# Patient Record
Sex: FEMALE | Race: BLACK OR AFRICAN AMERICAN
[De-identification: names, ages, dates, MRNs, and addresses within clinical notes are randomized per-mention and may not be internally consistent; named-entity substitution may affect disease eponyms.]

---

## 2017-01-21 ENCOUNTER — HOSPITAL ENCOUNTER (INPATIENT)
Dept: HOSPITAL 72 - EMR | Age: 62
LOS: 9 days | Discharge: HOME HEALTH | DRG: 388 | End: 2017-01-30
Payer: MEDICARE

## 2017-01-21 VITALS — SYSTOLIC BLOOD PRESSURE: 110 MMHG | DIASTOLIC BLOOD PRESSURE: 63 MMHG

## 2017-01-21 VITALS — HEIGHT: 66 IN | BODY MASS INDEX: 26.36 KG/M2 | WEIGHT: 164 LBS

## 2017-01-21 VITALS — DIASTOLIC BLOOD PRESSURE: 61 MMHG | SYSTOLIC BLOOD PRESSURE: 110 MMHG

## 2017-01-21 VITALS — DIASTOLIC BLOOD PRESSURE: 64 MMHG | SYSTOLIC BLOOD PRESSURE: 119 MMHG

## 2017-01-21 DIAGNOSIS — G40.909: ICD-10-CM

## 2017-01-21 DIAGNOSIS — K59.00: ICD-10-CM

## 2017-01-21 DIAGNOSIS — J44.1: ICD-10-CM

## 2017-01-21 DIAGNOSIS — M54.16: ICD-10-CM

## 2017-01-21 DIAGNOSIS — K50.90: ICD-10-CM

## 2017-01-21 DIAGNOSIS — G93.40: ICD-10-CM

## 2017-01-21 DIAGNOSIS — K86.1: ICD-10-CM

## 2017-01-21 DIAGNOSIS — I10: ICD-10-CM

## 2017-01-21 DIAGNOSIS — K52.9: ICD-10-CM

## 2017-01-21 DIAGNOSIS — I50.9: ICD-10-CM

## 2017-01-21 DIAGNOSIS — Z86.73: ICD-10-CM

## 2017-01-21 DIAGNOSIS — K56.7: Primary | ICD-10-CM

## 2017-01-21 DIAGNOSIS — I95.9: ICD-10-CM

## 2017-01-21 DIAGNOSIS — F41.9: ICD-10-CM

## 2017-01-21 DIAGNOSIS — F31.81: ICD-10-CM

## 2017-01-21 DIAGNOSIS — F11.20: ICD-10-CM

## 2017-01-21 DIAGNOSIS — J84.9: ICD-10-CM

## 2017-01-21 DIAGNOSIS — E86.0: ICD-10-CM

## 2017-01-21 LAB
ALBUMIN/GLOB SERPL: 0.6 {RATIO} (ref 1–2.7)
ALT SERPL-CCNC: 9 U/L (ref 3–33)
ANION GAP SERPL CALC-SCNC: 21 MMOL/L (ref 5–15)
APPEARANCE UR: (no result)
APTT BLD: 29 SEC (ref 23–33)
AST SERPL-CCNC: 19 U/L (ref 5–40)
BACTERIA #/AREA URNS HPF: (no result) /HPF
BASOPHILS NFR BLD AUTO: 0.9 % (ref 0–2)
CALCIUM SERPL-MCNC: 10.6 MG/DL (ref 8.6–10.2)
CHLORIDE SERPL-SCNC: 99 MEQ/L (ref 98–107)
CK MB SERPL-MCNC: 2.9 NG/ML (ref ?–3.8)
CO2 SERPL-SCNC: 15 MEQ/L (ref 20–30)
CREAT SERPL-MCNC: 1.5 MG/DL (ref 0.5–0.9)
EOSINOPHIL NFR BLD AUTO: 1 % (ref 0–3)
ERYTHROCYTE [DISTWIDTH] IN BLOOD BY AUTOMATED COUNT: 17.6 % (ref 11.6–14.8)
GFR SERPLBLD BASED ON 1.73 SQ M-ARVRAT: 42.5 ML/MIN (ref 60–?)
GLOBULIN SER-MCNC: 6.5 G/DL
HEMOLYSIS: 49
ICTOTEST: NEGATIVE
INR PPP: 1.1 (ref 0.9–1.1)
KETONES UR QL STRIP: NEGATIVE
LEUKOCYTE ESTERASE UR QL STRIP: (no result)
LIPASE SERPL-CCNC: 18 U/L (ref ?–60)
LYMPHOCYTES NFR BLD AUTO: 14 % (ref 20–45)
MCH RBC QN AUTO: 26.6 PG (ref 27–31)
MCHC RBC AUTO-ENTMCNC: 30 G/DL (ref 32–36)
MCV RBC AUTO: 88 FL (ref 80–99)
MONOCYTES NFR BLD AUTO: 6.1 % (ref 1–10)
NEUTROPHILS NFR BLD AUTO: 78 % (ref 45–75)
NITRITE UR QL STRIP: NEGATIVE
PH UR STRIP: 6 [PH] (ref 4.5–8)
PLATELET # BLD: 372 K/UL (ref 150–450)
PMV BLD AUTO: 6.5 FL (ref 6.5–10.1)
POTASSIUM SERPL-SCNC: 4.5 MEQ/L (ref 3.4–4.9)
PROT SERPL-MCNC: 10.4 G/DL (ref 6.6–8.7)
PROT UR QL STRIP: (no result)
PROTHROMBIN TIME: 11.2 SEC (ref 9.3–11.5)
RBC # BLD AUTO: 5.39 M/UL (ref 4.2–5.4)
RBC #/AREA URNS HPF: (no result) /HPF (ref 0–2)
SODIUM SERPL-SCNC: 135 MEQ/L (ref 135–145)
SP GR UR STRIP: 1.02 (ref 1–1.03)
SQUAMOUS #/AREA URNS LPF: (no result) /LPF
TROPONIN I SERPL-MCNC: < 0.3 NG/ML (ref ?–0.3)
UROBILINOGEN UR-MCNC: NORMAL MG/DL (ref 0–1)
WBC # BLD AUTO: 17.1 K/UL (ref 4.8–10.8)

## 2017-01-21 PROCEDURE — 80299 QUANTITATIVE ASSAY DRUG: CPT

## 2017-01-21 PROCEDURE — 87040 BLOOD CULTURE FOR BACTERIA: CPT

## 2017-01-21 PROCEDURE — 85610 PROTHROMBIN TIME: CPT

## 2017-01-21 PROCEDURE — 94640 AIRWAY INHALATION TREATMENT: CPT

## 2017-01-21 PROCEDURE — 82150 ASSAY OF AMYLASE: CPT

## 2017-01-21 PROCEDURE — 94664 DEMO&/EVAL PT USE INHALER: CPT

## 2017-01-21 PROCEDURE — 87086 URINE CULTURE/COLONY COUNT: CPT

## 2017-01-21 PROCEDURE — 87493 C DIFF AMPLIFIED PROBE: CPT

## 2017-01-21 PROCEDURE — 85025 COMPLETE CBC W/AUTO DIFF WBC: CPT

## 2017-01-21 PROCEDURE — 85730 THROMBOPLASTIN TIME PARTIAL: CPT

## 2017-01-21 PROCEDURE — 81003 URINALYSIS AUTO W/O SCOPE: CPT

## 2017-01-21 PROCEDURE — 71010: CPT

## 2017-01-21 PROCEDURE — 80048 BASIC METABOLIC PNL TOTAL CA: CPT

## 2017-01-21 PROCEDURE — 82550 ASSAY OF CK (CPK): CPT

## 2017-01-21 PROCEDURE — 74176 CT ABD & PELVIS W/O CONTRAST: CPT

## 2017-01-21 PROCEDURE — 84484 ASSAY OF TROPONIN QUANT: CPT

## 2017-01-21 PROCEDURE — 83690 ASSAY OF LIPASE: CPT

## 2017-01-21 PROCEDURE — 93005 ELECTROCARDIOGRAM TRACING: CPT

## 2017-01-21 PROCEDURE — 80053 COMPREHEN METABOLIC PANEL: CPT

## 2017-01-21 PROCEDURE — 36415 COLL VENOUS BLD VENIPUNCTURE: CPT

## 2017-01-21 PROCEDURE — 82553 CREATINE MB FRACTION: CPT

## 2017-01-21 RX ADMIN — DEXTROSE MONOHYDRATE SCH MLS/HR: 50 INJECTION, SOLUTION INTRAVENOUS at 22:08

## 2017-01-21 RX ADMIN — HEPARIN SODIUM SCH UNITS: 5000 INJECTION INTRAVENOUS; SUBCUTANEOUS at 21:00

## 2017-01-21 RX ADMIN — TOPIRAMATE SCH MG: 25 TABLET, COATED ORAL at 19:23

## 2017-01-21 RX ADMIN — DEXTROSE AND SODIUM CHLORIDE SCH MLS/HR: 5; .45 INJECTION, SOLUTION INTRAVENOUS at 16:57

## 2017-01-21 RX ADMIN — MORPHINE SULFATE PRN MG: 2 INJECTION, SOLUTION INTRAMUSCULAR; INTRAVENOUS at 16:54

## 2017-01-21 NOTE — EMERGENCY ROOM REPORT
History of Present Illness


General


Chief Complaint:  Abdominal Pain


Source:  Patient





Present Illness


HPI


Patient presents with complaints of diffuse abdominal pain


Left lower along with left upper quadrant patient has complaints of diarrhea


Along with vomiting as well





Patient has had fairly extensive past medical history including small bowel 

Scioscia in with 2 different surgeries patient has also had Crohn's





Given the increased diarrhea and the pain she was concerning came to the ER





Patient reports being on Bactrim recently


And has 2 days left denies any fevers or chills denies any chest pain or 

shortness of breath abdominal pain is 6/10





Patient's pain management doctor is Dr. skaggs


Allergies:  


Coded Allergies:  


     No Known Allergies (Verified , 10/27/06)





Patient History


Past Medical History:  see triage record


Pertinent Family History:  none


Reviewed Nursing Documentation:  PMH: Agreed, PSxH: Agreed





Nursing Documentation-PMH


Past Medical History:  No History, Except For


Hx Cardiac Problems:  Yes - MI 2016


Hx Hypertension:  No


Hx Pacemaker:  No


Hx Asthma:  Yes


Hx COPD:  Yes


Hx Diabetes:  No


Hx Cancer:  No


Hx Gastrointestinal Problems:  Yes - Chrohns


Hx Dialysis:  No


Hx Neurological Problems:  Yes


Hx Cerebrovascular Accident:  Yes - 2005


Hx Seizures:  Yes


Hx Epilepsy:  Yes


Hx Vertigo:  Yes


Hx Dizziness:  Yes


Hx Syncope:  Yes


Hx Headaches:  Yes


Hx Weakness:  Yes


Hx Fatigue:  Yes





Review of Systems


All Other Systems:  negative except mentioned in HPI





Physical Exam





Vital Signs








  Date Time  Temp Pulse Resp B/P Pulse Ox O2 Delivery O2 Flow Rate FiO2


 


1/21/17 12:26 99.3 77 14 86/50 98 Room Air  








Sp02 EP Interpretation:  reviewed, normal


General Appearance:  well appearing, no apparent distress


Head:  normocephalic, atraumatic


Eyes:  bilateral eye EOMI, bilateral eye PERRL


ENT:  hearing grossly normal, normal pharynx, TMs + canals normal, uvula midline


Neck:  full range of motion, supple, no meningismus, no bony tend


Respiratory:  lungs clear, normal breath sounds, no rhonchi, no respiratory 

distress, no retraction, no accessory muscle use


Cardiovascular #1:  normal peripheral pulses, regular rate, rhythm, no edema, 

no gallop, no JVD, no murmur


Gastrointestinal:  normal bowel sounds, soft, no mass, no organomegaly, non-

distended, no guarding, no hernia, no pulsatile mass, no rebound, tenderness - 

Diffusely, implant in the left lower abdomen, other - Evidence of multiple 

previous abdominal surgeries


Genitourinary:  no CVA tenderness


Musculoskeletal:  normal inspection


Neurologic:  oriented x3, responsive, CNs III-XII nml as tested, motor strength/

tone normal, sensory intact


Psychiatric:  mood/affect normal


Skin:  normal color, no rash, warm/dry, palpation normal


Lymphatic:  normal inspection, no adenopathy





Medical Decision Making


Diagnostic Impression:  


 Primary Impression:  


 Colitis


 Additional Impressions:  


 Leukocytosis


 UTI (urinary tract infection)


 Abdominal pain


 Crohn's disease


ER Course


Given the patient's presentation multiple differentials are considered


Patient's complex  requiring blood work and imaging





Patient's CAT scan shows some possible evidence of ileus no obvious instruction 

blood work does show bladder infection with elevated white blood cell count 

patient also has a history of Crohn's and could have signs of exacerbation


At this time pain is improved and patient admitted for further inpatient care





Labs








Test


  1/21/17


13:18 1/21/17


13:30


 


White Blood Count


  17.1 K/UL


(4.8-10.8) 


 


 


Red Blood Count


  5.39 M/UL


(4.20-5.40) 


 


 


Hemoglobin


  14.3 G/DL


(12.0-16.0) 


 


 


Hematocrit


  47.6 %


(37.0-47.0) 


 


 


Mean Corpuscular Volume 88 FL (80-99)  


 


Mean Corpuscular Hemoglobin


  26.6 PG


(27.0-31.0) 


 


 


Mean Corpuscular Hemoglobin


Concent 30.0 G/DL


(32.0-36.0) 


 


 


Red Cell Distribution Width


  17.6 %


(11.6-14.8) 


 


 


Platelet Count


  372 K/UL


(150-450) 


 


 


Mean Platelet Volume


  6.5 FL


(6.5-10.1) 


 


 


Neutrophils (%) (Auto)


  78.0 %


(45.0-75.0) 


 


 


Lymphocytes (%) (Auto)


  14.0 %


(20.0-45.0) 


 


 


Monocytes (%) (Auto)


  6.1 %


(1.0-10.0) 


 


 


Eosinophils (%) (Auto)


  1.0 %


(0.0-3.0) 


 


 


Basophils (%) (Auto)


  0.9 %


(0.0-2.0) 


 


 


Sodium Level


  135 mEQ/L


(135-145) 


 


 


Potassium Level


  4.5 mEQ/L


(3.4-4.9) 


 


 


Chloride Level


  99 mEQ/L


() 


 


 


Carbon Dioxide Level


  15 mEQ/L


(20-30) 


 


 


Anion Gap 21 (5-15)  


 


Blood Urea Nitrogen


  17 mg/dL


(7-23) 


 


 


Creatinine


  1.5 mg/dL


(0.5-0.9) 


 


 


Estimat Glomerular Filtration


Rate 42.5 mL/min


(>60) 


 


 


Glucose Level


  111 mg/dL


() 


 


 


Calcium Level


  10.6 mg/dL


(8.6-10.2) 


 


 


Total Bilirubin


  < 0.2 mg/dL


(0.0-1.2) 


 


 


Aspartate Amino Transf


(AST/SGOT) 19 U/L (5-40) 


  


 


 


Alanine Aminotransferase


(ALT/SGPT) 9 U/L (3-33) 


  


 


 


Alkaline Phosphatase


  105 U/L


() 


 


 


Total Creatine Kinase


  44 U/L


() 


 


 


Creatine Kinase MB


  2.9 ng/mL (<


3.8) 


 


 


Creatine Kinase MB Relative


Index 6.5 


  


 


 


Troponin I


  < 0.30 ng/mL


(<=0.30) 


 


 


Total Protein


  10.4 g/dL


(6.6-8.7) 


 


 


Albumin


  3.9 g/dL


(3.5-5.2) 


 


 


Globulin 6.5 g/dL  


 


Albumin/Globulin Ratio 0.6 (1.0-2.7)  


 


Lipase 18 U/L (< 60)  


 


Prothrombin Time


  


  11.2 SEC


(9.30-11.50)


 


Prothromb Time International


Ratio 


  1.1 (0.9-1.1) 


 


 


Activated Partial


Thromboplast Time 


  29 SEC (23-33) 


 


 


Urine Color  Chel 


 


Urine Appearance


  


  Slightly


cloudy


 


Urine pH  6 (4.5-8.0) 


 


Urine Specific Gravity


  


  1.020


(1.005-1.035)


 


Urine Protein  2+ (NEGATIVE) 


 


Urine Glucose (UA)


  


  Negative


(NEGATIVE)


 


Urine Ketones


  


  Negative


(NEGATIVE)


 


Urine Occult Blood  3+ (NEGATIVE) 


 


Urine Nitrite


  


  Negative


(NEGATIVE)


 


Urine Bilirubin


  


  Negative


(NEGATIVE)


 


Urine Ictotest  Negative 


 


Urine Urobilinogen


  


  Normal MG/DL


(0.0-1.0)


 


Urine Leukocyte Esterase  2+ (NEGATIVE) 


 


Urine RBC


  


  5-10 /HPF (0 -


2)


 


Urine WBC


  


  10-15 /HPF (0


- 2)


 


Urine Squamous Epithelial


Cells 


  Many /LPF


(NONE/OCC)


 


Urine Bacteria


  


  Many /HPF


(NONE)








Rhythm Strip Diag. Results


EP Interpretation:  yes


Rate:  88


Rhythm:  NSR, no PVC's, no ectopy





Chest X-Ray Diagnostic Results


EP Interpretation:  Yes


Findings:  no consolidation, no effusion, no pneumothorax, other - Interstitial 

disease


Number of Views:  1





CT/MRI/US Diagnostic Results


CT/MRI/US Diagnostic Results :  


   Impression


CT abdomen pelvis:Impression:





Postsurgical changes with mildly dilated fluid-filled small bowel but no 


abruption


transition identified. Moderate fecal retention and gas in the remaining colon.


Ileus could have this appearance with partial obstruction not completely 


excluded.


Clinical correlation/followup recommended.





Focal thickening versus underdistention within the distal stomach series 3 

image 


32.


Further evaluation with upper GI or endoscopy recommended as indicated.





Absent uterus.





Postsurgical changes of anterior abdominal wall mesh.





Interstitial opacities of the lung bases.





Other findings as above.





Last Vital Signs








  Date Time  Temp Pulse Resp B/P Pulse Ox O2 Delivery O2 Flow Rate FiO2


 


1/21/17 12:26 99.3 77 14 86/50 98 Room Air  








Status:  improved


Disposition:  ADMITTED AS INPATIENT


Condition:  Serious


Referrals:  


MAICOL LANG (PCP)











LEA SANCHES D.O. Jan 21, 2017 13:15

## 2017-01-21 NOTE — INFECTIOUS DISEASES PROG NOTE
Assessment/Plan


Problems:  


(1) UTI (urinary tract infection)


Assessment & Plan:  will continue zosyn for now and send urine culture 





(2) Sepsis


Assessment & Plan:  will send blood culture and continue zosyn for now





(3) Abdominal pain


Assessment & Plan:  continue pain management as per primary, check CT abdomen





(4) Crohn's disease


Assessment & Plan:  consult GI, continue meds





(5) Nausea and vomiting


Assessment & Plan:  continue supportive care





(6) Diarrhea


Assessment & Plan:  will send stool for C diff and culture 








Subjective


Allergies:  


Coded Allergies:  


     No Known Allergies (Verified , 10/27/06)





Objective


Vital Signs





Last 24 Hour Vital Signs








  Date Time  Temp Pulse Resp B/P Pulse Ox O2 Delivery O2 Flow Rate FiO2


 


1/21/17 15:15 99.3 95 16 110/61 98 Room Air  


 


1/21/17 15:14 99.3       


 


1/21/17 14:00 99.3       


 


1/21/17 13:47 99.3 95 16 110/61 98 Room Air  


 


1/21/17 12:26 99.3 77 14 86/50 98 Room Air  








Height (Feet):  5


Height (Inches):  6.00


Weight (Pounds):  164





Laboratory Tests








Test


  1/21/17


13:18 1/21/17


13:30


 


White Blood Count


  17.1 K/UL


(4.8-10.8)  H 


 


 


Red Blood Count


  5.39 M/UL


(4.20-5.40) 


 


 


Hemoglobin


  14.3 G/DL


(12.0-16.0) 


 


 


Hematocrit


  47.6 %


(37.0-47.0)  H 


 


 


Mean Corpuscular Volume 88 FL (80-99)   


 


Mean Corpuscular Hemoglobin


  26.6 PG


(27.0-31.0)  L 


 


 


Mean Corpuscular Hemoglobin


Concent 30.0 G/DL


(32.0-36.0)  L 


 


 


Red Cell Distribution Width


  17.6 %


(11.6-14.8)  H 


 


 


Platelet Count


  372 K/UL


(150-450) 


 


 


Mean Platelet Volume


  6.5 FL


(6.5-10.1) 


 


 


Neutrophils (%) (Auto)


  78.0 %


(45.0-75.0)  H 


 


 


Lymphocytes (%) (Auto)


  14.0 %


(20.0-45.0)  L 


 


 


Monocytes (%) (Auto)


  6.1 %


(1.0-10.0) 


 


 


Eosinophils (%) (Auto)


  1.0 %


(0.0-3.0) 


 


 


Basophils (%) (Auto)


  0.9 %


(0.0-2.0) 


 


 


Sodium Level


  135 mEQ/L


(135-145) 


 


 


Potassium Level


  4.5 mEQ/L


(3.4-4.9) 


 


 


Chloride Level


  99 mEQ/L


() 


 


 


Carbon Dioxide Level


  15 mEQ/L


(20-30)  L 


 


 


Anion Gap 21 (5-15)  H 


 


Blood Urea Nitrogen


  17 mg/dL


(7-23) 


 


 


Creatinine


  1.5 mg/dL


(0.5-0.9)  H 


 


 


Estimat Glomerular Filtration


Rate 42.5 mL/min


(>60) 


 


 


Glucose Level


  111 mg/dL


()  H 


 


 


Calcium Level


  10.6 mg/dL


(8.6-10.2)  H 


 


 


Total Bilirubin


  < 0.2 mg/dL


(0.0-1.2) 


 


 


Aspartate Amino Transf


(AST/SGOT) 19 U/L (5-40)  


  


 


 


Alanine Aminotransferase


(ALT/SGPT) 9 U/L (3-33)  


  


 


 


Alkaline Phosphatase


  105 U/L


()  H 


 


 


Total Creatine Kinase


  44 U/L


() 


 


 


Creatine Kinase MB


  2.9 ng/mL (<


3.8) 


 


 


Creatine Kinase MB Relative


Index 6.5  


  


 


 


Troponin I


  < 0.30 ng/mL


(<=0.30) 


 


 


Total Protein


  10.4 g/dL


(6.6-8.7)  H 


 


 


Albumin


  3.9 g/dL


(3.5-5.2) 


 


 


Globulin 6.5 g/dL   


 


Albumin/Globulin Ratio


  0.6 (1.0-2.7)


L 


 


 


Lipase 18 U/L (< 60)   


 


Prothrombin Time


  


  11.2 SEC


(9.30-11.50)


 


Prothromb Time International


Ratio 


  1.1 (0.9-1.1)  


 


 


Activated Partial


Thromboplast Time 


  29 SEC (23-33)


 


 


Urine Color  Chel  


 


Urine Appearance


  


  Slightly


cloudy


 


Urine pH  6 (4.5-8.0)  


 


Urine Specific Gravity


  


  1.020


(1.005-1.035)


 


Urine Protein


  


  2+ (NEGATIVE)


H


 


Urine Glucose (UA)


  


  Negative


(NEGATIVE)


 


Urine Ketones


  


  Negative


(NEGATIVE)


 


Urine Occult Blood


  


  3+ (NEGATIVE)


H


 


Urine Nitrite


  


  Negative


(NEGATIVE)


 


Urine Bilirubin


  


  Negative


(NEGATIVE)


 


Urine Ictotest  Negative  


 


Urine Urobilinogen


  


  Normal MG/DL


(0.0-1.0)


 


Urine Leukocyte Esterase


  


  2+ (NEGATIVE)


H


 


Urine RBC


  


  5-10 /HPF (0 -


2)  H


 


Urine WBC


  


  10-15 /HPF (0


- 2)  H


 


Urine Squamous Epithelial


Cells 


  Many /LPF


(NONE/OCC)  H


 


Urine Bacteria


  


  Many /HPF


(NONE)  H











Current Medications








 Medications


  (Trade)  Dose


 Ordered  Sig/Jed


 Route


 PRN Reason  Start Time


 Stop Time Status Last Admin


Dose Admin


 


 Acetaminophen


  (Tylenol)  650 mg  Q4H  PRN


 ORAL


 fever  1/21/17 14:45


 2/20/17 14:44   


 


 


 Al Hydroxide/Mg


 Hydroxide


  (Mylanta II)  30 ml  Q6H  PRN


 ORAL


 dyspepsia  1/21/17 14:45


 2/20/17 14:44   


 


 


 Dextrose     STAT  PRN


 IV


 Hypoglycemia  1/21/17 14:45


 2/20/17 14:44   


 


 


 Dextrose/Sodium


 Chloride


  (D5 0.45% NS)  1,000 ml @ 


 75 mls/hr  [id number]


 IV


   1/21/17 15:00


 2/20/17 14:59   


 


 


 Diphenhydramine


 HCl


  (Benadryl)  25 mg  Q6H  PRN


 ORAL


 Itching/Pruritis  1/21/17 14:45


 2/20/17 14:44   


 


 


 Duloxetine HCl


  (Cymbalta)  60 mg  DAILY


 ORAL


   1/22/17 09:00


 2/21/17 08:59   


 


 


 Heparin Sodium


  (Porcine)


  (Heparin 5000


 units/ml)  5,000 units  EVERY 12  HOURS


 SUBQ


   1/21/17 21:00


 2/20/17 20:59   


 


 


 Levetiracetam


  (Keppra)  1,500 mg  TWICE A  DAY


 ORAL


   1/21/17 18:00


 2/20/17 17:59   


 


 


 Mirtazapine


  (Remeron)  30 mg  BEDTIME


 ORAL


   1/21/17 21:00


 2/20/17 20:59   


 


 


 Morphine Sulfate


  (Morphine


 Sulfate)  2 mg  Q4H  PRN


 IVP


 severe  Pain (Pain Scale 7-10)  1/21/17 14:45


 1/28/17 14:44   


 


 


 Nitroglycerin


  (Ntg)  0.4 mg  Q5M X 3 DOSES PRN


 SL


 Prn Chest Pain  1/21/17 14:45


 2/20/17 14:44   


 


 


 Ondansetron HCl


  (Zofran)  4 mg  Q6H  PRN


 IVP


 Nausea & Vomiting  1/21/17 14:45


 2/20/17 14:44   


 


 


 Piperacillin Sod/


 Tazobactam Sod/


 Sodium Chloride


  (Zosyn/Sodium


 Chloride)  110 ml @ 


 27.5 mls/hr  EVERY 8  HOURS


 IVPB


   1/21/17 22:00


 1/28/17 21:59   


 


 


 Polyethylene


 Glycol


  (Miralax)  17 gm  HSPRN  PRN


 ORAL


 Constipation  1/21/17 14:45


 2/20/17 14:44   


 


 


 Quetiapine


 Fumarate


  (SEROquel)  200 mg  DAILY


 ORAL


   1/22/17 09:00


 2/21/17 08:59   


 


 


 Temazepam


  (Restoril)  15 mg  HSPRN  PRN


 ORAL


 Insomnia  1/21/17 14:45


 1/28/17 14:44   


 


 


 Topiramate 25 mg  25 mg  TWICE A  DAY


 ORAL


   1/21/17 18:00


 2/20/17 17:59   


 

















Violet Dior M.D. Jan 21, 2017 16:29

## 2017-01-21 NOTE — CONSULTATION
History of Present Illness


General


Date patient seen:  Jan 21, 2017


Time patient seen:  17:30


Chief Complaint:  Abdominal Pain


Referring physician:  Sandeep


Reason for Consultation:  internal medicine, hx of asthma





Present Illness


HPI


61 y/old female presented to [facility] with complaints of abdominal pain for few days 


pain was getting progressively worse, not radiating,  increased abdomen, 


diarrhea earlier today, foul smelling , no blood in Stool 


denies n/v/, 


denies fevers, chills


patient with hx of Crohn disease 


in ED found that patient has leukocytosis, 


CT A/P completed, results pending 


patient was admitted for further management


Allergies:  


Coded Allergies:  


     No Known Allergies (Verified , 10/27/06)





Medication History


Scheduled


Duloxetine Hcl* (Cymbalta*), 60 MG ORAL DAILY, (Reported)


Levetiracetam* (Levetiracetam*), 1,500 MG ORAL TWICE A DAY, (Reported)


Levofloxacin* (Levaquin*), 250 MG ORAL DAILY, (Reported)


Mesalamine (Pentasa), 1,000 MG ORAL TID, (Reported)


Metronidazole* (Flagyl*), 500 MG ORAL EVERY 8 HOURS, (Reported)


Mirtazapine* (Mirtazapine*), 30 MG ORAL BEDTIME, (Reported)


Quetiapine Fumarate* (Seroquel*), 200 MG ORAL DAILY, (Reported)


Ranitidine Hcl* (Zantac*), 150 MG ORAL TWICE A DAY, (Reported)


Topiramate* (Topamax*), 25 MG ORAL TWICE A DAY, (Reported)





Scheduled PRN


Albuterol Sulfate* (Albuterol Sulfate Hhn*), 3 ML INH Q4H PRN for Shortness of 

Breath, (Reported)


Temazepam* (Restoril*), 15 MG ORAL BEDTIME PRN for Insomnia, (Reported)





Miscellaneous Medications


Diphenhydramine HCl (Benadryl), 25 MG PO, (Reported)





Patient History


History Provided By:  Patient


Healthcare decision maker





Resuscitation status





Advanced Directive on File








Past Medical/Surgical History


Past Medical/Surgical History:  


(1) Asthma


(2) Acute encephalopathy


(3) Hx SBO


(4) Anemia


(5) Crohns disease


(6) Chronic pain syndrome


(7) Abdominal pain


(8) Seizure


(9) Radiculopathy of lumbar region


(10) Chronic pancreatitis





Review of Systems


Constitutional:  Reports: weakness


Eye:  Reports: no symptoms


ENT:  Reports: no symptoms


Respiratory:  Reports: other - asthma, shortness of breath


Cardiovascular:  Reports: see HPI


Gastrointestinal:  Reports: see HPI


Genitourinary:  Reports: no symptoms


Musculoskeletal:  Reports: back pain, muscle stiffness


Skin:  Reports: no symptoms


Psychiatric:  Reports: depressed feelings


Neurological:  Reports: other - hx of CVA


Endocrine:  Reports: no symptoms


Hematologic/Lymphatic:  Reports: anemia





Physical Exam


General Appearance:  WD/WN, no apparent distress, alert


Lines, tubes and drains:  peripheral


HEENT:  normocephalic, mucous membranes moist


Neck:  normal alignment, supple


Respiratory/Chest:  chest wall non-tender, lungs clear - with moderate air 

entry , no respiratory distress, no accessory muscle use


Cardiovascular/Chest:  normal peripheral pulses, normal rate, regular rhythm, 

no JVD


Abdomen:  normal bowel sounds, soft - mild diffused tenderness, no rebound, 

noguarding, + distended


Extremities:  normal range of motion


Neurologic:  no motor/sensory deficits, alert, oriented x 3


Musculoskeletal:  normal muscle bulk





Last 24 Hour Vital Signs








  Date Time  Temp Pulse Resp B/P Pulse Ox O2 Delivery O2 Flow Rate FiO2


 


1/21/17 15:15 99.3 95 16 110/61 98 Room Air  


 


1/21/17 15:14 99.3       


 


1/21/17 14:00 99.3       


 


1/21/17 13:47 99.3 95 16 110/61 98 Room Air  


 


1/21/17 12:26 99.3 77 14 86/50 98 Room Air  











Laboratory Tests








Test


  1/21/17


13:18 1/21/17


13:30


 


White Blood Count


  17.1 K/UL


(4.8-10.8)  H 


 


 


Red Blood Count


  5.39 M/UL


(4.20-5.40) 


 


 


Hemoglobin


  14.3 G/DL


(12.0-16.0) 


 


 


Hematocrit


  47.6 %


(37.0-47.0)  H 


 


 


Mean Corpuscular Volume 88 FL (80-99)   


 


Mean Corpuscular Hemoglobin


  26.6 PG


(27.0-31.0)  L 


 


 


Mean Corpuscular Hemoglobin


Concent 30.0 G/DL


(32.0-36.0)  L 


 


 


Red Cell Distribution Width


  17.6 %


(11.6-14.8)  H 


 


 


Platelet Count


  372 K/UL


(150-450) 


 


 


Mean Platelet Volume


  6.5 FL


(6.5-10.1) 


 


 


Neutrophils (%) (Auto)


  78.0 %


(45.0-75.0)  H 


 


 


Lymphocytes (%) (Auto)


  14.0 %


(20.0-45.0)  L 


 


 


Monocytes (%) (Auto)


  6.1 %


(1.0-10.0) 


 


 


Eosinophils (%) (Auto)


  1.0 %


(0.0-3.0) 


 


 


Basophils (%) (Auto)


  0.9 %


(0.0-2.0) 


 


 


Sodium Level


  135 mEQ/L


(135-145) 


 


 


Potassium Level


  4.5 mEQ/L


(3.4-4.9) 


 


 


Chloride Level


  99 mEQ/L


() 


 


 


Carbon Dioxide Level


  15 mEQ/L


(20-30)  L 


 


 


Anion Gap 21 (5-15)  H 


 


Blood Urea Nitrogen


  17 mg/dL


(7-23) 


 


 


Creatinine


  1.5 mg/dL


(0.5-0.9)  H 


 


 


Estimat Glomerular Filtration


Rate 42.5 mL/min


(>60) 


 


 


Glucose Level


  111 mg/dL


()  H 


 


 


Calcium Level


  10.6 mg/dL


(8.6-10.2)  H 


 


 


Total Bilirubin


  < 0.2 mg/dL


(0.0-1.2) 


 


 


Aspartate Amino Transf


(AST/SGOT) 19 U/L (5-40)  


  


 


 


Alanine Aminotransferase


(ALT/SGPT) 9 U/L (3-33)  


  


 


 


Alkaline Phosphatase


  105 U/L


()  H 


 


 


Total Creatine Kinase


  44 U/L


() 


 


 


Creatine Kinase MB


  2.9 ng/mL (<


3.8) 


 


 


Creatine Kinase MB Relative


Index 6.5  


  


 


 


Troponin I


  < 0.30 ng/mL


(<=0.30) 


 


 


Total Protein


  10.4 g/dL


(6.6-8.7)  H 


 


 


Albumin


  3.9 g/dL


(3.5-5.2) 


 


 


Globulin 6.5 g/dL   


 


Albumin/Globulin Ratio


  0.6 (1.0-2.7)


L 


 


 


Lipase 18 U/L (< 60)   


 


Prothrombin Time


  


  11.2 SEC


(9.30-11.50)


 


Prothromb Time International


Ratio 


  1.1 (0.9-1.1)  


 


 


Activated Partial


Thromboplast Time 


  29 SEC (23-33)


 


 


Urine Color  Chel  


 


Urine Appearance


  


  Slightly


cloudy


 


Urine pH  6 (4.5-8.0)  


 


Urine Specific Gravity


  


  1.020


(1.005-1.035)


 


Urine Protein


  


  2+ (NEGATIVE)


H


 


Urine Glucose (UA)


  


  Negative


(NEGATIVE)


 


Urine Ketones


  


  Negative


(NEGATIVE)


 


Urine Occult Blood


  


  3+ (NEGATIVE)


H


 


Urine Nitrite


  


  Negative


(NEGATIVE)


 


Urine Bilirubin


  


  Negative


(NEGATIVE)


 


Urine Ictotest  Negative  


 


Urine Urobilinogen


  


  Normal MG/DL


(0.0-1.0)


 


Urine Leukocyte Esterase


  


  2+ (NEGATIVE)


H


 


Urine RBC


  


  5-10 /HPF (0 -


2)  H


 


Urine WBC


  


  10-15 /HPF (0


- 2)  H


 


Urine Squamous Epithelial


Cells 


  Many /LPF


(NONE/OCC)  H


 


Urine Bacteria


  


  Many /HPF


(NONE)  H








Height (Feet):  5


Height (Inches):  6.00


Weight (Pounds):  164


Medications





Current Medications








 Medications


  (Trade)  Dose


 Ordered  Sig/Jed


 Route


 PRN Reason  Start Time


 Stop Time Status Last Admin


Dose Admin


 


 Acetaminophen


  (Tylenol)  650 mg  Q4H  PRN


 ORAL


 fever  1/21/17 14:45


 2/20/17 14:44   


 


 


 Al Hydroxide/Mg


 Hydroxide


  (Mylanta II)  30 ml  Q6H  PRN


 ORAL


 dyspepsia  1/21/17 14:45


 2/20/17 14:44   


 


 


 Dextrose     STAT  PRN


 IV


 Hypoglycemia  1/21/17 14:45


 2/20/17 14:44   


 


 


 Dextrose/Sodium


 Chloride


  (D5 0.45% NS)  1,000 ml @ 


 75 mls/hr  [id number]


 IV


   1/21/17 15:00


 2/20/17 14:59  1/21/17 16:57


 


 


 Diphenhydramine


 HCl


  (Benadryl)  25 mg  Q6H  PRN


 ORAL


 Itching/Pruritis  1/21/17 14:45


 2/20/17 14:44   


 


 


 Duloxetine HCl


  (Cymbalta)  60 mg  DAILY


 ORAL


   1/22/17 09:00


 2/21/17 08:59   


 


 


 Heparin Sodium


  (Porcine)


  (Heparin 5000


 units/ml)  5,000 units  EVERY 12  HOURS


 SUBQ


   1/21/17 21:00


 2/20/17 20:59   


 


 


 Levetiracetam


  (Keppra)  1,500 mg  TWICE A  DAY


 ORAL


   1/21/17 18:00


 2/20/17 17:59   


 


 


 Mirtazapine


  (Remeron)  30 mg  BEDTIME


 ORAL


   1/21/17 21:00


 2/20/17 20:59   


 


 


 Morphine Sulfate


  (Morphine


 Sulfate)  2 mg  Q4H  PRN


 IVP


 severe  Pain (Pain Scale 7-10)  1/21/17 14:45


 1/28/17 14:44  1/21/17 16:54


 


 


 Nitroglycerin


  (Ntg)  0.4 mg  Q5M X 3 DOSES PRN


 SL


 Prn Chest Pain  1/21/17 14:45


 2/20/17 14:44   


 


 


 Ondansetron HCl


  (Zofran)  4 mg  Q6H  PRN


 IVP


 Nausea & Vomiting  1/21/17 14:45


 2/20/17 14:44  1/21/17 16:54


 


 


 Piperacillin Sod/


 Tazobactam Sod/


 Sodium Chloride


  (Zosyn/Sodium


 Chloride)  110 ml @ 


 27.5 mls/hr  EVERY 8  HOURS


 IVPB


   1/21/17 22:00


 1/28/17 21:59   


 


 


 Polyethylene


 Glycol


  (Miralax)  17 gm  HSPRN  PRN


 ORAL


 Constipation  1/21/17 14:45


 2/20/17 14:44   


 


 


 Quetiapine


 Fumarate


  (SEROquel)  200 mg  DAILY


 ORAL


   1/22/17 09:00


 2/21/17 08:59   


 


 


 Temazepam


  (Restoril)  15 mg  HSPRN  PRN


 ORAL


 Insomnia  1/21/17 14:45


 1/28/17 14:44   


 


 


 Topiramate 25 mg  25 mg  TWICE A  DAY


 ORAL


   1/21/17 18:00


 2/20/17 17:59   


 











Assessment/Plan


Assessment/Plan


ASSESSMENT


sepsis 


leukocytosis 


colitis  


r/o C dif colitis 


hx of Crohn disease ? flare  


chronic pancreatics 


UTI 


asthma 


chronic pain syndrome 


seizure disorder 


hx of CVA 





PLAN OF CARE 


tele


NPO 


IVF 


empiric abx


fup with CT AP results


stool C dif , fup with other cx 


 


GI consult as per PMD discretion 


check amylase, lipase  


pan management  


antiemetic prn 


seizure precautions, 


continue Keppra 


O2, HHN prn  


no evidence of asthma exacerbation   


DVT prophylaxis   





case discussed and evaluated by supervising physician











Rozina Henry NP ([place]) Jan 21, 2017 17:25

## 2017-01-22 VITALS — SYSTOLIC BLOOD PRESSURE: 146 MMHG | DIASTOLIC BLOOD PRESSURE: 86 MMHG

## 2017-01-22 VITALS — SYSTOLIC BLOOD PRESSURE: 136 MMHG | DIASTOLIC BLOOD PRESSURE: 71 MMHG

## 2017-01-22 VITALS — SYSTOLIC BLOOD PRESSURE: 145 MMHG | DIASTOLIC BLOOD PRESSURE: 86 MMHG

## 2017-01-22 VITALS — DIASTOLIC BLOOD PRESSURE: 92 MMHG | SYSTOLIC BLOOD PRESSURE: 140 MMHG

## 2017-01-22 VITALS — SYSTOLIC BLOOD PRESSURE: 104 MMHG | DIASTOLIC BLOOD PRESSURE: 66 MMHG

## 2017-01-22 VITALS — SYSTOLIC BLOOD PRESSURE: 134 MMHG | DIASTOLIC BLOOD PRESSURE: 77 MMHG

## 2017-01-22 VITALS — DIASTOLIC BLOOD PRESSURE: 77 MMHG | SYSTOLIC BLOOD PRESSURE: 150 MMHG

## 2017-01-22 LAB
ALBUMIN/GLOB SERPL: 0.5 {RATIO} (ref 1–2.7)
ALT SERPL-CCNC: 6 U/L (ref 3–33)
AMYLASE SERPL-CCNC: 88 U/L (ref 10–110)
ANION GAP SERPL CALC-SCNC: 18 MMOL/L (ref 5–15)
AST SERPL-CCNC: 10 U/L (ref 5–40)
BASOPHILS NFR BLD AUTO: 0.5 % (ref 0–2)
CALCIUM SERPL-MCNC: 9.3 MG/DL (ref 8.6–10.2)
CHLORIDE SERPL-SCNC: 98 MEQ/L (ref 98–107)
CO2 SERPL-SCNC: 18 MEQ/L (ref 20–30)
CREAT SERPL-MCNC: 1.2 MG/DL (ref 0.5–0.9)
EOSINOPHIL NFR BLD AUTO: 0.1 % (ref 0–3)
ERYTHROCYTE [DISTWIDTH] IN BLOOD BY AUTOMATED COUNT: 16.7 % (ref 11.6–14.8)
GFR SERPLBLD BASED ON 1.73 SQ M-ARVRAT: 54.9 ML/MIN (ref 60–?)
GLOBULIN SER-MCNC: 5.8 G/DL
HEMOLYSIS: 1
LIPASE SERPL-CCNC: 13 U/L (ref ?–60)
LYMPHOCYTES NFR BLD AUTO: 12.1 % (ref 20–45)
MCH RBC QN AUTO: 25.7 PG (ref 27–31)
MCHC RBC AUTO-ENTMCNC: 29.5 G/DL (ref 32–36)
MCV RBC AUTO: 87 FL (ref 80–99)
MONOCYTES NFR BLD AUTO: 7.3 % (ref 1–10)
NEUTROPHILS NFR BLD AUTO: 80.1 % (ref 45–75)
PLATELET # BLD: 390 K/UL (ref 150–450)
PMV BLD AUTO: 6.7 FL (ref 6.5–10.1)
POTASSIUM SERPL-SCNC: 4 MEQ/L (ref 3.4–4.9)
PROT SERPL-MCNC: 8.9 G/DL (ref 6.6–8.7)
RBC # BLD AUTO: 5.14 M/UL (ref 4.2–5.4)
SODIUM SERPL-SCNC: 134 MEQ/L (ref 135–145)
WBC # BLD AUTO: 12 K/UL (ref 4.8–10.8)

## 2017-01-22 RX ADMIN — TOPIRAMATE SCH MG: 25 TABLET, COATED ORAL at 18:06

## 2017-01-22 RX ADMIN — HEPARIN SODIUM SCH UNITS: 5000 INJECTION INTRAVENOUS; SUBCUTANEOUS at 21:18

## 2017-01-22 RX ADMIN — DEXTROSE MONOHYDRATE SCH MLS/HR: 50 INJECTION, SOLUTION INTRAVENOUS at 06:05

## 2017-01-22 RX ADMIN — DULOXETINE HYDROCHLORIDE SCH MG: 30 CAPSULE, DELAYED RELEASE ORAL at 09:05

## 2017-01-22 RX ADMIN — TOPIRAMATE SCH MG: 25 TABLET, COATED ORAL at 09:06

## 2017-01-22 RX ADMIN — DEXTROSE MONOHYDRATE SCH MLS/HR: 50 INJECTION, SOLUTION INTRAVENOUS at 13:55

## 2017-01-22 RX ADMIN — DEXTROSE AND SODIUM CHLORIDE SCH MLS/HR: 5; .45 INJECTION, SOLUTION INTRAVENOUS at 21:21

## 2017-01-22 RX ADMIN — MORPHINE SULFATE PRN MG: 2 INJECTION, SOLUTION INTRAMUSCULAR; INTRAVENOUS at 00:29

## 2017-01-22 RX ADMIN — HEPARIN SODIUM SCH UNITS: 5000 INJECTION INTRAVENOUS; SUBCUTANEOUS at 09:10

## 2017-01-22 RX ADMIN — QUETIAPINE SCH MG: 200 TABLET, FILM COATED ORAL at 09:05

## 2017-01-22 RX ADMIN — MORPHINE SULFATE PRN MG: 2 INJECTION, SOLUTION INTRAMUSCULAR; INTRAVENOUS at 04:34

## 2017-01-22 RX ADMIN — DEXTROSE AND SODIUM CHLORIDE SCH MLS/HR: 5; .45 INJECTION, SOLUTION INTRAVENOUS at 17:40

## 2017-01-22 RX ADMIN — DEXTROSE AND SODIUM CHLORIDE SCH MLS/HR: 5; .45 INJECTION, SOLUTION INTRAVENOUS at 04:20

## 2017-01-22 RX ADMIN — DEXTROSE MONOHYDRATE SCH MLS/HR: 50 INJECTION, SOLUTION INTRAVENOUS at 21:16

## 2017-01-22 NOTE — PULMONOLOGY PROGRESS NOTE
Assessment/Plan


Assessment/Plan


  ASSESSMENT


sepsis 


leukocytosis 


colitis  


r/o C dif colitis  


ileus  


possible partial SBO 


fecal impaction 


hx of Crohn disease ? flare  


chronic pancreatitis


UTI 


asthma 


chronic pain syndrome 


seizure disorder 


hx of CVA 





PLAN OF CARE 


 


NPO 


IVF 


empiric abx


CT AP  with moderate fecal retention and gas in the remaining colon.


Ileus could have this appearance with partial obstruction not completely  

excluded.


stool C dif , fup with other cx 


GI consult as per PMD discretion 


check amylase, lipase - pending 


pan management  


antiemetic prn 


seizure precautions, 


continue Keppra 


O2, HHN prn  


CXR no acute cardiopulmonary disease 


clinically no evidence of asthma exacerbation   


DVT prophylaxis   





case discussed and evaluated by supervising physician














CT AP  -





Subjective


Allergies:  


Coded Allergies:  


     No Known Allergies (Verified , 10/27/06)


Subjective


afebrile   


on RA, no sat of respiratory distress


labs pending for this am


still with abdominal pain,





Objective





Last 24 Hour Vital Signs








  Date Time  Temp Pulse Resp B/P Pulse Ox O2 Delivery O2 Flow Rate FiO2


 


1/22/17 12:04 97.7 81 16 104/66 100 Room Air  


 


1/22/17 09:36 98.2       


 


1/22/17 08:32 98.2 92 16 140/92 93 Room Air  


 


1/22/17 07:50  97 20   Room Air  21


 


1/22/17 04:00 98.2 102 22 134/77 98 Room Air  


 


1/22/17 01:52 97.0 99 20 146/86  Room Air  


 


1/22/17 00:00  92      


 


1/22/17 00:00 98.6 92 18 150/77 96 Room Air  


 


1/21/17 21:43        21


 


1/21/17 21:42  88 20  96 Room Air  21


 


1/21/17 21:42  88 20   Room Air  21


 


1/21/17 20:00 97.6 86 18 119/64 98   


 


1/21/17 20:00  86      


 


1/21/17 16:00 98.8 89 20 110/63 100 Room Air  


 


1/21/17 16:00  92      


 


1/21/17 15:15 99.3 95 16 110/61 98 Room Air  


 


1/21/17 15:14 99.3       


 


1/21/17 14:00 99.3       


 


1/21/17 13:47 99.3 95 16 110/61 98 Room Air  


 


1/21/17 12:26 99.3 77 14 86/50 98 Room Air  

















Intake and Output  


 


 1/21/17 1/22/17





 19:00 07:00


 


Intake Total 510 ml 772.5 ml


 


Balance 510 ml 772.5 ml


 


  


 


Intake Oral 360 ml 240 ml


 


IV Total 150 ml 532.5 ml


 


# Voids 1 2








Objective


General Appearance:  WD/WN, no apparent distress, alert


Lines, tubes and drains:  peripheral


HEENT:  normocephalic, mucous membranes moist


Neck:  normal alignment, supple


Respiratory/Chest:  chest wall non-tender, lungs clear - with moderate air 

entry , no respiratory distress, no accessory muscle use


Cardiovascular/Chest:  normal peripheral pulses, normal rate, regular rhythm, 

no JVD


Abdomen:  normal bowel sounds, soft - mild diffused tenderness, no rebound, 

noguarding, + distended


Extremities:  normal range of motion


Neurologic:  no motor/sensory deficits, alert, oriented x 3


Musculoskeletal:  normal muscle bulk





Microbiology








 Date/Time


Source Procedure


Growth Status


 


 


 1/21/17 20:42


Stool  Ordered


 


 1/21/17 13:30


Urine,Clean Catch Urine Culture - Preliminary Resulted








Laboratory Tests


1/21/17 13:18: 


White Blood Count 17.1H, Red Blood Count 5.39, Hemoglobin 14.3, Hematocrit 47.6H

, Mean Corpuscular Volume 88, Mean Corpuscular Hemoglobin 26.6L, Mean 

Corpuscular Hemoglobin Concent 30.0L, Red Cell Distribution Width 17.6H, 

Platelet Count 372, Mean Platelet Volume 6.5, Neutrophils (%) (Auto) 78.0H, 

Lymphocytes (%) (Auto) 14.0L, Monocytes (%) (Auto) 6.1, Eosinophils (%) (Auto) 

1.0, Basophils (%) (Auto) 0.9, Sodium Level 135, Potassium Level 4.5, Chloride 

Level 99, Carbon Dioxide Level 15L, Anion Gap 21H, Blood Urea Nitrogen 17, 

Creatinine 1.5H, Estimat Glomerular Filtration Rate 42.5, Glucose Level 111H, 

Calcium Level 10.6H, Total Bilirubin < 0.2, Aspartate Amino Transf (AST/SGOT) 19

, Alanine Aminotransferase (ALT/SGPT) 9, Alkaline Phosphatase 105H, Total 

Creatine Kinase 44, Creatine Kinase MB 2.9, Creatine Kinase MB Relative Index 

6.5, Troponin I < 0.30, Total Protein 10.4H, Albumin 3.9, Globulin 6.5, Albumin/

Globulin Ratio 0.6L, Lipase 18


1/21/17 13:30: 


Prothrombin Time 11.2, Prothromb Time International Ratio 1.1, Activated 

Partial Thromboplast Time 29, Urine Color Chel, Urine Appearance Slightly 

cloudy, Urine pH 6, Urine Specific Gravity 1.020, Urine Protein 2+H, Urine 

Glucose (UA) Negative, Urine Ketones Negative, Urine Occult Blood 3+H, Urine 

Nitrite Negative, Urine Bilirubin Negative, Urine Ictotest Negative, Urine 

Urobilinogen Normal, Urine Leukocyte Esterase 2+H, Urine RBC 5-10H, Urine WBC 10

-15H, Urine Squamous Epithelial Cells ManyH, Urine Bacteria ManyH


1/22/17 11:20: 


White Blood Count [Pending], Red Blood Count [Pending], Hemoglobin [Pending], 

Hematocrit [Pending], Mean Corpuscular Volume [Pending], Mean Corpuscular 

Hemoglobin [Pending], Mean Corpuscular Hemoglobin Concent [Pending], Red Cell 

Distribution Width [Pending], Platelet Count [Pending], Mean Platelet Volume [

Pending], Neutrophils (%) (Auto) [Pending], Lymphocytes (%) (Auto) [Pending], 

Monocytes (%) (Auto) [Pending], Eosinophils (%) (Auto) [Pending], Basophils (%) 

(Auto) [Pending], Sodium Level [Pending], Potassium Level [Pending], Chloride 

Level [Pending], Carbon Dioxide Level [Pending], Blood Urea Nitrogen [Pending], 

Creatinine [Pending], Estimat Glomerular Filtration Rate [Pending], Glucose 

Level [Pending], Calcium Level [Pending], Total Bilirubin [Pending], Aspartate 

Amino Transf (AST/SGOT) [Pending], Alanine Aminotransferase (ALT/SGPT) [Pending]

, Alkaline Phosphatase [Pending], Total Protein [Pending], Albumin [Pending], 

Globulin [Pending], Lipase [Pending], Activated Partial Thromboplast Time [

Pending], Amylase Level [Pending]





Current Medications








 Medications


  (Trade)  Dose


 Ordered  Sig/Jed


 Route


 PRN Reason  Start Time


 Stop Time Status Last Admin


Dose Admin


 


 Acetaminophen


  (Tylenol)  650 mg  Q4H  PRN


 ORAL


 fever  1/21/17 14:45


 2/20/17 14:44   


 


 


 Al Hydroxide/Mg


 Hydroxide


  (Mylanta II)  30 ml  Q6H  PRN


 ORAL


 dyspepsia  1/21/17 14:45


 2/20/17 14:44   


 


 


 Albuterol/


 Ipratropium


  (DuoNeb


 0.5-3(2.5)mg/3ml)  3 ml  Q4HRT  PRN


 HHN


 sob  1/21/17 18:30


 1/26/17 18:29  1/21/17 21:40


 


 


 Dextrose     STAT  PRN


 IV


 Hypoglycemia  1/21/17 14:45


 2/20/17 14:44   


 


 


 Dextrose/Sodium


 Chloride


  (D5 0.45% NS)  1,000 ml @ 


 75 mls/hr  [id number]


 IV


   1/21/17 15:00


 2/20/17 14:59  1/21/17 16:57


 


 


 Diphenhydramine


 HCl


  (Benadryl)  25 mg  Q6H  PRN


 ORAL


 Itching/Pruritis  1/21/17 14:45


 2/20/17 14:44   


 


 


 Duloxetine HCl


  (Cymbalta)  60 mg  DAILY


 ORAL


   1/22/17 09:00


 2/21/17 08:59  1/22/17 09:05


 


 


 Heparin Sodium


  (Porcine)


  (Heparin 5000


 units/ml)  5,000 units  EVERY 12  HOURS


 SUBQ


   1/21/17 21:00


 2/20/17 20:59  1/22/17 09:10


 


 


 Hydromorphone HCl


  (Dilaudid)  2 mg  Q4H  PRN


 IVP


 For Pain  1/22/17 08:15


 1/29/17 08:14  1/22/17 09:06


 


 


 Levetiracetam


  (Keppra)  1,500 mg  TWICE A  DAY


 ORAL


   1/21/17 18:00


 2/20/17 17:59  1/22/17 09:05


 


 


 Mesalamine


  (Asacol)  800 mg  THREE TIMES A  DAY


 ORAL


   1/22/17 09:00


 2/21/17 08:59  1/22/17 10:02


 


 


 Mirtazapine


  (Remeron)  30 mg  BEDTIME


 ORAL


   1/21/17 21:00


 2/20/17 20:59  1/21/17 21:00


 


 


 Nitroglycerin


  (Ntg)  0.4 mg  Q5M X 3 DOSES PRN


 SL


 Prn Chest Pain  1/21/17 14:45


 2/20/17 14:44   


 


 


 Ondansetron HCl


  (Zofran)  4 mg  Q6H  PRN


 IVP


 Nausea & Vomiting  1/21/17 14:45


 2/20/17 14:44  1/22/17 09:21


 


 


 Piperacillin Sod/


 Tazobactam Sod/


 Sodium Chloride


  (Zosyn/Sodium


 Chloride)  110 ml @ 


 27.5 mls/hr  EVERY 8  HOURS


 IVPB


   1/21/17 22:00


 1/28/17 21:59  1/22/17 06:05


 


 


 Polyethylene


 Glycol


  (Miralax)  17 gm  HSPRN  PRN


 ORAL


 Constipation  1/21/17 14:45


 2/20/17 14:44   


 


 


 Quetiapine


 Fumarate


  (SEROquel)  200 mg  DAILY


 ORAL


   1/22/17 09:00


 2/21/17 08:59  1/22/17 09:05


 


 


 Temazepam


  (Restoril)  15 mg  HSPRN  PRN


 ORAL


 Insomnia  1/21/17 14:45


 1/28/17 14:44  1/21/17 22:10


 


 


 Topiramate 25 mg  25 mg  TWICE A  DAY


 ORAL


   1/21/17 18:00


 2/20/17 17:59  1/22/17 09:06


 

















Carl ([facility])Rozina NP Jan 22, 2017 12:14

## 2017-01-22 NOTE — DIAGNOSTIC IMAGING REPORT
Indication: Chest pain



Technique: XRAY CHEST 1 V



Comparison: 12/23/16



Findings: Cardiomediastinal silhouette is stable. There is grossly stable

interstitial disease. No consolidation is identified. Osseous structures are 

stable.

Atherosclerotic changes are seen.



Impression:



Grossly stable interstitial disease. Clinical correlation recommended.

## 2017-01-22 NOTE — DIAGNOSTIC IMAGING REPORT
Indication: Abdominal pain



Technique: CT scan of the abdomen and pelvis utilizing automated exposure control

with oral contrast. Axial, sagittal and coronal images were obtained.



CT dose: Total  mGycm; CTDI vol 17.7 mGy



Comparison: 12/23/16



Findings:



Evaluation of the solid organs is limited without intravenous contrast 

material.

Interstitial opacities are noted in the lung bases. The adrenal glands, kidneys,

spleen and pancreas are grossly unremarkable. No focal hepatic abnormalities are

seen. No CT evident gallstones are identified. There is focal thickening 

versus

underdistention of the stomach series 3 image 32. Postsurgical changes are

demonstrated of the bowel. Fluid-filled mildly dilated small bowel loops are 

seen.

Moderate stool and air are present in the remaining colon. Anterior abdominal 

hernia

repair is present. Intrathecal pump and leads are present. Degenerative changes of

the spine are noted. Atherosclerotic changes are seen. 2 tiny stable nodules are

seen in the left upper quadrant each measuring 6 mm.



Impression:



Postsurgical changes with mildly dilated fluid-filled small bowel but no 

abruption

transition identified. Moderate fecal retention and gas in the remaining colon.

Ileus could have this appearance with partial obstruction not completely 

excluded.

Clinical correlation/followup recommended.



Focal thickening versus underdistention within the distal stomach series 3 image 

32.

Further evaluation with upper GI or endoscopy recommended as indicated.



Absent uterus.



Postsurgical changes of anterior abdominal wall mesh.



Interstitial opacities of the lung bases.



Other findings as above.



The CT scanner at [facility] is accredited by the [employer] College 

of

Radiology and the scans are performed using protocols designed to limit 

radiation

exposure to as low as reasonably achievable to attain images of sufficient

resolution adequate for diagnostic evaluation.

## 2017-01-22 NOTE — INFECTIOUS DISEASES PROG NOTE
Assessment/Plan


Problems:  


(1) UTI (urinary tract infection)


Assessment & Plan:   continue zosyn for now pending  urine culture results 





(2) Sepsis


Assessment & Plan:  suspect GI VS  source , await  blood culture and urine 

culture results, continue zosyn for now





(3) Abdominal pain


Assessment & Plan:  continue pain management as per primary, check CT abdomen





(4) Crohn's disease


Assessment & Plan:  consult GI, continue meds





(5) Nausea and vomiting


Assessment & Plan:  due to ileus , continue supportive care





(6) Diarrhea


Assessment & Plan:  will send stool for C diff and culture 








Subjective


Gastrointestinal/Abdominal:  Reports: bloating, constipation, nausea, vomiting


Allergies:  


Coded Allergies:  


     No Known Allergies (Verified , 10/27/06)


All Systems:  reviewed and negative except above





Objective


Vital Signs





Last 24 Hour Vital Signs








  Date Time  Temp Pulse Resp B/P Pulse Ox O2 Delivery O2 Flow Rate FiO2


 


1/22/17 14:25 97.7       


 


1/22/17 12:04 97.7 81 16 104/66 100 Room Air  


 


1/22/17 08:32 98.2 92 16 140/92 93 Room Air  


 


1/22/17 07:50  97 20   Room Air  21


 


1/22/17 04:00 98.2 102 22 134/77 98 Room Air  


 


1/22/17 01:52 97.0 99 20 146/86  Room Air  


 


1/22/17 00:00  92      


 


1/22/17 00:00 98.6 92 18 150/77 96 Room Air  


 


1/21/17 21:43        21


 


1/21/17 21:42  88 20  96 Room Air  21


 


1/21/17 21:42  88 20   Room Air  21


 


1/21/17 20:00 97.6 86 18 119/64 98   


 


1/21/17 20:00  86      








Height (Feet):  5


Height (Inches):  6.00


Weight (Pounds):  164


General Appearance:  WD/WN, no acute distress


HEENT:  normocephalic, atraumatic, anicteric, mucous membranes moist


Respiratory/Chest:  chest wall non-tender, lungs clear, normal breath sounds, 

no respiratory distress, no accessory muscle use


Cardiovascular:  normal peripheral pulses, normal rate, regular rhythm, no 

gallop/murmur, no JVD


Abdomen:  no organomegaly, no mass, absent bowel sounds, distended, tender


Extremities:  no cyanosis, no clubbing


Skin:  no rash, no lesions, no ulcers





Microbiology








 Date/Time


Source Procedure


Growth Status


 


 


 1/21/17 20:42


Stool  Ordered


 


 1/21/17 13:30


Urine,Clean Catch Urine Culture - Preliminary Resulted











Laboratory Tests








Test


  1/22/17


11:20


 


White Blood Count


  12.0 K/UL


(4.8-10.8)  H


 


Red Blood Count


  5.14 M/UL


(4.20-5.40)


 


Hemoglobin


  13.2 G/DL


(12.0-16.0)


 


Hematocrit


  44.8 %


(37.0-47.0)


 


Mean Corpuscular Volume 87 FL (80-99)  


 


Mean Corpuscular Hemoglobin


  25.7 PG


(27.0-31.0)  L


 


Mean Corpuscular Hemoglobin


Concent 29.5 G/DL


(32.0-36.0)  L


 


Red Cell Distribution Width


  16.7 %


(11.6-14.8)  H


 


Platelet Count


  390 K/UL


(150-450)


 


Mean Platelet Volume


  6.7 FL


(6.5-10.1)


 


Neutrophils (%) (Auto)


  80.1 %


(45.0-75.0)  H


 


Lymphocytes (%) (Auto)


  12.1 %


(20.0-45.0)  L


 


Monocytes (%) (Auto)


  7.3 %


(1.0-10.0)


 


Eosinophils (%) (Auto)


  0.1 %


(0.0-3.0)


 


Basophils (%) (Auto)


  0.5 %


(0.0-2.0)


 


Activated Partial


Thromboplast Time 31 SEC (23-33)


 


 


Sodium Level


  134 mEQ/L


(135-145)  L


 


Potassium Level


  4.0 mEQ/L


(3.4-4.9)


 


Chloride Level


  98 mEQ/L


()


 


Carbon Dioxide Level


  18 mEQ/L


(20-30)  L


 


Anion Gap 18 (5-15)  H


 


Blood Urea Nitrogen


  23 mg/dL


(7-23)


 


Creatinine


  1.2 mg/dL


(0.5-0.9)  H


 


Estimat Glomerular Filtration


Rate 54.9 mL/min


(>60)


 


Glucose Level


  130 mg/dL


()  H


 


Calcium Level


  9.3 mg/dL


(8.6-10.2)


 


Total Bilirubin


  0.3 mg/dL


(0.0-1.2)


 


Aspartate Amino Transf


(AST/SGOT) 10 U/L (5-40)  


 


 


Alanine Aminotransferase


(ALT/SGPT) 6 U/L (3-33)  


 


 


Alkaline Phosphatase


  87 U/L


()


 


Total Protein


  8.9 g/dL


(6.6-8.7)  H


 


Albumin


  3.1 g/dL


(3.5-5.2)  L


 


Globulin 5.8 g/dL  


 


Albumin/Globulin Ratio


  0.5 (1.0-2.7)


L


 


Amylase Level


  88 U/L


()


 


Lipase 13 U/L (< 60)  











Current Medications








 Medications


  (Trade)  Dose


 Ordered  Sig/Jed


 Route


 PRN Reason  Start Time


 Stop Time Status Last Admin


Dose Admin


 


 Acetaminophen


  (Tylenol)  650 mg  Q4H  PRN


 ORAL


 fever  1/21/17 14:45


 2/20/17 14:44   


 


 


 Al Hydroxide/Mg


 Hydroxide


  (Mylanta II)  30 ml  Q6H  PRN


 ORAL


 dyspepsia  1/21/17 14:45


 2/20/17 14:44   


 


 


 Albuterol/


 Ipratropium


  (DuoNeb


 0.5-3(2.5)mg/3ml)  3 ml  Q4HRT  PRN


 HHN


 sob  1/21/17 18:30


 1/26/17 18:29  1/21/17 21:40


 


 


 Dextrose     STAT  PRN


 IV


 Hypoglycemia  1/21/17 14:45


 2/20/17 14:44   


 


 


 Dextrose/Sodium


 Chloride


  (D5 0.45% NS)  1,000 ml @ 


 75 mls/hr  [id number]


 IV


   1/21/17 15:00


 2/20/17 14:59  1/21/17 16:57


 


 


 Diphenhydramine


 HCl


  (Benadryl)  25 mg  Q6H  PRN


 ORAL


 Itching/Pruritis  1/21/17 14:45


 2/20/17 14:44   


 


 


 Duloxetine HCl


  (Cymbalta)  60 mg  DAILY


 ORAL


   1/22/17 09:00


 2/21/17 08:59  1/22/17 09:05


 


 


 Heparin Sodium


  (Porcine)


  (Heparin 5000


 units/ml)  5,000 units  EVERY 12  HOURS


 SUBQ


   1/21/17 21:00


 2/20/17 20:59  1/22/17 09:10


 


 


 Hydromorphone HCl


  (Dilaudid)  2 mg  Q4H  PRN


 IVP


 For Pain  1/22/17 08:15


 1/29/17 08:14  1/22/17 13:55


 


 


 Levetiracetam


  (Keppra)  1,500 mg  TWICE A  DAY


 ORAL


   1/21/17 18:00


 2/20/17 17:59  1/22/17 09:05


 


 


 Mesalamine


  (Asacol)  800 mg  THREE TIMES A  DAY


 ORAL


   1/22/17 09:00


 2/21/17 08:59  1/22/17 14:03


 


 


 Mirtazapine


  (Remeron)  30 mg  BEDTIME


 ORAL


   1/21/17 21:00


 2/20/17 20:59  1/21/17 21:00


 


 


 Nitroglycerin


  (Ntg)  0.4 mg  Q5M X 3 DOSES PRN


 SL


 Prn Chest Pain  1/21/17 14:45


 2/20/17 14:44   


 


 


 Ondansetron HCl


  (Zofran)  4 mg  Q6H  PRN


 IVP


 Nausea & Vomiting  1/21/17 14:45


 2/20/17 14:44  1/22/17 09:21


 


 


 Piperacillin Sod/


 Tazobactam Sod/


 Sodium Chloride


  (Zosyn/Sodium


 Chloride)  110 ml @ 


 27.5 mls/hr  EVERY 8  HOURS


 IVPB


   1/21/17 22:00


 1/28/17 21:59  1/22/17 13:55


 


 


 Polyethylene


 Glycol


  (Miralax)  17 gm  HSPRN  PRN


 ORAL


 Constipation  1/21/17 14:45


 2/20/17 14:44   


 


 


 Quetiapine


 Fumarate


  (SEROquel)  200 mg  DAILY


 ORAL


   1/22/17 09:00


 2/21/17 08:59  1/22/17 09:05


 


 


 Temazepam


  (Restoril)  15 mg  HSPRN  PRN


 ORAL


 Insomnia  1/21/17 14:45


 1/28/17 14:44  1/21/17 22:10


 


 


 Topiramate 25 mg  25 mg  TWICE A  DAY


 ORAL


   1/21/17 18:00


 2/20/17 17:59  1/22/17 09:06


 

















Violet Dior M.D. Jan 22, 2017 16:14

## 2017-01-22 NOTE — CONSULTATION
DATE OF CONSULTATION:



INFECTIOUS DISEASE CONSULTATION



REQUESTING PHYSICIAN:  Maxim Hopkins D.O.



REASON FOR CONSULTATION:  Sepsis, UTI, and colitis.  Recommendation

for antibiotic therapy.



HISTORY OF PRESENT ILLNESS:  The patient is a 61-year-old female with

complicated surgical history, who had Crohn's disease presented to [facility] with progressive diffuse abdominal pain.  It was mainly

localized in the left side of her abdomen 10/10, sharp, dull ache radiate

to the back at sometimes.  Abdominal pain was associated with diarrhea and

nonbloody stool.  She also had nausea and vomited several times.  The

patient has multiple surgeries on abdomen in the past.  She also had a

Crohn disease, unclear whether she is taking medications for it or not.

The patient denied any fever or chills.  No chest pain.  No cough or

shortness of breath.  No recent travel or sick contacts.  In the emergency

room, she had fever with 99.3 degrees.  Her white count was elevated at

17,000, so was consulted by the primary provider for antibiotics

recommendation and further management.



REVIEW OF SYSTEMS:  A 14-point of system reviewed were all negative

apart from the one I mentioned above in my History and Physical.



PAST MEDICAL HISTORY:  Significant for coronary artery disease status

post MI, asthma, COPD, Crohn's disease, CVA, epilepsy, seizure disorder,

syncope, headache, and fatigue.



PAST SURGICAL HISTORY:  She had extensive abdominal surgery in the

past, unknown date.



ALLERGIES:  She has no known drug allergy.



MEDICATIONS:  She is on Zosyn.  For the rest of the medications,

please refer to the MAR.



FAMILY HISTORY:  Negative.  Noncontributory.



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  Denied using any drugs,

tobacco, or alcohol.



PHYSICAL EXAMINATION:

VITAL SIGNS:  Temperature 99.3 degrees, pulse 95, respirations 16,

blood pressure 110/61, and saturation 98% on room air.

GENERAL:  This is a middle-aged female, obese lying in bed, awake,

alert, not in distress, complains of abdominal discomfort.

HEENT:  Normocephalic and atraumatic.  Pupils both reactive to light

equally.  Moist oral mucosa.  No exudate or thrush.

NECK:  Supple.  No lymphadenopathy.

CARDIOVASCULAR:  Regular rate and rhythm.  No murmur or gallop.

LUNGS:  Diminished breathing at the bases.

ABDOMEN:  Soft and distended.  Tender diffusely.  No rebound.  No

organomegaly.  No ascites.

EXTREMITIES:  No edema.  No cyanosis.



LABORATORY AND DIAGNOSTIC DATA:  Showed white count of 17.1,

hemoglobin of 14.3, hematocrit of 47.6, and platelet count of 372,000.

BUN 17 and creatinine of 1.5.  AST of 19 and ALT of 9.  Urinalysis showed

+2 leukocyte esterase.  WBC 10 to 15 and many bacteria.



Abdominal CT scan results pending at this point.



ASSESSMENT AND PLAN:

1. Sepsis suspect due to gastrointestinal source.  The patient will be

started on Zosyn.  We will send the blood culture.

2. Abdominal pain, nausea, and vomiting suspect colitis.  Continue

antibiotics treatment.  IV fluid for hydration.  Consult GI.

3. Diarrhea.  We will send stool for Clostridium difficile and culture.

 

4. Urinary tract infection.  The patient is already on Zosyn.  We will

send urine for culture.









  ______________________________________________

  Violet Dior M.D.





DR:  Juan J

D:  01/21/2017 19:04

T:  01/22/2017 04:43

JOB#:  [id number]

CC:

## 2017-01-23 VITALS — DIASTOLIC BLOOD PRESSURE: 88 MMHG | SYSTOLIC BLOOD PRESSURE: 136 MMHG

## 2017-01-23 VITALS — SYSTOLIC BLOOD PRESSURE: 132 MMHG | DIASTOLIC BLOOD PRESSURE: 88 MMHG

## 2017-01-23 VITALS — DIASTOLIC BLOOD PRESSURE: 92 MMHG | SYSTOLIC BLOOD PRESSURE: 147 MMHG

## 2017-01-23 VITALS — DIASTOLIC BLOOD PRESSURE: 93 MMHG | SYSTOLIC BLOOD PRESSURE: 143 MMHG

## 2017-01-23 VITALS — SYSTOLIC BLOOD PRESSURE: 133 MMHG | DIASTOLIC BLOOD PRESSURE: 86 MMHG

## 2017-01-23 VITALS — DIASTOLIC BLOOD PRESSURE: 92 MMHG | SYSTOLIC BLOOD PRESSURE: 116 MMHG

## 2017-01-23 LAB
ALBUMIN/GLOB SERPL: 0.7 {RATIO} (ref 1–2.7)
ALT SERPL-CCNC: 6 U/L (ref 3–33)
ANION GAP SERPL CALC-SCNC: 15 MMOL/L (ref 5–15)
AST SERPL-CCNC: 12 U/L (ref 5–40)
BASOPHILS NFR BLD AUTO: 0.9 % (ref 0–2)
CALCIUM SERPL-MCNC: 9.4 MG/DL (ref 8.6–10.2)
CHLORIDE SERPL-SCNC: 101 MEQ/L (ref 98–107)
CO2 SERPL-SCNC: 21 MEQ/L (ref 20–30)
CREAT SERPL-MCNC: 1 MG/DL (ref 0.5–0.9)
EOSINOPHIL NFR BLD AUTO: 0.4 % (ref 0–3)
ERYTHROCYTE [DISTWIDTH] IN BLOOD BY AUTOMATED COUNT: 16.7 % (ref 11.6–14.8)
GFR SERPLBLD BASED ON 1.73 SQ M-ARVRAT: > 60 ML/MIN (ref 60–?)
GLOBULIN SER-MCNC: 4.9 G/DL
HEMOLYSIS: 17
LYMPHOCYTES NFR BLD AUTO: 15.3 % (ref 20–45)
MCH RBC QN AUTO: 26.2 PG (ref 27–31)
MCHC RBC AUTO-ENTMCNC: 29.6 G/DL (ref 32–36)
MCV RBC AUTO: 89 FL (ref 80–99)
MONOCYTES NFR BLD AUTO: 9.5 % (ref 1–10)
NEUTROPHILS NFR BLD AUTO: 74 % (ref 45–75)
PLATELET # BLD: 375 K/UL (ref 150–450)
PMV BLD AUTO: 6.5 FL (ref 6.5–10.1)
POTASSIUM SERPL-SCNC: 4.6 MEQ/L (ref 3.4–4.9)
PROT SERPL-MCNC: 8.5 G/DL (ref 6.6–8.7)
RBC # BLD AUTO: 4.93 M/UL (ref 4.2–5.4)
SODIUM SERPL-SCNC: 137 MEQ/L (ref 135–145)
WBC # BLD AUTO: 11.7 K/UL (ref 4.8–10.8)

## 2017-01-23 RX ADMIN — DEXTROSE AND SODIUM CHLORIDE SCH MLS/HR: 5; .45 INJECTION, SOLUTION INTRAVENOUS at 21:44

## 2017-01-23 RX ADMIN — POLYETHYLENE GLYCOL 3350 SCH GM: 17 POWDER, FOR SOLUTION ORAL at 21:44

## 2017-01-23 RX ADMIN — DEXTROSE MONOHYDRATE SCH MLS/HR: 50 INJECTION, SOLUTION INTRAVENOUS at 21:43

## 2017-01-23 RX ADMIN — DOCUSATE SODIUM SCH MG: 100 CAPSULE, LIQUID FILLED ORAL at 18:10

## 2017-01-23 RX ADMIN — DEXTROSE MONOHYDRATE SCH MLS/HR: 50 INJECTION, SOLUTION INTRAVENOUS at 05:43

## 2017-01-23 RX ADMIN — TOPIRAMATE SCH MG: 25 TABLET, COATED ORAL at 18:10

## 2017-01-23 RX ADMIN — HEPARIN SODIUM SCH UNITS: 5000 INJECTION INTRAVENOUS; SUBCUTANEOUS at 21:00

## 2017-01-23 RX ADMIN — DOCUSATE SODIUM SCH MG: 100 CAPSULE, LIQUID FILLED ORAL at 14:28

## 2017-01-23 RX ADMIN — HEPARIN SODIUM SCH UNITS: 5000 INJECTION INTRAVENOUS; SUBCUTANEOUS at 10:18

## 2017-01-23 RX ADMIN — QUETIAPINE SCH MG: 200 TABLET, FILM COATED ORAL at 10:05

## 2017-01-23 RX ADMIN — DULOXETINE HYDROCHLORIDE SCH MG: 30 CAPSULE, DELAYED RELEASE ORAL at 10:03

## 2017-01-23 RX ADMIN — DEXTROSE MONOHYDRATE SCH MLS/HR: 50 INJECTION, SOLUTION INTRAVENOUS at 14:29

## 2017-01-23 RX ADMIN — TOPIRAMATE SCH MG: 25 TABLET, COATED ORAL at 10:03

## 2017-01-23 NOTE — HISTORY AND PHYSICAL REPORT
DATE OF ADMISSION:  01/21/2017



TIME SEEN:  9 a.m.



ATTENDING PHYSICIAN:  Maxim Hopkins D.O.



CONSULTANTS:

1. Blaze Fraga M.D.

2. Behnoush Zarrini, M.D.

3. Dimitry Mcleod M.D.

4. Violet Dior M.D.



CHIEF COMPLAINT:  Abdominal pain, possible obstruction, UTI, and

sepsis.



BRIEF HISTORY:  The patient is a 61-year-old female from home, who

presented to [facility] last night with increased abdominal pain and

slight nausea, who was found to have abdominal pain, possible obstruction,

ileus, and also UTI and sepsis, and admitted to medical floor for further

treatment.  Currently, calm, sleeping in bed, and not talking much.



PAST MEDICAL HISTORY:  Includes Crohn's, CHF, chronic pain, and

COPD.



PAST SURGICAL HISTORY:  Abdominal surgery.



MEDICATIONS:  Include Cymbalta, Seroquel, Asacol, Dilaudid, Zosyn,

Remeron, Keppra, Topamax, albuterol, Tylenol, MiraLax, Zofran, Restoril,

Benadryl, Mylanta, and nitroglycerine.



ALLERGIES:  Denies.



SOCIAL HISTORY:  Positive for smoking.  No alcohol.  No intravenous

drug abuse.



FAMILY HISTORY:  Noncontributory.



REVIEW OF SYSTEMS:  Unavailable.



PHYSICAL EXAMINATION:

GENERAL:  The patient is calm in bed, oriented x3, and in no acute

distress.

VITAL SIGNS:  Temperature is 98 degrees, pulse 92, respirations 16,

and blood pressure 140/92.

CARDIOVASCULAR:  Distant without murmur.

LUNGS:  Poor exchange.

ABDOMEN:  Bowel sounds positive.  Slightly distended and soft.  No

guarding.  No rigidity.

EXTREMITIES:  No cyanosis, clubbing, or edema.

NEUROLOGIC:  Cranial nerves II through XII are grossly intact.  Deep

tendon reflexes 2+.  Muscle strength 4/5.



LABORATORY DATA:  Lab studies show white count 17, otherwise CBC is

normal.  BMP shows CO2 of 15.  BUN and creatinine 17 and 1.5.  Glucose

111.  Troponin less than 0.3.  INR is 1.1.  Urinalysis shows 3+ occult

blood, 2+ protein, and 2+ leukocyte esterase.



ASSESSMENT:

1. Abdominal pain, possible obstruction, ileus.

2. Urinary tract infection.

3. Sepsis.

4. Crohn's disease.

5. Congestive heart failure.

6. Chronic pain.

7. Chronic obstructive pulmonary disease.



PLAN:  Continue premedications, NPO, and IV fluids.  Antibiotics per

Infectious Disease.  Pain control.  O2 and pulmonary treatment.  Dietary

followup.  OT, PT, and dietary evaluation.  CBC and BMP in the morning.

Dr. Fraga, Dr. Shetty, Dr. Mcleod, and Dr. Dior consulted.









  ______________________________________________

  Maxim Hopkins D.O.





DR:  RITA

D:  01/22/2017 08:58

T:  01/23/2017 00:19

JOB#:  [id number]

CC:

## 2017-01-23 NOTE — CARDIOLOGY REPORT
--------------- APPROVED REPORT --------------





EKG Measurement

Heart [id number]

OH [id number]

[id number]

[id number]

[id number]





Normal sinus rhythm

Possible Left atrial enlargement

Borderline ECG

## 2017-01-23 NOTE — INFECTIOUS DISEASES PROG NOTE
Assessment/Plan


Problems:  


(1) UTI (urinary tract infection)


Assessment & Plan:   continue zosyn for now , urine culture showed contaminant 

carla.





(2) Sepsis


Assessment & Plan:  suspect GI source , await  blood culture and urine culture 

results, continue zosyn for now





(3) Abdominal pain


Assessment & Plan:  continue pain management as per primary, check CT abdomen





(4) Crohn's disease


Assessment & Plan:  consult GI, continue meds





(5) Nausea and vomiting


Assessment & Plan:  due to ileus , continue supportive care





(6) Diarrhea


Assessment & Plan:  will send stool for C diff and culture 








Subjective


Gastrointestinal/Abdominal:  Reports: bloating, constipation


Neurologic:  Reports: confusion, weakness


Allergies:  


Coded Allergies:  


     No Known Allergies (Verified , 10/27/06)


All Systems:  reviewed and negative except above


Subjective


she was lethargic, but arousal, respond to verbal commands, had no BM today or 

passed kolton , no nausea or vomiting





Objective


Vital Signs





Last 24 Hour Vital Signs








  Date Time  Temp Pulse Resp B/P Pulse Ox O2 Delivery O2 Flow Rate FiO2


 


1/23/17 15:13 97.0       


 


1/23/17 12:00 97.0 88 20 136/88 98 Room Air  


 


1/23/17 08:00 97.6 93 20 147/92 98 Room Air  


 


1/23/17 07:30  86 20   Room Air  21


 


1/23/17 04:00 97.9 86 18 143/93 100 Room Air  


 


1/23/17 00:00 97.9 96 18 132/88 100 Room Air  


 


1/22/17 21:42  91 20   Room Air  21


 


1/22/17 20:00 97.9 101 18 136/71 94 Room Air  








Height (Feet):  5


Height (Inches):  6.00


Weight (Pounds):  164


General Appearance:  WD/WN, no acute distress


HEENT:  normocephalic, atraumatic, anicteric, mucous membranes moist


Respiratory/Chest:  chest wall non-tender, lungs clear, normal breath sounds, 

no respiratory distress, no accessory muscle use


Cardiovascular:  normal peripheral pulses, normal rate, regular rhythm, no 

gallop/murmur, no JVD


Abdomen:  no organomegaly, non distended, no mass, absent bowel sounds, 

distended, tender


Extremities:  no cyanosis, no clubbing


Skin:  no rash, no lesions, no ulcers





Microbiology








 Date/Time


Source Procedure


Growth Status


 


 


 1/21/17 20:20


Blood Blood Culture - Preliminary


NO GROWTH AFTER 24 HOURS Resulted


 


 1/21/17 20:10


Blood Blood Culture - Preliminary


NO GROWTH AFTER 24 HOURS Resulted


 


 1/21/17 20:42


Stool  Ordered


 


 1/21/17 13:30


Urine,Clean Catch Urine Culture - Final


Mixed Urogenital Contaminants Complete











Laboratory Tests








Test


  1/23/17


07:45


 


White Blood Count


  11.7 K/UL


(4.8-10.8)  H


 


Red Blood Count


  4.93 M/UL


(4.20-5.40)


 


Hemoglobin


  12.9 G/DL


(12.0-16.0)


 


Hematocrit


  43.8 %


(37.0-47.0)


 


Mean Corpuscular Volume 89 FL (80-99)  


 


Mean Corpuscular Hemoglobin


  26.2 PG


(27.0-31.0)  L


 


Mean Corpuscular Hemoglobin


Concent 29.6 G/DL


(32.0-36.0)  L


 


Red Cell Distribution Width


  16.7 %


(11.6-14.8)  H


 


Platelet Count


  375 K/UL


(150-450)


 


Mean Platelet Volume


  6.5 FL


(6.5-10.1)


 


Neutrophils (%) (Auto)


  74.0 %


(45.0-75.0)


 


Lymphocytes (%) (Auto)


  15.3 %


(20.0-45.0)  L


 


Monocytes (%) (Auto)


  9.5 %


(1.0-10.0)


 


Eosinophils (%) (Auto)


  0.4 %


(0.0-3.0)


 


Basophils (%) (Auto)


  0.9 %


(0.0-2.0)


 


Sodium Level


  137 mEQ/L


(135-145)


 


Potassium Level


  4.6 mEQ/L


(3.4-4.9)


 


Chloride Level


  101 mEQ/L


()


 


Carbon Dioxide Level


  21 mEQ/L


(20-30)


 


Anion Gap 15 (5-15)  


 


Blood Urea Nitrogen


  26 mg/dL


(7-23)  H


 


Creatinine


  1.0 mg/dL


(0.5-0.9)  H


 


Estimat Glomerular Filtration


Rate > 60 mL/min


(>60)


 


Glucose Level


  114 mg/dL


()  H


 


Calcium Level


  9.4 mg/dL


(8.6-10.2)


 


Total Bilirubin


  0.2 mg/dL


(0.0-1.2)


 


Aspartate Amino Transf


(AST/SGOT) 12 U/L (5-40)  


 


 


Alanine Aminotransferase


(ALT/SGPT) 6 U/L (3-33)  


 


 


Alkaline Phosphatase


  93 U/L


()


 


Total Protein


  8.5 g/dL


(6.6-8.7)


 


Albumin


  3.6 g/dL


(3.5-5.2)


 


Globulin 4.9 g/dL  


 


Albumin/Globulin Ratio


  0.7 (1.0-2.7)


L











Current Medications








 Medications


  (Trade)  Dose


 Ordered  Sig/Jed


 Route


 PRN Reason  Start Time


 Stop Time Status Last Admin


Dose Admin


 


 Acetaminophen


  (Tylenol)  650 mg  Q4H  PRN


 ORAL


 fever  1/21/17 14:45


 2/20/17 14:44   


 


 


 Al Hydroxide/Mg


 Hydroxide


  (Mylanta II)  30 ml  Q6H  PRN


 ORAL


 dyspepsia  1/21/17 14:45


 2/20/17 14:44   


 


 


 Albuterol/


 Ipratropium


  (DuoNeb


 0.5-3(2.5)mg/3ml)  3 ml  Q4HRT  PRN


 HHN


 sob  1/21/17 18:30


 1/26/17 18:29  1/21/17 21:40


 


 


 Dextrose     STAT  PRN


 IV


 Hypoglycemia  1/21/17 14:45


 2/20/17 14:44   


 


 


 Dextrose/Sodium


 Chloride


  (D5 0.45% NS)  1,000 ml @ 


 75 mls/hr  [id number]


 IV


   1/21/17 15:00


 2/20/17 14:59  1/22/17 21:21


 


 


 Diphenhydramine


 HCl


  (Benadryl)  25 mg  Q6H  PRN


 ORAL


 Itching/Pruritis  1/21/17 14:45


 2/20/17 14:44   


 


 


 Docusate Sodium


  (Colace)  100 mg  THREE TIMES A  DAY


 ORAL


   1/23/17 13:00


 2/22/17 12:59  1/23/17 14:28


 


 


 Duloxetine HCl


  (Cymbalta)  60 mg  DAILY


 ORAL


   1/22/17 09:00


 2/21/17 08:59  1/23/17 10:03


 


 


 Heparin Sodium


  (Porcine)


  (Heparin 5000


 units/ml)  5,000 units  EVERY 12  HOURS


 SUBQ


   1/21/17 21:00


 2/20/17 20:59  1/23/17 10:18


 


 


 Hydromorphone HCl


  (Dilaudid)  2 mg  Q4H  PRN


 IVP


 For Pain  1/22/17 08:15


 1/29/17 08:14  1/23/17 14:43


 


 


 Levetiracetam


  (Keppra)  1,500 mg  TWICE A  DAY


 ORAL


   1/21/17 18:00


 2/20/17 17:59  1/23/17 10:03


 


 


 Mesalamine


  (Asacol)  800 mg  THREE TIMES A  DAY


 ORAL


   1/22/17 09:00


 2/21/17 08:59  1/23/17 14:28


 


 


 Mirtazapine


  (Remeron)  30 mg  BEDTIME


 ORAL


   1/21/17 21:00


 2/20/17 20:59  1/22/17 21:15


 


 


 Nitroglycerin


  (Ntg)  0.4 mg  Q5M X 3 DOSES PRN


 SL


 Prn Chest Pain  1/21/17 14:45


 2/20/17 14:44   


 


 


 Ondansetron HCl


  (Zofran)  4 mg  Q6H  PRN


 IVP


 Nausea & Vomiting  1/21/17 14:45


 2/20/17 14:44  1/23/17 10:03


 


 


 Piperacillin Sod/


 Tazobactam Sod/


 Sodium Chloride


  (Zosyn/Sodium


 Chloride)  110 ml @ 


 27.5 mls/hr  EVERY 8  HOURS


 IVPB


   1/21/17 22:00


 1/28/17 21:59  1/23/17 14:29


 


 


 Polyethylene


 Glycol


  (Miralax)  17 gm  BEDTIME


 ORAL


   1/23/17 21:00


 2/22/17 20:59   


 


 


 Polyethylene


 Glycol


  (Miralax)  17 gm  HSPRN  PRN


 ORAL


 Constipation  1/21/17 14:45


 2/20/17 14:44   


 


 


 Quetiapine


 Fumarate


  (SEROquel)  200 mg  DAILY


 ORAL


   1/22/17 09:00


 2/21/17 08:59  1/23/17 10:05


 


 


 Temazepam


  (Restoril)  15 mg  HSPRN  PRN


 ORAL


 Insomnia  1/21/17 14:45


 1/28/17 14:44  1/21/17 22:10


 


 


 Topiramate 25 mg  25 mg  TWICE A  DAY


 ORAL


   1/21/17 18:00


 2/20/17 17:59  1/23/17 10:03


 

















Violet Dior M.D. Jan 23, 2017 17:33

## 2017-01-23 NOTE — CONSULTATION
DATE OF CONSULTATION:



HISTORY OF PRESENT ILLNESS:  This is an initial psychiatric

evaluation for Radha Gaviria, 61-year-old female patient.  _____ she is in

the hospital because of hypertension and abdominal pain.  She has a

history of ulcerative colitis and she also has a diagnosis of bipolar 2

disorder, currently on psychotropic medication regimen consisting of

Topamax 25 mg twice a day and she is also on psychotropic med regimen

consisting of Seroquel 200 mg daily _____ medication without any side

effects, but she would like to continue on Seroquel and Topamax _____ she

is on Remeron at a dose of 30 mg bedtime and she denies any current _____

for depression continue on Cymbalta 60 mg daily and _____ she denies any

drug or alcohol use.



SOCIAL HISTORY:  She lives in a private residence.  Financially

supported by [employer] and Medicare.



ALLERGIES:  No known drug allergies.



MEDICAL HISTORY:  Include asthma, COPD, past procedure disorder per

chart.



PSYCHIATRIC HISTORY:  Bipolar 2.



MENTAL STATUS EXAMINATION:  This is a 61-year-old female _____ she

does have psychomotor retardation.  Mood is depressed.  Affect guarded and

restricted.  Thought process is disorganized and illogical.  No signs of

any suicidal or homicidal thoughts.  Insight and judgment is poor.



PLAN:  Continue on Cymbalta at 60 mg daily _____ 30 mg at bedtime,

Topamax 25 mg twice a day, Seroquel 200 mg daily _____ January 22, 2017.



Chart reviewed and discussed with the staff.









  ______________________________________________

  Samantha Rick M.D.





DR:  [place]

D:  01/22/2017 15:10

T:  01/23/2017 22:30

JOB#:  [id number]

CC:

## 2017-01-23 NOTE — GENERAL PROGRESS NOTE
Assessment/Plan


Problem List:  


(1) Abdominal pain


(2) Constipation


(3) Opioid dependence


ICD Codes:  F11.20 - Opioid dependence


SNOMED:  20197805


(4) Shortness of breath


(5) Emphysema


ICD Codes:  J43.9 - Emphysema


SNOMED:  90069707


(6) COPD exacerbation


ICD Codes:  J44.1 - COPD exacerbation


SNOMED:  648860377


(7) CHF (congestive heart failure)


ICD Codes:  I50.9 - Heart failure, unspecified


SNOMED:  54428467


(8) Nausea and vomiting


(9) Crohns disease


ICD Codes:  K50.90 - Crohns disease


SNOMED:  04510820


(10) UTI (urinary tract infection)


ICD Codes:  N39.0 - Urinary tract infection, site not specified


SNOMED:  86765138


Status:  stable, progressing


Assessment/Plan


ot pt diet gi f/u abx o2 pulm tx pain control cbc bmp am





Subjective


Constitutional:  Reports: weakness


Allergies:  


Coded Allergies:  


     No Known Allergies (Verified , 10/27/06)


All Systems:  reviewed and negative except above


Subjective


sleepy calm





Objective





Last 24 Hour Vital Signs








  Date Time  Temp Pulse Resp B/P Pulse Ox O2 Delivery O2 Flow Rate FiO2


 


1/23/17 10:34 97.9       


 


1/23/17 08:00 97.6 93 20 147/92 98 Room Air  


 


1/23/17 07:30  86 20   Room Air  21 1/23/17 04:00 97.9 86 18 143/93 100 Room Air  


 


1/23/17 00:00 97.9 96 18 132/88 100 Room Air  


 


1/22/17 21:42  91 20   Room Air  21 1/22/17 20:00 97.9 101 18 136/71 94 Room Air  


 


1/22/17 16:29 97.3 97 15 145/86 100 Room Air  

















Intake and Output  


 


 1/22/17 1/23/17





 19:00 07:00


 


Intake Total 445.0 ml 137.5 ml


 


Output Total 400 ml 300 ml


 


Balance 45.0 ml -162.5 ml


 


  


 


Intake Oral  0 ml


 


IV Total 445.0 ml 137.5 ml


 


Output Urine Total 400 ml 300 ml








Laboratory Tests


1/23/17 07:45: 


White Blood Count 11.7H, Red Blood Count 4.93, Hemoglobin 12.9, Hematocrit 43.8

, Mean Corpuscular Volume 89, Mean Corpuscular Hemoglobin 26.2L, Mean 

Corpuscular Hemoglobin Concent 29.6L, Red Cell Distribution Width 16.7H, 

Platelet Count 375, Mean Platelet Volume 6.5, Neutrophils (%) (Auto) 74.0, 

Lymphocytes (%) (Auto) 15.3L, Monocytes (%) (Auto) 9.5, Eosinophils (%) (Auto) 

0.4, Basophils (%) (Auto) 0.9, Sodium Level 137, Potassium Level 4.6, Chloride 

Level 101, Carbon Dioxide Level 21, Anion Gap 15, Blood Urea Nitrogen 26H, 

Creatinine 1.0H, Estimat Glomerular Filtration Rate > 60, Glucose Level 114H, 

Calcium Level 9.4, Total Bilirubin 0.2, Aspartate Amino Transf (AST/SGOT) 12, 

Alanine Aminotransferase (ALT/SGPT) 6, Alkaline Phosphatase 93, Total Protein 

8.5, Albumin 3.6, Globulin 4.9, Albumin/Globulin Ratio 0.7L


Height (Feet):  5


Height (Inches):  6.00


Weight (Pounds):  164


General Appearance:  lethargic


EENT:  normal ENT inspection


Neck:  normal alignment


Cardiovascular:  normal peripheral pulses, normal rate, regular rhythm


Respiratory/Chest:  chest wall non-tender, lungs clear, normal breath sounds


Abdomen:  non tender, soft, hypoactive bowel sounds, distended


Extremities:  normal inspection


Edema:  no edema noted Arm (L), no edema noted Arm (R), no edema noted Leg (L), 

no edema noted Leg (R), no edema noted Pedal (L), no edema noted Pedal (R), no 

edema noted Generalized


Neurologic:  motor weakness


Skin:  normal pigmentation, warm/dry











MAICOL LANG Jan 23, 2017 13:38

## 2017-01-23 NOTE — PULMONOLOGY PROGRESS NOTE
Assessment/Plan


Problems:  


(1) Emphysema


(2) Leukocytosis


(3) Colitis


(4) Acute encephalopathy


(5) Hx SBO


Assessment/Plan


check cultures


continue antibiotics


symptomatic treatment


IV hydration


GI evaluation





Subjective


ROS Limited/Unobtainable:  No


Allergies:  


Coded Allergies:  


     No Known Allergies (Verified , 10/27/06)





Objective





Last 24 Hour Vital Signs








  Date Time  Temp Pulse Resp B/P Pulse Ox O2 Delivery O2 Flow Rate FiO2


 


1/23/17 16:00 98.6 88 20 116/92 100 Room Air  


 


1/23/17 15:13 97.0       


 


1/23/17 12:00 97.0 88 20 136/88 98 Room Air  


 


1/23/17 08:00 97.6 93 20 147/92 98 Room Air  


 


1/23/17 07:30  86 20   Room Air  21


 


1/23/17 04:00 97.9 86 18 143/93 100 Room Air  


 


1/23/17 00:00 97.9 96 18 132/88 100 Room Air  


 


1/22/17 21:42  91 20   Room Air  21


 


1/22/17 20:00 97.9 101 18 136/71 94 Room Air  

















Intake and Output  


 


 1/22/17 1/23/17





 19:00 07:00


 


Intake Total 520.0 ml 265.0 ml


 


Output Total 400 ml 300 ml


 


Balance 120.0 ml -35.0 ml


 


  


 


Intake Oral  0 ml


 


IV Total 520.0 ml 265.0 ml


 


Output Urine Total 400 ml 300 ml








General Appearance:  WD/WN


HEENT:  normocephalic


Respiratory/Chest:  chest wall non-tender


Cardiovascular:  normal peripheral pulses, regular rhythm


Abdomen:  soft, non tender


Genitourinary:  normal external genitalia


Neurologic/Psychiatric:  CNs II-XII grossly normal, abnormal gait





Microbiology








 Date/Time


Source Procedure


Growth Status


 


 


 1/21/17 20:20


Blood Blood Culture - Preliminary


NO GROWTH AFTER 24 HOURS Resulted


 


 1/21/17 20:10


Blood Blood Culture - Preliminary


NO GROWTH AFTER 24 HOURS Resulted


 


 1/21/17 20:42


Stool  Ordered


 


 1/21/17 13:30


Urine,Clean Catch Urine Culture - Final


Mixed Urogenital Contaminants Complete








Laboratory Tests


1/23/17 07:45: 


White Blood Count 11.7H, Red Blood Count 4.93, Hemoglobin 12.9, Hematocrit 43.8

, Mean Corpuscular Volume 89, Mean Corpuscular Hemoglobin 26.2L, Mean 

Corpuscular Hemoglobin Concent 29.6L, Red Cell Distribution Width 16.7H, 

Platelet Count 375, Mean Platelet Volume 6.5, Neutrophils (%) (Auto) 74.0, 

Lymphocytes (%) (Auto) 15.3L, Monocytes (%) (Auto) 9.5, Eosinophils (%) (Auto) 

0.4, Basophils (%) (Auto) 0.9, Sodium Level 137, Potassium Level 4.6, Chloride 

Level 101, Carbon Dioxide Level 21, Anion Gap 15, Blood Urea Nitrogen 26H, 

Creatinine 1.0H, Estimat Glomerular Filtration Rate > 60, Glucose Level 114H, 

Calcium Level 9.4, Total Bilirubin 0.2, Aspartate Amino Transf (AST/SGOT) 12, 

Alanine Aminotransferase (ALT/SGPT) 6, Alkaline Phosphatase 93, Total Protein 

8.5, Albumin 3.6, Globulin 4.9, Albumin/Globulin Ratio 0.7L





Current Medications








 Medications


  (Trade)  Dose


 Ordered  Sig/Jed


 Route


 PRN Reason  Start Time


 Stop Time Status Last Admin


Dose Admin


 


 Acetaminophen


  (Tylenol)  650 mg  Q4H  PRN


 ORAL


 fever  1/21/17 14:45


 2/20/17 14:44   


 


 


 Al Hydroxide/Mg


 Hydroxide


  (Mylanta II)  30 ml  Q6H  PRN


 ORAL


 dyspepsia  1/21/17 14:45


 2/20/17 14:44   


 


 


 Albuterol/


 Ipratropium


  (DuoNeb


 0.5-3(2.5)mg/3ml)  3 ml  Q4HRT  PRN


 HHN


 sob  1/21/17 18:30


 1/26/17 18:29  1/21/17 21:40


 


 


 Dextrose     STAT  PRN


 IV


 Hypoglycemia  1/21/17 14:45


 2/20/17 14:44   


 


 


 Dextrose/Sodium


 Chloride


  (D5 0.45% NS)  1,000 ml @ 


 75 mls/hr  [id number]


 IV


   1/21/17 15:00


 2/20/17 14:59  1/22/17 21:21


 


 


 Diphenhydramine


 HCl


  (Benadryl)  25 mg  Q6H  PRN


 ORAL


 Itching/Pruritis  1/21/17 14:45


 2/20/17 14:44   


 


 


 Docusate Sodium


  (Colace)  100 mg  THREE TIMES A  DAY


 ORAL


   1/23/17 13:00


 2/22/17 12:59  1/23/17 18:10


 


 


 Duloxetine HCl


  (Cymbalta)  60 mg  DAILY


 ORAL


   1/22/17 09:00


 2/21/17 08:59  1/23/17 10:03


 


 


 Heparin Sodium


  (Porcine)


  (Heparin 5000


 units/ml)  5,000 units  EVERY 12  HOURS


 SUBQ


   1/21/17 21:00


 2/20/17 20:59  1/23/17 10:18


 


 


 Hydromorphone HCl


  (Dilaudid)  2 mg  Q4H  PRN


 IVP


 For Pain  1/22/17 08:15


 1/29/17 08:14  1/23/17 14:43


 


 


 Levetiracetam


  (Keppra)  1,500 mg  TWICE A  DAY


 ORAL


   1/21/17 18:00


 2/20/17 17:59  1/23/17 18:10


 


 


 Mesalamine


  (Asacol)  800 mg  THREE TIMES A  DAY


 ORAL


   1/22/17 09:00


 2/21/17 08:59  1/23/17 18:10


 


 


 Mirtazapine


  (Remeron)  30 mg  BEDTIME


 ORAL


   1/21/17 21:00


 2/20/17 20:59  1/22/17 21:15


 


 


 Nitroglycerin


  (Ntg)  0.4 mg  Q5M X 3 DOSES PRN


 SL


 Prn Chest Pain  1/21/17 14:45


 2/20/17 14:44   


 


 


 Ondansetron HCl


  (Zofran)  4 mg  Q6H  PRN


 IVP


 Nausea & Vomiting  1/21/17 14:45


 2/20/17 14:44  1/23/17 10:03


 


 


 Piperacillin Sod/


 Tazobactam Sod/


 Sodium Chloride


  (Zosyn/Sodium


 Chloride)  110 ml @ 


 27.5 mls/hr  EVERY 8  HOURS


 IVPB


   1/21/17 22:00


 1/28/17 21:59  1/23/17 14:29


 


 


 Polyethylene


 Glycol


  (Miralax)  17 gm  BEDTIME


 ORAL


   1/23/17 21:00


 2/22/17 20:59   


 


 


 Polyethylene


 Glycol


  (Miralax)  17 gm  HSPRN  PRN


 ORAL


 Constipation  1/21/17 14:45


 2/20/17 14:44   


 


 


 Quetiapine


 Fumarate


  (SEROquel)  200 mg  DAILY


 ORAL


   1/22/17 09:00


 2/21/17 08:59  1/23/17 10:05


 


 


 Temazepam


  (Restoril)  15 mg  HSPRN  PRN


 ORAL


 Insomnia  1/21/17 14:45


 1/28/17 14:44  1/21/17 22:10


 


 


 Topiramate 25 mg  25 mg  TWICE A  DAY


 ORAL


   1/21/17 18:00


 2/20/17 17:59  1/23/17 18:10


 

















JULIETTE KLEIN Jan 23, 2017 18:38

## 2017-01-23 NOTE — CONSULTATION
DATE OF CONSULTATION:  01/23/2017



CONSULTING PHYSICIAN:  Orestes Ross PsyD.



TREATING ATTENDING PHYSICIAN:  Maxim Hopkins D.O.



HISTORY OF PRESENT ILLNESS:  The patient is a 61-year-old female.

This patient was admitted to the hospital for hypertension and abdominal

pain.  The patient also has a history of mental illness including

depression, anxiety, and possible bipolar disorder.  This clinician

assessed the patient.  The patient states that she has been treated by the

psychiatrist in the past for bipolar disorder.  She does endorse feelings

of anxiety and depression at this time stating that she has had these

symptoms since about a year.  Apparently, it has been exacerbated due to

her current medical condition and medical stressors.  The patient also

reports difficulty sleeping and difficulty focusing and concentrating.

She denies suicidal or homicidal thoughts of ideations.  This clinician

assessed the patient.  _____ the patient requires continuous

hospitalization for stabilization of her symptoms.  Encouraged the patient

to go to psychotherapy and _____ issues.



PAST MEDICAL HISTORY:  Leukocytosis and Crohn disease.



ALLERGIES:  The patient has no known drug allergies.



SUBSTANCE USE HISTORY:  The patient denies any history of alcohol

use, illicit substance use, or smoking cigarettes.



PSYCHIATRIC HISTORY:  The patient states that she does have a history

of bipolar disorder and has been treated with psychotropic medications in

the past.



SOCIAL HISTORY:  The patient is a 61-year-old female, lives

independently.  Financially sustained through Medicare.



MENTAL STATUS EXAMINATION:  The patient is alert and oriented x3 to

person, place, and time.  Her mood is depressed.  Affect is blunted.

Thought process is disorganized.  The patient has poor attention and

concentration.  Poor insight, judgment, and impulse control.   This

clinician assessed the patient and provided the patient with supportive

psychotherapy, reality orientation, and coping skills, encouraging the

patient to participate in treatment.  Continue with medication

management.



DIAGNOSES:

AXIS I:  Bipolar 2 disorder, severe, recurrent.

AXIS II:  Deferred.

AXIS III:  Per History and Physical.

AXIS IV:  Problems with social environment.



PLAN:  This clinician assessed the patient, provided to the patient

with reality orientation and supportive psychotherapy and coping skills.

Encouraging the patient to participate in treatment, including medication

management and behavioral management.  This clinician has reviewed the

patient's chart and discussed the treatment with nursing staff.









  ______________________________________________

  Orestes Ross PsyD.





DR:  [facility]

D:  01/22/2017 22:36

T:  01/23/2017 21:05

JOB#:  [id number]

CC:

## 2017-01-23 NOTE — GI PROGRESS NOTE
Assessment/Plan


Problems:  


(1) Abdominal pain


ICD Codes:  R10.9 - Unspecified abdominal pain


SNOMED:  77652305


(2) Crohn's disease


ICD Codes:  K50.90 - Crohn's disease


SNOMED:  82296241


(3) Colitis


ICD Codes:  K52.9 - Noninfective gastroenteritis and colitis, unspecified


SNOMED:  452896954


(4) Abdominal pain


(5) Anemia


(6) Nausea and vomiting


Status:  stable, unchanged


Status Narrative


Discussed with Dr. Mcleod.


Assessment/Plan


adv CLD


APCT >> fecal retention >> laxatives


mesalamine for Crohns


OB stool uncollected


s/p EGD/colon 2016


fu cdiff


pain mgmt


fu labs





Subjective


Gastrointestinal/Abdominal:  Reports: abdominal pain, constipated





Objective





Last 24 Hour Vital Signs








  Date Time  Temp Pulse Resp B/P Pulse Ox O2 Delivery O2 Flow Rate FiO2


 


1/23/17 08:00 97.6 93 20 147/92 98 Room Air  


 


1/23/17 07:30  86 20   Room Air  21


 


1/23/17 06:13 97.9       


 


1/23/17 04:00 97.9 86 18 143/93 100 Room Air  


 


1/23/17 00:00 97.9 96 18 132/88 100 Room Air  


 


1/22/17 21:42  91 20   Room Air  21


 


1/22/17 20:00 97.9 101 18 136/71 94 Room Air  


 


1/22/17 16:29 97.3 97 15 145/86 100 Room Air  

















Intake and Output  


 


 1/22/17 1/23/17





 19:00 07:00


 


Intake Total 445.0 ml 137.5 ml


 


Output Total 400 ml 300 ml


 


Balance 45.0 ml -162.5 ml


 


  


 


Intake Oral  0 ml


 


IV Total 445.0 ml 137.5 ml


 


Output Urine Total 400 ml 300 ml











Laboratory Tests








Test


  1/23/17


07:45


 


White Blood Count


  11.7 K/UL


(4.8-10.8)  H


 


Red Blood Count


  4.93 M/UL


(4.20-5.40)


 


Hemoglobin


  12.9 G/DL


(12.0-16.0)


 


Hematocrit


  43.8 %


(37.0-47.0)


 


Mean Corpuscular Volume 89 FL (80-99)  


 


Mean Corpuscular Hemoglobin


  26.2 PG


(27.0-31.0)  L


 


Mean Corpuscular Hemoglobin


Concent 29.6 G/DL


(32.0-36.0)  L


 


Red Cell Distribution Width


  16.7 %


(11.6-14.8)  H


 


Platelet Count


  375 K/UL


(150-450)


 


Mean Platelet Volume


  6.5 FL


(6.5-10.1)


 


Neutrophils (%) (Auto)


  74.0 %


(45.0-75.0)


 


Lymphocytes (%) (Auto)


  15.3 %


(20.0-45.0)  L


 


Monocytes (%) (Auto)


  9.5 %


(1.0-10.0)


 


Eosinophils (%) (Auto)


  0.4 %


(0.0-3.0)


 


Basophils (%) (Auto)


  0.9 %


(0.0-2.0)


 


Sodium Level


  137 mEQ/L


(135-145)


 


Potassium Level


  4.6 mEQ/L


(3.4-4.9)


 


Chloride Level


  101 mEQ/L


()


 


Carbon Dioxide Level


  21 mEQ/L


(20-30)


 


Anion Gap 15 (5-15)  


 


Blood Urea Nitrogen


  26 mg/dL


(7-23)  H


 


Creatinine


  1.0 mg/dL


(0.5-0.9)  H


 


Estimat Glomerular Filtration


Rate > 60 mL/min


(>60)


 


Glucose Level


  114 mg/dL


()  H


 


Calcium Level


  9.4 mg/dL


(8.6-10.2)


 


Total Bilirubin


  0.2 mg/dL


(0.0-1.2)


 


Aspartate Amino Transf


(AST/SGOT) 12 U/L (5-40)  


 


 


Alanine Aminotransferase


(ALT/SGPT) 6 U/L (3-33)  


 


 


Alkaline Phosphatase


  93 U/L


()


 


Total Protein


  8.5 g/dL


(6.6-8.7)


 


Albumin


  3.6 g/dL


(3.5-5.2)


 


Globulin 4.9 g/dL  


 


Albumin/Globulin Ratio


  0.7 (1.0-2.7)


L








Height (Feet):  5


Height (Inches):  6.00


Weight (Pounds):  164


General Appearance:  no apparent distress, alert


Cardiovascular:  normal rate


Respiratory/Chest:  normal breath sounds, no respiratory distress


Abdominal Exam:  normal bowel sounds, soft, tender











Elena Seay [email or web address] Jan 23, 2017 12:43

## 2017-01-23 NOTE — CONSULTATION
DATE OF CONSULTATION:  01/22/2017



CHIEF COMPLAINT:  Abdominal pain.



HISTORY OF PRESENT ILLNESS:  This is a very pleasant unfortunate

61-year-old  female, known to me from prior admission.

The patient has history of Crohn disease.  Last endoscopy and colonoscopy

was done in October 2016.  Endoscopy showed H. pylori negative gastritis

and gastric ulceration.  Colonoscopy showed evidence of recurrent Crohn's

of the anastomosis.  The patient was taking Pentasa and Entocort, but the

patient now readmitted with complaint of abdominal pain and on admission,

had a white count of 17,000.  Of note, the patient also history of C.

difficile colitis.



PAST MEDICAL HISTORY:

1. History of Crohn's disease with multiple surgeries and last

colonoscopy showing reactivation at the anastomosis in October 2016.

2. H. pylori negative gastritis and gastric ulceration.

3. History of C. difficile colitis.

4. History of chronic opiate dependency.

5. Appendectomy.

6. Multiple abdominal surgeries for Crohn's and only a 40 cm of the

colon left.

7. Asthma/chronic obstructive pulmonary disease.

8. Coronary artery disease.

9. Seizure.



PAST SURGICAL HISTORY:

1. Appendectomy.

2. Hysterectomy.

3. Multiple surgeries, four colonic surgeries for Crohn's.



MEDICATION:  Please see medication reconciliation list.



ALLERGIES:  No known drug allergy.



SOCIAL HISTORY:  The patient denies any tobacco, alcohol, or drug

abuse.



FAMILY HISTORY:  Noncontributory.



REVIEW OF SYSTEMS:  A 10-point review of systems was performed and

pertinent positives in history of present illness.



PHYSICAL EXAMINATION:

VITAL SIGNS:  Temperature is 98.2 degrees, pulse is 102, respirations

22, and blood pressure is 134/77.

HEENT:  Normocephalic and atraumatic.  Sclerae anicteric.

NECK:  Supple.  No lymphadenopathy.

CARDIOVASCULAR:  Tachy.  Regular rate.  Plus S1 and S2.

LUNGS:  Decreased breath sounds bilaterally on supine exam.

ABDOMEN:  Distended.  Tympanic to percussion.  Bowel sounds are

hypoactive.  No rebound.  No guarding.  No peritoneal sign.

EXTREMITIES:  No cyanosis.  No clubbing.  No edema.



LABORATORY DATA:  White count is 17, hemoglobin 14, hematocrit 47,

and platelet count is 372,000.  Chem-7, sodium is 135, potassium 4.5, BUN

is 17, and creatinine is 1.5.



ASSESSMENT:  This is a 61-year-old female with abdominal distention

and abdominal pain.  Differential diagnosis would be recurrent Clostridium

difficile colitis with white count of 17,000 versus reactivation of the

Crohn's.  According to the patient, she was taking her medication Pentasa

and Enticort.



PLAN:  Followup with a CT, which has already been ordered.  The

results are still pending.  Send stool for C. difficile.  If the stool for

C. difficile come back negative, we will consider starting the patient on

intravenous steroids and may be add 6-MP since the mesalamine is not

working for her Crohn disease.  We will follow with the C. diff and if it

is negative, we will go ahead with our plan of steroids and 6-MP.

Meanwhile, the patient to be hydrated with fluids, pain managed with

Dilaudid, and labs to be followed closely.



I want to thank, Dr. Maxim Hopkins, for this kind referral.









  ______________________________________________

  Dimitry Mcleod M.D.





DR:  JOHN

D:  01/22/2017 08:23

T:  01/23/2017 00:35

JOB#:  [id number]

CC:  Maxim Hopkins D.O.

## 2017-01-24 VITALS — SYSTOLIC BLOOD PRESSURE: 136 MMHG | DIASTOLIC BLOOD PRESSURE: 74 MMHG

## 2017-01-24 VITALS — DIASTOLIC BLOOD PRESSURE: 76 MMHG | SYSTOLIC BLOOD PRESSURE: 133 MMHG

## 2017-01-24 VITALS — SYSTOLIC BLOOD PRESSURE: 109 MMHG | DIASTOLIC BLOOD PRESSURE: 70 MMHG

## 2017-01-24 VITALS — SYSTOLIC BLOOD PRESSURE: 149 MMHG | DIASTOLIC BLOOD PRESSURE: 79 MMHG

## 2017-01-24 VITALS — DIASTOLIC BLOOD PRESSURE: 80 MMHG | SYSTOLIC BLOOD PRESSURE: 140 MMHG

## 2017-01-24 VITALS — SYSTOLIC BLOOD PRESSURE: 104 MMHG | DIASTOLIC BLOOD PRESSURE: 79 MMHG

## 2017-01-24 LAB
ALBUMIN/GLOB SERPL: 0.5 {RATIO} (ref 1–2.7)
ALT SERPL-CCNC: 5 U/L (ref 3–33)
ANION GAP SERPL CALC-SCNC: 12 MMOL/L (ref 5–15)
AST SERPL-CCNC: 9 U/L (ref 5–40)
BASOPHILS NFR BLD AUTO: 0.8 % (ref 0–2)
CALCIUM SERPL-MCNC: 8.9 MG/DL (ref 8.6–10.2)
CHLORIDE SERPL-SCNC: 103 MEQ/L (ref 98–107)
CO2 SERPL-SCNC: 23 MEQ/L (ref 20–30)
CREAT SERPL-MCNC: 0.9 MG/DL (ref 0.5–0.9)
EOSINOPHIL NFR BLD AUTO: 1.6 % (ref 0–3)
ERYTHROCYTE [DISTWIDTH] IN BLOOD BY AUTOMATED COUNT: 16.2 % (ref 11.6–14.8)
GFR SERPLBLD BASED ON 1.73 SQ M-ARVRAT: > 60 ML/MIN (ref 60–?)
GLOBULIN SER-MCNC: 5.1 G/DL
HEMOLYSIS: 0
LYMPHOCYTES NFR BLD AUTO: 23.8 % (ref 20–45)
MCH RBC QN AUTO: 26.3 PG (ref 27–31)
MCHC RBC AUTO-ENTMCNC: 30.1 G/DL (ref 32–36)
MCV RBC AUTO: 87 FL (ref 80–99)
MONOCYTES NFR BLD AUTO: 12.8 % (ref 1–10)
NEUTROPHILS NFR BLD AUTO: 61.1 % (ref 45–75)
PLATELET # BLD: 384 K/UL (ref 150–450)
PMV BLD AUTO: 6.4 FL (ref 6.5–10.1)
POTASSIUM SERPL-SCNC: 3.6 MEQ/L (ref 3.4–4.9)
PROT SERPL-MCNC: 8.1 G/DL (ref 6.6–8.7)
RBC # BLD AUTO: 4.58 M/UL (ref 4.2–5.4)
SODIUM SERPL-SCNC: 138 MEQ/L (ref 135–145)
WBC # BLD AUTO: 11.3 K/UL (ref 4.8–10.8)

## 2017-01-24 RX ADMIN — DOCUSATE SODIUM SCH MG: 100 CAPSULE, LIQUID FILLED ORAL at 08:15

## 2017-01-24 RX ADMIN — TOPIRAMATE SCH MG: 25 TABLET, COATED ORAL at 08:15

## 2017-01-24 RX ADMIN — HEPARIN SODIUM SCH UNITS: 5000 INJECTION INTRAVENOUS; SUBCUTANEOUS at 22:36

## 2017-01-24 RX ADMIN — DEXTROSE AND SODIUM CHLORIDE SCH MLS/HR: 5; .45 INJECTION, SOLUTION INTRAVENOUS at 22:44

## 2017-01-24 RX ADMIN — HEPARIN SODIUM SCH UNITS: 5000 INJECTION INTRAVENOUS; SUBCUTANEOUS at 08:18

## 2017-01-24 RX ADMIN — DOCUSATE SODIUM SCH MG: 100 CAPSULE, LIQUID FILLED ORAL at 15:17

## 2017-01-24 RX ADMIN — QUETIAPINE SCH MG: 200 TABLET, FILM COATED ORAL at 08:28

## 2017-01-24 RX ADMIN — DEXTROSE MONOHYDRATE SCH MLS/HR: 50 INJECTION, SOLUTION INTRAVENOUS at 22:38

## 2017-01-24 RX ADMIN — VALSARTAN SCH EA: 160 TABLET ORAL at 16:42

## 2017-01-24 RX ADMIN — DULOXETINE HYDROCHLORIDE SCH MG: 30 CAPSULE, DELAYED RELEASE ORAL at 08:15

## 2017-01-24 RX ADMIN — DEXTROSE MONOHYDRATE SCH MLS/HR: 50 INJECTION, SOLUTION INTRAVENOUS at 15:17

## 2017-01-24 RX ADMIN — DEXTROSE AND SODIUM CHLORIDE SCH MLS/HR: 5; .45 INJECTION, SOLUTION INTRAVENOUS at 08:31

## 2017-01-24 RX ADMIN — TOPIRAMATE SCH MG: 25 TABLET, COATED ORAL at 17:04

## 2017-01-24 RX ADMIN — POLYETHYLENE GLYCOL 3350 SCH GM: 17 POWDER, FOR SOLUTION ORAL at 22:33

## 2017-01-24 RX ADMIN — DOCUSATE SODIUM SCH MG: 100 CAPSULE, LIQUID FILLED ORAL at 17:04

## 2017-01-24 RX ADMIN — DEXTROSE MONOHYDRATE SCH MLS/HR: 50 INJECTION, SOLUTION INTRAVENOUS at 05:48

## 2017-01-24 NOTE — INFECTIOUS DISEASES PROG NOTE
Assessment/Plan


Problems:  


(1) UTI (urinary tract infection)


Assessment & Plan:   continue zosyn for now , urine culture showed contaminant 

carla.





(2) Sepsis


Assessment & Plan:  suspect GI source , await  blood culture and urine culture 

results, continue zosyn for now





(3) Abdominal pain


Assessment & Plan:  continue pain management as per primary, check CT abdomen





(4) Crohn's disease


Assessment & Plan:  consult GI, continue meds





(5) Nausea and vomiting


Assessment & Plan:  due to ileus , continue supportive care








Subjective


Constitutional:  Reports: anorexia, fatigue


Gastrointestinal/Abdominal:  Reports: bloating, constipation


Allergies:  


Coded Allergies:  


     No Known Allergies (Verified , 10/27/06)


All Systems:  reviewed and negative except above


Subjective


she was more awake and alert today , had no BM or passed kolton , no nausea or 

vomiting





Objective


Vital Signs





Last 24 Hour Vital Signs








  Date Time  Temp Pulse Resp B/P Pulse Ox O2 Delivery O2 Flow Rate FiO2


 


1/24/17 16:00 98.1 94 20 140/80 96 Room Air  


 


1/24/17 15:48 98.1       


 


1/24/17 12:00 97.8 91 18 109/70 97 Room Air  


 


1/24/17 08:00 97.7 88 18 149/79 96 Room Air  


 


1/24/17 04:00 97.5 91 20 104/79 95 Room Air  


 


1/24/17 00:00 97.3 97 20 136/74 97 Room Air  


 


1/23/17 20:00 98.6 99 20 133/86 91 Room Air  


 


1/23/17 19:30  90 20   Room Air  21








Height (Feet):  5


Height (Inches):  6.00


Weight (Pounds):  164


General Appearance:  WD/WN, no acute distress


HEENT:  normocephalic, atraumatic, anicteric, mucous membranes moist, PERRL


Respiratory/Chest:  chest wall non-tender, lungs clear, normal breath sounds, 

no respiratory distress, no accessory muscle use


Cardiovascular:  normal peripheral pulses, normal rate, regular rhythm, no 

gallop/murmur


Abdomen:  no organomegaly, no mass, absent bowel sounds, distended, tender


Extremities:  no cyanosis, no clubbing


Skin:  no rash, no lesions, no ulcers





Microbiology








 Date/Time


Source Procedure


Growth Status


 


 


 1/21/17 20:20


Blood Blood Culture - Preliminary


NO GROWTH AFTER 48 HOURS Resulted


 


 1/21/17 20:10


Blood Blood Culture - Preliminary


NO GROWTH AFTER 48 HOURS Resulted











Laboratory Tests








Test


  1/24/17


06:15


 


White Blood Count


  11.3 K/UL


(4.8-10.8)  H


 


Red Blood Count


  4.58 M/UL


(4.20-5.40)


 


Hemoglobin


  12.0 G/DL


(12.0-16.0)


 


Hematocrit


  40.0 %


(37.0-47.0)


 


Mean Corpuscular Volume 87 FL (80-99)  


 


Mean Corpuscular Hemoglobin


  26.3 PG


(27.0-31.0)  L


 


Mean Corpuscular Hemoglobin


Concent 30.1 G/DL


(32.0-36.0)  L


 


Red Cell Distribution Width


  16.2 %


(11.6-14.8)  H


 


Platelet Count


  384 K/UL


(150-450)


 


Mean Platelet Volume


  6.4 FL


(6.5-10.1)  L


 


Neutrophils (%) (Auto)


  61.1 %


(45.0-75.0)


 


Lymphocytes (%) (Auto)


  23.8 %


(20.0-45.0)


 


Monocytes (%) (Auto)


  12.8 %


(1.0-10.0)  H


 


Eosinophils (%) (Auto)


  1.6 %


(0.0-3.0)


 


Basophils (%) (Auto)


  0.8 %


(0.0-2.0)


 


Sodium Level


  138 mEQ/L


(135-145)


 


Potassium Level


  3.6 mEQ/L


(3.4-4.9)


 


Chloride Level


  103 mEQ/L


()


 


Carbon Dioxide Level


  23 mEQ/L


(20-30)


 


Anion Gap 12 (5-15)  


 


Blood Urea Nitrogen


  21 mg/dL


(7-23)


 


Creatinine


  0.9 mg/dL


(0.5-0.9)


 


Estimat Glomerular Filtration


Rate > 60 mL/min


(>60)


 


Glucose Level


  97 mg/dL


()


 


Calcium Level


  8.9 mg/dL


(8.6-10.2)


 


Total Bilirubin


  0.2 mg/dL


(0.0-1.2)


 


Aspartate Amino Transf


(AST/SGOT) 9 U/L (5-40)  


 


 


Alanine Aminotransferase


(ALT/SGPT) 5 U/L (3-33)  


 


 


Alkaline Phosphatase


  74 U/L


()


 


Total Protein


  8.1 g/dL


(6.6-8.7)


 


Albumin


  3.0 g/dL


(3.5-5.2)  L


 


Globulin 5.1 g/dL  


 


Albumin/Globulin Ratio


  0.5 (1.0-2.7)


L











Current Medications








 Medications


  (Trade)  Dose


 Ordered  Sig/Jed


 Route


 PRN Reason  Start Time


 Stop Time Status Last Admin


Dose Admin


 


 Acetaminophen


  (Tylenol)  650 mg  Q4H  PRN


 ORAL


 fever  1/21/17 14:45


 2/20/17 14:44   


 


 


 Al Hydroxide/Mg


 Hydroxide


  (Mylanta II)  30 ml  Q6H  PRN


 ORAL


 dyspepsia  1/21/17 14:45


 2/20/17 14:44   


 


 


 Albuterol/


 Ipratropium


  (DuoNeb


 0.5-3(2.5)mg/3ml)  3 ml  Q4HRT  PRN


 HHN


 sob  1/21/17 18:30


 1/26/17 18:29  1/21/17 21:40


 


 


 Dextrose     STAT  PRN


 IV


 Hypoglycemia  1/21/17 14:45


 2/20/17 14:44   


 


 


 Dextrose/Sodium


 Chloride


  (D5 0.45% NS)  1,000 ml @ 


 75 mls/hr  [id number]


 IV


   1/21/17 15:00


 2/20/17 14:59  1/23/17 21:44


 


 


 Diphenhydramine


 HCl


  (Benadryl)  25 mg  Q6H  PRN


 ORAL


 Itching/Pruritis  1/21/17 14:45


 2/20/17 14:44   


 


 


 Docusate Sodium


  (Colace)  100 mg  THREE TIMES A  DAY


 ORAL


   1/23/17 13:00


 2/22/17 12:59  1/24/17 17:04


 


 


 Duloxetine HCl


  (Cymbalta)  60 mg  DAILY


 ORAL


   1/22/17 09:00


 2/21/17 08:59  1/24/17 08:15


 


 


 Heparin Sodium


  (Porcine)


  (Heparin 5000


 units/ml)  5,000 units  EVERY 12  HOURS


 SUBQ


   1/21/17 21:00


 2/20/17 20:59  1/24/17 08:18


 


 


 Hydromorphone HCl


  (Dilaudid)  2 mg  Q4H  PRN


 IVP


 For Pain  1/22/17 08:15


 1/29/17 08:14  1/24/17 15:18


 


 


 Levetiracetam


  (Keppra)  1,500 mg  TWICE A  DAY


 ORAL


   1/21/17 18:00


 2/20/17 17:59  1/24/17 17:04


 


 


 Mesalamine


  (Asacol)  800 mg  THREE TIMES A  DAY


 ORAL


   1/22/17 09:00


 2/21/17 08:59  1/24/17 17:04


 


 


 Mirtazapine


  (Remeron)  30 mg  BEDTIME


 ORAL


   1/21/17 21:00


 2/20/17 20:59  1/23/17 21:43


 


 


 Nitroglycerin


  (Ntg)  0.4 mg  Q5M X 3 DOSES PRN


 SL


 Prn Chest Pain  1/21/17 14:45


 2/20/17 14:44   


 


 


 Ondansetron HCl


  (Zofran)  4 mg  Q6H  PRN


 IVP


 Nausea & Vomiting  1/21/17 14:45


 2/20/17 14:44  1/24/17 10:59


 


 


 Patient Own


 Medication


  (Patient's Own


 Med)  1 ea  DAILY@1630


 ORAL


   1/24/17 16:30


 1/26/17 16:31  1/24/17 16:42


 


 


 Piperacillin Sod/


 Tazobactam Sod/


 Sodium Chloride


  (Zosyn/Sodium


 Chloride)  110 ml @ 


 27.5 mls/hr  EVERY 8  HOURS


 IVPB


   1/21/17 22:00


 1/28/17 21:59  1/24/17 15:17


 


 


 Polyethylene


 Glycol


  (Miralax)  17 gm  BEDTIME


 ORAL


   1/23/17 21:00


 2/22/17 20:59  1/23/17 21:44


 


 


 Polyethylene


 Glycol


  (Miralax)  17 gm  HSPRN  PRN


 ORAL


 Constipation  1/21/17 14:45


 2/20/17 14:44   


 


 


 Quetiapine


 Fumarate


  (SEROquel)  200 mg  DAILY


 ORAL


   1/22/17 09:00


 2/21/17 08:59  1/24/17 08:28


 


 


 Temazepam


  (Restoril)  15 mg  HSPRN  PRN


 ORAL


 Insomnia  1/21/17 14:45


 1/28/17 14:44  1/21/17 22:10


 


 


 Topiramate 25 mg  25 mg  TWICE A  DAY


 ORAL


   1/21/17 18:00


 2/20/17 17:59  1/24/17 17:04


 

















Violet Dior M.D. Jan 24, 2017 17:19

## 2017-01-24 NOTE — PULMONOLOGY PROGRESS NOTE
Assessment/Plan


Problems:  


(1) Sepsis


(2) Emphysema


(3) Leukocytosis


(4) Colitis


(5) Acute encephalopathy


(6) Hx SBO


Assessment/Plan


on zosyn, 


improivng


all cultures negative sofar


all notes and meds reviewed.





Subjective


Interval Events:  improving


Allergies:  


Coded Allergies:  


     No Known Allergies (Verified , 10/27/06)





Objective





Last 24 Hour Vital Signs








  Date Time  Temp Pulse Resp B/P Pulse Ox O2 Delivery O2 Flow Rate FiO2


 


1/24/17 23:17 98.1       


 


1/24/17 19:38  92 20   Room Air  21


 


1/24/17 19:00 98.1 93 20 133/76 95 Room Air  


 


1/24/17 16:00 98.1 94 20 140/80 96 Room Air  


 


1/24/17 12:00 97.8 91 18 109/70 97 Room Air  


 


1/24/17 08:00 97.7 88 18 149/79 96 Room Air  


 


1/24/17 04:00 97.5 91 20 104/79 95 Room Air  


 


1/24/17 00:00 97.3 97 20 136/74 97 Room Air  

















Intake and Output  


 


 1/23/17 1/24/17





 19:00 07:00


 


Intake Total 550.0 ml 505.0 ml


 


Balance 550.0 ml 505.0 ml


 


  


 


Intake Oral 120 ml 


 


IV Total 430.0 ml 505.0 ml


 


# Voids  3








General Appearance:  WD/WN


HEENT:  normocephalic


Respiratory/Chest:  chest wall non-tender


Breasts:  no masses


Cardiovascular:  regular rhythm, no JVD


Abdomen:  soft, non tender


Skin:  no rash


Laboratory Tests


1/24/17 06:15: 


White Blood Count 11.3H, Red Blood Count 4.58, Hemoglobin 12.0, Hematocrit 40.0

, Mean Corpuscular Volume 87, Mean Corpuscular Hemoglobin 26.3L, Mean 

Corpuscular Hemoglobin Concent 30.1L, Red Cell Distribution Width 16.2H, 

Platelet Count 384, Mean Platelet Volume 6.4L, Neutrophils (%) (Auto) 61.1, 

Lymphocytes (%) (Auto) 23.8, Monocytes (%) (Auto) 12.8H, Eosinophils (%) (Auto) 

1.6, Basophils (%) (Auto) 0.8, Sodium Level 138, Potassium Level 3.6, Chloride 

Level 103, Carbon Dioxide Level 23, Anion Gap 12, Blood Urea Nitrogen 21, 

Creatinine 0.9, Estimat Glomerular Filtration Rate > 60, Glucose Level 97, 

Calcium Level 8.9, Total Bilirubin 0.2, Aspartate Amino Transf (AST/SGOT) 9, 

Alanine Aminotransferase (ALT/SGPT) 5, Alkaline Phosphatase 74, Total Protein 

8.1, Albumin 3.0L, Globulin 5.1, Albumin/Globulin Ratio 0.5L





Current Medications








 Medications


  (Trade)  Dose


 Ordered  Sig/Jed


 Route


 PRN Reason  Start Time


 Stop Time Status Last Admin


Dose Admin


 


 Acetaminophen


  (Tylenol)  650 mg  Q4H  PRN


 ORAL


 fever  1/21/17 14:45


 2/20/17 14:44   


 


 


 Al Hydroxide/Mg


 Hydroxide


  (Mylanta II)  30 ml  Q6H  PRN


 ORAL


 dyspepsia  1/21/17 14:45


 2/20/17 14:44   


 


 


 Albuterol/


 Ipratropium


  (DuoNeb


 0.5-3(2.5)mg/3ml)  3 ml  Q4HRT  PRN


 HHN


 sob  1/21/17 18:30


 1/26/17 18:29  1/21/17 21:40


 


 


 Dextrose     STAT  PRN


 IV


 Hypoglycemia  1/21/17 14:45


 2/20/17 14:44   


 


 


 Dextrose/Sodium


 Chloride


  (D5 0.45% NS)  1,000 ml @ 


 75 mls/hr  [id number]


 IV


   1/21/17 15:00


 2/20/17 14:59  1/23/17 21:44


 


 


 Diphenhydramine


 HCl


  (Benadryl)  25 mg  Q6H  PRN


 ORAL


 Itching/Pruritis  1/21/17 14:45


 2/20/17 14:44   


 


 


 Docusate Sodium


  (Colace)  100 mg  THREE TIMES A  DAY


 ORAL


   1/23/17 13:00


 2/22/17 12:59  1/24/17 17:04


 


 


 Duloxetine HCl


  (Cymbalta)  60 mg  DAILY


 ORAL


   1/22/17 09:00


 2/21/17 08:59  1/24/17 08:15


 


 


 Heparin Sodium


  (Porcine)


  (Heparin 5000


 units/ml)  5,000 units  EVERY 12  HOURS


 SUBQ


   1/21/17 21:00


 2/20/17 20:59  1/24/17 22:36


 


 


 Hydromorphone HCl


  (Dilaudid)  2 mg  Q4H  PRN


 IVP


 For Pain  1/22/17 08:15


 1/29/17 08:14  1/24/17 22:47


 


 


 Levetiracetam


  (Keppra)  1,500 mg  TWICE A  DAY


 ORAL


   1/21/17 18:00


 2/20/17 17:59  1/24/17 17:04


 


 


 Mesalamine


  (Asacol)  800 mg  THREE TIMES A  DAY


 ORAL


   1/22/17 09:00


 2/21/17 08:59  1/24/17 17:04


 


 


 Mirtazapine


  (Remeron)  30 mg  BEDTIME


 ORAL


   1/21/17 21:00


 2/20/17 20:59  1/24/17 22:33


 


 


 Nitroglycerin


  (Ntg)  0.4 mg  Q5M X 3 DOSES PRN


 SL


 Prn Chest Pain  1/21/17 14:45


 2/20/17 14:44   


 


 


 Ondansetron HCl


  (Zofran)  4 mg  Q6H  PRN


 IVP


 Nausea & Vomiting  1/21/17 14:45


 2/20/17 14:44  1/24/17 10:59


 


 


 Patient Own


 Medication


  (Patient's Own


 Med)  1 ea  DAILY@1630


 ORAL


   1/24/17 16:30


 1/26/17 16:31  1/24/17 16:42


 


 


 Piperacillin Sod/


 Tazobactam Sod/


 Sodium Chloride


  (Zosyn/Sodium


 Chloride)  110 ml @ 


 27.5 mls/hr  EVERY 8  HOURS


 IVPB


   1/21/17 22:00


 1/28/17 21:59  1/24/17 22:38


 


 


 Polyethylene


 Glycol


  (Miralax)  17 gm  BEDTIME


 ORAL


   1/23/17 21:00


 2/22/17 20:59  1/24/17 22:33


 


 


 Polyethylene


 Glycol


  (Miralax)  17 gm  HSPRN  PRN


 ORAL


 Constipation  1/21/17 14:45


 2/20/17 14:44   


 


 


 Quetiapine


 Fumarate


  (SEROquel)  200 mg  DAILY


 ORAL


   1/22/17 09:00


 2/21/17 08:59  1/24/17 08:28


 


 


 Temazepam


  (Restoril)  15 mg  HSPRN  PRN


 ORAL


 Insomnia  1/21/17 14:45


 1/28/17 14:44  1/21/17 22:10


 


 


 Topiramate 25 mg  25 mg  TWICE A  DAY


 ORAL


   1/21/17 18:00


 2/20/17 17:59  1/24/17 17:04


 

















JULIETTE KLEIN Jan 24, 2017 23:47

## 2017-01-24 NOTE — CONSULTATION
DATE OF CONSULTATION:  01/23/2017



PSYCHOTHERAPY CONSULTATION PROGRESS NOTE:



CONSULTING PHYSICIAN:  Orestes Ross M.D.



TREATING ATTENDING:  Maxim Hopkins D.O.



HISTORY OF PRESENT ILLNESS:  The patient is a 61-year-old female.

This patient _____ diagnosis ________ bipolar disorder.  The patient

states that she confused and had some difficulty breathing.  She has

anxiety and depression, however, denies suicidal or homicidal thoughts of

ideation _____ auditory or visual hallucinations.  She has been sleeping,

lethargic, _____ having poor energy ______.This clinician assessed the

patient and provided with supportive psychotherapy, cognitive intervention

and coping skills including _____ interview.  Continue with medication

management and behavioral management.   This clinician has reviewed the

patient's chart. Discussed the treatment with nursing staff.









  ______________________________________________

  Orestes Ross PsyD.





DR:  Janusz

D:  01/23/2017 16:42

T:  01/24/2017 00:19

JOB#:  [id number]

CC:

## 2017-01-24 NOTE — DIAGNOSTIC IMAGING REPORT
Indication: DYSPNEA



Technique: One view of the chest



Comparison: 1/21/2017



Findings: Bilateral diffuse interstitial disease, right greater than left, 

appears

similar to the previous exam. No new consolidation. Heart size is normal. Pleural

spaces are clear. Inspiration is less optimal on the current than on the prior exam



Impression:  Unchanged, over 3 days, findings as above.

## 2017-01-24 NOTE — GI PROGRESS NOTE
Assessment/Plan


Problems:  


(1) Abdominal pain


ICD Codes:  R10.9 - Unspecified abdominal pain


SNOMED:  96903038


(2) Crohn's disease


ICD Codes:  K50.90 - Crohn's disease


SNOMED:  53293235


(3) Colitis


ICD Codes:  K52.9 - Noninfective gastroenteritis and colitis, unspecified


SNOMED:  002329330


(4) Abdominal pain


(5) Anemia


(6) Nausea and vomiting


(7) Constipation


Status:  unchanged


Status Narrative


Discussed with Dr. Mcleod.


Assessment/Plan


APCT >> fecal retention


mesalamine for Crohns


no BM reported


s/p EGD/colon 2016


non functional implanted morphine pump





adv FLD


Rx trial of Movantik for OIC


laxatives


OB stool uncollected


pain mgmt


fu labs





Subjective


Gastrointestinal/Abdominal:  Reports: abdomen distended, constipated





Objective





Last 24 Hour Vital Signs








  Date Time  Temp Pulse Resp B/P Pulse Ox O2 Delivery O2 Flow Rate FiO2


 


1/24/17 12:00 97.8 91 18 109/70 97 Room Air  


 


1/24/17 11:00 97.7       


 


1/24/17 08:00 97.7 88 18 149/79 96 Room Air  


 


1/24/17 04:00 97.5 91 20 104/79 95 Room Air  


 


1/24/17 00:00 97.3 97 20 136/74 97 Room Air  


 


1/23/17 20:00 98.6 99 20 133/86 91 Room Air  


 


1/23/17 19:30  90 20   Room Air  21


 


1/23/17 16:00 98.6 88 20 116/92 100 Room Air  

















Intake and Output  


 


 1/23/17 1/24/17





 19:00 07:00


 


Intake Total 550.0 ml 505.0 ml


 


Balance 550.0 ml 505.0 ml


 


  


 


Intake Oral 120 ml 


 


IV Total 430.0 ml 505.0 ml


 


# Voids  3











Laboratory Tests








Test


  1/24/17


06:15


 


White Blood Count


  11.3 K/UL


(4.8-10.8)  H


 


Red Blood Count


  4.58 M/UL


(4.20-5.40)


 


Hemoglobin


  12.0 G/DL


(12.0-16.0)


 


Hematocrit


  40.0 %


(37.0-47.0)


 


Mean Corpuscular Volume 87 FL (80-99)  


 


Mean Corpuscular Hemoglobin


  26.3 PG


(27.0-31.0)  L


 


Mean Corpuscular Hemoglobin


Concent 30.1 G/DL


(32.0-36.0)  L


 


Red Cell Distribution Width


  16.2 %


(11.6-14.8)  H


 


Platelet Count


  384 K/UL


(150-450)


 


Mean Platelet Volume


  6.4 FL


(6.5-10.1)  L


 


Neutrophils (%) (Auto)


  61.1 %


(45.0-75.0)


 


Lymphocytes (%) (Auto)


  23.8 %


(20.0-45.0)


 


Monocytes (%) (Auto)


  12.8 %


(1.0-10.0)  H


 


Eosinophils (%) (Auto)


  1.6 %


(0.0-3.0)


 


Basophils (%) (Auto)


  0.8 %


(0.0-2.0)


 


Sodium Level


  138 mEQ/L


(135-145)


 


Potassium Level


  3.6 mEQ/L


(3.4-4.9)


 


Chloride Level


  103 mEQ/L


()


 


Carbon Dioxide Level


  23 mEQ/L


(20-30)


 


Anion Gap 12 (5-15)  


 


Blood Urea Nitrogen


  21 mg/dL


(7-23)


 


Creatinine


  0.9 mg/dL


(0.5-0.9)


 


Estimat Glomerular Filtration


Rate > 60 mL/min


(>60)


 


Glucose Level


  97 mg/dL


()


 


Calcium Level


  8.9 mg/dL


(8.6-10.2)


 


Total Bilirubin


  0.2 mg/dL


(0.0-1.2)


 


Aspartate Amino Transf


(AST/SGOT) 9 U/L (5-40)  


 


 


Alanine Aminotransferase


(ALT/SGPT) 5 U/L (3-33)  


 


 


Alkaline Phosphatase


  74 U/L


()


 


Total Protein


  8.1 g/dL


(6.6-8.7)


 


Albumin


  3.0 g/dL


(3.5-5.2)  L


 


Globulin 5.1 g/dL  


 


Albumin/Globulin Ratio


  0.5 (1.0-2.7)


L








Height (Feet):  5


Height (Inches):  6.00


Weight (Pounds):  164


General Appearance:  no apparent distress, alert


Cardiovascular:  normal rate


Respiratory/Chest:  normal breath sounds, no respiratory distress


Abdominal Exam:  normal bowel sounds, non tender, soft, distended


Objective


No BM reported.











Elena Seay N.P. Jan 24, 2017 15:43

## 2017-01-24 NOTE — GENERAL PROGRESS NOTE
Assessment/Plan


Problem List:  


(1) Abdominal pain


(2) Constipation


(3) Opioid dependence


ICD Codes:  F11.20 - Opioid dependence


SNOMED:  18764358


(4) Shortness of breath


(5) Emphysema


ICD Codes:  J43.9 - Emphysema


SNOMED:  81928930


(6) COPD exacerbation


ICD Codes:  J44.1 - COPD exacerbation


SNOMED:  666028802


(7) CHF (congestive heart failure)


ICD Codes:  I50.9 - Heart failure, unspecified


SNOMED:  89141900


(8) Nausea and vomiting


(9) Crohns disease


ICD Codes:  K50.90 - Crohns disease


SNOMED:  75783767


(10) UTI (urinary tract infection)


ICD Codes:  N39.0 - Urinary tract infection, site not specified


SNOMED:  13922579


Status:  stable, progressing, tolerating diet


Assessment/Plan


ot pt diet gi f/u abx o2 pulm tx pain control cbc bmp am





Subjective


Constitutional:  Reports: weakness


Allergies:  


Coded Allergies:  


     No Known Allergies (Verified , 10/27/06)


All Systems:  reviewed and negative except above


Subjective


sleepy calm





Objective





Last 24 Hour Vital Signs








  Date Time  Temp Pulse Resp B/P Pulse Ox O2 Delivery O2 Flow Rate FiO2


 


1/24/17 12:00 97.8 91 18 109/70 97 Room Air  


 


1/24/17 11:00 97.7       


 


1/24/17 08:00 97.7 88 18 149/79 96 Room Air  


 


1/24/17 04:00 97.5 91 20 104/79 95 Room Air  


 


1/24/17 00:00 97.3 97 20 136/74 97 Room Air  


 


1/23/17 20:00 98.6 99 20 133/86 91 Room Air  


 


1/23/17 19:30  90 20   Room Air  21


 


1/23/17 16:00 98.6 88 20 116/92 100 Room Air  

















Intake and Output  


 


 1/23/17 1/24/17





 19:00 07:00


 


Intake Total 550.0 ml 505.0 ml


 


Balance 550.0 ml 505.0 ml


 


  


 


Intake Oral 120 ml 


 


IV Total 430.0 ml 505.0 ml


 


# Voids  3








Laboratory Tests


1/24/17 06:15: 


White Blood Count 11.3H, Red Blood Count 4.58, Hemoglobin 12.0, Hematocrit 40.0

, Mean Corpuscular Volume 87, Mean Corpuscular Hemoglobin 26.3L, Mean 

Corpuscular Hemoglobin Concent 30.1L, Red Cell Distribution Width 16.2H, 

Platelet Count 384, Mean Platelet Volume 6.4L, Neutrophils (%) (Auto) 61.1, 

Lymphocytes (%) (Auto) 23.8, Monocytes (%) (Auto) 12.8H, Eosinophils (%) (Auto) 

1.6, Basophils (%) (Auto) 0.8, Sodium Level 138, Potassium Level 3.6, Chloride 

Level 103, Carbon Dioxide Level 23, Anion Gap 12, Blood Urea Nitrogen 21, 

Creatinine 0.9, Estimat Glomerular Filtration Rate > 60, Glucose Level 97, 

Calcium Level 8.9, Total Bilirubin 0.2, Aspartate Amino Transf (AST/SGOT) 9, 

Alanine Aminotransferase (ALT/SGPT) 5, Alkaline Phosphatase 74, Total Protein 

8.1, Albumin 3.0L, Globulin 5.1, Albumin/Globulin Ratio 0.5L


Height (Feet):  5


Height (Inches):  6.00


Weight (Pounds):  164


General Appearance:  lethargic


EENT:  normal ENT inspection


Neck:  normal alignment


Cardiovascular:  normal peripheral pulses, normal rate, regular rhythm


Respiratory/Chest:  chest wall non-tender, lungs clear, normal breath sounds


Abdomen:  soft, hypoactive bowel sounds, distended


Extremities:  normal inspection


Edema:  no edema noted Arm (L), no edema noted Arm (R), no edema noted Leg (L), 

no edema noted Leg (R), no edema noted Pedal (L), no edema noted Pedal (R), no 

edema noted Generalized


Neurologic:  responsive, motor weakness


Skin:  normal pigmentation, warm/dry











MAICOL LANG Jan 24, 2017 15:09

## 2017-01-25 VITALS — DIASTOLIC BLOOD PRESSURE: 77 MMHG | SYSTOLIC BLOOD PRESSURE: 129 MMHG

## 2017-01-25 VITALS — DIASTOLIC BLOOD PRESSURE: 74 MMHG | SYSTOLIC BLOOD PRESSURE: 123 MMHG

## 2017-01-25 VITALS — DIASTOLIC BLOOD PRESSURE: 77 MMHG | SYSTOLIC BLOOD PRESSURE: 130 MMHG

## 2017-01-25 VITALS — DIASTOLIC BLOOD PRESSURE: 64 MMHG | SYSTOLIC BLOOD PRESSURE: 101 MMHG

## 2017-01-25 VITALS — SYSTOLIC BLOOD PRESSURE: 122 MMHG | DIASTOLIC BLOOD PRESSURE: 66 MMHG

## 2017-01-25 VITALS — DIASTOLIC BLOOD PRESSURE: 74 MMHG | SYSTOLIC BLOOD PRESSURE: 128 MMHG

## 2017-01-25 LAB
ANION GAP SERPL CALC-SCNC: 13 MMOL/L (ref 5–15)
BASOPHILS NFR BLD AUTO: 0.8 % (ref 0–2)
CALCIUM SERPL-MCNC: 9.1 MG/DL (ref 8.6–10.2)
CHLORIDE SERPL-SCNC: 100 MEQ/L (ref 98–107)
CO2 SERPL-SCNC: 24 MEQ/L (ref 20–30)
CREAT SERPL-MCNC: 0.8 MG/DL (ref 0.5–0.9)
EOSINOPHIL NFR BLD AUTO: 1.6 % (ref 0–3)
ERYTHROCYTE [DISTWIDTH] IN BLOOD BY AUTOMATED COUNT: 16.5 % (ref 11.6–14.8)
GFR SERPLBLD BASED ON 1.73 SQ M-ARVRAT: > 60 ML/MIN (ref 60–?)
HEMOLYSIS: 5
LYMPHOCYTES NFR BLD AUTO: 24 % (ref 20–45)
MCH RBC QN AUTO: 25.9 PG (ref 27–31)
MCHC RBC AUTO-ENTMCNC: 29.9 G/DL (ref 32–36)
MCV RBC AUTO: 87 FL (ref 80–99)
MONOCYTES NFR BLD AUTO: 11 % (ref 1–10)
NEUTROPHILS NFR BLD AUTO: 62.8 % (ref 45–75)
PLATELET # BLD: 392 K/UL (ref 150–450)
PMV BLD AUTO: 6.4 FL (ref 6.5–10.1)
POTASSIUM SERPL-SCNC: 3.4 MEQ/L (ref 3.4–4.9)
RBC # BLD AUTO: 4.75 M/UL (ref 4.2–5.4)
SODIUM SERPL-SCNC: 137 MEQ/L (ref 135–145)
WBC # BLD AUTO: 10.8 K/UL (ref 4.8–10.8)

## 2017-01-25 RX ADMIN — POLYETHYLENE GLYCOL 3350 SCH GM: 17 POWDER, FOR SOLUTION ORAL at 21:00

## 2017-01-25 RX ADMIN — DIPHENHYDRAMINE HYDROCHLORIDE PRN MG: 50 INJECTION INTRAMUSCULAR; INTRAVENOUS at 17:27

## 2017-01-25 RX ADMIN — VALSARTAN SCH EA: 160 TABLET ORAL at 18:49

## 2017-01-25 RX ADMIN — DOCUSATE SODIUM SCH MG: 100 CAPSULE, LIQUID FILLED ORAL at 13:51

## 2017-01-25 RX ADMIN — TOPIRAMATE SCH MG: 25 TABLET, COATED ORAL at 08:33

## 2017-01-25 RX ADMIN — DULOXETINE HYDROCHLORIDE SCH MG: 30 CAPSULE, DELAYED RELEASE ORAL at 08:33

## 2017-01-25 RX ADMIN — DEXTROSE MONOHYDRATE SCH MLS/HR: 50 INJECTION, SOLUTION INTRAVENOUS at 13:53

## 2017-01-25 RX ADMIN — QUETIAPINE SCH MG: 200 TABLET, FILM COATED ORAL at 08:33

## 2017-01-25 RX ADMIN — DEXTROSE AND SODIUM CHLORIDE SCH MLS/HR: 5; .45 INJECTION, SOLUTION INTRAVENOUS at 13:51

## 2017-01-25 RX ADMIN — DIPHENHYDRAMINE HYDROCHLORIDE PRN MG: 50 INJECTION INTRAMUSCULAR; INTRAVENOUS at 18:49

## 2017-01-25 RX ADMIN — DEXTROSE MONOHYDRATE SCH MLS/HR: 50 INJECTION, SOLUTION INTRAVENOUS at 05:37

## 2017-01-25 RX ADMIN — DOCUSATE SODIUM SCH MG: 100 CAPSULE, LIQUID FILLED ORAL at 08:33

## 2017-01-25 RX ADMIN — DOCUSATE SODIUM SCH MG: 100 CAPSULE, LIQUID FILLED ORAL at 17:24

## 2017-01-25 RX ADMIN — TOPIRAMATE SCH MG: 25 TABLET, COATED ORAL at 17:26

## 2017-01-25 RX ADMIN — HEPARIN SODIUM SCH UNITS: 5000 INJECTION INTRAVENOUS; SUBCUTANEOUS at 21:21

## 2017-01-25 RX ADMIN — HEPARIN SODIUM SCH UNITS: 5000 INJECTION INTRAVENOUS; SUBCUTANEOUS at 08:32

## 2017-01-25 NOTE — GI PROGRESS NOTE
Assessment/Plan


Problems:  


(1) Abdominal pain


ICD Codes:  R10.9 - Unspecified abdominal pain


SNOMED:  90323150


(2) Crohn's disease


ICD Codes:  K50.90 - Crohn's disease


SNOMED:  47205905


(3) Colitis


ICD Codes:  K52.9 - Noninfective gastroenteritis and colitis, unspecified


SNOMED:  798572223


(4) Abdominal pain


(5) Anemia


(6) Nausea and vomiting


(7) Constipation


Status:  stable


Status Narrative


Discussed with Dr. Mcleod.


Assessment/Plan


APCT >> fecal retention


mesalamine for Crohns


BM x 1


s/p EGD/colon 2016


non functional implanted morphine pump LLQ





ok for DC per GI standpoint


cardiac diet


Rx trial of Movantik for OIC >> BM x 1 this morning


laxatives


OB stool uncollected


pain mgmt 


fu labs


pt scheduled to be seen outpatient in [facility] next week





Subjective


Gastrointestinal/Abdominal:  Reports: abdominal pain - improved


Subjective


BM x 1





Objective





Last 24 Hour Vital Signs








  Date Time  Temp Pulse Resp B/P Pulse Ox O2 Delivery O2 Flow Rate FiO2


 


1/25/17 12:00 98.2 93 18 122/66 98 Room Air  


 


1/25/17 08:00 98.2 92 18 129/77 100 Room Air  


 


1/25/17 07:43  87 20   Room Air  21


 


1/25/17 04:00 98.2 91 20 130/77 93 Room Air  


 


1/25/17 00:00 97.2 89 20 101/64 100 Room Air  


 


1/24/17 23:17 98.1       


 


1/24/17 19:38  92 20   Room Air  21


 


1/24/17 19:00 98.1 93 20 133/76 95 Room Air  


 


1/24/17 16:00 98.1 94 20 140/80 96 Room Air  

















Intake and Output  


 


 1/24/17 1/25/17





 19:00 07:00


 


Intake Total 690.0 ml 355.0 ml


 


Balance 690.0 ml 355.0 ml


 


  


 


Intake Oral 240 ml 


 


IV Total 450.0 ml 355.0 ml


 


# Voids 2 4











Laboratory Tests








Test


  1/25/17


05:30


 


White Blood Count


  10.8 K/UL


(4.8-10.8)


 


Red Blood Count


  4.75 M/UL


(4.20-5.40)


 


Hemoglobin


  12.3 G/DL


(12.0-16.0)


 


Hematocrit


  41.1 %


(37.0-47.0)


 


Mean Corpuscular Volume 87 FL (80-99)  


 


Mean Corpuscular Hemoglobin


  25.9 PG


(27.0-31.0)  L


 


Mean Corpuscular Hemoglobin


Concent 29.9 G/DL


(32.0-36.0)  L


 


Red Cell Distribution Width


  16.5 %


(11.6-14.8)  H


 


Platelet Count


  392 K/UL


(150-450)


 


Mean Platelet Volume


  6.4 FL


(6.5-10.1)  L


 


Neutrophils (%) (Auto)


  62.8 %


(45.0-75.0)


 


Lymphocytes (%) (Auto)


  24.0 %


(20.0-45.0)


 


Monocytes (%) (Auto)


  11.0 %


(1.0-10.0)  H


 


Eosinophils (%) (Auto)


  1.6 %


(0.0-3.0)


 


Basophils (%) (Auto)


  0.8 %


(0.0-2.0)


 


Sodium Level


  137 mEQ/L


(135-145)


 


Potassium Level


  3.4 mEQ/L


(3.4-4.9)


 


Chloride Level


  100 mEQ/L


()


 


Carbon Dioxide Level


  24 mEQ/L


(20-30)


 


Anion Gap 13 (5-15)  


 


Blood Urea Nitrogen


  18 mg/dL


(7-23)


 


Creatinine


  0.8 mg/dL


(0.5-0.9)


 


Estimat Glomerular Filtration


Rate > 60 mL/min


(>60)


 


Glucose Level


  90 mg/dL


()


 


Calcium Level


  9.1 mg/dL


(8.6-10.2)











Microbiology








 Date/Time


Source Procedure


Growth Status


 


 


 1/25/17 06:50


Stool Clostridium difficile Toxin Assay - Final Complete








Height (Feet):  5


Height (Inches):  6.00


Weight (Pounds):  164


General Appearance:  alert, other - drowsy


Cardiovascular:  normal rate


Respiratory/Chest:  normal breath sounds, no respiratory distress


Abdominal Exam:  normal bowel sounds, non tender, soft











Elena Seay N.PMatt Jan 25, 2017 13:59

## 2017-01-25 NOTE — PULMONOLOGY PROGRESS NOTE
Assessment/Plan


Problems:  


(1) Sepsis


(2) Leukocytosis


(3) Colitis


(4) Acute encephalopathy


(5) Hx SBO


(6) Crohn's disease


(7) Asthma


(8) Emphysema


Assessment/Plan


improving


off antibiotics


all cultures negative


tolerating diet





Subjective


ROS Limited/Unobtainable:  No


Interval Events:  no new complains


Allergies:  


Coded Allergies:  


     No Known Allergies (Verified , 10/27/06)





Objective





Last 24 Hour Vital Signs








  Date Time  Temp Pulse Resp B/P Pulse Ox O2 Delivery O2 Flow Rate FiO2


 


1/25/17 16:00 98.4 95 20 123/74 100 Room Air  


 


1/25/17 12:00 98.2 93 18 122/66 98 Room Air  


 


1/25/17 08:00 98.2 92 18 129/77 100 Room Air  


 


1/25/17 07:43  87 20   Room Air  21


 


1/25/17 04:00 98.2 91 20 130/77 93 Room Air  


 


1/25/17 00:00 97.2 89 20 101/64 100 Room Air  


 


1/24/17 23:17 98.1       


 


1/24/17 19:38  92 20   Room Air  21


 


1/24/17 19:00 98.1 93 20 133/76 95 Room Air  

















Intake and Output  


 


 1/24/17 1/25/17





 19:00 07:00


 


Intake Total 690.0 ml 355.0 ml


 


Balance 690.0 ml 355.0 ml


 


  


 


Intake Oral 240 ml 


 


IV Total 450.0 ml 355.0 ml


 


# Voids 2 4








General Appearance:  WD/WN


HEENT:  normocephalic, atraumatic


Respiratory/Chest:  chest wall non-tender, lungs clear


Cardiovascular:  normal peripheral pulses, normal rate


Abdomen:  normal bowel sounds, soft, non tender


Skin:  no rash


Neurologic/Psychiatric:  CNs II-XII grossly normal, no motor/sensory deficits





Microbiology








 Date/Time


Source Procedure


Growth Status


 


 


 1/25/17 06:50


Stool Clostridium difficile Toxin Assay - Final Complete








Laboratory Tests


1/25/17 05:30: 


White Blood Count 10.8, Red Blood Count 4.75, Hemoglobin 12.3, Hematocrit 41.1, 

Mean Corpuscular Volume 87, Mean Corpuscular Hemoglobin 25.9L, Mean Corpuscular 

Hemoglobin Concent 29.9L, Red Cell Distribution Width 16.5H, Platelet Count 392

, Mean Platelet Volume 6.4L, Neutrophils (%) (Auto) 62.8, Lymphocytes (%) (Auto

) 24.0, Monocytes (%) (Auto) 11.0H, Eosinophils (%) (Auto) 1.6, Basophils (%) (

Auto) 0.8, Sodium Level 137, Potassium Level 3.4, Chloride Level 100, Carbon 

Dioxide Level 24, Anion Gap 13, Blood Urea Nitrogen 18, Creatinine 0.8, Estimat 

Glomerular Filtration Rate > 60, Glucose Level 90, Calcium Level 9.1





Current Medications








 Medications


  (Trade)  Dose


 Ordered  Sig/Jed


 Route


 PRN Reason  Start Time


 Stop Time Status Last Admin


Dose Admin


 


 Acetaminophen


  (Tylenol)  650 mg  Q4H  PRN


 ORAL


 fever  1/21/17 14:45


 2/20/17 14:44   


 


 


 Al Hydroxide/Mg


 Hydroxide


  (Mylanta II)  30 ml  Q6H  PRN


 ORAL


 dyspepsia  1/21/17 14:45


 2/20/17 14:44   


 


 


 Albuterol/


 Ipratropium


  (DuoNeb


 0.5-3(2.5)mg/3ml)  3 ml  Q4HRT  PRN


 HHN


 sob  1/21/17 18:30


 1/26/17 18:29  1/21/17 21:40


 


 


 Dextrose


  (Dextrose 50%)    STAT  PRN


 IV


 Hypoglycemia  1/21/17 14:45


 2/20/17 14:44   


 


 


 Dextrose/Sodium


 Chloride


  (D5 0.45% NS)  1,000 ml @ 


 75 mls/hr  [id number]


 IV


   1/21/17 15:00


 2/20/17 14:59  1/25/17 13:51


 


 


 Diphenhydramine


 HCl


  (Benadryl)  25 mg  Q6H  PRN


 ORAL


 Itching/Pruritis  1/21/17 14:45


 2/20/17 14:44   


 


 


 Docusate Sodium


  (Colace)  100 mg  THREE TIMES A  DAY


 ORAL


   1/23/17 13:00


 2/22/17 12:59  1/25/17 13:51


 


 


 Duloxetine HCl


  (Cymbalta)  60 mg  DAILY


 ORAL


   1/22/17 09:00


 2/21/17 08:59  1/25/17 08:33


 


 


 Heparin Sodium


  (Porcine)


  (Heparin 5000


 units/ml)  5,000 units  EVERY 12  HOURS


 SUBQ


   1/21/17 21:00


 2/20/17 20:59  1/25/17 08:32


 


 


 Hydromorphone HCl


  (Dilaudid)  1 mg  Q4H  PRN


 IVP


 PAIN 4-10  1/25/17 14:00


 2/1/17 13:59  1/25/17 17:27


 


 


 Levetiracetam


  (Keppra)  1,500 mg  TWICE A  DAY


 ORAL


   1/21/17 18:00


 2/20/17 17:59  1/25/17 17:26


 


 


 Mesalamine


  (Asacol)  800 mg  THREE TIMES A  DAY


 ORAL


   1/22/17 09:00


 2/21/17 08:59  1/25/17 17:26


 


 


 Mirtazapine


  (Remeron)  30 mg  BEDTIME


 ORAL


   1/21/17 21:00


 2/20/17 20:59  1/24/17 22:33


 


 


 Nitroglycerin


  (Ntg)  0.4 mg  Q5M X 3 DOSES PRN


 SL


 Prn Chest Pain  1/21/17 14:45


 2/20/17 14:44   


 


 


 Ondansetron HCl


  (Zofran)  4 mg  Q6H  PRN


 IVP


 Nausea & Vomiting  1/21/17 14:45


 2/20/17 14:44  1/24/17 10:59


 


 


 Patient Own


 Medication


  (Patient's Own


 Med)  1 ea  DAILY@1630


 ORAL


   1/24/17 16:30


 1/26/17 16:31  1/24/17 16:42


 


 


 Polyethylene


 Glycol


  (Miralax)  17 gm  BEDTIME


 ORAL


   1/23/17 21:00


 2/22/17 20:59  1/24/17 22:33


 


 


 Polyethylene


 Glycol


  (Miralax)  17 gm  HSPRN  PRN


 ORAL


 Constipation  1/21/17 14:45


 2/20/17 14:44   


 


 


 Quetiapine


 Fumarate


  (SEROquel)  200 mg  DAILY


 ORAL


   1/22/17 09:00


 2/21/17 08:59  1/25/17 08:33


 


 


 Temazepam


  (Restoril)  15 mg  HSPRN  PRN


 ORAL


 Insomnia  1/21/17 14:45


 1/28/17 14:44  1/21/17 22:10


 


 


 Topiramate 25 mg  25 mg  TWICE A  DAY


 ORAL


   1/21/17 18:00


 2/20/17 17:59  1/25/17 17:26


 

















JULIETTE KLEIN Jan 25, 2017 17:43

## 2017-01-25 NOTE — GENERAL PROGRESS NOTE
Assessment/Plan


Problem List:  


(1) Abdominal pain


(2) Constipation


(3) Opioid dependence


ICD Codes:  F11.20 - Opioid dependence


SNOMED:  41522837


(4) Shortness of breath


(5) Emphysema


ICD Codes:  J43.9 - Emphysema


SNOMED:  91664797


(6) COPD exacerbation


ICD Codes:  J44.1 - COPD exacerbation


SNOMED:  440837195


(7) CHF (congestive heart failure)


ICD Codes:  I50.9 - Heart failure, unspecified


SNOMED:  21251120


(8) Nausea and vomiting


(9) Crohns disease


ICD Codes:  K50.90 - Crohns disease


SNOMED:  59692072


(10) UTI (urinary tract infection)


ICD Codes:  N39.0 - Urinary tract infection, site not specified


SNOMED:  41074572


Status:  stable, progressing


Assessment/Plan


ot pt diet gi f/u abx o2 pulm tx pain control cbc bmp am promise ltach





Subjective


Constitutional:  Reports: weakness


Gastrointestinal/Abdominal:  Reports: nausea


Allergies:  


Coded Allergies:  


     No Known Allergies (Verified , 10/27/06)


All Systems:  reviewed and negative except above


Subjective


sleepy calm





Objective





Last 24 Hour Vital Signs








  Date Time  Temp Pulse Resp B/P Pulse Ox O2 Delivery O2 Flow Rate FiO2


 


1/25/17 12:00 98.2 93 18 122/66 98 Room Air  


 


1/25/17 08:00 98.2 92 18 129/77 100 Room Air  


 


1/25/17 07:43  87 20   Room Air  21


 


1/25/17 04:00 98.2 91 20 130/77 93 Room Air  


 


1/25/17 00:00 97.2 89 20 101/64 100 Room Air  


 


1/24/17 23:17 98.1       


 


1/24/17 19:38  92 20   Room Air  21


 


1/24/17 19:00 98.1 93 20 133/76 95 Room Air  


 


1/24/17 16:00 98.1 94 20 140/80 96 Room Air  

















Intake and Output  


 


 1/24/17 1/25/17





 19:00 07:00


 


Intake Total 690.0 ml 355.0 ml


 


Balance 690.0 ml 355.0 ml


 


  


 


Intake Oral 240 ml 


 


IV Total 450.0 ml 355.0 ml


 


# Voids 2 4








Laboratory Tests


1/25/17 05:30: 


White Blood Count 10.8, Red Blood Count 4.75, Hemoglobin 12.3, Hematocrit 41.1, 

Mean Corpuscular Volume 87, Mean Corpuscular Hemoglobin 25.9L, Mean Corpuscular 

Hemoglobin Concent 29.9L, Red Cell Distribution Width 16.5H, Platelet Count 392

, Mean Platelet Volume 6.4L, Neutrophils (%) (Auto) 62.8, Lymphocytes (%) (Auto

) 24.0, Monocytes (%) (Auto) 11.0H, Eosinophils (%) (Auto) 1.6, Basophils (%) (

Auto) 0.8, Sodium Level 137, Potassium Level 3.4, Chloride Level 100, Carbon 

Dioxide Level 24, Anion Gap 13, Blood Urea Nitrogen 18, Creatinine 0.8, Estimat 

Glomerular Filtration Rate > 60, Glucose Level 90, Calcium Level 9.1


Height (Feet):  5


Height (Inches):  6.00


Weight (Pounds):  164


General Appearance:  lethargic


EENT:  normal ENT inspection


Neck:  normal alignment


Cardiovascular:  normal peripheral pulses, normal rate, regular rhythm


Respiratory/Chest:  chest wall non-tender, lungs clear, normal breath sounds


Abdomen:  non tender, soft, hypoactive bowel sounds, distended


Extremities:  normal inspection


Edema:  no edema noted Arm (L), no edema noted Arm (R), no edema noted Leg (L), 

no edema noted Leg (R), no edema noted Pedal (L), no edema noted Pedal (R), no 

edema noted Generalized


Neurologic:  responsive, motor weakness


Skin:  normal pigmentation, warm/dry











MAICOL LANG Jan 25, 2017 13:18

## 2017-01-25 NOTE — INFECTIOUS DISEASES PROG NOTE
Assessment/Plan


Problems:  


(1) Ileus


Assessment & Plan:  suspect due to prior surgeries , continue supportive care , 

management as per GI





(2) UTI (urinary tract infection)


Assessment & Plan:  urine culture showed contaminant [email or web address] D/C zosyn





(3) Sepsis


Assessment & Plan:  ruled out with negative blood culture , will stop  zosyn , 

and monitor off antibiotics.





(4) Abdominal pain


Assessment & Plan:  continue pain management as per primary, check CT abdomen





(5) Crohn's disease


Assessment & Plan:  consult GI, continue meds





(6) Nausea and vomiting


Assessment & Plan:  due to ileus , continue supportive care








Subjective


Constitutional:  Denies: anorexia, chills, drenching sweats, fatigue, fever, no 

symptoms, other


HEENT:  Denies: congestion, coryza, dysphagia, hearing change, no symptoms, 

other, visual change


Respiratory:  Denies: dry cough, no symptoms, other, productive cough, 

shortness of breath


Breasts:  Denies: discharge, no symptoms, other, swelling, tenderness


Cardiovascular:  Denies: chest pain, dyspnea on exertion, no symptoms, other, 

palpitations


Gastrointestinal/Abdominal:  Reports: bloating, constipation


Genitourinary:  Denies: dysuria, frequency, hematuria, last menstrual period, 

no symptoms, nocturia, other, vaginal bleed/discharge


Neurologic:  Reports: weakness


Psychiatric:  Denies: anxiety, depression, no symptoms, other


Skin:  Denies: no symptoms, other, rash, ulcer


Endocrine:  Denies: feels cold, feels warm, no symptoms, other


Allergies:  


Coded Allergies:  


     No Known Allergies (Verified , 10/27/06)


Subjective


she was doing ok, awake and alert today , had one  BM today , no nausea or 

vomiting





Objective


Vital Signs





Last 24 Hour Vital Signs








  Date Time  Temp Pulse Resp B/P Pulse Ox O2 Delivery O2 Flow Rate FiO2


 


1/25/17 12:00 98.2 93 18 122/66 98 Room Air  


 


1/25/17 08:00 98.2 92 18 129/77 100 Room Air  


 


1/25/17 07:43  87 20   Room Air  21


 


1/25/17 04:00 98.2 91 20 130/77 93 Room Air  


 


1/25/17 00:00 97.2 89 20 101/64 100 Room Air  


 


1/24/17 23:17 98.1       


 


1/24/17 19:38  92 20   Room Air  21


 


1/24/17 19:00 98.1 93 20 133/76 95 Room Air  








Height (Feet):  5


Height (Inches):  6.00


Weight (Pounds):  164


General Appearance:  WD/WN, no acute distress


HEENT:  normocephalic, atraumatic, anicteric, mucous membranes moist


Respiratory/Chest:  chest wall non-tender, lungs clear, normal breath sounds, 

no respiratory distress, no accessory muscle use


Cardiovascular:  normal peripheral pulses, normal rate, regular rhythm, no 

gallop/murmur


Abdomen:  normal bowel sounds, soft, non tender, no organomegaly, non distended

, no mass, no scars


Extremities:  no cyanosis, no clubbing


Skin:  no rash, no lesions





Microbiology








 Date/Time


Source Procedure


Growth Status


 


 


 1/25/17 06:50


Stool Clostridium difficile Toxin Assay - Final Complete











Laboratory Tests








Test


  1/25/17


05:30


 


White Blood Count


  10.8 K/UL


(4.8-10.8)


 


Red Blood Count


  4.75 M/UL


(4.20-5.40)


 


Hemoglobin


  12.3 G/DL


(12.0-16.0)


 


Hematocrit


  41.1 %


(37.0-47.0)


 


Mean Corpuscular Volume 87 FL (80-99)  


 


Mean Corpuscular Hemoglobin


  25.9 PG


(27.0-31.0)  L


 


Mean Corpuscular Hemoglobin


Concent 29.9 G/DL


(32.0-36.0)  L


 


Red Cell Distribution Width


  16.5 %


(11.6-14.8)  H


 


Platelet Count


  392 K/UL


(150-450)


 


Mean Platelet Volume


  6.4 FL


(6.5-10.1)  L


 


Neutrophils (%) (Auto)


  62.8 %


(45.0-75.0)


 


Lymphocytes (%) (Auto)


  24.0 %


(20.0-45.0)


 


Monocytes (%) (Auto)


  11.0 %


(1.0-10.0)  H


 


Eosinophils (%) (Auto)


  1.6 %


(0.0-3.0)


 


Basophils (%) (Auto)


  0.8 %


(0.0-2.0)


 


Sodium Level


  137 mEQ/L


(135-145)


 


Potassium Level


  3.4 mEQ/L


(3.4-4.9)


 


Chloride Level


  100 mEQ/L


()


 


Carbon Dioxide Level


  24 mEQ/L


(20-30)


 


Anion Gap 13 (5-15)  


 


Blood Urea Nitrogen


  18 mg/dL


(7-23)


 


Creatinine


  0.8 mg/dL


(0.5-0.9)


 


Estimat Glomerular Filtration


Rate > 60 mL/min


(>60)


 


Glucose Level


  90 mg/dL


()


 


Calcium Level


  9.1 mg/dL


(8.6-10.2)











Current Medications








 Medications


  (Trade)  Dose


 Ordered  Sig/Jed


 Route


 PRN Reason  Start Time


 Stop Time Status Last Admin


Dose Admin


 


 Acetaminophen


  (Tylenol)  650 mg  Q4H  PRN


 ORAL


 fever  1/21/17 14:45


 2/20/17 14:44   


 


 


 Al Hydroxide/Mg


 Hydroxide


  (Mylanta II)  30 ml  Q6H  PRN


 ORAL


 dyspepsia  1/21/17 14:45


 2/20/17 14:44   


 


 


 Albuterol/


 Ipratropium


  (DuoNeb


 0.5-3(2.5)mg/3ml)  3 ml  Q4HRT  PRN


 HHN


 sob  1/21/17 18:30


 1/26/17 18:29  1/21/17 21:40


 


 


 Dextrose     STAT  PRN


 IV


 Hypoglycemia  1/21/17 14:45


 2/20/17 14:44   


 


 


 Dextrose/Sodium


 Chloride


  (D5 0.45% NS)  1,000 ml @ 


 75 mls/hr  [id number]


 IV


   1/21/17 15:00


 2/20/17 14:59  1/25/17 13:51


 


 


 Diphenhydramine


 HCl


  (Benadryl)  25 mg  Q6H  PRN


 ORAL


 Itching/Pruritis  1/21/17 14:45


 2/20/17 14:44   


 


 


 Docusate Sodium


  (Colace)  100 mg  THREE TIMES A  DAY


 ORAL


   1/23/17 13:00


 2/22/17 12:59  1/25/17 13:51


 


 


 Duloxetine HCl


  (Cymbalta)  60 mg  DAILY


 ORAL


   1/22/17 09:00


 2/21/17 08:59  1/25/17 08:33


 


 


 Heparin Sodium


  (Porcine)


  (Heparin 5000


 units/ml)  5,000 units  EVERY 12  HOURS


 SUBQ


   1/21/17 21:00


 2/20/17 20:59  1/25/17 08:32


 


 


 Hydromorphone HCl


  (Dilaudid)  1 mg  Q4H  PRN


 IVP


 PAIN 4-10  1/25/17 14:00


 2/1/17 13:59   


 


 


 Levetiracetam


  (Keppra)  1,500 mg  TWICE A  DAY


 ORAL


   1/21/17 18:00


 2/20/17 17:59  1/25/17 08:33


 


 


 Mesalamine


  (Asacol)  800 mg  THREE TIMES A  DAY


 ORAL


   1/22/17 09:00


 2/21/17 08:59  1/25/17 13:51


 


 


 Mirtazapine


  (Remeron)  30 mg  BEDTIME


 ORAL


   1/21/17 21:00


 2/20/17 20:59  1/24/17 22:33


 


 


 Nitroglycerin


  (Ntg)  0.4 mg  Q5M X 3 DOSES PRN


 SL


 Prn Chest Pain  1/21/17 14:45


 2/20/17 14:44   


 


 


 Ondansetron HCl


  (Zofran)  4 mg  Q6H  PRN


 IVP


 Nausea & Vomiting  1/21/17 14:45


 2/20/17 14:44  1/24/17 10:59


 


 


 Patient Own


 Medication


  (Patient's Own


 Med)  1 ea  DAILY@1630


 ORAL


   1/24/17 16:30


 1/26/17 16:31  1/24/17 16:42


 


 


 Piperacillin Sod/


 Tazobactam Sod/


 Sodium Chloride


  (Zosyn/Sodium


 Chloride)  110 ml @ 


 27.5 mls/hr  EVERY 8  HOURS


 IVPB


   1/21/17 22:00


 1/28/17 21:59  1/25/17 13:53


 


 


 Polyethylene


 Glycol


  (Miralax)  17 gm  BEDTIME


 ORAL


   1/23/17 21:00


 2/22/17 20:59  1/24/17 22:33


 


 


 Polyethylene


 Glycol


  (Miralax)  17 gm  HSPRN  PRN


 ORAL


 Constipation  1/21/17 14:45


 2/20/17 14:44   


 


 


 Quetiapine


 Fumarate


  (SEROquel)  200 mg  DAILY


 ORAL


   1/22/17 09:00


 2/21/17 08:59  1/25/17 08:33


 


 


 Temazepam


  (Restoril)  15 mg  HSPRN  PRN


 ORAL


 Insomnia  1/21/17 14:45


 1/28/17 14:44  1/21/17 22:10


 


 


 Topiramate 25 mg  25 mg  TWICE A  DAY


 ORAL


   1/21/17 18:00


 2/20/17 17:59  1/25/17 08:33


 

















Violet Dior M.D. Jan 25, 2017 16:47

## 2017-01-26 VITALS — SYSTOLIC BLOOD PRESSURE: 148 MMHG | DIASTOLIC BLOOD PRESSURE: 75 MMHG

## 2017-01-26 VITALS — DIASTOLIC BLOOD PRESSURE: 64 MMHG | SYSTOLIC BLOOD PRESSURE: 116 MMHG

## 2017-01-26 VITALS — SYSTOLIC BLOOD PRESSURE: 131 MMHG | DIASTOLIC BLOOD PRESSURE: 71 MMHG

## 2017-01-26 VITALS — DIASTOLIC BLOOD PRESSURE: 61 MMHG | SYSTOLIC BLOOD PRESSURE: 118 MMHG

## 2017-01-26 VITALS — SYSTOLIC BLOOD PRESSURE: 123 MMHG | DIASTOLIC BLOOD PRESSURE: 72 MMHG

## 2017-01-26 VITALS — DIASTOLIC BLOOD PRESSURE: 76 MMHG | SYSTOLIC BLOOD PRESSURE: 126 MMHG

## 2017-01-26 LAB
ANION GAP SERPL CALC-SCNC: 13 MMOL/L (ref 5–15)
BASOPHILS NFR BLD AUTO: 0.7 % (ref 0–2)
CALCIUM SERPL-MCNC: 8.9 MG/DL (ref 8.6–10.2)
CHLORIDE SERPL-SCNC: 100 MEQ/L (ref 98–107)
CO2 SERPL-SCNC: 25 MEQ/L (ref 20–30)
CREAT SERPL-MCNC: 0.8 MG/DL (ref 0.5–0.9)
EOSINOPHIL NFR BLD AUTO: 1.5 % (ref 0–3)
ERYTHROCYTE [DISTWIDTH] IN BLOOD BY AUTOMATED COUNT: 16.4 % (ref 11.6–14.8)
GFR SERPLBLD BASED ON 1.73 SQ M-ARVRAT: > 60 ML/MIN (ref 60–?)
HEMOLYSIS: 0
LYMPHOCYTES NFR BLD AUTO: 20.4 % (ref 20–45)
MCH RBC QN AUTO: 26.3 PG (ref 27–31)
MCHC RBC AUTO-ENTMCNC: 30.4 G/DL (ref 32–36)
MCV RBC AUTO: 86 FL (ref 80–99)
MONOCYTES NFR BLD AUTO: 10.9 % (ref 1–10)
NEUTROPHILS NFR BLD AUTO: 66.5 % (ref 45–75)
PLATELET # BLD: 371 K/UL (ref 150–450)
PMV BLD AUTO: 6.8 FL (ref 6.5–10.1)
POTASSIUM SERPL-SCNC: 3.2 MEQ/L (ref 3.4–4.9)
RBC # BLD AUTO: 4.48 M/UL (ref 4.2–5.4)
SODIUM SERPL-SCNC: 138 MEQ/L (ref 135–145)
WBC # BLD AUTO: 10.2 K/UL (ref 4.8–10.8)

## 2017-01-26 RX ADMIN — DEXTROSE AND SODIUM CHLORIDE SCH MLS/HR: 5; .45 INJECTION, SOLUTION INTRAVENOUS at 11:07

## 2017-01-26 RX ADMIN — DOCUSATE SODIUM SCH MG: 100 CAPSULE, LIQUID FILLED ORAL at 17:40

## 2017-01-26 RX ADMIN — TOPIRAMATE SCH MG: 25 TABLET, COATED ORAL at 17:40

## 2017-01-26 RX ADMIN — DEXTROSE AND SODIUM CHLORIDE SCH MLS/HR: 5; .45 INJECTION, SOLUTION INTRAVENOUS at 01:40

## 2017-01-26 RX ADMIN — POLYETHYLENE GLYCOL 3350 SCH GM: 17 POWDER, FOR SOLUTION ORAL at 20:35

## 2017-01-26 RX ADMIN — HEPARIN SODIUM SCH UNITS: 5000 INJECTION INTRAVENOUS; SUBCUTANEOUS at 20:38

## 2017-01-26 RX ADMIN — DIPHENHYDRAMINE HYDROCHLORIDE PRN MG: 50 INJECTION INTRAMUSCULAR; INTRAVENOUS at 22:32

## 2017-01-26 RX ADMIN — DIPHENHYDRAMINE HYDROCHLORIDE PRN MG: 50 INJECTION INTRAMUSCULAR; INTRAVENOUS at 13:42

## 2017-01-26 RX ADMIN — DOCUSATE SODIUM SCH MG: 100 CAPSULE, LIQUID FILLED ORAL at 09:33

## 2017-01-26 RX ADMIN — DOCUSATE SODIUM SCH MG: 100 CAPSULE, LIQUID FILLED ORAL at 12:54

## 2017-01-26 RX ADMIN — QUETIAPINE SCH MG: 200 TABLET, FILM COATED ORAL at 09:33

## 2017-01-26 RX ADMIN — DIPHENHYDRAMINE HYDROCHLORIDE PRN MG: 50 INJECTION INTRAMUSCULAR; INTRAVENOUS at 00:30

## 2017-01-26 RX ADMIN — DULOXETINE HYDROCHLORIDE SCH MG: 30 CAPSULE, DELAYED RELEASE ORAL at 09:33

## 2017-01-26 RX ADMIN — HEPARIN SODIUM SCH UNITS: 5000 INJECTION INTRAVENOUS; SUBCUTANEOUS at 09:31

## 2017-01-26 RX ADMIN — DIPHENHYDRAMINE HYDROCHLORIDE PRN MG: 50 INJECTION INTRAMUSCULAR; INTRAVENOUS at 04:49

## 2017-01-26 RX ADMIN — TOPIRAMATE SCH MG: 25 TABLET, COATED ORAL at 09:34

## 2017-01-26 RX ADMIN — DIPHENHYDRAMINE HYDROCHLORIDE PRN MG: 50 INJECTION INTRAMUSCULAR; INTRAVENOUS at 09:37

## 2017-01-26 RX ADMIN — VALSARTAN SCH EA: 160 TABLET ORAL at 16:45

## 2017-01-26 NOTE — PULMONOLOGY PROGRESS NOTE
Assessment/Plan


Problems:  


(1) Interstitial lung disease


(2) Sepsis


(3) Emphysema


(4) Leukocytosis


(5) Colitis


(6) Acute encephalopathy


(7) Hx SBO


Assessment/Plan


cxr unchged


on zosyn, 


improivng


titrate fo2 to sat of 92%


all cultures negative sofar


all notes and meds reviewed.





Subjective


ROS Limited/Unobtainable:  No


Interval Events:  no new complains


Allergies:  


Coded Allergies:  


     No Known Allergies (Verified , 10/27/06)





Objective





Last 24 Hour Vital Signs








  Date Time  Temp Pulse Resp B/P Pulse Ox O2 Delivery O2 Flow Rate FiO2


 


1/26/17 12:00 97.5 86 18 131/71 98 Room Air  


 


1/26/17 09:45  92 20   Room Air  21


 


1/26/17 08:00 97.6 83 18 123/72 97 Room Air  


 


1/26/17 04:00 96.4 84 20 126/76 100 Room Air  


 


1/26/17 00:00 98.1 85 20 148/75 100 Room Air  


 


1/25/17 19:00  90 20   Room Air  21


 


1/25/17 19:00 97.9 89 18 128/74 100 Room Air  


 


1/25/17 16:00 98.4 95 20 123/74 100 Room Air  

















Intake and Output  


 


 1/25/17 1/26/17





 19:00 07:00


 


Intake Total 642.5 ml 1035 ml


 


Balance 642.5 ml 1035 ml


 


  


 


Intake Oral 560 ml 360 ml


 


IV Total 82.5 ml 675 ml


 


# Voids 3 5


 


# Bowel Movements 1 3








General Appearance:  WD/WN


HEENT:  normocephalic


Respiratory/Chest:  chest wall non-tender, crackles/rales


Cardiovascular:  normal peripheral pulses, normal rate


Abdomen:  normal bowel sounds, soft, non tender


Genitourinary:  normal external genitalia


Extremities:  no cyanosis


Neurologic/Psychiatric:  CNs II-XII grossly normal, no motor/sensory deficits





Microbiology








 Date/Time


Source Procedure


Growth Status


 


 


 1/25/17 06:50


Stool Clostridium difficile Toxin Assay - Final Complete








Laboratory Tests


1/26/17 06:50: 


White Blood Count 10.2, Red Blood Count 4.48, Hemoglobin 11.8L, Hematocrit 38.6

, Mean Corpuscular Volume 86, Mean Corpuscular Hemoglobin 26.3L, Mean 

Corpuscular Hemoglobin Concent 30.4L, Red Cell Distribution Width 16.4H, 

Platelet Count 371, Mean Platelet Volume 6.8, Neutrophils (%) (Auto) 66.5, 

Lymphocytes (%) (Auto) 20.4, Monocytes (%) (Auto) 10.9H, Eosinophils (%) (Auto) 

1.5, Basophils (%) (Auto) 0.7, Sodium Level 138, Potassium Level 3.2L, Chloride 

Level 100, Carbon Dioxide Level 25, Anion Gap 13, Blood Urea Nitrogen 15, 

Creatinine 0.8, Estimat Glomerular Filtration Rate > 60, Glucose Level 103, 

Calcium Level 8.9





Current Medications








 Medications


  (Trade)  Dose


 Ordered  Sig/Jde


 Route


 PRN Reason  Start Time


 Stop Time Status Last Admin


Dose Admin


 


 Acetaminophen


  (Tylenol)  650 mg  Q4H  PRN


 ORAL


 fever  1/21/17 14:45


 2/20/17 14:44   


 


 


 Al Hydroxide/Mg


 Hydroxide


  (Mylanta II)  30 ml  Q6H  PRN


 ORAL


 dyspepsia  1/21/17 14:45


 2/20/17 14:44   


 


 


 Albuterol/


 Ipratropium


  (DuoNeb


 0.5-3(2.5)mg/3ml)  3 ml  Q4HRT  PRN


 HHN


 sob  1/21/17 18:30


 1/26/17 18:29  1/21/17 21:40


 


 


 Dextrose


  (Dextrose 50%)    STAT  PRN


 IV


 Hypoglycemia  1/21/17 14:45


 2/20/17 14:44   


 


 


 Dextrose/Sodium


 Chloride


  (D5 0.45% NS)  1,000 ml @ 


 75 mls/hr  [id number]


 IV


   1/21/17 15:00


 2/20/17 14:59  1/26/17 11:07


 


 


 Diphenhydramine


 HCl


  (Benadryl)  25 mg  Q6H  PRN


 ORAL


 Itching/Pruritis  1/21/17 14:45


 2/20/17 14:44   


 


 


 Docusate Sodium


  (Colace)  100 mg  THREE TIMES A  DAY


 ORAL


   1/23/17 13:00


 2/22/17 12:59  1/26/17 12:54


 


 


 Duloxetine HCl


  (Cymbalta)  60 mg  DAILY


 ORAL


   1/22/17 09:00


 2/21/17 08:59  1/26/17 09:33


 


 


 Heparin Sodium


  (Porcine)


  (Heparin 5000


 units/ml)  5,000 units  EVERY 12  HOURS


 SUBQ


   1/21/17 21:00


 2/20/17 20:59  1/26/17 09:31


 


 


 Hydromorphone HCl


  (Dilaudid)  1 mg  Q4H  PRN


 IVP


 PAIN 4-10  1/25/17 14:00


 2/1/17 13:59  1/26/17 13:42


 


 


 Levetiracetam


  (Keppra)  1,500 mg  TWICE A  DAY


 ORAL


   1/21/17 18:00


 2/20/17 17:59  1/26/17 09:35


 


 


 Mesalamine


  (Asacol)  800 mg  THREE TIMES A  DAY


 ORAL


   1/22/17 09:00


 2/21/17 08:59  1/26/17 12:54


 


 


 Mirtazapine


  (Remeron)  30 mg  BEDTIME


 ORAL


   1/21/17 21:00


 2/20/17 20:59  1/25/17 21:20


 


 


 Nitroglycerin


  (Ntg)  0.4 mg  Q5M X 3 DOSES PRN


 SL


 Prn Chest Pain  1/21/17 14:45


 2/20/17 14:44   


 


 


 Ondansetron HCl


  (Zofran)  4 mg  Q6H  PRN


 IVP


 Nausea & Vomiting  1/21/17 14:45


 2/20/17 14:44  1/26/17 09:37


 


 


 Patient Own


 Medication


  (Patient's Own


 Med)  1 ea  DAILY@1630


 ORAL


   1/24/17 16:30


 1/26/17 16:31  1/25/17 18:49


 


 


 Polyethylene


 Glycol


  (Miralax)  17 gm  BEDTIME


 ORAL


   1/23/17 21:00


 2/22/17 20:59  1/24/17 22:33


 


 


 Polyethylene


 Glycol


  (Miralax)  17 gm  HSPRN  PRN


 ORAL


 Constipation  1/21/17 14:45


 2/20/17 14:44   


 


 


 Quetiapine


 Fumarate


  (SEROquel)  200 mg  DAILY


 ORAL


   1/22/17 09:00


 2/21/17 08:59  1/26/17 09:33


 


 


 Temazepam


  (Restoril)  15 mg  HSPRN  PRN


 ORAL


 Insomnia  1/21/17 14:45


 1/28/17 14:44  1/21/17 22:10


 


 


 Topiramate 25 mg  25 mg  TWICE A  DAY


 ORAL


   1/21/17 18:00


 2/20/17 17:59  1/26/17 09:34


 

















JULIETTE KLEIN Jan 26, 2017 14:57

## 2017-01-26 NOTE — INFECTIOUS DISEASES PROG NOTE
Assessment/Plan


Problems:  


(1) Ileus


Assessment & Plan:  suspect due to prior surgeries , continue supportive care , 

management as per GI





(2) UTI (urinary tract infection)


Assessment & Plan:  urine culture showed contaminant carla. zosyn was 

discontinued 





(3) Sepsis


Assessment & Plan:  ruled out with negative blood culture , off  zosyn , will 

monitor off antibiotics.





(4) Abdominal pain


Assessment & Plan:  continue pain management as per primary, check CT abdomen





(5) Crohn's disease


Assessment & Plan:   GI is following , continue meds





(6) Nausea and vomiting


Assessment & Plan:  due to ileus , continue supportive care








Subjective


Gastrointestinal/Abdominal:  Reports: bloating, nausea


Allergies:  


Coded Allergies:  


     No Known Allergies (Verified , 10/27/06)


All Systems:  reviewed and negative except above


Subjective


tolerated liquid diet





Objective


Vital Signs





Last 24 Hour Vital Signs








  Date Time  Temp Pulse Resp B/P Pulse Ox O2 Delivery O2 Flow Rate FiO2


 


1/26/17 16:00 97.3 91 18 116/64  Room Air  


 


1/26/17 12:00 97.5 86 18 131/71 98 Room Air  


 


1/26/17 09:45  92 20   Room Air  21


 


1/26/17 08:00 97.6 83 18 123/72 97 Room Air  


 


1/26/17 04:00 96.4 84 20 126/76 100 Room Air  


 


1/26/17 00:00 98.1 85 20 148/75 100 Room Air  


 


1/25/17 19:00  90 20   Room Air  21


 


1/25/17 19:00 97.9 89 18 128/74 100 Room Air  








Height (Feet):  5


Height (Inches):  6.00


Weight (Pounds):  164


General Appearance:  WD/WN, no acute distress


HEENT:  normocephalic, atraumatic, anicteric, mucous membranes moist, PERRL


Respiratory/Chest:  chest wall non-tender, lungs clear, normal breath sounds, 

no respiratory distress, no accessory muscle use


Cardiovascular:  normal peripheral pulses, normal rate, regular rhythm, no 

gallop/murmur, no JVD


Abdomen:  normal bowel sounds, no organomegaly, no mass, hypoactive bowel sounds

, distended, tender


Extremities:  no cyanosis, no clubbing


Skin:  no rash, no lesions, no ulcers





Microbiology








 Date/Time


Source Procedure


Growth Status


 


 


 1/25/17 06:50


Stool Clostridium difficile Toxin Assay - Final Complete











Laboratory Tests








Test


  1/26/17


06:50


 


White Blood Count


  10.2 K/UL


(4.8-10.8)


 


Red Blood Count


  4.48 M/UL


(4.20-5.40)


 


Hemoglobin


  11.8 G/DL


(12.0-16.0)  L


 


Hematocrit


  38.6 %


(37.0-47.0)


 


Mean Corpuscular Volume 86 FL (80-99)  


 


Mean Corpuscular Hemoglobin


  26.3 PG


(27.0-31.0)  L


 


Mean Corpuscular Hemoglobin


Concent 30.4 G/DL


(32.0-36.0)  L


 


Red Cell Distribution Width


  16.4 %


(11.6-14.8)  H


 


Platelet Count


  371 K/UL


(150-450)


 


Mean Platelet Volume


  6.8 FL


(6.5-10.1)


 


Neutrophils (%) (Auto)


  66.5 %


(45.0-75.0)


 


Lymphocytes (%) (Auto)


  20.4 %


(20.0-45.0)


 


Monocytes (%) (Auto)


  10.9 %


(1.0-10.0)  H


 


Eosinophils (%) (Auto)


  1.5 %


(0.0-3.0)


 


Basophils (%) (Auto)


  0.7 %


(0.0-2.0)


 


Sodium Level


  138 mEQ/L


(135-145)


 


Potassium Level


  3.2 mEQ/L


(3.4-4.9)  L


 


Chloride Level


  100 mEQ/L


()


 


Carbon Dioxide Level


  25 mEQ/L


(20-30)


 


Anion Gap 13 (5-15)  


 


Blood Urea Nitrogen


  15 mg/dL


(7-23)


 


Creatinine


  0.8 mg/dL


(0.5-0.9)


 


Estimat Glomerular Filtration


Rate > 60 mL/min


(>60)


 


Glucose Level


  103 mg/dL


()


 


Calcium Level


  8.9 mg/dL


(8.6-10.2)











Current Medications








 Medications


  (Trade)  Dose


 Ordered  Sig/Jed


 Route


 PRN Reason  Start Time


 Stop Time Status Last Admin


Dose Admin


 


 Acetaminophen


  (Tylenol)  650 mg  Q4H  PRN


 ORAL


 fever  1/21/17 14:45


 2/20/17 14:44   


 


 


 Al Hydroxide/Mg


 Hydroxide


  (Mylanta II)  30 ml  Q6H  PRN


 ORAL


 dyspepsia  1/21/17 14:45


 2/20/17 14:44   


 


 


 Albuterol/


 Ipratropium


  (DuoNeb


 0.5-3(2.5)mg/3ml)  3 ml  Q4HRT  PRN


 HHN


 sob  1/21/17 18:30


 1/26/17 18:29  1/21/17 21:40


 


 


 Dextrose


  (Dextrose 50%)    STAT  PRN


 IV


 Hypoglycemia  1/21/17 14:45


 2/20/17 14:44   


 


 


 Dextrose/Sodium


 Chloride


  (D5 0.45% NS)  1,000 ml @ 


 75 mls/hr  [id number]


 IV


   1/21/17 15:00


 2/20/17 14:59  1/26/17 11:07


 


 


 Diphenhydramine


 HCl


  (Benadryl)  25 mg  Q6H  PRN


 ORAL


 Itching/Pruritis  1/21/17 14:45


 2/20/17 14:44   


 


 


 Docusate Sodium


  (Colace)  100 mg  THREE TIMES A  DAY


 ORAL


   1/23/17 13:00


 2/22/17 12:59  1/26/17 12:54


 


 


 Duloxetine HCl


  (Cymbalta)  60 mg  DAILY


 ORAL


   1/22/17 09:00


 2/21/17 08:59  1/26/17 09:33


 


 


 Heparin Sodium


  (Porcine)


  (Heparin 5000


 units/ml)  5,000 units  EVERY 12  HOURS


 SUBQ


   1/21/17 21:00


 2/20/17 20:59  1/26/17 09:31


 


 


 Hydromorphone HCl


  (Dilaudid)  1 mg  Q4H  PRN


 IVP


 PAIN 4-10  1/25/17 14:00


 2/1/17 13:59  1/26/17 13:42


 


 


 Levetiracetam


  (Keppra)  1,500 mg  TWICE A  DAY


 ORAL


   1/21/17 18:00


 2/20/17 17:59  1/26/17 09:35


 


 


 Mesalamine


  (Asacol)  800 mg  THREE TIMES A  DAY


 ORAL


   1/22/17 09:00


 2/21/17 08:59  1/26/17 12:54


 


 


 Mirtazapine


  (Remeron)  30 mg  BEDTIME


 ORAL


   1/21/17 21:00


 2/20/17 20:59  1/25/17 21:20


 


 


 Nitroglycerin


  (Ntg)  0.4 mg  Q5M X 3 DOSES PRN


 SL


 Prn Chest Pain  1/21/17 14:45


 2/20/17 14:44   


 


 


 Ondansetron HCl


  (Zofran)  4 mg  Q6H  PRN


 IVP


 Nausea & Vomiting  1/21/17 14:45


 2/20/17 14:44  1/26/17 09:37


 


 


 Polyethylene


 Glycol


  (Miralax)  17 gm  BEDTIME


 ORAL


   1/23/17 21:00


 2/22/17 20:59  1/24/17 22:33


 


 


 Polyethylene


 Glycol


  (Miralax)  17 gm  HSPRN  PRN


 ORAL


 Constipation  1/21/17 14:45


 2/20/17 14:44   


 


 


 Quetiapine


 Fumarate


  (SEROquel)  200 mg  DAILY


 ORAL


   1/22/17 09:00


 2/21/17 08:59  1/26/17 09:33


 


 


 Temazepam


  (Restoril)  15 mg  HSPRN  PRN


 ORAL


 Insomnia  1/21/17 14:45


 1/28/17 14:44  1/21/17 22:10


 


 


 Topiramate 25 mg  25 mg  TWICE A  DAY


 ORAL


   1/21/17 18:00


 2/20/17 17:59  1/26/17 09:34


 

















Violet Dior M.D. Jan 26, 2017 17:34

## 2017-01-26 NOTE — GENERAL PROGRESS NOTE
Assessment/Plan


Problem List:  


(1) Abdominal pain


(2) Constipation


(3) Opioid dependence


ICD Codes:  F11.20 - Opioid dependence


SNOMED:  61697140


(4) Shortness of breath


(5) Emphysema


ICD Codes:  J43.9 - Emphysema


SNOMED:  79245615


(6) COPD exacerbation


ICD Codes:  J44.1 - COPD exacerbation


SNOMED:  496030646


(7) CHF (congestive heart failure)


ICD Codes:  I50.9 - Heart failure, unspecified


SNOMED:  23453797


(8) Nausea and vomiting


(9) Crohns disease


ICD Codes:  K50.90 - Crohns disease


SNOMED:  06217036


(10) UTI (urinary tract infection)


ICD Codes:  N39.0 - Urinary tract infection, site not specified


SNOMED:  71568240


Status:  stable, progressing, tolerating diet


Assessment/Plan


ot pt diet gi f/u abx o2 pulm tx pain control cbc bmp am promise ltach transfer





Subjective


Constitutional:  Reports: weakness


Allergies:  


Coded Allergies:  


     No Known Allergies (Verified , 10/27/06)


All Systems:  reviewed and negative except above


Subjective


sleepy calm





Objective





Last 24 Hour Vital Signs








  Date Time  Temp Pulse Resp B/P Pulse Ox O2 Delivery O2 Flow Rate FiO2


 


1/26/17 12:00 97.5 86 18 131/71 98 Room Air  


 


1/26/17 09:45  92 20   Room Air  21 1/26/17 08:00 97.6 83 18 123/72 97 Room Air  


 


1/26/17 04:00 96.4 84 20 126/76 100 Room Air  


 


1/26/17 00:00 98.1 85 20 148/75 100 Room Air  


 


1/25/17 19:00  90 20   Room Air  21


 


1/25/17 19:00 97.9 89 18 128/74 100 Room Air  


 


1/25/17 16:00 98.4 95 20 123/74 100 Room Air  

















Intake and Output  


 


 1/25/17 1/26/17





 19:00 07:00


 


Intake Total 642.5 ml 1035 ml


 


Balance 642.5 ml 1035 ml


 


  


 


Intake Oral 560 ml 360 ml


 


IV Total 82.5 ml 675 ml


 


# Voids 3 5


 


# Bowel Movements 1 3








Laboratory Tests


1/26/17 06:50: 


White Blood Count 10.2, Red Blood Count 4.48, Hemoglobin 11.8L, Hematocrit 38.6

, Mean Corpuscular Volume 86, Mean Corpuscular Hemoglobin 26.3L, Mean 

Corpuscular Hemoglobin Concent 30.4L, Red Cell Distribution Width 16.4H, 

Platelet Count 371, Mean Platelet Volume 6.8, Neutrophils (%) (Auto) 66.5, 

Lymphocytes (%) (Auto) 20.4, Monocytes (%) (Auto) 10.9H, Eosinophils (%) (Auto) 

1.5, Basophils (%) (Auto) 0.7, Sodium Level 138, Potassium Level 3.2L, Chloride 

Level 100, Carbon Dioxide Level 25, Anion Gap 13, Blood Urea Nitrogen 15, 

Creatinine 0.8, Estimat Glomerular Filtration Rate > 60, Glucose Level 103, 

Calcium Level 8.9


Height (Feet):  5


Height (Inches):  6.00


Weight (Pounds):  164


General Appearance:  lethargic


EENT:  TMs normal


Neck:  normal alignment


Cardiovascular:  normal peripheral pulses, normal rate, regular rhythm


Respiratory/Chest:  chest wall non-tender, lungs clear, normal breath sounds


Abdomen:  non tender, soft


Extremities:  normal inspection


Edema:  no edema noted Arm (L), no edema noted Arm (R), no edema noted Leg (L), 

no edema noted Leg (R), no edema noted Pedal (L), no edema noted Pedal (R), no 

edema noted Generalized


Neurologic:  motor weakness


Skin:  warm/dry











MAICOL LANG Jan 26, 2017 15:20

## 2017-01-26 NOTE — PROGRESS NOTE
DATE:  01/25/2017



PSYCHOTHERAPY CONSULTATION PROGRESS NOTE



CONSULTING PHYSICIAN:  Orestes Ross M.D.



TREATING ATTENDING:  Maxim Hopkins D.O.



SUBJECTIVE:  The patient is a 61-year-old female.  This patient has

been very agitated, irritable, confused, anxious, and depressed.  The

patient states that her anxiety and depression always fluctuate as she has

"good days and bad days."  The patient states that she is having

difficulty sleeping and feeling restless.



PLAN:  This clinician assessed the patient.  This clinician provided

the patient with supportive psychotherapy, reality orientation, and coping

skills.  Encouraging the patient to participate in treatment as well as

with medication regimen.  Continue with medication management and

behavioral management.   This clinician has reviewed the patient's chart.

Discussed the treatment with nursing staff.









  ______________________________________________

  Orestes Ross PsyD.





DR:  JUSTUS

D:  01/26/2017 09:38

T:  01/26/2017 20:36

JOB#:  [id number]

CC:

## 2017-01-26 NOTE — GI PROGRESS NOTE
Assessment/Plan


Problems:  


(1) Abdominal pain


ICD Codes:  R10.9 - Unspecified abdominal pain


SNOMED:  61960505


(2) Crohn's disease


ICD Codes:  K50.90 - Crohn's disease


SNOMED:  48795494


(3) Colitis


ICD Codes:  K52.9 - Noninfective gastroenteritis and colitis, unspecified


SNOMED:  649206610


(4) Abdominal pain


(5) Anemia


(6) Nausea and vomiting


(7) Constipation


Status:  stable


Status Narrative


Discussed with Dr. Mcleod.


Assessment/Plan


APCT >> fecal retention


mesalamine for Crohns


BM x 1


s/p EGD/colon 2016


non functional implanted morphine pump LLQ





ok for DC per GI standpoint


cardiac diet


Rx trial of Movantik for OIC >> patient responded well


laxatives


OB stool uncollected


pain mgmt 


fu labs


pt scheduled to be seen outpatient in [facility] next week





Subjective


Gastrointestinal/Abdominal:  Reports: no symptoms


Subjective


feels better


multiple BMs 


ready to go home





Objective





Last 24 Hour Vital Signs








  Date Time  Temp Pulse Resp B/P Pulse Ox O2 Delivery O2 Flow Rate FiO2


 


1/26/17 09:45  92 20   Room Air  21


 


1/26/17 08:00 97.6 83 18 123/72 97 Room Air  


 


1/26/17 04:00 96.4 84 20 126/76 100 Room Air  


 


1/26/17 00:00 98.1 85 20 148/75 100 Room Air  


 


1/25/17 19:00  90 20   Room Air  21


 


1/25/17 19:00 97.9 89 18 128/74 100 Room Air  


 


1/25/17 16:00 98.4 95 20 123/74 100 Room Air  


 


1/25/17 12:00 98.2 93 18 122/66 98 Room Air  

















Intake and Output  


 


 1/25/17 1/26/17





 19:00 07:00


 


Intake Total 642.5 ml 1035 ml


 


Balance 642.5 ml 1035 ml


 


  


 


Intake Oral 560 ml 360 ml


 


IV Total 82.5 ml 675 ml


 


# Voids 3 5


 


# Bowel Movements 1 3











Laboratory Tests








Test


  1/26/17


06:50


 


White Blood Count


  10.2 K/UL


(4.8-10.8)


 


Red Blood Count


  4.48 M/UL


(4.20-5.40)


 


Hemoglobin


  11.8 G/DL


(12.0-16.0)  L


 


Hematocrit


  38.6 %


(37.0-47.0)


 


Mean Corpuscular Volume 86 FL (80-99)  


 


Mean Corpuscular Hemoglobin


  26.3 PG


(27.0-31.0)  L


 


Mean Corpuscular Hemoglobin


Concent 30.4 G/DL


(32.0-36.0)  L


 


Red Cell Distribution Width


  16.4 %


(11.6-14.8)  H


 


Platelet Count


  371 K/UL


(150-450)


 


Mean Platelet Volume


  6.8 FL


(6.5-10.1)


 


Neutrophils (%) (Auto)


  66.5 %


(45.0-75.0)


 


Lymphocytes (%) (Auto)


  20.4 %


(20.0-45.0)


 


Monocytes (%) (Auto)


  10.9 %


(1.0-10.0)  H


 


Eosinophils (%) (Auto)


  1.5 %


(0.0-3.0)


 


Basophils (%) (Auto)


  0.7 %


(0.0-2.0)


 


Sodium Level


  138 mEQ/L


(135-145)


 


Potassium Level


  3.2 mEQ/L


(3.4-4.9)  L


 


Chloride Level


  100 mEQ/L


()


 


Carbon Dioxide Level


  25 mEQ/L


(20-30)


 


Anion Gap 13 (5-15)  


 


Blood Urea Nitrogen


  15 mg/dL


(7-23)


 


Creatinine


  0.8 mg/dL


(0.5-0.9)


 


Estimat Glomerular Filtration


Rate > 60 mL/min


(>60)


 


Glucose Level


  103 mg/dL


()


 


Calcium Level


  8.9 mg/dL


(8.6-10.2)








Height (Feet):  5


Height (Inches):  6.00


Weight (Pounds):  164


General Appearance:  no apparent distress, alert


Cardiovascular:  normal rate


Respiratory/Chest:  normal breath sounds, no respiratory distress


Abdominal Exam:  normal bowel sounds, non tender, soft


Extremities:  normal range of motion











Elena Seay N.P. Jan 26, 2017 11:19

## 2017-01-27 VITALS — DIASTOLIC BLOOD PRESSURE: 72 MMHG | SYSTOLIC BLOOD PRESSURE: 128 MMHG

## 2017-01-27 VITALS — SYSTOLIC BLOOD PRESSURE: 146 MMHG | DIASTOLIC BLOOD PRESSURE: 80 MMHG

## 2017-01-27 VITALS — SYSTOLIC BLOOD PRESSURE: 146 MMHG | DIASTOLIC BLOOD PRESSURE: 87 MMHG

## 2017-01-27 VITALS — DIASTOLIC BLOOD PRESSURE: 68 MMHG | SYSTOLIC BLOOD PRESSURE: 111 MMHG

## 2017-01-27 VITALS — DIASTOLIC BLOOD PRESSURE: 65 MMHG | SYSTOLIC BLOOD PRESSURE: 133 MMHG

## 2017-01-27 VITALS — DIASTOLIC BLOOD PRESSURE: 64 MMHG | SYSTOLIC BLOOD PRESSURE: 122 MMHG

## 2017-01-27 LAB
ANION GAP SERPL CALC-SCNC: 16 MMOL/L (ref 5–15)
BASOPHILS NFR BLD AUTO: 0.9 % (ref 0–2)
CALCIUM SERPL-MCNC: 8.7 MG/DL (ref 8.6–10.2)
CHLORIDE SERPL-SCNC: 97 MEQ/L (ref 98–107)
CO2 SERPL-SCNC: 21 MEQ/L (ref 20–30)
CREAT SERPL-MCNC: 0.8 MG/DL (ref 0.5–0.9)
EOSINOPHIL NFR BLD AUTO: 0.8 % (ref 0–3)
ERYTHROCYTE [DISTWIDTH] IN BLOOD BY AUTOMATED COUNT: 17 % (ref 11.6–14.8)
GFR SERPLBLD BASED ON 1.73 SQ M-ARVRAT: > 60 ML/MIN (ref 60–?)
HEMOLYSIS: 19
LYMPHOCYTES NFR BLD AUTO: 19.5 % (ref 20–45)
MCH RBC QN AUTO: 26.2 PG (ref 27–31)
MCHC RBC AUTO-ENTMCNC: 30.3 G/DL (ref 32–36)
MCV RBC AUTO: 86 FL (ref 80–99)
MONOCYTES NFR BLD AUTO: 8.9 % (ref 1–10)
NEUTROPHILS NFR BLD AUTO: 69.9 % (ref 45–75)
PLATELET # BLD: 341 K/UL (ref 150–450)
PMV BLD AUTO: 6.9 FL (ref 6.5–10.1)
POTASSIUM SERPL-SCNC: 3.8 MEQ/L (ref 3.4–4.9)
RBC # BLD AUTO: 4.63 M/UL (ref 4.2–5.4)
SODIUM SERPL-SCNC: 134 MEQ/L (ref 135–145)
WBC # BLD AUTO: 13.6 K/UL (ref 4.8–10.8)

## 2017-01-27 RX ADMIN — DOCUSATE SODIUM SCH MG: 100 CAPSULE, LIQUID FILLED ORAL at 09:00

## 2017-01-27 RX ADMIN — DIPHENHYDRAMINE HYDROCHLORIDE PRN MG: 50 INJECTION INTRAMUSCULAR; INTRAVENOUS at 17:38

## 2017-01-27 RX ADMIN — DIPHENHYDRAMINE HYDROCHLORIDE PRN MG: 50 INJECTION INTRAMUSCULAR; INTRAVENOUS at 05:00

## 2017-01-27 RX ADMIN — HEPARIN SODIUM SCH UNITS: 5000 INJECTION INTRAVENOUS; SUBCUTANEOUS at 20:18

## 2017-01-27 RX ADMIN — DEXTROSE AND SODIUM CHLORIDE SCH MLS/HR: 5; .45 INJECTION, SOLUTION INTRAVENOUS at 00:12

## 2017-01-27 RX ADMIN — TOPIRAMATE SCH MG: 25 TABLET, COATED ORAL at 09:14

## 2017-01-27 RX ADMIN — DULOXETINE HYDROCHLORIDE SCH MG: 30 CAPSULE, DELAYED RELEASE ORAL at 09:15

## 2017-01-27 RX ADMIN — HEPARIN SODIUM SCH UNITS: 5000 INJECTION INTRAVENOUS; SUBCUTANEOUS at 09:14

## 2017-01-27 RX ADMIN — QUETIAPINE SCH MG: 200 TABLET, FILM COATED ORAL at 09:18

## 2017-01-27 RX ADMIN — DOCUSATE SODIUM SCH MG: 100 CAPSULE, LIQUID FILLED ORAL at 12:24

## 2017-01-27 RX ADMIN — DEXTROSE AND SODIUM CHLORIDE SCH MLS/HR: 5; .45 INJECTION, SOLUTION INTRAVENOUS at 16:09

## 2017-01-27 RX ADMIN — DOCUSATE SODIUM SCH MG: 100 CAPSULE, LIQUID FILLED ORAL at 17:38

## 2017-01-27 RX ADMIN — TOPIRAMATE SCH MG: 25 TABLET, COATED ORAL at 17:39

## 2017-01-27 RX ADMIN — DIPHENHYDRAMINE HYDROCHLORIDE PRN MG: 50 INJECTION INTRAMUSCULAR; INTRAVENOUS at 22:24

## 2017-01-27 RX ADMIN — POLYETHYLENE GLYCOL 3350 SCH GM: 17 POWDER, FOR SOLUTION ORAL at 20:13

## 2017-01-27 RX ADMIN — DIPHENHYDRAMINE HYDROCHLORIDE PRN MG: 50 INJECTION INTRAMUSCULAR; INTRAVENOUS at 09:18

## 2017-01-27 NOTE — GI PROGRESS NOTE
Assessment/Plan


Problems:  


(1) Abdominal pain


ICD Codes:  R10.9 - Unspecified abdominal pain


SNOMED:  80446090


(2) Crohn's disease


ICD Codes:  K50.90 - Crohn's disease


SNOMED:  11547358


(3) Colitis


ICD Codes:  K52.9 - Noninfective gastroenteritis and colitis, unspecified


SNOMED:  795908026


(4) Abdominal pain


(5) Anemia


(6) Nausea and vomiting


(7) Constipation


Status:  stable


Status Narrative


Discussed with Dr. Mcleod.


Assessment/Plan


APCT >> fecal retention


mesalamine for Crohns


BM x 1


s/p EGD/colon 2016


non functional implanted morphine pump LLQ





ok for DC per GI standpoint


cardiac diet


Rx trial of Movantik for OIC >> patient responded well


laxatives


OB stool uncollected


pain mgmt 


fu labs


pt scheduled to be seen outpatient in [facility] next week





Subjective


Gastrointestinal/Abdominal:  Reports: no symptoms


Subjective


feels better


multiple BMs 


ready to go home





Objective





Last 24 Hour Vital Signs








  Date Time  Temp Pulse Resp B/P Pulse Ox O2 Delivery O2 Flow Rate FiO2


 


1/27/17 07:58 97.6 92 21 146/80 95 Room Air  


 


1/27/17 04:00 98.2 86 20 128/72 99 Room Air  


 


1/27/17 00:00 98.0 70 20 146/87 100 Room Air  


 


1/26/17 20:14  95 20   Room Air  


 


1/26/17 19:00 97.0 90 18 118/61 98 Room Air  


 


1/26/17 16:00 97.3 91 18 116/64  Room Air  


 


1/26/17 12:00 97.5 86 18 131/71 98 Room Air  

















Intake and Output  


 


 1/26/17 1/27/17





 19:00 07:00


 


Intake Total 1330 ml 1185 ml


 


Balance 1330 ml 1185 ml


 


  


 


Intake Oral 580 ml 360 ml


 


IV Total 750 ml 825 ml


 


# Voids 2 5


 


# Bowel Movements  1











Laboratory Tests








Test


  1/27/17


09:30


 


White Blood Count


  13.6 K/UL


(4.8-10.8)  H


 


Red Blood Count


  4.63 M/UL


(4.20-5.40)


 


Hemoglobin


  12.1 G/DL


(12.0-16.0)


 


Hematocrit


  39.9 %


(37.0-47.0)


 


Mean Corpuscular Volume 86 FL (80-99)  


 


Mean Corpuscular Hemoglobin


  26.2 PG


(27.0-31.0)  L


 


Mean Corpuscular Hemoglobin


Concent 30.3 G/DL


(32.0-36.0)  L


 


Red Cell Distribution Width


  17.0 %


(11.6-14.8)  H


 


Platelet Count


  341 K/UL


(150-450)


 


Mean Platelet Volume


  6.9 FL


(6.5-10.1)


 


Neutrophils (%) (Auto)


  69.9 %


(45.0-75.0)


 


Lymphocytes (%) (Auto)


  19.5 %


(20.0-45.0)  L


 


Monocytes (%) (Auto)


  8.9 %


(1.0-10.0)


 


Eosinophils (%) (Auto)


  0.8 %


(0.0-3.0)


 


Basophils (%) (Auto)


  0.9 %


(0.0-2.0)


 


Sodium Level Pending  


 


Potassium Level Pending  


 


Chloride Level Pending  


 


Carbon Dioxide Level Pending  


 


Blood Urea Nitrogen Pending  


 


Creatinine Pending  


 


Estimat Glomerular Filtration


Rate Pending  


 


 


Glucose Level Pending  


 


Calcium Level Pending  








Height (Feet):  5


Height (Inches):  6.00


Weight (Pounds):  164


General Appearance:  no apparent distress, alert


Cardiovascular:  normal rate


Respiratory/Chest:  normal breath sounds, no respiratory distress


Abdominal Exam:  normal bowel sounds, non tender, soft


Extremities:  normal range of motion











Elena Seay N.P. Jan 27, 2017 10:25

## 2017-01-27 NOTE — GENERAL PROGRESS NOTE
Assessment/Plan


Problem List:  


(1) Abdominal pain


(2) Constipation


(3) Opioid dependence


ICD Codes:  F11.20 - Opioid dependence


SNOMED:  83161268


(4) Shortness of breath


(5) Emphysema


ICD Codes:  J43.9 - Emphysema


SNOMED:  51474915


(6) COPD exacerbation


ICD Codes:  J44.1 - COPD exacerbation


SNOMED:  297331675


(7) CHF (congestive heart failure)


ICD Codes:  I50.9 - Heart failure, unspecified


SNOMED:  77216787


(8) Nausea and vomiting


(9) Crohns disease


ICD Codes:  K50.90 - Crohns disease


SNOMED:  02938547


(10) UTI (urinary tract infection)


ICD Codes:  N39.0 - Urinary tract infection, site not specified


SNOMED:  55945480


Status:  stable, progressing, tolerating diet


Assessment/Plan


ot pt diet gi f/u abx o2 pulm tx pain control cbc bmp am





Subjective


Constitutional:  Reports: weakness


Allergies:  


Coded Allergies:  


     No Known Allergies (Verified , 10/27/06)


All Systems:  reviewed and negative except above


Subjective


sleepy calm





Objective





Last 24 Hour Vital Signs








  Date Time  Temp Pulse Resp B/P Pulse Ox O2 Delivery O2 Flow Rate FiO2


 


1/27/17 16:00 97.3 85 20 122/64 97 Room Air  


 


1/27/17 11:46 98.7 85 20 111/68 97 Room Air  


 


1/27/17 07:58 97.6 92 21 146/80 95 Room Air  


 


1/27/17 04:00 98.2 86 20 128/72 99 Room Air  


 


1/27/17 00:00 98.0 70 20 146/87 100 Room Air  


 


1/26/17 20:14  95 20   Room Air  


 


1/26/17 19:00 97.0 90 18 118/61 98 Room Air  

















Intake and Output  


 


 1/26/17 1/27/17





 19:00 07:00


 


Intake Total 1330 ml 1185 ml


 


Balance 1330 ml 1185 ml


 


  


 


Intake Oral 580 ml 360 ml


 


IV Total 750 ml 825 ml


 


# Voids 2 5


 


# Bowel Movements  1








Laboratory Tests


1/27/17 09:30: 


White Blood Count 13.6H, Red Blood Count 4.63, Hemoglobin 12.1, Hematocrit 39.9

, Mean Corpuscular Volume 86, Mean Corpuscular Hemoglobin 26.2L, Mean 

Corpuscular Hemoglobin Concent 30.3L, Red Cell Distribution Width 17.0H, 

Platelet Count 341, Mean Platelet Volume 6.9, Neutrophils (%) (Auto) 69.9, 

Lymphocytes (%) (Auto) 19.5L, Monocytes (%) (Auto) 8.9, Eosinophils (%) (Auto) 

0.8, Basophils (%) (Auto) 0.9, Sodium Level 134L, Potassium Level 3.8, Chloride 

Level 97L, Carbon Dioxide Level 21, Anion Gap 16H, Blood Urea Nitrogen 9, 

Creatinine 0.8, Estimat Glomerular Filtration Rate > 60, Glucose Level 104, 

Calcium Level 8.7


Height (Feet):  5


Height (Inches):  6.00


Weight (Pounds):  164


General Appearance:  lethargic


EENT:  normal ENT inspection


Neck:  normal alignment


Cardiovascular:  normal peripheral pulses, normal rate, regular rhythm


Respiratory/Chest:  chest wall non-tender, lungs clear, normal breath sounds


Abdomen:  normal bowel sounds, non tender, soft


Extremities:  normal inspection


Edema:  no edema noted Arm (L), no edema noted Arm (R), no edema noted Leg (L), 

no edema noted Leg (R), no edema noted Pedal (L), no edema noted Pedal (R), no 

edema noted Generalized


Neurologic:  responsive, motor weakness


Skin:  normal pigmentation, warm/dry











MAICOL LANG Jan 27, 2017 16:21

## 2017-01-27 NOTE — INFECTIOUS DISEASES PROG NOTE
Assessment/Plan


Problems:  


(1) Ileus


Assessment & Plan:  suspect due to prior surgeries , continue supportive care , 

management as per GI





(2) UTI (urinary tract infection)


Assessment & Plan:  urine culture showed contaminant carla. zosyn was 

discontinued 





(3) Sepsis


Assessment & Plan:  ruled out with negative blood culture , off  zosyn , will 

monitor off antibiotics.





(4) Abdominal pain


Assessment & Plan:  continue pain management as per primary, check CT abdomen





(5) Crohn's disease


Assessment & Plan:   GI is following , continue meds





(6) Nausea and vomiting


Assessment & Plan:  due to ileus , continue supportive care








Subjective


Constitutional:  Denies: anorexia, chills, drenching sweats, fatigue, fever, no 

symptoms, other


Respiratory:  Denies: dry cough, no symptoms, other, productive cough, 

shortness of breath


Gastrointestinal/Abdominal:  Reports: bloating


Genitourinary:  Denies: dysuria, frequency, hematuria, last menstrual period, 

no symptoms, nocturia, other, vaginal bleed/discharge


Neurologic:  Denies: confusion, headache, no symptoms, numbness, other, weakness


Psychiatric:  Denies: anxiety, depression, no symptoms, other


Skin:  Denies: no symptoms, other, rash, ulcer


Endocrine:  Denies: feels cold, feels warm, no symptoms, other


Hematologic:  Denies: bleeding, no symptoms, other, swollen lymph nodes


Musculoskeletal:  Denies: no symptoms, other, pain, stiffness, swelling


Allergies:  


Coded Allergies:  


     No Known Allergies (Verified , 10/27/06)


Subjective


tolerated liquid diet





Objective


Vital Signs





Last 24 Hour Vital Signs








  Date Time  Temp Pulse Resp B/P Pulse Ox O2 Delivery O2 Flow Rate FiO2


 


1/27/17 16:00 97.3 85 20 122/64 97 Room Air  


 


1/27/17 11:46 98.7 85 20 111/68 97 Room Air  


 


1/27/17 07:58 97.6 92 21 146/80 95 Room Air  


 


1/27/17 04:00 98.2 86 20 128/72 99 Room Air  


 


1/27/17 00:00 98.0 70 20 146/87 100 Room Air  


 


1/26/17 20:14  95 20   Room Air  








Height (Feet):  5


Height (Inches):  6.00


Weight (Pounds):  164


General Appearance:  WD/WN, no acute distress


HEENT:  normocephalic, atraumatic, anicteric, mucous membranes moist, PERRL


Respiratory/Chest:  chest wall non-tender, lungs clear, normal breath sounds, 

no respiratory distress, no accessory muscle use


Cardiovascular:  normal peripheral pulses, normal rate, regular rhythm, no 

gallop/murmur, no JVD


Abdomen:  soft, non tender, no organomegaly, no mass, hypoactive bowel sounds, 

distended, tender


Extremities:  no cyanosis, no clubbing


Skin:  no rash, no lesions





Microbiology








 Date/Time


Source Procedure


Growth Status


 


 


 1/25/17 06:50


Stool Clostridium difficile Toxin Assay - Final Complete











Laboratory Tests








Test


  1/27/17


09:30


 


White Blood Count


  13.6 K/UL


(4.8-10.8)  H


 


Red Blood Count


  4.63 M/UL


(4.20-5.40)


 


Hemoglobin


  12.1 G/DL


(12.0-16.0)


 


Hematocrit


  39.9 %


(37.0-47.0)


 


Mean Corpuscular Volume 86 FL (80-99)  


 


Mean Corpuscular Hemoglobin


  26.2 PG


(27.0-31.0)  L


 


Mean Corpuscular Hemoglobin


Concent 30.3 G/DL


(32.0-36.0)  L


 


Red Cell Distribution Width


  17.0 %


(11.6-14.8)  H


 


Platelet Count


  341 K/UL


(150-450)


 


Mean Platelet Volume


  6.9 FL


(6.5-10.1)


 


Neutrophils (%) (Auto)


  69.9 %


(45.0-75.0)


 


Lymphocytes (%) (Auto)


  19.5 %


(20.0-45.0)  L


 


Monocytes (%) (Auto)


  8.9 %


(1.0-10.0)


 


Eosinophils (%) (Auto)


  0.8 %


(0.0-3.0)


 


Basophils (%) (Auto)


  0.9 %


(0.0-2.0)


 


Sodium Level


  134 mEQ/L


(135-145)  L


 


Potassium Level


  3.8 mEQ/L


(3.4-4.9)


 


Chloride Level


  97 mEQ/L


()  L


 


Carbon Dioxide Level


  21 mEQ/L


(20-30)


 


Anion Gap 16 (5-15)  H


 


Blood Urea Nitrogen


  9 mg/dL (7-23)


 


 


Creatinine


  0.8 mg/dL


(0.5-0.9)


 


Estimat Glomerular Filtration


Rate > 60 mL/min


(>60)


 


Glucose Level


  104 mg/dL


()


 


Calcium Level


  8.7 mg/dL


(8.6-10.2)











Current Medications








 Medications


  (Trade)  Dose


 Ordered  Sig/Jed


 Route


 PRN Reason  Start Time


 Stop Time Status Last Admin


Dose Admin


 


 Acetaminophen


  (Tylenol)  650 mg  Q4H  PRN


 ORAL


 fever  1/21/17 14:45


 2/20/17 14:44   


 


 


 Al Hydroxide/Mg


 Hydroxide


  (Mylanta II)  30 ml  Q6H  PRN


 ORAL


 dyspepsia  1/21/17 14:45


 2/20/17 14:44   


 


 


 Dextrose


  (Dextrose 50%)    STAT  PRN


 IV


 Hypoglycemia  1/21/17 14:45


 2/20/17 14:44   


 


 


 Dextrose/Sodium


 Chloride


  (D5 0.45% NS)  1,000 ml @ 


 75 mls/hr  [id number]


 IV


   1/21/17 15:00


 2/20/17 14:59  1/27/17 16:09


 


 


 Diphenhydramine


 HCl


  (Benadryl)  25 mg  Q6H  PRN


 ORAL


 Itching/Pruritis  1/21/17 14:45


 2/20/17 14:44  1/27/17 00:19


 


 


 Docusate Sodium


  (Colace)  100 mg  THREE TIMES A  DAY


 ORAL


   1/23/17 13:00


 2/22/17 12:59  1/26/17 17:40


 


 


 Duloxetine HCl


  (Cymbalta)  60 mg  DAILY


 ORAL


   1/22/17 09:00


 2/21/17 08:59  1/27/17 09:15


 


 


 Heparin Sodium


  (Porcine)


  (Heparin 5000


 units/ml)  5,000 units  EVERY 12  HOURS


 SUBQ


   1/21/17 21:00


 2/20/17 20:59  1/27/17 09:14


 


 


 Hydromorphone HCl


  (Dilaudid)  1 mg  Q4H  PRN


 IVP


 PAIN 4-10  1/25/17 14:00


 2/1/17 13:59  1/27/17 17:38


 


 


 Levetiracetam


  (Keppra)  1,500 mg  TWICE A  DAY


 ORAL


   1/21/17 18:00


 2/20/17 17:59  1/27/17 17:38


 


 


 Mesalamine


  (Asacol)  800 mg  THREE TIMES A  DAY


 ORAL


   1/22/17 09:00


 2/21/17 08:59  1/27/17 17:38


 


 


 Mirtazapine


  (Remeron)  30 mg  BEDTIME


 ORAL


   1/21/17 21:00


 2/20/17 20:59  1/26/17 20:36


 


 


 Nitroglycerin


  (Ntg)  0.4 mg  Q5M X 3 DOSES PRN


 SL


 Prn Chest Pain  1/21/17 14:45


 2/20/17 14:44   


 


 


 Ondansetron HCl


  (Zofran)  4 mg  Q6H  PRN


 IVP


 Nausea & Vomiting  1/21/17 14:45


 2/20/17 14:44  1/26/17 09:37


 


 


 Polyethylene


 Glycol


  (Miralax)  17 gm  BEDTIME


 ORAL


   1/23/17 21:00


 2/22/17 20:59  1/26/17 20:35


 


 


 Polyethylene


 Glycol


  (Miralax)  17 gm  HSPRN  PRN


 ORAL


 Constipation  1/21/17 14:45


 2/20/17 14:44   


 


 


 Quetiapine


 Fumarate


  (SEROquel)  200 mg  DAILY


 ORAL


   1/22/17 09:00


 2/21/17 08:59  1/27/17 09:18


 


 


 Temazepam


  (Restoril)  15 mg  HSPRN  PRN


 ORAL


 Insomnia  1/21/17 14:45


 1/28/17 14:44  1/21/17 22:10


 


 


 Topiramate 25 mg  25 mg  TWICE A  DAY


 ORAL


   1/21/17 18:00


 2/20/17 17:59  1/27/17 17:39


 

















Violet Dior M.D. Jan 27, 2017 19:33

## 2017-01-27 NOTE — PULMONOLOGY PROGRESS NOTE
Assessment/Plan


Problems:  


(1) Interstitial lung disease


(2) Sepsis


(3) Emphysema


(4) Leukocytosis


(5) Colitis


(6) Acute encephalopathy


(7) Hx SBO


Assessment/Plan


cxr unchged


off antibiotics





improivng


titrate fo2 to sat of 92%


all cultures negative sofar


all notes and meds reviewed.





ok to go home





Subjective


ROS Limited/Unobtainable:  No


Interval Events:  no new complains


Allergies:  


Coded Allergies:  


     No Known Allergies (Verified , 10/27/06)





Objective





Last 24 Hour Vital Signs








  Date Time  Temp Pulse Resp B/P Pulse Ox O2 Delivery O2 Flow Rate FiO2


 


1/27/17 11:46 98.7 85 20 111/68 97 Room Air  


 


1/27/17 07:58 97.6 92 21 146/80 95 Room Air  


 


1/27/17 04:00 98.2 86 20 128/72 99 Room Air  


 


1/27/17 00:00 98.0 70 20 146/87 100 Room Air  


 


1/26/17 20:14  95 20   Room Air  


 


1/26/17 19:00 97.0 90 18 118/61 98 Room Air  


 


1/26/17 16:00 97.3 91 18 116/64  Room Air  

















Intake and Output  


 


 1/26/17 1/27/17





 19:00 07:00


 


Intake Total 1330 ml 1185 ml


 


Balance 1330 ml 1185 ml


 


  


 


Intake Oral 580 ml 360 ml


 


IV Total 750 ml 825 ml


 


# Voids 2 5


 


# Bowel Movements  1








General Appearance:  WD/WN


HEENT:  normocephalic, atraumatic


Respiratory/Chest:  chest wall non-tender, lungs clear


Cardiovascular:  normal peripheral pulses, regular rhythm


Genitourinary:  normal external genitalia


Extremities:  no clubbing


Neurologic/Psychiatric:  CNs II-XII grossly normal





Microbiology








 Date/Time


Source Procedure


Growth Status


 


 


 1/25/17 06:50


Stool Clostridium difficile Toxin Assay - Final Complete








Laboratory Tests


1/27/17 09:30: 


White Blood Count 13.6H, Red Blood Count 4.63, Hemoglobin 12.1, Hematocrit 39.9

, Mean Corpuscular Volume 86, Mean Corpuscular Hemoglobin 26.2L, Mean 

Corpuscular Hemoglobin Concent 30.3L, Red Cell Distribution Width 17.0H, 

Platelet Count 341, Mean Platelet Volume 6.9, Neutrophils (%) (Auto) 69.9, 

Lymphocytes (%) (Auto) 19.5L, Monocytes (%) (Auto) 8.9, Eosinophils (%) (Auto) 

0.8, Basophils (%) (Auto) 0.9, Sodium Level 134L, Potassium Level 3.8, Chloride 

Level 97L, Carbon Dioxide Level 21, Anion Gap 16H, Blood Urea Nitrogen 9, 

Creatinine 0.8, Estimat Glomerular Filtration Rate > 60, Glucose Level 104, 

Calcium Level 8.7





Current Medications








 Medications


  (Trade)  Dose


 Ordered  Sig/Jed


 Route


 PRN Reason  Start Time


 Stop Time Status Last Admin


Dose Admin


 


 Acetaminophen


  (Tylenol)  650 mg  Q4H  PRN


 ORAL


 fever  1/21/17 14:45


 2/20/17 14:44   


 


 


 Al Hydroxide/Mg


 Hydroxide


  (Mylanta II)  30 ml  Q6H  PRN


 ORAL


 dyspepsia  1/21/17 14:45


 2/20/17 14:44   


 


 


 Dextrose


  (Dextrose 50%)    STAT  PRN


 IV


 Hypoglycemia  1/21/17 14:45


 2/20/17 14:44   


 


 


 Dextrose/Sodium


 Chloride


  (D5 0.45% NS)  1,000 ml @ 


 75 mls/hr  [id number]


 IV


   1/21/17 15:00


 2/20/17 14:59  1/27/17 00:12


 


 


 Diphenhydramine


 HCl


  (Benadryl)  25 mg  Q6H  PRN


 ORAL


 Itching/Pruritis  1/21/17 14:45


 2/20/17 14:44  1/27/17 00:19


 


 


 Docusate Sodium


  (Colace)  100 mg  THREE TIMES A  DAY


 ORAL


   1/23/17 13:00


 2/22/17 12:59  1/26/17 17:40


 


 


 Duloxetine HCl


  (Cymbalta)  60 mg  DAILY


 ORAL


   1/22/17 09:00


 2/21/17 08:59  1/27/17 09:15


 


 


 Heparin Sodium


  (Porcine)


  (Heparin 5000


 units/ml)  5,000 units  EVERY 12  HOURS


 SUBQ


   1/21/17 21:00


 2/20/17 20:59  1/27/17 09:14


 


 


 Hydromorphone HCl


  (Dilaudid)  1 mg  Q4H  PRN


 IVP


 PAIN 4-10  1/25/17 14:00


 2/1/17 13:59  1/27/17 09:18


 


 


 Levetiracetam


  (Keppra)  1,500 mg  TWICE A  DAY


 ORAL


   1/21/17 18:00


 2/20/17 17:59  1/27/17 09:18


 


 


 Mesalamine


  (Asacol)  800 mg  THREE TIMES A  DAY


 ORAL


   1/22/17 09:00


 2/21/17 08:59  1/27/17 12:24


 


 


 Mirtazapine


  (Remeron)  30 mg  BEDTIME


 ORAL


   1/21/17 21:00


 2/20/17 20:59  1/26/17 20:36


 


 


 Nitroglycerin


  (Ntg)  0.4 mg  Q5M X 3 DOSES PRN


 SL


 Prn Chest Pain  1/21/17 14:45


 2/20/17 14:44   


 


 


 Ondansetron HCl


  (Zofran)  4 mg  Q6H  PRN


 IVP


 Nausea & Vomiting  1/21/17 14:45


 2/20/17 14:44  1/26/17 09:37


 


 


 Polyethylene


 Glycol


  (Miralax)  17 gm  BEDTIME


 ORAL


   1/23/17 21:00


 2/22/17 20:59  1/26/17 20:35


 


 


 Polyethylene


 Glycol


  (Miralax)  17 gm  HSPRN  PRN


 ORAL


 Constipation  1/21/17 14:45


 2/20/17 14:44   


 


 


 Quetiapine


 Fumarate


  (SEROquel)  200 mg  DAILY


 ORAL


   1/22/17 09:00


 2/21/17 08:59  1/27/17 09:18


 


 


 Temazepam


  (Restoril)  15 mg  HSPRN  PRN


 ORAL


 Insomnia  1/21/17 14:45


 1/28/17 14:44  1/21/17 22:10


 


 


 Topiramate 25 mg  25 mg  TWICE A  DAY


 ORAL


   1/21/17 18:00


 2/20/17 17:59  1/27/17 09:14


 

















JULIETTE KLEIN Jan 27, 2017 14:19

## 2017-01-27 NOTE — PROGRESS NOTE
DATE:  01/26/2017



PSYCHOTHERAPY CONSULTATION PROGRESS NOTE



TREATING ATTENDING PHYSICIAN:  Maxim Hopkins D.O.



SUBJECTIVE:  The patient is a 61-year-old female.  This patient has

been depressed and helpless.  The patient has been feeling lethargic and

tired.  She states her anxiety, bipolar, depression remains high however

she denies suicidal or homicidal thoughts of ideation.  The patient feels

that her anxiety and depression _____.  She _____ that she is feeling very

tired and fatigued.



PLAN:  This clinician assessed the patient. Provided the patient with

reality orientation and supportive psychotherapy. Encouraging the patient

to participate in treatment as well as with medication management.

Continue with medication management and behavioral management.   This

clinician has reviewed the patient's chart.  Discussed the treatment with

nursing staff.









  ______________________________________________

  Orestes Ross PsyD.





DR:  Mariana

D:  01/27/2017 09:22

T:  01/27/2017 21:55

JOB#:  [id number]

CC:

## 2017-01-28 VITALS — SYSTOLIC BLOOD PRESSURE: 127 MMHG | DIASTOLIC BLOOD PRESSURE: 67 MMHG

## 2017-01-28 VITALS — DIASTOLIC BLOOD PRESSURE: 71 MMHG | SYSTOLIC BLOOD PRESSURE: 128 MMHG

## 2017-01-28 VITALS — SYSTOLIC BLOOD PRESSURE: 138 MMHG | DIASTOLIC BLOOD PRESSURE: 70 MMHG

## 2017-01-28 VITALS — SYSTOLIC BLOOD PRESSURE: 134 MMHG | DIASTOLIC BLOOD PRESSURE: 78 MMHG

## 2017-01-28 VITALS — DIASTOLIC BLOOD PRESSURE: 68 MMHG | SYSTOLIC BLOOD PRESSURE: 123 MMHG

## 2017-01-28 VITALS — DIASTOLIC BLOOD PRESSURE: 69 MMHG | SYSTOLIC BLOOD PRESSURE: 105 MMHG

## 2017-01-28 VITALS — SYSTOLIC BLOOD PRESSURE: 137 MMHG | DIASTOLIC BLOOD PRESSURE: 74 MMHG

## 2017-01-28 LAB
ANION GAP SERPL CALC-SCNC: 9 MMOL/L (ref 5–15)
BASOPHILS NFR BLD AUTO: 0.4 % (ref 0–2)
CALCIUM SERPL-MCNC: 9.1 MG/DL (ref 8.6–10.2)
CHLORIDE SERPL-SCNC: 97 MEQ/L (ref 98–107)
CO2 SERPL-SCNC: 30 MEQ/L (ref 20–30)
CREAT SERPL-MCNC: 0.7 MG/DL (ref 0.5–0.9)
EOSINOPHIL NFR BLD AUTO: 1.3 % (ref 0–3)
ERYTHROCYTE [DISTWIDTH] IN BLOOD BY AUTOMATED COUNT: 16.9 % (ref 11.6–14.8)
GFR SERPLBLD BASED ON 1.73 SQ M-ARVRAT: > 60 ML/MIN (ref 60–?)
HEMOLYSIS: 2
LYMPHOCYTES NFR BLD AUTO: 19.7 % (ref 20–45)
MCH RBC QN AUTO: 26.2 PG (ref 27–31)
MCHC RBC AUTO-ENTMCNC: 30.4 G/DL (ref 32–36)
MCV RBC AUTO: 86 FL (ref 80–99)
MONOCYTES NFR BLD AUTO: 9.3 % (ref 1–10)
NEUTROPHILS NFR BLD AUTO: 69.3 % (ref 45–75)
PLATELET # BLD: 381 K/UL (ref 150–450)
PMV BLD AUTO: 6.7 FL (ref 6.5–10.1)
POTASSIUM SERPL-SCNC: 2.8 MEQ/L (ref 3.4–4.9)
RBC # BLD AUTO: 4.37 M/UL (ref 4.2–5.4)
SODIUM SERPL-SCNC: 136 MEQ/L (ref 135–145)
WBC # BLD AUTO: 13.4 K/UL (ref 4.8–10.8)

## 2017-01-28 RX ADMIN — DIPHENHYDRAMINE HYDROCHLORIDE PRN MG: 50 INJECTION INTRAMUSCULAR; INTRAVENOUS at 22:23

## 2017-01-28 RX ADMIN — DIPHENHYDRAMINE HYDROCHLORIDE PRN MG: 50 INJECTION INTRAMUSCULAR; INTRAVENOUS at 17:53

## 2017-01-28 RX ADMIN — DIPHENHYDRAMINE HYDROCHLORIDE PRN MG: 50 INJECTION INTRAMUSCULAR; INTRAVENOUS at 02:57

## 2017-01-28 RX ADMIN — QUETIAPINE SCH MG: 200 TABLET, FILM COATED ORAL at 08:10

## 2017-01-28 RX ADMIN — DOCUSATE SODIUM SCH MG: 100 CAPSULE, LIQUID FILLED ORAL at 17:55

## 2017-01-28 RX ADMIN — DOCUSATE SODIUM SCH MG: 100 CAPSULE, LIQUID FILLED ORAL at 08:10

## 2017-01-28 RX ADMIN — TOPIRAMATE SCH MG: 25 TABLET, COATED ORAL at 08:10

## 2017-01-28 RX ADMIN — POLYETHYLENE GLYCOL 3350 SCH GM: 17 POWDER, FOR SOLUTION ORAL at 21:00

## 2017-01-28 RX ADMIN — HEPARIN SODIUM SCH UNITS: 5000 INJECTION INTRAVENOUS; SUBCUTANEOUS at 21:00

## 2017-01-28 RX ADMIN — DIPHENHYDRAMINE HYDROCHLORIDE PRN MG: 50 INJECTION INTRAMUSCULAR; INTRAVENOUS at 13:41

## 2017-01-28 RX ADMIN — DOCUSATE SODIUM SCH MG: 100 CAPSULE, LIQUID FILLED ORAL at 13:40

## 2017-01-28 RX ADMIN — DIPHENHYDRAMINE HYDROCHLORIDE PRN MG: 50 INJECTION INTRAMUSCULAR; INTRAVENOUS at 08:10

## 2017-01-28 RX ADMIN — TOPIRAMATE SCH MG: 25 TABLET, COATED ORAL at 17:52

## 2017-01-28 RX ADMIN — DEXTROSE AND SODIUM CHLORIDE SCH MLS/HR: 5; .45 INJECTION, SOLUTION INTRAVENOUS at 07:00

## 2017-01-28 RX ADMIN — DULOXETINE HYDROCHLORIDE SCH MG: 30 CAPSULE, DELAYED RELEASE ORAL at 08:09

## 2017-01-28 RX ADMIN — HEPARIN SODIUM SCH UNITS: 5000 INJECTION INTRAVENOUS; SUBCUTANEOUS at 08:11

## 2017-01-28 NOTE — PULMONOLOGY PROGRESS NOTE
Assessment/Plan


Problems:  


(1) Interstitial lung disease


(2) Sepsis


(3) Emphysema


(4) Leukocytosis


(5) Colitis


(6) Acute encephalopathy


(7) Hx SBO


Assessment/Plan


cxr unchged


off antibiotics


improivng


titrate fo2 to sat of 92%


all cultures negative sofar


all notes and meds reviewed.


dc IV fluids


ok to go home





Subjective


ROS Limited/Unobtainable:  No


Constitutional:  Reports: no symptoms


HEENT:  Repors: no symptoms


Respiratory:  Reports: no symptoms


Allergies:  


Coded Allergies:  


     No Known Allergies (Verified , 10/27/06)





Objective





Last 24 Hour Vital Signs








  Date Time  Temp Pulse Resp B/P Pulse Ox O2 Delivery O2 Flow Rate FiO2


 


1/28/17 12:02 98.4 95 20 138/70 98 Room Air  


 


1/28/17 08:40 98.2       


 


1/28/17 08:02 98.2 91 20 137/74 98 Room Air  


 


1/28/17 04:00 96.6 86 20 127/67 100 Room Air  


 


1/28/17 00:00 98.2 87 20 134/78 100 Room Air  


 


1/27/17 19:00 96.8 85 20 133/65 97 Room Air  


 


1/27/17 16:00 97.3 85 20 122/64 97 Room Air  

















Intake and Output  


 


 1/27/17 1/28/17





 19:00 07:00


 


Intake Total 1155 ml 1020 ml


 


Balance 1155 ml 1020 ml


 


  


 


Intake Oral 480 ml 120 ml


 


IV Total 675 ml 900 ml


 


# Voids 4 4


 


# Bowel Movements 4 1








General Appearance:  WD/WN


HEENT:  normocephalic, atraumatic


Respiratory/Chest:  chest wall non-tender, lungs clear, normal breath sounds


Cardiovascular:  normal peripheral pulses, normal rate


Abdomen:  normal bowel sounds, soft, non tender


Genitourinary:  normal external genitalia


Extremities:  no cyanosis


Skin:  no rash


Laboratory Tests


1/28/17 06:05: 


White Blood Count 13.4H, Red Blood Count 4.37, Hemoglobin 11.5L, Hematocrit 37.7

, Mean Corpuscular Volume 86, Mean Corpuscular Hemoglobin 26.2L, Mean 

Corpuscular Hemoglobin Concent 30.4L, Red Cell Distribution Width 16.9H, 

Platelet Count 381, Mean Platelet Volume 6.7, Neutrophils (%) (Auto) 69.3, 

Lymphocytes (%) (Auto) 19.7L, Monocytes (%) (Auto) 9.3, Eosinophils (%) (Auto) 

1.3, Basophils (%) (Auto) 0.4, Sodium Level 136, Potassium Level 2.8L, Chloride 

Level 97L, Carbon Dioxide Level 30, Anion Gap 9, Blood Urea Nitrogen 6L, 

Creatinine 0.7, Estimat Glomerular Filtration Rate > 60, Glucose Level 109H, 

Calcium Level 9.1





Current Medications








 Medications


  (Trade)  Dose


 Ordered  Sig/Jed


 Route


 PRN Reason  Start Time


 Stop Time Status Last Admin


Dose Admin


 


 Acetaminophen


  (Tylenol)  650 mg  Q4H  PRN


 ORAL


 fever  1/21/17 14:45


 2/20/17 14:44   


 


 


 Al Hydroxide/Mg


 Hydroxide


  (Mylanta II)  30 ml  Q6H  PRN


 ORAL


 dyspepsia  1/21/17 14:45


 2/20/17 14:44   


 


 


 Dextrose


  (Dextrose 50%)    STAT  PRN


 IV


 Hypoglycemia  1/21/17 14:45


 2/20/17 14:44   


 


 


 Dextrose/Sodium


 Chloride


  (D5 0.45% NS)  1,000 ml @ 


 75 mls/hr  [id number]


 IV


   1/21/17 15:00


 2/20/17 14:59  1/27/17 16:09


 


 


 Diphenhydramine


 HCl


  (Benadryl)  25 mg  Q6H  PRN


 ORAL


 Itching/Pruritis  1/21/17 14:45


 2/20/17 14:44  1/27/17 00:19


 


 


 Docusate Sodium


  (Colace)  100 mg  THREE TIMES A  DAY


 ORAL


   1/23/17 13:00


 2/22/17 12:59  1/28/17 08:10


 


 


 Duloxetine HCl


  (Cymbalta)  60 mg  DAILY


 ORAL


   1/22/17 09:00


 2/21/17 08:59  1/28/17 08:09


 


 


 Heparin Sodium


  (Porcine)


  (Heparin 5000


 units/ml)  5,000 units  EVERY 12  HOURS


 SUBQ


   1/21/17 21:00


 2/20/17 20:59  1/28/17 08:11


 


 


 Hydromorphone HCl


  (Dilaudid)  1 mg  Q4H  PRN


 IVP


 PAIN 4-10  1/25/17 14:00


 2/1/17 13:59  1/28/17 08:10


 


 


 Levetiracetam


  (Keppra)  1,500 mg  TWICE A  DAY


 ORAL


   1/21/17 18:00


 2/20/17 17:59  1/28/17 08:10


 


 


 Mesalamine


  (Asacol)  800 mg  THREE TIMES A  DAY


 ORAL


   1/22/17 09:00


 2/21/17 08:59  1/28/17 10:28


 


 


 Mirtazapine


  (Remeron)  30 mg  BEDTIME


 ORAL


   1/21/17 21:00


 2/20/17 20:59  1/27/17 20:18


 


 


 Nitroglycerin


  (Ntg)  0.4 mg  Q5M X 3 DOSES PRN


 SL


 Prn Chest Pain  1/21/17 14:45


 2/20/17 14:44   


 


 


 Ondansetron HCl


  (Zofran)  4 mg  Q6H  PRN


 IVP


 Nausea & Vomiting  1/21/17 14:45


 2/20/17 14:44  1/26/17 09:37


 


 


 Polyethylene


 Glycol


  (Miralax)  17 gm  BEDTIME


 ORAL


   1/23/17 21:00


 2/22/17 20:59  1/26/17 20:35


 


 


 Polyethylene


 Glycol


  (Miralax)  17 gm  HSPRN  PRN


 ORAL


 Constipation  1/21/17 14:45


 2/20/17 14:44   


 


 


 Quetiapine


 Fumarate


  (SEROquel)  200 mg  DAILY


 ORAL


   1/22/17 09:00


 2/21/17 08:59  1/28/17 08:10


 


 


 Temazepam


  (Restoril)  15 mg  HSPRN  PRN


 ORAL


 Insomnia  1/21/17 14:45


 1/28/17 14:44  1/21/17 22:10


 


 


 Topiramate 25 mg  25 mg  TWICE A  DAY


 ORAL


   1/21/17 18:00


 2/20/17 17:59  1/28/17 08:10


 

















JULIETTE KLEIN Jan 28, 2017 13:14

## 2017-01-28 NOTE — GENERAL PROGRESS NOTE
Assessment/Plan


Problem List:  


(1) Abdominal pain


(2) Constipation


(3) Opioid dependence


ICD Codes:  F11.20 - Opioid dependence


SNOMED:  74291487


(4) Shortness of breath


(5) Emphysema


ICD Codes:  J43.9 - Emphysema


SNOMED:  23533407


(6) COPD exacerbation


ICD Codes:  J44.1 - COPD exacerbation


SNOMED:  002852876


(7) CHF (congestive heart failure)


ICD Codes:  I50.9 - Heart failure, unspecified


SNOMED:  69139669


(8) Nausea and vomiting


(9) Crohns disease


ICD Codes:  K50.90 - Crohns disease


SNOMED:  46792103


(10) UTI (urinary tract infection)


ICD Codes:  N39.0 - Urinary tract infection, site not specified


SNOMED:  56722220


Status:  stable, progressing, tolerating diet


Assessment/Plan


ot pt diet gi f/u abx o2 pulm tx pain control gi id f/u cbc bmp am





Subjective


Constitutional:  Reports: weakness


Allergies:  


Coded Allergies:  


     No Known Allergies (Verified , 10/27/06)


All Systems:  reviewed and negative except above


Subjective


sleepy calm





Objective





Last 24 Hour Vital Signs








  Date Time  Temp Pulse Resp B/P Pulse Ox O2 Delivery O2 Flow Rate FiO2


 


1/28/17 08:02 98.2 91 20 137/74 98 Room Air  


 


1/28/17 04:00 96.6 86 20 127/67 100 Room Air  


 


1/28/17 00:00 98.2 87 20 134/78 100 Room Air  


 


1/27/17 19:00 96.8 85 20 133/65 97 Room Air  


 


1/27/17 16:00 97.3 85 20 122/64 97 Room Air  


 


1/27/17 11:46 98.7 85 20 111/68 97 Room Air  

















Intake and Output  


 


 1/27/17 1/28/17





 19:00 07:00


 


Intake Total 1155 ml 1020 ml


 


Balance 1155 ml 1020 ml


 


  


 


Intake Oral 480 ml 120 ml


 


IV Total 675 ml 900 ml


 


# Voids 4 4


 


# Bowel Movements 4 1








Laboratory Tests


1/27/17 09:30: 


White Blood Count 13.6H, Red Blood Count 4.63, Hemoglobin 12.1, Hematocrit 39.9

, Mean Corpuscular Volume 86, Mean Corpuscular Hemoglobin 26.2L, Mean 

Corpuscular Hemoglobin Concent 30.3L, Red Cell Distribution Width 17.0H, 

Platelet Count 341, Mean Platelet Volume 6.9, Neutrophils (%) (Auto) 69.9, 

Lymphocytes (%) (Auto) 19.5L, Monocytes (%) (Auto) 8.9, Eosinophils (%) (Auto) 

0.8, Basophils (%) (Auto) 0.9, Sodium Level 134L, Potassium Level 3.8, Chloride 

Level 97L, Carbon Dioxide Level 21, Anion Gap 16H, Blood Urea Nitrogen 9, 

Creatinine 0.8, Estimat Glomerular Filtration Rate > 60, Glucose Level 104, 

Calcium Level 8.7


1/28/17 06:05: 


White Blood Count 13.4H, Red Blood Count 4.37, Hemoglobin 11.5L, Hematocrit 37.7

, Mean Corpuscular Volume 86, Mean Corpuscular Hemoglobin 26.2L, Mean 

Corpuscular Hemoglobin Concent 30.4L, Red Cell Distribution Width 16.9H, 

Platelet Count 381, Mean Platelet Volume 6.7, Neutrophils (%) (Auto) 69.3, 

Lymphocytes (%) (Auto) 19.7L, Monocytes (%) (Auto) 9.3, Eosinophils (%) (Auto) 

1.3, Basophils (%) (Auto) 0.4, Sodium Level 136, Potassium Level 2.8L, Chloride 

Level 97L, Carbon Dioxide Level 30, Anion Gap 9, Blood Urea Nitrogen 6L, 

Creatinine 0.7, Estimat Glomerular Filtration Rate > 60, Glucose Level 109H, 

Calcium Level 9.1


Height (Feet):  5


Height (Inches):  6.00


Weight (Pounds):  164


General Appearance:  lethargic


EENT:  normal ENT inspection


Neck:  normal alignment


Cardiovascular:  normal peripheral pulses, normal rate, regular rhythm


Respiratory/Chest:  chest wall non-tender, lungs clear, normal breath sounds


Abdomen:  normal bowel sounds, non tender, soft


Extremities:  normal inspection


Edema:  no edema noted Arm (L), no edema noted Arm (R), no edema noted Leg (L), 

no edema noted Leg (R), no edema noted Pedal (L), no edema noted Pedal (R), no 

edema noted Generalized


Neurologic:  responsive, motor weakness


Skin:  normal pigmentation, warm/dry











MAICOL LANG Jan 28, 2017 08:54

## 2017-01-28 NOTE — INFECTIOUS DISEASES PROG NOTE
Assessment/Plan


Problems:  


(1) Ileus


Assessment & Plan:  suspect due to prior surgeries , continue supportive care , 

management as per GI





(2) UTI (urinary tract infection)


Assessment & Plan:  urine culture showed contaminant carla. zosyn was 

discontinued 





(3) Sepsis


Assessment & Plan:  ruled out with negative blood culture , off  zosyn , will 

monitor off antibiotics.





(4) Abdominal pain


Assessment & Plan:  continue pain management as per primary, check CT abdomen





(5) Crohn's disease


Assessment & Plan:   GI is following , continue meds





(6) Nausea and vomiting


Assessment & Plan:  due to ileus , continue supportive care








Subjective


Constitutional:  Denies: anorexia, chills, drenching sweats, fatigue, fever, no 

symptoms, other


HEENT:  Denies: congestion, coryza, dysphagia, hearing change, no symptoms, 

other, visual change


Breasts:  Denies: discharge, no symptoms, other, swelling, tenderness


Cardiovascular:  Denies: chest pain, dyspnea on exertion, no symptoms, other, 

palpitations


Gastrointestinal/Abdominal:  Denies: bloating, blood in stool, constipation, 

diarrhea, nausea, no symptoms, other, vomiting


Genitourinary:  Denies: dysuria, frequency, hematuria, last menstrual period, 

no symptoms, nocturia, other, vaginal bleed/discharge


Neurologic:  Denies: confusion, headache, no symptoms, numbness, other, weakness


Psychiatric:  Denies: anxiety, depression, no symptoms, other


Skin:  Denies: no symptoms, other, rash, ulcer


Endocrine:  Denies: feels cold, feels warm, no symptoms, other


Hematologic:  Denies: bleeding, no symptoms, other, swollen lymph nodes


Allergies:  


Coded Allergies:  


     No Known Allergies (Verified , 10/27/06)


Subjective


tolerated diet well





Objective


Vital Signs





Last 24 Hour Vital Signs








  Date Time  Temp Pulse Resp B/P Pulse Ox O2 Delivery O2 Flow Rate FiO2


 


1/28/17 15:58 97.9 89 18 123/68 100 Room Air  


 


1/28/17 14:11 98.4       


 


1/28/17 12:02 98.4 95 20 138/70 98 Room Air  


 


1/28/17 08:02 98.2 91 20 137/74 98 Room Air  


 


1/28/17 04:00 96.6 86 20 127/67 100 Room Air  


 


1/28/17 00:00 98.2 87 20 134/78 100 Room Air  


 


1/27/17 19:00 96.8 85 20 133/65 97 Room Air  








Height (Feet):  5


Height (Inches):  6.00


Weight (Pounds):  164


General Appearance:  WD/WN, no acute distress


HEENT:  normocephalic, atraumatic, anicteric, mucous membranes moist


Respiratory/Chest:  chest wall non-tender, lungs clear, normal breath sounds, 

no respiratory distress, no accessory muscle use


Cardiovascular:  normal peripheral pulses, normal rate, regular rhythm, no 

gallop/murmur


Abdomen:  normal bowel sounds, soft, non tender, no organomegaly, non distended

, no mass


Extremities:  no cyanosis, no clubbing


Skin:  no rash





Laboratory Tests








Test


  1/28/17


06:05


 


White Blood Count


  13.4 K/UL


(4.8-10.8)  H


 


Red Blood Count


  4.37 M/UL


(4.20-5.40)


 


Hemoglobin


  11.5 G/DL


(12.0-16.0)  L


 


Hematocrit


  37.7 %


(37.0-47.0)


 


Mean Corpuscular Volume 86 FL (80-99)  


 


Mean Corpuscular Hemoglobin


  26.2 PG


(27.0-31.0)  L


 


Mean Corpuscular Hemoglobin


Concent 30.4 G/DL


(32.0-36.0)  L


 


Red Cell Distribution Width


  16.9 %


(11.6-14.8)  H


 


Platelet Count


  381 K/UL


(150-450)


 


Mean Platelet Volume


  6.7 FL


(6.5-10.1)


 


Neutrophils (%) (Auto)


  69.3 %


(45.0-75.0)


 


Lymphocytes (%) (Auto)


  19.7 %


(20.0-45.0)  L


 


Monocytes (%) (Auto)


  9.3 %


(1.0-10.0)


 


Eosinophils (%) (Auto)


  1.3 %


(0.0-3.0)


 


Basophils (%) (Auto)


  0.4 %


(0.0-2.0)


 


Sodium Level


  136 mEQ/L


(135-145)


 


Potassium Level


  2.8 mEQ/L


(3.4-4.9)  L


 


Chloride Level


  97 mEQ/L


()  L


 


Carbon Dioxide Level


  30 mEQ/L


(20-30)


 


Anion Gap 9 (5-15)  


 


Blood Urea Nitrogen


  6 mg/dL (7-23)


L


 


Creatinine


  0.7 mg/dL


(0.5-0.9)


 


Estimat Glomerular Filtration


Rate > 60 mL/min


(>60)


 


Glucose Level


  109 mg/dL


()  H


 


Calcium Level


  9.1 mg/dL


(8.6-10.2)











Current Medications








 Medications


  (Trade)  Dose


 Ordered  Sig/Jed


 Route


 PRN Reason  Start Time


 Stop Time Status Last Admin


Dose Admin


 


 Acetaminophen


  (Tylenol)  650 mg  Q4H  PRN


 ORAL


 fever  1/21/17 14:45


 2/20/17 14:44   


 


 


 Al Hydroxide/Mg


 Hydroxide


  (Mylanta II)  30 ml  Q6H  PRN


 ORAL


 dyspepsia  1/21/17 14:45


 2/20/17 14:44   


 


 


 Dextrose


  (Dextrose 50%)    STAT  PRN


 IV


 Hypoglycemia  1/21/17 14:45


 2/20/17 14:44   


 


 


 Diphenhydramine


 HCl


  (Benadryl)  25 mg  Q6H  PRN


 ORAL


 Itching/Pruritis  1/21/17 14:45


 2/20/17 14:44  1/27/17 00:19


 


 


 Docusate Sodium


  (Colace)  100 mg  THREE TIMES A  DAY


 ORAL


   1/23/17 13:00


 2/22/17 12:59  1/28/17 13:40


 


 


 Duloxetine HCl


  (Cymbalta)  60 mg  DAILY


 ORAL


   1/22/17 09:00


 2/21/17 08:59  1/28/17 08:09


 


 


 Heparin Sodium


  (Porcine)


  (Heparin 5000


 units/ml)  5,000 units  EVERY 12  HOURS


 SUBQ


   1/21/17 21:00


 2/20/17 20:59  1/28/17 08:11


 


 


 Hydromorphone HCl


  (Dilaudid)  1 mg  Q4H  PRN


 IVP


 PAIN 4-10  1/25/17 14:00


 2/1/17 13:59  1/28/17 13:41


 


 


 Levetiracetam


  (Keppra)  1,500 mg  TWICE A  DAY


 ORAL


   1/21/17 18:00


 2/20/17 17:59  1/28/17 08:10


 


 


 Mesalamine


  (Asacol)  800 mg  THREE TIMES A  DAY


 ORAL


   1/22/17 09:00


 2/21/17 08:59  1/28/17 13:40


 


 


 Mirtazapine


  (Remeron)  30 mg  BEDTIME


 ORAL


   1/21/17 21:00


 2/20/17 20:59  1/27/17 20:18


 


 


 Nitroglycerin


  (Ntg)  0.4 mg  Q5M X 3 DOSES PRN


 SL


 Prn Chest Pain  1/21/17 14:45


 2/20/17 14:44   


 


 


 Ondansetron HCl


  (Zofran)  4 mg  Q6H  PRN


 IVP


 Nausea & Vomiting  1/21/17 14:45


 2/20/17 14:44  1/26/17 09:37


 


 


 Polyethylene


 Glycol


  (Miralax)  17 gm  BEDTIME


 ORAL


   1/23/17 21:00


 2/22/17 20:59  1/26/17 20:35


 


 


 Polyethylene


 Glycol


  (Miralax)  17 gm  HSPRN  PRN


 ORAL


 Constipation  1/21/17 14:45


 2/20/17 14:44   


 


 


 Quetiapine


 Fumarate


  (SEROquel)  200 mg  DAILY


 ORAL


   1/22/17 09:00


 2/21/17 08:59  1/28/17 08:10


 


 


 Topiramate


  (Topamax)  25 mg  TWICE A  DAY


 ORAL


   1/21/17 18:00


 2/20/17 17:59  1/28/17 08:10


 

















Violet Dior M.D. Jan 28, 2017 16:36

## 2017-01-29 VITALS — DIASTOLIC BLOOD PRESSURE: 69 MMHG | SYSTOLIC BLOOD PRESSURE: 114 MMHG

## 2017-01-29 VITALS — DIASTOLIC BLOOD PRESSURE: 54 MMHG | SYSTOLIC BLOOD PRESSURE: 92 MMHG

## 2017-01-29 VITALS — SYSTOLIC BLOOD PRESSURE: 124 MMHG | DIASTOLIC BLOOD PRESSURE: 69 MMHG

## 2017-01-29 VITALS — SYSTOLIC BLOOD PRESSURE: 120 MMHG | DIASTOLIC BLOOD PRESSURE: 67 MMHG

## 2017-01-29 VITALS — DIASTOLIC BLOOD PRESSURE: 65 MMHG | SYSTOLIC BLOOD PRESSURE: 114 MMHG

## 2017-01-29 VITALS — DIASTOLIC BLOOD PRESSURE: 65 MMHG | SYSTOLIC BLOOD PRESSURE: 123 MMHG

## 2017-01-29 LAB
ANION GAP SERPL CALC-SCNC: 10 MMOL/L (ref 5–15)
BASOPHILS NFR BLD AUTO: 0.3 % (ref 0–2)
CALCIUM SERPL-MCNC: 9.7 MG/DL (ref 8.6–10.2)
CHLORIDE SERPL-SCNC: 97 MEQ/L (ref 98–107)
CO2 SERPL-SCNC: 31 MEQ/L (ref 20–30)
CREAT SERPL-MCNC: 0.7 MG/DL (ref 0.5–0.9)
EOSINOPHIL NFR BLD AUTO: 0.8 % (ref 0–3)
ERYTHROCYTE [DISTWIDTH] IN BLOOD BY AUTOMATED COUNT: 16.9 % (ref 11.6–14.8)
GFR SERPLBLD BASED ON 1.73 SQ M-ARVRAT: > 60 ML/MIN (ref 60–?)
HEMOLYSIS: 0
LYMPHOCYTES NFR BLD AUTO: 13.4 % (ref 20–45)
MCH RBC QN AUTO: 26.6 PG (ref 27–31)
MCHC RBC AUTO-ENTMCNC: 31.2 G/DL (ref 32–36)
MCV RBC AUTO: 85 FL (ref 80–99)
MONOCYTES NFR BLD AUTO: 8.2 % (ref 1–10)
NEUTROPHILS NFR BLD AUTO: 77.4 % (ref 45–75)
PLATELET # BLD: 410 K/UL (ref 150–450)
PMV BLD AUTO: 7.2 FL (ref 6.5–10.1)
POTASSIUM SERPL-SCNC: 3.3 MEQ/L (ref 3.4–4.9)
RBC # BLD AUTO: 4.5 M/UL (ref 4.2–5.4)
SODIUM SERPL-SCNC: 138 MEQ/L (ref 135–145)
WBC # BLD AUTO: 17 K/UL (ref 4.8–10.8)

## 2017-01-29 RX ADMIN — DOCUSATE SODIUM SCH MG: 100 CAPSULE, LIQUID FILLED ORAL at 08:06

## 2017-01-29 RX ADMIN — DIPHENHYDRAMINE HYDROCHLORIDE PRN MG: 50 INJECTION INTRAMUSCULAR; INTRAVENOUS at 21:52

## 2017-01-29 RX ADMIN — HEPARIN SODIUM SCH UNITS: 5000 INJECTION INTRAVENOUS; SUBCUTANEOUS at 08:09

## 2017-01-29 RX ADMIN — TOPIRAMATE SCH MG: 25 TABLET, COATED ORAL at 17:15

## 2017-01-29 RX ADMIN — DIPHENHYDRAMINE HYDROCHLORIDE PRN MG: 50 INJECTION INTRAMUSCULAR; INTRAVENOUS at 03:47

## 2017-01-29 RX ADMIN — POLYETHYLENE GLYCOL 3350 SCH GM: 17 POWDER, FOR SOLUTION ORAL at 20:09

## 2017-01-29 RX ADMIN — DIPHENHYDRAMINE HYDROCHLORIDE PRN MG: 50 INJECTION INTRAMUSCULAR; INTRAVENOUS at 17:16

## 2017-01-29 RX ADMIN — TOPIRAMATE SCH MG: 25 TABLET, COATED ORAL at 08:06

## 2017-01-29 RX ADMIN — DOCUSATE SODIUM SCH MG: 100 CAPSULE, LIQUID FILLED ORAL at 13:01

## 2017-01-29 RX ADMIN — QUETIAPINE SCH MG: 200 TABLET, FILM COATED ORAL at 08:07

## 2017-01-29 RX ADMIN — DULOXETINE HYDROCHLORIDE SCH MG: 30 CAPSULE, DELAYED RELEASE ORAL at 08:06

## 2017-01-29 RX ADMIN — DOCUSATE SODIUM SCH MG: 100 CAPSULE, LIQUID FILLED ORAL at 17:11

## 2017-01-29 RX ADMIN — HEPARIN SODIUM SCH UNITS: 5000 INJECTION INTRAVENOUS; SUBCUTANEOUS at 20:13

## 2017-01-29 RX ADMIN — DIPHENHYDRAMINE HYDROCHLORIDE PRN MG: 50 INJECTION INTRAMUSCULAR; INTRAVENOUS at 08:09

## 2017-01-29 RX ADMIN — DIPHENHYDRAMINE HYDROCHLORIDE PRN MG: 50 INJECTION INTRAMUSCULAR; INTRAVENOUS at 13:01

## 2017-01-29 NOTE — PROGRESS NOTE
DATE:  01/28/2017



SUBJECTIVE:  This is a 61-year-old female patient with hypertension

and abdominal pain.



PLAN:  I am going to continue treatment with psychotropic medications

to prevent any further decline in cognition.  Continue treatment with

Seroquel to prevent any further decline in cognition.  Chart reviewed.

Discussed with staff.  Seen and assessed at bedside.









  ______________________________________________

  Samantha iRck M.D.





DR:  YOLANDA

D:  01/29/2017 19:54

T:  01/29/2017 23:08

JOB#:  [id number]

CC:

## 2017-01-29 NOTE — PULMONOLOGY PROGRESS NOTE
Assessment/Plan


Problems:  


(1) Interstitial lung disease


(2) Sepsis


(3) Emphysema


(4) Leukocytosis


(5) Colitis


(6) Acute encephalopathy


(7) Hx SBO


Assessment/Plan


wants to go home


cxr unchged


off antibiotics


improivng


titrate fo2 to sat of 92%


all cultures negative sofar


all notes and meds reviewed.





ok to go home





Subjective


ROS Limited/Unobtainable:  No


Constitutional:  Reports: no symptoms


HEENT:  Repors: no symptoms


Allergies:  


Coded Allergies:  


     No Known Allergies (Verified , 10/27/06)





Objective





Last 24 Hour Vital Signs








  Date Time  Temp Pulse Resp B/P Pulse Ox O2 Delivery O2 Flow Rate FiO2


 


1/29/17 12:00 97.3 90 16 92/54 99 Room Air  


 


1/29/17 08:39 98.2       


 


1/29/17 07:50 98.2 89 14 120/67 100 Room Air  


 


1/29/17 04:00 97.9 89 20 123/65 97 Room Air  


 


1/28/17 23:39 98.8 90 20 105/69 97 Room Air  


 


1/28/17 20:12 97.8 71 18 128/71 100 Room Air  


 


1/28/17 15:58 97.9 89 18 123/68 100 Room Air  

















Intake and Output  


 


 1/28/17 1/29/17





 19:00 07:00


 


Intake Total 240 ml 400 ml


 


Output Total  650 ml


 


Balance 240 ml -250 ml


 


  


 


Intake Oral 240 ml 400 ml


 


Output Urine Total  650 ml


 


# Voids 2 6


 


# Bowel Movements 1 








General Appearance:  WD/WN


HEENT:  normocephalic, atraumatic


Respiratory/Chest:  chest wall non-tender, lungs clear


Breasts:  no masses


Cardiovascular:  normal peripheral pulses, normal rate


Abdomen:  normal bowel sounds, soft, non tender


Genitourinary:  normal external genitalia


Extremities:  no cyanosis


Laboratory Tests


1/29/17 07:15: 


White Blood Count 17.0H, Red Blood Count 4.50, Hemoglobin 12.0, Hematocrit 38.3

, Mean Corpuscular Volume 85, Mean Corpuscular Hemoglobin 26.6L, Mean 

Corpuscular Hemoglobin Concent 31.2L, Red Cell Distribution Width 16.9H, 

Platelet Count 410, Mean Platelet Volume 7.2, Neutrophils (%) (Auto) 77.4H, 

Lymphocytes (%) (Auto) 13.4L, Monocytes (%) (Auto) 8.2, Eosinophils (%) (Auto) 

0.8, Basophils (%) (Auto) 0.3, Sodium Level 138, Potassium Level 3.3L, Chloride 

Level 97L, Carbon Dioxide Level 31H, Anion Gap 10, Blood Urea Nitrogen 9, 

Creatinine 0.7, Estimat Glomerular Filtration Rate > 60, Glucose Level 109H, 

Calcium Level 9.7





Current Medications








 Medications


  (Trade)  Dose


 Ordered  Sig/Jed


 Route


 PRN Reason  Start Time


 Stop Time Status Last Admin


Dose Admin


 


 Acetaminophen


  (Tylenol)  650 mg  Q4H  PRN


 ORAL


 fever  1/21/17 14:45


 2/20/17 14:44   


 


 


 Al Hydroxide/Mg


 Hydroxide


  (Mylanta II)  30 ml  Q6H  PRN


 ORAL


 dyspepsia  1/21/17 14:45


 2/20/17 14:44   


 


 


 Dextrose


  (Dextrose 50%)    STAT  PRN


 IV


 Hypoglycemia  1/21/17 14:45


 2/20/17 14:44   


 


 


 Diphenhydramine


 HCl


  (Benadryl)  25 mg  Q6H  PRN


 ORAL


 Itching/Pruritis  1/21/17 14:45


 2/20/17 14:44  1/27/17 00:19


 


 


 Docusate Sodium


  (Colace)  100 mg  THREE TIMES A  DAY


 ORAL


   1/23/17 13:00


 2/22/17 12:59  1/29/17 13:01


 


 


 Duloxetine HCl


  (Cymbalta)  60 mg  DAILY


 ORAL


   1/22/17 09:00


 2/21/17 08:59  1/29/17 08:06


 


 


 Heparin Sodium


  (Porcine)


  (Heparin 5000


 units/ml)  5,000 units  EVERY 12  HOURS


 SUBQ


   1/21/17 21:00


 2/20/17 20:59  1/29/17 08:09


 


 


 Hydromorphone HCl


  (Dilaudid)  1 mg  Q4H  PRN


 IVP


 PAIN 4-10  1/25/17 14:00


 2/1/17 13:59  1/29/17 13:01


 


 


 Levetiracetam


  (Keppra)  1,500 mg  TWICE A  DAY


 ORAL


   1/21/17 18:00


 2/20/17 17:59  1/29/17 08:07


 


 


 Mesalamine


  (Asacol)  800 mg  THREE TIMES A  DAY


 ORAL


   1/22/17 09:00


 2/21/17 08:59  1/29/17 13:01


 


 


 Mirtazapine


  (Remeron)  30 mg  BEDTIME


 ORAL


   1/21/17 21:00


 2/20/17 20:59  1/27/17 20:18


 


 


 Nitroglycerin


  (Ntg)  0.4 mg  Q5M X 3 DOSES PRN


 SL


 Prn Chest Pain  1/21/17 14:45


 2/20/17 14:44   


 


 


 Ondansetron HCl


  (Zofran)  4 mg  Q6H  PRN


 IVP


 Nausea & Vomiting  1/21/17 14:45


 2/20/17 14:44  1/26/17 09:37


 


 


 Polyethylene


 Glycol


  (Miralax)  17 gm  BEDTIME


 ORAL


   1/23/17 21:00


 2/22/17 20:59  1/26/17 20:35


 


 


 Polyethylene


 Glycol


  (Miralax)  17 gm  HSPRN  PRN


 ORAL


 Constipation  1/21/17 14:45


 2/20/17 14:44   


 


 


 Quetiapine


 Fumarate


  (SEROquel)  200 mg  DAILY


 ORAL


   1/22/17 09:00


 2/21/17 08:59  1/29/17 08:07


 


 


 Topiramate


  (Topamax)  25 mg  TWICE A  DAY


 ORAL


   1/21/17 18:00


 2/20/17 17:59  1/29/17 08:06


 

















JULIETTE KLEIN Jan 29, 2017 13:18

## 2017-01-29 NOTE — GENERAL PROGRESS NOTE
Assessment/Plan


Problem List:  


(1) Abdominal pain


(2) Constipation


(3) Opioid dependence


ICD Codes:  F11.20 - Opioid dependence


SNOMED:  03630126


(4) Shortness of breath


(5) Emphysema


ICD Codes:  J43.9 - Emphysema


SNOMED:  12510948


(6) COPD exacerbation


ICD Codes:  J44.1 - COPD exacerbation


SNOMED:  525191834


(7) CHF (congestive heart failure)


ICD Codes:  I50.9 - Heart failure, unspecified


SNOMED:  05223649


(8) Nausea and vomiting


(9) Crohns disease


ICD Codes:  K50.90 - Crohns disease


SNOMED:  33325139


(10) UTI (urinary tract infection)


ICD Codes:  N39.0 - Urinary tract infection, site not specified


SNOMED:  77013774


Status:  stable, progressing, tolerating diet


Assessment/Plan


ot pt diet gi f/u abx o2 pulm tx pain control gi id f/u cbc bmp am





Subjective


Constitutional:  Reports: weakness


Allergies:  


Coded Allergies:  


     No Known Allergies (Verified , 10/27/06)


All Systems:  reviewed and negative except above


Subjective


sleepy calm





Objective





Last 24 Hour Vital Signs








  Date Time  Temp Pulse Resp B/P Pulse Ox O2 Delivery O2 Flow Rate FiO2


 


1/29/17 07:50 98.2 89 14 120/67 100 Room Air  


 


1/29/17 04:00 97.9 89 20 123/65 97 Room Air  


 


1/28/17 23:39 98.8 90 20 105/69 97 Room Air  


 


1/28/17 22:53 97.8       


 


1/28/17 20:12 97.8 71 18 128/71 100 Room Air  


 


1/28/17 15:58 97.9 89 18 123/68 100 Room Air  


 


1/28/17 12:02 98.4 95 20 138/70 98 Room Air  

















Intake and Output  


 


 1/28/17 1/29/17





 19:00 07:00


 


Intake Total 240 ml 400 ml


 


Output Total  650 ml


 


Balance 240 ml -250 ml


 


  


 


Intake Oral 240 ml 400 ml


 


Output Urine Total  650 ml


 


# Voids 2 6


 


# Bowel Movements 1 








Laboratory Tests


1/29/17 07:15: 


White Blood Count 17.0H, Red Blood Count 4.50, Hemoglobin 12.0, Hematocrit 38.3

, Mean Corpuscular Volume 85, Mean Corpuscular Hemoglobin 26.6L, Mean 

Corpuscular Hemoglobin Concent 31.2L, Red Cell Distribution Width 16.9H, 

Platelet Count 410, Mean Platelet Volume 7.2, Neutrophils (%) (Auto) 77.4H, 

Lymphocytes (%) (Auto) 13.4L, Monocytes (%) (Auto) 8.2, Eosinophils (%) (Auto) 

0.8, Basophils (%) (Auto) 0.3, Sodium Level 138, Potassium Level 3.3L, Chloride 

Level 97L, Carbon Dioxide Level 31H, Anion Gap 10, Blood Urea Nitrogen 9, 

Creatinine 0.7, Estimat Glomerular Filtration Rate > 60, Glucose Level 109H, 

Calcium Level 9.7


Height (Feet):  5


Height (Inches):  6.00


Weight (Pounds):  164


General Appearance:  lethargic


EENT:  normal ENT inspection


Neck:  normal alignment


Cardiovascular:  normal peripheral pulses, normal rate, regular rhythm


Respiratory/Chest:  chest wall non-tender, lungs clear, normal breath sounds


Abdomen:  non tender, soft


Extremities:  normal inspection


Edema:  no edema noted Arm (L), no edema noted Arm (R), no edema noted Leg (L), 

no edema noted Leg (R), no edema noted Pedal (L), no edema noted Pedal (R), no 

edema noted Generalized


Neurologic:  responsive, motor weakness


Skin:  normal pigmentation, warm/dry











MAICOL LANG Jan 29, 2017 08:56

## 2017-01-29 NOTE — PROGRESS NOTE
DATE:  01/26/2017



SUBJECTIVE:  The patient has hypotension and abdominal pain.



PLAN:  Plan is to continue treatment with psychotropic medications to

stabilize her mood.  Chart reviewed.  Discussed with staff.









  ______________________________________________

  Samantha Rick M.D.





DR:  BASSEM

D:  01/29/2017 19:52

T:  01/29/2017 23:07

JOB#:  [id number]

CC:

## 2017-01-29 NOTE — PROGRESS NOTE
DATE:  01/25/2017



SUBJECTIVE:  The patient is a 61-year-old female with hypertension

and abdominal pain.  She was seen and assessed at bedside.



PLAN:  Treat her with Seroquel and stabilize her mood.  Seen and

assessed at bedside.  Chart reviewed and discussed with staff.









  ______________________________________________

  Samantha Rick M.D.





DR:  YOLANDA

D:  01/29/2017 19:51

T:  01/29/2017 22:58

JOB#:  [id number]

CC:

## 2017-01-29 NOTE — PROGRESS NOTE
DATE:  01/29/2017



SUBJECTIVE:  The patient is a 61-year-old female patient with

__________ and abdominal plan.  I am going to continue treatment with

psychotropic medications to prevent any further decline in cognition.

Chart reviewed.  Discussed with staff. Seen and assessed at bedside.









  ______________________________________________

  Samantha Rick M.D.





DR:  Ramone

D:  01/29/2017 19:55

T:  01/29/2017 23:34

JOB#:  [id number]

CC:

## 2017-01-30 VITALS — DIASTOLIC BLOOD PRESSURE: 72 MMHG | SYSTOLIC BLOOD PRESSURE: 127 MMHG

## 2017-01-30 VITALS — DIASTOLIC BLOOD PRESSURE: 77 MMHG | SYSTOLIC BLOOD PRESSURE: 122 MMHG

## 2017-01-30 VITALS — SYSTOLIC BLOOD PRESSURE: 110 MMHG | DIASTOLIC BLOOD PRESSURE: 62 MMHG

## 2017-01-30 VITALS — DIASTOLIC BLOOD PRESSURE: 66 MMHG | SYSTOLIC BLOOD PRESSURE: 110 MMHG

## 2017-01-30 LAB
ANION GAP SERPL CALC-SCNC: 12 MMOL/L (ref 5–15)
BASOPHILS NFR BLD AUTO: 0.5 % (ref 0–2)
CALCIUM SERPL-MCNC: 9.5 MG/DL (ref 8.6–10.2)
CHLORIDE SERPL-SCNC: 96 MEQ/L (ref 98–107)
CO2 SERPL-SCNC: 31 MEQ/L (ref 20–30)
CREAT SERPL-MCNC: 0.8 MG/DL (ref 0.5–0.9)
EOSINOPHIL NFR BLD AUTO: 0.6 % (ref 0–3)
ERYTHROCYTE [DISTWIDTH] IN BLOOD BY AUTOMATED COUNT: 16.6 % (ref 11.6–14.8)
GFR SERPLBLD BASED ON 1.73 SQ M-ARVRAT: > 60 ML/MIN (ref 60–?)
HEMOLYSIS: 0
LYMPHOCYTES NFR BLD AUTO: 15.4 % (ref 20–45)
MCH RBC QN AUTO: 26.9 PG (ref 27–31)
MCHC RBC AUTO-ENTMCNC: 30.6 G/DL (ref 32–36)
MCV RBC AUTO: 88 FL (ref 80–99)
MONOCYTES NFR BLD AUTO: 6.6 % (ref 1–10)
NEUTROPHILS NFR BLD AUTO: 76.9 % (ref 45–75)
PLATELET # BLD: 356 K/UL (ref 150–450)
PMV BLD AUTO: 6.4 FL (ref 6.5–10.1)
POTASSIUM SERPL-SCNC: 3.4 MEQ/L (ref 3.4–4.9)
RBC # BLD AUTO: 4.36 M/UL (ref 4.2–5.4)
SODIUM SERPL-SCNC: 139 MEQ/L (ref 135–145)
WBC # BLD AUTO: 16.3 K/UL (ref 4.8–10.8)

## 2017-01-30 RX ADMIN — DOCUSATE SODIUM SCH MG: 100 CAPSULE, LIQUID FILLED ORAL at 13:00

## 2017-01-30 RX ADMIN — TOPIRAMATE SCH MG: 25 TABLET, COATED ORAL at 08:48

## 2017-01-30 RX ADMIN — DIPHENHYDRAMINE HYDROCHLORIDE PRN MG: 50 INJECTION INTRAMUSCULAR; INTRAVENOUS at 03:41

## 2017-01-30 RX ADMIN — TOPIRAMATE SCH MG: 25 TABLET, COATED ORAL at 18:06

## 2017-01-30 RX ADMIN — DIPHENHYDRAMINE HYDROCHLORIDE PRN MG: 50 INJECTION INTRAMUSCULAR; INTRAVENOUS at 08:47

## 2017-01-30 RX ADMIN — DOCUSATE SODIUM SCH MG: 100 CAPSULE, LIQUID FILLED ORAL at 18:00

## 2017-01-30 RX ADMIN — HEPARIN SODIUM SCH UNITS: 5000 INJECTION INTRAVENOUS; SUBCUTANEOUS at 08:46

## 2017-01-30 RX ADMIN — DULOXETINE HYDROCHLORIDE SCH MG: 30 CAPSULE, DELAYED RELEASE ORAL at 08:47

## 2017-01-30 RX ADMIN — DIPHENHYDRAMINE HYDROCHLORIDE PRN MG: 50 INJECTION INTRAMUSCULAR; INTRAVENOUS at 15:59

## 2017-01-30 RX ADMIN — DOCUSATE SODIUM SCH MG: 100 CAPSULE, LIQUID FILLED ORAL at 08:47

## 2017-01-30 RX ADMIN — QUETIAPINE SCH MG: 200 TABLET, FILM COATED ORAL at 08:47

## 2017-01-30 NOTE — PROGRESS NOTE
DATE:  01/23/2017



SUBJECTIVE:  This is a female patient, 61 years old, with

hypertension and abdominal pain.



PLAN:  We will continue treating her with the psychiatric regimen of

Seroquel to stabilize her mood.  Chart reviewed.  Discussed with staff.

Seen and assessed at bedside.









  ______________________________________________

  Samantha Rick M.D.





DR:  YOLANDA

D:  01/29/2017 19:50

T:  01/30/2017 03:00

JOB#:  [id number]

CC:

## 2017-01-30 NOTE — GENERAL PROGRESS NOTE
Assessment/Plan


Problem List:  


(1) Abdominal pain


(2) Constipation


(3) Opioid dependence


ICD Codes:  F11.20 - Opioid dependence


SNOMED:  22202900


(4) Shortness of breath


(5) Emphysema


ICD Codes:  J43.9 - Emphysema


SNOMED:  85879659


(6) COPD exacerbation


ICD Codes:  J44.1 - COPD exacerbation


SNOMED:  556780718


(7) CHF (congestive heart failure)


ICD Codes:  I50.9 - Heart failure, unspecified


SNOMED:  42886238


(8) Nausea and vomiting


(9) Crohns disease


ICD Codes:  K50.90 - Crohns disease


SNOMED:  35733982


(10) UTI (urinary tract infection)


ICD Codes:  N39.0 - Urinary tract infection, site not specified


SNOMED:  70092395


Status:  stable, progressing, tolerating diet


Assessment/Plan


ot pt diet gi f/u abx o2 pulm tx pain control gi id f/u cbc bmp am dc if clear 

by id and pulm





Subjective


Constitutional:  Reports: weakness


Allergies:  


Coded Allergies:  


     No Known Allergies (Verified , 10/27/06)


All Systems:  reviewed and negative except above


Subjective


sleepy calm





Objective





Last 24 Hour Vital Signs








  Date Time  Temp Pulse Resp B/P Pulse Ox O2 Delivery O2 Flow Rate FiO2


 


1/30/17 11:42 98.2 97 21 110/66 97 Room Air  


 


1/30/17 08:15 98.6 111 21 122/77 95 Room Air  


 


1/30/17 04:00 98.2 95 20 127/72 97 Room Air  


 


1/29/17 23:48 97.2 100 20 124/69 95 Room Air  


 


1/29/17 22:22 98.1       


 


1/29/17 20:00 98.1 101 19 114/65 97 Nasal Cannula  


 


1/29/17 15:58 98.6 98 16 114/69 98 Room Air  

















Intake and Output  


 


 1/29/17 1/30/17





 19:00 07:00


 


Intake Total 1000 ml 


 


Output Total  500 ml


 


Balance 1000 ml -500 ml


 


  


 


Intake Oral 1000 ml 


 


Output Urine Total  500 ml


 


# Voids  1


 


# Bowel Movements 2 2








Laboratory Tests


1/30/17 06:00: 


White Blood Count 16.3H, Red Blood Count 4.36, Hemoglobin 11.7L, Hematocrit 38.3

, Mean Corpuscular Volume 88, Mean Corpuscular Hemoglobin 26.9L, Mean 

Corpuscular Hemoglobin Concent 30.6L, Red Cell Distribution Width 16.6H, 

Platelet Count 356, Mean Platelet Volume 6.4L, Neutrophils (%) (Auto) 76.9H, 

Lymphocytes (%) (Auto) 15.4L, Monocytes (%) (Auto) 6.6, Eosinophils (%) (Auto) 

0.6, Basophils (%) (Auto) 0.5, Sodium Level 139, Potassium Level 3.4, Chloride 

Level 96L, Carbon Dioxide Level 31H, Anion Gap 12, Blood Urea Nitrogen 15, 

Creatinine 0.8, Estimat Glomerular Filtration Rate > 60, Glucose Level 137H, 

Calcium Level 9.5


Height (Feet):  5


Height (Inches):  6.00


Weight (Pounds):  164


General Appearance:  alert


EENT:  normal ENT inspection


Neck:  normal alignment


Cardiovascular:  normal peripheral pulses, normal rate, regular rhythm


Respiratory/Chest:  chest wall non-tender, lungs clear, normal breath sounds


Abdomen:  normal bowel sounds, non tender, soft


Extremities:  normal inspection


Edema:  no edema noted Arm (L), no edema noted Arm (R), no edema noted Leg (L), 

no edema noted Leg (R), no edema noted Pedal (L), no edema noted Pedal (R), no 

edema noted Generalized


Neurologic:  responsive, motor weakness


Skin:  normal pigmentation, warm/dry











MAICOL LANG Jan 30, 2017 13:04

## 2017-01-30 NOTE — PROGRESS NOTE
DATE:  01/30/2017



SUBJECTIVE:  The patient was seen and assessed at bedside.  She has

abdominal pain.  I am going to treat her with some Seroquel 200 mg daily,

Cymbalta 60 mg daily, Remeron 30 mg at bedtime, and Topamax 25 mg twice a

day to stabilize her mood.  The patient was seen and assessed at bedside.

Chart reviewed.  Discussed with staff.









  ______________________________________________

  Samantha Rick M.D.





DR:  GRAY

D:  01/30/2017 14:25

T:  01/30/2017 20:46

JOB#:  [id number]

CC:

## 2017-01-30 NOTE — GI PROGRESS NOTE
Assessment/Plan


Problems:  


(1) Abdominal pain


ICD Codes:  R10.9 - Unspecified abdominal pain


SNOMED:  66324937


(2) Crohn's disease


ICD Codes:  K50.90 - Crohn's disease


SNOMED:  10154984


(3) Colitis


ICD Codes:  K52.9 - Noninfective gastroenteritis and colitis, unspecified


SNOMED:  364972459


(4) Abdominal pain


(5) Anemia


(6) Nausea and vomiting


(7) Constipation


Status:  stable


Status Narrative


Discussed with Dr. Mcleod.


Assessment/Plan


APCT >> fecal retention


mesalamine for Crohns


BM x 1


s/p EGD/colon 2016


non functional implanted morphine pump LLQ





ok for DC per GI standpoint


cardiac diet


Rx trial of Movantik for OIC >> patient responded well


laxatives


OB stool uncollected


pain mgmt 


fu labs


pt scheduled to be seen outpatient in [facility] next week





Subjective


Gastrointestinal/Abdominal:  Reports: no symptoms


Subjective


asymptomatic


ready to go home





Objective





Last 24 Hour Vital Signs








  Date Time  Temp Pulse Resp B/P Pulse Ox O2 Delivery O2 Flow Rate FiO2


 


1/30/17 08:15 98.6 111 21 122/77 95 Room Air  


 


1/30/17 04:00 98.2 95 20 127/72 97 Room Air  


 


1/29/17 23:48 97.2 100 20 124/69 95 Room Air  


 


1/29/17 22:22 98.1       


 


1/29/17 20:00 98.1 101 19 114/65 97 Nasal Cannula  


 


1/29/17 15:58 98.6 98 16 114/69 98 Room Air  


 


1/29/17 12:00 97.3 90 16 92/54 99 Room Air  

















Intake and Output  


 


 1/29/17 1/30/17





 19:00 07:00


 


Intake Total 1000 ml 


 


Output Total  500 ml


 


Balance 1000 ml -500 ml


 


  


 


Intake Oral 1000 ml 


 


Output Urine Total  500 ml


 


# Voids  1


 


# Bowel Movements 2 2











Laboratory Tests








Test


  1/30/17


06:00


 


White Blood Count


  16.3 K/UL


(4.8-10.8)  H


 


Red Blood Count


  4.36 M/UL


(4.20-5.40)


 


Hemoglobin


  11.7 G/DL


(12.0-16.0)  L


 


Hematocrit


  38.3 %


(37.0-47.0)


 


Mean Corpuscular Volume 88 FL (80-99)  


 


Mean Corpuscular Hemoglobin


  26.9 PG


(27.0-31.0)  L


 


Mean Corpuscular Hemoglobin


Concent 30.6 G/DL


(32.0-36.0)  L


 


Red Cell Distribution Width


  16.6 %


(11.6-14.8)  H


 


Platelet Count


  356 K/UL


(150-450)


 


Mean Platelet Volume


  6.4 FL


(6.5-10.1)  L


 


Neutrophils (%) (Auto)


  76.9 %


(45.0-75.0)  H


 


Lymphocytes (%) (Auto)


  15.4 %


(20.0-45.0)  L


 


Monocytes (%) (Auto)


  6.6 %


(1.0-10.0)


 


Eosinophils (%) (Auto)


  0.6 %


(0.0-3.0)


 


Basophils (%) (Auto)


  0.5 %


(0.0-2.0)


 


Sodium Level


  139 mEQ/L


(135-145)


 


Potassium Level


  3.4 mEQ/L


(3.4-4.9)


 


Chloride Level


  96 mEQ/L


()  L


 


Carbon Dioxide Level


  31 mEQ/L


(20-30)  H


 


Anion Gap 12 (5-15)  


 


Blood Urea Nitrogen


  15 mg/dL


(7-23)


 


Creatinine


  0.8 mg/dL


(0.5-0.9)


 


Estimat Glomerular Filtration


Rate > 60 mL/min


(>60)


 


Glucose Level


  137 mg/dL


()  H


 


Calcium Level


  9.5 mg/dL


(8.6-10.2)








Height (Feet):  5


Height (Inches):  6.00


Weight (Pounds):  164


General Appearance:  alert


Cardiovascular:  normal rate


Respiratory/Chest:  normal breath sounds, no respiratory distress


Abdominal Exam:  non tender, soft











Elena Seay [email or web address] Jan 30, 2017 10:57

## 2017-01-30 NOTE — PULMONOLOGY PROGRESS NOTE
Assessment/Plan


Problems:  


(1) Interstitial lung disease


(2) Sepsis


(3) Emphysema


(4) Leukocytosis


(5) Colitis


(6) Acute encephalopathy


(7) Hx SBO


Assessment/Plan


no new complains


cxr unchged


off antibiotics


improivng


titrate fo2 to sat of 92%


all cultures negative sofar


all notes and meds reviewed.





ok to go home





Subjective


ROS Limited/Unobtainable:  No


Constitutional:  Reports: no symptoms


HEENT:  Repors: no symptoms


Respiratory:  Reports: no symptoms


Allergies:  


Coded Allergies:  


     No Known Allergies (Verified , 10/27/06)





Objective





Last 24 Hour Vital Signs








  Date Time  Temp Pulse Resp B/P Pulse Ox O2 Delivery O2 Flow Rate FiO2


 


1/30/17 16:28 98.2       


 


1/30/17 16:00 98.2 97 18 110/62 98 Room Air  


 


1/30/17 11:42 98.2 97 21 110/66 97 Room Air  


 


1/30/17 08:15 98.6 111 21 122/77 95 Room Air  


 


1/30/17 04:00 98.2 95 20 127/72 97 Room Air  


 


1/29/17 23:48 97.2 100 20 124/69 95 Room Air  

















Intake and Output  


 


 1/29/17 1/30/17





 19:00 07:00


 


Intake Total 1000 ml 


 


Output Total  500 ml


 


Balance 1000 ml -500 ml


 


  


 


Intake Oral 1000 ml 


 


Output Urine Total  500 ml


 


# Voids  1


 


# Bowel Movements 2 2








General Appearance:  WD/WN


HEENT:  normocephalic


Respiratory/Chest:  chest wall non-tender, lungs clear


Cardiovascular:  normal peripheral pulses


Abdomen:  normal bowel sounds


Genitourinary:  normal external genitalia


Skin:  no rash


Neurologic/Psychiatric:  CNs II-XII grossly normal


Laboratory Tests


1/30/17 06:00: 


White Blood Count 16.3H, Red Blood Count 4.36, Hemoglobin 11.7L, Hematocrit 38.3

, Mean Corpuscular Volume 88, Mean Corpuscular Hemoglobin 26.9L, Mean 

Corpuscular Hemoglobin Concent 30.6L, Red Cell Distribution Width 16.6H, 

Platelet Count 356, Mean Platelet Volume 6.4L, Neutrophils (%) (Auto) 76.9H, 

Lymphocytes (%) (Auto) 15.4L, Monocytes (%) (Auto) 6.6, Eosinophils (%) (Auto) 

0.6, Basophils (%) (Auto) 0.5, Sodium Level 139, Potassium Level 3.4, Chloride 

Level 96L, Carbon Dioxide Level 31H, Anion Gap 12, Blood Urea Nitrogen 15, 

Creatinine 0.8, Estimat Glomerular Filtration Rate > 60, Glucose Level 137H, 

Calcium Level 9.5











JULIETTE KLEIN Jan 30, 2017 23:08

## 2017-01-30 NOTE — PROGRESS NOTE
DATE:  01/24/2017



SUBJECTIVE:  The patient is a 61-year-old female patient with

hypertension and abdominal pain.  She has a diagnosis of bipolar 2

disorder.



PLAN:  Continue treatment with Seroquel.  Chart reviewed and

discussed with the staff.  Behavioral management provided.









  ______________________________________________

  Samantha Rick M.D.





DR:  YOLANDA

D:  01/29/2017 19:51

T:  01/30/2017 01:52

JOB#:  [id number]

CC:

## 2017-01-30 NOTE — INFECTIOUS DISEASES PROG NOTE
Assessment/Plan


Problems:  


(1) Ileus


Assessment & Plan:  resolved, suspect due to prior surgeries , follow up with  

GI





(2) UTI (urinary tract infection)


Assessment & Plan:  was treated with zosyn , urine culture showed contaminant 

carla.





(3) Sepsis


Assessment & Plan:  ruled out with negative blood culture , off  zosyn , doing 

well  off antibiotics.





(4) Abdominal pain


Assessment & Plan:  continue pain management as per primary





(5) Crohn's disease


Assessment & Plan:   GI is following , continue meds





(6) Nausea and vomiting


Assessment & Plan:  resolved, due to ileus , continue supportive care





(7) Leukocytosis


Assessment & Plan:  suspect dehydration related, VS Crohn's DZ related, no 

evidence of infection or colitis, no need for antibiotics therapy , already 

received  zosyn    








Subjective


Constitutional:  Denies: anorexia, chills, drenching sweats, fatigue, fever, no 

symptoms, other


HEENT:  Denies: congestion, coryza, dysphagia, hearing change, no symptoms, 

other, visual change


Respiratory:  Denies: dry cough, no symptoms, other, productive cough, 

shortness of breath


Breasts:  Denies: discharge, no symptoms, other, swelling, tenderness


Cardiovascular:  Denies: chest pain, dyspnea on exertion, no symptoms, other, 

palpitations


Gastrointestinal/Abdominal:  Denies: bloating, blood in stool, constipation, 

diarrhea, nausea, no symptoms, other, vomiting


Genitourinary:  Denies: dysuria, frequency, hematuria, last menstrual period, 

no symptoms, nocturia, other, vaginal bleed/discharge


Neurologic:  Denies: confusion, headache, no symptoms, numbness, other, weakness


Psychiatric:  Denies: anxiety, depression, no symptoms, other


Skin:  Denies: no symptoms, other, rash, ulcer


Endocrine:  Denies: feels cold, feels warm, no symptoms, other


Hematologic:  Denies: bleeding, no symptoms, other, swollen lymph nodes


Allergies:  


Coded Allergies:  


     No Known Allergies (Verified , 10/27/06)


Subjective


tolerated diet well





Objective


Vital Signs





Last 24 Hour Vital Signs








  Date Time  Temp Pulse Resp B/P Pulse Ox O2 Delivery O2 Flow Rate FiO2


 


1/30/17 16:28 98.2       


 


1/30/17 16:00 98.2 97 18 110/62 98 Room Air  


 


1/30/17 11:42 98.2 97 21 110/66 97 Room Air  


 


1/30/17 08:15 98.6 111 21 122/77 95 Room Air  


 


1/30/17 04:00 98.2 95 20 127/72 97 Room Air  


 


1/29/17 23:48 97.2 100 20 124/69 95 Room Air  


 


1/29/17 20:00 98.1 101 19 114/65 97 Nasal Cannula  








Height (Feet):  5


Height (Inches):  6.00


Weight (Pounds):  164


General Appearance:  WD/WN, no acute distress


HEENT:  normocephalic, atraumatic, anicteric, mucous membranes moist


Respiratory/Chest:  chest wall non-tender, lungs clear, normal breath sounds, 

no respiratory distress, no accessory muscle use


Cardiovascular:  normal peripheral pulses, normal rate, regular rhythm, no 

gallop/murmur, no JVD


Abdomen:  normal bowel sounds, soft, non tender, no organomegaly, non distended

, no mass


Extremities:  no cyanosis, no clubbing


Skin:  no rash, no lesions





Laboratory Tests








Test


  1/30/17


06:00


 


White Blood Count


  16.3 K/UL


(4.8-10.8)  H


 


Red Blood Count


  4.36 M/UL


(4.20-5.40)


 


Hemoglobin


  11.7 G/DL


(12.0-16.0)  L


 


Hematocrit


  38.3 %


(37.0-47.0)


 


Mean Corpuscular Volume 88 FL (80-99)  


 


Mean Corpuscular Hemoglobin


  26.9 PG


(27.0-31.0)  L


 


Mean Corpuscular Hemoglobin


Concent 30.6 G/DL


(32.0-36.0)  L


 


Red Cell Distribution Width


  16.6 %


(11.6-14.8)  H


 


Platelet Count


  356 K/UL


(150-450)


 


Mean Platelet Volume


  6.4 FL


(6.5-10.1)  L


 


Neutrophils (%) (Auto)


  76.9 %


(45.0-75.0)  H


 


Lymphocytes (%) (Auto)


  15.4 %


(20.0-45.0)  L


 


Monocytes (%) (Auto)


  6.6 %


(1.0-10.0)


 


Eosinophils (%) (Auto)


  0.6 %


(0.0-3.0)


 


Basophils (%) (Auto)


  0.5 %


(0.0-2.0)


 


Sodium Level


  139 mEQ/L


(135-145)


 


Potassium Level


  3.4 mEQ/L


(3.4-4.9)


 


Chloride Level


  96 mEQ/L


()  L


 


Carbon Dioxide Level


  31 mEQ/L


(20-30)  H


 


Anion Gap 12 (5-15)  


 


Blood Urea Nitrogen


  15 mg/dL


(7-23)


 


Creatinine


  0.8 mg/dL


(0.5-0.9)


 


Estimat Glomerular Filtration


Rate > 60 mL/min


(>60)


 


Glucose Level


  137 mg/dL


()  H


 


Calcium Level


  9.5 mg/dL


(8.6-10.2)











Current Medications








 Medications


  (Trade)  Dose


 Ordered  Sig/Jed


 Route


 PRN Reason  Start Time


 Stop Time Status Last Admin


Dose Admin


 


 Acetaminophen


  (Tylenol)  650 mg  Q4H  PRN


 ORAL


 fever  1/21/17 14:45


 2/20/17 14:44   


 


 


 Al Hydroxide/Mg


 Hydroxide


  (Mylanta II)  30 ml  Q6H  PRN


 ORAL


 dyspepsia  1/21/17 14:45


 2/20/17 14:44   


 


 


 Dextrose


  (Dextrose 50%)    STAT  PRN


 IV


 Hypoglycemia  1/21/17 14:45


 2/20/17 14:44   


 


 


 Diphenhydramine


 HCl


  (Benadryl)  25 mg  Q6H  PRN


 ORAL


 Itching/Pruritis  1/21/17 14:45


 2/20/17 14:44  1/27/17 00:19


 


 


 Docusate Sodium


  (Colace)  100 mg  THREE TIMES A  DAY


 ORAL


   1/23/17 13:00


 2/22/17 12:59  1/29/17 13:01


 


 


 Duloxetine HCl


  (Cymbalta)  60 mg  DAILY


 ORAL


   1/22/17 09:00


 2/21/17 08:59  1/30/17 08:47


 


 


 Heparin Sodium


  (Porcine)


  (Heparin 5000


 units/ml)  5,000 units  EVERY 12  HOURS


 SUBQ


   1/21/17 21:00


 2/20/17 20:59  1/30/17 08:46


 


 


 Hydromorphone HCl


  (Dilaudid)  1 mg  Q4H  PRN


 IVP


 PAIN 4-10  1/25/17 14:00


 2/1/17 13:59  1/30/17 15:59


 


 


 Levetiracetam


  (Keppra)  1,500 mg  TWICE A  DAY


 ORAL


   1/21/17 18:00


 2/20/17 17:59  1/30/17 08:47


 


 


 Mesalamine


  (Asacol)  800 mg  THREE TIMES A  DAY


 ORAL


   1/22/17 09:00


 2/21/17 08:59  1/30/17 13:35


 


 


 Mirtazapine


  (Remeron)  30 mg  BEDTIME


 ORAL


   1/21/17 21:00


 2/20/17 20:59  1/29/17 20:10


 


 


 Nitroglycerin


  (Ntg)  0.4 mg  Q5M X 3 DOSES PRN


 SL


 Prn Chest Pain  1/21/17 14:45


 2/20/17 14:44   


 


 


 Ondansetron HCl


  (Zofran)  4 mg  Q6H  PRN


 IVP


 Nausea & Vomiting  1/21/17 14:45


 2/20/17 14:44  1/26/17 09:37


 


 


 Polyethylene


 Glycol


  (Miralax)  17 gm  BEDTIME


 ORAL


   1/23/17 21:00


 2/22/17 20:59  1/26/17 20:35


 


 


 Polyethylene


 Glycol


  (Miralax)  17 gm  HSPRN  PRN


 ORAL


 Constipation  1/21/17 14:45


 2/20/17 14:44   


 


 


 Quetiapine


 Fumarate


  (SEROquel)  200 mg  DAILY


 ORAL


   1/22/17 09:00


 2/21/17 08:59  1/30/17 08:47


 


 


 Topiramate


  (Topamax)  25 mg  TWICE A  DAY


 ORAL


   1/21/17 18:00


 2/20/17 17:59  1/30/17 08:48


 

















Violet Dior M.D. Jan 30, 2017 17:41

## 2017-01-31 NOTE — DISCHARGE SUMMARY
Discharge Summary


Hospital Course


Date of Admission


Jan 21, 2017 at 13:16


Date of Discharge


Jan 30, 2017 at 20:57


Admitting Diagnosis


 hypotension, abdominal pain


HPI


Radha Gaviria is a 61 year old female who was admitted on Jan 21, 2017 at 13:16 

for Hypotension, Abdominal Pain


Hospital Course


dc summary dictated #[id number]





Discharge Medications


Continued Medications:  


Acetaminophen* (Tylenol Extra Strength*) 500 Mg Tablet


650 MG ORAL Q4HR PRN for Mild Pain/Temp > 100.5, TAB 0 Refills





Al Hydroxide/mg Hydroxide (Mag-Al Liquid) 30 Ml Oral.susp


30 ML ORAL Q6HR PRN for DYSPEPSIA, ML





Diphenhydramine HCl (Benadryl) 25 Mg Capsule


25 MG PO, CAP





Docusate Sodium* (Docusate Sodium*) 100 Mg Capsule


100 MG ORAL THREE TIMES A DAY, CAP





Duloxetine Hcl* (Cymbalta*) 60 Mg Capsule.dr


60 MG ORAL DAILY, CAP





Levetiracetam* (Levetiracetam*) 500 Mg Tablet


1500 MG ORAL TWICE A DAY, #60 TAB 0 Refills





Mesalamine (Asacol Hd) 800 Mg Tablet.dr


800 MG ORAL THREE TIMES A DAY, TAB


Do not break outer coating


Mirtazapine* (Mirtazapine*) 15 Mg Tablet


30 MG ORAL BEDTIME, TAB





Nitroglycerin (Nitroglycerin) 0.4 Mg Tab.subl


0.4 MG SL Q5MIN X3 PRN for CHEST PAIN, TAB





Polyethylene Glycol 3350* (Miralax*) 17 Gm Powd.pack


17 GM ORAL BEDTIME, PACKET





Quetiapine Fumarate* (Seroquel*) 200 Mg Tablet


200 MG ORAL DAILY, TAB





Topiramate* (Topamax*) 25 Mg Tablet


25 MG ORAL TWICE A DAY, #60 TAB 0 Refills





 


Discontinued Medications:  


Albuterol Sulfate* (Albuterol Sulfate Hhn*) 2.5 Mg/3 Ml Vial.neb


3 ML INH Q4H PRN for Shortness of Breath, EA





Levofloxacin* (Levaquin*) 250 Mg Tablet


250 MG ORAL DAILY, TAB





Mesalamine (Pentasa) 500 Mg Capsule.er


1000 MG ORAL TID, #30 CAP 0 Refills





Metronidazole* (Flagyl*) 500 Mg Tablet


500 MG ORAL EVERY 8 HOURS, TAB





Ranitidine Hcl* (Zantac*) 150 Mg Tablet


150 MG ORAL TWICE A DAY, TAB





Temazepam* (Restoril*) 15 Mg Capsule


15 MG ORAL BEDTIME PRN for Insomnia, CAP











Discharge


Condition Upon Discharge:  stable


Discharge Disposition


Patient was discharged to Home with Home Health(06)


Follow up with outpt GI clinic in 1 week


Discharge Diagnoses:  











Bravo ([facility]),Rozina DA SILVA Jan 31, 2017 13:03

## 2017-02-01 NOTE — DISCHARGE SUMMARY
DATE OF ADMISSION:  01/21/2017



DATE OF DISCHARGE:  01/30/2017



 



REASON FOR ADMISSION:  The patient is a 61-year-old female, presented

to the emergency room with complaint of diffuse abdominal pain and also

complained of diarrhea and vomiting.  The patient has a fairly extensive

past medical history including bowel obstruction, Crohn disease, and

history of C. difficile colitis.  The patient reported being on Bactrim

recently.  She denies fever.  Denies chills.  Denies chest pain, shortness

of breath, or cough.  She quantifies her abdominal pain 6/10 on a scale 1

to 10.  The patient has a pain specialist, Dr. Shetty, that she follows.

Subsequently in the emergency room, urinalysis revealed evidence of many

bacteria, pyuria, but negative for nitrates.  The patient presented with

leukocytosis of 17.1.  Hemoglobin and hematocrit are stable.  Creatinine

up to 1.5.  Anion gap 21.  Lipase stable of 18.  CT of the abdomen and

pelvis revealed moderate fecal retention and gas in the remaining colon.

Ileus could have the appearance with partial obstruction, not completely

excluded.  Chest x-ray revealed grossly stable interstitial disease.  The

patient was admitted for further management.



ADMITTING DIAGNOSES:   



1. Ileus.

2. Probable urinary tract infection.

3. Crohn disease.

4. Abdominal pain.

5. Dehydration secondary to nausea and vomiting.

6. History of asthma.

7. Leukocytosis.

8. Fecal impaction.



HOSPITAL COURSE:  The patient was admitted on the floor.  The patient

was started initially on the IV fluids.  Renal parameters were closely

monitored.  GI consult and Pulmonary consult were requested.   

GI specialist had seen the patient.  He put the patient on mesalamine for

Crohn disease.  The patient undergone esophagogastroduodenoscopy and

colonoscopy in 2016 without significant changes.  Bowel regimen was

instituted.  The patient started to have normal bowel movements.

Outpatient followup arranged for next week with the GI in the clinic.  

Leukocytosis, likely reactive due to dehydration versus Crohn disease.  

She had no evidence of infection or colitis.  No need for antibiotics as per ID.

Pulmonary wise, the patient has stable respiratory status.  She had stable 
pulse 

oximetry on room air.  No need for supplemental oxygen.  Pulmonary toilet as 
needed.  Chest

x-ray revealed  no acute cardiopulmonary disease.  There was no evidence of 
asthma

exacerbation.  The patient was asymptomatic in regards to respiratory symptoms. 

Initially, the patient was on antibiotic for urinary tract infection and

status post treatment, however urine culture was collected after

antibiotic started,  and revealed just mixed urogenital contaminants.  The

patient  off antibiotics.  Stool for C. difficile was negative.

Diarrhea subsided. Antiemetic provided as needed.   

Pain was controlled.  Pain specialist followed.

Pain management was effectively managed with multiple regimen of analgesic ..

Seizure precautions maintained. Keppra continued.   Due to the history of 
chronic 

pancreatitis, amylase and lipase were monitored and were

within normal limits.  GI cleared the patient for discharge.



DISCHARGE DIAGNOSES:   



1. Crohn disease.

2. Abdominal pain.

3. Leukocytosis.

4. History of asthma.

5. Fecal impaction, resolved.

6. Chronic pancreatitis, in remission.

7. Chronic pain syndrome.

8. Seizure disorder.

9.  Probable urinary tract infection, status post treatment.

10. History of asthma, but no acute exacerbation.



 





  ______________________________________________

  Maxim Hopkins D.O.



  ______________________________________________

  Rozina MeltonAlbany Medical Centeredwar N.PMatt





DR:  HIGINIO

D:  01/31/2017 13:05

T:  02/01/2017 02:02

JOB#:  [id number]

CC:



MEL

## 2017-02-09 ENCOUNTER — HOSPITAL ENCOUNTER (INPATIENT)
Dept: HOSPITAL 72 - EMR | Age: 62
LOS: 4 days | Discharge: NURSING, REHAB OR LONG-TERM CARE | DRG: 386 | End: 2017-02-13
Payer: MEDICARE

## 2017-02-09 VITALS — HEIGHT: 66 IN | BODY MASS INDEX: 26.36 KG/M2 | WEIGHT: 164 LBS

## 2017-02-09 VITALS — DIASTOLIC BLOOD PRESSURE: 69 MMHG | SYSTOLIC BLOOD PRESSURE: 112 MMHG

## 2017-02-09 VITALS — SYSTOLIC BLOOD PRESSURE: 100 MMHG | DIASTOLIC BLOOD PRESSURE: 60 MMHG

## 2017-02-09 VITALS — SYSTOLIC BLOOD PRESSURE: 117 MMHG | DIASTOLIC BLOOD PRESSURE: 71 MMHG

## 2017-02-09 DIAGNOSIS — F31.81: ICD-10-CM

## 2017-02-09 DIAGNOSIS — E86.0: ICD-10-CM

## 2017-02-09 DIAGNOSIS — R56.9: ICD-10-CM

## 2017-02-09 DIAGNOSIS — J44.9: ICD-10-CM

## 2017-02-09 DIAGNOSIS — J45.909: ICD-10-CM

## 2017-02-09 DIAGNOSIS — I50.9: ICD-10-CM

## 2017-02-09 DIAGNOSIS — G89.4: ICD-10-CM

## 2017-02-09 DIAGNOSIS — J84.9: ICD-10-CM

## 2017-02-09 DIAGNOSIS — K50.918: Primary | ICD-10-CM

## 2017-02-09 DIAGNOSIS — N39.0: ICD-10-CM

## 2017-02-09 LAB
ALBUMIN/GLOB SERPL: 0.5 {RATIO} (ref 1–2.7)
ALT SERPL-CCNC: 10 U/L (ref 3–33)
ANION GAP SERPL CALC-SCNC: 17 MMOL/L (ref 5–15)
APPEARANCE UR: (no result)
AST SERPL-CCNC: 13 U/L (ref 5–40)
BACTERIA #/AREA URNS HPF: (no result) /HPF
BASOPHILS NFR BLD AUTO: 0.6 % (ref 0–2)
CALCIUM SERPL-MCNC: 8.9 MG/DL (ref 8.6–10.2)
CHLORIDE SERPL-SCNC: 95 MEQ/L (ref 98–107)
CO2 SERPL-SCNC: 20 MEQ/L (ref 20–30)
CREAT SERPL-MCNC: 1.1 MG/DL (ref 0.5–0.9)
EOSINOPHIL NFR BLD AUTO: 1.2 % (ref 0–3)
ERYTHROCYTE [DISTWIDTH] IN BLOOD BY AUTOMATED COUNT: 16.3 % (ref 11.6–14.8)
GFR SERPLBLD BASED ON 1.73 SQ M-ARVRAT: > 60 ML/MIN (ref 60–?)
GLOBULIN SER-MCNC: 5.7 G/DL
HEMOLYSIS: 0
KETONES UR QL STRIP: NEGATIVE
LEUKOCYTE ESTERASE UR QL STRIP: (no result)
LIPASE SERPL-CCNC: 10 U/L (ref ?–60)
LYMPHOCYTES NFR BLD AUTO: 21.1 % (ref 20–45)
MCH RBC QN AUTO: 26 PG (ref 27–31)
MCHC RBC AUTO-ENTMCNC: 30.7 G/DL (ref 32–36)
MCV RBC AUTO: 84 FL (ref 80–99)
MONOCYTES NFR BLD AUTO: 10.9 % (ref 1–10)
NEUTROPHILS NFR BLD AUTO: 66.2 % (ref 45–75)
NITRITE UR QL STRIP: NEGATIVE
PH UR STRIP: 6.5 [PH] (ref 4.5–8)
PLATELET # BLD: 478 K/UL (ref 150–450)
PMV BLD AUTO: 6.9 FL (ref 6.5–10.1)
POTASSIUM SERPL-SCNC: 3.7 MEQ/L (ref 3.4–4.9)
PROT SERPL-MCNC: 8.6 G/DL (ref 6.6–8.7)
PROT UR QL STRIP: (no result)
RBC # BLD AUTO: 4.56 M/UL (ref 4.2–5.4)
RBC #/AREA URNS HPF: (no result) /HPF (ref 0–2)
SODIUM SERPL-SCNC: 132 MEQ/L (ref 135–145)
SP GR UR STRIP: 1.01 (ref 1–1.03)
SQUAMOUS #/AREA URNS LPF: (no result) /LPF
UROBILINOGEN UR-MCNC: NORMAL MG/DL (ref 0–1)
WBC # BLD AUTO: 12.3 K/UL (ref 4.8–10.8)
WBC #/AREA URNS HPF: (no result) /HPF (ref 0–2)

## 2017-02-09 PROCEDURE — 36415 COLL VENOUS BLD VENIPUNCTURE: CPT

## 2017-02-09 PROCEDURE — 81003 URINALYSIS AUTO W/O SCOPE: CPT

## 2017-02-09 PROCEDURE — 82150 ASSAY OF AMYLASE: CPT

## 2017-02-09 PROCEDURE — 83690 ASSAY OF LIPASE: CPT

## 2017-02-09 PROCEDURE — 85025 COMPLETE CBC W/AUTO DIFF WBC: CPT

## 2017-02-09 PROCEDURE — 76700 US EXAM ABDOM COMPLETE: CPT

## 2017-02-09 PROCEDURE — 84100 ASSAY OF PHOSPHORUS: CPT

## 2017-02-09 PROCEDURE — 85730 THROMBOPLASTIN TIME PARTIAL: CPT

## 2017-02-09 PROCEDURE — 87045 FECES CULTURE AEROBIC BACT: CPT

## 2017-02-09 PROCEDURE — 87493 C DIFF AMPLIFIED PROBE: CPT

## 2017-02-09 PROCEDURE — 80053 COMPREHEN METABOLIC PANEL: CPT

## 2017-02-09 PROCEDURE — 80299 QUANTITATIVE ASSAY DRUG: CPT

## 2017-02-09 PROCEDURE — 83735 ASSAY OF MAGNESIUM: CPT

## 2017-02-09 PROCEDURE — 80048 BASIC METABOLIC PNL TOTAL CA: CPT

## 2017-02-09 PROCEDURE — 74000: CPT

## 2017-02-09 RX ADMIN — DIPHENHYDRAMINE HYDROCHLORIDE PRN MG: 50 INJECTION INTRAMUSCULAR; INTRAVENOUS at 21:09

## 2017-02-09 RX ADMIN — DEXTROSE AND SODIUM CHLORIDE SCH MLS/HR: 5; .45 INJECTION, SOLUTION INTRAVENOUS at 18:40

## 2017-02-09 RX ADMIN — SUCRALFATE SCH GM: 1 TABLET ORAL at 18:00

## 2017-02-09 RX ADMIN — POLYETHYLENE GLYCOL 3350 SCH GM: 17 POWDER, FOR SOLUTION ORAL at 21:00

## 2017-02-09 RX ADMIN — TOPIRAMATE SCH MG: 25 TABLET, COATED ORAL at 21:08

## 2017-02-09 RX ADMIN — HEPARIN SODIUM SCH UNITS: 5000 INJECTION INTRAVENOUS; SUBCUTANEOUS at 21:10

## 2017-02-09 NOTE — CONSULTATION
History of Present Illness


General


Date patient seen:  Feb 9, 2017


Chief Complaint:  Abdominal Pain


Reason for Consultation:  inpatient management





Present Illness


HPI


61 year old patient with hx of Crohn's disease, Asthma/copd  recent 

hospitalization to [facility] [place], resented to  emergency department today complaining of 

severe abdominal pain typical for her usual Crohn's disease exacerbation, 

nausea vomiting abdominal discomfort associate with diarrhea.  Patient has had 

multiple surgeries in the past for bowel obstruction as well as lysis of 

adhesions.   Pt is admitted for flare of Crohn disease.


Allergies:  


Coded Allergies:  


     No Known Allergies (Verified , 10/27/06)





Medication History


Scheduled


Bupropion Hcl* (Bupropion Hcl*), 100 MG ORAL DAILY, (Reported)


Docusate Sodium* (Docusate Sodium*), 100 MG ORAL THREE TIMES A DAY, (Reported)


Duloxetine Hcl* (Cymbalta*), 60 MG ORAL DAILY, (Reported)


Ibuprofen* (Motrin*), 800 MG ORAL THREE TIMES A DAY, (Reported)


Levetiracetam* (Levetiracetam*), 1,500 MG ORAL TWICE A DAY, (Reported)


Mesalamine (Asacol Hd), 800 MG ORAL THREE TIMES A DAY, (Reported)


Mesalamine (Pentasa), 1,000 MG ORAL TID, (Reported)


Mirtazapine* (Mirtazapine*), 30 MG ORAL BEDTIME, (Reported)


Montelukast Sodium* (Montelukast Sodium*), 10 MG ORAL DAILY, (Reported)


Polyethylene Glycol 3350* (Miralax*), 17 GM ORAL BEDTIME, (Reported)


Quetiapine Fumarate* (Seroquel*), 100 MG ORAL BEDTIME, (Reported)


Quetiapine Fumarate* (Seroquel*), 200 MG ORAL DAILY, (Reported)


Risperidone* (Risperdal*), 0.5 MG ORAL BEDTIME, (Reported)


Sucralfate* (Carafate*), 1 GM ORAL TID, (Reported)


Topiramate* (Topamax*), 25 MG ORAL TWICE A DAY, (Reported)





Scheduled PRN


Diphenhydramine HCl (Benadryl), 25 MG PO Q6HR PRN for Itching, (Reported)


Nitroglycerin (Nitroglycerin), 0.4 MG SL Q5MIN X3 PRN for CHEST PAIN, (Reported)


Trazodone* (Trazodone*), 100 MG ORAL BEDTIME PRN for Insomnia, (Reported)


Zolpidem Tartrate* (Zolpidem Tartrate*), 10 MG ORAL BEDTIME PRN for Insomnia, (

Reported)





Discontinued Medications


Acetaminophen* (Tylenol Extra Strength*), 650 MG ORAL Q4HR PRN for Mild Pain/

Temp > 100.5, (Reported)


   Discontinued Reason: MD discontinued med


Al Hydroxide/mg Hydroxide (Mag-Al Liquid), 30 ML ORAL Q6HR PRN for DYSPEPSIA, (

Reported)


   Discontinued Reason: Therapy completed





Patient History


Healthcare decision maker





Resuscitation status





Advanced Directive on File








Past Medical/Surgical History


Past Medical/Surgical History:  


(1) Psychosis


(2) IBD (inflammatory bowel disease)


(3) Crohns disease


(4) Seizure


(5) Asthma





Review of Systems


Gastrointestinal:  Reports: diarrhea, vomiting





Physical Exam


General Appearance:  WD/WN


Lines, tubes and drains:  peripheral


HEENT:  normocephalic, atraumatic


Neck:  normal alignment


Respiratory/Chest:  chest wall non-tender, lungs clear


Cardiovascular/Chest:  normal peripheral pulses


Abdomen:  normal bowel sounds, non tender


Genitourinary/Rectal:  normal genital exam


Extremities:  normal range of motion





Last 24 Hour Vital Signs








  Date Time  Temp Pulse Resp B/P Pulse Ox O2 Delivery O2 Flow Rate FiO2


 


2/9/17 13:00  90 20 100/60 98 Room Air  


 


2/9/17 12:56 99.1       


 


2/9/17 12:05 99.1       


 


2/9/17 10:58 99.1 99 13 117/71 100 Room Air  


 


2/9/17 10:41 99.5 104 18 101/67 100 Room Air  











Laboratory Tests








Test


  2/9/17


12:10 2/9/17


14:47


 


White Blood Count


  12.3 K/UL


(4.8-10.8)  H 


 


 


Red Blood Count


  4.56 M/UL


(4.20-5.40) 


 


 


Hemoglobin


  11.8 G/DL


(12.0-16.0)  L 


 


 


Hematocrit


  38.5 %


(37.0-47.0) 


 


 


Mean Corpuscular Volume 84 FL (80-99)   


 


Mean Corpuscular Hemoglobin


  26.0 PG


(27.0-31.0)  L 


 


 


Mean Corpuscular Hemoglobin


Concent 30.7 G/DL


(32.0-36.0)  L 


 


 


Red Cell Distribution Width


  16.3 %


(11.6-14.8)  H 


 


 


Platelet Count


  478 K/UL


(150-450)  H 


 


 


Mean Platelet Volume


  6.9 FL


(6.5-10.1) 


 


 


Neutrophils (%) (Auto)


  66.2 %


(45.0-75.0) 


 


 


Lymphocytes (%) (Auto)


  21.1 %


(20.0-45.0) 


 


 


Monocytes (%) (Auto)


  10.9 %


(1.0-10.0)  H 


 


 


Eosinophils (%) (Auto)


  1.2 %


(0.0-3.0) 


 


 


Basophils (%) (Auto)


  0.6 %


(0.0-2.0) 


 


 


Sodium Level


  132 mEQ/L


(135-145)  L 


 


 


Potassium Level


  3.7 mEQ/L


(3.4-4.9) 


 


 


Chloride Level


  95 mEQ/L


()  L 


 


 


Carbon Dioxide Level


  20 mEQ/L


(20-30) 


 


 


Anion Gap 17 (5-15)  H 


 


Blood Urea Nitrogen


  29 mg/dL


(7-23)  H 


 


 


Creatinine


  1.1 mg/dL


(0.5-0.9)  H 


 


 


Estimat Glomerular Filtration


Rate > 60 mL/min


(>60) 


 


 


Glucose Level


  90 mg/dL


() 


 


 


Calcium Level


  8.9 mg/dL


(8.6-10.2) 


 


 


Total Bilirubin


  < 0.2 mg/dL


(0.0-1.2) 


 


 


Aspartate Amino Transf


(AST/SGOT) 13 U/L (5-40)  


  


 


 


Alanine Aminotransferase


(ALT/SGPT) 10 U/L (3-33)  


  


 


 


Alkaline Phosphatase


  102 U/L


() 


 


 


Total Protein


  8.6 g/dL


(6.6-8.7) 


 


 


Albumin


  2.9 g/dL


(3.5-5.2)  L 


 


 


Globulin 5.7 g/dL   


 


Albumin/Globulin Ratio


  0.5 (1.0-2.7)


L 


 


 


Lipase 10 U/L (< 60)   


 


Urine Color  Yellow  


 


Urine Appearance


  


  Slightly


cloudy


 


Urine pH  6.5 (4.5-8.0)  


 


Urine Specific Gravity


  


  1.015


(1.005-1.035)


 


Urine Protein


  


  2+ (NEGATIVE)


H


 


Urine Glucose (UA)


  


  Negative


(NEGATIVE)


 


Urine Ketones


  


  Negative


(NEGATIVE)


 


Urine Occult Blood


  


  3+ (NEGATIVE)


H


 


Urine Nitrite


  


  Negative


(NEGATIVE)


 


Urine Bilirubin


  


  Negative


(NEGATIVE)


 


Urine Urobilinogen


  


  Normal MG/DL


(0.0-1.0)


 


Urine Leukocyte Esterase


  


  1+ (NEGATIVE)


H


 


Urine RBC


  


  5-10 /HPF (0 -


2)  H


 


Urine WBC


  


  5-10 /HPF (0 -


2)  H


 


Urine Squamous Epithelial


Cells 


  Few /LPF


(NONE/OCC)


 


Urine Bacteria


  


  Few /HPF


(NONE)








Height (Feet):  5


Height (Inches):  6.00


Weight (Pounds):  164





Assessment/Plan


Problem List:  


(1) Nausea and vomiting


(2) IBD (inflammatory bowel disease)


ICD Codes:  K63.89 - Other specified diseases of intestine


SNOMED:  76147356


(3) Asthma


ICD Codes:  J45.909 - Unspecified asthma, uncomplicated


SNOMED:  431931712


(4) Interstitial lung disease


ICD Codes:  J84.9 - Interstitial pulmonary disease, unspecified


SNOMED:  59621066, 697319689


(5) Seizure


ICD Codes:  R56.9 - Unspecified convulsions


SNOMED:  16978836


(6) Psychosis


ICD Codes:  F29 - Unspecified psychosis not due to a substance or known 

physiological condition


SNOMED:  82289637


(7) Crohns disease


ICD Codes:  K50.90 - Crohns disease


SNOMED:  97053105


(8) History of exploratory laparotomy


ICD Codes:  Z98.89 - Other specified postprocedural states


SNOMED:  [id number], 49186034, [id number]


Assessment/Plan


NPO


antiinflammatory


Iv fluids


GI evaluation


check electrolytes


paracenthesis if enough fluid.











JULIETTE KLEIN Feb 9, 2017 17:20

## 2017-02-09 NOTE — EMERGENCY ROOM REPORT
History of Present Illness


General


Chief Complaint:  Abdominal Pain


Source:  Patient





Present Illness


HPI


Patient has a history of Crohn's disease.  Patient presents emergency 

department today complaining of severe abdominal pain typical for her usual 

Crohn's disease exacerbation.  Patient has had multiple surgeries in the past 

for bowel obstruction as well as lysis of adhesions.  Symptoms noted to be 

severe.  Patient was recently admitted for ileus and stool impaction.  She 

presents emergency department today complaining nausea vomiting abdominal 

discomfort associate with diarrhea typical for her severe exacerbation.  She is 

requesting pain medications.  States that she's not tolerating any oral fluids.

  Symptoms noted to be severe.  Patient primary care physician is Dr. Maicol Lang.No other modifying factors.  No other associated signs and symptoms.  No 

other complaints were noted.


Allergies:  


Coded Allergies:  


     No Known Allergies (Verified , 10/27/06)





Patient History


Past Medical History:  HTN, MI, other - crohn's disease


Past Surgical History:  other - small bowel obstructio, lysis of adhesio


Social History:  Denies: alcohol use, drug use, smoking


Reviewed Nursing Documentation:  PMH: Agreed, PSxH: Agreed





Nursing Documentation-PMH


Hx Cardiac Problems:  Yes - MI


Hx Hypertension:  No


Hx Pacemaker:  No


Hx Asthma:  Yes


Hx COPD:  Yes


Hx Diabetes:  No


Hx Cancer:  No


Hx Gastrointestinal Problems:  Yes - Crohn's; C-diff; MRSA


Hx Dialysis:  No


Hx Neurological Problems:  Yes


Hx Cerebrovascular Accident:  Yes - 2005


Hx Seizures:  Yes


Hx Epilepsy:  Yes


Hx Vertigo:  Yes


Hx Dizziness:  Yes


Hx Syncope:  Yes


Hx Headaches:  Yes


Hx Weakness:  Yes


Hx Fatigue:  Yes





Review of Systems


All Other Systems:  negative except mentioned in HPI





Physical Exam





Vital Signs








  Date Time  Temp Pulse Resp B/P Pulse Ox O2 Delivery O2 Flow Rate FiO2


 


2/9/17 10:41 99.5 104 18 101/67 100 Room Air  








Sp02 EP Interpretation:  reviewed, normal


General Appearance:  alert, moderate distress


Head:  atraumatic


Eyes:  bilateral eye normal inspection


ENT:  normal ENT inspection, hearing grossly normal, normal voice


Neck:  normal inspection, full range of motion, supple, no bony tend


Respiratory:  normal inspection, lungs clear, normal breath sounds, no 

respiratory distress, no retraction, no wheezing


Cardiovascular #1:  regular rate, rhythm, no edema


Gastrointestinal:  soft, no guarding, distended, other - diffuse be ten, 

decreased bowel sounds


Genitourinary:  no CVA tenderness


Musculoskeletal:  normal inspection, back normal, normal range of motion


Neurologic:  normal inspection, alert, responsive, speech normal


Psychiatric:  anxious


Skin:  normal inspection, normal color, no rash





Medical Decision Making


Diagnostic Impression:  


 Primary Impression:  


 Crohn's disease


 Additional Impressions:  


 Diarrhea


 Abdominal pain


 Chronic pain


ER Course


Patient presents to the emergency department today complaining of abdominal 

pain.  Differential considerations include acute pancreatitis, cholecystitis, 

gastritis, hepatitis, appendicitis just to name a few.  Given the severity of 

the patient's presentation I felt this is a highly complex patient.  This 

patient required extensive workup.  Patient laboratory workup showed an 

elevated white blood cell count.  Patient received multiple doses of pain 

medications and fluids and still had continued have pain.  Case was discussed 

in detail with Dr. Maicol Lang who knows the patient well and recommended 

patient be admitted.  Case was also discussed with Dr. Fraga.





Labs








Test


  2/9/17


12:10


 


White Blood Count


  12.3 K/UL


(4.8-10.8)


 


Red Blood Count


  4.56 M/UL


(4.20-5.40)


 


Hemoglobin


  11.8 G/DL


(12.0-16.0)


 


Hematocrit


  38.5 %


(37.0-47.0)


 


Mean Corpuscular Volume 84 FL (80-99) 


 


Mean Corpuscular Hemoglobin


  26.0 PG


(27.0-31.0)


 


Mean Corpuscular Hemoglobin


Concent 30.7 G/DL


(32.0-36.0)


 


Red Cell Distribution Width


  16.3 %


(11.6-14.8)


 


Platelet Count


  478 K/UL


(150-450)


 


Mean Platelet Volume


  6.9 FL


(6.5-10.1)


 


Neutrophils (%) (Auto)


  66.2 %


(45.0-75.0)


 


Lymphocytes (%) (Auto)


  21.1 %


(20.0-45.0)


 


Monocytes (%) (Auto)


  10.9 %


(1.0-10.0)


 


Eosinophils (%) (Auto)


  1.2 %


(0.0-3.0)


 


Basophils (%) (Auto)


  0.6 %


(0.0-2.0)


 


Sodium Level


  132 mEQ/L


(135-145)


 


Potassium Level


  3.7 mEQ/L


(3.4-4.9)


 


Chloride Level


  95 mEQ/L


()


 


Carbon Dioxide Level


  20 mEQ/L


(20-30)


 


Anion Gap 17 (5-15) 


 


Blood Urea Nitrogen


  29 mg/dL


(7-23)


 


Creatinine


  1.1 mg/dL


(0.5-0.9)


 


Estimat Glomerular Filtration


Rate > 60 mL/min


(>60)


 


Glucose Level


  90 mg/dL


()


 


Calcium Level


  8.9 mg/dL


(8.6-10.2)


 


Total Bilirubin


  < 0.2 mg/dL


(0.0-1.2)


 


Aspartate Amino Transf


(AST/SGOT) 13 U/L (5-40) 


 


 


Alanine Aminotransferase


(ALT/SGPT) 10 U/L (3-33) 


 


 


Alkaline Phosphatase


  102 U/L


()


 


Total Protein


  8.6 g/dL


(6.6-8.7)


 


Albumin


  2.9 g/dL


(3.5-5.2)


 


Globulin 5.7 g/dL 


 


Albumin/Globulin Ratio 0.5 (1.0-2.7) 


 


Lipase 10 U/L (< 60) 











Last Vital Signs








  Date Time  Temp Pulse Resp B/P Pulse Ox O2 Delivery O2 Flow Rate FiO2


 


2/9/17 13:00  90 20 100/60 98 Room Air  


 


2/9/17 12:56 99.1       








Status:  improved


Disposition:  ADMITTED AS INPATIENT


Condition:  Serious


Referrals:  


MAICOL LANG (PCP)











MANJINDER PAEZ M.D. Feb 9, 2017 14:23

## 2017-02-10 VITALS — SYSTOLIC BLOOD PRESSURE: 110 MMHG | DIASTOLIC BLOOD PRESSURE: 65 MMHG

## 2017-02-10 VITALS — SYSTOLIC BLOOD PRESSURE: 136 MMHG | DIASTOLIC BLOOD PRESSURE: 70 MMHG

## 2017-02-10 VITALS — SYSTOLIC BLOOD PRESSURE: 106 MMHG | DIASTOLIC BLOOD PRESSURE: 55 MMHG

## 2017-02-10 VITALS — SYSTOLIC BLOOD PRESSURE: 94 MMHG | DIASTOLIC BLOOD PRESSURE: 60 MMHG

## 2017-02-10 VITALS — SYSTOLIC BLOOD PRESSURE: 96 MMHG | DIASTOLIC BLOOD PRESSURE: 54 MMHG

## 2017-02-10 VITALS — SYSTOLIC BLOOD PRESSURE: 103 MMHG | DIASTOLIC BLOOD PRESSURE: 59 MMHG

## 2017-02-10 LAB
ALBUMIN/GLOB SERPL: 0.4 {RATIO} (ref 1–2.7)
ALT SERPL-CCNC: 8 U/L (ref 3–33)
AMYLASE SERPL-CCNC: 36 U/L (ref 10–110)
ANION GAP SERPL CALC-SCNC: 15 MMOL/L (ref 5–15)
AST SERPL-CCNC: 11 U/L (ref 5–40)
BASOPHILS NFR BLD AUTO: 0.6 % (ref 0–2)
CALCIUM SERPL-MCNC: 9.1 MG/DL (ref 8.6–10.2)
CHLORIDE SERPL-SCNC: 100 MEQ/L (ref 98–107)
CO2 SERPL-SCNC: 23 MEQ/L (ref 20–30)
CREAT SERPL-MCNC: 0.8 MG/DL (ref 0.5–0.9)
EOSINOPHIL NFR BLD AUTO: 1.2 % (ref 0–3)
ERYTHROCYTE [DISTWIDTH] IN BLOOD BY AUTOMATED COUNT: 16.3 % (ref 11.6–14.8)
GFR SERPLBLD BASED ON 1.73 SQ M-ARVRAT: > 60 ML/MIN (ref 60–?)
GLOBULIN SER-MCNC: 5.5 G/DL
HEMOLYSIS: 0
LIPASE SERPL-CCNC: 10 U/L (ref ?–60)
LYMPHOCYTES NFR BLD AUTO: 26.9 % (ref 20–45)
MCH RBC QN AUTO: 25.6 PG (ref 27–31)
MCHC RBC AUTO-ENTMCNC: 30.2 G/DL (ref 32–36)
MCV RBC AUTO: 85 FL (ref 80–99)
MONOCYTES NFR BLD AUTO: 10.8 % (ref 1–10)
NEUTROPHILS NFR BLD AUTO: 60.4 % (ref 45–75)
PLATELET # BLD: 462 K/UL (ref 150–450)
PMV BLD AUTO: 6.7 FL (ref 6.5–10.1)
POTASSIUM SERPL-SCNC: 3.5 MEQ/L (ref 3.4–4.9)
PROT SERPL-MCNC: 8.2 G/DL (ref 6.6–8.7)
RBC # BLD AUTO: 4.5 M/UL (ref 4.2–5.4)
SODIUM SERPL-SCNC: 138 MEQ/L (ref 135–145)
WBC # BLD AUTO: 7.2 K/UL (ref 4.8–10.8)

## 2017-02-10 RX ADMIN — MONTELUKAST SODIUM SCH MG: 10 TABLET, COATED ORAL at 08:50

## 2017-02-10 RX ADMIN — DIPHENHYDRAMINE HYDROCHLORIDE PRN MG: 50 INJECTION INTRAMUSCULAR; INTRAVENOUS at 16:39

## 2017-02-10 RX ADMIN — DIPHENHYDRAMINE HYDROCHLORIDE PRN MG: 50 INJECTION INTRAMUSCULAR; INTRAVENOUS at 09:55

## 2017-02-10 RX ADMIN — SUCRALFATE SCH GM: 1 TABLET ORAL at 18:54

## 2017-02-10 RX ADMIN — BUPROPION HYDROCHLORIDE SCH MG: 100 TABLET, EXTENDED RELEASE ORAL at 08:50

## 2017-02-10 RX ADMIN — QUETIAPINE SCH MG: 200 TABLET, FILM COATED ORAL at 08:50

## 2017-02-10 RX ADMIN — HEPARIN SODIUM SCH UNITS: 5000 INJECTION INTRAVENOUS; SUBCUTANEOUS at 08:54

## 2017-02-10 RX ADMIN — DEXTROSE AND SODIUM CHLORIDE SCH MLS/HR: 5; .45 INJECTION, SOLUTION INTRAVENOUS at 18:55

## 2017-02-10 RX ADMIN — TOPIRAMATE SCH MG: 25 TABLET, COATED ORAL at 18:55

## 2017-02-10 RX ADMIN — DEXTROSE AND SODIUM CHLORIDE SCH MLS/HR: 5; .45 INJECTION, SOLUTION INTRAVENOUS at 03:40

## 2017-02-10 RX ADMIN — TOPIRAMATE SCH MG: 25 TABLET, COATED ORAL at 08:50

## 2017-02-10 RX ADMIN — SUCRALFATE SCH GM: 1 TABLET ORAL at 08:49

## 2017-02-10 RX ADMIN — DIPHENHYDRAMINE HYDROCHLORIDE PRN MG: 50 INJECTION INTRAMUSCULAR; INTRAVENOUS at 03:37

## 2017-02-10 RX ADMIN — DIPHENHYDRAMINE HYDROCHLORIDE PRN MG: 50 INJECTION INTRAMUSCULAR; INTRAVENOUS at 23:00

## 2017-02-10 RX ADMIN — SUCRALFATE SCH GM: 1 TABLET ORAL at 13:00

## 2017-02-10 RX ADMIN — POLYETHYLENE GLYCOL 3350 SCH GM: 17 POWDER, FOR SOLUTION ORAL at 21:00

## 2017-02-10 RX ADMIN — DULOXETINE HYDROCHLORIDE SCH MG: 30 CAPSULE, DELAYED RELEASE ORAL at 08:50

## 2017-02-10 RX ADMIN — HEPARIN SODIUM SCH UNITS: 5000 INJECTION INTRAVENOUS; SUBCUTANEOUS at 21:22

## 2017-02-10 RX ADMIN — DIPHENHYDRAMINE HYDROCHLORIDE PRN MG: 50 INJECTION INTRAMUSCULAR; INTRAVENOUS at 06:36

## 2017-02-10 NOTE — PULMONOLOGY PROGRESS NOTE
Assessment/Plan


Problems:  


(1) Nausea and vomiting


(2) IBD (inflammatory bowel disease)


(3) Asthma


(4) Interstitial lung disease


(5) Seizure


(6) Psychosis


(7) Crohns disease


(8) History of exploratory laparotomy


Assessment/Plan


improving 


IV fluids


GI note appreciated


f/u electrolytes


pain management.





Subjective


ROS Limited/Unobtainable:  No


Interval Events:  doing better,


Allergies:  


Coded Allergies:  


     No Known Allergies (Verified , 10/27/06)





Objective





Last 24 Hour Vital Signs








  Date Time  Temp Pulse Resp B/P Pulse Ox O2 Delivery O2 Flow Rate FiO2


 


2/10/17 16:00 99.0 81 16 110/65 100 Room Air  


 


2/10/17 11:57 98.0 82 19 96/54 97 Room Air  


 


2/10/17 08:00 97.9 73 21 106/55 99 Room Air  


 


2/10/17 07:06 96.6       


 


2/10/17 04:00 97.0 73 20 94/60 93 Room Air  


 


2/10/17 00:00 96.6 75 20 103/59 100 Room Air  

















Intake and Output  


 


 2/9/17 2/10/17





 19:00 07:00


 


Intake Total 1055 ml 600 ml


 


Balance 1055 ml 600 ml


 


  


 


Intake Oral 0 ml 


 


IV Total 1055 ml 600 ml


 


# Voids  4








General Appearance:  WD/WN


HEENT:  normocephalic, atraumatic


Respiratory/Chest:  chest wall non-tender, lungs clear


Cardiovascular:  normal peripheral pulses, normal rate


Abdomen:  normal bowel sounds, soft, non tender


Extremities:  no cyanosis


Laboratory Tests


2/10/17 07:40: 


White Blood Count 7.2, Red Blood Count 4.50, Hemoglobin 11.5L, Hematocrit 38.1, 

Mean Corpuscular Volume 85, Mean Corpuscular Hemoglobin 25.6L, Mean Corpuscular 

Hemoglobin Concent 30.2L, Red Cell Distribution Width 16.3H, Platelet Count 462H

, Mean Platelet Volume 6.7, Neutrophils (%) (Auto) 60.4, Lymphocytes (%) (Auto) 

26.9, Monocytes (%) (Auto) 10.8H, Eosinophils (%) (Auto) 1.2, Basophils (%) (

Auto) 0.6, Activated Partial Thromboplast Time 28, Sodium Level 138, Potassium 

Level 3.5, Chloride Level 100, Carbon Dioxide Level 23, Anion Gap 15, Blood 

Urea Nitrogen 19, Creatinine 0.8, Estimat Glomerular Filtration Rate > 60, 

Glucose Level 96, Calcium Level 9.1, Total Bilirubin < 0.2, Aspartate Amino 

Transf (AST/SGOT) 11, Alanine Aminotransferase (ALT/SGPT) 8, Alkaline 

Phosphatase 99, Total Protein 8.2, Albumin 2.7L, Globulin 5.5, Albumin/Globulin 

Ratio 0.4L, Amylase Level 36, Lipase 10





Current Medications








 Medications


  (Trade)  Dose


 Ordered  Sig/Jed


 Route


 PRN Reason  Start Time


 Stop Time Status Last Admin


Dose Admin


 


 Acetaminophen


  (Tylenol)  650 mg  Q4H  PRN


 ORAL


 fever  2/9/17 18:00


 3/11/17 17:59   


 


 


 Al Hydroxide/Mg


 Hydroxide


  (Mylanta II)  30 ml  Q6H  PRN


 ORAL


 dyspepsia  2/9/17 18:00


 3/11/17 17:59   


 


 


 Bupropion HCl


  (Wellbutrin SR)  100 mg  DAILY


 ORAL


   2/10/17 09:00


 3/12/17 08:59  2/10/17 08:50


 


 


 Dextrose


  (Dextrose 50%)    STAT  PRN


 IV


 Hypoglycemia  2/9/17 18:00


 3/11/17 17:59   


 


 


 Dextrose/Sodium


 Chloride


  (D5 0.45% NS)  1,000 ml @ 


 75 mls/hr  [id number]


 IV


   2/9/17 18:00


 3/11/17 17:59  2/10/17 03:40


 


 


 Diphenhydramine


 HCl


  (Benadryl)  25 mg  Q6H  PRN


 IVP


 Itching  2/9/17 18:00


 3/11/17 17:59   


 


 


 Diphenhydramine


 HCl


  (Benadryl)  25 mg  Q6H  PRN


 ORAL


 Itching/Pruritis  2/9/17 18:00


 3/11/17 17:59   


 


 


 Duloxetine HCl


  (Cymbalta)  60 mg  DAILY


 ORAL


   2/10/17 09:00


 3/12/17 08:59  2/10/17 08:50


 


 


 Heparin Sodium


  (Porcine)


  (Heparin 5000


 units/ml)  5,000 units  EVERY 12  HOURS


 SUBQ


   2/9/17 21:00


 3/11/17 20:59  2/10/17 08:54


 


 


 Hydromorphone HCl


  (Dilaudid)  1 mg  Q3H  PRN


 IVP


 Severe Pain (Pain Scale 7-10)  2/9/17 18:45


 2/16/17 18:44  2/10/17 16:39


 


 


 Levetiracetam


  (Keppra)  1,500 mg  TWICE A  DAY


 ORAL


   2/9/17 18:00


 3/11/17 17:59  2/10/17 08:50


 


 


 Mirtazapine


  (Remeron)  30 mg  BEDTIME


 ORAL


   2/9/17 21:00


 3/11/17 20:59  2/9/17 21:08


 


 


 Montelukast Sodium


  (Singulair)  10 mg  DAILY


 ORAL


   2/10/17 09:00


 3/12/17 08:59  2/10/17 08:50


 


 


 Nitroglycerin


  (Ntg)  0.4 mg  Q5M X 3 DOSES PRN


 SL


 Prn Chest Pain  2/9/17 18:00


 3/11/17 17:59   


 


 


 Ondansetron HCl


  (Zofran)  4 mg  Q6H  PRN


 IVP


 Nausea & Vomiting  2/9/17 18:00


 3/11/17 17:59   


 


 


 Polyethylene


 Glycol


  (Miralax)  17 gm  BEDTIME


 ORAL


   2/9/17 21:00


 3/11/17 20:59   


 


 


 Polyethylene


 Glycol


  (Miralax)  17 gm  HSPRN  PRN


 ORAL


 Constipation  2/9/17 18:00


 3/11/17 17:59   


 


 


 Quetiapine


 Fumarate


  (SEROquel)  100 mg  BEDTIME


 ORAL


   2/9/17 21:00


 3/11/17 20:59  2/9/17 21:08


 


 


 Quetiapine


 Fumarate


  (SEROquel)  200 mg  DAILY


 ORAL


   2/10/17 09:00


 3/12/17 08:59  2/10/17 08:50


 


 


 Sucralfate


  (Carafate)  1 gm  TID


 ORAL


   2/9/17 18:00


 3/11/17 17:59  2/10/17 08:49


 


 


 Topiramate


  (Topamax)  25 mg  TWICE A  DAY


 ORAL


   2/9/17 18:00


 3/11/17 17:59  2/10/17 08:50


 


 


 Trazodone HCl 100


 mg  100 mg  HSPRN  PRN


 ORAL


 Insomnia  2/9/17 17:30


 3/11/17 17:29   


 

















JULIETTE KLEIN Feb 10, 2017 17:20

## 2017-02-10 NOTE — DIAGNOSTIC IMAGING REPORT
Indication: Abdominal pain and distention



Technique: Gray-scale and duplex images of the upper abdomen were obtained



Comparison: 6/23/2008



Findings:    . Gallbladder is unremarkable, without stones, wall thickening, 

nor

pericholecystic fluid.  Sonographic Pressley's sign is negative. Common bile duct

measures 4 mm in diameter.  No intrahepatic biliary ductal dilatation.  

Liver

demonstrates normal echogenicity, no focal abnormality.  Portal vein and hepatic

veins are patent..  Pancreas is incompletely visualized due to overlying bowel 

gas,

visualized portions are unremarkable.  Spleen is unremarkable. Left kidney measures

10.7 cm in length.  Right kidney measures 11.3 cm length. Right kidney 

demonstrates

normal echogenicity. Left kidney demonstrates equivocally slightly 

increased

echogenicity. There is no hydronephrosis.  There is a small left renal cyst.    

.

Abdominal aorta is partially obscured by bowel gas, visualized portions 

are

non-aneurysmal.



Impression: Negative for gallstones or dilated ducts



Equivocal slightly increased left renal echogenicity, if real could indicate 

medical

renal disease



Note suboptimal visualization of the abdominal aorta and pancreas

## 2017-02-10 NOTE — HISTORY AND PHYSICAL REPORT
DATE OF ADMISSION:  02/09/2017



CONSULTANTS:

1. Behnoush Zarrini, M.D.

2. Blaze Fraga M.D.

3. Dimitry Mcleod M.D.

4. Samantha Rick M.D.



CHIEF COMPLAINT:  Abdominal pain, nausea, vomiting, weakness, and

shortness of breath.



BRIEF HISTORY:  This is a 61-year-old female, who lives at home,

presents with increased nausea, vomiting, and diarrhea, and came to the

office yesterday and diagnosed with the above and sent to the [facility]

and diagnosed with above and admitted to medical floor for further

treatment.  Currently, sleeping in bed and not talking much.



PAST MEDICAL HISTORY:  Includes COPD, chronic pain, Crohn's, and

CHF.



PAST SURGICAL HISTORY:  Abdominal surgery.



MEDICATIONS:  Include Wellbutrin, Cymbalta, Singulair, Seroquel,

Remeron, MiraLax, Dilaudid, Keppra, Topamax, Carafate, Tylenol, and

Benadryl.



ALLERGIES:  Denies.



SOCIAL HISTORY:  Positive smoking.  No alcohol.  No intravenous drug

abuse.



FAMILY HISTORY:  Noncontributory.



REVIEW OF SYSTEMS:  Unavailable.



PHYSICAL EXAMINATION:

GENERAL:  Calm in bed.  Oriented x3.  Slight weakness.

VITAL SIGNS:  Show temperature is 98 degrees, pulse 82, respirations

19, and blood pressure 96/54.

CARDIOVASCULAR:  No murmur.

LUNGS:  Poor air exchange.

ABDOMEN:  Positive bowel sounds.  Nontender.  Nondistended.

EXTREMITIES:  No cyanosis, clubbing, or edema.

NEUROLOGIC:  The patient moves all extremities slightly.



LABORATORY DATA:  Labs at this time show white count 12.3 and now

7.2, hemoglobin and hematocrit are 11 and 38, and platelets is 462,000.

BMP is normal today.  Albumin 2.7.  Iron efficacy is 28.  Urinalysis show

3+ occult blood and 1+ leukocyte esterase.



ASSESSMENT:

1. Shortness of breath.

2. Urinary tract infection.

3. Nausea, vomiting, and diarrhea.

4. Abdominal pain.

5. Anemia.

6. Chronic obstructive pulmonary disease.

7. Chronic pain.

8. Crohn's.

9. Congestive heart failure.



PLAN:

1. Continue premedications.

2. O2 and pulmonary treatment

3. Antibiotics per Infectious Disease.

4. Pain control.

5. OT, PT, and dietary evaluation.

6. CBC and BMP in the morning.

7. Resume home medications.



Dr. Shetty, Dr. Fraga, Dr. Mcleod, Dr. Rick, and Dr. Dior to

consult.  We will continue to follow this patient.









  ______________________________________________

  Maxim Hopkins D.O.





DR:  RITA

D:  02/10/2017 13:05

T:  02/10/2017 21:36

JOB#:  [id number]

CC:

## 2017-02-10 NOTE — GI INITIAL CONSULT NOTE
History of Present Illness


General


Date patient seen:  Feb 10, 2017


Time patient seen:  10:00


Reason for Hospitalization:  Abdominal Pain


Referring physician:  MAICOL LANG


Reason for Consultation:  ABDOMINAL PAIN





Present Illness


HPI


Patient has a history of Crohn's disease.  Patient presents emergency 

department today complaining of severe abdominal pain typical for her usual 

Crohn's disease exacerbation.  Patient has had multiple surgeries in the past 

for bowel obstruction as well as lysis of adhesions.  Symptoms noted to be 

severe.  Patient was recently admitted for ileus and stool impaction.  She 

presents emergency department today complaining nausea vomiting abdominal 

discomfort associate with diarrhea typical for her severe exacerbation.  She is 

requesting pain medications.  States that she's not tolerating any oral fluids.

  Symptoms noted to be severe.  Patient primary care physician is Dr. Maicol Lang.No other modifying factors.  No other associated signs and symptoms.  No 

other complaints were noted.





GI CONSULT:  HPI as noted above.  GI consulted for abdominal pain.  This 

patient known to us previously admitted a few weeks prior for stool impaction 2/ 2 to [facility].  She presents c/o of abdominal pain with diarrhea x 3 days and 

hypoalbuminemia.  Lipase unremarkable.  Abdominal US taken in ER.





Endoscopy Procedure Note


Indication for Procedure:  CD, gerd


Procedures Performed:  EGD, colonoscopy


Operative Findings/Diagnosis:  , CD


THAOJAYDALINDA THOMASDAD - Oct 5, 2016 12:35


Home Meds


Reported Medications


Mesalamine (PENTASA) 500 Mg Capsule.er, 1000 MG ORAL TID, #30 CAP 0 Refills


   2/9/17


Montelukast Sodium* (MONTELUKAST SODIUM*) 10 Mg Tablet, 10 MG ORAL DAILY, TAB


   2/9/17


Quetiapine Fumarate* (SEROQUEL*) 200 Mg Tablet, 200 MG ORAL DAILY, TAB


   2/9/17


Zolpidem Tartrate* (ZOLPIDEM TARTRATE*) 10 Mg Tablet, 10 MG ORAL BEDTIME Y for 

Insomnia, TAB 0 Refills


   2/9/17


Risperidone* (RISPERDAL*) 0.5 Mg Tablet, 0.5 MG ORAL BEDTIME, #30 TAB 0 Refills


   2/9/17


Bupropion Hcl* (BUPROPION HCL*) 100 Mg Tablet, 100 MG ORAL DAILY, TAB


   2/9/17


Trazodone* (TRAZODONE*) 150 Mg Tablet, 100 MG ORAL BEDTIME Y for Insomnia, TAB


   2/9/17


Sucralfate* (CARAFATE*) 1 Gm Tablet, 1 GM ORAL TID, TAB


   2/9/17


Ibuprofen* (MOTRIN*) 600 Mg Tablet, 800 MG ORAL THREE TIMES A DAY, #30 TAB 0 

Refills


   2/9/17


Mesalamine (ASACOL HD) 800 Mg Tablet.dr, 800 MG ORAL THREE TIMES A DAY, TAB


   Do not break outer coating


   1/30/17


Polyethylene Glycol 3350* (MIRALAX*) 17 Gm Powd.pack, 17 GM ORAL BEDTIME, PACKET


   1/30/17


Nitroglycerin (NITROGLYCERIN) 0.4 Mg Tab.subl, 0.4 MG SL Q5MIN X3 Y for CHEST 

PAIN, TAB


   1/30/17


Docusate Sodium* (DOCUSATE SODIUM*) 100 Mg Capsule, 100 MG ORAL THREE TIMES A 

DAY, CAP


   1/30/17


Topiramate* (TOPAMAX*) 25 Mg Tablet, 25 MG ORAL TWICE A DAY, #60 TAB 0 Refills


   12/25/16


Quetiapine Fumarate* (SEROQUEL*) 200 Mg Tablet, 100 MG ORAL BEDTIME, TAB


   12/25/16


Mirtazapine* (MIRTAZAPINE*) 15 Mg Tablet, 30 MG ORAL BEDTIME, TAB


   12/25/16


Levetiracetam* (LEVETIRACETAM*) 500 Mg Tablet, 1500 MG ORAL TWICE A DAY, #60 

TAB 0 Refills


   12/25/16


Duloxetine Hcl* (CYMBALTA*) 60 Mg Capsule.dr, 60 MG ORAL DAILY, CAP


   12/25/16


Diphenhydramine HCl (Benadryl) 25 Mg Capsule, 25 MG PO Q6HR Y for Itching, CAP


   12/25/16


Discontinued Reported Medications


Al Hydroxide/mg Hydroxide (Mag-Al Liquid) 30 Ml Oral.susp, 30 ML ORAL Q6HR Y 

for DYSPEPSIA, ML


   1/30/17


Acetaminophen* (TYLENOL EXTRA STRENGTH*) 500 Mg Tablet, 650 MG ORAL Q4HR Y for 

Mild Pain/Temp > 100.5, TAB 0 Refills


   1/30/17


Med list reviewed/reconciled:  Yes


Allergies:  


Coded Allergies:  


     No Known Allergies (Verified , 10/27/06)





Patient History


History Provided By:  Patient, Medical Record


[facility] Narrative


Patient History


Past Medical History:  HTN, MI, other - Crohn's disease


Past Surgical History:  other - small bowel obstruction, lysis of adhesion


Social History:  Denies: alcohol use, drug use, smoking


Reviewed Nursing Documentation:  PMH: Agreed, PSxH: Agreed





Nursing Documentation-PMH


Hx Cardiac Problems:  Yes - MI


Hx Hypertension:  No


Hx Pacemaker:  No


Hx Asthma:  Yes


Hx COPD:  Yes


Hx Diabetes:  No


Hx Cancer:  No


Hx Gastrointestinal Problems:  Yes - Crohn's; C-diff; MRSA


Hx Dialysis:  No


Hx Neurological Problems:  Yes


Hx Cerebrovascular Accident:  Yes - 2005


Hx Seizures:  Yes


Hx Epilepsy:  Yes


Hx Vertigo:  Yes


Hx Dizziness:  Yes


Hx Syncope:  Yes


Hx Headaches:  Yes


Hx Weakness:  Yes


Hx Fatigue:  Yes





Review of Systems


All Other Systems:  negative except mentioned in HPI





Physical Exam





Vital Signs








  Date Time  Temp Pulse Resp B/P Pulse Ox O2 Delivery O2 Flow Rate FiO2


 


2/9/17 10:41 99.5 104 18 101/67 100 Room Air  








Sp02 EP Interpretation:  reviewed


Labs





Laboratory Tests








Test


  2/9/17


14:47 2/10/17


07:40


 


Urine Color Yellow   


 


Urine Appearance


  Slightly


cloudy 


 


 


Urine pH 6.5 (4.5-8.0)   


 


Urine Specific Gravity


  1.015


(1.005-1.035) 


 


 


Urine Protein


  2+ (NEGATIVE)


H 


 


 


Urine Glucose (UA)


  Negative


(NEGATIVE) 


 


 


Urine Ketones


  Negative


(NEGATIVE) 


 


 


Urine Occult Blood


  3+ (NEGATIVE)


H 


 


 


Urine Nitrite


  Negative


(NEGATIVE) 


 


 


Urine Bilirubin


  Negative


(NEGATIVE) 


 


 


Urine Urobilinogen


  Normal MG/DL


(0.0-1.0) 


 


 


Urine Leukocyte Esterase


  1+ (NEGATIVE)


H 


 


 


Urine RBC


  5-10 /HPF (0 -


2)  H 


 


 


Urine WBC


  5-10 /HPF (0 -


2)  H 


 


 


Urine Squamous Epithelial


Cells Few /LPF


(NONE/OCC) 


 


 


Urine Bacteria


  Few /HPF


(NONE) 


 


 


White Blood Count


  


  7.2 K/UL


(4.8-10.8)


 


Red Blood Count


  


  4.50 M/UL


(4.20-5.40)


 


Hemoglobin


  


  11.5 G/DL


(12.0-16.0)  L


 


Hematocrit


  


  38.1 %


(37.0-47.0)


 


Mean Corpuscular Volume  85 FL (80-99)  


 


Mean Corpuscular Hemoglobin


  


  25.6 PG


(27.0-31.0)  L


 


Mean Corpuscular Hemoglobin


Concent 


  30.2 G/DL


(32.0-36.0)  L


 


Red Cell Distribution Width


  


  16.3 %


(11.6-14.8)  H


 


Platelet Count


  


  462 K/UL


(150-450)  H


 


Mean Platelet Volume


  


  6.7 FL


(6.5-10.1)


 


Neutrophils (%) (Auto)


  


  60.4 %


(45.0-75.0)


 


Lymphocytes (%) (Auto)


  


  26.9 %


(20.0-45.0)


 


Monocytes (%) (Auto)


  


  10.8 %


(1.0-10.0)  H


 


Eosinophils (%) (Auto)


  


  1.2 %


(0.0-3.0)


 


Basophils (%) (Auto)


  


  0.6 %


(0.0-2.0)


 


Activated Partial


Thromboplast Time 


  28 SEC (23-33)


 


 


Sodium Level


  


  138 mEQ/L


(135-145)


 


Potassium Level


  


  3.5 mEQ/L


(3.4-4.9)


 


Chloride Level


  


  100 mEQ/L


()


 


Carbon Dioxide Level


  


  23 mEQ/L


(20-30)


 


Anion Gap  15 (5-15)  


 


Blood Urea Nitrogen


  


  19 mg/dL


(7-23)


 


Creatinine


  


  0.8 mg/dL


(0.5-0.9)


 


Estimat Glomerular Filtration


Rate 


  > 60 mL/min


(>60)


 


Glucose Level


  


  96 mg/dL


()


 


Calcium Level


  


  9.1 mg/dL


(8.6-10.2)


 


Total Bilirubin


  


  < 0.2 mg/dL


(0.0-1.2)


 


Aspartate Amino Transf


(AST/SGOT) 


  11 U/L (5-40)  


 


 


Alanine Aminotransferase


(ALT/SGPT) 


  8 U/L (3-33)  


 


 


Alkaline Phosphatase


  


  99 U/L


()


 


Total Protein


  


  8.2 g/dL


(6.6-8.7)


 


Albumin


  


  2.7 g/dL


(3.5-5.2)  L


 


Globulin  5.5 g/dL  


 


Albumin/Globulin Ratio


  


  0.4 (1.0-2.7)


L


 


Amylase Level


  


  36 U/L


()


 


Lipase  10 U/L (< 60)  








General Appearance:  well appearing, no apparent distress, alert


Head:  normocephalic


EENT:  normal ENT inspection


Neck:  supple


Respiratory:  normal breath sounds, no respiratory distress


Cardiovascular:  normal rate


Gastrointestinal:  other - abdominal bloating / LLQ pump


Musculoskeletal:  normal inspection


Neurologic:  normal inspection, alert, oriented x3, responsive


Psychiatric:  normal inspection, judgement/insight normal


Skin:  normal inspection, normal color, no rash, warm/dry


Lymphatic:  normal inspection, no adenopathy


Current Medications





Current Medications








 Medications


  (Trade)  Dose


 Ordered  Sig/Jed


 Route


 PRN Reason  Start Time


 Stop Time Status Last Admin


Dose Admin


 


 Acetaminophen


  (Tylenol)  650 mg  Q4H  PRN


 ORAL


 fever  2/9/17 18:00


 3/11/17 17:59   


 


 


 Al Hydroxide/Mg


 Hydroxide


  (Mylanta II)  30 ml  Q6H  PRN


 ORAL


 dyspepsia  2/9/17 18:00


 3/11/17 17:59   


 


 


 Bupropion HCl


  (Wellbutrin SR)  100 mg  DAILY


 ORAL


   2/10/17 09:00


 3/12/17 08:59  2/10/17 08:50


 


 


 Dextrose


  (Dextrose 50%)    STAT  PRN


 IV


 Hypoglycemia  2/9/17 18:00


 3/11/17 17:59   


 


 


 Dextrose/Sodium


 Chloride


  (D5 0.45% NS)  1,000 ml @ 


 75 mls/hr  [id number]


 IV


   2/9/17 18:00


 3/11/17 17:59  2/10/17 03:40


 


 


 Diphenhydramine


 HCl


  (Benadryl)  25 mg  Q6H  PRN


 IVP


 Itching  2/9/17 18:00


 3/11/17 17:59   


 


 


 Diphenhydramine


 HCl


  (Benadryl)  25 mg  Q6H  PRN


 ORAL


 Itching/Pruritis  2/9/17 18:00


 3/11/17 17:59   


 


 


 Duloxetine HCl


  (Cymbalta)  60 mg  DAILY


 ORAL


   2/10/17 09:00


 3/12/17 08:59  2/10/17 08:50


 


 


 Heparin Sodium


  (Porcine)


  (Heparin 5000


 units/ml)  5,000 units  EVERY 12  HOURS


 SUBQ


   2/9/17 21:00


 3/11/17 20:59  2/10/17 08:54


 


 


 Hydromorphone HCl


  (Dilaudid)  1 mg  Q3H  PRN


 IVP


 Severe Pain (Pain Scale 7-10)  2/9/17 18:45


 2/16/17 18:44  2/10/17 09:55


 


 


 Levetiracetam


  (Keppra)  1,500 mg  TWICE A  DAY


 ORAL


   2/9/17 18:00


 3/11/17 17:59  2/10/17 08:50


 


 


 Mirtazapine


  (Remeron)  30 mg  BEDTIME


 ORAL


   2/9/17 21:00


 3/11/17 20:59  2/9/17 21:08


 


 


 Montelukast Sodium


  (Singulair)  10 mg  DAILY


 ORAL


   2/10/17 09:00


 3/12/17 08:59  2/10/17 08:50


 


 


 Nitroglycerin


  (Ntg)  0.4 mg  Q5M X 3 DOSES PRN


 SL


 Prn Chest Pain  2/9/17 18:00


 3/11/17 17:59   


 


 


 Ondansetron HCl


  (Zofran)  4 mg  Q6H  PRN


 IVP


 Nausea & Vomiting  2/9/17 18:00


 3/11/17 17:59   


 


 


 Polyethylene


 Glycol


  (Miralax)  17 gm  BEDTIME


 ORAL


   2/9/17 21:00


 3/11/17 20:59   


 


 


 Polyethylene


 Glycol


  (Miralax)  17 gm  HSPRN  PRN


 ORAL


 Constipation  2/9/17 18:00


 3/11/17 17:59   


 


 


 Quetiapine


 Fumarate


  (SEROquel)  100 mg  BEDTIME


 ORAL


   2/9/17 21:00


 3/11/17 20:59  2/9/17 21:08


 


 


 Quetiapine


 Fumarate


  (SEROquel)  200 mg  DAILY


 ORAL


   2/10/17 09:00


 3/12/17 08:59  2/10/17 08:50


 


 


 Sucralfate


  (Carafate)  1 gm  TID


 ORAL


   2/9/17 18:00


 3/11/17 17:59  2/10/17 08:49


 


 


 Topiramate


  (Topamax)  25 mg  TWICE A  DAY


 ORAL


   2/9/17 18:00


 3/11/17 17:59  2/10/17 08:50


 


 


 Trazodone HCl 100


 mg  100 mg  HSPRN  PRN


 ORAL


 Insomnia  2/9/17 17:30


 3/11/17 17:29   


 











GI: Plan


Problems:  


(1) Diarrhea


(2) Crohn's disease


(3) Chronic pain


(4) History of exploratory laparotomy


(5) Nausea and vomiting


Plan


fu Abd U/S


s/p EGD/colon 2016 >> Gastric Ulcer, Crohns





diarrhea most likely from Crohn's Flare


ordered cdiff, stool studies


mesalamine for Crohns


FLD, adv as tolerated


ppi


pain mgmt


fu labs





Discussed with Dr. Mcleod.


Thank you for referring this patient, we will follow.











Elena Seay N.P. Feb 10, 2017 13:08

## 2017-02-11 VITALS — SYSTOLIC BLOOD PRESSURE: 117 MMHG | DIASTOLIC BLOOD PRESSURE: 77 MMHG

## 2017-02-11 VITALS — SYSTOLIC BLOOD PRESSURE: 108 MMHG | DIASTOLIC BLOOD PRESSURE: 67 MMHG

## 2017-02-11 VITALS — DIASTOLIC BLOOD PRESSURE: 70 MMHG | SYSTOLIC BLOOD PRESSURE: 115 MMHG

## 2017-02-11 VITALS — SYSTOLIC BLOOD PRESSURE: 109 MMHG | DIASTOLIC BLOOD PRESSURE: 66 MMHG

## 2017-02-11 VITALS — DIASTOLIC BLOOD PRESSURE: 66 MMHG | SYSTOLIC BLOOD PRESSURE: 114 MMHG

## 2017-02-11 VITALS — SYSTOLIC BLOOD PRESSURE: 101 MMHG | DIASTOLIC BLOOD PRESSURE: 73 MMHG

## 2017-02-11 LAB
ANION GAP SERPL CALC-SCNC: 18 MMOL/L (ref 5–15)
BASOPHILS NFR BLD AUTO: 0.8 % (ref 0–2)
CALCIUM SERPL-MCNC: 9.5 MG/DL (ref 8.6–10.2)
CHLORIDE SERPL-SCNC: 96 MEQ/L (ref 98–107)
CO2 SERPL-SCNC: 22 MEQ/L (ref 20–30)
CREAT SERPL-MCNC: 0.9 MG/DL (ref 0.5–0.9)
EOSINOPHIL NFR BLD AUTO: 2 % (ref 0–3)
ERYTHROCYTE [DISTWIDTH] IN BLOOD BY AUTOMATED COUNT: 16.2 % (ref 11.6–14.8)
GFR SERPLBLD BASED ON 1.73 SQ M-ARVRAT: > 60 ML/MIN (ref 60–?)
HEMOLYSIS: 3
LYMPHOCYTES NFR BLD AUTO: 31.5 % (ref 20–45)
MAGNESIUM SERPL-MCNC: 1.6 MG/DL (ref 1.7–2.5)
MCH RBC QN AUTO: 26 PG (ref 27–31)
MCHC RBC AUTO-ENTMCNC: 30.9 G/DL (ref 32–36)
MCV RBC AUTO: 84 FL (ref 80–99)
MONOCYTES NFR BLD AUTO: 12.4 % (ref 1–10)
NEUTROPHILS NFR BLD AUTO: 53.4 % (ref 45–75)
PHOSPHATE SERPL-MCNC: 3.1 MG/DL (ref 2.5–4.8)
PLATELET # BLD: 452 K/UL (ref 150–450)
PMV BLD AUTO: 6.6 FL (ref 6.5–10.1)
POTASSIUM SERPL-SCNC: 3.1 MEQ/L (ref 3.4–4.9)
RBC # BLD AUTO: 4.62 M/UL (ref 4.2–5.4)
SODIUM SERPL-SCNC: 136 MEQ/L (ref 135–145)
WBC # BLD AUTO: 6.4 K/UL (ref 4.8–10.8)

## 2017-02-11 RX ADMIN — TOPIRAMATE SCH MG: 25 TABLET, COATED ORAL at 19:05

## 2017-02-11 RX ADMIN — QUETIAPINE SCH MG: 200 TABLET, FILM COATED ORAL at 09:13

## 2017-02-11 RX ADMIN — DIPHENHYDRAMINE HYDROCHLORIDE PRN MG: 50 INJECTION INTRAMUSCULAR; INTRAVENOUS at 19:41

## 2017-02-11 RX ADMIN — BUPROPION HYDROCHLORIDE SCH MG: 100 TABLET, EXTENDED RELEASE ORAL at 09:13

## 2017-02-11 RX ADMIN — DIPHENHYDRAMINE HYDROCHLORIDE PRN MG: 50 INJECTION INTRAMUSCULAR; INTRAVENOUS at 08:27

## 2017-02-11 RX ADMIN — HEPARIN SODIUM SCH UNITS: 5000 INJECTION INTRAVENOUS; SUBCUTANEOUS at 09:14

## 2017-02-11 RX ADMIN — POLYETHYLENE GLYCOL 3350 SCH GM: 17 POWDER, FOR SOLUTION ORAL at 20:32

## 2017-02-11 RX ADMIN — SUCRALFATE SCH GM: 1 TABLET ORAL at 09:21

## 2017-02-11 RX ADMIN — SUCRALFATE SCH GM: 1 TABLET ORAL at 14:00

## 2017-02-11 RX ADMIN — SUCRALFATE SCH GM: 1 TABLET ORAL at 19:05

## 2017-02-11 RX ADMIN — DEXTROSE AND SODIUM CHLORIDE SCH MLS/HR: 5; .45 INJECTION, SOLUTION INTRAVENOUS at 09:11

## 2017-02-11 RX ADMIN — HEPARIN SODIUM SCH UNITS: 5000 INJECTION INTRAVENOUS; SUBCUTANEOUS at 20:26

## 2017-02-11 RX ADMIN — TOPIRAMATE SCH MG: 25 TABLET, COATED ORAL at 09:13

## 2017-02-11 RX ADMIN — DULOXETINE HYDROCHLORIDE SCH MG: 30 CAPSULE, DELAYED RELEASE ORAL at 09:13

## 2017-02-11 RX ADMIN — MONTELUKAST SODIUM SCH MG: 10 TABLET, COATED ORAL at 09:14

## 2017-02-11 RX ADMIN — QUETIAPINE SCH MG: 200 TABLET, FILM COATED ORAL at 20:25

## 2017-02-11 RX ADMIN — DIPHENHYDRAMINE HYDROCHLORIDE PRN MG: 50 INJECTION INTRAMUSCULAR; INTRAVENOUS at 16:14

## 2017-02-11 NOTE — PULMONOLOGY PROGRESS NOTE
Assessment/Plan


Problems:  


(1) Nausea and vomiting


(2) IBD (inflammatory bowel disease)


(3) Asthma


(4) Interstitial lung disease


(5) Seizure


(6) Psychosis


(7) Crohns disease


(8) History of exploratory laparotomy


Assessment/Plan


improving 


IV fluids


GI note appreciated


f/u electrolytes


pain management.


diet was started today, tolerating well





Subjective


ROS Limited/Unobtainable:  No


Interval Events:  improving slowly, on clear liquid diet


Allergies:  


Coded Allergies:  


     No Known Allergies (Verified , 10/27/06)





Objective





Last 24 Hour Vital Signs








  Date Time  Temp Pulse Resp B/P Pulse Ox O2 Delivery O2 Flow Rate FiO2


 


2/11/17 16:44 98.6       


 


2/11/17 16:00 98.6 87 18 101/73 100 Room Air  


 


2/11/17 11:34 98.2 84 19 108/67 95 Room Air  


 


2/11/17 07:42 98.6 85 19 114/66 95 Room Air  


 


2/11/17 04:00 96.4 78 20 109/66 93 Room Air  


 


2/11/17 00:00 98.4 78 20 115/70 94 Room Air  


 


2/10/17 20:00 98.2 62 19 136/70 100 Room Air  

















Intake and Output  


 


 2/10/17 2/11/17





 19:00 07:00


 


Intake Total 225 ml 1075 ml


 


Balance 225 ml 1075 ml


 


  


 


Intake Oral  400 ml


 


IV Total 225 ml 675 ml


 


# Voids 3 3








Objective





General Appearance:  WD/WN, no apparent distress, alert


EENT:  PERRL/EOMI, normal ENT inspection, TMs normal, hair lose.


Neck:  non-tender, normal alignment, supple, normal inspection


Cardiovascular:  normal peripheral pulses, normal rate, regular rhythm, no 

murmur.


Respiratory/Chest:  CTA, crackles/rales, rhonchi , no wheezing


Abdomen:  normal bowel sounds, non tender, soft, no mass


Extremities:  normal range of motion, non-tender


Neurologic:  CNs II-XII grossly normal, Moving all extremities.


Skin:  normal pigmentation, warm/dry


Laboratory Tests


2/11/17 05:15: 


White Blood Count 6.4, Red Blood Count 4.62, Hemoglobin 12.0, Hematocrit 38.9, 

Mean Corpuscular Volume 84, Mean Corpuscular Hemoglobin 26.0L, Mean Corpuscular 

Hemoglobin Concent 30.9L, Red Cell Distribution Width 16.2H, Platelet Count 452H

, Mean Platelet Volume 6.6, Neutrophils (%) (Auto) 53.4, Lymphocytes (%) (Auto) 

31.5, Monocytes (%) (Auto) 12.4H, Eosinophils (%) (Auto) 2.0, Basophils (%) (

Auto) 0.8, Sodium Level 136, Potassium Level 3.1L, Chloride Level 96L, Carbon 

Dioxide Level 22, Anion Gap 18H, Blood Urea Nitrogen 16, Creatinine 0.9, 

Estimat Glomerular Filtration Rate > 60, Glucose Level 93, Calcium Level 9.5, 

Phosphorus Level 3.1, Magnesium Level 1.6L





Current Medications








 Medications


  (Trade)  Dose


 Ordered  Sig/Jed


 Route


 PRN Reason  Start Time


 Stop Time Status Last Admin


Dose Admin


 


 Acetaminophen


  (Tylenol)  650 mg  Q4H  PRN


 ORAL


 fever  2/9/17 18:00


 3/11/17 17:59   


 


 


 Al Hydroxide/Mg


 Hydroxide


  (Mylanta II)  30 ml  Q6H  PRN


 ORAL


 dyspepsia  2/9/17 18:00


 3/11/17 17:59   


 


 


 Bupropion HCl


  (Wellbutrin SR)  100 mg  DAILY


 ORAL


   2/10/17 09:00


 3/12/17 08:59  2/11/17 09:13


 


 


 Dextrose


  (Dextrose 50%)    STAT  PRN


 IV


 Hypoglycemia  2/9/17 18:00


 3/11/17 17:59   


 


 


 Dextrose/Sodium


 Chloride


  (D5 0.45% NS)  1,000 ml @ 


 75 mls/hr  [id number]


 IV


   2/9/17 18:00


 3/11/17 17:59  2/11/17 09:11


 


 


 Diphenhydramine


 HCl


  (Benadryl)  25 mg  Q6H  PRN


 IVP


 Itching  2/9/17 18:00


 3/11/17 17:59   


 


 


 Diphenhydramine


 HCl


  (Benadryl)  25 mg  Q6H  PRN


 ORAL


 Itching/Pruritis  2/9/17 18:00


 3/11/17 17:59   


 


 


 Duloxetine HCl


  (Cymbalta)  60 mg  DAILY


 ORAL


   2/10/17 09:00


 3/12/17 08:59  2/11/17 09:13


 


 


 Heparin Sodium


  (Porcine)


  (Heparin 5000


 units/ml)  5,000 units  EVERY 12  HOURS


 SUBQ


   2/9/17 21:00


 3/11/17 20:59  2/11/17 09:14


 


 


 Hydromorphone HCl


  (Dilaudid)  1 mg  Q3H  PRN


 IVP


 Severe Pain (Pain Scale 7-10)  2/9/17 18:45


 2/16/17 18:44  2/11/17 16:14


 


 


 Levetiracetam


  (Keppra)  1,500 mg  TWICE A  DAY


 ORAL


   2/9/17 18:00


 3/11/17 17:59  2/11/17 09:13


 


 


 Mirtazapine


  (Remeron)  30 mg  BEDTIME


 ORAL


   2/9/17 21:00


 3/11/17 20:59  2/10/17 21:21


 


 


 Montelukast Sodium


  (Singulair)  10 mg  DAILY


 ORAL


   2/10/17 09:00


 3/12/17 08:59  2/11/17 09:14


 


 


 Nitroglycerin


  (Ntg)  0.4 mg  Q5M X 3 DOSES PRN


 SL


 Prn Chest Pain  2/9/17 18:00


 3/11/17 17:59   


 


 


 Ondansetron HCl


  (Zofran)  4 mg  Q6H  PRN


 IVP


 Nausea & Vomiting  2/9/17 18:00


 3/11/17 17:59  2/11/17 17:38


 


 


 Polyethylene


 Glycol


  (Miralax)  17 gm  BEDTIME


 ORAL


   2/9/17 21:00


 3/11/17 20:59   


 


 


 Polyethylene


 Glycol


  (Miralax)  17 gm  HSPRN  PRN


 ORAL


 Constipation  2/9/17 18:00


 3/11/17 17:59   


 


 


 Quetiapine


 Fumarate


  (SEROquel)  100 mg  BEDTIME


 ORAL


   2/9/17 21:00


 3/11/17 20:59  2/10/17 21:21


 


 


 Quetiapine


 Fumarate


  (SEROquel)  200 mg  DAILY


 ORAL


   2/10/17 09:00


 3/12/17 08:59  2/11/17 09:13


 


 


 Sucralfate


  (Carafate)  1 gm  TID


 ORAL


   2/9/17 18:00


 3/11/17 17:59  2/11/17 14:00


 


 


 Topiramate


  (Topamax)  25 mg  TWICE A  DAY


 ORAL


   2/9/17 18:00


 3/11/17 17:59  2/11/17 09:13


 


 


 Trazodone HCl 100


 mg  100 mg  HSPRN  PRN


 ORAL


 Insomnia  2/9/17 17:30


 3/11/17 17:29   


 

















JULIETTE KLEIN Feb 11, 2017 18:12

## 2017-02-11 NOTE — GENERAL PROGRESS NOTE
Assessment/Plan


Problem List:  


(1) Respiratory distress


(2) Dyspnea


(3) Chronic abdominal pain


(4) SOB (shortness of breath)


ICD Codes:  R06.02 - Shortness of breath


SNOMED:  427495369


(5) COPD (chronic obstructive pulmonary disease)


ICD Codes:  J44.9 - Chronic obstructive pulmonary disease


SNOMED:  54896916


(6) Abdominal pain


(7) UTI (urinary tract infection)


ICD Codes:  N39.0 - Urinary tract infection, site not specified


SNOMED:  86586437


(8) CHF (congestive heart failure)


ICD Codes:  I50.9 - Heart failure, unspecified


SNOMED:  43008251


(9) Crohns disease


ICD Codes:  K50.90 - Crohns disease


SNOMED:  96402088


(10) Nausea and vomiting


(11) Diarrhea


ICD Codes:  R19.7 - Diarrhea, unspecified


SNOMED:  97131232


Status:  stable, progressing


Assessment/Plan


ot pt diet abx cbc bmp am promise ltach eval





Subjective


Constitutional:  Reports: weakness


Allergies:  


Coded Allergies:  


     No Known Allergies (Verified , 10/27/06)


All Systems:  reviewed and negative except above


Subjective


lethargic sleepy





Objective





Last 24 Hour Vital Signs








  Date Time  Temp Pulse Resp B/P Pulse Ox O2 Delivery O2 Flow Rate FiO2


 


2/11/17 04:00 96.4 78 20 109/66 93 Room Air  


 


2/11/17 00:00 98.4 78 20 115/70 94 Room Air  


 


2/10/17 23:30 98.2       


 


2/10/17 20:00 98.2 62 19 136/70 100 Room Air  


 


2/10/17 16:00 99.0 81 16 110/65 100 Room Air  


 


2/10/17 11:57 98.0 82 19 96/54 97 Room Air  


 


2/10/17 08:00 97.9 73 21 106/55 99 Room Air  

















Intake and Output  


 


 2/10/17 2/11/17





 19:00 07:00


 


Intake Total 225 ml 1000 ml


 


Balance 225 ml 1000 ml


 


  


 


Intake Oral  400 ml


 


IV Total 225 ml 600 ml


 


# Voids 3 3








Laboratory Tests


2/10/17 07:40: 


White Blood Count 7.2, Red Blood Count 4.50, Hemoglobin 11.5L, Hematocrit 38.1, 

Mean Corpuscular Volume 85, Mean Corpuscular Hemoglobin 25.6L, Mean Corpuscular 

Hemoglobin Concent 30.2L, Red Cell Distribution Width 16.3H, Platelet Count 462H

, Mean Platelet Volume 6.7, Neutrophils (%) (Auto) 60.4, Lymphocytes (%) (Auto) 

26.9, Monocytes (%) (Auto) 10.8H, Eosinophils (%) (Auto) 1.2, Basophils (%) (

Auto) 0.6, Activated Partial Thromboplast Time 28, Sodium Level 138, Potassium 

Level 3.5, Chloride Level 100, Carbon Dioxide Level 23, Anion Gap 15, Blood 

Urea Nitrogen 19, Creatinine 0.8, Estimat Glomerular Filtration Rate > 60, 

Glucose Level 96, Calcium Level 9.1, Total Bilirubin < 0.2, Aspartate Amino 

Transf (AST/SGOT) 11, Alanine Aminotransferase (ALT/SGPT) 8, Alkaline 

Phosphatase 99, Total Protein 8.2, Albumin 2.7L, Globulin 5.5, Albumin/Globulin 

Ratio 0.4L, Amylase Level 36, Lipase 10


2/11/17 05:15: 


White Blood Count [Pending], Red Blood Count [Pending], Hemoglobin [Pending], 

Hematocrit [Pending], Mean Corpuscular Volume [Pending], Mean Corpuscular 

Hemoglobin [Pending], Mean Corpuscular Hemoglobin Concent [Pending], Red Cell 

Distribution Width [Pending], Platelet Count [Pending], Mean Platelet Volume [

Pending], Neutrophils (%) (Auto) [Pending], Lymphocytes (%) (Auto) [Pending], 

Monocytes (%) (Auto) [Pending], Eosinophils (%) (Auto) [Pending], Basophils (%) 

(Auto) [Pending], Sodium Level [Pending], Potassium Level [Pending], Chloride 

Level [Pending], Carbon Dioxide Level [Pending], Blood Urea Nitrogen [Pending], 

Creatinine [Pending], Estimat Glomerular Filtration Rate [Pending], Glucose 

Level [Pending], Calcium Level [Pending], Phosphorus Level [Pending], Magnesium 

Level [Pending]


Height (Feet):  5


Height (Inches):  6.00


Weight (Pounds):  164


General Appearance:  lethargic


EENT:  normal ENT inspection


Neck:  normal alignment


Cardiovascular:  normal peripheral pulses, normal rate, regular rhythm


Respiratory/Chest:  chest wall non-tender, lungs clear, normal breath sounds


Abdomen:  normal bowel sounds, non tender, soft


Extremities:  normal inspection


Edema:  no edema noted Arm (L), no edema noted Arm (R), no edema noted Leg (L), 

no edema noted Leg (R), no edema noted Pedal (L), no edema noted Pedal (R), no 

edema noted Generalized


Neurologic:  motor weakness


Skin:  normal pigmentation, warm/dry











MAICOL LANG Feb 11, 2017 07:41

## 2017-02-11 NOTE — CONSULTATION
DATE OF CONSULTATION:  02/10/2017



PSYCHOTHERAPY CONSULTATION PROGRESS NOTE:



CONSULTING PHYSICIAN:  Orestes Ross M.D.



TREATING ATTENDING PHYSICIAN:  Maxim Hopkins D.O.



HISTORY OF PRESENT ILLNESS:  The patient is a 61-year-old female

admitted to the hospital for abdominal pain.  The patient states that she

has been experiencing severe anxiety and depression.  She states that she

had a diagnosis of bipolar disorder.  The patient denies suicidal or

homicidal ideation.  The patient denied auditory or visual hallucination.

_____ anxiety and depression.  This clinician assessed this patient and

provided the patient with reality orientation, supportive psychotherapy.

 



PAST MEDICAL HISTORY:  History of COPD, chronic disease, CHF, and

chronic pain.



ALLERGIES:  The patient has no known drug allergies.



SUBSTANCE USE HISTORY:  The patient denies any history of alcohol

use, illicit substance use, or smoking cigarettes.



PSYCHIATRIC HISTORY:  The patient states that she has a history of

bipolar disorder and it has been treated with psychotropic medications in

the past.



SOCIAL HISTORY:  The patient is a 61-year-old female, lives

independently.  Financially sustained through the Medicare.



MENTAL STATUS EXAMINATION:  The patient is alert and oriented x3 to

person, place, and time.  Her mood is anxious.  Affect is congruent.

Thought process is slightly disorganized.  The patient has poor attention

and concentration.  Poor insight, judgment, and impulse control.



DIAGNOSES:

Axis I  Bipolar disorder, recurrent, moderate.

Axis II  Deferred.

Axis III  Per History and Physical.

Axis IV  Problem with social environment.



PLAN:  This clinician assessed the patient, this clinician has

reviewed the patient's chart and _____ for anxiety and depression

providing with coping skills.  Continue with medication management and

behavioral management.  Continue to encourage _____ .  Review with the

patient's chart and discuss the treatment with nursing staff.









  ______________________________________________

  Orestes Ross PsyD.





:  MONICA

D:  02/11/2017 06:38

T:  02/11/2017 11:54

JOB#:  [id number]

CC:

## 2017-02-12 VITALS — SYSTOLIC BLOOD PRESSURE: 111 MMHG | DIASTOLIC BLOOD PRESSURE: 77 MMHG

## 2017-02-12 VITALS — SYSTOLIC BLOOD PRESSURE: 117 MMHG | DIASTOLIC BLOOD PRESSURE: 81 MMHG

## 2017-02-12 VITALS — DIASTOLIC BLOOD PRESSURE: 76 MMHG | SYSTOLIC BLOOD PRESSURE: 102 MMHG

## 2017-02-12 VITALS — DIASTOLIC BLOOD PRESSURE: 74 MMHG | SYSTOLIC BLOOD PRESSURE: 119 MMHG

## 2017-02-12 VITALS — DIASTOLIC BLOOD PRESSURE: 87 MMHG | SYSTOLIC BLOOD PRESSURE: 117 MMHG

## 2017-02-12 VITALS — DIASTOLIC BLOOD PRESSURE: 74 MMHG | SYSTOLIC BLOOD PRESSURE: 124 MMHG

## 2017-02-12 LAB
ANION GAP SERPL CALC-SCNC: 17 MMOL/L (ref 5–15)
BASOPHILS NFR BLD AUTO: 0.4 % (ref 0–2)
CALCIUM SERPL-MCNC: 10.5 MG/DL (ref 8.6–10.2)
CHLORIDE SERPL-SCNC: 91 MEQ/L (ref 98–107)
CO2 SERPL-SCNC: 28 MEQ/L (ref 20–30)
CREAT SERPL-MCNC: 1.3 MG/DL (ref 0.5–0.9)
EOSINOPHIL NFR BLD AUTO: 0.4 % (ref 0–3)
ERYTHROCYTE [DISTWIDTH] IN BLOOD BY AUTOMATED COUNT: 16.4 % (ref 11.6–14.8)
GFR SERPLBLD BASED ON 1.73 SQ M-ARVRAT: 50.2 ML/MIN (ref 60–?)
HEMOLYSIS: 0
LYMPHOCYTES NFR BLD AUTO: 19.8 % (ref 20–45)
MCH RBC QN AUTO: 25 PG (ref 27–31)
MCHC RBC AUTO-ENTMCNC: 29.9 G/DL (ref 32–36)
MCV RBC AUTO: 83 FL (ref 80–99)
MONOCYTES NFR BLD AUTO: 9.2 % (ref 1–10)
NEUTROPHILS NFR BLD AUTO: 70.2 % (ref 45–75)
PLATELET # BLD: 506 K/UL (ref 150–450)
PMV BLD AUTO: 7 FL (ref 6.5–10.1)
POTASSIUM SERPL-SCNC: 3 MEQ/L (ref 3.4–4.9)
RBC # BLD AUTO: 5.82 M/UL (ref 4.2–5.4)
SODIUM SERPL-SCNC: 136 MEQ/L (ref 135–145)
WBC # BLD AUTO: 11.1 K/UL (ref 4.8–10.8)

## 2017-02-12 RX ADMIN — TOPIRAMATE SCH MG: 25 TABLET, COATED ORAL at 10:04

## 2017-02-12 RX ADMIN — DIPHENHYDRAMINE HYDROCHLORIDE PRN MG: 50 INJECTION INTRAMUSCULAR; INTRAVENOUS at 06:03

## 2017-02-12 RX ADMIN — MONTELUKAST SODIUM SCH MG: 10 TABLET, COATED ORAL at 10:05

## 2017-02-12 RX ADMIN — DIPHENHYDRAMINE HYDROCHLORIDE PRN MG: 50 INJECTION INTRAMUSCULAR; INTRAVENOUS at 22:35

## 2017-02-12 RX ADMIN — TOPIRAMATE SCH MG: 25 TABLET, COATED ORAL at 17:34

## 2017-02-12 RX ADMIN — BUPROPION HYDROCHLORIDE SCH MG: 100 TABLET, EXTENDED RELEASE ORAL at 10:04

## 2017-02-12 RX ADMIN — SUCRALFATE SCH GM: 1 TABLET ORAL at 10:05

## 2017-02-12 RX ADMIN — SUCRALFATE SCH GM: 1 TABLET ORAL at 17:33

## 2017-02-12 RX ADMIN — DIPHENHYDRAMINE HYDROCHLORIDE PRN MG: 50 INJECTION INTRAMUSCULAR; INTRAVENOUS at 16:55

## 2017-02-12 RX ADMIN — HEPARIN SODIUM SCH UNITS: 5000 INJECTION INTRAVENOUS; SUBCUTANEOUS at 20:46

## 2017-02-12 RX ADMIN — DULOXETINE HYDROCHLORIDE SCH MG: 30 CAPSULE, DELAYED RELEASE ORAL at 10:04

## 2017-02-12 RX ADMIN — DIPHENHYDRAMINE HYDROCHLORIDE PRN MG: 50 INJECTION INTRAMUSCULAR; INTRAVENOUS at 00:50

## 2017-02-12 RX ADMIN — DEXTROSE AND SODIUM CHLORIDE SCH MLS/HR: 5; .45 INJECTION, SOLUTION INTRAVENOUS at 03:19

## 2017-02-12 RX ADMIN — POLYETHYLENE GLYCOL 3350 SCH GM: 17 POWDER, FOR SOLUTION ORAL at 21:00

## 2017-02-12 RX ADMIN — SODIUM CHLORIDE SCH MG: 0.9 INJECTION INTRAVENOUS at 20:45

## 2017-02-12 RX ADMIN — HEPARIN SODIUM SCH UNITS: 5000 INJECTION INTRAVENOUS; SUBCUTANEOUS at 10:06

## 2017-02-12 RX ADMIN — QUETIAPINE SCH MG: 200 TABLET, FILM COATED ORAL at 10:26

## 2017-02-12 RX ADMIN — DIPHENHYDRAMINE HYDROCHLORIDE PRN MG: 50 INJECTION INTRAMUSCULAR; INTRAVENOUS at 09:06

## 2017-02-12 RX ADMIN — DIPHENHYDRAMINE HYDROCHLORIDE PRN MG: 50 INJECTION INTRAMUSCULAR; INTRAVENOUS at 13:58

## 2017-02-12 RX ADMIN — SUCRALFATE SCH GM: 1 TABLET ORAL at 13:24

## 2017-02-12 NOTE — PROGRESS NOTE
DATE:  02/11/2017



PSYCHOTHERAPY CONSULTATION PROGRESS NOTE



TREATING ATTENDING:  Maxim Hopkins D.O.



SUBJECTIVE:  The patient is a 61-year-old female, remained confused

slightly and disorganized, however, anxious and depressed __________ was

very tired __________.



PLAN:  This clinician assessed the patient.  Provided the patient

with reality orientation and supportive psychotherapy.  Encouraging the

patient to participate in treatment and milieu.  Provided the patient

inpatient insight into mental illness.  Continue with medication

management and behavioral management.  This clinician has reviewed the

patient's chart.  Discussed the treatment with nursing staff.









  ______________________________________________

  Orestes Ross PsyD.





DR:  Jess

D:  02/12/2017 05:19

T:  02/12/2017 21:07

JOB#:  [id number]

CC:

## 2017-02-12 NOTE — DIAGNOSTIC IMAGING REPORT
Clinical history: 2/12/17.



Technique: Single frontal abdominal radiograph was obtained.



Comparisons: Abdomen/pelvis CT dated 1/21/17.



Findings: Neural stimulation device is noted in the left hemipelvis. Postsurgical

changes are noted. There is moderate air distention of several small and large 

bowel

loops throughout the abdomen and pelvis, suspicious for ileus or at least partial

small bowel obstruction.



Impression:

Moderate air distention of several small and large bowel loops suspicious for ileus

or at least partial small bowel obstruction. Postsurgical changes noted.

## 2017-02-12 NOTE — PROGRESS NOTE
DATE:  02/12/2017



SUBJECTIVE:  This is a 61-year-old female patient with abdominal

pain.



PLAN:  For this patient is to treat her with Remeron, Cymbalta,

trazodone and Topamax.  She will continue to be followed by Psychiatry

throughout the hospital course.  Chart reviewed.  Discussed with staff.

Seen and assessed at bedside.









  ______________________________________________

  Samantha Rick M.D.





DR:  BASSEM

D:  02/12/2017 20:12

T:  02/12/2017 22:24

JOB#:  [id number]

CC:

## 2017-02-12 NOTE — GENERAL PROGRESS NOTE
Assessment/Plan


Problem List:  


(1) Respiratory distress


(2) Dyspnea


(3) Chronic abdominal pain


(4) SOB (shortness of breath)


ICD Codes:  R06.02 - Shortness of breath


SNOMED:  081170274


(5) COPD (chronic obstructive pulmonary disease)


ICD Codes:  J44.9 - Chronic obstructive pulmonary disease


SNOMED:  16868711


(6) Abdominal pain


(7) UTI (urinary tract infection)


ICD Codes:  N39.0 - Urinary tract infection, site not specified


SNOMED:  14706132


(8) CHF (congestive heart failure)


ICD Codes:  I50.9 - Heart failure, unspecified


SNOMED:  65002395


(9) Crohns disease


ICD Codes:  K50.90 - Crohns disease


SNOMED:  43598742


(10) Nausea and vomiting


(11) Diarrhea


ICD Codes:  R19.7 - Diarrhea, unspecified


SNOMED:  55418091


Status:  stable, progressing, tolerating diet


Assessment/Plan


ot pt diet abx cbc bmp am promise ltach eval





Subjective


Constitutional:  Reports: weakness


Allergies:  


Coded Allergies:  


     No Known Allergies (Verified , 10/27/06)


All Systems:  reviewed and negative except above


Subjective


lethargic sleepy





Objective





Last 24 Hour Vital Signs








  Date Time  Temp Pulse Resp B/P Pulse Ox O2 Delivery O2 Flow Rate FiO2


 


2/12/17 04:00 97.3 93 20 124/74 95 Room Air  


 


2/12/17 00:00 96.8 87 20 117/87 95 Room Air  


 


2/11/17 20:00 98.2 93 19 117/77 100 Room Air  


 


2/11/17 16:44 98.6       


 


2/11/17 16:00 98.6 87 18 101/73 100 Room Air  


 


2/11/17 11:34 98.2 84 19 108/67 95 Room Air  


 


2/11/17 07:42 98.6 85 19 114/66 95 Room Air  

















Intake and Output  


 


 2/11/17 2/12/17





 19:00 07:00


 


Intake Total 1275 ml 755 ml


 


Balance 1275 ml 755 ml


 


  


 


Intake Oral 450 ml 480 ml


 


IV Total 825 ml 275 ml


 


# Voids 2 2


 


# Bowel Movements 4 3








Height (Feet):  5


Height (Inches):  6.00


Weight (Pounds):  164


General Appearance:  lethargic


EENT:  normal ENT inspection


Neck:  normal alignment


Cardiovascular:  normal peripheral pulses, normal rate, regular rhythm


Respiratory/Chest:  chest wall non-tender, lungs clear, normal breath sounds


Abdomen:  normal bowel sounds, non tender, hypoactive bowel sounds


Extremities:  normal inspection


Edema:  no edema noted Arm (L), no edema noted Arm (R), no edema noted Leg (L), 

no edema noted Leg (R), no edema noted Pedal (L), no edema noted Pedal (R), no 

edema noted Generalized


Neurologic:  responsive, motor weakness


Skin:  normal pigmentation, warm/dry











MAICOL LANG Feb 12, 2017 07:27

## 2017-02-12 NOTE — PROGRESS NOTE
DATE:  02/11/2017



SUBJECTIVE:  Treated with Remeron, Cymbalta, trazodone, Topamax,

Seroquel, and Wellbutrin to stabilize her mood, depression, and anxiety.

She is depressed, secondary to her medical condition.



Chart reviewed and discussed with staff.  Seen and assessed at

bedside.









  ______________________________________________

  Samantha Rick M.D.





DR:  FAY

D:  02/12/2017 20:11

T:  02/12/2017 22:39

JOB#:  [id number]

CC:

## 2017-02-12 NOTE — PULMONOLOGY PROGRESS NOTE
Assessment/Plan


Problems:  


(1) Nausea and vomiting


(2) IBD (inflammatory bowel disease)


(3) Asthma


(4) Interstitial lung disease


(5) Seizure


(6) Psychosis


(7) Crohns disease


(8) History of exploratory laparotomy


Assessment/Plan


npo again


IV fluids


GI note appreciated


f/u electrolytes


pain management.


f/u GI recommendation


pts respiratory status stable





Subjective


ROS Limited/Unobtainable:  No - lots


Interval Events:  lots of vominting, abdomen distended, NPO again


Allergies:  


Coded Allergies:  


     No Known Allergies (Verified , 10/27/06)





Objective





Last 24 Hour Vital Signs








  Date Time  Temp Pulse Resp B/P Pulse Ox O2 Delivery O2 Flow Rate FiO2


 


2/12/17 14:28 97.3       


 


2/12/17 12:00 98.4 101 19 111/77 100 Room Air  


 


2/12/17 08:00 98.2 99 20 102/76 100 Room Air  


 


2/12/17 04:00 97.3 93 20 124/74 95 Room Air  


 


2/12/17 00:00 96.8 87 20 117/87 95 Room Air  


 


2/11/17 20:00 98.2 93 19 117/77 100 Room Air  


 


2/11/17 16:00 98.6 87 18 101/73 100 Room Air  

















Intake and Output  


 


 2/11/17 2/12/17





 19:00 07:00


 


Intake Total 1275 ml 755 ml


 


Balance 1275 ml 755 ml


 


  


 


Intake Oral 450 ml 480 ml


 


IV Total 825 ml 275 ml


 


# Voids 2 2


 


# Bowel Movements 4 3








Objective





General Appearance:  WD/WN, no apparent distress, alert


EENT:  PERRL/EOMI, normal ENT inspection, TMs normal, hair lose.


Neck:  non-tender, normal alignment, supple, normal inspection


Cardiovascular:  normal peripheral pulses, normal rate, regular rhythm, no 

murmur.


Respiratory/Chest:  CTA, crackles/rales, rhonchi , no wheezing


Abdomen:  normal bowel sounds, non tender, soft, no mass, distended


Extremities:  normal range of motion, non-tender


Neurologic:  CNs II-XII grossly normal, Moving all extremities.


Skin:  normal pigmentation, warm/dry





Microbiology








 Date/Time


Source Procedure


Growth Status


 


 


 2/11/17 09:37


Stool Stool Culture - Preliminary


NORMAL FECAL RANDY. Resulted


 


 2/11/17 09:37


Stool Clostridium difficile Toxin Assay - Final Resulted








Laboratory Tests


2/12/17 07:00: 


White Blood Count 11.1#H, Red Blood Count 5.82H, Hemoglobin 14.5, Hematocrit 

48.5H, Mean Corpuscular Volume 83, Mean Corpuscular Hemoglobin 25.0L, Mean 

Corpuscular Hemoglobin Concent 29.9L, Red Cell Distribution Width 16.4H, 

Platelet Count 506H, Mean Platelet Volume 7.0, Neutrophils (%) (Auto) 70.2, 

Lymphocytes (%) (Auto) 19.8L, Monocytes (%) (Auto) 9.2, Eosinophils (%) (Auto) 

0.4, Basophils (%) (Auto) 0.4, Sodium Level 136, Potassium Level 3.0L, Chloride 

Level 91L, Carbon Dioxide Level 28, Anion Gap 17H, Blood Urea Nitrogen 19, 

Creatinine 1.3H, Estimat Glomerular Filtration Rate 50.2, Glucose Level 137H, 

Calcium Level 10.5H





Current Medications








 Medications


  (Trade)  Dose


 Ordered  Sig/Jed


 Route


 PRN Reason  Start Time


 Stop Time Status Last Admin


Dose Admin


 


 Acetaminophen


  (Tylenol)  650 mg  Q4H  PRN


 ORAL


 fever  2/9/17 18:00


 3/11/17 17:59   


 


 


 Al Hydroxide/Mg


 Hydroxide


  (Mylanta II)  30 ml  Q6H  PRN


 ORAL


 dyspepsia  2/9/17 18:00


 3/11/17 17:59   


 


 


 Bupropion HCl


  (Wellbutrin SR)  100 mg  DAILY


 ORAL


   2/10/17 09:00


 3/12/17 08:59  2/12/17 10:04


 


 


 Dextrose


  (Dextrose 50%)    STAT  PRN


 IV


 Hypoglycemia  2/9/17 18:00


 3/11/17 17:59   


 


 


 Dextrose/Sodium


 Chloride


  (D5 0.45% NS)  1,000 ml @ 


 50 mls/hr  Q20H


 IV


   2/12/17 18:45


 3/14/17 18:44  2/12/17 03:19


 


 


 Diphenhydramine


 HCl


  (Benadryl)  25 mg  Q6H  PRN


 IVP


 Itching  2/9/17 18:00


 3/11/17 17:59   


 


 


 Diphenhydramine


 HCl


  (Benadryl)  25 mg  Q6H  PRN


 ORAL


 Itching/Pruritis  2/9/17 18:00


 3/11/17 17:59   


 


 


 Duloxetine HCl


  (Cymbalta)  60 mg  DAILY


 ORAL


   2/10/17 09:00


 3/12/17 08:59  2/12/17 10:04


 


 


 Heparin Sodium


  (Porcine)


  (Heparin 5000


 units/ml)  5,000 units  EVERY 12  HOURS


 SUBQ


   2/9/17 21:00


 3/11/17 20:59  2/12/17 10:06


 


 


 Hydromorphone HCl


 1 mg  1 mg  Q3H  PRN


 IVP


 Severe Pain (Pain Scale 7-10)  2/9/17 18:45


 2/16/17 18:44  2/12/17 13:58


 


 


 Levetiracetam


  (Keppra)  1,500 mg  TWICE A  DAY


 ORAL


   2/9/17 18:00


 3/11/17 17:59  2/12/17 10:05


 


 


 Mirtazapine


  (Remeron)  30 mg  BEDTIME


 ORAL


   2/9/17 21:00


 3/11/17 20:59  2/11/17 20:26


 


 


 Montelukast Sodium


  (Singulair)  10 mg  DAILY


 ORAL


   2/10/17 09:00


 3/12/17 08:59  2/12/17 10:05


 


 


 Nitroglycerin


  (Ntg)  0.4 mg  Q5M X 3 DOSES PRN


 SL


 Prn Chest Pain  2/9/17 18:00


 3/11/17 17:59   


 


 


 Ondansetron HCl 4


 mg  4 mg  Q4HR  PRN


 IVP


 Nausea & Vomiting  2/11/17 21:45


 3/13/17 21:44  2/12/17 13:58


 


 


 Polyethylene


 Glycol


  (Miralax)  17 gm  BEDTIME


 ORAL


   2/9/17 21:00


 3/11/17 20:59   


 


 


 Polyethylene


 Glycol


  (Miralax)  17 gm  HSPRN  PRN


 ORAL


 Constipation  2/9/17 18:00


 3/11/17 17:59   


 


 


 Potassium Chloride  100 ml @ 


 100 mls/hr  Q1HR


 IVPB


   2/12/17 16:00


 2/12/17 19:59   


 


 


 Potassium Chloride


  (KCl 10mEq/100ml


 Premix)  100 ml @ 


 100 mls/hr  Q1HR


 IVPB


   2/13/17 09:00


 2/13/17 12:59   


 


 


 Quetiapine


 Fumarate


  (SEROquel)  100 mg  BEDTIME


 ORAL


   2/9/17 21:00


 3/11/17 20:59  2/11/17 20:32


 


 


 Quetiapine


 Fumarate


  (SEROquel)  200 mg  DAILY


 ORAL


   2/10/17 09:00


 3/12/17 08:59  2/12/17 10:26


 


 


 Sucralfate


  (Carafate)  1 gm  TID


 ORAL


   2/9/17 18:00


 3/11/17 17:59  2/12/17 13:24


 


 


 Topiramate


  (Topamax)  25 mg  TWICE A  DAY


 ORAL


   2/9/17 18:00


 3/11/17 17:59  2/12/17 10:04


 


 


 Trazodone HCl


  (Desyrel)  100 mg  HSPRN  PRN


 ORAL


 Insomnia  2/9/17 17:30


 3/11/17 17:29   


 

















JULIETTE KLEIN Feb 12, 2017 15:16

## 2017-02-13 VITALS — SYSTOLIC BLOOD PRESSURE: 107 MMHG | DIASTOLIC BLOOD PRESSURE: 78 MMHG

## 2017-02-13 VITALS — DIASTOLIC BLOOD PRESSURE: 76 MMHG | SYSTOLIC BLOOD PRESSURE: 106 MMHG

## 2017-02-13 VITALS — SYSTOLIC BLOOD PRESSURE: 102 MMHG | DIASTOLIC BLOOD PRESSURE: 72 MMHG

## 2017-02-13 VITALS — SYSTOLIC BLOOD PRESSURE: 113 MMHG | DIASTOLIC BLOOD PRESSURE: 71 MMHG

## 2017-02-13 VITALS — DIASTOLIC BLOOD PRESSURE: 80 MMHG | SYSTOLIC BLOOD PRESSURE: 114 MMHG

## 2017-02-13 VITALS — DIASTOLIC BLOOD PRESSURE: 78 MMHG | SYSTOLIC BLOOD PRESSURE: 111 MMHG

## 2017-02-13 VITALS — DIASTOLIC BLOOD PRESSURE: 72 MMHG | SYSTOLIC BLOOD PRESSURE: 91 MMHG

## 2017-02-13 LAB
ANION GAP SERPL CALC-SCNC: 18 MMOL/L (ref 5–15)
BASOPHILS NFR BLD AUTO: 0.3 % (ref 0–2)
CALCIUM SERPL-MCNC: 10.3 MG/DL (ref 8.6–10.2)
CHLORIDE SERPL-SCNC: 92 MEQ/L (ref 98–107)
CO2 SERPL-SCNC: 25 MEQ/L (ref 20–30)
CREAT SERPL-MCNC: 1.7 MG/DL (ref 0.5–0.9)
EOSINOPHIL NFR BLD AUTO: 0.4 % (ref 0–3)
ERYTHROCYTE [DISTWIDTH] IN BLOOD BY AUTOMATED COUNT: 16.2 % (ref 11.6–14.8)
GFR SERPLBLD BASED ON 1.73 SQ M-ARVRAT: 36.7 ML/MIN (ref 60–?)
HEMOLYSIS: 0
LYMPHOCYTES NFR BLD AUTO: 14.7 % (ref 20–45)
MCH RBC QN AUTO: 25.5 PG (ref 27–31)
MCHC RBC AUTO-ENTMCNC: 30.4 G/DL (ref 32–36)
MCV RBC AUTO: 84 FL (ref 80–99)
MONOCYTES NFR BLD AUTO: 10.4 % (ref 1–10)
NEUTROPHILS NFR BLD AUTO: 74.1 % (ref 45–75)
PLATELET # BLD: 491 K/UL (ref 150–450)
PMV BLD AUTO: 7.1 FL (ref 6.5–10.1)
POTASSIUM SERPL-SCNC: 4.1 MEQ/L (ref 3.4–4.9)
RBC # BLD AUTO: 6.18 M/UL (ref 4.2–5.4)
SODIUM SERPL-SCNC: 135 MEQ/L (ref 135–145)
WBC # BLD AUTO: 12.2 K/UL (ref 4.8–10.8)

## 2017-02-13 RX ADMIN — TOPIRAMATE SCH MG: 25 TABLET, COATED ORAL at 09:30

## 2017-02-13 RX ADMIN — DULOXETINE HYDROCHLORIDE SCH MG: 30 CAPSULE, DELAYED RELEASE ORAL at 09:29

## 2017-02-13 RX ADMIN — HEPARIN SODIUM SCH UNITS: 5000 INJECTION INTRAVENOUS; SUBCUTANEOUS at 09:36

## 2017-02-13 RX ADMIN — SUCRALFATE SCH GM: 1 TABLET ORAL at 09:29

## 2017-02-13 RX ADMIN — DIPHENHYDRAMINE HYDROCHLORIDE PRN MG: 50 INJECTION INTRAMUSCULAR; INTRAVENOUS at 12:53

## 2017-02-13 RX ADMIN — DEXTROSE AND SODIUM CHLORIDE SCH MLS/HR: 5; .45 INJECTION, SOLUTION INTRAVENOUS at 04:17

## 2017-02-13 RX ADMIN — SUCRALFATE SCH GM: 1 TABLET ORAL at 17:59

## 2017-02-13 RX ADMIN — SODIUM CHLORIDE SCH MG: 0.9 INJECTION INTRAVENOUS at 12:47

## 2017-02-13 RX ADMIN — DIPHENHYDRAMINE HYDROCHLORIDE PRN MG: 50 INJECTION INTRAMUSCULAR; INTRAVENOUS at 16:25

## 2017-02-13 RX ADMIN — MONTELUKAST SODIUM SCH MG: 10 TABLET, COATED ORAL at 09:29

## 2017-02-13 RX ADMIN — DIPHENHYDRAMINE HYDROCHLORIDE PRN MG: 50 INJECTION INTRAMUSCULAR; INTRAVENOUS at 04:23

## 2017-02-13 RX ADMIN — DIPHENHYDRAMINE HYDROCHLORIDE PRN MG: 50 INJECTION INTRAMUSCULAR; INTRAVENOUS at 20:50

## 2017-02-13 RX ADMIN — DIPHENHYDRAMINE HYDROCHLORIDE PRN MG: 50 INJECTION INTRAMUSCULAR; INTRAVENOUS at 09:30

## 2017-02-13 RX ADMIN — QUETIAPINE SCH MG: 200 TABLET, FILM COATED ORAL at 09:30

## 2017-02-13 RX ADMIN — TOPIRAMATE SCH MG: 25 TABLET, COATED ORAL at 18:05

## 2017-02-13 RX ADMIN — SUCRALFATE SCH GM: 1 TABLET ORAL at 12:47

## 2017-02-13 RX ADMIN — SODIUM CHLORIDE SCH MG: 0.9 INJECTION INTRAVENOUS at 04:17

## 2017-02-13 RX ADMIN — BUPROPION HYDROCHLORIDE SCH MG: 100 TABLET, EXTENDED RELEASE ORAL at 09:29

## 2017-02-13 NOTE — PULMONOLOGY PROGRESS NOTE
Assessment/Plan


Problems:  


(1) Nausea and vomiting


(2) IBD (inflammatory bowel disease)


(3) Asthma


(4) Interstitial lung disease


(5) Seizure


(6) Psychosis


(7) Crohns disease


(8) History of exploratory laparotomy


Assessment/Plan


no new complains


npo again


IV fluids


GI note appreciated


f/u electrolytes


pain management.


f/u GI recommendation


pts respiratory status stable





Subjective


ROS Limited/Unobtainable:  Yes


Allergies:  


Coded Allergies:  


     No Known Allergies (Verified , 10/27/06)





Objective





Last 24 Hour Vital Signs








  Date Time  Temp Pulse Resp B/P Pulse Ox O2 Delivery O2 Flow Rate FiO2


 


2/13/17 16:55 98.4       


 


2/13/17 15:59 98.4 100 20 113/71 100 Room Air  


 


2/13/17 12:15 97.7 101 20 102/72 97 Room Air  


 


2/13/17 07:58 97.7 98 20 106/76 95 Room Air  


 


2/13/17 04:00 98.4 100 19 114/80 94 Room Air  


 


2/13/17 00:00 98.6 95 18 107/78 94 Room Air  


 


2/12/17 20:00 98.2 106 18 117/81 98 Room Air  

















Intake and Output  


 


 2/12/17 2/13/17





 18:59 06:59


 


Intake Total 600 ml 780 ml


 


Output Total 550 ml 


 


Balance 50 ml 780 ml


 


  


 


Intake Oral 0 ml 480 ml


 


IV Total 600 ml 300 ml


 


Output Emesis 550 ml 


 


# Voids 3 4


 


# Bowel Movements  5








Objective





General Appearance:  WD/WN, no apparent distress, alert


EENT:  PERRL/EOMI, normal ENT inspection, TMs normal, hair lose.


Neck:  non-tender, normal alignment, supple, normal inspection


Cardiovascular:  normal peripheral pulses, normal rate, regular rhythm, no 

murmur.


Respiratory/Chest:  CTA, crackles/rales, rhonchi , no wheezing


Abdomen:  normal bowel sounds, non tender, soft, no mass, distended


Extremities:  normal range of motion, non-tender


Neurologic:  CNs II-XII grossly normal, Moving all extremities.


Skin:  normal pigmentation, warm/dry





Microbiology








 Date/Time


Source Procedure


Growth Status


 


 


 2/11/17 09:37


Stool Stool Culture - Preliminary Resulted


 


 2/11/17 09:37


Stool Clostridium difficile Toxin Assay - Final Resulted








Laboratory Tests


2/13/17 07:00: 


White Blood Count 12.2H, Red Blood Count 6.18H, Hemoglobin 15.7, Hematocrit 

51.8H, Mean Corpuscular Volume 84, Mean Corpuscular Hemoglobin 25.5L, Mean 

Corpuscular Hemoglobin Concent 30.4L, Red Cell Distribution Width 16.2H, 

Platelet Count 491H, Mean Platelet Volume 7.1, Neutrophils (%) (Auto) 74.1, 

Lymphocytes (%) (Auto) 14.7L, Monocytes (%) (Auto) 10.4H, Eosinophils (%) (Auto

) 0.4, Basophils (%) (Auto) 0.3, Sodium Level 135, Potassium Level 4.1, 

Chloride Level 92L, Carbon Dioxide Level 25, Anion Gap 18H, Blood Urea Nitrogen 

29H, Creatinine 1.7H, Estimat Glomerular Filtration Rate 36.7, Glucose Level 

133H, Calcium Level 10.3H





Current Medications








 Medications


  (Trade)  Dose


 Ordered  Sig/Jed


 Route


 PRN Reason  Start Time


 Stop Time Status Last Admin


Dose Admin


 


 Acetaminophen


  (Tylenol)  650 mg  Q4H  PRN


 ORAL


 fever  2/9/17 18:00


 3/11/17 17:59   


 


 


 Al Hydroxide/Mg


 Hydroxide


  (Mylanta II)  30 ml  Q6H  PRN


 ORAL


 dyspepsia  2/9/17 18:00


 3/11/17 17:59   


 


 


 Bupropion HCl


  (Wellbutrin SR)  100 mg  DAILY


 ORAL


   2/10/17 09:00


 3/12/17 08:59  2/13/17 09:29


 


 


 Dextrose


  (Dextrose 50%)    STAT  PRN


 IV


 Hypoglycemia  2/9/17 18:00


 3/11/17 17:59   


 


 


 Dextrose/Sodium


 Chloride


  (D5 0.45% NS)  1,000 ml @ 


 50 mls/hr  Q20H


 IV


   2/12/17 18:45


 3/14/17 18:44  2/13/17 04:17


 


 


 Diphenhydramine


 HCl


  (Benadryl)  25 mg  Q6H  PRN


 IVP


 Itching  2/9/17 18:00


 3/11/17 17:59   


 


 


 Diphenhydramine


 HCl


  (Benadryl)  25 mg  Q6H  PRN


 ORAL


 Itching/Pruritis  2/9/17 18:00


 3/11/17 17:59   


 


 


 Duloxetine HCl


  (Cymbalta)  60 mg  DAILY


 ORAL


   2/10/17 09:00


 3/12/17 08:59  2/13/17 09:29


 


 


 Heparin Sodium


  (Porcine)


  (Heparin 5000


 units/ml)  5,000 units  EVERY 12  HOURS


 SUBQ


   2/9/17 21:00


 3/11/17 20:59  2/12/17 20:46


 


 


 Hydromorphone HCl


 1 mg  1 mg  Q3H  PRN


 IVP


 Severe Pain (Pain Scale 7-10)  2/9/17 18:45


 2/16/17 18:44  2/13/17 16:25


 


 


 Levetiracetam


  (Keppra)  1,500 mg  TWICE A  DAY


 ORAL


   2/9/17 18:00


 3/11/17 17:59  2/13/17 09:29


 


 


 Mirtazapine


  (Remeron)  30 mg  BEDTIME


 ORAL


   2/9/17 21:00


 3/11/17 20:59  2/12/17 20:45


 


 


 Montelukast Sodium


  (Singulair)  10 mg  DAILY


 ORAL


   2/10/17 09:00


 3/12/17 08:59  2/13/17 09:29


 


 


 Nitroglycerin


  (Ntg)  0.4 mg  Q5M X 3 DOSES PRN


 SL


 Prn Chest Pain  2/9/17 18:00


 3/11/17 17:59   


 


 


 Ondansetron HCl


  (Zofran)  4 mg  Q4HR  PRN


 IVP


 Nausea & Vomiting  2/11/17 21:45


 3/13/17 21:44  2/12/17 22:35


 


 


 Patient Own


 Medication


  (Patient's Own


 Med)  1 ea  DAILY


 ORAL


   2/13/17 18:00


 2/18/17 09:01   


 


 


 Polyethylene


 Glycol


  (Miralax)  17 gm  BEDTIME


 ORAL


   2/9/17 21:00


 3/11/17 20:59   


 


 


 Polyethylene


 Glycol


  (Miralax)  17 gm  HSPRN  PRN


 ORAL


 Constipation  2/9/17 18:00


 3/11/17 17:59   


 


 


 Quetiapine


 Fumarate


  (SEROquel)  100 mg  BEDTIME


 ORAL


   2/9/17 21:00


 3/11/17 20:59  2/12/17 20:45


 


 


 Quetiapine


 Fumarate


  (SEROquel)  200 mg  DAILY


 ORAL


   2/10/17 09:00


 3/12/17 08:59  2/13/17 09:30


 


 


 Sucralfate


  (Carafate)  1 gm  TID


 ORAL


   2/9/17 18:00


 3/11/17 17:59  2/13/17 12:47


 


 


 Topiramate


  (Topamax)  25 mg  TWICE A  DAY


 ORAL


   2/9/17 18:00


 3/11/17 17:59  2/13/17 09:30


 


 


 Trazodone HCl


  (Desyrel)  100 mg  HSPRN  PRN


 ORAL


 Insomnia  2/9/17 17:30


 3/11/17 17:29   


 

















JULIETTE KLEIN Feb 13, 2017 17:26

## 2017-02-13 NOTE — GI PROGRESS NOTE
Assessment/Plan


Problems:  


(1) History of exploratory laparotomy


ICD Codes:  Z98.89 - Other specified postprocedural states


SNOMED:  14588304, 34085711, 422425462


(2) Abdominal pain


(3) Chronic pain


ICD Codes:  G89.29 - Other chronic pain


SNOMED:  11048315


(4) Diarrhea


ICD Codes:  R19.7 - Diarrhea, unspecified


SNOMED:  48394469


(5) Crohn's disease


ICD Codes:  K50.90 - Crohn's disease


SNOMED:  32060168


(6) Ileus


ICD Codes:  K56.7 - Ileus, unspecified


SNOMED:  248242147


Status:  not improved, unchanged


Status Narrative


Discussed with Dr. Mcleod.


Assessment/Plan


fu Abd U/S


s/p EGD/colon 2016 >> Gastric Ulcer, Crohns


cdiff >> negative


KUB >> Moderate air distention of several small and large bowel loops 

suspicious for ileus or at least partial small bowel obstruction.


hx crohns





will give trial rx Movantik for OIC today.


fu stool studies


mesalamine for Crohns


NPO + IVFs


ppi


pain mgmt


fu labs





Subjective


Gastrointestinal/Abdominal:  Reports: abdomen distended, vomiting


Subjective


emesis x 1 this morning


BM x 1 this morning, watery


abdominal distention





Objective





Last 24 Hour Vital Signs








  Date Time  Temp Pulse Resp B/P Pulse Ox O2 Delivery O2 Flow Rate FiO2


 


2/13/17 12:15 97.7 101 20 102/72 97 Room Air  


 


2/13/17 07:58 97.7 98 20 106/76 95 Room Air  


 


2/13/17 04:00 98.4 100 19 114/80 94 Room Air  


 


2/13/17 00:00 98.6 95 18 107/78 94 Room Air  


 


2/12/17 20:00 98.2 106 18 117/81 98 Room Air  


 


2/12/17 16:00 98.2 101 16 119/74 100 Room Air  

















Intake and Output  


 


 2/12/17 2/13/17





 19:00 07:00


 


Intake Total 550 ml 780 ml


 


Output Total 550 ml 


 


Balance 0 ml 780 ml


 


  


 


Intake Oral 0 ml 480 ml


 


IV Total 550 ml 300 ml


 


Output Emesis 550 ml 


 


# Voids 3 4


 


# Bowel Movements  5











Laboratory Tests








Test


  2/13/17


07:00


 


White Blood Count


  12.2 K/UL


(4.8-10.8)  H


 


Red Blood Count


  6.18 M/UL


(4.20-5.40)  H


 


Hemoglobin


  15.7 G/DL


(12.0-16.0)


 


Hematocrit


  51.8 %


(37.0-47.0)  H


 


Mean Corpuscular Volume 84 FL (80-99)  


 


Mean Corpuscular Hemoglobin


  25.5 PG


(27.0-31.0)  L


 


Mean Corpuscular Hemoglobin


Concent 30.4 G/DL


(32.0-36.0)  L


 


Red Cell Distribution Width


  16.2 %


(11.6-14.8)  H


 


Platelet Count


  491 K/UL


(150-450)  H


 


Mean Platelet Volume


  7.1 FL


(6.5-10.1)


 


Neutrophils (%) (Auto)


  74.1 %


(45.0-75.0)


 


Lymphocytes (%) (Auto)


  14.7 %


(20.0-45.0)  L


 


Monocytes (%) (Auto)


  10.4 %


(1.0-10.0)  H


 


Eosinophils (%) (Auto)


  0.4 %


(0.0-3.0)


 


Basophils (%) (Auto)


  0.3 %


(0.0-2.0)


 


Sodium Level


  135 mEQ/L


(135-145)


 


Potassium Level


  4.1 mEQ/L


(3.4-4.9)


 


Chloride Level


  92 mEQ/L


()  L


 


Carbon Dioxide Level


  25 mEQ/L


(20-30)


 


Anion Gap 18 (5-15)  H


 


Blood Urea Nitrogen


  29 mg/dL


(7-23)  H


 


Creatinine


  1.7 mg/dL


(0.5-0.9)  H


 


Estimat Glomerular Filtration


Rate 36.7 mL/min


(>60)


 


Glucose Level


  133 mg/dL


()  H


 


Calcium Level


  10.3 mg/dL


(8.6-10.2)  H








Height (Feet):  5


Height (Inches):  6.00


Weight (Pounds):  164


General Appearance:  no apparent distress, alert


Cardiovascular:  normal rate


Respiratory/Chest:  normal breath sounds, no respiratory distress


Abdominal Exam:  soft, distended - tympanic


Extremities:  normal range of motion


Objective


Procedure: XRAY Abdomen 1v


Clinical history: 2/12/17.


Comparisons: Abdomen/pelvis CT dated 1/21/17.


Impression:


Moderate air distention of several small and large bowel loops suspicious for 

ileus


or at least partial small bowel obstruction. Postsurgical changes noted.











Elena Seay N.P. Feb 13, 2017 14:37

## 2017-02-13 NOTE — PROGRESS NOTE
DATE:  02/12/2017



PSYCHOTHERAPY CONSULTATION PROGRESS NOTE



TREATING ATTENDING:  Maxim Hopkins D.O.



HISTORY:  The patient is a 61-year-old female patient complaining of

anxiety, depression, has been very helpless, hopeless and depressed.  The

patient states that she has difficulty breathing as well, remains anxious

during the daytime.  She claims that Cogentin has good impulse control,

_____ depression and anxiety.



IMPRESSION:  This clinician assessed the patient, provided the

patient with reality orientation and supportive psychotherapy.

Encouraging the patient to participate in treatment as well as with

medication regimen.  Continue with medication management and behavioral

management.  This clinician has reviewed the patient's chart and discussed

the treatment with nursing staff.









  ______________________________________________

  Orestes Ross PsyD.





DR:  GRETEL

D:  02/13/2017 10:42

T:  02/13/2017 20:26

JOB#:  [id number]

CC:

## 2017-02-13 NOTE — GENERAL PROGRESS NOTE
Assessment/Plan


Problem List:  


(1) Respiratory distress


(2) Dyspnea


(3) Chronic abdominal pain


(4) SOB (shortness of breath)


ICD Codes:  R06.02 - Shortness of breath


SNOMED:  788483011


(5) COPD (chronic obstructive pulmonary disease)


ICD Codes:  J44.9 - Chronic obstructive pulmonary disease


SNOMED:  04101417


(6) Abdominal pain


(7) UTI (urinary tract infection)


ICD Codes:  N39.0 - Urinary tract infection, site not specified


SNOMED:  05548088


(8) CHF (congestive heart failure)


ICD Codes:  I50.9 - Heart failure, unspecified


SNOMED:  92962139


(9) Crohns disease


ICD Codes:  K50.90 - Crohns disease


SNOMED:  63822782


(10) Nausea and vomiting


(11) Diarrhea


ICD Codes:  R19.7 - Diarrhea, unspecified


SNOMED:  [id number]


Status:  stable, progressing, tolerating diet


Assessment/Plan


ot pt diet abx cbc bmp am promise ltach eval





Subjective


Constitutional:  Reports: weakness


Allergies:  


Coded Allergies:  


     No Known Allergies (Verified , 10/27/06)


All Systems:  reviewed and negative except above


Subjective


lethargic sleepy





Objective





Last 24 Hour Vital Signs








  Date Time  Temp Pulse Resp B/P Pulse Ox O2 Delivery O2 Flow Rate FiO2


 


2/13/17 12:15 97.7 101 20 102/72 97 Room Air  


 


2/13/17 07:58 97.7 98 20 106/76 95 Room Air  


 


2/13/17 04:00 98.4 100 19 114/80 94 Room Air  


 


2/13/17 00:00 98.6 95 18 107/78 94 Room Air  


 


2/12/17 20:00 98.2 106 18 117/81 98 Room Air  


 


2/12/17 16:00 98.2 101 16 119/74 100 Room Air  


 


2/12/17 14:28 97.3       

















Intake and Output  


 


 2/12/17 2/13/17





 18:59 06:59


 


Intake Total 600 ml 780 ml


 


Output Total 550 ml 


 


Balance 50 ml 780 ml


 


  


 


Intake Oral 0 ml 480 ml


 


IV Total 600 ml 300 ml


 


Output Emesis 550 ml 


 


# Voids 3 4


 


# Bowel Movements  5








Laboratory Tests


2/13/17 07:00: 


White Blood Count 12.2H, Red Blood Count 6.18H, Hemoglobin 15.7, Hematocrit 

51.8H, Mean Corpuscular Volume 84, Mean Corpuscular Hemoglobin 25.5L, Mean 

Corpuscular Hemoglobin Concent 30.4L, Red Cell Distribution Width 16.2H, 

Platelet Count 491H, Mean Platelet Volume 7.1, Neutrophils (%) (Auto) 74.1, 

Lymphocytes (%) (Auto) 14.7L, Monocytes (%) (Auto) 10.4H, Eosinophils (%) (Auto

) 0.4, Basophils (%) (Auto) 0.3, Sodium Level 135, Potassium Level 4.1, 

Chloride Level 92L, Carbon Dioxide Level 25, Anion Gap 18H, Blood Urea Nitrogen 

29H, Creatinine 1.7H, Estimat Glomerular Filtration Rate 36.7, Glucose Level 

133H, Calcium Level 10.3H


Height (Feet):  5


Height (Inches):  6.00


Weight (Pounds):  164


General Appearance:  lethargic


EENT:  normal ENT inspection


Neck:  normal alignment


Cardiovascular:  normal peripheral pulses, normal rate, regular rhythm


Respiratory/Chest:  chest wall non-tender, lungs clear, normal breath sounds


Abdomen:  normal bowel sounds, non tender, hypoactive bowel sounds


Extremities:  normal inspection


Edema:  no edema noted Arm (L), no edema noted Arm (R), no edema noted Leg (L), 

no edema noted Leg (R), no edema noted Pedal (L), no edema noted Pedal (R), no 

edema noted Generalized


Neurologic:  responsive, motor weakness


Skin:  normal pigmentation, warm/dry











MAICOL LANG Feb 13, 2017 13:20

## 2017-02-13 NOTE — CONSULTATION
DATE OF CONSULTATION:  02/09/2017



HISTORY OF PRESENT ILLNESS:  Ms. Radha Gaviria is a 61-year-old female

patient with abdominal pain.  This patient has inflammatory bowel disease,

but she also has a history of bipolar 2 disorder.  She is on a number of

psychotropic medications.  So there was a psychiatric consultation

requested to evaluate this patient.  The patient does not has depression

and confusion, but she says that she has tried to manage by taking

psychiatric medications regimen consisting of Remeron, Cymbalta,

trazodone, Topamax, Seroquel, and Wellbutrin.  Prior to that she denies

any suicidal or homicidal thoughts.  She is calm, but she says she does

have some anxiety because of her abdominal pain and inflammatory bowel

disease.



SOCIAL HISTORY:  As far as her social history lives in a private

residence.  Financially supported by surgical site infection and

Medicare.



SUBSTANCE ABUSE HISTORY:  Denies drug and alcohol use.



ALLERGY:  She has no known drug allergies.



PAST MEDICAL HISTORY:  She has a history of inflammatory bowel

disease, pneumonia, chronic pain syndrome with some radiculopathy.



PSYCHIATRIC HISTORY:  Bipolar 2.



MENTAL STATUS EXAMINATION:  This is a 61-year-old female with

psychomotor retardation.  Mood is depressed and anxious.  Affect is

guarded and restricted.  Thought process, linear goal directed.  Denies

any current suicidal or homicidal thoughts.  Insight and judgment is

fair.



DIAGNOSIS:  Bipolar 2.



PLAN:  Plan for this patient I am going to treat this patient with

medications regimen of Remeron 30 mg at bedtime, Cymbalta 60 mg daily,

trazodone 50 mg at bedtime as needed, Topamax 25 mg twice a day, Seroquel

twice a day.









  ______________________________________________

  Samantha Rick M.D. DR:  Carlos

D:  02/12/2017 20:09

T:  02/13/2017 03:28

JOB#:  [id number]

CC:

## 2017-02-13 NOTE — CONSULTATION
DATE OF CONSULTATION:



DATE:  02/10/2017



SUBJECTIVE:  The patient is a 60-year-old female patient with

abdominal pain.



PLAN:  For this patient is to treat her with Remeron 30 mg nightly,

Cymbalta 60 mg nightly, trazodone as needed 100 mg nightly, Topamax 25 mg

twice a day, Wellbutrin 100 mg daily, and Seroquel twice a day.   Chart

reviewed.  Discussed with staff.









  ______________________________________________

  Samantha Rick M.D.





DR:  BALTA

D:  02/12/2017 20:10

T:  02/13/2017 00:00

JOB#:  [id number]

CC:

## 2017-02-14 NOTE — DISCHARGE SUMMARY
Discharge Summary


Hospital Course


Date of Admission


Feb 9, 2017 at 14:21


Date of Discharge


Feb 13, 2017 at 21:48


Admitting Diagnosis


Abdominal pain


HPI


Radha Gaviria is a 61 year old female who was admitted on Feb 9, 2017 at 14:21 

for Abdominal Pain


Hospital Course


dc summary dictated #[id number]





Discharge Medications


Continued Medications:  


Acetaminophen (Acetaminophen) 650 Mg/20.3 Ml Solution


650 MG ORAL Q8H PRN for Fever/Headache/Mild Pain, ML 0 Refills





Al Hydroxide/mg Hydroxide (Mag-Al Liquid) 30 Ml Oral.susp


30 ML PO Q6HR PRN for Per rx protocol, ML





Bupropion Hcl* (Bupropion Hcl*) 100 Mg Tablet


100 MG ORAL DAILY, TAB





Diphenhydramine HCl (Benadryl) 25 Mg Capsule


25 MG PO Q6HR PRN for Itching, CAP





Docusate Sodium* (Docusate Sodium*) 100 Mg Capsule


100 MG ORAL THREE TIMES A DAY, CAP





Duloxetine Hcl* (Cymbalta*) 60 Mg Capsule.dr


60 MG ORAL DAILY, CAP





Heparin Sod (Porcine) (Heparin Sodium*) 5 000/1 Ml Vial


5000 UNITS SUBQ EVERY 12 HOURS, VIAL





Levetiracetam* (Levetiracetam*) 500 Mg Tablet


1500 MG ORAL TWICE A DAY, #60 TAB 0 Refills





Mirtazapine* (Mirtazapine*) 15 Mg Tablet


30 MG ORAL BEDTIME, TAB





Montelukast Sodium* (Montelukast Sodium*) 10 Mg Tablet


10 MG ORAL DAILY, TAB





Naloxegol Oxalate (Movantik) 25 Mg Tablet


25 MG PO DAILY for 6 Days, TAB





Nitroglycerin (Nitroglycerin) 0.4 Mg Tab.subl


0.4 MG SL Q5MIN X3 PRN for CHEST PAIN, TAB





Ondansetron* (Zofran*) 4 Mg/2 Ml Vial


4 MG IV Q4HR PRN for Nausea & Vomiting, VIAL





Polyethylene Glycol 3350* (Miralax*) 17 Gm Powd.pack


17 GM ORAL BEDTIME, PACKET





Quetiapine Fumarate* (Seroquel*) 200 Mg Tablet


100 MG ORAL BEDTIME, TAB





Quetiapine Fumarate* (Seroquel*) 200 Mg Tablet


200 MG ORAL DAILY, TAB





Sucralfate* (Carafate*) 1 Gm Tablet


1 GM ORAL TID, TAB





Topiramate* (Topamax*) 25 Mg Tablet


25 MG ORAL TWICE A DAY, #60 TAB 0 Refills





Trazodone* (Trazodone*) 150 Mg Tablet


100 MG ORAL BEDTIME PRN for Insomnia, TAB











Discharge


Condition Upon Discharge:  stable


Discharge Disposition


Patient was discharged to [facility] ([facility])


Discharge Diagnoses:  





Discharge Instructions


Discharge Instructions


Special Instructions


I have been assigned to complete a D/C Summary on this account. I was not 

involved in the patient management











Carl MckenzieRozina vann NP Feb 14, 2017 12:39

## 2017-02-15 NOTE — DISCHARGE SUMMARY 2 SIG
DATE OF ADMISSION:  02/09/2017



DATE OF DISCHARGE:  02/13/2017



REASON FOR HOSPITAL ADMISSION:   61-year-old female

with a history of Crohn disease presented to the emergency department

complaining of severe abdominal pain and diarrhea.  The patient with a 

history of multiple surgeries in the past for bowel obstruction and lysis

of adhesions.  The patient reported severe symptoms.  She was recently

hospitalized for ileus and stool impaction.   This time she    complained of 
nausea, 

vomiting, abdominal discomfort, and diarrhea. These symptoms typical,  which 
she 

experiences with Crohn disease flare. She requested pain medication.  

She stated that she was not able to tolerate any oral fluids. Of note, the 
patient with 

chronic pain syndrome. 

Workup in the emergency department revealed mild leukocytosis of 12.3,

mild anemia with hemoglobin of  11.8 and  hematocrit of 38.5, lipase and LFTs 
were  stable, 

mild dehydration with BUN 29 and creatinine 1.1.  Anion gap elevated  -at 17.  
The patient started

on the IV fluids and transferred to medical/surgical floor for further

management.



ADMITTING DIAGNOSES:   



1. Diarrhea.

2. Crohn disease,  possible flare.

3. Abdominal pain.

4. Chronic pain syndrome.

5. History of asthma.



HOSPITAL STAY:  The patient admitted to medical/surgical floor.  GI

followed the patient.  Stool culture were ordered.  Abdominal ultrasound

initially done in the emergency room was negative for gallstones

and dilated ducts.  Subsequently, abdominal x-ray was done, which indicated

moderate air distention of several small large bowel loops suspicious for

ileus or at least partial small bowel obstruction.  The patient's stool

culture was negative.  Stool for C. difficile was negative.  The patient

was afebrile.  GI followed the patient closely.  EGD and colonoscopy were done

in 2016  with findings of Crohn disease and gastric ulcer.   

Diarrhea likely secondary to Crohn disease flare.  Pain management provided.  

The patient started on mesalamine for Crohn disease, reinforced  adherence to

medication regimen at home.  Antiemetic provided as needed.  IV fluids

provided.  Started on  liquid diet.  Able to tolerate full  liquid diet.  PPI 
added to

existing regimen.  Patient was started on trial of Movantix.

Pulmonary status was stable.  Pulmonologist followed.  

The patient has a history of asthma and interstitial lung

disease.  Pulse oximetry was stable on room air.  Pulmonary toilet provided as 
needed.

No evidence of asthma exacerbation.  

No evidence of seizure activity while in the hospital.  Continue  antiepileptic 
medication.  

The patient with significant depression and  mild anxiety secondary to disease

process.  Psychiatrist seen the patient and optimize her psychiatric

medication.  The patient was stable for discharge.



DISCHARGE DIAGNOSES:  



1. Crohn disease flare.

2. Diarrhea -resolved.

3. Abdominal pain with chronic pain syndrome-controlled

4. History of exploratory laparotomy.

5. Asthma.

6. Interstitial lung disease.

7. Seizure disorder.

8. Depression and anxiety.

9. Mild dehydration, due to diarrhea -  resolved after intravenous hydration.



DISCHARGE MEDICATIONS:  See medication reconciliation list.



DISCHARGE INSTRUCTIONS:  The patient discharged to [facility] term acute care 
[facility] for 

further management. Pain control, advance diet as tolerated, bowel regimen,  
continue mesalamine, continue 

trial with Movantix as recommended by GI. 

 





  ______________________________________________

  Maxim Hopkins D.O.



I have been assigned to dictate discharge summary on this account and I

was not involved in the patient's management.



  ______________________________________________

  Rozina Mckenzieedwar N.PaMtt





DR:  Luis Antonio

D:  02/14/2017 12:36

T:  02/15/2017 01:06

JOB#:  [id number]

CC:



MEL

## 2017-02-27 ENCOUNTER — HOSPITAL ENCOUNTER (OUTPATIENT)
Dept: HOSPITAL 72 - PAN | Age: 62
Discharge: HOME | End: 2017-02-27
Payer: MEDICARE

## 2017-02-27 DIAGNOSIS — R19.7: ICD-10-CM

## 2017-02-27 DIAGNOSIS — K50.90: Primary | ICD-10-CM

## 2017-02-27 DIAGNOSIS — R11.0: ICD-10-CM

## 2017-02-27 DIAGNOSIS — R10.9: ICD-10-CM

## 2017-02-27 PROCEDURE — 99211 OFF/OP EST MAY X REQ PHY/QHP: CPT

## 2017-02-27 NOTE — GI PROGRESS NOTE
Assessment/Plan


Problems:  


(1) Nausea


ICD Codes:  R11.0 - Nausea


SNOMED:  485276849


(2) Abdominal pain


(3) Diarrhea


ICD Codes:  R19.7 - Diarrhea, unspecified


SNOMED:  76963066


(4) Crohn's disease


ICD Codes:  K50.90 - Crohn's disease


SNOMED:  95989025


Status:  stable, unchanged


Status Narrative


Seen with Dr. Mcleod.


Assessment/Plan


cdiff negative


recommend 6-MP >> pt will contact us if she wants the rx, will fax then.


cont pentasa 1000mg TID


RTC x 1 month





Subjective


Subjective


generalized abdominal pain


diarrhea x10/day


nausea without vomiting


20+ lbs weight loss in the past 2 months due to recently hospitalization/SNF





Objective


T 98.2


/69


P 90


Weight (Pounds):  143


General Appearance:  no apparent distress, alert


Cardiovascular:  normal rate


Respiratory/Chest:  normal breath sounds, no respiratory distress


Abdominal Exam:  normal bowel sounds, soft, tender


Extremities:  normal range of motion


Objective


Endoscopy Procedure Note


Indication for Procedure:  CD, gerd


Procedures Performed:  EGD, colonoscopy


Operative Findings/Diagnosis:  , CD


LESLY MCLEOD - Oct 5, 2016 12:35





s/p hospitalization @ [facility] [place] 2/13/17


s/p SNF placement


[facility] Nov. 2008


cdiff negative 12/28











Elena Seay N.P. Feb 27, 2017 15:11

## 2017-02-28 VITALS — SYSTOLIC BLOOD PRESSURE: 101 MMHG | DIASTOLIC BLOOD PRESSURE: 69 MMHG

## 2017-03-23 ENCOUNTER — HOSPITAL ENCOUNTER (OUTPATIENT)
Dept: HOSPITAL 72 - PAN | Age: 62
Discharge: HOME | End: 2017-03-23
Payer: MEDICARE

## 2017-03-23 VITALS — DIASTOLIC BLOOD PRESSURE: 63 MMHG | SYSTOLIC BLOOD PRESSURE: 104 MMHG

## 2017-03-23 DIAGNOSIS — K59.00: ICD-10-CM

## 2017-03-23 DIAGNOSIS — J44.9: ICD-10-CM

## 2017-03-23 DIAGNOSIS — K50.90: Primary | ICD-10-CM

## 2017-03-23 DIAGNOSIS — R10.9: ICD-10-CM

## 2017-03-23 PROCEDURE — 99211 OFF/OP EST MAY X REQ PHY/QHP: CPT

## 2017-03-23 NOTE — GENERAL PROGRESS NOTE
Assessment/Plan


Problem List:  


(1) Crohn's disease


ICD Codes:  K50.90 - Crohn's disease


SNOMED:  95625627


(2) Chronic abdominal pain


(3) COPD (chronic obstructive pulmonary disease)


ICD Codes:  J44.9 - Chronic obstructive pulmonary disease


SNOMED:  55360351


(4) Constipation


Assessment/Plan


continue pentasa


add entrocort


refill linzess


rtc 8 weeks





Subjective


ROS Limited/Unobtainable:  Yes


Allergies:  


Coded Allergies:  


     No Known Allergies (Verified , 10/27/06)


Subjective


C/O ABD PAIN





Objective





Last 24 Hour Vital Signs








  Date Time  Temp Pulse Resp B/P Pulse Ox O2 Delivery O2 Flow Rate FiO2


 


3/23/17 13:37 98.8 95 18 104/63    








General Appearance:  no apparent distress


EENT:  normal ENT inspection


Neck:  supple


Cardiovascular:  normal rate


Respiratory/Chest:  decreased breath sounds


Abdomen:  normal bowel sounds, non tender, soft


Extremities:  non-tender











LESLY PINA Mar 23, 2017 13:55

## 2017-05-25 ENCOUNTER — HOSPITAL ENCOUNTER (INPATIENT)
Dept: HOSPITAL 72 - EMR | Age: 62
LOS: 5 days | Discharge: HOME HEALTH | DRG: 871 | End: 2017-05-30
Payer: MEDICARE

## 2017-05-25 VITALS — BODY MASS INDEX: 26.36 KG/M2 | HEIGHT: 66 IN | WEIGHT: 164 LBS

## 2017-05-25 VITALS — SYSTOLIC BLOOD PRESSURE: 111 MMHG | DIASTOLIC BLOOD PRESSURE: 67 MMHG

## 2017-05-25 VITALS — SYSTOLIC BLOOD PRESSURE: 119 MMHG | DIASTOLIC BLOOD PRESSURE: 75 MMHG

## 2017-05-25 VITALS — SYSTOLIC BLOOD PRESSURE: 115 MMHG | DIASTOLIC BLOOD PRESSURE: 60 MMHG

## 2017-05-25 VITALS — DIASTOLIC BLOOD PRESSURE: 64 MMHG | SYSTOLIC BLOOD PRESSURE: 115 MMHG

## 2017-05-25 VITALS — SYSTOLIC BLOOD PRESSURE: 120 MMHG | DIASTOLIC BLOOD PRESSURE: 73 MMHG

## 2017-05-25 VITALS — DIASTOLIC BLOOD PRESSURE: 60 MMHG | SYSTOLIC BLOOD PRESSURE: 112 MMHG

## 2017-05-25 DIAGNOSIS — K86.1: ICD-10-CM

## 2017-05-25 DIAGNOSIS — J18.9: ICD-10-CM

## 2017-05-25 DIAGNOSIS — Z86.73: ICD-10-CM

## 2017-05-25 DIAGNOSIS — J44.1: ICD-10-CM

## 2017-05-25 DIAGNOSIS — F17.200: ICD-10-CM

## 2017-05-25 DIAGNOSIS — M47.896: ICD-10-CM

## 2017-05-25 DIAGNOSIS — F31.81: ICD-10-CM

## 2017-05-25 DIAGNOSIS — K50.90: ICD-10-CM

## 2017-05-25 DIAGNOSIS — M51.26: ICD-10-CM

## 2017-05-25 DIAGNOSIS — A41.9: Primary | ICD-10-CM

## 2017-05-25 DIAGNOSIS — J84.9: ICD-10-CM

## 2017-05-25 LAB
ALBUMIN/GLOB SERPL: 0.5 {RATIO} (ref 1–2.7)
ALT SERPL-CCNC: 12 U/L (ref 3–33)
ANION GAP SERPL CALC-SCNC: 13 MMOL/L (ref 5–15)
APPEARANCE UR: (no result)
AST SERPL-CCNC: 15 U/L (ref 5–40)
BACTERIA #/AREA URNS HPF: (no result) /HPF
BASOPHILS NFR BLD AUTO: 0.6 % (ref 0–2)
CALCIUM SERPL-MCNC: 9.2 MG/DL (ref 8.6–10.2)
CHLORIDE SERPL-SCNC: 103 MEQ/L (ref 98–107)
CK MB SERPL-MCNC: < 1.5 NG/ML (ref ?–3.8)
CO2 SERPL-SCNC: 23 MEQ/L (ref 20–30)
CREAT SERPL-MCNC: 0.9 MG/DL (ref 0.5–0.9)
EOSINOPHIL NFR BLD AUTO: 1.1 % (ref 0–3)
ERYTHROCYTE [DISTWIDTH] IN BLOOD BY AUTOMATED COUNT: 16.3 % (ref 11.6–14.8)
GFR SERPLBLD BASED ON 1.73 SQ M-ARVRAT: > 60 ML/MIN (ref 60–?)
GLOBULIN SER-MCNC: 5.7 G/DL
HEMOLYSIS: 26
KETONES UR QL STRIP: NEGATIVE
LEUKOCYTE ESTERASE UR QL STRIP: (no result)
LIPASE SERPL-CCNC: 15 U/L (ref ?–60)
LYMPHOCYTES NFR BLD AUTO: 22 % (ref 20–45)
MCH RBC QN AUTO: 26 PG (ref 27–31)
MCHC RBC AUTO-ENTMCNC: 30.2 G/DL (ref 32–36)
MCV RBC AUTO: 86 FL (ref 80–99)
MONOCYTES NFR BLD AUTO: 7 % (ref 1–10)
NEUTROPHILS NFR BLD AUTO: 69.3 % (ref 45–75)
NITRITE UR QL STRIP: NEGATIVE
PH UR STRIP: 6 [PH] (ref 4.5–8)
PLATELET # BLD: 454 K/UL (ref 150–450)
PMV BLD AUTO: 6.3 FL (ref 6.5–10.1)
POTASSIUM SERPL-SCNC: 3.7 MEQ/L (ref 3.4–4.9)
PROT SERPL-MCNC: 8.9 G/DL (ref 6.6–8.7)
PROT UR QL STRIP: (no result)
RBC # BLD AUTO: 4.5 M/UL (ref 4.2–5.4)
RBC #/AREA URNS HPF: (no result) /HPF (ref 0–2)
SODIUM SERPL-SCNC: 139 MEQ/L (ref 135–145)
SP GR UR STRIP: 1.02 (ref 1–1.03)
SQUAMOUS #/AREA URNS LPF: (no result) /LPF
TROPONIN I SERPL-MCNC: < 0.3 NG/ML (ref ?–0.3)
UROBILINOGEN UR-MCNC: NORMAL MG/DL (ref 0–1)
WBC # BLD AUTO: 15.5 K/UL (ref 4.8–10.8)
WBC #/AREA URNS HPF: (no result) /HPF (ref 0–2)

## 2017-05-25 PROCEDURE — 71010: CPT

## 2017-05-25 PROCEDURE — 94760 N-INVAS EAR/PLS OXIMETRY 1: CPT

## 2017-05-25 PROCEDURE — 80048 BASIC METABOLIC PNL TOTAL CA: CPT

## 2017-05-25 PROCEDURE — 86140 C-REACTIVE PROTEIN: CPT

## 2017-05-25 PROCEDURE — 97803 MED NUTRITION INDIV SUBSEQ: CPT

## 2017-05-25 PROCEDURE — 87324 CLOSTRIDIUM AG IA: CPT

## 2017-05-25 PROCEDURE — 83690 ASSAY OF LIPASE: CPT

## 2017-05-25 PROCEDURE — 84484 ASSAY OF TROPONIN QUANT: CPT

## 2017-05-25 PROCEDURE — 83605 ASSAY OF LACTIC ACID: CPT

## 2017-05-25 PROCEDURE — 81003 URINALYSIS AUTO W/O SCOPE: CPT

## 2017-05-25 PROCEDURE — 85651 RBC SED RATE NONAUTOMATED: CPT

## 2017-05-25 PROCEDURE — 80053 COMPREHEN METABOLIC PANEL: CPT

## 2017-05-25 PROCEDURE — 36415 COLL VENOUS BLD VENIPUNCTURE: CPT

## 2017-05-25 PROCEDURE — 82553 CREATINE MB FRACTION: CPT

## 2017-05-25 PROCEDURE — 84100 ASSAY OF PHOSPHORUS: CPT

## 2017-05-25 PROCEDURE — 83880 ASSAY OF NATRIURETIC PEPTIDE: CPT

## 2017-05-25 PROCEDURE — 86480 TB TEST CELL IMMUN MEASURE: CPT

## 2017-05-25 PROCEDURE — 93005 ELECTROCARDIOGRAM TRACING: CPT

## 2017-05-25 PROCEDURE — 87040 BLOOD CULTURE FOR BACTERIA: CPT

## 2017-05-25 PROCEDURE — 94640 AIRWAY INHALATION TREATMENT: CPT

## 2017-05-25 PROCEDURE — 80299 QUANTITATIVE ASSAY DRUG: CPT

## 2017-05-25 PROCEDURE — 82550 ASSAY OF CK (CPK): CPT

## 2017-05-25 PROCEDURE — 94664 DEMO&/EVAL PT USE INHALER: CPT

## 2017-05-25 PROCEDURE — 83735 ASSAY OF MAGNESIUM: CPT

## 2017-05-25 PROCEDURE — 74177 CT ABD & PELVIS W/CONTRAST: CPT

## 2017-05-25 PROCEDURE — 87045 FECES CULTURE AEROBIC BACT: CPT

## 2017-05-25 PROCEDURE — 85025 COMPLETE CBC W/AUTO DIFF WBC: CPT

## 2017-05-25 PROCEDURE — 80061 LIPID PANEL: CPT

## 2017-05-25 PROCEDURE — 85007 BL SMEAR W/DIFF WBC COUNT: CPT

## 2017-05-25 RX ADMIN — MORPHINE SULFATE PRN MG: 2 INJECTION, SOLUTION INTRAMUSCULAR; INTRAVENOUS at 13:48

## 2017-05-25 RX ADMIN — DEXTROSE MONOHYDRATE SCH MLS/HR: 50 INJECTION, SOLUTION INTRAVENOUS at 18:49

## 2017-05-25 RX ADMIN — HEPARIN SODIUM SCH UNITS: 5000 INJECTION INTRAVENOUS; SUBCUTANEOUS at 21:03

## 2017-05-25 RX ADMIN — THEOPHYLLINE ANHYDROUS SCH MG: 100 CAPSULE, EXTENDED RELEASE ORAL at 20:59

## 2017-05-25 RX ADMIN — MORPHINE SULFATE PRN MG: 2 INJECTION, SOLUTION INTRAMUSCULAR; INTRAVENOUS at 18:52

## 2017-05-25 RX ADMIN — METHYLPREDNISOLONE SODIUM SUCCINATE SCH MG: 125 INJECTION, POWDER, FOR SOLUTION INTRAMUSCULAR; INTRAVENOUS at 18:49

## 2017-05-25 NOTE — EMERGENCY ROOM REPORT
History of Present Illness


General


Chief Complaint:  General Complaint


Source:  Patient





Present Illness


HPI


61-year-old female presents to ED complaining of shortness of breath and chest 

pain.  Symptoms for the last 2 days.  Notes chest tightness.  History of COPD.  

States she's having a productive cough with grayish phlegm.  No sick contacts 

or recent travel.  Denies fevers or chills.  Denies nausea or vomiting.  No 

other aggravating or leading factors.  Denies any other associated symptoms


Allergies:  


Coded Allergies:  


     No Known Allergies (Verified , 10/27/06)





Patient History


Past Medical History:  asthma, COPD, CVA/TIA, seizures


Pertinent Family History:  none


Social History:  Denies: alcohol use, drug use, smoking


Pregnant Now:  No


Immunizations:  UTD


Reviewed Nursing Documentation:  PMH: Agreed, PSxH: Agreed





Nursing Documentation-PMH


Hx Cardiac Problems:  Yes


Hx Hypertension:  No


Hx Pacemaker:  No


Hx Asthma:  Yes


Hx COPD:  Yes


Hx Diabetes:  No


Hx Cancer:  No


Hx Gastrointestinal Problems:  Yes


Hx Dialysis:  No


Hx Neurological Problems:  Yes


Hx Cerebrovascular Accident:  Yes - 2005


Hx Seizures:  Yes


Hx Epilepsy:  Yes


Hx Vertigo:  Yes


Hx Dizziness:  Yes


Hx Syncope:  Yes


Hx Headaches:  Yes


Hx Weakness:  Yes


Hx Fatigue:  Yes





Review of Systems


All Other Systems:  negative except mentioned in HPI





Physical Exam





Vital Signs








  Date Time  Temp Pulse Resp B/P Pulse Ox O2 Delivery O2 Flow Rate FiO2


 


5/25/17 09:50 99.3 97 20 134/79 100 Room Air  


 


5/25/17 10:18        21


 


5/25/17 11:46       2.0 








Sp02 EP Interpretation:  reviewed, normal


General Appearance:  no apparent distress, alert, GCS 15, non-toxic


Head:  normocephalic


Eyes:  bilateral eye PERRL, bilateral eye normal inspection


ENT:  normal ENT inspection


Neck:  normal inspection


Respiratory:  crackles, wheezing


Cardiovascular #1:  regular rate, rhythm, no edema


Gastrointestinal:  normal inspection


Rectal:  deferred


Genitourinary:  no CVA tenderness


Musculoskeletal:  normal inspection


Neurologic:  alert, oriented x3, responsive, motor strength/tone normal, 

sensory intact, speech normal


Psychiatric:  normal inspection


Skin:  normal inspection


Lymphatic:  normal inspection





Medical Decision Making


Diagnostic Impression:  


 Primary Impression:  


 COPD exacerbation


 Additional Impression:  


 PNA (pneumonia)


 Qualified Codes:  J18.9 - Pneumonia, unspecified organism


ER Course


Hospital Course 


61-year-old F presenting to ED with SOB.  h/o COPD





Differential diagnoses include: Pneumonia, CHF exacerbation, pneumothorax, 

fluid overload





Clinical course


Patient placed on stretcher.  On cardiac monitor with stable vitals.  After 

initial history and physical, I ordered nebulizer treatments.  I ordered labs, 

IV fluids, EKG, chest x-ray, blood cultures, UA.  





Labs - noted leukocytosis noted, hemoglobin/hematocrit stable, electrolytes  

okay, lactate okay, troponins negative 


CXR - bilateral interstitial infiltrates


EKG - NSR, no acute changes





Patient states she does not feel better and wishes to be admitted.  abx given





Case discussed with Dr. Hopkins and he agreed to the patient to his service for 

further care and support





I feel this is a highly complex case requiring extensive working including EKG/

Rhythm strip, Xray/CT/US, Blood/urine lab work, repeat exams while in ED, and 

administration of strong opiates/narcotics for pain control, admission to 

hospital or close patient follow up.  





Diagnosis - COPD exacerbation, PNA 





Patient admitted to telemetry in serious condition





Labs








Test


  5/25/17


10:30 5/25/17


13:00


 


White Blood Count


  15.5 K/UL


(4.8-10.8) 


 


 


Red Blood Count


  4.50 M/UL


(4.20-5.40) 


 


 


Hemoglobin


  11.7 G/DL


(12.0-16.0) 


 


 


Hematocrit


  38.7 %


(37.0-47.0) 


 


 


Mean Corpuscular Volume 86 FL (80-99)  


 


Mean Corpuscular Hemoglobin


  26.0 PG


(27.0-31.0) 


 


 


Mean Corpuscular Hemoglobin


Concent 30.2 G/DL


(32.0-36.0) 


 


 


Red Cell Distribution Width


  16.3 %


(11.6-14.8) 


 


 


Platelet Count


  454 K/UL


(150-450) 


 


 


Mean Platelet Volume


  6.3 FL


(6.5-10.1) 


 


 


Neutrophils (%) (Auto)


  69.3 %


(45.0-75.0) 


 


 


Lymphocytes (%) (Auto)


  22.0 %


(20.0-45.0) 


 


 


Monocytes (%) (Auto)


  7.0 %


(1.0-10.0) 


 


 


Eosinophils (%) (Auto)


  1.1 %


(0.0-3.0) 


 


 


Basophils (%) (Auto)


  0.6 %


(0.0-2.0) 


 


 


Sodium Level


  139 mEQ/L


(135-145) 


 


 


Potassium Level


  3.7 mEQ/L


(3.4-4.9) 


 


 


Chloride Level


  103 mEQ/L


() 


 


 


Carbon Dioxide Level


  23 mEQ/L


(20-30) 


 


 


Anion Gap 13 (5-15)  


 


Blood Urea Nitrogen


  23 mg/dL


(7-23) 


 


 


Creatinine


  0.9 mg/dL


(0.5-0.9) 


 


 


Estimat Glomerular Filtration


Rate > 60 mL/min


(>60) 


 


 


Glucose Level


  90 mg/dL


() 


 


 


Lactic Acid Level


  1.40 mmol/L


(0.66-2.22) 


 


 


Calcium Level


  9.2 mg/dL


(8.6-10.2) 


 


 


Total Bilirubin


  0.2 mg/dL


(0.0-1.2) 


 


 


Aspartate Amino Transf


(AST/SGOT) 15 U/L (5-40) 


  


 


 


Alanine Aminotransferase


(ALT/SGPT) 12 U/L (3-33) 


  


 


 


Alkaline Phosphatase


  80 U/L


() 


 


 


Total Creatine Kinase


  32 U/L


() 


 


 


Creatine Kinase MB


  < 1.5 ng/mL (<


3.8) 


 


 


Creatine Kinase MB Relative


Index  


  


 


 


Troponin I


  < 0.30 ng/mL


(<=0.30) 


 


 


Pro-B-Type Natriuretic Peptide


  49 pg/mL


(0-125) 


 


 


Total Protein


  8.9 g/dL


(6.6-8.7) 


 


 


Albumin


  3.2 g/dL


(3.5-5.2) 


 


 


Globulin 5.7 g/dL  


 


Albumin/Globulin Ratio 0.5 (1.0-2.7)  


 


Lipase 15 U/L (< 60)  








EKG Diagnostic Results


Rate:  normal


Rhythm:  NSR


ST Segments:  no acute changes


ASA given to the pt in ED:  No





Rhythm Strip Diag. Results


EP Interpretation:  yes


Rhythm:  NSR, no PVC's, no ectopy





Chest X-Ray Diagnostic Results


EP Interpretation:  No


Findings:  no pneumothorax, no acute cardiopulmonary disease, other - diffuse 

bilateral interstitial infiltrates


Number of Views:  1





Last Vital Signs








  Date Time  Temp Pulse Resp B/P Pulse Ox O2 Delivery O2 Flow Rate FiO2


 


5/25/17 11:46  83 20 115/64 96 Nasal Cannula 2.0 


 


5/25/17 10:33        28


 


5/25/17 10:01 99.3       








Status:  improved


Disposition:  ADMITTED AS INPATIENT


Condition:  Serious


Referrals:  


NOT CHOSEN IPA/MD,REFERRING (PCP)











ROB HEDRICK M.D. May 25, 2017 14:05

## 2017-05-25 NOTE — HISTORY AND PHYSICAL REPORT
DATE OF ADMISSION:  05/25/2017



TIME SEEN:  1 p.m.



CONSULTANTS:

1. Dr. Hopkins.

2. Blaze Fraga M.D.

3. Dr. Little.

4. Dimitry Mcleod M.D.

5. Behnoush Zarrini, M.D.

6. Delon Harris M.D.



CHIEF COMPLAINT:  Chest pain, abdominal pain, diarrhea, shortness of

breath, pneumonia, and COPD exacerbation.



BRIEF HISTORY:  The patient is a 61-year-old female who complained of

cough times four days.  Today, it became worse, has some diarrhea, has

some chest pain, came to the [facility] ER, diagnosed as above, and being

admitted to telemetry for further care at clinic.  _____ no complaint

otherwise.



PAST MEDICAL HISTORY:  Includes Crohn, COPD, and chronic pain.



PAST SURGICAL HISTORY:  Abdominal surgery.



MEDICATIONS:  Wellbutrin, Cymbalta, Remeron, Seroquel, Keppra, Zosyn,

Duo-Neb, Toradol, Ativan, Solu-Medrol, and morphine.



ALLERGIES:  Denied.



SOCIAL HISTORY:  Positive smoke.  Positive alcohol.  No intravenous

drug abuse.



FAMILY HISTORY:  Noncontributory.



REVIEW OF SYSTEMS:  Chest pain, shortness of breath, nausea, no

vomiting.  Positive diarrhea.



PHYSICAL EXAMINATION:

GENERAL:  Slightly weak and pale, oriented x3, and in no acute

distress.

VITAL SIGNS:  Temperature 99 degrees, pulse is 63, respiratory rate

20, and blood pressure 115/64.

CARDIOVASCULAR:  No murmurs.

LUNGS:  Poor air exchange.

ABDOMEN:  Positive bowel sounds.  Slightly tender and soft.  No

guarding.  No rigidity.  No rebound.

EXTREMITIES:  No cyanosis, clubbing, or edema.

NEUROLOGIC:  The patient moves all extremities, but slightly weak.

 



LABORATORY AND DIAGNOSTIC DATA:  White count 15, hemoglobin and

hematocrit 11.7 and 38, and platelets 454,000.  BMP shows albumin 3.2,

lipase 15, otherwise BMP is normal.  Troponin is less than 0.3.



ASSESSMENT:

1. Pneumonia.

2. Cough.

3. Sepsis.

4. Chest pain.

5. Diarrhea.

6. Abdominal pain.

7. Weakness.

8. Anemia.

9. Chronic obstructive pulmonary disease exacerbation.

10. Crohn's disease.

11. Chronic pain.



PLAN:  Continue pre-medications.  OT/PT.  Dietary followup.  O2 and

pulmonary treatment.  Antibiotic per Infectious Disease.  Dr. Fraga, Dr. Little, Dr. Mcleod, Dr. Shetty,  Dr. Harris, and Dr. Rick to consult.  We

will continue to follow this patient medically.









  ______________________________________________

  Maxim Hopkins D.O.





DR:  Sarah

D:  05/25/2017 12:46

T:  05/25/2017 21:14

JOB#:  [id number]

CC:

## 2017-05-25 NOTE — GI INITIAL CONSULT NOTE
History of Present Illness


General


Date patient seen:  May 25, 2017


Time patient seen:  16:18


Reason for Hospitalization:  General Complaint


Referring physician:  MAICOL LANG


Reason for Consultation:  DIARRHEA





Present Illness


HPI


61-year-old female presents to ED complaining of shortness of breath and chest 

pain.  Symptoms for the last 2 days.  Notes chest tightness.  History of COPD.  

States she's having a productive cough with grayish phlegm.  No sick contacts 

or recent travel.  Denies fevers or chills.  Denies nausea or vomiting.  No 

other aggravating or leading factors.  Denies any other associated symptoms





GI CONSULT:  Patient has a history of Crohn's disease.  Patient presents 

emergency department today complaining of severe abdominal pain typical for her 

usual Crohn's disease exacerbation.  Patient has had multiple surgeries in the 

past for bowel obstruction as well as lysis of adhesions.  C/o of CP, abdominal 

pain and diarrhea.  Presents today with leukocytosis and hypoalbuminemia.





Endoscopy Procedure Note


Indication for Procedure:  CD, GERD


Procedures Performed:  EGD, colonoscopy


Operative Findings/Diagnosis:  , CD


LESLY PINA - Oct 5, 2016 12:35


Home Meds


Reported Medications


Linaclotide (LINZESS) 145 Mcg Capsule, 145 MCG PO DAILY, CAP


   3/23/17


Acetaminophen (Acetaminophen) 650 Mg/20.3 Ml Solution, 650 MG ORAL Q8H Y for 

Fever/Headache/Mild Pain, ML 0 Refills


   2/13/17


Mesalamine (PENTASA) 500 Mg Capsule.er, 1000 MG ORAL TID, #30 CAP 0 Refills


   2/9/17


Montelukast Sodium* (MONTELUKAST SODIUM*) 10 Mg Tablet, 10 MG ORAL DAILY, TAB


   2/9/17


Risperidone* (RISPERDAL*) 0.5 Mg Tablet, 0.5 MG ORAL BEDTIME, #30 TAB 0 Refills


   2/9/17


Bupropion Hcl* (BUPROPION HCL*) 100 Mg Tablet, 100 MG ORAL DAILY, TAB


   2/9/17


Trazodone* (TRAZODONE*) 150 Mg Tablet, 100 MG ORAL BEDTIME Y for Insomnia, TAB


   2/9/17


Ibuprofen* (MOTRIN*) 600 Mg Tablet, 800 MG ORAL THREE TIMES A DAY, #30 TAB 0 

Refills


   2/9/17


Polyethylene Glycol 3350* (MIRALAX*) 17 Gm Powd.pack, 17 GM ORAL BEDTIME, PACKET


   1/30/17


Topiramate* (TOPAMAX*) 25 Mg Tablet, 25 MG ORAL TWICE A DAY, #60 TAB 0 Refills


   12/25/16


Quetiapine Fumarate* (SEROQUEL*) 200 Mg Tablet, 100 MG ORAL BEDTIME, TAB


   12/25/16


Mirtazapine* (MIRTAZAPINE*) 15 Mg Tablet, 30 MG ORAL BEDTIME, TAB


   12/25/16


Levetiracetam* (LEVETIRACETAM*) 500 Mg Tablet, 1500 MG ORAL TWICE A DAY, #60 

TAB 0 Refills


   12/25/16


Duloxetine Hcl* (CYMBALTA*) 60 Mg Capsule.dr, 60 MG ORAL DAILY, CAP


   12/25/16


Diphenhydramine HCl (Benadryl) 25 Mg Capsule, 25 MG PO Q6HR Y for Itching, CAP


   12/25/16


Med list reviewed/reconciled:  Yes


Allergies:  


Coded Allergies:  


     No Known Allergies (Verified , 10/27/06)





Patient History


History Provided By:  Patient, Medical Record


PMH Narrative


Past Medical History:  asthma, COPD, CVA/TIA, seizures


Pertinent Family History:  none


Social History:  Denies: alcohol use, drug use, smoking


Pregnant Now:  No


Immunizations:  UTD


Reviewed Nursing Documentation:  PMH: Agreed, PSxH: Agreed





Nursing Documentation-PMH


Hx Cardiac Problems:  Yes


Hx Hypertension:  No


Hx Pacemaker:  No


Hx Asthma:  Yes


Hx COPD:  Yes


Hx Diabetes:  No


Hx Cancer:  No


Hx Gastrointestinal Problems:  Yes


Hx Dialysis:  No


Hx Neurological Problems:  Yes


Hx Cerebrovascular Accident:  Yes - 2005


Hx Seizures:  Yes


Hx Epilepsy:  Yes


Hx Vertigo:  Yes


Hx Dizziness:  Yes


Hx Syncope:  Yes


Hx Headaches:  Yes


Hx Weakness:  Yes


Hx Fatigue:  Yes


Social History:  Reports: drug use - pain medication





Review of Systems


All Other Systems:  negative except mentioned in HPI





Physical Exam





Vital Signs








  Date Time  Temp Pulse Resp B/P Pulse Ox O2 Delivery O2 Flow Rate FiO2


 


5/25/17 09:50 99.3 97 20 134/79 100 Room Air  


 


5/25/17 10:18        21


 


5/25/17 11:46       2.0 








Sp02 EP Interpretation:  reviewed


Labs





Laboratory Tests








Test


  5/25/17


10:30 5/25/17


13:00


 


White Blood Count


  15.5 K/UL


(4.8-10.8)  H 


 


 


Red Blood Count


  4.50 M/UL


(4.20-5.40) 


 


 


Hemoglobin


  11.7 G/DL


(12.0-16.0)  L 


 


 


Hematocrit


  38.7 %


(37.0-47.0) 


 


 


Mean Corpuscular Volume 86 FL (80-99)   


 


Mean Corpuscular Hemoglobin


  26.0 PG


(27.0-31.0)  L 


 


 


Mean Corpuscular Hemoglobin


Concent 30.2 G/DL


(32.0-36.0)  L 


 


 


Red Cell Distribution Width


  16.3 %


(11.6-14.8)  H 


 


 


Platelet Count


  454 K/UL


(150-450)  H 


 


 


Mean Platelet Volume


  6.3 FL


(6.5-10.1)  L 


 


 


Neutrophils (%) (Auto)


  69.3 %


(45.0-75.0) 


 


 


Lymphocytes (%) (Auto)


  22.0 %


(20.0-45.0) 


 


 


Monocytes (%) (Auto)


  7.0 %


(1.0-10.0) 


 


 


Eosinophils (%) (Auto)


  1.1 %


(0.0-3.0) 


 


 


Basophils (%) (Auto)


  0.6 %


(0.0-2.0) 


 


 


Sodium Level


  139 mEQ/L


(135-145) 


 


 


Potassium Level


  3.7 mEQ/L


(3.4-4.9) 


 


 


Chloride Level


  103 mEQ/L


() 


 


 


Carbon Dioxide Level


  23 mEQ/L


(20-30) 


 


 


Anion Gap 13 (5-15)   


 


Blood Urea Nitrogen


  23 mg/dL


(7-23) 


 


 


Creatinine


  0.9 mg/dL


(0.5-0.9) 


 


 


Estimat Glomerular Filtration


Rate > 60 mL/min


(>60) 


 


 


Glucose Level


  90 mg/dL


() 


 


 


Lactic Acid Level


  1.40 mmol/L


(0.66-2.22) 


 


 


Calcium Level


  9.2 mg/dL


(8.6-10.2) 


 


 


Total Bilirubin


  0.2 mg/dL


(0.0-1.2) 


 


 


Aspartate Amino Transf


(AST/SGOT) 15 U/L (5-40)  


  


 


 


Alanine Aminotransferase


(ALT/SGPT) 12 U/L (3-33)  


  


 


 


Alkaline Phosphatase


  80 U/L


() 


 


 


Total Creatine Kinase


  32 U/L


() 


 


 


Creatine Kinase MB


  < 1.5 ng/mL (<


3.8) 


 


 


Creatine Kinase MB Relative


Index   


  


 


 


Troponin I


  < 0.30 ng/mL


(<=0.30) 


 


 


Pro-B-Type Natriuretic Peptide


  49 pg/mL


(0-125) 


 


 


Total Protein


  8.9 g/dL


(6.6-8.7)  H 


 


 


Albumin


  3.2 g/dL


(3.5-5.2)  L 


 


 


Globulin 5.7 g/dL   


 


Albumin/Globulin Ratio


  0.5 (1.0-2.7)


L 


 


 


Lipase 15 U/L (< 60)   


 


Urine Color  Yellow  


 


Urine Appearance


  


  Slightly


cloudy


 


Urine pH  6 (4.5-8.0)  


 


Urine Specific Gravity


  


  1.020


(1.005-1.035)


 


Urine Protein


  


  1+ (NEGATIVE)


H


 


Urine Glucose (UA)


  


  Negative


(NEGATIVE)


 


Urine Ketones


  


  Negative


(NEGATIVE)


 


Urine Occult Blood


  


  4+ (NEGATIVE)


H


 


Urine Nitrite


  


  Negative


(NEGATIVE)


 


Urine Bilirubin


  


  Negative


(NEGATIVE)


 


Urine Urobilinogen


  


  Normal MG/DL


(0.0-1.0)


 


Urine Leukocyte Esterase


  


  2+ (NEGATIVE)


H


 


Urine RBC


  


  5-10 /HPF (0 -


2)  H


 


Urine WBC


  


  5-10 /HPF (0 -


2)  H


 


Urine Squamous Epithelial


Cells 


  Few /LPF


(NONE/OCC)


 


Urine Bacteria


  


  Few /HPF


(NONE)








General Appearance:  no apparent distress


Head:  normocephalic


EENT:  normal ENT inspection


Neck:  supple


Respiratory:  normal breath sounds, no respiratory distress


Cardiovascular:  normal rate


Gastrointestinal:  soft, distended, other - multiple abdominal surgical scars


Rectal:  deferred


Neurologic:  normal inspection, alert, oriented x3, responsive


Psychiatric:  normal inspection, judgement/insight normal, memory normal


Skin:  normal inspection, normal color, no rash


Lymphatic:  normal inspection, no adenopathy


Current Medications





Current Medications








 Medications


  (Trade)  Dose


 Ordered  Sig/Jed


 Route


 PRN Reason  Start Time


 Stop Time Status Last Admin


Dose Admin


 


 Albuterol/


 Ipratropium


  (DuoNeb


 0.5-3(2.5)mg/3ml)  3 ml  Q4H  PRN


 HHN


 dyspnea  5/25/17 12:00


 5/30/17 11:59   


 


 


 Azithromycin


  (Zithromax)  250 mg  Q24H


 ORAL


   5/26/17 09:00


 6/2/17 08:59   


 


 


 Bupropion HCl


  (Wellbutrin)  100 mg  DAILY


 ORAL


   5/26/17 09:00


 6/25/17 08:59   


 


 


 Dextrose     STAT  PRN


 IV


 Hypoglycemia  5/25/17 12:00


 6/24/17 11:59   


 


 


 Duloxetine HCl


  (Cymbalta)  60 mg  DAILY


 ORAL


   5/26/17 09:00


 6/25/17 08:59   


 


 


 Heparin Sodium


  (Porcine)


  (Heparin 5000


 units/ml)  5,000 units  EVERY 12  HOURS


 SUBQ


   5/25/17 21:00


 6/24/17 20:59   


 


 


 Ketorolac


 Tromethamine


  (Toradol 30mg)  30 mg  Q8H  PRN


 IV


 moderate pain 4-6  5/25/17 12:00


 5/30/17 11:59   


 


 


 Levetiracetam


  (Keppra)  1,500 mg  TWICE A  DAY


 ORAL


   5/25/17 18:00


 6/24/17 17:59   


 


 


 Lorazepam


  (Ativan 2mg/ml


 1ml)  0.5 mg  Q4H  PRN


 IV


 For Anxiety  5/25/17 12:00


 6/1/17 11:59   


 


 


 Methylprednisolone


 Sodium Succinate


  (Solu-MEDROL)  60 mg  EVERY 6  HOURS


 IV


   5/25/17 18:00


 6/24/17 17:59   


 


 


 Mirtazapine


  (Remeron)  30 mg  BEDTIME


 ORAL


   5/25/17 21:00


 6/24/17 20:59   


 


 


 Morphine Sulfate


  (Morphine


 Sulfate)  2 mg  Q4H  PRN


 IVP


 severe pain 7-10  5/25/17 12:00


 6/1/17 11:59  5/25/17 13:48


 


 


 Nitroglycerin


  (Ntg)  0.4 mg  Q5M X 3 DOSES PRN


 SL


 Prn Chest Pain  5/25/17 12:00


 6/24/17 11:59   


 


 


 Ondansetron HCl


  (Zofran)  4 mg  Q6H  PRN


 IVP


 Nausea & Vomiting  5/25/17 12:00


 6/24/17 11:59   


 


 


 Piperacillin Sod/


 Tazobactam Sod/


 Dextrose


  (Zosyn/D5W)  110 ml @ 


 27.5 mls/hr  Q8HR@0000,0800,1600


 IVPB


   5/25/17 16:00


 6/1/17 15:59   


 


 


 Promethazine HCl/


 Codeine


  (Phenergan with


 Codeine)  5 ml  Q6H  PRN


 ORAL


 cough  5/25/17 12:00


 6/24/17 11:59   


 


 


 Quetiapine


 Fumarate


  (SEROquel)  100 mg  BEDTIME


 ORAL


   5/25/17 21:00


 6/24/17 20:59   


 


 


 Temazepam


  (Restoril)  15 mg  HSPRN  PRN


 ORAL


 Insomnia  5/25/17 12:00


 6/1/17 11:59   


 


 


 Theophylline


  (Shiv-Dur)  100 mg  EVERY 12  HOURS


 ORAL


   5/25/17 21:00


 6/24/17 20:59   


 


 


 Trazodone HCl


  (Desyrel)  100 mg  BEDTIME  PRN


 ORAL


 Insomnia  5/25/17 21:00


 6/24/17 20:59   


 











GI: Plan


Problems:  


(1) Smoker


(2) History of exploratory laparotomy


(3) Abdominal pain


(4) Crohns disease


(5) Diarrhea


(6) IBD (inflammatory bowel disease)


(7) Nausea and vomiting


(8) Opioid dependence


Plan


s/p EGD/colon 2016


non functional implanted morphine pump LLQ





symptomatic treatment at this time


electrolyte replacement


low residual diet


Imodium prn after stool studies are collected.


ordered for cdiff, stool culture


mesalamine for Crohns


pain mgmt 


fu labs





Discussed with Dr. Pina.


Thank you for referring this patient, we will follow.











Elena Seay N.P. May 25, 2017 16:22

## 2017-05-25 NOTE — DIAGNOSTIC IMAGING REPORT
Indications:  Shortness of breath



Technique:  Portable AP chest



Findings:



Comparison:  1/24/17, 12/16/13



Diffuse bilateral interstitial infiltrates persist, unchanged. Circumscribed gas

filled lucency is again noted in lateral aspect of right upper lung, unchanged. No

new pulmonary parenchymal abnormality is demonstrated. No pleural disease is

evident. Heart size remains within normal limits. Pulmonary vasculature largely

obscured. Aortic arch calcification again noted.



IMPRESSION:



Stable bilateral interstitial disease/fibrosis.



No new abnormality identified

## 2017-05-25 NOTE — CONSULTATION
History of Present Illness


General


Date patient seen:  May 25, 2017


Chief Complaint:  General Complaint


Referring physician:  MAICOL LANG


Reason for Consultation:  cough





Present Illness


HPI


61-year-old female with hx of COPD, smoking  presented to ED complaining of 

shortness of breath and pleuritic chest pain and productive cough.  Symptoms 

for the last 2 days. History of COPD.  Denied fevers or chills.  Denies any 

other associated symptoms.  Her cxr showed chronic pulmonary fibrosi.  She is 

admitted for acute exacerbation of COPD and bronchitis.


Allergies:  


Coded Allergies:  


     No Known Allergies (Verified , 10/27/06)





Medication History


Scheduled


Bupropion Hcl* (Bupropion Hcl*), 100 MG ORAL DAILY, (Reported)


Duloxetine Hcl* (Cymbalta*), 60 MG ORAL DAILY, (Reported)


Ibuprofen* (Motrin*), 800 MG ORAL THREE TIMES A DAY, (Reported)


Levetiracetam* (Levetiracetam*), 1,500 MG ORAL TWICE A DAY, (Reported)


Linaclotide (Linzess), 145 MCG PO DAILY, (Reported)


Mesalamine (Pentasa), 1,000 MG ORAL TID, (Reported)


Mirtazapine* (Mirtazapine*), 30 MG ORAL BEDTIME, (Reported)


Montelukast Sodium* (Montelukast Sodium*), 10 MG ORAL DAILY, (Reported)


Polyethylene Glycol 3350* (Miralax*), 17 GM ORAL BEDTIME, (Reported)


Quetiapine Fumarate* (Seroquel*), 100 MG ORAL BEDTIME, (Reported)


Risperidone* (Risperdal*), 0.5 MG ORAL BEDTIME, (Reported)


Topiramate* (Topamax*), 25 MG ORAL TWICE A DAY, (Reported)





Scheduled PRN


Acetaminophen (Acetaminophen), 650 MG ORAL Q8H PRN for Fever/Headache/Mild Pain,

 (Reported)


Diphenhydramine HCl (Benadryl), 25 MG PO Q6HR PRN for Itching, (Reported)


Trazodone* (Trazodone*), 100 MG ORAL BEDTIME PRN for Insomnia, (Reported)





Patient History


Healthcare decision maker


N


Resuscitation status


Full Code


Advanced Directive on File








Past Medical/Surgical History


Past Medical/Surgical History:  


(1) Smoker


(2) IBD (inflammatory bowel disease)


(3) Crohns disease


(4) Interstitial lung disease


(5) Emphysema


(6) Elevated CEA





Review of Systems


Respiratory:  Reports: shortness of breath, sputum





Physical Exam


General Appearance:  WD/WN, alert


Lines, tubes and drains:  peripheral


HEENT:  normocephalic


Neck:  non-tender, normal alignment


Respiratory/Chest:  chest wall non-tender, rhonchi - bilaterally


Cardiovascular/Chest:  normal peripheral pulses, normal rate


Abdomen:  normal bowel sounds


Genitourinary/Rectal:  normal genital exam





Last 24 Hour Vital Signs








  Date Time  Temp Pulse Resp B/P Pulse Ox O2 Delivery O2 Flow Rate FiO2


 


5/25/17 20:00 97.0 88 18 119/75 96 Room Air  


 


5/25/17 16:00 97.0 80 20 112/60 100 Room Air  


 


5/25/17 16:00  78      


 


5/25/17 14:39  79      


 


5/25/17 14:36  79 20 111/67 98 Nasal Cannula 2.0 


 


5/25/17 14:36 99.3 79 20 111/67 98 Nasal Cannula 2.0 28


 


5/25/17 14:11 99.3       


 


5/25/17 14:11 99.3       


 


5/25/17 11:46  83 20 115/64 96 Nasal Cannula 2.0 


 


5/25/17 10:33  90 18  99 Nasal Cannula  28


 


5/25/17 10:18        28


 


5/25/17 10:18  88 20  98 Nasal Cannula  28


 


5/25/17 10:18  88 20   Room Air  21


 


5/25/17 10:01 99.3 88 20 120/73 92 Room Air  


 


5/25/17 09:50 99.3 97 20 134/79 100 Room Air  











Laboratory Tests








Test


  5/25/17


10:30 5/25/17


13:00


 


White Blood Count


  15.5 K/UL


(4.8-10.8)  H 


 


 


Red Blood Count


  4.50 M/UL


(4.20-5.40) 


 


 


Hemoglobin


  11.7 G/DL


(12.0-16.0)  L 


 


 


Hematocrit


  38.7 %


(37.0-47.0) 


 


 


Mean Corpuscular Volume 86 FL (80-99)   


 


Mean Corpuscular Hemoglobin


  26.0 PG


(27.0-31.0)  L 


 


 


Mean Corpuscular Hemoglobin


Concent 30.2 G/DL


(32.0-36.0)  L 


 


 


Red Cell Distribution Width


  16.3 %


(11.6-14.8)  H 


 


 


Platelet Count


  454 K/UL


(150-450)  H 


 


 


Mean Platelet Volume


  6.3 FL


(6.5-10.1)  L 


 


 


Neutrophils (%) (Auto)


  69.3 %


(45.0-75.0) 


 


 


Lymphocytes (%) (Auto)


  22.0 %


(20.0-45.0) 


 


 


Monocytes (%) (Auto)


  7.0 %


(1.0-10.0) 


 


 


Eosinophils (%) (Auto)


  1.1 %


(0.0-3.0) 


 


 


Basophils (%) (Auto)


  0.6 %


(0.0-2.0) 


 


 


Sodium Level


  139 mEQ/L


(135-145) 


 


 


Potassium Level


  3.7 mEQ/L


(3.4-4.9) 


 


 


Chloride Level


  103 mEQ/L


() 


 


 


Carbon Dioxide Level


  23 mEQ/L


(20-30) 


 


 


Anion Gap 13 (5-15)   


 


Blood Urea Nitrogen


  23 mg/dL


(7-23) 


 


 


Creatinine


  0.9 mg/dL


(0.5-0.9) 


 


 


Estimat Glomerular Filtration


Rate > 60 mL/min


(>60) 


 


 


Glucose Level


  90 mg/dL


() 


 


 


Lactic Acid Level


  1.40 mmol/L


(0.66-2.22) 


 


 


Calcium Level


  9.2 mg/dL


(8.6-10.2) 


 


 


Total Bilirubin


  0.2 mg/dL


(0.0-1.2) 


 


 


Aspartate Amino Transf


(AST/SGOT) 15 U/L (5-40)  


  


 


 


Alanine Aminotransferase


(ALT/SGPT) 12 U/L (3-33)  


  


 


 


Alkaline Phosphatase


  80 U/L


() 


 


 


Total Creatine Kinase


  32 U/L


() 


 


 


Creatine Kinase MB


  < 1.5 ng/mL (<


3.8) 


 


 


Creatine Kinase MB Relative


Index   


  


 


 


Troponin I


  < 0.30 ng/mL


(<=0.30) 


 


 


Pro-B-Type Natriuretic Peptide


  49 pg/mL


(0-125) 


 


 


Total Protein


  8.9 g/dL


(6.6-8.7)  H 


 


 


Albumin


  3.2 g/dL


(3.5-5.2)  L 


 


 


Globulin 5.7 g/dL   


 


Albumin/Globulin Ratio


  0.5 (1.0-2.7)


L 


 


 


Lipase 15 U/L (< 60)   


 


Urine Color  Yellow  


 


Urine Appearance


  


  Slightly


cloudy


 


Urine pH  6 (4.5-8.0)  


 


Urine Specific Gravity


  


  1.020


(1.005-1.035)


 


Urine Protein


  


  1+ (NEGATIVE)


H


 


Urine Glucose (UA)


  


  Negative


(NEGATIVE)


 


Urine Ketones


  


  Negative


(NEGATIVE)


 


Urine Occult Blood


  


  4+ (NEGATIVE)


H


 


Urine Nitrite


  


  Negative


(NEGATIVE)


 


Urine Bilirubin


  


  Negative


(NEGATIVE)


 


Urine Urobilinogen


  


  Normal MG/DL


(0.0-1.0)


 


Urine Leukocyte Esterase


  


  2+ (NEGATIVE)


H


 


Urine RBC


  


  5-10 /HPF (0 -


2)  H


 


Urine WBC


  


  5-10 /HPF (0 -


2)  H


 


Urine Squamous Epithelial


Cells 


  Few /LPF


(NONE/OCC)


 


Urine Bacteria


  


  Few /HPF


(NONE)








Height (Feet):  5


Height (Inches):  6.00


Weight (Pounds):  164


Medications





Current Medications








 Medications


  (Trade)  Dose


 Ordered  Sig/Jde


 Route


 PRN Reason  Start Time


 Stop Time Status Last Admin


Dose Admin


 


 Albuterol/


 Ipratropium


  (DuoNeb


 0.5-3(2.5)mg/3ml)  3 ml  Q4H  PRN


 HHN


 dyspnea  5/25/17 12:00


 5/30/17 11:59   


 


 


 Azithromycin


  (Zithromax)  250 mg  Q24H


 ORAL


   5/26/17 09:00


 6/2/17 08:59   


 


 


 Bupropion HCl


  (Wellbutrin)  100 mg  DAILY


 ORAL


   5/26/17 09:00


 6/25/17 08:59   


 


 


 Dextrose     STAT  PRN


 IV


 Hypoglycemia  5/25/17 12:00


 6/24/17 11:59   


 


 


 Duloxetine HCl


  (Cymbalta)  60 mg  DAILY


 ORAL


   5/26/17 09:00


 6/25/17 08:59   


 


 


 Heparin Sodium


  (Porcine)


  (Heparin 5000


 units/ml)  5,000 units  EVERY 12  HOURS


 SUBQ


   5/25/17 21:00


 6/24/17 20:59  5/25/17 21:03


 


 


 Ketorolac


 Tromethamine


  (Toradol 30mg)  30 mg  Q8H  PRN


 IV


 moderate pain 4-6  5/25/17 12:00


 5/30/17 11:59   


 


 


 Levetiracetam


  (Keppra)  1,500 mg  TWICE A  DAY


 ORAL


   5/25/17 18:00


 6/24/17 17:59  5/25/17 18:50


 


 


 Lorazepam


  (Ativan 2mg/ml


 1ml)  0.5 mg  Q4H  PRN


 IV


 For Anxiety  5/25/17 12:00


 6/1/17 11:59   


 


 


 Mesalamine


  (Asacol)  800 mg  THREE TIMES A  DAY


 ORAL


   5/25/17 20:30


 6/24/17 20:29  5/25/17 20:59


 


 


 Methylprednisolone


 Sodium Succinate


  (Solu-MEDROL)  60 mg  EVERY 6  HOURS


 IV


   5/25/17 18:00


 6/24/17 17:59  5/25/17 18:49


 


 


 Mirtazapine


  (Remeron)  30 mg  BEDTIME


 ORAL


   5/25/17 21:00


 6/24/17 20:59  5/25/17 21:00


 


 


 Morphine Sulfate


  (Morphine


 Sulfate)  2 mg  Q4H  PRN


 IVP


 severe pain 7-10  5/25/17 12:00


 6/1/17 11:59  5/25/17 18:52


 


 


 Nitroglycerin


  (Ntg)  0.4 mg  Q5M X 3 DOSES PRN


 SL


 Prn Chest Pain  5/25/17 12:00


 6/24/17 11:59   


 


 


 Ondansetron HCl


  (Zofran)  4 mg  Q6H  PRN


 IVP


 Nausea & Vomiting  5/25/17 12:00


 6/24/17 11:59   


 


 


 Piperacillin Sod/


 Tazobactam Sod/


 Dextrose


  (Zosyn/D5W)  110 ml @ 


 27.5 mls/hr  Q8HR@0000,0800,1600


 IVPB


   5/25/17 16:00


 6/1/17 15:59  5/25/17 18:49


 


 


 Promethazine HCl/


 Codeine


  (Phenergan with


 Codeine)  5 ml  Q6H  PRN


 ORAL


 cough  5/25/17 12:00


 6/24/17 11:59   


 


 


 Quetiapine


 Fumarate


  (SEROquel)  100 mg  BEDTIME


 ORAL


   5/25/17 21:00


 6/24/17 20:59  5/25/17 20:59


 


 


 Temazepam


  (Restoril)  15 mg  HSPRN  PRN


 ORAL


 Insomnia  5/25/17 12:00


 6/1/17 11:59   


 


 


 Theophylline


  (Shiv-Dur)  100 mg  EVERY 12  HOURS


 ORAL


   5/25/17 21:00


 6/24/17 20:59  5/25/17 20:59


 


 


 Trazodone HCl


  (Desyrel)  100 mg  BEDTIME  PRN


 ORAL


 Insomnia  5/25/17 21:00


 6/24/17 20:59   


 











Assessment/Plan


Problem List:  


(1) COPD exacerbation


ICD Codes:  J44.1 - COPD exacerbation


SNOMED:  056960453


(2) Interstitial lung disease


ICD Codes:  J84.9 - Interstitial pulmonary disease, unspecified


SNOMED:  50769857, [id number]


(3) Emphysema


ICD Codes:  J43.9 - Emphysema


SNOMED:  65197807


(4) Chronic abdominal pain


(5) Crohn's disease


ICD Codes:  K50.90 - Crohn's disease


SNOMED:  06149951


Assessment/Plan


IV steroids,


IV antibiotics


check sputum


respiratory treatment 


titrate fio2 to sat of 92%











JULIETTE KLEIN May 25, 2017 21:26

## 2017-05-25 NOTE — CONSULTATION
DATE OF CONSULTATION:



INFECTIOUS DISEASE CONSULTATION



CONSULTING PHYSICIAN:  Violet Dior M.D.



REQUESTING PHYSICIAN:  Maxim Hopkins D.O.



REASON FOR CONSULTATION:  Community-acquired pneumonia.

Recommendation for antibiotics therapy.



HISTORY OF PRESENT ILLNESS:  The patient is a 61-year-old female,

presented to the emergency room at [facility] for progressive

shortness of breath with cough productive of grayish phlegm, which has

been going on for the last couple of days.  The patient denied any

shortness of breath.  She had a history of chronic obstructive pulmonary

disease and she noticed that now she is getting more short of breath with

activities.  Denied any recent travel or sick contacts.  No fever or

chills.  No nausea or vomiting.  The patient had a chest x-ray in the

emergency room, which showed interstitial infiltrates on both sides with

leukocytosis, so she was started on intravenous antibiotics therapy and I

was consulted by the primary provider for antibiotics treatment and

further management.



PAST MEDICAL HISTORY:  Significant for asthma, chronic obstructive

pulmonary disease, CVA, and seizure.



MEDICATIONS:  The patient was started on Zosyn and steroid by the

pulmonologist.



ALLERGIES:  She has no known drug allergy.



FAMILY HISTORY:  Not contributory.



SOCIAL HISTORY:  The patient denies using any drugs, tobacco, or

alcohol.



REVIEW OF SYSTEMS:  A 12-point of system reviewed were all negative

apart from the one I mentioned above in my History and Physical.



PHYSICAL EXAMINATION:

VITAL SIGNS:  Temperature 99.3 degrees, pulse 79, respirations 20,

blood pressure 111/67, and saturation 98% on nasal cannula of 2 liters.

GENERAL:  A middle-aged female, up in bed, coughing, awake, alert,a

nd not in acute distress.

HEENT:   Normocephalic and atraumatic.  Pupils reactive to light

equally.  Moist oral mucosa.  No exudate.

NECK:  Supple.  No lymphadenopathy.

CARDIOVASCULAR:  Regular rate and rhythm.  No murmurs.

LUNGS:  She had crackles and diminished breathing sounds on both

sides.

ABDOMEN:  Soft, nontender, and nondistended.  Positive bowel sounds.

No hepatosplenomegaly.  No ascites.

EXTREMITIES:  No edema or cyanosis.



LABORATORY DATA:  Labs showed white count of 15.5 and hemoglobin of

11.7.  BUN of 23 and creatinine of 0.9.  AST of 15 and ALT of 12.

Urinalysis showed leukocyte esterase +2, WBCs 5 to 10, and red blood cells

5 to 10.  Chest x-ray showed stable bilateral interstitial disease and

fibrosis.



ASSESSMENT AND PLAN:

1. Community-acquired pneumonia in immunocompromised patient.  Agree on

Zosyn.  At this point, we will add azithromycin to cover atypical

organisms.  Monitor her symptoms closely and monitor her cultures

including sputum culture.

2. Sepsis due to pneumonia.  Continue wide-spectrum antibiotics therapy.

We will send blood culture.

3. Crohn's disease.  Consult Gastrointestinal for further management.

4. Chronic obstructive pulmonary disease with exacerbation.  Started on

large dose of steroids.  Recommended to taper and continue nebulizer.

5. Abdominal pain, suspect due to Crohn's disease.  Consult

Gastrointestinal for further evaluation.









  ______________________________________________

  Violet Dior M.D.





DR:  CIARAN

D:  05/25/2017 14:53

T:  05/25/2017 19:24

JOB#:  [id number]

CC:

## 2017-05-26 VITALS — DIASTOLIC BLOOD PRESSURE: 77 MMHG | SYSTOLIC BLOOD PRESSURE: 116 MMHG

## 2017-05-26 VITALS — DIASTOLIC BLOOD PRESSURE: 79 MMHG | SYSTOLIC BLOOD PRESSURE: 131 MMHG

## 2017-05-26 VITALS — SYSTOLIC BLOOD PRESSURE: 120 MMHG | DIASTOLIC BLOOD PRESSURE: 67 MMHG

## 2017-05-26 VITALS — DIASTOLIC BLOOD PRESSURE: 75 MMHG | SYSTOLIC BLOOD PRESSURE: 134 MMHG

## 2017-05-26 VITALS — SYSTOLIC BLOOD PRESSURE: 116 MMHG | DIASTOLIC BLOOD PRESSURE: 72 MMHG

## 2017-05-26 LAB
%HYPO/RBC NFR BLD AUTO: (no result) %
ANION GAP SERPL CALC-SCNC: 14 MMOL/L (ref 5–15)
ANISOCYTOSIS BLD QL SMEAR: (no result)
BASOPHILS NFR BLD MANUAL: 0 % (ref 0–2)
CALCIUM SERPL-MCNC: 9.3 MG/DL (ref 8.6–10.2)
CHLORIDE SERPL-SCNC: 100 MEQ/L (ref 98–107)
CO2 SERPL-SCNC: 23 MEQ/L (ref 20–30)
CREAT SERPL-MCNC: 0.8 MG/DL (ref 0.5–0.9)
EOSINOPHIL NFR BLD MANUAL: 0 % (ref 0–3)
ERYTHROCYTE [DISTWIDTH] IN BLOOD BY AUTOMATED COUNT: 16 % (ref 11.6–14.8)
GFR SERPLBLD BASED ON 1.73 SQ M-ARVRAT: > 60 ML/MIN (ref 60–?)
HEMOLYSIS: 1
MCH RBC QN AUTO: 26.6 PG (ref 27–31)
MCHC RBC AUTO-ENTMCNC: 31.2 G/DL (ref 32–36)
MCV RBC AUTO: 85 FL (ref 80–99)
NEUTS BAND NFR BLD MANUAL: 0 % (ref 0–8)
NEUTS BAND NFR BLD MANUAL: 87 % (ref 45–75)
PLAT MORPH BLD: NORMAL
PLATELET # BLD EST: (no result) 10*3/UL
PLATELET # BLD: 437 K/UL (ref 150–450)
PMV BLD AUTO: 6.2 FL (ref 6.5–10.1)
POTASSIUM SERPL-SCNC: 4 MEQ/L (ref 3.4–4.9)
RBC # BLD AUTO: 4.03 M/UL (ref 4.2–5.4)
SODIUM SERPL-SCNC: 137 MEQ/L (ref 135–145)
TOTAL CELLS COUNTED BLD: 100
VARIANT LYMPHS NFR BLD MANUAL: 11 % (ref 20–45)
WBC # BLD AUTO: 16.9 K/UL (ref 4.8–10.8)

## 2017-05-26 RX ADMIN — THEOPHYLLINE ANHYDROUS SCH MG: 100 CAPSULE, EXTENDED RELEASE ORAL at 10:27

## 2017-05-26 RX ADMIN — DEXTROSE MONOHYDRATE SCH MLS/HR: 50 INJECTION, SOLUTION INTRAVENOUS at 16:00

## 2017-05-26 RX ADMIN — METHYLPREDNISOLONE SODIUM SUCCINATE SCH MG: 125 INJECTION, POWDER, FOR SOLUTION INTRAMUSCULAR; INTRAVENOUS at 13:33

## 2017-05-26 RX ADMIN — MORPHINE SULFATE PRN MG: 2 INJECTION, SOLUTION INTRAMUSCULAR; INTRAVENOUS at 15:13

## 2017-05-26 RX ADMIN — DEXTROSE MONOHYDRATE SCH MLS/HR: 50 INJECTION, SOLUTION INTRAVENOUS at 08:45

## 2017-05-26 RX ADMIN — MORPHINE SULFATE PRN MG: 2 INJECTION, SOLUTION INTRAMUSCULAR; INTRAVENOUS at 05:58

## 2017-05-26 RX ADMIN — DEXTROSE MONOHYDRATE SCH MLS/HR: 50 INJECTION, SOLUTION INTRAVENOUS at 23:34

## 2017-05-26 RX ADMIN — METHYLPREDNISOLONE SODIUM SUCCINATE SCH MG: 125 INJECTION, POWDER, FOR SOLUTION INTRAMUSCULAR; INTRAVENOUS at 05:56

## 2017-05-26 RX ADMIN — MORPHINE SULFATE PRN MG: 2 INJECTION, SOLUTION INTRAMUSCULAR; INTRAVENOUS at 11:14

## 2017-05-26 RX ADMIN — ASPIRIN 81 MG SCH MG: 81 TABLET ORAL at 21:39

## 2017-05-26 RX ADMIN — METHYLPREDNISOLONE SODIUM SUCCINATE SCH MG: 125 INJECTION, POWDER, FOR SOLUTION INTRAMUSCULAR; INTRAVENOUS at 00:26

## 2017-05-26 RX ADMIN — TRAZODONE HYDROCHLORIDE PRN MG: 100 TABLET ORAL at 21:55

## 2017-05-26 RX ADMIN — MORPHINE SULFATE PRN MG: 2 INJECTION, SOLUTION INTRAMUSCULAR; INTRAVENOUS at 19:59

## 2017-05-26 RX ADMIN — DEXTROSE MONOHYDRATE SCH MLS/HR: 50 INJECTION, SOLUTION INTRAVENOUS at 00:25

## 2017-05-26 RX ADMIN — METHYLPREDNISOLONE SODIUM SUCCINATE SCH MG: 125 INJECTION, POWDER, FOR SOLUTION INTRAMUSCULAR; INTRAVENOUS at 23:34

## 2017-05-26 RX ADMIN — HEPARIN SODIUM SCH UNITS: 5000 INJECTION INTRAVENOUS; SUBCUTANEOUS at 21:37

## 2017-05-26 RX ADMIN — HEPARIN SODIUM SCH UNITS: 5000 INJECTION INTRAVENOUS; SUBCUTANEOUS at 10:29

## 2017-05-26 RX ADMIN — METHYLPREDNISOLONE SODIUM SUCCINATE SCH MG: 125 INJECTION, POWDER, FOR SOLUTION INTRAMUSCULAR; INTRAVENOUS at 18:18

## 2017-05-26 RX ADMIN — THEOPHYLLINE ANHYDROUS SCH MG: 100 CAPSULE, EXTENDED RELEASE ORAL at 21:39

## 2017-05-26 RX ADMIN — DEXTROSE MONOHYDRATE SCH MLS/HR: 50 INJECTION, SOLUTION INTRAVENOUS at 10:26

## 2017-05-26 NOTE — CARDIOLOGY REPORT
--------------- APPROVED REPORT --------------





EKG Measurement

Heart [id number]

[id number]

[id number]

[id number]

[id number]





Normal sinus rhythm

Possible Left atrial enlargement

Borderline ECG

## 2017-05-26 NOTE — GENERAL PROGRESS NOTE
Assessment/Plan


Problem List:  


(1) Pneumonia


ICD Codes:  J18.9 - Pneumonia, unspecified organism


SNOMED:  695784157


(2) Chest pain


ICD Codes:  R07.9 - Chest pain, unspecified


SNOMED:  98687007


(3) COPD (chronic obstructive pulmonary disease)


ICD Codes:  J44.9 - Chronic obstructive pulmonary disease, unspecified


SNOMED:  10240692


(4) Sepsis


ICD Codes:  A41.9 - Sepsis, unspecified organism


SNOMED:  62747106


Status:  stable, progressing, tolerating diet


Assessment/Plan


ot pt diet o2 pulm tx abx cbc bmp am





Subjective


Constitutional:  Reports: weakness


Respiratory:  Reports: shortness of breath


Allergies:  


Coded Allergies:  


     No Known Allergies (Verified , 10/27/06)


All Systems:  reviewed and negative except above


Subjective


o2nc weak sob





Objective





Last 24 Hour Vital Signs








  Date Time  Temp Pulse Resp B/P Pulse Ox O2 Delivery O2 Flow Rate FiO2


 


5/26/17 12:00  74      


 


5/26/17 11:16 97.5 67 18 134/75 100 Nasal Cannula 2.0 


 


5/26/17 08:00  77      


 


5/26/17 07:42 96.8 73 18 120/67 98 Nasal Cannula 2.0 


 


5/26/17 04:13 97.5 64 19 116/72 100 Nasal Cannula 2.0 


 


5/26/17 04:00  66      


 


5/26/17 00:00  67      


 


5/25/17 23:47 99.5 72 20 115/60 98 Nasal Cannula 2.0 


 


5/25/17 20:00  76      


 


5/25/17 20:00 97.0 88 18 119/75 96 Room Air  


 


5/25/17 16:00 97.0 80 20 112/60 100 Room Air  


 


5/25/17 16:00  78      


 


5/25/17 14:39  79      


 


5/25/17 14:36  79 20 111/67 98 Nasal Cannula 2.0 


 


5/25/17 14:36 99.3 79 20 111/67 98 Nasal Cannula 2.0 28


 


5/25/17 14:11 99.3       


 


5/25/17 14:11 99.3       

















Intake and Output  


 


 5/25/17 5/26/17





 19:00 07:00


 


Intake Total  220.0 ml


 


Balance  220.0 ml


 


  


 


IV Total  220.0 ml


 


# Voids 1 1


 


# Bowel Movements  1








Laboratory Tests


5/26/17 05:15: 


White Blood Count 16.9H, Red Blood Count 4.03L, Hemoglobin 10.7L, Hematocrit 

34.3L, Mean Corpuscular Volume 85, Mean Corpuscular Hemoglobin 26.6L, Mean 

Corpuscular Hemoglobin Concent 31.2L, Red Cell Distribution Width 16.0H, 

Platelet Count 437, Mean Platelet Volume 6.2L, Neutrophils (%) (Auto) , 

Lymphocytes (%) (Auto) , Monocytes (%) (Auto) , Eosinophils (%) (Auto) , 

Basophils (%) (Auto) , Differential Total Cells Counted 100, Neutrophils % (

Manual) 87H, Lymphocytes % (Manual) 11L, Monocytes % (Manual) 2, Eosinophils % (

Manual) 0, Basophils % (Manual) 0, Band Neutrophils 0, Platelet Estimate 

IncreasedH, Platelet Morphology Normal, Hypochromasia 2+, Anisocytosis 1+, 

Sodium Level 137, Potassium Level 4.0, Chloride Level 100, Carbon Dioxide Level 

23, Anion Gap 14, Blood Urea Nitrogen 25H, Creatinine 0.8, Estimat Glomerular 

Filtration Rate > 60, Glucose Level 132H, Calcium Level 9.3


Height (Feet):  5


Height (Inches):  6.00


Weight (Pounds):  164


General Appearance:  alert


EENT:  normal ENT inspection


Neck:  normal alignment


Cardiovascular:  normal peripheral pulses, normal rate, regular rhythm


Respiratory/Chest:  chest wall non-tender, lungs clear, decreased breath sounds


Abdomen:  normal bowel sounds, non tender, soft


Extremities:  normal inspection


Edema:  no edema noted Arm (L), no edema noted Arm (R), no edema noted Leg (L), 

no edema noted Leg (R), no edema noted Pedal (L), no edema noted Pedal (R), no 

edema noted Generalized


Neurologic:  responsive, motor weakness


Skin:  normal pigmentation, warm/dry











MAICOL LANG May 26, 2017 14:05

## 2017-05-26 NOTE — INFECTIOUS DISEASES PROG NOTE
Assessment/Plan


Problems:  


(1) PNA (pneumonia)


Assessment & Plan:  continue  Zosyn and azithromycin to cover atypical 

organisms  ,  await sputum culture 





(2) Abdominal pain


Assessment & Plan:  due to crohn's disease , consult GI for further eval





(3) Leukocytosis


Assessment & Plan:  due to steroids  , recommend to taper , await  blood 

culture and continue antibiotics 





(4) COPD exacerbation


Assessment & Plan:  started on large dose of steroids, recommend to taper, 

continue nebulizers 





(5) Crohns disease


Assessment & Plan:  consult GI for further eval








Subjective


Constitutional:  Reports: no symptoms


HEENT:  Reports: no symptoms


Respiratory:  Reports: dry cough, shortness of breath


Breasts:  Reports: no symptoms


Cardiovascular:  Reports: no symptoms


Gastrointestinal/Abdominal:  Reports: no symptoms


Genitourinary:  Reports: no symptoms


Neurologic:  Reports: no symptoms


Psychiatric:  Reports: no symptoms


Skin:  Reports: no symptoms


Allergies:  


Coded Allergies:  


     No Known Allergies (Verified , 10/27/06)





Objective


Vital Signs





Last 24 Hour Vital Signs








  Date Time  Temp Pulse Resp B/P Pulse Ox O2 Delivery O2 Flow Rate FiO2


 


5/26/17 12:00  74      


 


5/26/17 11:16 97.5 67 18 134/75 100 Nasal Cannula 2.0 


 


5/26/17 08:00  77      


 


5/26/17 07:42 96.8 73 18 120/67 98 Nasal Cannula 2.0 


 


5/26/17 04:13 97.5 64 19 116/72 100 Nasal Cannula 2.0 


 


5/26/17 04:00  66      


 


5/26/17 00:00  67      


 


5/25/17 23:47 99.5 72 20 115/60 98 Nasal Cannula 2.0 


 


5/25/17 20:00  76      


 


5/25/17 20:00 97.0 88 18 119/75 96 Room Air  


 


5/25/17 16:00 97.0 80 20 112/60 100 Room Air  


 


5/25/17 16:00  78      








Height (Feet):  5


Height (Inches):  6.00


Weight (Pounds):  164


General Appearance:  WD/WN, no acute distress


HEENT:  normocephalic, atraumatic, anicteric, mucous membranes moist, PERRL


Respiratory/Chest:  chest wall non-tender, lungs clear, normal breath sounds, 

no respiratory distress


Cardiovascular:  normal peripheral pulses, normal rate, regular rhythm, no 

gallop/murmur, no JVD


Abdomen:  normal bowel sounds, soft, non tender, no organomegaly, non distended

, no mass, no scars


Extremities:  no cyanosis, no clubbing


Skin:  no rash, no lesions, no ulcers





Laboratory Tests








Test


  5/26/17


05:15


 


White Blood Count


  16.9 K/UL


(4.8-10.8)  H


 


Red Blood Count


  4.03 M/UL


(4.20-5.40)  L


 


Hemoglobin


  10.7 G/DL


(12.0-16.0)  L


 


Hematocrit


  34.3 %


(37.0-47.0)  L


 


Mean Corpuscular Volume 85 FL (80-99)  


 


Mean Corpuscular Hemoglobin


  26.6 PG


(27.0-31.0)  L


 


Mean Corpuscular Hemoglobin


Concent 31.2 G/DL


(32.0-36.0)  L


 


Red Cell Distribution Width


  16.0 %


(11.6-14.8)  H


 


Platelet Count


  437 K/UL


(150-450)


 


Mean Platelet Volume


  6.2 FL


(6.5-10.1)  L


 


Neutrophils (%) (Auto)


  % (45.0-75.0)


 


 


Lymphocytes (%) (Auto)


  % (20.0-45.0)


 


 


Monocytes (%) (Auto)  % (1.0-10.0)  


 


Eosinophils (%) (Auto)  % (0.0-3.0)  


 


Basophils (%) (Auto)  % (0.0-2.0)  


 


Differential Total Cells


Counted 100  


 


 


Neutrophils % (Manual) 87 % (45-75)  H


 


Lymphocytes % (Manual) 11 % (20-45)  L


 


Monocytes % (Manual) 2 % (1-10)  


 


Eosinophils % (Manual) 0 % (0-3)  


 


Basophils % (Manual) 0 % (0-2)  


 


Band Neutrophils 0 % (0-8)  


 


Platelet Estimate Increased  H


 


Platelet Morphology Normal  


 


Hypochromasia 2+  


 


Anisocytosis 1+  


 


Sodium Level


  137 mEQ/L


(135-145)


 


Potassium Level


  4.0 mEQ/L


(3.4-4.9)


 


Chloride Level


  100 mEQ/L


()


 


Carbon Dioxide Level


  23 mEQ/L


(20-30)


 


Anion Gap 14 (5-15)  


 


Blood Urea Nitrogen


  25 mg/dL


(7-23)  H


 


Creatinine


  0.8 mg/dL


(0.5-0.9)


 


Estimat Glomerular Filtration


Rate > 60 mL/min


(>60)


 


Glucose Level


  132 mg/dL


()  H


 


Calcium Level


  9.3 mg/dL


(8.6-10.2)











Current Medications








 Medications


  (Trade)  Dose


 Ordered  Sig/Jed


 Route


 PRN Reason  Start Time


 Stop Time Status Last Admin


Dose Admin


 


 Albuterol/


 Ipratropium


  (DuoNeb


 0.5-3(2.5)mg/3ml)  3 ml  Q4H  PRN


 HHN


 dyspnea  5/26/17 15:00


 5/31/17 14:59   


 


 


 Azithromycin


  (Zithromax)  250 mg  Q24H


 ORAL


   5/27/17 09:00


 6/3/17 08:59   


 


 


 Bupropion HCl


  (Wellbutrin)  100 mg  DAILY


 ORAL


   5/27/17 09:00


 6/26/17 08:59   


 


 


 Dextrose


  (Dextrose 50%)    STAT  PRN


 IV


 Hypoglycemia  5/27/17 12:00


 6/26/17 11:59   


 


 


 Duloxetine HCl


  (Cymbalta)  60 mg  DAILY


 ORAL


   5/27/17 09:00


 6/26/17 08:59   


 


 


 Heparin Sodium


  (Porcine)


  (Heparin 5000


 units/ml)  5,000 units  EVERY 12  HOURS


 SUBQ


   5/26/17 21:00


 6/25/17 20:59   


 


 


 Ketorolac


 Tromethamine


  (Toradol 30mg)  30 mg  Q8H  PRN


 IV


 Moderate Pain (Pain Scale 4-6)  5/26/17 15:00


 5/31/17 14:59   


 


 


 Levetiracetam


  (Keppra)  1,500 mg  Q12HR


 ORAL


   5/26/17 21:00


 6/25/17 20:59   


 


 


 Loperamide HCl


  (Imodium)  2 mg  Q4H  PRN


 ORAL


 Diarrhea  5/26/17 15:00


 6/25/17 14:59   


 


 


 Lorazepam


  (Ativan 2mg/ml


 1ml)  0.5 mg  Q4H  PRN


 IV


 For Anxiety  5/26/17 15:00


 6/2/17 14:59   


 


 


 Mesalamine


  (Asacol)  800 mg  THREE TIMES A  DAY


 ORAL


   5/26/17 18:00


 6/25/17 17:59   


 


 


 Methylprednisolone


 Sodium Succinate


  (Solu-MEDROL)  60 mg  EVERY 6  HOURS


 IV


   5/26/17 18:00


 6/25/17 17:59   


 


 


 Mirtazapine


  (Remeron)  30 mg  BEDTIME


 ORAL


   5/26/17 21:00


 6/25/17 20:59   


 


 


 Morphine Sulfate


  (Morphine


 Sulfate)  2 mg  Q4H  PRN


 IVP


 Severe Pain (Pain Scale 7-10)  5/26/17 15:00


 6/2/17 14:59  5/26/17 15:13


 


 


 Nitroglycerin


  (Ntg)  0.4 mg  Q5M X 3 DOSES PRN


 SL


 Prn Chest Pain  5/26/17 15:00


 6/25/17 14:59   


 


 


 Ondansetron HCl


  (Zofran)  4 mg  Q6H  PRN


 IVP


 Nausea & Vomiting  5/26/17 15:00


 6/25/17 14:59   


 


 


 Piperacillin Sod/


 Tazobactam Sod/


 Dextrose


  (Zosyn/D5W)  110 ml @ 


 27.5 mls/hr  Q8HR@0000,0800,1600


 IVPB


   5/26/17 16:00


 6/2/17 15:59   


 


 


 Promethazine HCl/


 Codeine


  (Phenergan with


 Codeine)  5 ml  Q6H  PRN


 ORAL


 For Cough  5/26/17 15:00


 6/25/17 14:59   


 


 


 Quetiapine


 Fumarate


  (SEROquel)  100 mg  BEDTIME


 ORAL


   5/26/17 21:00


 6/25/17 20:59   


 


 


 Theophylline


  (Shiv-Dur)  100 mg  EVERY 12  HOURS


 ORAL


   5/26/17 21:00


 6/25/17 20:59   


 


 


 Trazodone HCl


  (Desyrel)  100 mg  HSPRN  PRN


 ORAL


 Insomnia  5/26/17 21:00


 6/25/17 20:59   


 

















Violet Dior M.D. May 26, 2017 15:58

## 2017-05-26 NOTE — PULMONOLOGY PROGRESS NOTE
Assessment/Plan


Problems:  


(1) COPD exacerbation


(2) Interstitial lung disease


(3) Emphysema


(4) Chronic abdominal pain


(5) Crohn's disease


Assessment/Plan


improving 


respiratory treatment


iv steroids and abx


continue respiratory treatment


GI evaluation


check electrolytes.





Subjective


Interval Events:  improving less cough


Allergies:  


Coded Allergies:  


     No Known Allergies (Verified , 10/27/06)





Objective





Last 24 Hour Vital Signs








  Date Time  Temp Pulse Resp B/P Pulse Ox O2 Delivery O2 Flow Rate FiO2


 


5/26/17 11:16 97.5 67 18 134/75 100 Nasal Cannula 2.0 


 


5/26/17 07:42 96.8 73 18 120/67 98 Nasal Cannula 2.0 


 


5/26/17 04:13 97.5 64 19 116/72 100 Nasal Cannula 2.0 


 


5/26/17 04:00  66      


 


5/26/17 00:00  67      


 


5/25/17 23:47 99.5 72 20 115/60 98 Nasal Cannula 2.0 


 


5/25/17 20:00  76      


 


5/25/17 20:00 97.0 88 18 119/75 96 Room Air  


 


5/25/17 16:00 97.0 80 20 112/60 100 Room Air  


 


5/25/17 16:00  78      


 


5/25/17 14:39  79      


 


5/25/17 14:36  79 20 111/67 98 Nasal Cannula 2.0 


 


5/25/17 14:36 99.3 79 20 111/67 98 Nasal Cannula 2.0 28


 


5/25/17 14:11 99.3       


 


5/25/17 14:11 99.3       

















Intake and Output  


 


 5/25/17 5/26/17





 19:00 07:00


 


Intake Total  220.0 ml


 


Balance  220.0 ml


 


  


 


IV Total  220.0 ml


 


# Voids 1 1


 


# Bowel Movements  1








General Appearance:  WD/WN


HEENT:  normocephalic, PERRL


Respiratory/Chest:  chest wall non-tender, lungs clear


Cardiovascular:  normal peripheral pulses, normal rate


Abdomen:  normal bowel sounds, soft, non tender


Extremities:  no cyanosis


Neurologic/Psychiatric:  CNs II-XII grossly normal


Lymphatic:  no neck adenopathy


Laboratory Tests


5/25/17 13:00: 


Urine Color Yellow, Urine Appearance Slightly cloudy, Urine pH 6, Urine 

Specific Gravity 1.020, Urine Protein 1+H, Urine Glucose (UA) Negative, Urine 

Ketones Negative, Urine Occult Blood 4+H, Urine Nitrite Negative, Urine 

Bilirubin Negative, Urine Urobilinogen Normal, Urine Leukocyte Esterase 2+H, 

Urine RBC 5-10H, Urine WBC 5-10H, Urine Squamous Epithelial Cells Few, Urine 

Bacteria Few


5/26/17 05:15: 


White Blood Count 16.9H, Red Blood Count 4.03L, Hemoglobin 10.7L, Hematocrit 

34.3L, Mean Corpuscular Volume 85, Mean Corpuscular Hemoglobin 26.6L, Mean 

Corpuscular Hemoglobin Concent 31.2L, Red Cell Distribution Width 16.0H, 

Platelet Count 437, Mean Platelet Volume 6.2L, Neutrophils (%) (Auto) , 

Lymphocytes (%) (Auto) , Monocytes (%) (Auto) , Eosinophils (%) (Auto) , 

Basophils (%) (Auto) , Differential Total Cells Counted 100, Neutrophils % (

Manual) 87H, Lymphocytes % (Manual) 11L, Monocytes % (Manual) 2, Eosinophils % (

Manual) 0, Basophils % (Manual) 0, Band Neutrophils 0, Platelet Estimate 

IncreasedH, Platelet Morphology Normal, Hypochromasia 2+, Anisocytosis 1+, 

Sodium Level 137, Potassium Level 4.0, Chloride Level 100, Carbon Dioxide Level 

23, Anion Gap 14, Blood Urea Nitrogen 25H, Creatinine 0.8, Estimat Glomerular 

Filtration Rate > 60, Glucose Level 132H, Calcium Level 9.3





Current Medications








 Medications


  (Trade)  Dose


 Ordered  Sig/Jed


 Route


 PRN Reason  Start Time


 Stop Time Status Last Admin


Dose Admin


 


 Albuterol/


 Ipratropium


  (DuoNeb


 0.5-3(2.5)mg/3ml)  3 ml  Q4H  PRN


 HHN


 dyspnea  5/25/17 12:00


 5/30/17 11:59   


 


 


 Azithromycin


  (Zithromax)  250 mg  Q24H


 ORAL


   5/26/17 09:00


 6/2/17 08:59  5/26/17 10:27


 


 


 Bupropion HCl


  (Wellbutrin)  100 mg  DAILY


 ORAL


   5/26/17 09:00


 6/25/17 08:59  5/26/17 10:27


 


 


 Dextrose     STAT  PRN


 IV


 Hypoglycemia  5/25/17 12:00


 6/24/17 11:59   


 


 


 Duloxetine HCl


  (Cymbalta)  60 mg  DAILY


 ORAL


   5/26/17 09:00


 6/25/17 08:59  5/26/17 10:26


 


 


 Heparin Sodium


  (Porcine)


  (Heparin 5000


 units/ml)  5,000 units  EVERY 12  HOURS


 SUBQ


   5/25/17 21:00


 6/24/17 20:59  5/26/17 10:29


 


 


 Ketorolac


 Tromethamine


  (Toradol 30mg)  30 mg  Q8H  PRN


 IV


 moderate pain 4-6  5/25/17 12:00


 5/30/17 11:59   


 


 


 Levetiracetam


  (Keppra)  1,500 mg  TWICE A  DAY


 ORAL


   5/25/17 18:00


 6/24/17 17:59  5/26/17 10:26


 


 


 Loperamide HCl


  (Imodium)  2 mg  Q4H  PRN


 ORAL


 Diarrhea  5/26/17 11:30


 6/25/17 11:29   


 


 


 Lorazepam


  (Ativan 2mg/ml


 1ml)  0.5 mg  Q4H  PRN


 IV


 For Anxiety  5/25/17 12:00


 6/1/17 11:59   


 


 


 Mesalamine


  (Asacol)  800 mg  THREE TIMES A  DAY


 ORAL


   5/25/17 20:30


 6/24/17 20:29  5/26/17 10:27


 


 


 Methylprednisolone


 Sodium Succinate


  (Solu-MEDROL)  60 mg  EVERY 6  HOURS


 IV


   5/25/17 18:00


 6/24/17 17:59  5/26/17 05:56


 


 


 Mirtazapine


  (Remeron)  30 mg  BEDTIME


 ORAL


   5/25/17 21:00


 6/24/17 20:59  5/25/17 21:00


 


 


 Morphine Sulfate


  (Morphine


 Sulfate)  2 mg  Q4H  PRN


 IVP


 severe pain 7-10  5/25/17 12:00


 6/1/17 11:59  5/26/17 11:14


 


 


 Nitroglycerin


  (Ntg)  0.4 mg  Q5M X 3 DOSES PRN


 SL


 Prn Chest Pain  5/25/17 12:00


 6/24/17 11:59   


 


 


 Ondansetron HCl


  (Zofran)  4 mg  Q6H  PRN


 IVP


 Nausea & Vomiting  5/25/17 12:00


 6/24/17 11:59   


 


 


 Piperacillin Sod/


 Tazobactam Sod/


 Dextrose


  (Zosyn/D5W)  110 ml @ 


 27.5 mls/hr  Q8HR@0000,0800,1600


 IVPB


   5/25/17 16:00


 6/1/17 15:59  5/26/17 10:26


 


 


 Promethazine HCl/


 Codeine


  (Phenergan with


 Codeine)  5 ml  Q6H  PRN


 ORAL


 cough  5/25/17 12:00


 6/24/17 11:59   


 


 


 Quetiapine


 Fumarate


  (SEROquel)  100 mg  BEDTIME


 ORAL


   5/25/17 21:00


 6/24/17 20:59  5/25/17 20:59


 


 


 Temazepam


  (Restoril)  15 mg  HSPRN  PRN


 ORAL


 Insomnia  5/25/17 12:00


 6/1/17 11:59   


 


 


 Theophylline


  (Shiv-Dur)  100 mg  EVERY 12  HOURS


 ORAL


   5/25/17 21:00


 6/24/17 20:59  5/26/17 10:27


 


 


 Trazodone HCl


  (Desyrel)  100 mg  BEDTIME  PRN


 ORAL


 Insomnia  5/25/17 21:00


 6/24/17 20:59   


 

















JULIETTE KLEIN May 26, 2017 12:47

## 2017-05-26 NOTE — GENERAL PROGRESS NOTE
Assessment/Plan


Assessment/Plan


(1) Lumbar spondylosis


(2) Chronic abdominal pain


(3) Radiculopathy of lumbar region


(4) Herniated nucleus pulposus, lumbar


(5) Chronic pancreatitis


(6) Chronic pain


Pt will be continued on Morphine 2mg IV Q4H PRN severe pain and pt has 

intrathecal pump.


Pt was d/w Dr. Shetty and he concurred.  


Thank you for courtesy of this consultation.





Subjective


Date patient seen:  May 26, 2017


Time patient seen:  08:00 - am


Allergies:  


Coded Allergies:  


     No Known Allergies (Verified , 10/27/06)


Subjective


Constitutional:  Denies: chills, diaphoresis, fever, malaise, no symptoms, other

, Reports: weakness


HEENT:  Denies: blurred vision, double vision, ear discharge, ear pain, eye pain

, mouth pain, mouth swelling, no symptoms, nose congestion, nose pain, other, 

tearing, throat pain, throat swelling


Cardiovascular:  Denies: chest pain, edema, irregular heart rate, 

lightheadedness, no symptoms, other, palpitations, syncope


Respiratory:  Denies: SOB at rest, SOB with excertion, cough, no symptoms, 

orthopnea, other, shortness of breath, sputum, stridor, wheezing


Gastrointestinal/Abdominal:  Denies: abdomen distended, black stools, blood in 

stool, constipated, diarrhea, difficulty swallowing, nausea, no symptoms, other

, poor appetite, poor fluid intake, rectal bleeding, tarry stools, vomiting, 

Reports: abdominal pain


Genitourinary:  Denies: burning, discharge, flank pain, frequency, hematuria, 

incontinence, no symptoms, other, pain, urgency


Neurologic/Psychiatric:  Denies: anxiety, depressed, emotional problems, 

headache, no symptoms, numbness, other, paresthesia, pre-existing deficit, 

seizure, tingling, tremors, weakness


Endocrine:  Denies: excessive sweating, flushing, increased hunger, increased 

thirst, increased urine, intolerance to cold, intolerance to heat, no symptoms, 

other, unexplained weight gain, unexplained weight loss


Hematologic/Lymphatic:  Denies: anemia, easy bleeding, easy bruising, no 

symptoms, other





Subjective


Pt is a known pt from prior admission and f/u in the office for her intrathecal 

pump refills. Pt has chronic abdominal  pain due to pancreatitis. Admitted due 

to COPD exacerbation.





Objective





Last 24 Hour Vital Signs








  Date Time  Temp Pulse Resp B/P Pulse Ox O2 Delivery O2 Flow Rate FiO2


 


5/26/17 07:42 96.8 73 18 120/67 98 Nasal Cannula 2.0 


 


5/26/17 04:13 97.5 64 19 116/72 100 Nasal Cannula 2.0 


 


5/26/17 04:00  66      


 


5/26/17 00:00  67      


 


5/25/17 23:47 99.5 72 20 115/60 98 Nasal Cannula 2.0 


 


5/25/17 20:00  76      


 


5/25/17 20:00 97.0 88 18 119/75 96 Room Air  


 


5/25/17 16:00 97.0 80 20 112/60 100 Room Air  


 


5/25/17 16:00  78      


 


5/25/17 14:39  79      


 


5/25/17 14:36  79 20 111/67 98 Nasal Cannula 2.0 


 


5/25/17 14:36 99.3 79 20 111/67 98 Nasal Cannula 2.0 28


 


5/25/17 14:11 99.3       


 


5/25/17 14:11 99.3       


 


5/25/17 11:46  83 20 115/64 96 Nasal Cannula 2.0 


 


5/25/17 10:33  90 18  99 Nasal Cannula  28


 


5/25/17 10:18        28


 


5/25/17 10:18  88 20  98 Nasal Cannula  28


 


5/25/17 10:18  88 20   Room Air  21

















Intake and Output  


 


 5/25/17 5/26/17





 19:00 07:00


 


Intake Total  220.0 ml


 


Balance  220.0 ml


 


  


 


IV Total  220.0 ml


 


# Voids 1 1


 


# Bowel Movements  1








Laboratory Tests


5/25/17 10:30: 


White Blood Count 15.5H, Red Blood Count 4.50, Hemoglobin 11.7L, Hematocrit 38.7

, Mean Corpuscular Volume 86, Mean Corpuscular Hemoglobin 26.0L, Mean 

Corpuscular Hemoglobin Concent 30.2L, Red Cell Distribution Width 16.3H, 

Platelet Count 454H, Mean Platelet Volume 6.3L, Neutrophils (%) (Auto) 69.3, 

Lymphocytes (%) (Auto) 22.0, Monocytes (%) (Auto) 7.0, Eosinophils (%) (Auto) 

1.1, Basophils (%) (Auto) 0.6, Sodium Level 139, Potassium Level 3.7, Chloride 

Level 103, Carbon Dioxide Level 23, Anion Gap 13, Blood Urea Nitrogen 23, 

Creatinine 0.9, Estimat Glomerular Filtration Rate > 60, Glucose Level 90, 

Lactic Acid Level 1.40, Calcium Level 9.2, Total Bilirubin 0.2, Aspartate Amino 

Transf (AST/SGOT) 15, Alanine Aminotransferase (ALT/SGPT) 12, Alkaline 

Phosphatase 80, Total Creatine Kinase 32, Creatine Kinase MB < 1.5, Creatine 

Kinase MB Relative Index , Troponin I < 0.30, Pro-B-Type Natriuretic Peptide 49

, Total Protein 8.9H, Albumin 3.2L, Globulin 5.7, Albumin/Globulin Ratio 0.5L, 

Lipase 15


5/25/17 13:00: 


Urine Color Yellow, Urine Appearance Slightly cloudy, Urine pH 6, Urine 

Specific Gravity 1.020, Urine Protein 1+H, Urine Glucose (UA) Negative, Urine 

Ketones Negative, Urine Occult Blood 4+H, Urine Nitrite Negative, Urine 

Bilirubin Negative, Urine Urobilinogen Normal, Urine Leukocyte Esterase 2+H, 

Urine RBC 5-10H, Urine WBC 5-10H, Urine Squamous Epithelial Cells Few, Urine 

Bacteria Few


5/26/17 05:15: 


White Blood Count 16.9H, Red Blood Count 4.03L, Hemoglobin 10.7L, Hematocrit 

34.3L, Mean Corpuscular Volume 85, Mean Corpuscular Hemoglobin 26.6L, Mean 

Corpuscular Hemoglobin Concent 31.2L, Red Cell Distribution Width 16.0H, 

Platelet Count 437, Mean Platelet Volume 6.2L, Neutrophils (%) (Auto) , 

Lymphocytes (%) (Auto) , Monocytes (%) (Auto) , Eosinophils (%) (Auto) , 

Basophils (%) (Auto) , Sodium Level 137, Potassium Level 4.0, Chloride Level 100

, Carbon Dioxide Level 23, Anion Gap 14, Blood Urea Nitrogen 25H, Creatinine 0.8

, Estimat Glomerular Filtration Rate > 60, Glucose Level 132H, Calcium Level 9.3

, Differential Total Cells Counted 100, Neutrophils % (Manual) 87H, Lymphocytes 

% (Manual) 11L, Monocytes % (Manual) 2, Eosinophils % (Manual) 0, Basophils % (

Manual) 0, Band Neutrophils 0, Platelet Estimate IncreasedH, Platelet 

Morphology Normal, Hypochromasia 2+, Anisocytosis 1+


Height (Feet):  5


Height (Inches):  6.00


Weight (Pounds):  164


Objective


General Appearance:  no apparent distress, alert


EENT:  normal ENT inspection, TMs normal


Neck:  normal alignment, supple


Cardiovascular:  normal rate, regular rhythm


Respiratory/Chest:  decreased breath sounds


Abdomen:  tender, soft


Extremities:  non-tender


Edema:  no edema noted Arm (L), no edema noted Arm (R), no edema noted Leg (L), 

no edema noted Leg (R), no edema noted Pedal (L), no edema noted Pedal (R), no 

edema noted Generalized


Neurologic:  alert, oriented x 3


Skin:  warm/dry











LEA BRAVO May 26, 2017 10:14

## 2017-05-26 NOTE — GI PROGRESS NOTE
Assessment/Plan


Problems:  


(1) Nausea and vomiting


(2) IBD (inflammatory bowel disease)


ICD Codes:  K63.89 - Other specified diseases of intestine


SNOMED:  05403717


(3) Diarrhea


ICD Codes:  R19.7 - Diarrhea, unspecified


SNOMED:  19150158


(4) Abdominal pain


(5) Crohns disease


ICD Codes:  K50.90 - Crohns disease


SNOMED:  28849830


Status:  unchanged


Status Narrative


Discussed with Dr. Mcleod.


Assessment/Plan


s/p EGD/colon 2016


non functional implanted morphine pump LLQ





symptomatic treatment at this time


electrolyte replacement


low residual diet


fu cdiff, stool culture


Imodium prn, consider lomotil if diarrhea persists


mesalamine for Crohns


pain mgmt 


fu labs





Subjective


Subjective


diarrhea


abdominal pain


chronic pain





Objective





Last 24 Hour Vital Signs








  Date Time  Temp Pulse Resp B/P Pulse Ox O2 Delivery O2 Flow Rate FiO2


 


5/26/17 11:16 97.5 67 18 134/75 100 Nasal Cannula 2.0 


 


5/26/17 07:42 96.8 73 18 120/67 98 Nasal Cannula 2.0 


 


5/26/17 04:13 97.5 64 19 116/72 100 Nasal Cannula 2.0 


 


5/26/17 04:00  66      


 


5/26/17 00:00  67      


 


5/25/17 23:47 99.5 72 20 115/60 98 Nasal Cannula 2.0 


 


5/25/17 20:00  76      


 


5/25/17 20:00 97.0 88 18 119/75 96 Room Air  


 


5/25/17 16:00 97.0 80 20 112/60 100 Room Air  


 


5/25/17 16:00  78      


 


5/25/17 14:39  79      


 


5/25/17 14:36  79 20 111/67 98 Nasal Cannula 2.0 


 


5/25/17 14:36 99.3 79 20 111/67 98 Nasal Cannula 2.0 28


 


5/25/17 14:11 99.3       


 


5/25/17 14:11 99.3       


 


5/25/17 11:46  83 20 115/64 96 Nasal Cannula 2.0 

















Intake and Output  


 


 5/25/17 5/26/17





 19:00 07:00


 


Intake Total  220.0 ml


 


Balance  220.0 ml


 


  


 


IV Total  220.0 ml


 


# Voids 1 1


 


# Bowel Movements  1











Laboratory Tests








Test


  5/25/17


13:00 5/26/17


05:15


 


Urine Color Yellow   


 


Urine Appearance


  Slightly


cloudy 


 


 


Urine pH 6 (4.5-8.0)   


 


Urine Specific Gravity


  1.020


(1.005-1.035) 


 


 


Urine Protein


  1+ (NEGATIVE)


H 


 


 


Urine Glucose (UA)


  Negative


(NEGATIVE) 


 


 


Urine Ketones


  Negative


(NEGATIVE) 


 


 


Urine Occult Blood


  4+ (NEGATIVE)


H 


 


 


Urine Nitrite


  Negative


(NEGATIVE) 


 


 


Urine Bilirubin


  Negative


(NEGATIVE) 


 


 


Urine Urobilinogen


  Normal MG/DL


(0.0-1.0) 


 


 


Urine Leukocyte Esterase


  2+ (NEGATIVE)


H 


 


 


Urine RBC


  5-10 /HPF (0 -


2)  H 


 


 


Urine WBC


  5-10 /HPF (0 -


2)  H 


 


 


Urine Squamous Epithelial


Cells Few /LPF


(NONE/OCC) 


 


 


Urine Bacteria


  Few /HPF


(NONE) 


 


 


White Blood Count


  


  16.9 K/UL


(4.8-10.8)  H


 


Red Blood Count


  


  4.03 M/UL


(4.20-5.40)  L


 


Hemoglobin


  


  10.7 G/DL


(12.0-16.0)  L


 


Hematocrit


  


  34.3 %


(37.0-47.0)  L


 


Mean Corpuscular Volume  85 FL (80-99)  


 


Mean Corpuscular Hemoglobin


  


  26.6 PG


(27.0-31.0)  L


 


Mean Corpuscular Hemoglobin


Concent 


  31.2 G/DL


(32.0-36.0)  L


 


Red Cell Distribution Width


  


  16.0 %


(11.6-14.8)  H


 


Platelet Count


  


  437 K/UL


(150-450)


 


Mean Platelet Volume


  


  6.2 FL


(6.5-10.1)  L


 


Neutrophils (%) (Auto)


  


  % (45.0-75.0)


 


 


Lymphocytes (%) (Auto)


  


  % (20.0-45.0)


 


 


Monocytes (%) (Auto)   % (1.0-10.0)  


 


Eosinophils (%) (Auto)   % (0.0-3.0)  


 


Basophils (%) (Auto)   % (0.0-2.0)  


 


Differential Total Cells


Counted 


  100  


 


 


Neutrophils % (Manual)  87 % (45-75)  H


 


Lymphocytes % (Manual)  11 % (20-45)  L


 


Monocytes % (Manual)  2 % (1-10)  


 


Eosinophils % (Manual)  0 % (0-3)  


 


Basophils % (Manual)  0 % (0-2)  


 


Band Neutrophils  0 % (0-8)  


 


Platelet Estimate  Increased  H


 


Platelet Morphology  Normal  


 


Hypochromasia  2+  


 


Anisocytosis  1+  


 


Sodium Level


  


  137 mEQ/L


(135-145)


 


Potassium Level


  


  4.0 mEQ/L


(3.4-4.9)


 


Chloride Level


  


  100 mEQ/L


()


 


Carbon Dioxide Level


  


  23 mEQ/L


(20-30)


 


Anion Gap  14 (5-15)  


 


Blood Urea Nitrogen


  


  25 mg/dL


(7-23)  H


 


Creatinine


  


  0.8 mg/dL


(0.5-0.9)


 


Estimat Glomerular Filtration


Rate 


  > 60 mL/min


(>60)


 


Glucose Level


  


  132 mg/dL


()  H


 


Calcium Level


  


  9.3 mg/dL


(8.6-10.2)








Height (Feet):  5


Height (Inches):  6.00


Weight (Pounds):  164


General Appearance:  no apparent distress, alert


Cardiovascular:  normal rate


Respiratory/Chest:  lungs clear, normal breath sounds


Abdominal Exam:  normal bowel sounds, non tender, soft


Extremities:  normal range of motion











Elena Seay N.P. May 26, 2017 11:22

## 2017-05-27 VITALS — DIASTOLIC BLOOD PRESSURE: 76 MMHG | SYSTOLIC BLOOD PRESSURE: 125 MMHG

## 2017-05-27 VITALS — DIASTOLIC BLOOD PRESSURE: 68 MMHG | SYSTOLIC BLOOD PRESSURE: 118 MMHG

## 2017-05-27 VITALS — SYSTOLIC BLOOD PRESSURE: 118 MMHG | DIASTOLIC BLOOD PRESSURE: 68 MMHG

## 2017-05-27 VITALS — SYSTOLIC BLOOD PRESSURE: 106 MMHG | DIASTOLIC BLOOD PRESSURE: 66 MMHG

## 2017-05-27 VITALS — DIASTOLIC BLOOD PRESSURE: 66 MMHG | SYSTOLIC BLOOD PRESSURE: 118 MMHG

## 2017-05-27 VITALS — DIASTOLIC BLOOD PRESSURE: 69 MMHG | SYSTOLIC BLOOD PRESSURE: 119 MMHG

## 2017-05-27 VITALS — SYSTOLIC BLOOD PRESSURE: 110 MMHG | DIASTOLIC BLOOD PRESSURE: 56 MMHG

## 2017-05-27 LAB
%HYPO/RBC NFR BLD AUTO: (no result) %
ANION GAP SERPL CALC-SCNC: 12 MMOL/L (ref 5–15)
ANISOCYTOSIS BLD QL SMEAR: (no result)
BASOPHILS NFR BLD MANUAL: 0 % (ref 0–2)
CALCIUM SERPL-MCNC: 9.3 MG/DL (ref 8.6–10.2)
CHLORIDE SERPL-SCNC: 102 MEQ/L (ref 98–107)
CHOLEST SERPL-MCNC: 156 MG/DL (ref ?–200)
CHOLEST/HDLC SERPL: 3.2 {RATIO} (ref 3.3–4.4)
CO2 SERPL-SCNC: 23 MEQ/L (ref 20–30)
CREAT SERPL-MCNC: 0.9 MG/DL (ref 0.5–0.9)
EOSINOPHIL NFR BLD MANUAL: 0 % (ref 0–3)
ERYTHROCYTE [DISTWIDTH] IN BLOOD BY AUTOMATED COUNT: 16.1 % (ref 11.6–14.8)
GFR SERPLBLD BASED ON 1.73 SQ M-ARVRAT: > 60 ML/MIN (ref 60–?)
HEMOLYSIS: 2
LDLC SERPL ELPH-MCNC: 99 MG/DL (ref 60–99)
MAGNESIUM SERPL-MCNC: 2 MG/DL (ref 1.7–2.5)
MCH RBC QN AUTO: 26.7 PG (ref 27–31)
MCHC RBC AUTO-ENTMCNC: 31.4 G/DL (ref 32–36)
MCV RBC AUTO: 85 FL (ref 80–99)
NEUTS BAND NFR BLD MANUAL: 0 % (ref 0–8)
NEUTS BAND NFR BLD MANUAL: 92 % (ref 45–75)
PHOSPHATE SERPL-MCNC: 3.3 MG/DL (ref 2.5–4.8)
PLAT MORPH BLD: NORMAL
PLATELET # BLD EST: ADEQUATE 10*3/UL
PLATELET # BLD: 396 K/UL (ref 150–450)
PMV BLD AUTO: 5.9 FL (ref 6.5–10.1)
POTASSIUM SERPL-SCNC: 3.9 MEQ/L (ref 3.4–4.9)
RBC # BLD AUTO: 4.06 M/UL (ref 4.2–5.4)
SODIUM SERPL-SCNC: 137 MEQ/L (ref 135–145)
TOTAL CELLS COUNTED BLD: 100
VARIANT LYMPHS NFR BLD MANUAL: 6 % (ref 20–45)
WBC # BLD AUTO: 17.5 K/UL (ref 4.8–10.8)

## 2017-05-27 RX ADMIN — AZITHROMYCIN DIHYDRATE SCH MG: 250 TABLET, FILM COATED ORAL at 08:38

## 2017-05-27 RX ADMIN — METHYLPREDNISOLONE SODIUM SUCCINATE SCH MG: 125 INJECTION, POWDER, FOR SOLUTION INTRAMUSCULAR; INTRAVENOUS at 05:33

## 2017-05-27 RX ADMIN — DEXTROSE MONOHYDRATE SCH MLS/HR: 50 INJECTION, SOLUTION INTRAVENOUS at 08:27

## 2017-05-27 RX ADMIN — THEOPHYLLINE ANHYDROUS SCH MG: 100 CAPSULE, EXTENDED RELEASE ORAL at 20:24

## 2017-05-27 RX ADMIN — DULOXETINE HYDROCHLORIDE SCH MG: 30 CAPSULE, DELAYED RELEASE ORAL at 08:26

## 2017-05-27 RX ADMIN — MORPHINE SULFATE PRN MG: 2 INJECTION, SOLUTION INTRAMUSCULAR; INTRAVENOUS at 17:19

## 2017-05-27 RX ADMIN — HEPARIN SODIUM SCH UNITS: 5000 INJECTION INTRAVENOUS; SUBCUTANEOUS at 20:28

## 2017-05-27 RX ADMIN — HEPARIN SODIUM SCH UNITS: 5000 INJECTION INTRAVENOUS; SUBCUTANEOUS at 08:24

## 2017-05-27 RX ADMIN — MORPHINE SULFATE PRN MG: 2 INJECTION, SOLUTION INTRAMUSCULAR; INTRAVENOUS at 13:02

## 2017-05-27 RX ADMIN — THEOPHYLLINE ANHYDROUS SCH MG: 100 CAPSULE, EXTENDED RELEASE ORAL at 08:25

## 2017-05-27 RX ADMIN — ASPIRIN 81 MG SCH MG: 81 TABLET ORAL at 08:25

## 2017-05-27 RX ADMIN — DEXTROSE MONOHYDRATE SCH MLS/HR: 50 INJECTION, SOLUTION INTRAVENOUS at 16:14

## 2017-05-27 RX ADMIN — MORPHINE SULFATE PRN MG: 2 INJECTION, SOLUTION INTRAMUSCULAR; INTRAVENOUS at 08:24

## 2017-05-27 RX ADMIN — MORPHINE SULFATE PRN MG: 2 INJECTION, SOLUTION INTRAMUSCULAR; INTRAVENOUS at 02:06

## 2017-05-27 RX ADMIN — MORPHINE SULFATE PRN MG: 2 INJECTION, SOLUTION INTRAMUSCULAR; INTRAVENOUS at 20:21

## 2017-05-27 RX ADMIN — METHYLPREDNISOLONE SODIUM SUCCINATE SCH MG: 125 INJECTION, POWDER, FOR SOLUTION INTRAMUSCULAR; INTRAVENOUS at 12:00

## 2017-05-27 NOTE — CONSULTATION
DATE OF CONSULTATION:  05/26/2017



CARDIOLOGY CONSULTATION



CONSULTING PHYSICIAN:  Delon Harris M.D.



REFERRING PHYSICIAN:  Maxim Hopkins D.O.



REASON FOR CONSULTATION:  For management of shortness of breath from

cardiac standpoint.



HISTORY OF PRESENT ILLNESS:  The patient is a very pleasant

61-year-old female, who presents to the hospital with complaints of

shortness of breath and chest pain for just about two days.  At this time,

the chest pain is felt as tightness.  She also has a productive cough with

greenish phlegm.  In the emergency department, chest x-ray showed

bilateral interstitial disease, which was consistent with pneumonia.



The patient was started on IV antibiotic therapy and was admitted to the

Med/Surg unit for continuation of therapy.  The patient is seen in

Cardiology consultation for evaluation and management of dyspnea from

cardiac standpoint.  On arrival to the emergency department, initial blood

pressure was 134/79, pulse of 97, and she had low-grade fever.



The patient's risk factors for coronary artery disease includes history

of vascular event in 2005.



The patient admits to getting shortness of breath with two blocks

walking.



Her blood pressure also remains to be in the low side most of the time.

She carries a narcotic pump for her disabling lower back pain.



PAST MEDICAL HISTORY:  Asthma, chronic obstructive pulmonary disease,

CVA/TIA, history of seizure disorder, history of gastroesophageal reflux

disease, history of neuropathy, history of vertigo, and history of

syncope.



PAST SURGICAL HISTORY:  None.



ALLERGIES:  No known drug allergies.



HABITS:  Denies any tobacco, alcohol, or illicit drug use.



REVIEW OF SYSTEMS:  HEENT:  Denies any headache, diplopia, or blurred

vision.  Constitutional:  Had fever, chills, and generalized weakness.

Cardiovascular:  Complains of chest pain with shortness of breath.  She

also has dyspnea on exertion.  Denies any PND, orthopnea, leg swelling,

syncope, or palpitation.  Pulmonary:  Has productive cough of greenish

sputum.  Also, complains of a wheezing, but no hemoptysis.

Gastrointestinal:  Denies any nausea, vomiting, diarrhea, constipation,

abdominal pain, or GI bleed.  Genitourinary:  Denies any hematuria,

dysuria, or incontinence.  Neurology:  She has a history of stroke in the

past.  No significant motor deficit.



Denies any altered speech.



PHYSICAL EXAMINATION:

VITAL SIGNS:  Blood pressure is 134/79, respirations of 20, O2

saturation of 100% on room air, pulse of 97, and temperature 99.2 degrees

Fahrenheit.

GENERAL:  The patient is a very pleasant 89-year-old 

American female, in no apparent respiratory distress.  Alert and oriented

x4.

HEENT:  Atraumatic and normocephalic.  Anicteric.  Pupils are equal,

round, and reactive to light and accommodation.  Extraocular muscles

intact.

NECK:  JVP is less than 5 cm.  No carotid bruits.  Carotid upstrokes

2+ bilaterally.

CVS:  Normal S1 and S2.  Regular rate and rhythm.  A 2/6 mid systolic

murmur in the left sternal border.  PMI is at fourth intercostal space at

the midclavicular line.

LUNGS:  Bilateral rhonchi with a prolonged expiratory phase.

ABDOMEN:  Distended.  No hepatosplenomegaly.  Soft.  Positive bowel

sounds.

EXTREMITIES:  No evidence of edema, clubbing, or cyanosis.



LABORATORY FINDINGS:  WBC 15.5, hemoglobin 11.7, hematocrit 38.7, and

platelet count is 454,000.  Sodium is 139, potassium is 3.7, chloride 103,

bicarbonate 23, BUN of 23, creatinine 0.9, glucose is 90, and calcium is

9.2.  Troponin I is less than 0.3.  ProBNP is 49.  Chest x-ray shows

bilateral interstitial infiltration.  A 12-lead electrocardiogram, sinus

rhythm at a rate of 91.  Normal axis.  No acute ischemic features.



ASSESSMENT AND PLAN:  The patient is a very pleasant 61-year-old

female seen in Cardiology consultation at the request of Dr. Hopkins.



1. Dyspnea.  This is most likely pulmonary in origin.  I suspect

pneumonia given the history and also chest x-ray findings that is unlikely

heart failure to be present at this time and no heart failure features on

physical examination.  Normal beta-natriuretic peptide level.  Also _____

congestive heart failure.

2. A history of cerebrovascular accident.  I would strongly recommend

aspirin therapy in this patient as well as statin.  We will add both

medication to the regimen.



I would like to thank, Dr. Hopkins, for allowing me to participate in the

care of this patient.









  ______________________________________________

  Delon Harris M.D.





DR:  JANET

D:  05/26/2017 18:40

T:  05/27/2017 01:41

JOB#:  [id number]

CC:

## 2017-05-27 NOTE — CARDIOLOGY PROGRESS NOTE
Assessment/Plan


Assessment/Plan


1. Dyspnea, due to acute exacerbation of COPD given lung exam findings, steroid

, ABx therapy, pulmonary toilet, O2 therapy.


2. A history of cerebrovascular accident, continue ASA and statin.





Subjective


Subjective


Feeling less SOB, denies chest pain.





Objective





Last 24 Hour Vital Signs








  Date Time  Temp Pulse Resp B/P Pulse Ox O2 Delivery O2 Flow Rate FiO2


 


5/27/17 16:53 98.1 79 14 110/56 100 Nasal Cannula  


 


5/27/17 12:00 97.0 63 16 118/68  Room Air  


 


5/27/17 08:40 98.1 64 16 119/69 100 Room Air  


 


5/27/17 04:00 98.1 67 18 106/66 100 Nasal Cannula 2.0 


 


5/27/17 00:00 98.1 72 20 125/76 99 Nasal Cannula 2.0 


 


5/26/17 20:00 98.6 74 18 129/79 100 Nasal Cannula 2.0 

















Intake and Output  


 


 5/26/17 5/27/17





 19:00 07:00


 


Intake Total 1090.0 ml 360.0 ml


 


Balance 1090.0 ml 360.0 ml


 


  


 


Intake Oral 980 ml 250 ml


 


IV Total 110.0 ml 110.0 ml


 


# Voids 2 2











Laboratory Tests








Test


  5/27/17


05:57


 


White Blood Count


  17.5 K/UL


(4.8-10.8)  H


 


Red Blood Count


  4.06 M/UL


(4.20-5.40)  L


 


Hemoglobin


  10.9 G/DL


(12.0-16.0)  L


 


Hematocrit


  34.6 %


(37.0-47.0)  L


 


Mean Corpuscular Volume 85 FL (80-99)  


 


Mean Corpuscular Hemoglobin


  26.7 PG


(27.0-31.0)  L


 


Mean Corpuscular Hemoglobin


Concent 31.4 G/DL


(32.0-36.0)  L


 


Red Cell Distribution Width


  16.1 %


(11.6-14.8)  H


 


Platelet Count


  396 K/UL


(150-450)


 


Mean Platelet Volume


  5.9 FL


(6.5-10.1)  L


 


Neutrophils (%) (Auto)


  % (45.0-75.0)


 


 


Lymphocytes (%) (Auto)


  % (20.0-45.0)


 


 


Monocytes (%) (Auto)  % (1.0-10.0)  


 


Eosinophils (%) (Auto)  % (0.0-3.0)  


 


Basophils (%) (Auto)  % (0.0-2.0)  


 


Differential Total Cells


Counted 100  


 


 


Neutrophils % (Manual) 92 % (45-75)  H


 


Lymphocytes % (Manual) 6 % (20-45)  L


 


Monocytes % (Manual) 2 % (1-10)  


 


Eosinophils % (Manual) 0 % (0-3)  


 


Basophils % (Manual) 0 % (0-2)  


 


Band Neutrophils 0 % (0-8)  


 


Platelet Estimate Adequate  


 


Platelet Morphology Normal  


 


Hypochromasia 1+  


 


Anisocytosis 1+  


 


Sodium Level


  137 mEQ/L


(135-145)


 


Potassium Level


  3.9 mEQ/L


(3.4-4.9)


 


Chloride Level


  102 mEQ/L


()


 


Carbon Dioxide Level


  23 mEQ/L


(20-30)


 


Anion Gap 12 (5-15)  


 


Blood Urea Nitrogen


  28 mg/dL


(7-23)  H


 


Creatinine


  0.9 mg/dL


(0.5-0.9)


 


Estimat Glomerular Filtration


Rate > 60 mL/min


(>60)


 


Glucose Level


  125 mg/dL


()  H


 


Calcium Level


  9.3 mg/dL


(8.6-10.2)


 


Phosphorus Level


  3.3 mg/dL


(2.5-4.8)


 


Magnesium Level


  2.0 mg/dL


(1.7-2.5)


 


Triglycerides Level


  39 mg/dL (<


150)


 


Cholesterol Level


  156 mg/dL (<


200)


 


LDL Cholesterol


  99 mg/dL


(60-99)


 


HDL Cholesterol


  49 mg/dL (>


60)


 


Cholesterol/HDL Ratio


  3.2 (3.3-4.4)


L











Microbiology








 Date/Time


Source Procedure


Growth Status


 


 


 5/25/17 10:30


Blood Blood Culture - Preliminary


NO GROWTH AFTER 48 HOURS Resulted


 


 5/25/17 10:20


Blood Blood Culture - Preliminary


NO GROWTH AFTER 48 HOURS Resulted








Objective


HEENT:  Atraumatic and normocephalic.  Anicteric.  Pupils are equal,


round, and reactive to light and accommodation.  Extraocular muscles


intact.


NECK:  JVP is less than 5 cm.  No carotid bruits.  Carotid upstrokes


2+ bilaterally.


CVS:  Normal S1 and S2.  Regular rate and rhythm.  A 2/6 mid systolic


murmur in the left sternal border.  PMI is at fourth intercostal space at


the midclavicular line.


LUNGS:  Bilateral rhonchi with a prolonged expiratory phase.


ABDOMEN:  Distended.  No hepatosplenomegaly.  Soft.  Positive bowel


sounds.


EXTREMITIES:  No evidence of edema, clubbing, or cyanosis.











MARLENE GUTIERREZ May 27, 2017 18:41

## 2017-05-27 NOTE — INFECTIOUS DISEASES PROG NOTE
Assessment/Plan


Problems:  


(1) PNA (pneumonia)


Assessment & Plan:  continue  Zosyn and azithromycin to cover atypical 

organisms  ,  await sputum culture 





(2) Abdominal pain


Assessment & Plan:  due to crohn's disease , consult GI for further eval





(3) Leukocytosis


Assessment & Plan:  due to steroids  , recommend to taper , await  blood 

culture and continue antibiotics 





(4) COPD exacerbation


Assessment & Plan:  on large dose of steroids, recommend to taper, continue 

nebulizers 





(5) Crohns disease


Assessment & Plan:  consult GI for further eval








Subjective


Constitutional:  Reports: anorexia


HEENT:  Reports: no symptoms


Respiratory:  Reports: dry cough


Breasts:  Reports: no symptoms


Cardiovascular:  Reports: no symptoms


Gastrointestinal/Abdominal:  Reports: no symptoms


Genitourinary:  Reports: no symptoms


Neurologic:  Reports: no symptoms


Psychiatric:  Reports: no symptoms


Skin:  Reports: no symptoms


Endocrine:  Reports: no symptoms


Hematologic:  Reports: no symptoms


Musculoskeletal:  Reports: no symptoms


Allergies:  


Coded Allergies:  


     No Known Allergies (Verified , 10/27/06)





Objective


Vital Signs





Last 24 Hour Vital Signs








  Date Time  Temp Pulse Resp B/P Pulse Ox O2 Delivery O2 Flow Rate FiO2


 


5/27/17 12:00 97.0 63 16 118/68  Room Air  


 


5/27/17 08:40 98.1 64 16 119/69 100 Room Air  


 


5/27/17 04:00 98.1 67 18 106/66 100 Nasal Cannula 2.0 


 


5/27/17 00:00 98.1 72 20 125/76 99 Nasal Cannula 2.0 


 


5/26/17 20:00 98.6 74 18 129/79 100 Nasal Cannula 2.0 


 


5/26/17 16:16 97.6 85 20 116/77 99 Nasal Cannula 2.0 








Height (Feet):  5


Height (Inches):  6.00


Weight (Pounds):  164


General Appearance:  WD/WN, no acute distress


HEENT:  normocephalic, atraumatic, anicteric, mucous membranes moist


Respiratory/Chest:  chest wall non-tender, normal breath sounds, no respiratory 

distress, no accessory muscle use, decreased breath sounds, crackles/rales


Cardiovascular:  normal peripheral pulses, normal rate, regular rhythm, no 

gallop/murmur


Abdomen:  normal bowel sounds, soft, non tender, no organomegaly, non distended

, no mass


Extremities:  no cyanosis, no clubbing


Skin:  no rash, no lesions





Microbiology








 Date/Time


Source Procedure


Growth Status


 


 


 5/25/17 10:30


Blood Blood Culture - Preliminary


NO GROWTH AFTER 48 HOURS Resulted


 


 5/25/17 10:20


Blood Blood Culture - Preliminary


NO GROWTH AFTER 48 HOURS Resulted











Laboratory Tests








Test


  5/27/17


05:57


 


White Blood Count


  17.5 K/UL


(4.8-10.8)  H


 


Red Blood Count


  4.06 M/UL


(4.20-5.40)  L


 


Hemoglobin


  10.9 G/DL


(12.0-16.0)  L


 


Hematocrit


  34.6 %


(37.0-47.0)  L


 


Mean Corpuscular Volume 85 FL (80-99)  


 


Mean Corpuscular Hemoglobin


  26.7 PG


(27.0-31.0)  L


 


Mean Corpuscular Hemoglobin


Concent 31.4 G/DL


(32.0-36.0)  L


 


Red Cell Distribution Width


  16.1 %


(11.6-14.8)  H


 


Platelet Count


  396 K/UL


(150-450)


 


Mean Platelet Volume


  5.9 FL


(6.5-10.1)  L


 


Neutrophils (%) (Auto)


  % (45.0-75.0)


 


 


Lymphocytes (%) (Auto)


  % (20.0-45.0)


 


 


Monocytes (%) (Auto)  % (1.0-10.0)  


 


Eosinophils (%) (Auto)  % (0.0-3.0)  


 


Basophils (%) (Auto)  % (0.0-2.0)  


 


Differential Total Cells


Counted 100  


 


 


Neutrophils % (Manual) 92 % (45-75)  H


 


Lymphocytes % (Manual) 6 % (20-45)  L


 


Monocytes % (Manual) 2 % (1-10)  


 


Eosinophils % (Manual) 0 % (0-3)  


 


Basophils % (Manual) 0 % (0-2)  


 


Band Neutrophils 0 % (0-8)  


 


Platelet Estimate Adequate  


 


Platelet Morphology Normal  


 


Hypochromasia 1+  


 


Anisocytosis 1+  


 


Sodium Level


  137 mEQ/L


(135-145)


 


Potassium Level


  3.9 mEQ/L


(3.4-4.9)


 


Chloride Level


  102 mEQ/L


()


 


Carbon Dioxide Level


  23 mEQ/L


(20-30)


 


Anion Gap 12 (5-15)  


 


Blood Urea Nitrogen


  28 mg/dL


(7-23)  H


 


Creatinine


  0.9 mg/dL


(0.5-0.9)


 


Estimat Glomerular Filtration


Rate > 60 mL/min


(>60)


 


Glucose Level


  125 mg/dL


()  H


 


Calcium Level


  9.3 mg/dL


(8.6-10.2)


 


Phosphorus Level


  3.3 mg/dL


(2.5-4.8)


 


Magnesium Level


  2.0 mg/dL


(1.7-2.5)


 


Triglycerides Level


  39 mg/dL (<


150)


 


Cholesterol Level


  156 mg/dL (<


200)


 


LDL Cholesterol


  99 mg/dL


(60-99)


 


HDL Cholesterol


  49 mg/dL (>


60)


 


Cholesterol/HDL Ratio


  3.2 (3.3-4.4)


L











Current Medications








 Medications


  (Trade)  Dose


 Ordered  Sig/Jed


 Route


 PRN Reason  Start Time


 Stop Time Status Last Admin


Dose Admin


 


 Albuterol/


 Ipratropium


  (DuoNeb


 0.5-3(2.5)mg/3ml)  3 ml  Q4H  PRN


 HHN


 dyspnea  5/26/17 15:00


 5/31/17 14:59   


 


 


 Aspirin


  (ASA)  81 mg  DAILY


 ORAL


   5/26/17 21:00


 6/25/17 20:59  5/27/17 08:25


 


 


 Atorvastatin


 Calcium


  (Lipitor)  40 mg  BEDTIME


 ORAL


   5/26/17 21:00


 6/25/17 20:59  5/26/17 21:38


 


 


 Azithromycin


  (Zithromax)  250 mg  Q24H


 ORAL


   5/27/17 09:00


 6/3/17 08:59  5/27/17 08:38


 


 


 Bupropion HCl


  (Wellbutrin)  100 mg  DAILY


 ORAL


   5/27/17 09:00


 6/26/17 08:59  5/27/17 08:25


 


 


 Dextrose


  (Dextrose 50%)    STAT  PRN


 IV


 Hypoglycemia  5/27/17 12:00


 6/26/17 11:59   


 


 


 Duloxetine HCl


  (Cymbalta)  60 mg  DAILY


 ORAL


   5/27/17 09:00


 6/26/17 08:59  5/27/17 08:26


 


 


 Heparin Sodium


  (Porcine)


  (Heparin 5000


 units/ml)  5,000 units  EVERY 12  HOURS


 SUBQ


   5/26/17 21:00


 6/25/17 20:59  5/27/17 08:24


 


 


 Ketorolac


 Tromethamine


  (Toradol 30mg)  30 mg  Q8H  PRN


 IV


 Moderate Pain (Pain Scale 4-6)  5/26/17 15:00


 5/31/17 14:59   


 


 


 Levetiracetam


  (Keppra)  1,500 mg  Q12HR


 ORAL


   5/26/17 21:00


 6/25/17 20:59  5/27/17 08:25


 


 


 Loperamide HCl


  (Imodium)  2 mg  Q4H  PRN


 ORAL


 Diarrhea  5/26/17 15:00


 6/25/17 14:59   


 


 


 Lorazepam


  (Ativan 2mg/ml


 1ml)  0.5 mg  Q4H  PRN


 IV


 For Anxiety  5/26/17 15:00


 6/2/17 14:59   


 


 


 Mesalamine


  (Asacol)  800 mg  THREE TIMES A  DAY


 ORAL


   5/26/17 18:00


 6/25/17 17:59  5/27/17 13:02


 


 


 Methylprednisolone


 Sodium Succinate


  (Solu-MEDROL)  60 mg  DAILY


 IV


   5/28/17 09:00


 6/27/17 08:59   


 


 


 Mirtazapine


  (Remeron)  30 mg  BEDTIME


 ORAL


   5/26/17 21:00


 6/25/17 20:59  5/26/17 21:38


 


 


 Morphine Sulfate


  (Morphine


 Sulfate)  2 mg  Q4H  PRN


 IVP


 Severe Pain (Pain Scale 7-10)  5/26/17 15:00


 6/2/17 14:59  5/27/17 13:02


 


 


 Nitroglycerin


  (Ntg)  0.4 mg  Q5M X 3 DOSES PRN


 SL


 Prn Chest Pain  5/26/17 15:00


 6/25/17 14:59   


 


 


 Ondansetron HCl


  (Zofran)  4 mg  Q6H  PRN


 IVP


 Nausea & Vomiting  5/26/17 15:00


 6/25/17 14:59   


 


 


 Piperacillin Sod/


 Tazobactam Sod/


 Dextrose


  (Zosyn/D5W)  110 ml @ 


 27.5 mls/hr  Q8HR@0000,0800,1600


 IVPB


   5/26/17 16:00


 6/2/17 15:59  5/27/17 08:27


 


 


 Promethazine HCl/


 Codeine


  (Phenergan with


 Codeine)  5 ml  Q6H  PRN


 ORAL


 For Cough  5/26/17 15:00


 6/25/17 14:59   


 


 


 Quetiapine


 Fumarate


  (SEROquel)  100 mg  BEDTIME


 ORAL


   5/26/17 21:00


 6/25/17 20:59  5/26/17 21:37


 


 


 Theophylline


  (Shiv-Dur)  100 mg  EVERY 12  HOURS


 ORAL


   5/26/17 21:00


 6/25/17 20:59  5/27/17 08:25


 


 


 Trazodone HCl


  (Desyrel)  100 mg  HSPRN  PRN


 ORAL


 Insomnia  5/26/17 21:00


 6/25/17 20:59  5/26/17 21:55


 

















Violet Dior M.D. May 27, 2017 14:21

## 2017-05-27 NOTE — PULMONOLOGY PROGRESS NOTE
Assessment/Plan


Problems:  


(1) COPD exacerbation


(2) Interstitial lung disease


(3) Emphysema


(4) Chronic abdominal pain


(5) Crohn's disease


Assessment/Plan


improving 


respiratory treatment


iv steroids and abx


continue respiratory treatment


taper steroids


no sputum yet


BC are negative


check electrolytes.





Subjective


ROS Limited/Unobtainable:  No


Constitutional:  Reports: no symptoms


HEENT:  Repors: no symptoms


Respiratory:  Reports: no symptoms


Allergies:  


Coded Allergies:  


     No Known Allergies (Verified , 10/27/06)





Objective





Last 24 Hour Vital Signs








  Date Time  Temp Pulse Resp B/P Pulse Ox O2 Delivery O2 Flow Rate FiO2


 


5/27/17 08:40 98.1 64 16 119/69 100 Room Air  


 


5/27/17 04:00 98.1 67 18 106/66 100 Nasal Cannula 2.0 


 


5/27/17 00:00 98.1 72 20 125/76 99 Nasal Cannula 2.0 


 


5/26/17 20:00 98.6 74 18 129/79 100 Nasal Cannula 2.0 


 


5/26/17 16:16 97.6 85 20 116/77 99 Nasal Cannula 2.0 

















Intake and Output  


 


 5/26/17 5/27/17





 19:00 07:00


 


Intake Total 1090.0 ml 360.0 ml


 


Balance 1090.0 ml 360.0 ml


 


  


 


Intake Oral 980 ml 250 ml


 


IV Total 110.0 ml 110.0 ml


 


# Voids 2 2








General Appearance:  WD/WN


HEENT:  normocephalic, anicteric


Respiratory/Chest:  chest wall non-tender, normal breath sounds


Breasts:  no masses


Cardiovascular:  normal peripheral pulses


Abdomen:  normal bowel sounds, soft, non tender, no organomegaly


Extremities:  no cyanosis





Microbiology








 Date/Time


Source Procedure


Growth Status


 


 


 5/25/17 10:30


Blood Blood Culture - Preliminary


NO GROWTH AFTER 48 HOURS Resulted


 


 5/25/17 10:20


Blood Blood Culture - Preliminary


NO GROWTH AFTER 48 HOURS Resulted








Laboratory Tests


5/27/17 05:57: 


White Blood Count 17.5H, Red Blood Count 4.06L, Hemoglobin 10.9L, Hematocrit 

34.6L, Mean Corpuscular Volume 85, Mean Corpuscular Hemoglobin 26.7L, Mean 

Corpuscular Hemoglobin Concent 31.4L, Red Cell Distribution Width 16.1H, 

Platelet Count 396, Mean Platelet Volume 5.9L, Neutrophils (%) (Auto) , 

Lymphocytes (%) (Auto) , Monocytes (%) (Auto) , Eosinophils (%) (Auto) , 

Basophils (%) (Auto) , Differential Total Cells Counted 100, Neutrophils % (

Manual) 92H, Lymphocytes % (Manual) 6L, Monocytes % (Manual) 2, Eosinophils % (

Manual) 0, Basophils % (Manual) 0, Band Neutrophils 0, Platelet Estimate 

Adequate, Platelet Morphology Normal, Hypochromasia 1+, Anisocytosis 1+, Sodium 

Level 137, Potassium Level 3.9, Chloride Level 102, Carbon Dioxide Level 23, 

Anion Gap 12, Blood Urea Nitrogen 28H, Creatinine 0.9, Estimat Glomerular 

Filtration Rate > 60, Glucose Level 125H, Calcium Level 9.3, Phosphorus Level 

3.3, Magnesium Level 2.0, Triglycerides Level 39, Cholesterol Level 156, LDL 

Cholesterol 99, HDL Cholesterol 49, Cholesterol/HDL Ratio 3.2L





Current Medications








 Medications


  (Trade)  Dose


 Ordered  Sig/Jed


 Route


 PRN Reason  Start Time


 Stop Time Status Last Admin


Dose Admin


 


 Albuterol/


 Ipratropium


  (DuoNeb


 0.5-3(2.5)mg/3ml)  3 ml  Q4H  PRN


 HHN


 dyspnea  5/26/17 15:00


 5/31/17 14:59   


 


 


 Aspirin


  (ASA)  81 mg  DAILY


 ORAL


   5/26/17 21:00


 6/25/17 20:59  5/27/17 08:25


 


 


 Atorvastatin


 Calcium


  (Lipitor)  40 mg  BEDTIME


 ORAL


   5/26/17 21:00


 6/25/17 20:59  5/26/17 21:38


 


 


 Azithromycin


  (Zithromax)  250 mg  Q24H


 ORAL


   5/27/17 09:00


 6/3/17 08:59  5/27/17 08:38


 


 


 Bupropion HCl


  (Wellbutrin)  100 mg  DAILY


 ORAL


   5/27/17 09:00


 6/26/17 08:59  5/27/17 08:25


 


 


 Dextrose


  (Dextrose 50%)    STAT  PRN


 IV


 Hypoglycemia  5/27/17 12:00


 6/26/17 11:59   


 


 


 Duloxetine HCl


  (Cymbalta)  60 mg  DAILY


 ORAL


   5/27/17 09:00


 6/26/17 08:59  5/27/17 08:26


 


 


 Heparin Sodium


  (Porcine)


  (Heparin 5000


 units/ml)  5,000 units  EVERY 12  HOURS


 SUBQ


   5/26/17 21:00


 6/25/17 20:59  5/27/17 08:24


 


 


 Ketorolac


 Tromethamine


  (Toradol 30mg)  30 mg  Q8H  PRN


 IV


 Moderate Pain (Pain Scale 4-6)  5/26/17 15:00


 5/31/17 14:59   


 


 


 Levetiracetam


  (Keppra)  1,500 mg  Q12HR


 ORAL


   5/26/17 21:00


 6/25/17 20:59  5/27/17 08:25


 


 


 Loperamide HCl


  (Imodium)  2 mg  Q4H  PRN


 ORAL


 Diarrhea  5/26/17 15:00


 6/25/17 14:59   


 


 


 Lorazepam


  (Ativan 2mg/ml


 1ml)  0.5 mg  Q4H  PRN


 IV


 For Anxiety  5/26/17 15:00


 6/2/17 14:59   


 


 


 Mesalamine


  (Asacol)  800 mg  THREE TIMES A  DAY


 ORAL


   5/26/17 18:00


 6/25/17 17:59  5/27/17 08:26


 


 


 Methylprednisolone


 Sodium Succinate


  (Solu-MEDROL)  60 mg  EVERY 6  HOURS


 IV


   5/26/17 18:00


 6/25/17 17:59  5/27/17 05:33


 


 


 Mirtazapine


  (Remeron)  30 mg  BEDTIME


 ORAL


   5/26/17 21:00


 6/25/17 20:59  5/26/17 21:38


 


 


 Morphine Sulfate


  (Morphine


 Sulfate)  2 mg  Q4H  PRN


 IVP


 Severe Pain (Pain Scale 7-10)  5/26/17 15:00


 6/2/17 14:59  5/27/17 08:24


 


 


 Nitroglycerin


  (Ntg)  0.4 mg  Q5M X 3 DOSES PRN


 SL


 Prn Chest Pain  5/26/17 15:00


 6/25/17 14:59   


 


 


 Ondansetron HCl


  (Zofran)  4 mg  Q6H  PRN


 IVP


 Nausea & Vomiting  5/26/17 15:00


 6/25/17 14:59   


 


 


 Piperacillin Sod/


 Tazobactam Sod/


 Dextrose


  (Zosyn/D5W)  110 ml @ 


 27.5 mls/hr  Q8HR@0000,0800,1600


 IVPB


   5/26/17 16:00


 6/2/17 15:59  5/27/17 08:27


 


 


 Promethazine HCl/


 Codeine


  (Phenergan with


 Codeine)  5 ml  Q6H  PRN


 ORAL


 For Cough  5/26/17 15:00


 6/25/17 14:59   


 


 


 Quetiapine


 Fumarate


  (SEROquel)  100 mg  BEDTIME


 ORAL


   5/26/17 21:00


 6/25/17 20:59  5/26/17 21:37


 


 


 Theophylline


  (Shiv-Dur)  100 mg  EVERY 12  HOURS


 ORAL


   5/26/17 21:00


 6/25/17 20:59  5/27/17 08:25


 


 


 Trazodone HCl


  (Desyrel)  100 mg  HSPRN  PRN


 ORAL


 Insomnia  5/26/17 21:00


 6/25/17 20:59  5/26/17 21:55


 

















JULIETTE KLEIN May 27, 2017 12:21

## 2017-05-27 NOTE — GENERAL PROGRESS NOTE
Assessment/Plan


Problem List:  


(1) Pneumonia


ICD Codes:  J18.9 - Pneumonia, unspecified organism


SNOMED:  458993888


(2) Chest pain


ICD Codes:  R07.9 - Chest pain, unspecified


SNOMED:  28074512


(3) COPD (chronic obstructive pulmonary disease)


ICD Codes:  J44.9 - Chronic obstructive pulmonary disease, unspecified


SNOMED:  25321213


(4) Sepsis


ICD Codes:  A41.9 - Sepsis, unspecified organism


SNOMED:  81836754


Status:  stable, progressing, tolerating diet


Assessment/Plan


ot pt diet o2 pulm tx abx cbc bmp am





Subjective


Constitutional:  Reports: weakness


Allergies:  


Coded Allergies:  


     No Known Allergies (Verified , 10/27/06)


All Systems:  reviewed and negative except above


Subjective


sleepy calm





Objective





Last 24 Hour Vital Signs








  Date Time  Temp Pulse Resp B/P Pulse Ox O2 Delivery O2 Flow Rate FiO2


 


5/27/17 04:00 98.1 67 18 106/66 100 Nasal Cannula 2.0 


 


5/27/17 00:00 98.1 72 20 125/76 99 Nasal Cannula 2.0 


 


5/26/17 20:00 98.6 74 18 129/79 100 Nasal Cannula 2.0 


 


5/26/17 16:16 97.6 85 20 116/77 99 Nasal Cannula 2.0 


 


5/26/17 12:00  74      


 


5/26/17 11:16 97.5 67 18 134/75 100 Nasal Cannula 2.0 


 


5/26/17 08:00  77      


 


5/26/17 07:42 96.8 73 18 120/67 98 Nasal Cannula 2.0 

















Intake and Output  


 


 5/26/17 5/27/17





 19:00 07:00


 


Intake Total 1090.0 ml 360.0 ml


 


Balance 1090.0 ml 360.0 ml


 


  


 


Intake Oral 980 ml 250 ml


 


IV Total 110.0 ml 110.0 ml


 


# Voids 2 2








Laboratory Tests


5/27/17 05:57: 


White Blood Count [Pending], Red Blood Count [Pending], Hemoglobin [Pending], 

Hematocrit [Pending], Mean Corpuscular Volume [Pending], Mean Corpuscular 

Hemoglobin [Pending], Mean Corpuscular Hemoglobin Concent [Pending], Red Cell 

Distribution Width [Pending], Platelet Count [Pending], Mean Platelet Volume [

Pending], Neutrophils (%) (Auto) [Pending], Lymphocytes (%) (Auto) [Pending], 

Monocytes (%) (Auto) [Pending], Eosinophils (%) (Auto) [Pending], Basophils (%) 

(Auto) [Pending], Sodium Level [Pending], Potassium Level [Pending], Chloride 

Level [Pending], Carbon Dioxide Level [Pending], Blood Urea Nitrogen [Pending], 

Creatinine [Pending], Estimat Glomerular Filtration Rate [Pending], Glucose 

Level [Pending], Calcium Level [Pending], Phosphorus Level [Pending], Magnesium 

Level [Pending], Triglycerides Level [Pending], Cholesterol Level [Pending], 

LDL Cholesterol [Pending], HDL Cholesterol [Pending], Cholesterol/HDL Ratio [

Pending]


Height (Feet):  5


Height (Inches):  6.00


Weight (Pounds):  164


General Appearance:  lethargic


EENT:  normal ENT inspection


Neck:  normal alignment


Cardiovascular:  normal peripheral pulses, normal rate, regular rhythm


Respiratory/Chest:  chest wall non-tender, lungs clear, normal breath sounds


Abdomen:  normal bowel sounds, non tender, soft


Extremities:  normal inspection


Edema:  no edema noted Arm (L), no edema noted Arm (R), no edema noted Leg (L), 

no edema noted Leg (R), no edema noted Pedal (L), no edema noted Pedal (R), no 

edema noted Generalized


Neurologic:  responsive, motor weakness


Skin:  normal pigmentation, warm/dry











MAICOL LANG May 27, 2017 07:40

## 2017-05-27 NOTE — GENERAL PROGRESS NOTE
Assessment/Plan


Assessment/Plan


Assessment


(1) Nausea and vomiting


(2) IBD (inflammatory bowel disease) - Crohn's


(3) Diarrhea


(4) Abdominal pain


(5) Crohns disease


 - s/p EGD/colon 2016


 - non functional implanted morphine pump LLQ





Recommendations


symptomatic treatment at this time


electrolyte replacement


low residual diet


f/u cdiff, stool culture


Imodium prn, consider lomotil if diarrhea persists


fu labs


check Quantiferon TB gold in anticipation of anti TNF Rx





Subjective


Allergies:  


Coded Allergies:  


     No Known Allergies (Verified , 10/27/06)


Subjective


Feels OK


ate solids


no BM


some abd pain, nena RLQ


on steroids





Objective





Last 24 Hour Vital Signs








  Date Time  Temp Pulse Resp B/P Pulse Ox O2 Delivery O2 Flow Rate FiO2


 


5/27/17 20:00 98.8 85 20 118/66 96 Room Air  


 


5/27/17 16:53 98.1 79 14 110/56 100 Nasal Cannula  


 


5/27/17 12:00 97.0 63 16 118/68  Room Air  


 


5/27/17 08:40 98.1 64 16 119/69 100 Room Air  


 


5/27/17 04:00 98.1 67 18 106/66 100 Nasal Cannula 2.0 


 


5/27/17 00:00 98.1 72 20 125/76 99 Nasal Cannula 2.0 

















Intake and Output  


 


 5/26/17 5/27/17





 19:00 07:00


 


Intake Total 1090.0 ml 360.0 ml


 


Balance 1090.0 ml 360.0 ml


 


  


 


Intake Oral 980 ml 250 ml


 


IV Total 110.0 ml 110.0 ml


 


# Voids 2 2








Laboratory Tests


5/27/17 05:57: 


White Blood Count 17.5H, Red Blood Count 4.06L, Hemoglobin 10.9L, Hematocrit 

34.6L, Mean Corpuscular Volume 85, Mean Corpuscular Hemoglobin 26.7L, Mean 

Corpuscular Hemoglobin Concent 31.4L, Red Cell Distribution Width 16.1H, 

Platelet Count 396, Mean Platelet Volume 5.9L, Neutrophils (%) (Auto) , 

Lymphocytes (%) (Auto) , Monocytes (%) (Auto) , Eosinophils (%) (Auto) , 

Basophils (%) (Auto) , Differential Total Cells Counted 100, Neutrophils % (

Manual) 92H, Lymphocytes % (Manual) 6L, Monocytes % (Manual) 2, Eosinophils % (

Manual) 0, Basophils % (Manual) 0, Band Neutrophils 0, Platelet Estimate 

Adequate, Platelet Morphology Normal, Hypochromasia 1+, Anisocytosis 1+, Sodium 

Level 137, Potassium Level 3.9, Chloride Level 102, Carbon Dioxide Level 23, 

Anion Gap 12, Blood Urea Nitrogen 28H, Creatinine 0.9, Estimat Glomerular 

Filtration Rate > 60, Glucose Level 125H, Calcium Level 9.3, Phosphorus Level 

3.3, Magnesium Level 2.0, Triglycerides Level 39, Cholesterol Level 156, LDL 

Cholesterol 99, HDL Cholesterol 49, Cholesterol/HDL Ratio 3.2L


Height (Feet):  5


Height (Inches):  6.00


Weight (Pounds):  164


Objective


NCAT


supple


CTA


RRR 


abd soft (+) RLQ TTP , mild distention


no edema


non focal











DEMARIO BORREGO May 27, 2017 22:14

## 2017-05-28 VITALS — DIASTOLIC BLOOD PRESSURE: 76 MMHG | SYSTOLIC BLOOD PRESSURE: 117 MMHG

## 2017-05-28 VITALS — DIASTOLIC BLOOD PRESSURE: 66 MMHG | SYSTOLIC BLOOD PRESSURE: 111 MMHG

## 2017-05-28 VITALS — SYSTOLIC BLOOD PRESSURE: 114 MMHG | DIASTOLIC BLOOD PRESSURE: 77 MMHG

## 2017-05-28 VITALS — SYSTOLIC BLOOD PRESSURE: 110 MMHG | DIASTOLIC BLOOD PRESSURE: 72 MMHG

## 2017-05-28 VITALS — DIASTOLIC BLOOD PRESSURE: 65 MMHG | SYSTOLIC BLOOD PRESSURE: 112 MMHG

## 2017-05-28 VITALS — DIASTOLIC BLOOD PRESSURE: 72 MMHG | SYSTOLIC BLOOD PRESSURE: 121 MMHG

## 2017-05-28 LAB
ANION GAP SERPL CALC-SCNC: 11 MMOL/L (ref 5–15)
BASOPHILS NFR BLD AUTO: 0.9 % (ref 0–2)
CALCIUM SERPL-MCNC: 9.1 MG/DL (ref 8.6–10.2)
CHLORIDE SERPL-SCNC: 101 MEQ/L (ref 98–107)
CO2 SERPL-SCNC: 25 MEQ/L (ref 20–30)
CREAT SERPL-MCNC: 0.9 MG/DL (ref 0.5–0.9)
EOSINOPHIL NFR BLD AUTO: 0.5 % (ref 0–3)
ERYTHROCYTE [DISTWIDTH] IN BLOOD BY AUTOMATED COUNT: 16.1 % (ref 11.6–14.8)
GFR SERPLBLD BASED ON 1.73 SQ M-ARVRAT: > 60 ML/MIN (ref 60–?)
HEMOLYSIS: 3
LYMPHOCYTES NFR BLD AUTO: 29.7 % (ref 20–45)
MCH RBC QN AUTO: 27 PG (ref 27–31)
MCHC RBC AUTO-ENTMCNC: 31.8 G/DL (ref 32–36)
MCV RBC AUTO: 85 FL (ref 80–99)
MONOCYTES NFR BLD AUTO: 10.1 % (ref 1–10)
NEUTROPHILS NFR BLD AUTO: 58.8 % (ref 45–75)
PLATELET # BLD: 383 K/UL (ref 150–450)
PMV BLD AUTO: 6.5 FL (ref 6.5–10.1)
POTASSIUM SERPL-SCNC: 3.6 MEQ/L (ref 3.4–4.9)
RBC # BLD AUTO: 4.06 M/UL (ref 4.2–5.4)
SODIUM SERPL-SCNC: 137 MEQ/L (ref 135–145)
WBC # BLD AUTO: 12 K/UL (ref 4.8–10.8)

## 2017-05-28 RX ADMIN — DULOXETINE HYDROCHLORIDE SCH MG: 30 CAPSULE, DELAYED RELEASE ORAL at 09:07

## 2017-05-28 RX ADMIN — AZITHROMYCIN DIHYDRATE SCH MG: 250 TABLET, FILM COATED ORAL at 09:07

## 2017-05-28 RX ADMIN — MORPHINE SULFATE PRN MG: 2 INJECTION, SOLUTION INTRAMUSCULAR; INTRAVENOUS at 09:06

## 2017-05-28 RX ADMIN — HEPARIN SODIUM SCH UNITS: 5000 INJECTION INTRAVENOUS; SUBCUTANEOUS at 09:04

## 2017-05-28 RX ADMIN — MORPHINE SULFATE PRN MG: 2 INJECTION, SOLUTION INTRAMUSCULAR; INTRAVENOUS at 00:32

## 2017-05-28 RX ADMIN — MORPHINE SULFATE PRN MG: 2 INJECTION, SOLUTION INTRAMUSCULAR; INTRAVENOUS at 14:30

## 2017-05-28 RX ADMIN — DEXTROSE MONOHYDRATE SCH MLS/HR: 50 INJECTION, SOLUTION INTRAVENOUS at 09:06

## 2017-05-28 RX ADMIN — DEXTROSE MONOHYDRATE SCH MLS/HR: 50 INJECTION, SOLUTION INTRAVENOUS at 23:42

## 2017-05-28 RX ADMIN — ASPIRIN 81 MG SCH MG: 81 TABLET ORAL at 09:06

## 2017-05-28 RX ADMIN — MORPHINE SULFATE PRN MG: 2 INJECTION, SOLUTION INTRAMUSCULAR; INTRAVENOUS at 18:58

## 2017-05-28 RX ADMIN — TRAZODONE HYDROCHLORIDE PRN MG: 100 TABLET ORAL at 22:20

## 2017-05-28 RX ADMIN — DEXTROSE MONOHYDRATE SCH MLS/HR: 50 INJECTION, SOLUTION INTRAVENOUS at 00:32

## 2017-05-28 RX ADMIN — HEPARIN SODIUM SCH UNITS: 5000 INJECTION INTRAVENOUS; SUBCUTANEOUS at 20:23

## 2017-05-28 RX ADMIN — DEXTROSE MONOHYDRATE SCH MLS/HR: 50 INJECTION, SOLUTION INTRAVENOUS at 16:33

## 2017-05-28 RX ADMIN — THEOPHYLLINE ANHYDROUS SCH MG: 100 CAPSULE, EXTENDED RELEASE ORAL at 20:19

## 2017-05-28 RX ADMIN — THEOPHYLLINE ANHYDROUS SCH MG: 100 CAPSULE, EXTENDED RELEASE ORAL at 09:07

## 2017-05-28 RX ADMIN — MORPHINE SULFATE PRN MG: 2 INJECTION, SOLUTION INTRAMUSCULAR; INTRAVENOUS at 22:58

## 2017-05-28 NOTE — PULMONOLOGY PROGRESS NOTE
Assessment/Plan


Problems:  


(1) COPD exacerbation


(2) Interstitial lung disease


(3) Emphysema


(4) Chronic abdominal pain


(5) Crohn's disease


Assessment/Plan


improving 


respiratory treatment


 abx


continue respiratory treatment


taper steroids


no sputum yet, blood cultures negative


BC are negative


check electrolytes.





Subjective


ROS Limited/Unobtainable:  No


Interval Events:  improving


Allergies:  


Coded Allergies:  


     No Known Allergies (Verified , 10/27/06)





Objective





Last 24 Hour Vital Signs








  Date Time  Temp Pulse Resp B/P Pulse Ox O2 Delivery O2 Flow Rate FiO2


 


5/28/17 16:41      Nasal Cannula 1.0 24


 


5/28/17 16:41     93 Nasal Cannula 1.0 24


 


5/28/17 16:07 98.8 90 23 117/76 99 Nasal Cannula 2.0 


 


5/28/17 12:18 98.3 87 22 110/72 97 Nasal Cannula 2.0 


 


5/28/17 08:15 99.7 78 21 112/65 97 Nasal Cannula 2.0 


 


5/28/17 04:00 97.8 69 20 111/66 100 Nasal Cannula 2.0 


 


5/28/17 00:00 97.7 66 20 121/72 96 Nasal Cannula 2.0 


 


5/27/17 20:51 98.8       


 


5/27/17 20:00 98.8 85 20 118/66 96 Room Air  

















Intake and Output  


 


 5/27/17 5/28/17





 19:00 07:00


 


Intake Total 2500 ml 


 


Output Total 1800 ml 


 


Balance 700 ml 


 


  


 


Intake Oral 2500 ml 


 


Output Urine Total 1800 ml 


 


# Voids  2


 


# Bowel Movements 1 








General Appearance:  WD/WN


HEENT:  normocephalic, atraumatic


Respiratory/Chest:  chest wall non-tender, crackles/rales


Cardiovascular:  normal peripheral pulses, normal rate, regular rhythm


Abdomen:  normal bowel sounds


Extremities:  no cyanosis


Laboratory Tests


5/28/17 06:45: 


White Blood Count 12.0H, Red Blood Count 4.06L, Hemoglobin 11.0L, Hematocrit 

34.5L, Mean Corpuscular Volume 85, Mean Corpuscular Hemoglobin 27.0, Mean 

Corpuscular Hemoglobin Concent 31.8L, Red Cell Distribution Width 16.1H, 

Platelet Count 383, Mean Platelet Volume 6.5, Neutrophils (%) (Auto) 58.8, 

Lymphocytes (%) (Auto) 29.7, Monocytes (%) (Auto) 10.1H, Eosinophils (%) (Auto) 

0.5, Basophils (%) (Auto) 0.9, Erythrocyte Sedimentation Rate 65H, Sodium Level 

137, Potassium Level 3.6, Chloride Level 101, Carbon Dioxide Level 25, Anion 

Gap 11, Blood Urea Nitrogen 33H, Creatinine 0.9, Estimat Glomerular Filtration 

Rate > 60, Glucose Level 91, Calcium Level 9.1, C-Reactive Protein, 

Quantitative 0.5, TB Test (QFT) [Pending]





Current Medications








 Medications


  (Trade)  Dose


 Ordered  Sig/Jed


 Route


 PRN Reason  Start Time


 Stop Time Status Last Admin


Dose Admin


 


 Albuterol/


 Ipratropium


  (DuoNeb


 0.5-3(2.5)mg/3ml)  3 ml  Q4H  PRN


 HHN


 dyspnea  5/26/17 15:00


 5/31/17 14:59   


 


 


 Aspirin


  (ASA)  81 mg  DAILY


 ORAL


   5/26/17 21:00


 6/25/17 20:59  5/28/17 09:06


 


 


 Atorvastatin


 Calcium


  (Lipitor)  40 mg  BEDTIME


 ORAL


   5/26/17 21:00


 6/25/17 20:59  5/27/17 20:24


 


 


 Azithromycin


  (Zithromax)  250 mg  Q24H


 ORAL


   5/27/17 09:00


 6/3/17 08:59  5/28/17 09:07


 


 


 Bupropion HCl


  (Wellbutrin)  100 mg  DAILY


 ORAL


   5/27/17 09:00


 6/26/17 08:59  5/28/17 09:07


 


 


 Dextrose


  (Dextrose 50%)    STAT  PRN


 IV


 Hypoglycemia  5/27/17 12:00


 6/26/17 11:59   


 


 


 Duloxetine HCl


  (Cymbalta)  60 mg  DAILY


 ORAL


   5/27/17 09:00


 6/26/17 08:59  5/28/17 09:07


 


 


 Heparin Sodium


  (Porcine)


  (Heparin 5000


 units/ml)  5,000 units  EVERY 12  HOURS


 SUBQ


   5/26/17 21:00


 6/25/17 20:59  5/28/17 09:04


 


 


 Ketorolac


 Tromethamine


  (Toradol 30mg)  30 mg  Q8H  PRN


 IV


 Moderate Pain (Pain Scale 4-6)  5/26/17 15:00


 5/31/17 14:59   


 


 


 Levetiracetam


  (Keppra)  1,500 mg  Q12HR


 ORAL


   5/26/17 21:00


 6/25/17 20:59  5/28/17 09:07


 


 


 Loperamide HCl


  (Imodium)  2 mg  Q4H  PRN


 ORAL


 Diarrhea  5/26/17 15:00


 6/25/17 14:59   


 


 


 Lorazepam


  (Ativan 2mg/ml


 1ml)  0.5 mg  Q4H  PRN


 IV


 For Anxiety  5/26/17 15:00


 6/2/17 14:59   


 


 


 Mesalamine


  (Asacol)  800 mg  THREE TIMES A  DAY


 ORAL


   5/26/17 18:00


 6/25/17 17:59  5/28/17 18:01


 


 


 Methylprednisolone


 Sodium Succinate


  (Solu-MEDROL)  60 mg  DAILY


 IV


   5/28/17 09:00


 6/27/17 08:59  5/28/17 09:06


 


 


 Mirtazapine


  (Remeron)  30 mg  BEDTIME


 ORAL


   5/26/17 21:00


 6/25/17 20:59  5/27/17 20:24


 


 


 Morphine Sulfate


  (Morphine


 Sulfate)  2 mg  Q4H  PRN


 IVP


 Severe Pain (Pain Scale 7-10)  5/26/17 15:00


 6/2/17 14:59  5/28/17 14:30


 


 


 Nitroglycerin


  (Ntg)  0.4 mg  Q5M X 3 DOSES PRN


 SL


 Prn Chest Pain  5/26/17 15:00


 6/25/17 14:59   


 


 


 Ondansetron HCl


  (Zofran)  4 mg  Q6H  PRN


 IVP


 Nausea & Vomiting  5/26/17 15:00


 6/25/17 14:59   


 


 


 Piperacillin Sod/


 Tazobactam Sod/


 Dextrose


  (Zosyn/D5W)  110 ml @ 


 27.5 mls/hr  Q8HR@0000,0800,1600


 IVPB


   5/26/17 16:00


 6/2/17 15:59  5/28/17 16:33


 


 


 Promethazine HCl/


 Codeine


  (Phenergan with


 Codeine)  5 ml  Q6H  PRN


 ORAL


 For Cough  5/26/17 15:00


 6/25/17 14:59   


 


 


 Quetiapine


 Fumarate


  (SEROquel)  100 mg  BEDTIME


 ORAL


   5/26/17 21:00


 6/25/17 20:59  5/27/17 20:24


 


 


 Theophylline


  (Shiv-Dur)  100 mg  EVERY 12  HOURS


 ORAL


   5/26/17 21:00


 6/25/17 20:59  5/28/17 09:07


 


 


 Trazodone HCl


  (Desyrel)  100 mg  HSPRN  PRN


 ORAL


 Insomnia  5/26/17 21:00


 6/25/17 20:59  5/26/17 21:55


 

















JULIETTE KLEIN May 28, 2017 18:37

## 2017-05-28 NOTE — GENERAL PROGRESS NOTE
Assessment/Plan


Assessment/Plan


Assessment


(1) Nausea and vomiting


(2) Crohn's disease


(3) Diarrhea


(4) Abdominal pain, RLQ TTP


 - s/p EGD/colon 2016


 - non functional implanted morphine pump LLQ





Recommendations


check CT abd and pelvis - R/o RLQ fistula or abscess


unable to restart Pentassa - not on formulary at [facility] [place]


f/u cdiff, stool culture


fu labs


check Quantiferon TB gold in anticipation of anti TNF Rx





Subjective


Allergies:  


Coded Allergies:  


     No Known Allergies (Verified , 10/27/06)


Subjective


still with RLQ pain


says takes Pentassa 1000 TID at home


currently on steroids


WBC declining





Objective





Last 24 Hour Vital Signs








  Date Time  Temp Pulse Resp B/P Pulse Ox O2 Delivery O2 Flow Rate FiO2


 


5/28/17 12:18 98.3 87 22 110/72 97 Nasal Cannula 2.0 


 


5/28/17 08:15 99.7 78 21 112/65 97 Nasal Cannula 2.0 


 


5/28/17 04:00 97.8 69 20 111/66 100 Nasal Cannula 2.0 


 


5/28/17 00:00 97.7 66 20 121/72 96 Nasal Cannula 2.0 


 


5/27/17 20:51 98.8       


 


5/27/17 20:00 98.8 85 20 118/66 96 Room Air  


 


5/27/17 16:53 98.1 79 14 110/56 100 Nasal Cannula  

















Intake and Output  


 


 5/27/17 5/28/17





 19:00 07:00


 


Intake Total 2500 ml 


 


Output Total 1800 ml 


 


Balance 700 ml 


 


  


 


Intake Oral 2500 ml 


 


Output Urine Total 1800 ml 


 


# Voids  2


 


# Bowel Movements 1 








Laboratory Tests


5/28/17 06:45: 


White Blood Count 12.0H, Red Blood Count 4.06L, Hemoglobin 11.0L, Hematocrit 

34.5L, Mean Corpuscular Volume 85, Mean Corpuscular Hemoglobin 27.0, Mean 

Corpuscular Hemoglobin Concent 31.8L, Red Cell Distribution Width 16.1H, 

Platelet Count 383, Mean Platelet Volume 6.5, Neutrophils (%) (Auto) 58.8, 

Lymphocytes (%) (Auto) 29.7, Monocytes (%) (Auto) 10.1H, Eosinophils (%) (Auto) 

0.5, Basophils (%) (Auto) 0.9, Erythrocyte Sedimentation Rate 65H, Sodium Level 

137, Potassium Level 3.6, Chloride Level 101, Carbon Dioxide Level 25, Anion 

Gap 11, Blood Urea Nitrogen 33H, Creatinine 0.9, Estimat Glomerular Filtration 

Rate > 60, Glucose Level 91, Calcium Level 9.1, C-Reactive Protein, 

Quantitative 0.5, TB Test (QFT) [Pending]


Height (Feet):  5


Height (Inches):  6.00


Weight (Pounds):  164


Objective


NCAT


supple


CTA


RRR 


abd soft (+) RLQ TTP , mild distention


no edema


non focal











DEMARIO BORREGO May 28, 2017 14:26

## 2017-05-28 NOTE — GENERAL PROGRESS NOTE
Assessment/Plan


Assessment/Plan


(1) Lumbar spondylosis


(2) Chronic abdominal pain


(3) Radiculopathy of lumbar region


(4) Herniated nucleus pulposus, lumbar


(5) Chronic pancreatitis


(6) Chronic pain


Pt will be continued on Morphine and pt has intrathecal pump.


Pt was d/w Dr. Shetty and he concurred.





Subjective


Date patient seen:  May 28, 2017


Time patient seen:  06:30 - am


Allergies:  


Coded Allergies:  


     No Known Allergies (Verified , 10/27/06)


Subjective


Constitutional:  Denies: chills, diaphoresis, fever, malaise, no symptoms, other

, Reports: weakness


HEENT:  Denies: blurred vision, double vision, ear discharge, ear pain, eye pain

, mouth pain, mouth swelling, no symptoms, nose congestion, nose pain, other, 

tearing, throat pain, throat swelling


Cardiovascular:  Denies: chest pain, edema, irregular heart rate, 

lightheadedness, no symptoms, other, palpitations, syncope


Respiratory:  Denies: SOB at rest, SOB with excertion, cough, no symptoms, 

orthopnea, other, shortness of breath, sputum, stridor, wheezing


Gastrointestinal/Abdominal:  Denies: abdomen distended, black stools, blood in 

stool, constipated, diarrhea, difficulty swallowing, nausea, no symptoms, other

, poor appetite, poor fluid intake, rectal bleeding, tarry stools, vomiting, 

Reports: abdominal pain


Genitourinary:  Denies: burning, discharge, flank pain, frequency, hematuria, 

incontinence, no symptoms, other, pain, urgency


Neurologic/Psychiatric:  Denies: anxiety, depressed, emotional problems, 

headache, no symptoms, numbness, other, paresthesia, pre-existing deficit, 

seizure, tingling, tremors, weakness


Endocrine:  Denies: excessive sweating, flushing, increased hunger, increased 

thirst, increased urine, intolerance to cold, intolerance to heat, no symptoms, 

other, unexplained weight gain, unexplained weight loss


Hematologic/Lymphatic:  Denies: anemia, easy bleeding, easy bruising, no 

symptoms, other





Subjective


Her pain has been stable and tolerated on the morphine.





Objective





Last 24 Hour Vital Signs








  Date Time  Temp Pulse Resp B/P Pulse Ox O2 Delivery O2 Flow Rate FiO2


 


5/28/17 04:00 97.8 69 20 111/66 100 Nasal Cannula 2.0 


 


5/28/17 00:00 97.7 66 20 121/72 96 Nasal Cannula 2.0 


 


5/27/17 20:51 98.8       


 


5/27/17 20:00 98.8 85 20 118/66 96 Room Air  


 


5/27/17 16:53 98.1 79 14 110/56 100 Nasal Cannula  


 


5/27/17 12:00 97.0 63 16 118/68  Room Air  


 


5/27/17 08:40 98.1 64 16 119/69 100 Room Air  

















Intake and Output  


 


 5/27/17 5/28/17





 19:00 07:00


 


Intake Total 2500 ml 


 


Output Total 1800 ml 


 


Balance 700 ml 


 


  


 


Intake Oral 2500 ml 


 


Output Urine Total 1800 ml 


 


# Voids  2


 


# Bowel Movements 1 








Height (Feet):  5


Height (Inches):  6.00


Weight (Pounds):  164


Objective


General Appearance:  no apparent distress, alert


EENT:  normal ENT inspection, TMs normal


Neck:  normal alignment, supple


Cardiovascular:  normal rate, regular rhythm


Respiratory/Chest:  decreased breath sounds


Abdomen:  tender, soft


Extremities:  non-tender


Edema:  no edema noted Arm (L), no edema noted Arm (R), no edema noted Leg (L), 

no edema noted Leg (R), no edema noted Pedal (L), no edema noted Pedal (R), no 

edema noted Generalized


Neurologic:  alert, oriented x 3


Skin:  warm/dry











LEA BRAVO May 28, 2017 07:09

## 2017-05-28 NOTE — GENERAL PROGRESS NOTE
Assessment/Plan


Problem List:  


(1) Pneumonia


ICD Codes:  J18.9 - Pneumonia, unspecified organism


SNOMED:  215474479


(2) Chest pain


ICD Codes:  R07.9 - Chest pain, unspecified


SNOMED:  80619143


(3) COPD (chronic obstructive pulmonary disease)


ICD Codes:  J44.9 - Chronic obstructive pulmonary disease, unspecified


SNOMED:  17416013


(4) Sepsis


ICD Codes:  A41.9 - Sepsis, unspecified organism


SNOMED:  60961086


Status:  stable, progressing, tolerating diet


Assessment/Plan


ot pt diet o2 pulm tx abx cbc bmp am





Subjective


Constitutional:  Reports: weakness


Respiratory:  Reports: shortness of breath


Allergies:  


Coded Allergies:  


     No Known Allergies (Verified , 10/27/06)


All Systems:  reviewed and negative except above


Subjective


o2nc calm





Objective





Last 24 Hour Vital Signs








  Date Time  Temp Pulse Resp B/P Pulse Ox O2 Delivery O2 Flow Rate FiO2


 


5/28/17 04:00 97.8 69 20 111/66 100 Nasal Cannula 2.0 


 


5/28/17 00:00 97.7 66 20 121/72 96 Nasal Cannula 2.0 


 


5/27/17 20:51 98.8       


 


5/27/17 20:00 98.8 85 20 118/66 96 Room Air  


 


5/27/17 16:53 98.1 79 14 110/56 100 Nasal Cannula  


 


5/27/17 12:00 97.0 63 16 118/68  Room Air  


 


5/27/17 08:40 98.1 64 16 119/69 100 Room Air  

















Intake and Output  


 


 5/27/17 5/28/17





 19:00 07:00


 


Intake Total 2500 ml 


 


Output Total 1800 ml 


 


Balance 700 ml 


 


  


 


Intake Oral 2500 ml 


 


Output Urine Total 1800 ml 


 


# Voids  2


 


# Bowel Movements 1 








Height (Feet):  5


Height (Inches):  6.00


Weight (Pounds):  164


General Appearance:  alert


EENT:  normal ENT inspection


Neck:  normal alignment


Cardiovascular:  normal peripheral pulses, normal rate, regular rhythm


Respiratory/Chest:  chest wall non-tender, lungs clear, decreased breath sounds


Abdomen:  normal bowel sounds, non tender, soft


Extremities:  normal inspection


Edema:  no edema noted Arm (L), no edema noted Arm (R), no edema noted Leg (L), 

no edema noted Leg (R), no edema noted Pedal (L), no edema noted Pedal (R), no 

edema noted Generalized


Neurologic:  responsive, motor weakness


Skin:  normal pigmentation, warm/dry











MAICOL LANG May 28, 2017 07:58

## 2017-05-28 NOTE — CARDIOLOGY PROGRESS NOTE
Assessment/Plan


Assessment/Plan


1. Dyspnea, due to acute exacerbation of COPD given lung exam findings, steroid

, ABx therapy, pulmonary toilet and O2 therapy.


2. History of cerebrovascular accident, continue ASA and statin.


3. Crohn's disease.





Subjective


Subjective


Denies chest pain or SOB.


Comfortable.





Objective





Last 24 Hour Vital Signs








  Date Time  Temp Pulse Resp B/P Pulse Ox O2 Delivery O2 Flow Rate FiO2


 


5/28/17 20:00 98.8 92 20 114/77 100 Nasal Cannula 2.0 


 


5/28/17 16:41      Nasal Cannula 1.0 24


 


5/28/17 16:41     93 Nasal Cannula 1.0 24


 


5/28/17 16:07 98.8 90 23 117/76 99 Nasal Cannula 2.0 


 


5/28/17 12:18 98.3 87 22 110/72 97 Nasal Cannula 2.0 


 


5/28/17 08:15 99.7 78 21 112/65 97 Nasal Cannula 2.0 


 


5/28/17 04:00 97.8 69 20 111/66 100 Nasal Cannula 2.0 


 


5/28/17 00:00 97.7 66 20 121/72 96 Nasal Cannula 2.0 

















Intake and Output  


 


 5/27/17 5/28/17





 19:00 07:00


 


Intake Total 2500 ml 


 


Output Total 1800 ml 


 


Balance 700 ml 


 


  


 


Intake Oral 2500 ml 


 


Output Urine Total 1800 ml 


 


# Voids  2


 


# Bowel Movements 1 











Laboratory Tests








Test


  5/28/17


06:45


 


White Blood Count


  12.0 K/UL


(4.8-10.8)  H


 


Red Blood Count


  4.06 M/UL


(4.20-5.40)  L


 


Hemoglobin


  11.0 G/DL


(12.0-16.0)  L


 


Hematocrit


  34.5 %


(37.0-47.0)  L


 


Mean Corpuscular Volume 85 FL (80-99)  


 


Mean Corpuscular Hemoglobin


  27.0 PG


(27.0-31.0)


 


Mean Corpuscular Hemoglobin


Concent 31.8 G/DL


(32.0-36.0)  L


 


Red Cell Distribution Width


  16.1 %


(11.6-14.8)  H


 


Platelet Count


  383 K/UL


(150-450)


 


Mean Platelet Volume


  6.5 FL


(6.5-10.1)


 


Neutrophils (%) (Auto)


  58.8 %


(45.0-75.0)


 


Lymphocytes (%) (Auto)


  29.7 %


(20.0-45.0)


 


Monocytes (%) (Auto)


  10.1 %


(1.0-10.0)  H


 


Eosinophils (%) (Auto)


  0.5 %


(0.0-3.0)


 


Basophils (%) (Auto)


  0.9 %


(0.0-2.0)


 


Erythrocyte Sedimentation Rate


  65 MM/HR


(0-30)  H


 


Sodium Level


  137 mEQ/L


(135-145)


 


Potassium Level


  3.6 mEQ/L


(3.4-4.9)


 


Chloride Level


  101 mEQ/L


()


 


Carbon Dioxide Level


  25 mEQ/L


(20-30)


 


Anion Gap 11 (5-15)  


 


Blood Urea Nitrogen


  33 mg/dL


(7-23)  H


 


Creatinine


  0.9 mg/dL


(0.5-0.9)


 


Estimat Glomerular Filtration


Rate > 60 mL/min


(>60)


 


Glucose Level


  91 mg/dL


()


 


Calcium Level


  9.1 mg/dL


(8.6-10.2)


 


C-Reactive Protein,


Quantitative 0.5 mg/dL (<


0.5)


 


TB Test (QFT) Pending  








Objective


HEENT:  Atraumatic and normocephalic.  Anicteric.  Pupils are equal,


round, and reactive to light and accommodation.  Extraocular muscles


intact.


NECK:  JVP is less than 5 cm.  No carotid bruits.  Carotid upstrokes


2+ bilaterally.


CVS:  Normal S1 and S2.  Regular rate and rhythm.  A 2/6 mid systolic


murmur in the left sternal border.  PMI is at fourth intercostal space at


the midclavicular line.


LUNGS:  Bilateral rhonchi with a prolonged expiratory phase.


ABDOMEN:  Distended.  No hepatosplenomegaly.  Soft.  Positive bowel


sounds.


EXTREMITIES:  No evidence of edema, clubbing, or cyanosis.











MARLENE GUTIERREZ May 28, 2017 23:49

## 2017-05-29 VITALS — SYSTOLIC BLOOD PRESSURE: 107 MMHG | DIASTOLIC BLOOD PRESSURE: 68 MMHG

## 2017-05-29 VITALS — DIASTOLIC BLOOD PRESSURE: 62 MMHG | SYSTOLIC BLOOD PRESSURE: 114 MMHG

## 2017-05-29 VITALS — DIASTOLIC BLOOD PRESSURE: 69 MMHG | SYSTOLIC BLOOD PRESSURE: 112 MMHG

## 2017-05-29 VITALS — DIASTOLIC BLOOD PRESSURE: 73 MMHG | SYSTOLIC BLOOD PRESSURE: 109 MMHG

## 2017-05-29 VITALS — DIASTOLIC BLOOD PRESSURE: 70 MMHG | SYSTOLIC BLOOD PRESSURE: 115 MMHG

## 2017-05-29 VITALS — SYSTOLIC BLOOD PRESSURE: 123 MMHG | DIASTOLIC BLOOD PRESSURE: 77 MMHG

## 2017-05-29 LAB
ANION GAP SERPL CALC-SCNC: 14 MMOL/L (ref 5–15)
BASOPHILS NFR BLD AUTO: 0.7 % (ref 0–2)
CALCIUM SERPL-MCNC: 9.2 MG/DL (ref 8.6–10.2)
CHLORIDE SERPL-SCNC: 98 MEQ/L (ref 98–107)
CO2 SERPL-SCNC: 24 MEQ/L (ref 20–30)
CREAT SERPL-MCNC: 0.9 MG/DL (ref 0.5–0.9)
EOSINOPHIL NFR BLD AUTO: 1 % (ref 0–3)
ERYTHROCYTE [DISTWIDTH] IN BLOOD BY AUTOMATED COUNT: 15.9 % (ref 11.6–14.8)
GFR SERPLBLD BASED ON 1.73 SQ M-ARVRAT: > 60 ML/MIN (ref 60–?)
HEMOLYSIS: 0
LYMPHOCYTES NFR BLD AUTO: 26.6 % (ref 20–45)
MCH RBC QN AUTO: 26.4 PG (ref 27–31)
MCHC RBC AUTO-ENTMCNC: 31.1 G/DL (ref 32–36)
MCV RBC AUTO: 85 FL (ref 80–99)
MONOCYTES NFR BLD AUTO: 11 % (ref 1–10)
NEUTROPHILS NFR BLD AUTO: 60.8 % (ref 45–75)
PLATELET # BLD: 388 K/UL (ref 150–450)
PMV BLD AUTO: 6.8 FL (ref 6.5–10.1)
POTASSIUM SERPL-SCNC: 3.3 MEQ/L (ref 3.4–4.9)
RBC # BLD AUTO: 4.28 M/UL (ref 4.2–5.4)
SODIUM SERPL-SCNC: 136 MEQ/L (ref 135–145)
WBC # BLD AUTO: 14.1 K/UL (ref 4.8–10.8)

## 2017-05-29 RX ADMIN — MORPHINE SULFATE PRN MG: 2 INJECTION, SOLUTION INTRAMUSCULAR; INTRAVENOUS at 10:40

## 2017-05-29 RX ADMIN — AZITHROMYCIN DIHYDRATE SCH MG: 250 TABLET, FILM COATED ORAL at 08:49

## 2017-05-29 RX ADMIN — MORPHINE SULFATE PRN MG: 2 INJECTION, SOLUTION INTRAMUSCULAR; INTRAVENOUS at 14:41

## 2017-05-29 RX ADMIN — DULOXETINE HYDROCHLORIDE SCH MG: 30 CAPSULE, DELAYED RELEASE ORAL at 08:49

## 2017-05-29 RX ADMIN — THEOPHYLLINE ANHYDROUS SCH MG: 100 CAPSULE, EXTENDED RELEASE ORAL at 08:50

## 2017-05-29 RX ADMIN — MORPHINE SULFATE PRN MG: 2 INJECTION, SOLUTION INTRAMUSCULAR; INTRAVENOUS at 18:32

## 2017-05-29 RX ADMIN — MORPHINE SULFATE PRN MG: 2 INJECTION, SOLUTION INTRAMUSCULAR; INTRAVENOUS at 22:33

## 2017-05-29 RX ADMIN — DEXTROSE MONOHYDRATE SCH MLS/HR: 50 INJECTION, SOLUTION INTRAVENOUS at 15:52

## 2017-05-29 RX ADMIN — ASPIRIN 81 MG SCH MG: 81 TABLET ORAL at 08:49

## 2017-05-29 RX ADMIN — MORPHINE SULFATE PRN MG: 2 INJECTION, SOLUTION INTRAMUSCULAR; INTRAVENOUS at 06:32

## 2017-05-29 RX ADMIN — METHYLPREDNISOLONE SODIUM SUCCINATE SCH MG: 40 INJECTION, POWDER, LYOPHILIZED, FOR SOLUTION INTRAMUSCULAR; INTRAVENOUS at 08:50

## 2017-05-29 RX ADMIN — HEPARIN SODIUM SCH UNITS: 5000 INJECTION INTRAVENOUS; SUBCUTANEOUS at 08:55

## 2017-05-29 RX ADMIN — DEXTROSE MONOHYDRATE SCH MLS/HR: 50 INJECTION, SOLUTION INTRAVENOUS at 23:04

## 2017-05-29 RX ADMIN — DEXTROSE MONOHYDRATE SCH MLS/HR: 50 INJECTION, SOLUTION INTRAVENOUS at 08:48

## 2017-05-29 RX ADMIN — TRAZODONE HYDROCHLORIDE PRN MG: 100 TABLET ORAL at 23:04

## 2017-05-29 RX ADMIN — HEPARIN SODIUM SCH UNITS: 5000 INJECTION INTRAVENOUS; SUBCUTANEOUS at 21:01

## 2017-05-29 RX ADMIN — THEOPHYLLINE ANHYDROUS SCH MG: 100 CAPSULE, EXTENDED RELEASE ORAL at 21:00

## 2017-05-29 NOTE — DIAGNOSTIC IMAGING REPORT
Indication: Abdominal pain



Technique: Continuous helical transaxial imaging of the abdomen and pelvis was

obtained from the lung bases to the pubic symphysis during intravenous 

contrast

administration. Coronal 2-D reformats were also obtained. Study obtained in a

Siemens sensation 64 slice CT.



Total Dose length Product (DLP):  783 mGycm



CT Dose Index Volume (CTDIvol):   16 mGy



Comparison: June 2, 2014



Findings: Solid organs are unremarkable and appear relatively homogeneous. There 

is

intraspinal pump device noted. No free fluid or free air identified. Accessory

spleen nodules suspected. This is in the left upper quadrant of abdomen. Several

nodules are present. There is no hydronephrosis or obvious renal stones identified.

There is moderate fecal retention within the colon. There are surgical clips in 

the

abdomen especially in the right lower quadrant. Partial colon resection appears to

have been performed. There is no evidence of small bowel obstruction. Abdominal wall

mesh noted indicative of previous abdominal plasty. Uterus is absent.



Impression:



Moderate fecal retention.



Previous abdominal surgery.



Anterior ventral abdominal wall mesh and abdominal plasty.



Status post hysterectomy



Spinal pain pump



Atherosclerotic vascular disease



Accessory splenic nodules



Mild posterior basilar atelectasis



[facility] has communicated the preliminary results to the 

Emergency

Department.  Their findings are largely concordant with this report.







The CT scanner at [facility] is accredited by the [employer] College 

of

Radiology and the scans are performed using dose optimization techniques as

appropriate to a performed exam including Automatic Exposure control.

## 2017-05-29 NOTE — INFECTIOUS DISEASES PROG NOTE
Assessment/Plan


Problems:  


(1) PNA (pneumonia)


Assessment & Plan:  continue  Zosyn and azithromycin to cover atypical 

organisms  ,  await sputum culture 





(2) Abdominal pain


Assessment & Plan:  due to crohn's disease , consult GI for further eval





(3) Leukocytosis


Assessment & Plan:  due to steroids  , recommend to taper , await  blood 

culture and continue antibiotics 





(4) COPD exacerbation


Assessment & Plan:  on large dose of steroids, recommend to taper, continue 

nebulizers 





(5) Crohns disease


Assessment & Plan:  consult GI for further eval








Subjective


Constitutional:  Reports: no symptoms


HEENT:  Reports: no symptoms


Respiratory:  Reports: no symptoms


Breasts:  Reports: no symptoms


Cardiovascular:  Reports: no symptoms


Gastrointestinal/Abdominal:  Reports: no symptoms


Genitourinary:  Reports: no symptoms


Neurologic:  Reports: no symptoms


Psychiatric:  Reports: no symptoms


Skin:  Reports: no symptoms


Endocrine:  Reports: no symptoms


Allergies:  


Coded Allergies:  


     No Known Allergies (Verified , 10/27/06)





Objective


Vital Signs





Last 24 Hour Vital Signs








  Date Time  Temp Pulse Resp B/P Pulse Ox O2 Delivery O2 Flow Rate FiO2


 


5/29/17 12:33 97.8 78 23 107/68 97 Nasal Cannula 2.0 


 


5/29/17 08:15 97.7 76 20 109/73 97 Nasal Cannula 2.0 


 


5/29/17 04:00 97.5 64 20 112/69 100 Nasal Cannula 2.0 


 


5/29/17 00:00 98.1 79 20 114/62 100 Nasal Cannula 2.0 


 


5/28/17 20:00 98.8 92 20 114/77 100 Nasal Cannula 2.0 


 


5/28/17 16:41      Nasal Cannula 1.0 24


 


5/28/17 16:41     93 Nasal Cannula 1.0 24


 


5/28/17 16:07 98.8 90 23 117/76 99 Nasal Cannula 2.0 








Height (Feet):  5


Height (Inches):  6.00


Weight (Pounds):  164


General Appearance:  WD/WN, no acute distress


HEENT:  normocephalic, atraumatic, anicteric, mucous membranes moist


Respiratory/Chest:  chest wall non-tender, lungs clear, normal breath sounds, 

no respiratory distress, no accessory muscle use


Cardiovascular:  normal peripheral pulses, normal rate, regular rhythm, no 

gallop/murmur


Abdomen:  normal bowel sounds, soft, non tender, no organomegaly, non distended

, no mass


Extremities:  no cyanosis, no clubbing


Skin:  no rash, no lesions





Laboratory Tests








Test


  5/29/17


05:30


 


White Blood Count


  14.1 K/UL


(4.8-10.8)  H


 


Red Blood Count


  4.28 M/UL


(4.20-5.40)


 


Hemoglobin


  11.3 G/DL


(12.0-16.0)  L


 


Hematocrit


  36.3 %


(37.0-47.0)  L


 


Mean Corpuscular Volume 85 FL (80-99)  


 


Mean Corpuscular Hemoglobin


  26.4 PG


(27.0-31.0)  L


 


Mean Corpuscular Hemoglobin


Concent 31.1 G/DL


(32.0-36.0)  L


 


Red Cell Distribution Width


  15.9 %


(11.6-14.8)  H


 


Platelet Count


  388 K/UL


(150-450)


 


Mean Platelet Volume


  6.8 FL


(6.5-10.1)


 


Neutrophils (%) (Auto)


  60.8 %


(45.0-75.0)


 


Lymphocytes (%) (Auto)


  26.6 %


(20.0-45.0)


 


Monocytes (%) (Auto)


  11.0 %


(1.0-10.0)  H


 


Eosinophils (%) (Auto)


  1.0 %


(0.0-3.0)


 


Basophils (%) (Auto)


  0.7 %


(0.0-2.0)


 


Sodium Level


  136 mEQ/L


(135-145)


 


Potassium Level


  3.3 mEQ/L


(3.4-4.9)  L


 


Chloride Level


  98 mEQ/L


()


 


Carbon Dioxide Level


  24 mEQ/L


(20-30)


 


Anion Gap 14 (5-15)  


 


Blood Urea Nitrogen


  30 mg/dL


(7-23)  H


 


Creatinine


  0.9 mg/dL


(0.5-0.9)


 


Estimat Glomerular Filtration


Rate > 60 mL/min


(>60)


 


Glucose Level


  111 mg/dL


()  H


 


Calcium Level


  9.2 mg/dL


(8.6-10.2)











Current Medications








 Medications


  (Trade)  Dose


 Ordered  Sig/Jed


 Route


 PRN Reason  Start Time


 Stop Time Status Last Admin


Dose Admin


 


 Albuterol/


 Ipratropium


  (DuoNeb


 0.5-3(2.5)mg/3ml)  3 ml  Q4H  PRN


 HHN


 dyspnea  5/26/17 15:00


 5/31/17 14:59   


 


 


 Aspirin


  (ASA)  81 mg  DAILY


 ORAL


   5/26/17 21:00


 6/25/17 20:59  5/29/17 08:49


 


 


 Atorvastatin


 Calcium


  (Lipitor)  40 mg  BEDTIME


 ORAL


   5/26/17 21:00


 6/25/17 20:59  5/28/17 20:20


 


 


 Azithromycin


  (Zithromax)  250 mg  Q24H


 ORAL


   5/27/17 09:00


 6/3/17 08:59  5/29/17 08:49


 


 


 Bupropion HCl


  (Wellbutrin)  100 mg  DAILY


 ORAL


   5/27/17 09:00


 6/26/17 08:59  5/29/17 08:49


 


 


 Dextrose


  (Dextrose 50%)    STAT  PRN


 IV


 Hypoglycemia  5/27/17 12:00


 6/26/17 11:59   


 


 


 Duloxetine HCl


  (Cymbalta)  60 mg  DAILY


 ORAL


   5/27/17 09:00


 6/26/17 08:59  5/29/17 08:49


 


 


 Heparin Sodium


  (Porcine)


  (Heparin 5000


 units/ml)  5,000 units  EVERY 12  HOURS


 SUBQ


   5/26/17 21:00


 6/25/17 20:59  5/29/17 08:55


 


 


 Ketorolac


 Tromethamine


  (Toradol 30mg)  30 mg  Q8H  PRN


 IV


 Moderate Pain (Pain Scale 4-6)  5/26/17 15:00


 5/31/17 14:59   


 


 


 Levetiracetam


  (Keppra)  1,500 mg  Q12HR


 ORAL


   5/26/17 21:00


 6/25/17 20:59  5/29/17 08:49


 


 


 Loperamide HCl


  (Imodium)  2 mg  Q4H  PRN


 ORAL


 Diarrhea  5/26/17 15:00


 6/25/17 14:59   


 


 


 Lorazepam


  (Ativan 2mg/ml


 1ml)  0.5 mg  Q4H  PRN


 IV


 For Anxiety  5/26/17 15:00


 6/2/17 14:59   


 


 


 Mesalamine


  (Asacol)  800 mg  THREE TIMES A  DAY


 ORAL


   5/26/17 18:00


 6/25/17 17:59  5/29/17 13:15


 


 


 Methylprednisolone


 Sodium Succinate


  (Solu-MEDROL)  40 mg  DAILY


 IVP


   5/29/17 09:00


 6/28/17 08:59  5/29/17 08:50


 


 


 Mirtazapine


  (Remeron)  30 mg  BEDTIME


 ORAL


   5/26/17 21:00


 6/25/17 20:59  5/28/17 20:19


 


 


 Morphine Sulfate


  (Morphine


 Sulfate)  2 mg  Q4H  PRN


 IVP


 Severe Pain (Pain Scale 7-10)  5/26/17 15:00


 6/2/17 14:59  5/29/17 14:41


 


 


 Nitroglycerin


  (Ntg)  0.4 mg  Q5M X 3 DOSES PRN


 SL


 Prn Chest Pain  5/26/17 15:00


 6/25/17 14:59   


 


 


 Ondansetron HCl


  (Zofran)  4 mg  Q6H  PRN


 IVP


 Nausea & Vomiting  5/26/17 15:00


 6/25/17 14:59   


 


 


 Piperacillin Sod/


 Tazobactam Sod/


 Dextrose


  (Zosyn/D5W)  110 ml @ 


 27.5 mls/hr  Q8HR@0000,0800,1600


 IVPB


   5/26/17 16:00


 6/2/17 15:59  5/29/17 15:52


 


 


 Promethazine HCl/


 Codeine


  (Phenergan with


 Codeine)  5 ml  Q6H  PRN


 ORAL


 For Cough  5/26/17 15:00


 6/25/17 14:59   


 


 


 Quetiapine


 Fumarate


  (SEROquel)  100 mg  BEDTIME


 ORAL


   5/26/17 21:00


 6/25/17 20:59  5/28/17 20:18


 


 


 Theophylline


  (Shiv-Dur)  100 mg  EVERY 12  HOURS


 ORAL


   5/26/17 21:00


 6/25/17 20:59  5/29/17 08:50


 


 


 Trazodone HCl


  (Desyrel)  100 mg  HSPRN  PRN


 ORAL


 Insomnia  5/26/17 21:00


 6/25/17 20:59  5/28/17 22:20


 

















Violet Dior M.D. May 29, 2017 16:02

## 2017-05-29 NOTE — PULMONOLOGY PROGRESS NOTE
Assessment/Plan


Problems:  


(1) COPD exacerbation


(2) Interstitial lung disease


(3) Emphysema


(4) Chronic abdominal pain


(5) Crohn's disease


Assessment/Plan


improving 


respiratory treatment


 abx


continue respiratory treatment


taper steroids


no sputum yet, blood cultures negative


BC are negative


check electrolytes.


dc planning





Subjective


ROS Limited/Unobtainable:  No


Interval Events:  improving


Allergies:  


Coded Allergies:  


     No Known Allergies (Verified , 10/27/06)





Objective





Last 24 Hour Vital Signs








  Date Time  Temp Pulse Resp B/P Pulse Ox O2 Delivery O2 Flow Rate FiO2


 


5/29/17 20:00 98.1 88 19 123/77 95 Room Air  


 


5/29/17 19:45     96 Nasal Cannula 1.0 24


 


5/29/17 19:45      Nasal Cannula 1.0 24


 


5/29/17 16:27 98.3 82 24 115/70 97 Nasal Cannula 2.0 


 


5/29/17 12:33 97.8 78 23 107/68 97 Nasal Cannula 2.0 


 


5/29/17 08:15 97.7 76 20 109/73 97 Nasal Cannula 2.0 


 


5/29/17 04:00 97.5 64 20 112/69 100 Nasal Cannula 2.0 


 


5/29/17 00:00 98.1 79 20 114/62 100 Nasal Cannula 2.0 

















Intake and Output  


 


 5/28/17 5/29/17





 19:00 07:00


 


Intake Total 720 ml 


 


Output Total  1000 ml


 


Balance 720 ml -1000 ml


 


  


 


Intake Oral 720 ml 


 


Output Urine Total  1000 ml


 


# Bowel Movements  2








General Appearance:  WD/WN


HEENT:  normocephalic, atraumatic


Respiratory/Chest:  chest wall non-tender, lungs clear


Breasts:  no masses


Cardiovascular:  normal peripheral pulses, normal rate


Abdomen:  normal bowel sounds, soft, non tender


Extremities:  no cyanosis, no clubbing


Laboratory Tests


5/29/17 05:30: 


White Blood Count 14.1H, Red Blood Count 4.28, Hemoglobin 11.3L, Hematocrit 

36.3L, Mean Corpuscular Volume 85, Mean Corpuscular Hemoglobin 26.4L, Mean 

Corpuscular Hemoglobin Concent 31.1L, Red Cell Distribution Width 15.9H, 

Platelet Count 388, Mean Platelet Volume 6.8, Neutrophils (%) (Auto) 60.8, 

Lymphocytes (%) (Auto) 26.6, Monocytes (%) (Auto) 11.0H, Eosinophils (%) (Auto) 

1.0, Basophils (%) (Auto) 0.7, Sodium Level 136, Potassium Level 3.3L, Chloride 

Level 98, Carbon Dioxide Level 24, Anion Gap 14, Blood Urea Nitrogen 30H, 

Creatinine 0.9, Estimat Glomerular Filtration Rate > 60, Glucose Level 111H, 

Calcium Level 9.2





Current Medications








 Medications


  (Trade)  Dose


 Ordered  Sig/Jed


 Route


 PRN Reason  Start Time


 Stop Time Status Last Admin


Dose Admin


 


 Albuterol/


 Ipratropium


  (DuoNeb


 0.5-3(2.5)mg/3ml)  3 ml  Q4H  PRN


 HHN


 dyspnea  5/26/17 15:00


 5/31/17 14:59   


 


 


 Aspirin


  (ASA)  81 mg  DAILY


 ORAL


   5/26/17 21:00


 6/25/17 20:59  5/29/17 08:49


 


 


 Atorvastatin


 Calcium


  (Lipitor)  40 mg  BEDTIME


 ORAL


   5/26/17 21:00


 6/25/17 20:59  5/29/17 21:00


 


 


 Azithromycin


  (Zithromax)  250 mg  Q24H


 ORAL


   5/27/17 09:00


 6/3/17 08:59  5/29/17 08:49


 


 


 Bupropion HCl


  (Wellbutrin)  100 mg  DAILY


 ORAL


   5/27/17 09:00


 6/26/17 08:59  5/29/17 08:49


 


 


 Dextrose


  (Dextrose 50%)    STAT  PRN


 IV


 Hypoglycemia  5/27/17 12:00


 6/26/17 11:59   


 


 


 Duloxetine HCl


  (Cymbalta)  60 mg  DAILY


 ORAL


   5/27/17 09:00


 6/26/17 08:59  5/29/17 08:49


 


 


 Heparin Sodium


  (Porcine)


  (Heparin 5000


 units/ml)  5,000 units  EVERY 12  HOURS


 SUBQ


   5/26/17 21:00


 6/25/17 20:59  5/29/17 21:01


 


 


 Ketorolac


 Tromethamine


  (Toradol 30mg)  30 mg  Q8H  PRN


 IV


 Moderate Pain (Pain Scale 4-6)  5/26/17 15:00


 5/31/17 14:59   


 


 


 Levetiracetam


  (Keppra)  1,500 mg  Q12HR


 ORAL


   5/26/17 21:00


 6/25/17 20:59  5/29/17 21:00


 


 


 Loperamide HCl


  (Imodium)  2 mg  Q4H  PRN


 ORAL


 Diarrhea  5/26/17 15:00


 6/25/17 14:59   


 


 


 Lorazepam


  (Ativan 2mg/ml


 1ml)  0.5 mg  Q4H  PRN


 IV


 For Anxiety  5/26/17 15:00


 6/2/17 14:59   


 


 


 Mesalamine


  (Asacol)  800 mg  THREE TIMES A  DAY


 ORAL


   5/26/17 18:00


 6/25/17 17:59  5/29/17 18:32


 


 


 Methylprednisolone


 Sodium Succinate


  (Solu-MEDROL)  40 mg  DAILY


 IVP


   5/29/17 09:00


 6/28/17 08:59  5/29/17 08:50


 


 


 Mirtazapine


  (Remeron)  30 mg  BEDTIME


 ORAL


   5/26/17 21:00


 6/25/17 20:59  5/29/17 21:00


 


 


 Morphine Sulfate


  (Morphine


 Sulfate)  2 mg  Q4H  PRN


 IVP


 Severe Pain (Pain Scale 7-10)  5/26/17 15:00


 6/2/17 14:59  5/29/17 22:33


 


 


 Nitroglycerin


  (Ntg)  0.4 mg  Q5M X 3 DOSES PRN


 SL


 Prn Chest Pain  5/26/17 15:00


 6/25/17 14:59   


 


 


 Ondansetron HCl


  (Zofran)  4 mg  Q6H  PRN


 IVP


 Nausea & Vomiting  5/26/17 15:00


 6/25/17 14:59   


 


 


 Piperacillin Sod/


 Tazobactam Sod/


 Dextrose


  (Zosyn/D5W)  110 ml @ 


 27.5 mls/hr  Q8HR@0000,0800,1600


 IVPB


   5/26/17 16:00


 6/2/17 15:59  5/29/17 15:52


 


 


 Promethazine HCl/


 Codeine


  (Phenergan with


 Codeine)  5 ml  Q6H  PRN


 ORAL


 For Cough  5/26/17 15:00


 6/25/17 14:59   


 


 


 Quetiapine


 Fumarate


  (SEROquel)  100 mg  BEDTIME


 ORAL


   5/26/17 21:00


 6/25/17 20:59  5/29/17 21:01


 


 


 Theophylline


  (Shiv-Dur)  100 mg  EVERY 12  HOURS


 ORAL


   5/26/17 21:00


 6/25/17 20:59  5/29/17 21:00


 


 


 Trazodone HCl


  (Desyrel)  100 mg  HSPRN  PRN


 ORAL


 Insomnia  5/26/17 21:00


 6/25/17 20:59  5/28/17 22:20


 

















JULIETTE KLEIN May 29, 2017 22:43

## 2017-05-29 NOTE — CONSULTATION
DATE OF CONSULTATION:  05/25/2017



TREATING ATTENDING PHYSICIAN:  Maxim Hopkins D.O.



HISTORY OF PRESENT ILLNESS:  This is a 61-year-old female patient.

The patient has a history of chronic obstructive pulmonary disease and

history of bipolar 2 disorder.  The patient does have anxiety.  She has

shortness of breath, pneumonia, COPD exacerbation, _________ chest pain

according to _____ .  The patient denies suicidal or homicidal thoughts or

ideations.  ________.



PAST MEDICAL HISTORY:  She has a history of COPD, Crohn's disease,

and chronic pain.



ALLERGIES:  The patient has no known drug allergies.



SUBSTANCE USE HISTORY:  No indication of alcohol use or illicit

substance use.



SOCIAL HISTORY:  The patient is a 61-year-old female patient _____

facility.



MENTAL STATUS EXAMINATION:  The patient is alert and oriented _____ .

Mood is anxious.  Affect is congruent.  Thought process disorganized.

The patient has poor attention and concentration.  Poor insight, judgment,

and impulse control.



DIAGNOSES:

AXIS I:  Bipolar 2 disorder.

AXIS II:  Deferred.

AXIS III:  Per History and Physical.



PLAN:  Continue medications prior to the therapy ________.









  ______________________________________________

  Orestes Ross PsyD.





DR:  CHARANJIT

D:  05/27/2017 03:44

T:  05/27/2017 14:58

JOB#:  [id number]

CC:

## 2017-05-29 NOTE — GENERAL PROGRESS NOTE
Assessment/Plan


Assessment/Plan


(1) Lumbar spondylosis


(2) Chronic abdominal pain


(3) Radiculopathy of lumbar region


(4) Herniated nucleus pulposus, lumbar


(5) Chronic pancreatitis


(6) Chronic pain


Pt will be continued on Morphine and pt has intrathecal pump.


Pt was d/w Dr. Shetty and he concurred.





Subjective


Date patient seen:  May 29, 2017


Time patient seen:  07:00 - am


Allergies:  


Coded Allergies:  


     No Known Allergies (Verified , 10/27/06)


Subjective


Constitutional:  Denies: chills, diaphoresis, fever, malaise, no symptoms, other

, Reports: weakness


HEENT:  Denies: blurred vision, double vision, ear discharge, ear pain, eye pain

, mouth pain, mouth swelling, no symptoms, nose congestion, nose pain, other, 

tearing, throat pain, throat swelling


Cardiovascular:  Denies: chest pain, edema, irregular heart rate, 

lightheadedness, no symptoms, other, palpitations, syncope


Respiratory:  Denies: SOB at rest, SOB with excertion, cough, no symptoms, 

orthopnea, other, shortness of breath, sputum, stridor, wheezing


Gastrointestinal/Abdominal:  Denies: abdomen distended, black stools, blood in 

stool, constipated, diarrhea, difficulty swallowing, nausea, no symptoms, other

, poor appetite, poor fluid intake, rectal bleeding, tarry stools, vomiting, 

Reports: abdominal pain


Genitourinary:  Denies: burning, discharge, flank pain, frequency, hematuria, 

incontinence, no symptoms, other, pain, urgency


Neurologic/Psychiatric:  Denies: anxiety, depressed, emotional problems, 

headache, no symptoms, numbness, other, paresthesia, pre-existing deficit, 

seizure, tingling, tremors, weakness


Endocrine:  Denies: excessive sweating, flushing, increased hunger, increased 

thirst, increased urine, intolerance to cold, intolerance to heat, no symptoms, 

other, unexplained weight gain, unexplained weight loss


Hematologic/Lymphatic:  Denies: anemia, easy bleeding, easy bruising, no 

symptoms, other





Subjective


Pt is lying in be in no acute distress. Pain continues to fluctuate and is 

tolerated on current regimen.





Objective





Last 24 Hour Vital Signs








  Date Time  Temp Pulse Resp B/P Pulse Ox O2 Delivery O2 Flow Rate FiO2


 


5/29/17 04:00 97.5 64 20 112/69 100 Nasal Cannula 2.0 


 


5/29/17 00:00 98.1 79 20 114/62 100 Nasal Cannula 2.0 


 


5/28/17 20:00 98.8 92 20 114/77 100 Nasal Cannula 2.0 


 


5/28/17 16:41      Nasal Cannula 1.0 24


 


5/28/17 16:41     93 Nasal Cannula 1.0 24


 


5/28/17 16:07 98.8 90 23 117/76 99 Nasal Cannula 2.0 


 


5/28/17 12:18 98.3 87 22 110/72 97 Nasal Cannula 2.0 


 


5/28/17 08:15 99.7 78 21 112/65 97 Nasal Cannula 2.0 

















Intake and Output  


 


 5/28/17 5/29/17





 19:00 07:00


 


Intake Total 720 ml 


 


Output Total  1000 ml


 


Balance 720 ml -1000 ml


 


  


 


Intake Oral 720 ml 


 


Output Urine Total  1000 ml


 


# Bowel Movements  2








Laboratory Tests


5/29/17 05:30: 


White Blood Count 14.1H, Red Blood Count 4.28, Hemoglobin 11.3L, Hematocrit 

36.3L, Mean Corpuscular Volume 85, Mean Corpuscular Hemoglobin 26.4L, Mean 

Corpuscular Hemoglobin Concent 31.1L, Red Cell Distribution Width 15.9H, 

Platelet Count 388, Mean Platelet Volume 6.8, Neutrophils (%) (Auto) 60.8, 

Lymphocytes (%) (Auto) 26.6, Monocytes (%) (Auto) 11.0H, Eosinophils (%) (Auto) 

1.0, Basophils (%) (Auto) 0.7, Sodium Level 136, Potassium Level 3.3L, Chloride 

Level 98, Carbon Dioxide Level 24, Anion Gap 14, Blood Urea Nitrogen 30H, 

Creatinine 0.9, Estimat Glomerular Filtration Rate > 60, Glucose Level 111H, 

Calcium Level 9.2


Height (Feet):  5


Height (Inches):  6.00


Weight (Pounds):  164


Objective


General Appearance:  no apparent distress, alert


EENT:  normal ENT inspection, TMs normal


Neck:  normal alignment, supple


Cardiovascular:  normal rate, regular rhythm


Respiratory/Chest:  decreased breath sounds


Abdomen:  tender, soft


Extremities:  non-tender


Edema:  no edema noted Arm (L), no edema noted Arm (R), no edema noted Leg (L), 

no edema noted Leg (R), no edema noted Pedal (L), no edema noted Pedal (R), no 

edema noted Generalized


Neurologic:  alert, oriented x 3


Skin:  warm/dry











LEA BRAVO May 29, 2017 07:40

## 2017-05-29 NOTE — GENERAL PROGRESS NOTE
Assessment/Plan


Problem List:  


(1) Crohn's disease


ICD Codes:  K50.90 - Crohn's disease


SNOMED:  71003926


(2) Elevated CEA


ICD Codes:  R97.0 - Elevated CEA


SNOMED:  351226989


(3) Chronic abdominal pain


(4) Opioid dependence


ICD Codes:  F11.20 - Opioid dependence


SNOMED:  86874708


Assessment/Plan


fu CT


fu stool C.diff


meselamines


fu labs


pain control





Subjective


ROS Limited/Unobtainable:  Yes


Allergies:  


Coded Allergies:  


     No Known Allergies (Verified , 10/27/06)


Subjective


c/o abd pain





Objective





Last 24 Hour Vital Signs








  Date Time  Temp Pulse Resp B/P Pulse Ox O2 Delivery O2 Flow Rate FiO2


 


5/29/17 04:00 97.5 64 20 112/69 100 Nasal Cannula 2.0 


 


5/29/17 00:00 98.1 79 20 114/62 100 Nasal Cannula 2.0 


 


5/28/17 20:00 98.8 92 20 114/77 100 Nasal Cannula 2.0 


 


5/28/17 16:41      Nasal Cannula 1.0 24


 


5/28/17 16:41     93 Nasal Cannula 1.0 24


 


5/28/17 16:07 98.8 90 23 117/76 99 Nasal Cannula 2.0 


 


5/28/17 12:18 98.3 87 22 110/72 97 Nasal Cannula 2.0 


 


5/28/17 08:15 99.7 78 21 112/65 97 Nasal Cannula 2.0 

















Intake and Output  


 


 5/28/17 5/29/17





 19:00 07:00


 


Intake Total 720 ml 


 


Output Total  1000 ml


 


Balance 720 ml -1000 ml


 


  


 


Intake Oral 720 ml 


 


Output Urine Total  1000 ml


 


# Bowel Movements  2








Laboratory Tests


5/29/17 05:30: 


White Blood Count 14.1H, Red Blood Count 4.28, Hemoglobin 11.3L, Hematocrit 

36.3L, Mean Corpuscular Volume 85, Mean Corpuscular Hemoglobin 26.4L, Mean 

Corpuscular Hemoglobin Concent 31.1L, Red Cell Distribution Width 15.9H, 

Platelet Count 388, Mean Platelet Volume 6.8, Neutrophils (%) (Auto) 60.8, 

Lymphocytes (%) (Auto) 26.6, Monocytes (%) (Auto) 11.0H, Eosinophils (%) (Auto) 

1.0, Basophils (%) (Auto) 0.7, Sodium Level 136, Potassium Level 3.3L, Chloride 

Level 98, Carbon Dioxide Level 24, Anion Gap 14, Blood Urea Nitrogen 30H, 

Creatinine 0.9, Estimat Glomerular Filtration Rate > 60, Glucose Level 111H, 

Calcium Level 9.2


Height (Feet):  5


Height (Inches):  6.00


Weight (Pounds):  164


General Appearance:  alert


EENT:  normal ENT inspection


Neck:  supple


Cardiovascular:  normal rate


Respiratory/Chest:  decreased breath sounds


Abdomen:  soft, hypoactive bowel sounds, tender


Extremities:  non-tender











LESLY PINA May 29, 2017 07:37

## 2017-05-29 NOTE — GENERAL PROGRESS NOTE
Assessment/Plan


Problem List:  


(1) Pneumonia


ICD Codes:  J18.9 - Pneumonia, unspecified organism


SNOMED:  514204091


(2) Chest pain


ICD Codes:  R07.9 - Chest pain, unspecified


SNOMED:  78699029


(3) COPD (chronic obstructive pulmonary disease)


ICD Codes:  J44.9 - Chronic obstructive pulmonary disease, unspecified


SNOMED:  36761657


(4) Sepsis


ICD Codes:  A41.9 - Sepsis, unspecified organism


SNOMED:  11065521


Status:  stable, progressing, tolerating diet


Assessment/Plan


ot pt diet o2 pulm tx abx cbc bmp am





Subjective


Constitutional:  Reports: weakness


Allergies:  


Coded Allergies:  


     No Known Allergies (Verified , 10/27/06)


All Systems:  reviewed and negative except above


Subjective


o2nc calm sleepy





Objective





Last 24 Hour Vital Signs








  Date Time  Temp Pulse Resp B/P Pulse Ox O2 Delivery O2 Flow Rate FiO2


 


5/29/17 04:00 97.5 64 20 112/69 100 Nasal Cannula 2.0 


 


5/29/17 00:00 98.1 79 20 114/62 100 Nasal Cannula 2.0 


 


5/28/17 20:00 98.8 92 20 114/77 100 Nasal Cannula 2.0 


 


5/28/17 16:41      Nasal Cannula 1.0 24


 


5/28/17 16:41     93 Nasal Cannula 1.0 24


 


5/28/17 16:07 98.8 90 23 117/76 99 Nasal Cannula 2.0 


 


5/28/17 12:18 98.3 87 22 110/72 97 Nasal Cannula 2.0 


 


5/28/17 08:15 99.7 78 21 112/65 97 Nasal Cannula 2.0 

















Intake and Output  


 


 5/28/17 5/29/17





 19:00 07:00


 


Intake Total 720 ml 


 


Output Total  1000 ml


 


Balance 720 ml -1000 ml


 


  


 


Intake Oral 720 ml 


 


Output Urine Total  1000 ml


 


# Bowel Movements  2








Laboratory Tests


5/29/17 05:30: 


White Blood Count 14.1H, Red Blood Count 4.28, Hemoglobin 11.3L, Hematocrit 

36.3L, Mean Corpuscular Volume 85, Mean Corpuscular Hemoglobin 26.4L, Mean 

Corpuscular Hemoglobin Concent 31.1L, Red Cell Distribution Width 15.9H, 

Platelet Count 388, Mean Platelet Volume 6.8, Neutrophils (%) (Auto) 60.8, 

Lymphocytes (%) (Auto) 26.6, Monocytes (%) (Auto) 11.0H, Eosinophils (%) (Auto) 

1.0, Basophils (%) (Auto) 0.7, Sodium Level 136, Potassium Level 3.3L, Chloride 

Level 98, Carbon Dioxide Level 24, Anion Gap 14, Blood Urea Nitrogen 30H, 

Creatinine 0.9, Estimat Glomerular Filtration Rate > 60, Glucose Level 111H, 

Calcium Level 9.2


Height (Feet):  5


Height (Inches):  6.00


Weight (Pounds):  164


General Appearance:  lethargic


EENT:  normal ENT inspection


Neck:  normal alignment


Cardiovascular:  normal peripheral pulses, normal rate, regular rhythm


Respiratory/Chest:  chest wall non-tender, lungs clear, decreased breath sounds


Abdomen:  normal bowel sounds, non tender, soft


Extremities:  normal inspection


Edema:  no edema noted Arm (L), no edema noted Arm (R), no edema noted Leg (L), 

no edema noted Leg (R), no edema noted Pedal (L), no edema noted Pedal (R), no 

edema noted Generalized


Neurologic:  responsive, motor weakness


Skin:  normal pigmentation, warm/dry











MAICOL LANG May 29, 2017 07:47

## 2017-05-30 VITALS — SYSTOLIC BLOOD PRESSURE: 115 MMHG | DIASTOLIC BLOOD PRESSURE: 63 MMHG

## 2017-05-30 VITALS — DIASTOLIC BLOOD PRESSURE: 75 MMHG | SYSTOLIC BLOOD PRESSURE: 120 MMHG

## 2017-05-30 VITALS — DIASTOLIC BLOOD PRESSURE: 64 MMHG | SYSTOLIC BLOOD PRESSURE: 101 MMHG

## 2017-05-30 VITALS — DIASTOLIC BLOOD PRESSURE: 72 MMHG | SYSTOLIC BLOOD PRESSURE: 119 MMHG

## 2017-05-30 LAB
ANION GAP SERPL CALC-SCNC: 10 MMOL/L (ref 5–15)
BASOPHILS NFR BLD AUTO: 0.4 % (ref 0–2)
CALCIUM SERPL-MCNC: 9.4 MG/DL (ref 8.6–10.2)
CHLORIDE SERPL-SCNC: 101 MEQ/L (ref 98–107)
CO2 SERPL-SCNC: 26 MEQ/L (ref 20–30)
CREAT SERPL-MCNC: 0.9 MG/DL (ref 0.5–0.9)
EOSINOPHIL NFR BLD AUTO: 1.9 % (ref 0–3)
ERYTHROCYTE [DISTWIDTH] IN BLOOD BY AUTOMATED COUNT: 16.1 % (ref 11.6–14.8)
GFR SERPLBLD BASED ON 1.73 SQ M-ARVRAT: > 60 ML/MIN (ref 60–?)
HEMOLYSIS: 3
LYMPHOCYTES NFR BLD AUTO: 28 % (ref 20–45)
MCH RBC QN AUTO: 26.8 PG (ref 27–31)
MCHC RBC AUTO-ENTMCNC: 31.4 G/DL (ref 32–36)
MCV RBC AUTO: 85 FL (ref 80–99)
MONOCYTES NFR BLD AUTO: 9.1 % (ref 1–10)
NEUTROPHILS NFR BLD AUTO: 60.6 % (ref 45–75)
PLATELET # BLD: 385 K/UL (ref 150–450)
PMV BLD AUTO: 6.9 FL (ref 6.5–10.1)
POTASSIUM SERPL-SCNC: 3.8 MEQ/L (ref 3.4–4.9)
RBC # BLD AUTO: 4.2 M/UL (ref 4.2–5.4)
SODIUM SERPL-SCNC: 137 MEQ/L (ref 135–145)
WBC # BLD AUTO: 14 K/UL (ref 4.8–10.8)

## 2017-05-30 RX ADMIN — DULOXETINE HYDROCHLORIDE SCH MG: 30 CAPSULE, DELAYED RELEASE ORAL at 08:12

## 2017-05-30 RX ADMIN — HEPARIN SODIUM SCH UNITS: 5000 INJECTION INTRAVENOUS; SUBCUTANEOUS at 08:13

## 2017-05-30 RX ADMIN — AZITHROMYCIN DIHYDRATE SCH MG: 250 TABLET, FILM COATED ORAL at 08:12

## 2017-05-30 RX ADMIN — THEOPHYLLINE ANHYDROUS SCH MG: 100 CAPSULE, EXTENDED RELEASE ORAL at 08:14

## 2017-05-30 RX ADMIN — MORPHINE SULFATE PRN MG: 2 INJECTION, SOLUTION INTRAMUSCULAR; INTRAVENOUS at 02:54

## 2017-05-30 RX ADMIN — DEXTROSE MONOHYDRATE SCH MLS/HR: 50 INJECTION, SOLUTION INTRAVENOUS at 08:11

## 2017-05-30 RX ADMIN — METHYLPREDNISOLONE SODIUM SUCCINATE SCH MG: 40 INJECTION, POWDER, LYOPHILIZED, FOR SOLUTION INTRAMUSCULAR; INTRAVENOUS at 08:11

## 2017-05-30 RX ADMIN — ASPIRIN 81 MG SCH MG: 81 TABLET ORAL at 08:11

## 2017-05-30 NOTE — PULMONOLOGY PROGRESS NOTE
Assessment/Plan


Problems:  


(1) COPD exacerbation


(2) Interstitial lung disease


(3) Emphysema


(4) Chronic abdominal pain


(5) Crohn's disease


Assessment/Plan


improving 


respiratory treatment


abx


continue respiratory treatment


taper steroids


no sputum yet, blood cultures negative


BC are negative


check electrolytes.


dc planning


ok to dc home





Subjective


ROS Limited/Unobtainable:  No


Constitutional:  Reports: no symptoms


HEENT:  Repors: no symptoms


Respiratory:  Reports: no symptoms


Allergies:  


Coded Allergies:  


     No Known Allergies (Verified , 10/27/06)





Objective





Last 24 Hour Vital Signs








  Date Time  Temp Pulse Resp B/P Pulse Ox O2 Delivery O2 Flow Rate FiO2


 


5/30/17 12:39 97.9 79 15 101/64 100 Room Air  


 


5/30/17 08:39 98.1 78 16 115/63 100 Room Air  


 


5/30/17 04:00 98.6 74 17 119/72 100 Room Air  


 


5/30/17 00:00 98.2 80 19 120/75 100 Room Air  


 


5/29/17 20:00 98.1 88 19 123/77 95 Room Air  


 


5/29/17 19:45     96 Nasal Cannula 1.0 24


 


5/29/17 19:45      Nasal Cannula 1.0 24


 


5/29/17 16:27 98.3 82 24 115/70 97 Nasal Cannula 2.0 

















Intake and Output  


 


 5/29/17 5/30/17





 19:00 07:00


 


Intake Total 1152.5 ml 137.5 ml


 


Balance 1152.5 ml 137.5 ml


 


  


 


Intake Oral 960 ml 


 


IV Total 192.5 ml 137.5 ml


 


# Voids 3 2


 


# Bowel Movements 4 








General Appearance:  cachetic


HEENT:  normocephalic, atraumatic


Respiratory/Chest:  chest wall non-tender, lungs clear


Abdomen:  normal bowel sounds, soft, non tender


Genitourinary:  normal external genitalia


Extremities:  no clubbing


Skin:  no rash





Microbiology








 Date/Time


Source Procedure


Growth Status


 


 


 5/29/17 10:00


Stool Clostridium difficile Toxin Assay - Final Complete








Laboratory Tests


5/30/17 05:50: 


White Blood Count 14.0H, Red Blood Count 4.20, Hemoglobin 11.2L, Hematocrit 

35.8L, Mean Corpuscular Volume 85, Mean Corpuscular Hemoglobin 26.8L, Mean 

Corpuscular Hemoglobin Concent 31.4L, Red Cell Distribution Width 16.1H, 

Platelet Count 385, Mean Platelet Volume 6.9, Neutrophils (%) (Auto) 60.6, 

Lymphocytes (%) (Auto) 28.0, Monocytes (%) (Auto) 9.1, Eosinophils (%) (Auto) 

1.9, Basophils (%) (Auto) 0.4, Sodium Level 137, Potassium Level 3.8, Chloride 

Level 101, Carbon Dioxide Level 26, Anion Gap 10, Blood Urea Nitrogen 28H, 

Creatinine 0.9, Estimat Glomerular Filtration Rate > 60, Glucose Level 102, 

Calcium Level 9.4, TB Test (T-Spot) [Pending], TB Test Nil Control (T-Spot) [

Pending], TB Test Panel A (T-Spot) [Pending], TB Test Panel B (T-Spot) [Pending]

, TB Test Positive Control (T-Spot) [Pending]





Current Medications








 Medications


  (Trade)  Dose


 Ordered  Sig/Jed


 Route


 PRN Reason  Start Time


 Stop Time Status Last Admin


Dose Admin


 


 Albuterol/


 Ipratropium


  (DuoNeb


 0.5-3(2.5)mg/3ml)  3 ml  Q4H  PRN


 HHN


 dyspnea  5/26/17 15:00


 5/31/17 14:59   


 


 


 Aspirin


  (ASA)  81 mg  DAILY


 ORAL


   5/26/17 21:00


 6/25/17 20:59  5/30/17 08:11


 


 


 Atorvastatin


 Calcium


  (Lipitor)  40 mg  BEDTIME


 ORAL


   5/26/17 21:00


 6/25/17 20:59  5/29/17 21:00


 


 


 Azithromycin


  (Zithromax)  250 mg  Q24H


 ORAL


   5/27/17 09:00


 6/3/17 08:59  5/30/17 08:12


 


 


 Bupropion HCl


  (Wellbutrin)  100 mg  DAILY


 ORAL


   5/27/17 09:00


 6/26/17 08:59  5/30/17 08:11


 


 


 Dextrose


  (Dextrose 50%)    STAT  PRN


 IV


 Hypoglycemia  5/27/17 12:00


 6/26/17 11:59   


 


 


 Duloxetine HCl


  (Cymbalta)  60 mg  DAILY


 ORAL


   5/27/17 09:00


 6/26/17 08:59  5/30/17 08:12


 


 


 Heparin Sodium


  (Porcine)


  (Heparin 5000


 units/ml)  5,000 units  EVERY 12  HOURS


 SUBQ


   5/26/17 21:00


 6/25/17 20:59  5/30/17 08:13


 


 


 Ketorolac


 Tromethamine


  (Toradol 30mg)  30 mg  Q8H  PRN


 IV


 Moderate Pain (Pain Scale 4-6)  5/26/17 15:00


 5/31/17 14:59   


 


 


 Levetiracetam


  (Keppra)  1,500 mg  Q12HR


 ORAL


   5/26/17 21:00


 6/25/17 20:59  5/30/17 08:12


 


 


 Loperamide HCl


  (Imodium)  2 mg  Q4H  PRN


 ORAL


 Diarrhea  5/26/17 15:00


 6/25/17 14:59   


 


 


 Lorazepam


  (Ativan 2mg/ml


 1ml)  0.5 mg  Q4H  PRN


 IV


 For Anxiety  5/26/17 15:00


 6/2/17 14:59   


 


 


 Mesalamine


  (Asacol)  800 mg  THREE TIMES A  DAY


 ORAL


   5/26/17 18:00


 6/25/17 17:59  5/30/17 14:43


 


 


 Methylprednisolone


 Sodium Succinate


  (Solu-MEDROL)  40 mg  DAILY


 IVP


   5/29/17 09:00


 6/28/17 08:59  5/30/17 08:11


 


 


 Mirtazapine


  (Remeron)  30 mg  BEDTIME


 ORAL


   5/26/17 21:00


 6/25/17 20:59  5/29/17 21:00


 


 


 Morphine Sulfate


  (Morphine


 Sulfate)  2 mg  Q4H  PRN


 IVP


 Severe Pain (Pain Scale 7-10)  5/30/17 08:00


 6/6/17 07:59  5/30/17 08:12


 


 


 Nitroglycerin


  (Ntg)  0.4 mg  Q5M X 3 DOSES PRN


 SL


 Prn Chest Pain  5/26/17 15:00


 6/25/17 14:59   


 


 


 Ondansetron HCl


  (Zofran)  4 mg  Q6H  PRN


 IVP


 Nausea & Vomiting  5/26/17 15:00


 6/25/17 14:59   


 


 


 Piperacillin Sod/


 Tazobactam Sod/


 Dextrose


  (Zosyn/D5W)  110 ml @ 


 27.5 mls/hr  Q8HR@0000,0800,1600


 IVPB


   5/26/17 16:00


 6/2/17 15:59  5/30/17 08:11


 


 


 Promethazine HCl/


 Codeine


  (Phenergan with


 Codeine)  5 ml  Q6H  PRN


 ORAL


 For Cough  5/26/17 15:00


 6/25/17 14:59   


 


 


 Quetiapine


 Fumarate


  (SEROquel)  100 mg  BEDTIME


 ORAL


   5/26/17 21:00


 6/25/17 20:59  5/29/17 21:01


 


 


 Theophylline


  (Shiv-Dur)  100 mg  EVERY 12  HOURS


 ORAL


   5/26/17 21:00


 6/25/17 20:59  5/30/17 08:14


 


 


 Trazodone HCl


  (Desyrel)  100 mg  HSPRN  PRN


 ORAL


 Insomnia  5/26/17 21:00


 6/25/17 20:59  5/29/17 23:04


 

















JULIETTE KLEIN May 30, 2017 15:48

## 2017-05-30 NOTE — GENERAL PROGRESS NOTE
Assessment/Plan


Assessment/Plan


(1) Lumbar spondylosis


(2) Chronic abdominal pain


(3) Radiculopathy of lumbar region


(4) Herniated nucleus pulposus, lumbar


(5) Chronic pancreatitis


(6) Chronic pain


Pt will be continued on Morphine and pt has intrathecal pump.


Pt was d/w Dr. Shetty and he concurred.





Subjective


Date patient seen:  May 30, 2017


Time patient seen:  07:15 - am


Allergies:  


Coded Allergies:  


     No Known Allergies (Verified , 10/27/06)


Subjective


Constitutional:  Denies: chills, diaphoresis, fever, malaise, no symptoms, other

, Reports: weakness


HEENT:  Denies: blurred vision, double vision, ear discharge, ear pain, eye pain

, mouth pain, mouth swelling, no symptoms, nose congestion, nose pain, other, 

tearing, throat pain, throat swelling


Cardiovascular:  Denies: chest pain, edema, irregular heart rate, 

lightheadedness, no symptoms, other, palpitations, syncope


Respiratory:  Denies: SOB at rest, SOB with excertion, cough, no symptoms, 

orthopnea, other, shortness of breath, sputum, stridor, wheezing


Gastrointestinal/Abdominal:  Denies: abdomen distended, black stools, blood in 

stool, constipated, diarrhea, difficulty swallowing, nausea, no symptoms, other

, poor appetite, poor fluid intake, rectal bleeding, tarry stools, vomiting, 

Reports: abdominal pain


Genitourinary:  Denies: burning, discharge, flank pain, frequency, hematuria, 

incontinence, no symptoms, other, pain, urgency


Neurologic/Psychiatric:  Denies: anxiety, depressed, emotional problems, 

headache, no symptoms, numbness, other, paresthesia, pre-existing deficit, 

seizure, tingling, tremors, weakness


Endocrine:  Denies: excessive sweating, flushing, increased hunger, increased 

thirst, increased urine, intolerance to cold, intolerance to heat, no symptoms, 

other, unexplained weight gain, unexplained weight loss


Hematologic/Lymphatic:  Denies: anemia, easy bleeding, easy bruising, no 

symptoms, other





Subjective


She continues to c/o pain which has been tolerated and reduced on the 

medications


Pt is looking forward to being discharged home as per internist and was advised 

to


f/u in the office.





Objective





Last 24 Hour Vital Signs








  Date Time  Temp Pulse Resp B/P Pulse Ox O2 Delivery O2 Flow Rate FiO2


 


5/30/17 04:00 98.6 74 17 119/72 100 Room Air  


 


5/30/17 00:00 98.2 80 19 120/75 100 Room Air  


 


5/29/17 20:00 98.1 88 19 123/77 95 Room Air  


 


5/29/17 19:45     96 Nasal Cannula 1.0 24


 


5/29/17 19:45      Nasal Cannula 1.0 24


 


5/29/17 16:27 98.3 82 24 115/70 97 Nasal Cannula 2.0 


 


5/29/17 12:33 97.8 78 23 107/68 97 Nasal Cannula 2.0 


 


5/29/17 08:15 97.7 76 20 109/73 97 Nasal Cannula 2.0 

















Intake and Output  


 


 5/29/17 5/30/17





 19:00 07:00


 


Intake Total 1152.5 ml 137.5 ml


 


Balance 1152.5 ml 137.5 ml


 


  


 


Intake Oral 960 ml 


 


IV Total 192.5 ml 137.5 ml


 


# Voids 3 2


 


# Bowel Movements 4 








Laboratory Tests


5/30/17 05:50: 


White Blood Count 14.0H, Red Blood Count 4.20, Hemoglobin 11.2L, Hematocrit 

35.8L, Mean Corpuscular Volume 85, Mean Corpuscular Hemoglobin 26.8L, Mean 

Corpuscular Hemoglobin Concent 31.4L, Red Cell Distribution Width 16.1H, 

Platelet Count 385, Mean Platelet Volume 6.9, Neutrophils (%) (Auto) 60.6, 

Lymphocytes (%) (Auto) 28.0, Monocytes (%) (Auto) 9.1, Eosinophils (%) (Auto) 

1.9, Basophils (%) (Auto) 0.4, Sodium Level 137, Potassium Level 3.8, Chloride 

Level 101, Carbon Dioxide Level 26, Anion Gap 10, Blood Urea Nitrogen 28H, 

Creatinine 0.9, Estimat Glomerular Filtration Rate > 60, Glucose Level 102, 

Calcium Level 9.4, TB Test (T-Spot) [Pending], TB Test Nil Control (T-Spot) [

Pending], TB Test Panel A (T-Spot) [Pending], TB Test Panel B (T-Spot) [Pending]

, TB Test Positive Control (T-Spot) [Pending]


Height (Feet):  5


Height (Inches):  6.00


Weight (Pounds):  164


Objective


General Appearance:  no apparent distress, alert


EENT:  normal ENT inspection, TMs normal


Neck:  normal alignment, supple


Cardiovascular:  normal rate, regular rhythm


Respiratory/Chest:  decreased breath sounds


Abdomen:  tender, soft


Extremities:  non-tender


Edema:  no edema noted Arm (L), no edema noted Arm (R), no edema noted Leg (L), 

no edema noted Leg (R), no edema noted Pedal (L), no edema noted Pedal (R), no 

edema noted Generalized


Neurologic:  alert, oriented x 3


Skin:  warm/dry











LEA BRAVO May 30, 2017 07:44

## 2017-05-30 NOTE — CARDIOLOGY PROGRESS NOTE
Assessment/Plan


Assessment/Plan


1. Dyspnea, improved due to acute exacerbation of COPD given lung exam findings

, steroid, ABx therapy, pulmonary toilet and O2 therapy.


2. History of cerebrovascular accident, continue ASA and statin.


3. Crohn's disease.





Subjective


Subjective


Denies chest pain or SOB, walking around.


Awaiting to be discharged.





Objective





Last 24 Hour Vital Signs








  Date Time  Temp Pulse Resp B/P Pulse Ox O2 Delivery O2 Flow Rate FiO2


 


5/30/17 04:00 98.6 74 17 119/72 100 Room Air  


 


5/30/17 00:00 98.2 80 19 120/75 100 Room Air  


 


5/29/17 20:00 98.1 88 19 123/77 95 Room Air  


 


5/29/17 19:45     96 Nasal Cannula 1.0 24


 


5/29/17 19:45      Nasal Cannula 1.0 24


 


5/29/17 16:27 98.3 82 24 115/70 97 Nasal Cannula 2.0 


 


5/29/17 12:33 97.8 78 23 107/68 97 Nasal Cannula 2.0 

















Intake and Output  


 


 5/29/17 5/30/17





 18:59 06:59


 


Intake Total 1125.0 ml 165.0 ml


 


Balance 1125.0 ml 165.0 ml


 


  


 


Intake Oral 960 ml 


 


IV Total 165.0 ml 165.0 ml


 


# Voids 3 2


 


# Bowel Movements 4 











Laboratory Tests








Test


  5/30/17


05:50


 


White Blood Count


  14.0 K/UL


(4.8-10.8)  H


 


Red Blood Count


  4.20 M/UL


(4.20-5.40)


 


Hemoglobin


  11.2 G/DL


(12.0-16.0)  L


 


Hematocrit


  35.8 %


(37.0-47.0)  L


 


Mean Corpuscular Volume 85 FL (80-99)  


 


Mean Corpuscular Hemoglobin


  26.8 PG


(27.0-31.0)  L


 


Mean Corpuscular Hemoglobin


Concent 31.4 G/DL


(32.0-36.0)  L


 


Red Cell Distribution Width


  16.1 %


(11.6-14.8)  H


 


Platelet Count


  385 K/UL


(150-450)


 


Mean Platelet Volume


  6.9 FL


(6.5-10.1)


 


Neutrophils (%) (Auto)


  60.6 %


(45.0-75.0)


 


Lymphocytes (%) (Auto)


  28.0 %


(20.0-45.0)


 


Monocytes (%) (Auto)


  9.1 %


(1.0-10.0)


 


Eosinophils (%) (Auto)


  1.9 %


(0.0-3.0)


 


Basophils (%) (Auto)


  0.4 %


(0.0-2.0)


 


Sodium Level


  137 mEQ/L


(135-145)


 


Potassium Level


  3.8 mEQ/L


(3.4-4.9)


 


Chloride Level


  101 mEQ/L


()


 


Carbon Dioxide Level


  26 mEQ/L


(20-30)


 


Anion Gap 10 (5-15)  


 


Blood Urea Nitrogen


  28 mg/dL


(7-23)  H


 


Creatinine


  0.9 mg/dL


(0.5-0.9)


 


Estimat Glomerular Filtration


Rate > 60 mL/min


(>60)


 


Glucose Level


  102 mg/dL


()


 


Calcium Level


  9.4 mg/dL


(8.6-10.2)


 


TB Test (T-Spot) Pending  


 


TB Test Nil Control (T-Spot) Pending  


 


TB Test Panel A (T-Spot) Pending  


 


TB Test Panel B (T-Spot) Pending  


 


TB Test Positive Control


(T-Spot) Pending  


 








Objective


HEENT:  Atraumatic and normocephalic.  Anicteric.  Pupils are equal,


round, and reactive to light and accommodation.  Extraocular muscles


intact.


NECK:  JVP is less than 5 cm.  No carotid bruits.  Carotid upstrokes


2+ bilaterally.


CVS:  Normal S1 and S2.  Regular rate and rhythm.  A 2/6 mid systolic


murmur in the left sternal border.  PMI is at fourth intercostal space at


the midclavicular line.


LUNGS:  Bilateral rhonchi with a prolonged expiratory phase.


ABDOMEN:  Distended.  No hepatosplenomegaly.  Soft.  Positive bowel


sounds.


EXTREMITIES:  No evidence of edema, clubbing, or cyanosis.











MARLENE GUTIERREZ May 30, 2017 08:18

## 2017-05-30 NOTE — GENERAL PROGRESS NOTE
Assessment/Plan


Problem List:  


(1) Pneumonia


ICD Codes:  J18.9 - Pneumonia, unspecified organism


SNOMED:  837056254


(2) Chest pain


ICD Codes:  R07.9 - Chest pain, unspecified


SNOMED:  63856679


(3) COPD (chronic obstructive pulmonary disease)


ICD Codes:  J44.9 - Chronic obstructive pulmonary disease, unspecified


SNOMED:  96947010


(4) Sepsis


ICD Codes:  A41.9 - Sepsis, unspecified organism


SNOMED:  16327193


Status:  stable, progressing, tolerating diet


Assessment/Plan


ot pt diet o2 pulm tx abx cbc bmp am dc w hh if clear by id and pulm





Subjective


Constitutional:  Reports: weakness


Allergies:  


Coded Allergies:  


     No Known Allergies (Verified , 10/27/06)


All Systems:  reviewed and negative except above


Subjective


eating calm wants to go home





Objective





Last 24 Hour Vital Signs








  Date Time  Temp Pulse Resp B/P Pulse Ox O2 Delivery O2 Flow Rate FiO2


 


5/30/17 12:39 97.9 79 15 101/64 100 Room Air  


 


5/30/17 08:39 98.1 78 16 115/63 100 Room Air  


 


5/30/17 04:00 98.6 74 17 119/72 100 Room Air  


 


5/30/17 00:00 98.2 80 19 120/75 100 Room Air  


 


5/29/17 20:00 98.1 88 19 123/77 95 Room Air  


 


5/29/17 19:45     96 Nasal Cannula 1.0 24


 


5/29/17 19:45      Nasal Cannula 1.0 24


 


5/29/17 16:27 98.3 82 24 115/70 97 Nasal Cannula 2.0 

















Intake and Output  


 


 5/29/17 5/30/17





 19:00 07:00


 


Intake Total 1152.5 ml 137.5 ml


 


Balance 1152.5 ml 137.5 ml


 


  


 


Intake Oral 960 ml 


 


IV Total 192.5 ml 137.5 ml


 


# Voids 3 2


 


# Bowel Movements 4 








Laboratory Tests


5/30/17 05:50: 


White Blood Count 14.0H, Red Blood Count 4.20, Hemoglobin 11.2L, Hematocrit 

35.8L, Mean Corpuscular Volume 85, Mean Corpuscular Hemoglobin 26.8L, Mean 

Corpuscular Hemoglobin Concent 31.4L, Red Cell Distribution Width 16.1H, 

Platelet Count 385, Mean Platelet Volume 6.9, Neutrophils (%) (Auto) 60.6, 

Lymphocytes (%) (Auto) 28.0, Monocytes (%) (Auto) 9.1, Eosinophils (%) (Auto) 

1.9, Basophils (%) (Auto) 0.4, Sodium Level 137, Potassium Level 3.8, Chloride 

Level 101, Carbon Dioxide Level 26, Anion Gap 10, Blood Urea Nitrogen 28H, 

Creatinine 0.9, Estimat Glomerular Filtration Rate > 60, Glucose Level 102, 

Calcium Level 9.4, TB Test (T-Spot) [Pending], TB Test Nil Control (T-Spot) [

Pending], TB Test Panel A (T-Spot) [Pending], TB Test Panel B (T-Spot) [Pending]

, TB Test Positive Control (T-Spot) [Pending]


Height (Feet):  5


Height (Inches):  6.00


Weight (Pounds):  164


General Appearance:  alert


EENT:  normal ENT inspection


Neck:  normal alignment


Cardiovascular:  normal peripheral pulses, normal rate, regular rhythm


Respiratory/Chest:  chest wall non-tender, lungs clear, normal breath sounds


Abdomen:  normal bowel sounds, non tender, soft


Extremities:  normal inspection


Edema:  no edema noted Arm (L), no edema noted Arm (R), no edema noted Leg (L), 

no edema noted Leg (R), no edema noted Pedal (L), no edema noted Pedal (R), no 

edema noted Generalized


Neurologic:  responsive, motor weakness


Skin:  normal pigmentation, warm/dry











MAICOL LANG May 30, 2017 13:02

## 2017-05-30 NOTE — GI PROGRESS NOTE
Assessment/Plan


Problems:  


(1) Nausea and vomiting


(2) IBD (inflammatory bowel disease)


ICD Codes:  K63.89 - Other specified diseases of intestine


SNOMED:  37075428


(3) Diarrhea


ICD Codes:  R19.7 - Diarrhea, unspecified


SNOMED:  76948705


(4) Abdominal pain


(5) Crohns disease


ICD Codes:  K50.90 - Crohns disease


SNOMED:  56422013


Status:  stable


Status Narrative


Discussed with Dr. Mcleod.


Assessment/Plan


s/p EGD/colon 2016


non functional implanted morphine pump LLQ





ok for DC per GI standpoint


symptomatic treatment at this time


electrolyte replacement


low residual diet


fu cdiff, stool culture >> negative


Imodium prn, consider lomotil if diarrhea persists


mesalamine for Crohns


pain mgmt 


fu labs





Subjective


Subjective


diarrhea >> resolved


abdominal pain


chronic pain





Objective





Last 24 Hour Vital Signs








  Date Time  Temp Pulse Resp B/P Pulse Ox O2 Delivery O2 Flow Rate FiO2


 


5/30/17 12:39 97.9 79 15 101/64 100 Room Air  


 


5/30/17 08:39 98.1 78 16 115/63 100 Room Air  


 


5/30/17 04:00 98.6 74 17 119/72 100 Room Air  


 


5/30/17 00:00 98.2 80 19 120/75 100 Room Air  


 


5/29/17 20:00 98.1 88 19 123/77 95 Room Air  


 


5/29/17 19:45     96 Nasal Cannula 1.0 24


 


5/29/17 19:45      Nasal Cannula 1.0 24


 


5/29/17 16:27 98.3 82 24 115/70 97 Nasal Cannula 2.0 

















Intake and Output  


 


 5/29/17 5/30/17





 19:00 07:00


 


Intake Total 1152.5 ml 137.5 ml


 


Balance 1152.5 ml 137.5 ml


 


  


 


Intake Oral 960 ml 


 


IV Total 192.5 ml 137.5 ml


 


# Voids 3 2


 


# Bowel Movements 4 











Laboratory Tests








Test


  5/30/17


05:50


 


White Blood Count


  14.0 K/UL


(4.8-10.8)  H


 


Red Blood Count


  4.20 M/UL


(4.20-5.40)


 


Hemoglobin


  11.2 G/DL


(12.0-16.0)  L


 


Hematocrit


  35.8 %


(37.0-47.0)  L


 


Mean Corpuscular Volume 85 FL (80-99)  


 


Mean Corpuscular Hemoglobin


  26.8 PG


(27.0-31.0)  L


 


Mean Corpuscular Hemoglobin


Concent 31.4 G/DL


(32.0-36.0)  L


 


Red Cell Distribution Width


  16.1 %


(11.6-14.8)  H


 


Platelet Count


  385 K/UL


(150-450)


 


Mean Platelet Volume


  6.9 FL


(6.5-10.1)


 


Neutrophils (%) (Auto)


  60.6 %


(45.0-75.0)


 


Lymphocytes (%) (Auto)


  28.0 %


(20.0-45.0)


 


Monocytes (%) (Auto)


  9.1 %


(1.0-10.0)


 


Eosinophils (%) (Auto)


  1.9 %


(0.0-3.0)


 


Basophils (%) (Auto)


  0.4 %


(0.0-2.0)


 


Sodium Level


  137 mEQ/L


(135-145)


 


Potassium Level


  3.8 mEQ/L


(3.4-4.9)


 


Chloride Level


  101 mEQ/L


()


 


Carbon Dioxide Level


  26 mEQ/L


(20-30)


 


Anion Gap 10 (5-15)  


 


Blood Urea Nitrogen


  28 mg/dL


(7-23)  H


 


Creatinine


  0.9 mg/dL


(0.5-0.9)


 


Estimat Glomerular Filtration


Rate > 60 mL/min


(>60)


 


Glucose Level


  102 mg/dL


()


 


Calcium Level


  9.4 mg/dL


(8.6-10.2)


 


TB Test (T-Spot) Pending  


 


TB Test Nil Control (T-Spot) Pending  


 


TB Test Panel A (T-Spot) Pending  


 


TB Test Panel B (T-Spot) Pending  


 


TB Test Positive Control


(T-Spot) Pending  


 








Height (Feet):  5


Height (Inches):  6.00


Weight (Pounds):  164


General Appearance:  no apparent distress, alert, thin


Cardiovascular:  normal rate


Respiratory/Chest:  normal breath sounds


Abdominal Exam:  normal bowel sounds, non tender, soft











Elena Seay N.P. May 30, 2017 14:50

## 2017-10-04 ENCOUNTER — HOSPITAL ENCOUNTER (INPATIENT)
Dept: HOSPITAL 72 - EMR | Age: 62
LOS: 2 days | Discharge: HOME HEALTH | DRG: 640 | End: 2017-10-06
Payer: MEDICARE

## 2017-10-04 VITALS — DIASTOLIC BLOOD PRESSURE: 66 MMHG | SYSTOLIC BLOOD PRESSURE: 115 MMHG

## 2017-10-04 VITALS — SYSTOLIC BLOOD PRESSURE: 113 MMHG | DIASTOLIC BLOOD PRESSURE: 65 MMHG

## 2017-10-04 VITALS — HEIGHT: 66 IN | BODY MASS INDEX: 20.89 KG/M2 | WEIGHT: 130 LBS

## 2017-10-04 VITALS — DIASTOLIC BLOOD PRESSURE: 63 MMHG | SYSTOLIC BLOOD PRESSURE: 100 MMHG

## 2017-10-04 VITALS — DIASTOLIC BLOOD PRESSURE: 74 MMHG | SYSTOLIC BLOOD PRESSURE: 122 MMHG

## 2017-10-04 VITALS — SYSTOLIC BLOOD PRESSURE: 101 MMHG | DIASTOLIC BLOOD PRESSURE: 68 MMHG

## 2017-10-04 VITALS — SYSTOLIC BLOOD PRESSURE: 108 MMHG | DIASTOLIC BLOOD PRESSURE: 71 MMHG

## 2017-10-04 DIAGNOSIS — G40.909: ICD-10-CM

## 2017-10-04 DIAGNOSIS — I27.20: ICD-10-CM

## 2017-10-04 DIAGNOSIS — D50.9: ICD-10-CM

## 2017-10-04 DIAGNOSIS — I50.33: ICD-10-CM

## 2017-10-04 DIAGNOSIS — J44.9: ICD-10-CM

## 2017-10-04 DIAGNOSIS — G89.4: ICD-10-CM

## 2017-10-04 DIAGNOSIS — E86.0: Primary | ICD-10-CM

## 2017-10-04 DIAGNOSIS — Z86.73: ICD-10-CM

## 2017-10-04 DIAGNOSIS — Z23: ICD-10-CM

## 2017-10-04 DIAGNOSIS — K21.9: ICD-10-CM

## 2017-10-04 DIAGNOSIS — F17.200: ICD-10-CM

## 2017-10-04 DIAGNOSIS — F31.81: ICD-10-CM

## 2017-10-04 DIAGNOSIS — R97.0: ICD-10-CM

## 2017-10-04 DIAGNOSIS — J84.9: ICD-10-CM

## 2017-10-04 DIAGNOSIS — Z79.82: ICD-10-CM

## 2017-10-04 DIAGNOSIS — K50.90: ICD-10-CM

## 2017-10-04 DIAGNOSIS — E46: ICD-10-CM

## 2017-10-04 DIAGNOSIS — N39.0: ICD-10-CM

## 2017-10-04 DIAGNOSIS — K86.1: ICD-10-CM

## 2017-10-04 DIAGNOSIS — E03.9: ICD-10-CM

## 2017-10-04 DIAGNOSIS — I95.9: ICD-10-CM

## 2017-10-04 DIAGNOSIS — E87.6: ICD-10-CM

## 2017-10-04 LAB
ALBUMIN/GLOB SERPL: 0.6 {RATIO} (ref 1–2.7)
ALT SERPL-CCNC: 10 U/L (ref 3–33)
ANION GAP SERPL CALC-SCNC: 9 MMOL/L (ref 5–15)
APTT BLD: 26 SEC (ref 23–33)
AST SERPL-CCNC: 15 U/L (ref 5–40)
BASOPHILS NFR BLD AUTO: 1.1 % (ref 0–2)
CALCIUM SERPL-MCNC: 9.1 MG/DL (ref 8.6–10.2)
CHLORIDE SERPL-SCNC: 100 MEQ/L (ref 98–107)
CO2 SERPL-SCNC: 28 MEQ/L (ref 20–30)
CREAT SERPL-MCNC: 0.7 MG/DL (ref 0.5–0.9)
EOSINOPHIL NFR BLD AUTO: 1.4 % (ref 0–3)
ERYTHROCYTE [DISTWIDTH] IN BLOOD BY AUTOMATED COUNT: 15.8 % (ref 11.6–14.8)
GFR SERPLBLD BASED ON 1.73 SQ M-ARVRAT: > 60 ML/MIN (ref 60–?)
GLOBULIN SER-MCNC: 5.1 G/DL
HEMOLYSIS: 2
INR PPP: 1 (ref 0.9–1.1)
LIPASE SERPL-CCNC: 23 U/L (ref ?–60)
LYMPHOCYTES NFR BLD AUTO: 35.2 % (ref 20–45)
MCH RBC QN AUTO: 26.8 PG (ref 27–31)
MCHC RBC AUTO-ENTMCNC: 30.6 G/DL (ref 32–36)
MCV RBC AUTO: 87 FL (ref 80–99)
MONOCYTES NFR BLD AUTO: 8.2 % (ref 1–10)
NEUTROPHILS NFR BLD AUTO: 54.2 % (ref 45–75)
PLATELET # BLD: 383 K/UL (ref 150–450)
PMV BLD AUTO: 7.5 FL (ref 6.5–10.1)
POTASSIUM SERPL-SCNC: 3.9 MEQ/L (ref 3.4–4.9)
PROT SERPL-MCNC: 8.2 G/DL (ref 6.6–8.7)
PROTHROMBIN TIME: 10.9 SEC (ref 9.3–11.5)
RBC # BLD AUTO: 3.87 M/UL (ref 4.2–5.4)
SODIUM SERPL-SCNC: 137 MEQ/L (ref 135–145)
TROPONIN I SERPL-MCNC: < 0.3 NG/ML (ref ?–0.3)
WBC # BLD AUTO: 11.1 K/UL (ref 4.8–10.8)

## 2017-10-04 PROCEDURE — 84484 ASSAY OF TROPONIN QUANT: CPT

## 2017-10-04 PROCEDURE — 84300 ASSAY OF URINE SODIUM: CPT

## 2017-10-04 PROCEDURE — 71010: CPT

## 2017-10-04 PROCEDURE — 85730 THROMBOPLASTIN TIME PARTIAL: CPT

## 2017-10-04 PROCEDURE — 80048 BASIC METABOLIC PNL TOTAL CA: CPT

## 2017-10-04 PROCEDURE — 81001 URINALYSIS AUTO W/SCOPE: CPT

## 2017-10-04 PROCEDURE — 97803 MED NUTRITION INDIV SUBSEQ: CPT

## 2017-10-04 PROCEDURE — 93005 ELECTROCARDIOGRAM TRACING: CPT

## 2017-10-04 PROCEDURE — 84133 ASSAY OF URINE POTASSIUM: CPT

## 2017-10-04 PROCEDURE — 83540 ASSAY OF IRON: CPT

## 2017-10-04 PROCEDURE — 82378 CARCINOEMBRYONIC ANTIGEN: CPT

## 2017-10-04 PROCEDURE — 82043 UR ALBUMIN QUANTITATIVE: CPT

## 2017-10-04 PROCEDURE — 84100 ASSAY OF PHOSPHORUS: CPT

## 2017-10-04 PROCEDURE — 80300: CPT

## 2017-10-04 PROCEDURE — 36415 COLL VENOUS BLD VENIPUNCTURE: CPT

## 2017-10-04 PROCEDURE — 83550 IRON BINDING TEST: CPT

## 2017-10-04 PROCEDURE — 83880 ASSAY OF NATRIURETIC PEPTIDE: CPT

## 2017-10-04 PROCEDURE — 84443 ASSAY THYROID STIM HORMONE: CPT

## 2017-10-04 PROCEDURE — 86140 C-REACTIVE PROTEIN: CPT

## 2017-10-04 PROCEDURE — 93306 TTE W/DOPPLER COMPLETE: CPT

## 2017-10-04 PROCEDURE — 85651 RBC SED RATE NONAUTOMATED: CPT

## 2017-10-04 PROCEDURE — 86301 IMMUNOASSAY TUMOR CA 19-9: CPT

## 2017-10-04 PROCEDURE — 93970 EXTREMITY STUDY: CPT

## 2017-10-04 PROCEDURE — 84439 ASSAY OF FREE THYROXINE: CPT

## 2017-10-04 PROCEDURE — 82044 UR ALBUMIN SEMIQUANTITATIVE: CPT

## 2017-10-04 PROCEDURE — 83690 ASSAY OF LIPASE: CPT

## 2017-10-04 PROCEDURE — 85379 FIBRIN DEGRADATION QUANT: CPT

## 2017-10-04 PROCEDURE — 83735 ASSAY OF MAGNESIUM: CPT

## 2017-10-04 PROCEDURE — 72170 X-RAY EXAM OF PELVIS: CPT

## 2017-10-04 PROCEDURE — 82150 ASSAY OF AMYLASE: CPT

## 2017-10-04 PROCEDURE — 85025 COMPLETE CBC W/AUTO DIFF WBC: CPT

## 2017-10-04 PROCEDURE — 84481 FREE ASSAY (FT-3): CPT

## 2017-10-04 PROCEDURE — 99285 EMERGENCY DEPT VISIT HI MDM: CPT

## 2017-10-04 PROCEDURE — 80053 COMPREHEN METABOLIC PANEL: CPT

## 2017-10-04 PROCEDURE — 85610 PROTHROMBIN TIME: CPT

## 2017-10-04 PROCEDURE — 90630: CPT

## 2017-10-04 RX ADMIN — HUMAN INSULIN SCH MLS/HR: 100 INJECTION, SOLUTION SUBCUTANEOUS at 18:39

## 2017-10-04 NOTE — EMERGENCY ROOM REPORT
History of Present Illness


General


Chief Complaint:  Weakness


Source:  Patient





Present Illness


HPI


Patient is a 62-year-old female who presented after increased generalized 

weakness..  Patient states that she been having increased feeling like his pass 

out associated with increased nausea.  Patient reports having prior history of 

Crohn's disease.  She states that she had recently had labs drawn was noted to 

have a low potassium at that time.  Patient denies any diarrhea.  she had not 

been having any fever.


Allergies:  


Coded Allergies:  


     No Known Allergies (Verified , 10/27/06)





Patient History


Past Medical History:  see triage record


Reviewed Nursing Documentation:  PMH: Agreed, PSxH: Agreed





Nursing Documentation-PMH


Hx Cardiac Problems:  Yes


Hx Hypertension:  No


Hx Pacemaker:  No


Hx Asthma:  Yes


Hx COPD:  Yes


Hx Diabetes:  No


Hx Cancer:  No


Hx Gastrointestinal Problems:  Yes


Hx Dialysis:  No


Hx Neurological Problems:  Yes


Hx Cerebrovascular Accident:  Yes - 2005


Hx Seizures:  Yes


Hx Epilepsy:  Yes


Hx Vertigo:  Yes


Hx Dizziness:  Yes


Hx Syncope:  Yes


Hx Headaches:  Yes


Hx Weakness:  Yes


Hx Fatigue:  Yes





Review of Systems


All Other Systems:  negative except mentioned in HPI





Physical Exam


Sp02 EP Interpretation:  reviewed, normal


General Appearance:  normal inspection, well appearing, no apparent distress, 

alert, GCS 15


Head:  atraumatic


ENT:  normal ENT inspection, hearing grossly normal, normal voice


Neck:  normal inspection, full range of motion, supple, no bony tend


Respiratory:  normal inspection, lungs clear, normal breath sounds, no 

respiratory distress, no retraction, no wheezing


Cardiovascular #1:  normal inspection, regular rate, rhythm, no edema


Gastrointestinal:  normal inspection, normal bowel sounds, non tender, soft, no 

guarding, no hernia


Genitourinary:  no CVA tenderness


Musculoskeletal:  normal inspection, back normal, normal range of motion


Neurologic:  normal inspection, alert, oriented x3, responsive, CNs III-XII nml 

as tested, motor strength/tone normal, speech normal


Psychiatric:  normal inspection, judgement/insight normal, mood/affect normal


Skin:  normal inspection, normal color, no rash





Medical Decision Making


Diagnostic Impression:  


 Primary Impression:  


 Crohn's disease


 Additional Impression:  


 CHF (congestive heart failure)


ER Course


Patient presented for generalized weakness.Patient presented for generalized 

weakness.  Differential diagnosis included was not limited to anemia, urinary 

tract infection, electrolyte abnormality, hypothyroidism, myocardial infarction

, myasthenia gravis, dehydration, among others.  Because of complexity of 

patient's case laboratory testing and imaging studies were ordered.I laboratory 

testing was notable for elevated TSH.  


The patient was noted to have a generalized weakness without any focal 

deficits. Dr. Maxim Hopkins was contacted for inpatient management.





Labs








Test


  10/4/17


14:38 10/4/17


15:55


 


White Blood Count


  11.1 K/UL


(4.8-10.8) 


 


 


Red Blood Count


  3.87 M/UL


(4.20-5.40) 


 


 


Hemoglobin


  10.4 G/DL


(12.0-16.0) 


 


 


Hematocrit


  33.8 %


(37.0-47.0) 


 


 


Mean Corpuscular Volume 87 FL (80-99)  


 


Mean Corpuscular Hemoglobin


  26.8 PG


(27.0-31.0) 


 


 


Mean Corpuscular Hemoglobin


Concent 30.6 G/DL


(32.0-36.0) 


 


 


Red Cell Distribution Width


  15.8 %


(11.6-14.8) 


 


 


Platelet Count


  383 K/UL


(150-450) 


 


 


Mean Platelet Volume


  7.5 FL


(6.5-10.1) 


 


 


Neutrophils (%) (Auto)


  54.2 %


(45.0-75.0) 


 


 


Lymphocytes (%) (Auto)


  35.2 %


(20.0-45.0) 


 


 


Monocytes (%) (Auto)


  8.2 %


(1.0-10.0) 


 


 


Eosinophils (%) (Auto)


  1.4 %


(0.0-3.0) 


 


 


Basophils (%) (Auto)


  1.1 %


(0.0-2.0) 


 


 


Prothrombin Time


  10.9 SEC


(9.30-11.50) 


 


 


Prothromb Time International


Ratio 1.0 (0.9-1.1) 


  


 


 


Activated Partial


Thromboplast Time 26 SEC (23-33) 


  


 


 


D-Dimer


  


  1324 ng/mL


(<500)


 


Sodium Level


  


  137 mEQ/L


(135-145)


 


Potassium Level


  


  3.9 mEQ/L


(3.4-4.9)


 


Chloride Level


  


  100 mEQ/L


()


 


Carbon Dioxide Level


  


  28 mEQ/L


(20-30)


 


Anion Gap  9 (5-15) 


 


Blood Urea Nitrogen


  


  24 mg/dL


(7-23)


 


Creatinine


  


  0.7 mg/dL


(0.5-0.9)


 


Estimat Glomerular Filtration


Rate 


  > 60 mL/min


(>60)


 


Glucose Level


  


  88 mg/dL


()


 


Calcium Level


  


  9.1 mg/dL


(8.6-10.2)


 


Total Bilirubin


  


  < 0.2 mg/dL


(0.0-1.2)


 


Aspartate Amino Transf


(AST/SGOT) 


  15 U/L (5-40) 


 


 


Alanine Aminotransferase


(ALT/SGPT) 


  10 U/L (3-33) 


 


 


Alkaline Phosphatase


  


  81 U/L


()


 


Troponin I


  


  < 0.30 ng/mL


(<=0.30)


 


Pro-B-Type Natriuretic Peptide


  


  589 pg/mL


(0-125)


 


Total Protein


  


  8.2 g/dL


(6.6-8.7)


 


Albumin


  


  3.1 g/dL


(3.5-5.2)


 


Globulin  5.1 g/dL 


 


Albumin/Globulin Ratio  0.6 (1.0-2.7) 


 


Lipase  23 U/L (< 60) 


 


Thyroid Stimulating Hormone


(TSH) 


  61.090 uIU/mL


(0.300-4.500)








EKG Diagnostic Results


Rate:  normal


Rhythm:  NSR


ST Segments:  no acute changes


Status:  unchanged


Disposition:  ADMITTED AS INPATIENT


Condition:  Serious











Eddie Guadalupe Oct 4, 2017 14:07

## 2017-10-04 NOTE — CARDIOLOGY PROGRESS NOTE
Assessment/Plan


Assessment/Plan


The patient is seen and examined, full consult note is dictated.





Objective





Last 24 Hour Vital Signs








  Date Time  Temp Pulse Resp B/P (MAP) Pulse Ox O2 Delivery O2 Flow Rate FiO2


 


10/4/17 18:06 97.8 72 20 101/68 99 Room Air  


 


10/4/17 17:30  77 16 122/74 95 Room Air  


 


10/4/17 17:30  77 16 122/74 95 Room Air  


 


10/4/17 16:30  77 15 115/66 95 Room Air  


 


10/4/17 15:30  79 10 113/65 95 Room Air  


 


10/4/17 14:20 98.2 85 19 108/71 95 Room Air  


 


10/4/17 14:00 97.2 92 18 114/69 94 Room Air  

















Intake and Output  


 


 10/4/17 10/5/17





 19:00 07:00


 


Intake Total 350 ml 


 


Balance 350 ml 


 


  


 


Intake Oral 100 ml 


 


IV Total 250 ml 











Laboratory Tests








Test


  10/4/17


14:38 10/4/17


15:55


 


White Blood Count


  11.1 K/UL


(4.8-10.8)  H 


 


 


Red Blood Count


  3.87 M/UL


(4.20-5.40)  L 


 


 


Hemoglobin


  10.4 G/DL


(12.0-16.0)  L 


 


 


Hematocrit


  33.8 %


(37.0-47.0)  L 


 


 


Mean Corpuscular Volume 87 FL (80-99)   


 


Mean Corpuscular Hemoglobin


  26.8 PG


(27.0-31.0)  L 


 


 


Mean Corpuscular Hemoglobin


Concent 30.6 G/DL


(32.0-36.0)  L 


 


 


Red Cell Distribution Width


  15.8 %


(11.6-14.8)  H 


 


 


Platelet Count


  383 K/UL


(150-450) 


 


 


Mean Platelet Volume


  7.5 FL


(6.5-10.1) 


 


 


Neutrophils (%) (Auto)


  54.2 %


(45.0-75.0) 


 


 


Lymphocytes (%) (Auto)


  35.2 %


(20.0-45.0) 


 


 


Monocytes (%) (Auto)


  8.2 %


(1.0-10.0) 


 


 


Eosinophils (%) (Auto)


  1.4 %


(0.0-3.0) 


 


 


Basophils (%) (Auto)


  1.1 %


(0.0-2.0) 


 


 


Prothrombin Time


  10.9 SEC


(9.30-11.50) 


 


 


Prothromb Time International


Ratio 1.0 (0.9-1.1)  


  


 


 


Activated Partial


Thromboplast Time 26 SEC (23-33)


  


 


 


D-Dimer


  


  1324 ng/mL


(<500)  H


 


Sodium Level


  


  137 mEQ/L


(135-145)


 


Potassium Level


  


  3.9 mEQ/L


(3.4-4.9)


 


Chloride Level


  


  100 mEQ/L


()


 


Carbon Dioxide Level


  


  28 mEQ/L


(20-30)


 


Anion Gap  9 (5-15)  


 


Blood Urea Nitrogen


  


  24 mg/dL


(7-23)  H


 


Creatinine


  


  0.7 mg/dL


(0.5-0.9)


 


Estimat Glomerular Filtration


Rate 


  > 60 mL/min


(>60)


 


Glucose Level


  


  88 mg/dL


()


 


Calcium Level


  


  9.1 mg/dL


(8.6-10.2)


 


Total Bilirubin


  


  < 0.2 mg/dL


(0.0-1.2)


 


Aspartate Amino Transf


(AST/SGOT) 


  15 U/L (5-40)  


 


 


Alanine Aminotransferase


(ALT/SGPT) 


  10 U/L (3-33)  


 


 


Alkaline Phosphatase


  


  81 U/L


()


 


Troponin I


  


  < 0.30 ng/mL


(<=0.30)


 


Pro-B-Type Natriuretic Peptide


  


  589 pg/mL


(0-125)  H


 


Total Protein


  


  8.2 g/dL


(6.6-8.7)


 


Albumin


  


  3.1 g/dL


(3.5-5.2)  L


 


Globulin  5.1 g/dL  


 


Albumin/Globulin Ratio


  


  0.6 (1.0-2.7)


L


 


Lipase  23 U/L (< 60)  


 


Thyroid Stimulating Hormone


(TSH) 


  61.090 uIU/mL


(0.300-4.500)

















MARLENE GUTIERREZ Oct 4, 2017 19:56

## 2017-10-05 VITALS — SYSTOLIC BLOOD PRESSURE: 104 MMHG | DIASTOLIC BLOOD PRESSURE: 66 MMHG

## 2017-10-05 VITALS — SYSTOLIC BLOOD PRESSURE: 108 MMHG | DIASTOLIC BLOOD PRESSURE: 67 MMHG

## 2017-10-05 VITALS — SYSTOLIC BLOOD PRESSURE: 96 MMHG | DIASTOLIC BLOOD PRESSURE: 54 MMHG

## 2017-10-05 VITALS — SYSTOLIC BLOOD PRESSURE: 98 MMHG | DIASTOLIC BLOOD PRESSURE: 62 MMHG

## 2017-10-05 VITALS — DIASTOLIC BLOOD PRESSURE: 68 MMHG | SYSTOLIC BLOOD PRESSURE: 111 MMHG

## 2017-10-05 VITALS — SYSTOLIC BLOOD PRESSURE: 98 MMHG | DIASTOLIC BLOOD PRESSURE: 58 MMHG

## 2017-10-05 LAB
ALBUMIN/GLOB SERPL: 0.5 {RATIO} (ref 1–2.7)
ALT SERPL-CCNC: 9 U/L (ref 3–33)
AMORPH SED URNS QL MICRO: (no result) /LPF
AMYLASE SERPL-CCNC: 72 U/L (ref 10–110)
ANION GAP SERPL CALC-SCNC: 8 MMOL/L (ref 5–15)
APPEARANCE UR: CLEAR
APTT BLD: 29 SEC (ref 23–33)
AST SERPL-CCNC: 15 U/L (ref 5–40)
BACTERIA #/AREA URNS HPF: (no result) /HPF
BASOPHILS NFR BLD AUTO: 0.7 % (ref 0–2)
CALCIUM SERPL-MCNC: 9.3 MG/DL (ref 8.6–10.2)
CHLORIDE SERPL-SCNC: 103 MEQ/L (ref 98–107)
CO2 SERPL-SCNC: 29 MEQ/L (ref 20–30)
CREAT SERPL-MCNC: 0.7 MG/DL (ref 0.5–0.9)
EOSINOPHIL NFR BLD AUTO: 1.4 % (ref 0–3)
ERYTHROCYTE [DISTWIDTH] IN BLOOD BY AUTOMATED COUNT: 15.5 % (ref 11.6–14.8)
GFR SERPLBLD BASED ON 1.73 SQ M-ARVRAT: > 60 ML/MIN (ref 60–?)
GLOBULIN SER-MCNC: 5.3 G/DL
HEMOLYSIS: 0
HEMOLYSIS: 0
INR PPP: 1 (ref 0.9–1.1)
IRON SERPL-MCNC: 34 UG/DL (ref 37–145)
KETONES UR QL STRIP: NEGATIVE
LEUKOCYTE ESTERASE UR QL STRIP: (no result)
LIPASE SERPL-CCNC: 17 U/L (ref ?–60)
LYMPHOCYTES NFR BLD AUTO: 25.8 % (ref 20–45)
MCH RBC QN AUTO: 27.4 PG (ref 27–31)
MCHC RBC AUTO-ENTMCNC: 31.1 G/DL (ref 32–36)
MCV RBC AUTO: 88 FL (ref 80–99)
MONOCYTES NFR BLD AUTO: 6.7 % (ref 1–10)
NEUTROPHILS NFR BLD AUTO: 65.5 % (ref 45–75)
NITRITE UR QL STRIP: NEGATIVE
PH UR STRIP: 8 [PH] (ref 4.5–8)
PLATELET # BLD: 366 K/UL (ref 150–450)
PMV BLD AUTO: 6.7 FL (ref 6.5–10.1)
POTASSIUM SERPL-SCNC: 4.3 MEQ/L (ref 3.4–4.9)
PROT SERPL-MCNC: 8.4 G/DL (ref 6.6–8.7)
PROT UR QL STRIP: NEGATIVE
PROTHROMBIN TIME: 10.9 SEC (ref 9.3–11.5)
RBC # BLD AUTO: 4.34 M/UL (ref 4.2–5.4)
RBC #/AREA URNS HPF: (no result) /HPF (ref 0–2)
SODIUM SERPL-SCNC: 140 MEQ/L (ref 135–145)
SP GR UR STRIP: 1.01 (ref 1–1.03)
SQUAMOUS #/AREA URNS LPF: (no result) /LPF
TIBC SERPL-MCNC: 275 UG/DL (ref 250–400)
UROBILINOGEN UR-MCNC: NORMAL MG/DL (ref 0–1)
WBC # BLD AUTO: 8.6 K/UL (ref 4.8–10.8)
WBC #/AREA URNS HPF: (no result) /HPF (ref 0–2)

## 2017-10-05 RX ADMIN — MORPHINE SULFATE PRN MG: 4 INJECTION INTRAVENOUS at 22:00

## 2017-10-05 RX ADMIN — HUMAN INSULIN SCH MLS/HR: 100 INJECTION, SOLUTION SUBCUTANEOUS at 16:56

## 2017-10-05 RX ADMIN — HUMAN INSULIN SCH MLS/HR: 100 INJECTION, SOLUTION SUBCUTANEOUS at 03:58

## 2017-10-05 RX ADMIN — MORPHINE SULFATE PRN MG: 4 INJECTION INTRAVENOUS at 16:50

## 2017-10-05 NOTE — DIAGNOSTIC IMAGING REPORT
Indication: Blunt trauma to the pelvis. Pelvic pain



Findings: Single AP view of the pelvis was performed.



No obvious acute fracture is identified on the exam. The bones are osteopenic.

Surgical clips and sutures noted in the pelvis and lower abdomen as well as

abdominal wall mesh. Analgesic pain pump noted in the left lower quadrant of

abdomen.



Impression:



No acute injury identified

## 2017-10-05 NOTE — GI INITIAL CONSULT NOTE
GeenaElena Saul N.P. 10/5/17 1203:


History of Present Illness


General


Date patient seen:  Oct 5, 2017


Time patient seen:  10:00


Reason for Hospitalization:  Dizziness


Referring physician:  Dr Hopkins


Reason for Consultation:  CROHNS





Present Illness


HPI


Patient is a 62-year-old female who presented after increased generalized 

weakness..  Patient states that she been having increased feeling like his pass 

out associated with increased nausea.  Patient reports having prior history of 

Crohn's disease.  She states that she had recently had labs drawn was noted to 

have a low potassium at that time.  Patient denies any diarrhea.  she had not 

been having any fever.


GI consulted for anemia, hx of Crohn's disease.   HPI as noted above.  Pt seen 

on floor, awake A&Ox4 NAD with no active s/sx of N/VD.  The patient states she 

feels fine today, cannot recall her previous episode of weakness.  She c/o of 

chronic abdominal pain and has been known to have OIC.  Patient has had 

multiple surgeries in the past for bowel obstruction as well as lysis of 

adhesions.  She presents today with anemia and generalized weakness.  She had 

an EGD/colonoscopy performed in 2016 which they found a gastric ulcer and 

crohns.


Home Meds


Reported Medications


Theophylline (THEODUR*) 100 Mg Tab.er.12h, 100 MG ORAL TWICE A DAY, #30 TAB 0 

Refills


   5/30/17


Mesalamine (ASACOL HD) 800 Mg Tablet., 800 MG ORAL THREE TIMES A DAY, TAB


   Do not break outer coating


   5/30/17


Azithromycin* (ZITHROMAX*) 250 Mg Tablet, 250 MG ORAL DAILY for 4 Days, TAB


   5/30/17


Atorvastatin Calcium* (LIPITOR*) 40 Mg Tablet, 40 MG ORAL BEDTIME, #30 TAB 0 

Refills


   5/30/17


Aspirin* (ASPIR 81*) 81 Mg Tablet.dr, 81 MG ORAL DAILY, TAB


   5/30/17


Linaclotide (LINZESS) 145 Mcg Capsule, 145 MCG PO DAILY, CAP


   3/23/17


Acetaminophen (Acetaminophen) 650 Mg/20.3 Ml Solution, 650 MG ORAL Q8H Y for 

Fever/Headache/Mild Pain, ML 0 Refills


   2/13/17


Mesalamine (PENTASA) 500 Mg Capsule.er, 1000 MG ORAL TID, #30 CAP 0 Refills


   2/9/17


Montelukast Sodium* (MONTELUKAST SODIUM*) 10 Mg Tablet, 10 MG ORAL DAILY, TAB


   2/9/17


Risperidone* (RISPERDAL*) 0.5 Mg Tablet, 0.5 MG ORAL BEDTIME, #30 TAB 0 Refills


   2/9/17


Bupropion Hcl* (BUPROPION HCL*) 100 Mg Tablet, 100 MG ORAL DAILY, TAB


   2/9/17


Trazodone* (TRAZODONE*) 150 Mg Tablet, 200 MG ORAL BEDTIME Y for Insomnia, TAB


   2/9/17


Ibuprofen* (MOTRIN*) 600 Mg Tablet, 800 MG ORAL THREE TIMES A DAY, #30 TAB 0 

Refills


   2/9/17


Polyethylene Glycol 3350* (MIRALAX*) 17 Gm Powd.pack, 17 GM ORAL BEDTIME, PACKET


   1/30/17


Topiramate* (TOPAMAX*) 25 Mg Tablet, 25 MG ORAL TWICE A DAY, #60 TAB 0 Refills


   12/25/16


Quetiapine Fumarate* (SEROQUEL*) 200 Mg Tablet, 100 MG ORAL BEDTIME, TAB


   12/25/16


Mirtazapine* (MIRTAZAPINE*) 15 Mg Tablet, 30 MG ORAL BEDTIME, TAB


   12/25/16


Levetiracetam* (LEVETIRACETAM*) 500 Mg Tablet, 1000 MG ORAL TID, #60 TAB 0 

Refills


   12/25/16


Duloxetine Hcl* (CYMBALTA*) 60 Mg Capsule.dr, 60 MG ORAL DAILY, CAP


   12/25/16


Diphenhydramine HCl (Benadryl) 25 Mg Capsule, 25 MG PO Q6HR Y for Itching, CAP


   12/25/16


Med list reviewed/reconciled:  Yes


Allergies:  


Coded Allergies:  


     No Known Allergies (Verified , 10/27/06)





Patient History


History Provided By:  Patient, Medical Record


PMH Narrative


Hx Cardiac Problems:  Yes


Hx Hypertension:  No


Hx Pacemaker:  No


Hx Asthma:  Yes


Hx COPD:  Yes


Hx Diabetes:  No


Hx Cancer:  No


Hx Gastrointestinal Problems:  Yes


Hx Dialysis:  No


Hx Neurological Problems:  Yes


Hx Cerebrovascular Accident:  Yes - 2005


Hx Seizures:  Yes


Hx Epilepsy:  Yes


Hx Vertigo:  Yes


Hx Dizziness:  Yes


Hx Syncope:  Yes


Hx Headaches:  Yes


Hx Weakness:  Yes


Hx Fatigue:  Yes


Social History:  Denies: smoking, alcohol use, drug use, other





Review of Systems


All Other Systems:  negative except mentioned in HPI





Physical Exam





Vital Signs








  Date Time  Temp Pulse Resp B/P (MAP) Pulse Ox O2 Delivery O2 Flow Rate FiO2


 


10/4/17 14:00 97.2 92 18 114/69 94 Room Air  








Sp02 EP Interpretation:  reviewed


Labs





Laboratory Tests








Test


  10/4/17


14:38 10/4/17


15:55 10/5/17


09:00 10/5/17


10:35


 


White Blood Count


  11.1 K/UL


(4.8-10.8)  H 


  8.6 K/UL


(4.8-10.8) 


 


 


Red Blood Count


  3.87 M/UL


(4.20-5.40)  L 


  4.34 M/UL


(4.20-5.40) 


 


 


Hemoglobin


  10.4 G/DL


(12.0-16.0)  L 


  11.9 G/DL


(12.0-16.0)  L 


 


 


Hematocrit


  33.8 %


(37.0-47.0)  L 


  38.2 %


(37.0-47.0) 


 


 


Mean Corpuscular Volume 87 FL (80-99)    88 FL (80-99)   


 


Mean Corpuscular Hemoglobin


  26.8 PG


(27.0-31.0)  L 


  27.4 PG


(27.0-31.0) 


 


 


Mean Corpuscular Hemoglobin


Concent 30.6 G/DL


(32.0-36.0)  L 


  31.1 G/DL


(32.0-36.0)  L 


 


 


Red Cell Distribution Width


  15.8 %


(11.6-14.8)  H 


  15.5 %


(11.6-14.8)  H 


 


 


Platelet Count


  383 K/UL


(150-450) 


  366 K/UL


(150-450) 


 


 


Mean Platelet Volume


  7.5 FL


(6.5-10.1) 


  6.7 FL


(6.5-10.1) 


 


 


Neutrophils (%) (Auto)


  54.2 %


(45.0-75.0) 


  65.5 %


(45.0-75.0) 


 


 


Lymphocytes (%) (Auto)


  35.2 %


(20.0-45.0) 


  25.8 %


(20.0-45.0) 


 


 


Monocytes (%) (Auto)


  8.2 %


(1.0-10.0) 


  6.7 %


(1.0-10.0) 


 


 


Eosinophils (%) (Auto)


  1.4 %


(0.0-3.0) 


  1.4 %


(0.0-3.0) 


 


 


Basophils (%) (Auto)


  1.1 %


(0.0-2.0) 


  0.7 %


(0.0-2.0) 


 


 


Prothrombin Time


  10.9 SEC


(9.30-11.50) 


  10.9 SEC


(9.30-11.50) 


 


 


Prothromb Time International


Ratio 1.0 (0.9-1.1)  


  


  1.0 (0.9-1.1)  


  


 


 


Activated Partial


Thromboplast Time 26 SEC (23-33)


  


  29 SEC (23-33)


  


 


 


D-Dimer


  


  1324 ng/mL


(<500)  H 


  


 


 


Sodium Level


  


  137 mEQ/L


(135-145) 140 mEQ/L


(135-145) 


 


 


Potassium Level


  


  3.9 mEQ/L


(3.4-4.9) 4.3 mEQ/L


(3.4-4.9) 


 


 


Chloride Level


  


  100 mEQ/L


() 103 mEQ/L


() 


 


 


Carbon Dioxide Level


  


  28 mEQ/L


(20-30) 29 mEQ/L


(20-30) 


 


 


Anion Gap  9 (5-15)   8 (5-15)   


 


Blood Urea Nitrogen


  


  24 mg/dL


(7-23)  H 16 mg/dL


(7-23) 


 


 


Creatinine


  


  0.7 mg/dL


(0.5-0.9) 0.7 mg/dL


(0.5-0.9) 


 


 


Estimat Glomerular Filtration


Rate 


  > 60 mL/min


(>60) > 60 mL/min


(>60) 


 


 


Glucose Level


  


  88 mg/dL


() 98 mg/dL


() 


 


 


Calcium Level


  


  9.1 mg/dL


(8.6-10.2) 9.3 mg/dL


(8.6-10.2) 


 


 


Total Bilirubin


  


  < 0.2 mg/dL


(0.0-1.2) < 0.2 mg/dL


(0.0-1.2) 


 


 


Aspartate Amino Transf


(AST/SGOT) 


  15 U/L (5-40)  


  15 U/L (5-40)  


  


 


 


Alanine Aminotransferase


(ALT/SGPT) 


  10 U/L (3-33)  


  9 U/L (3-33)  


  


 


 


Alkaline Phosphatase


  


  81 U/L


() 86 U/L


() 


 


 


Troponin I


  


  < 0.30 ng/mL


(<=0.30) 


  


 


 


Pro-B-Type Natriuretic Peptide


  


  589 pg/mL


(0-125)  H 


  


 


 


Total Protein


  


  8.2 g/dL


(6.6-8.7) 8.4 g/dL


(6.6-8.7) 


 


 


Albumin


  


  3.1 g/dL


(3.5-5.2)  L 3.1 g/dL


(3.5-5.2)  L 


 


 


Globulin  5.1 g/dL   5.3 g/dL   


 


Albumin/Globulin Ratio


  


  0.6 (1.0-2.7)


L 0.5 (1.0-2.7)


L 


 


 


Lipase  23 U/L (< 60)   17 U/L (< 60)   


 


Thyroid Stimulating Hormone


(TSH) 


  61.090 uIU/mL


(0.300-4.500) 


  


 


 


Iron Level


  


  


  34 ug/dL


()  L 


 


 


Total Iron Binding Capacity


  


  


  275 ug/dL


(250-400) 


 


 


Percent Iron Saturation   12 % (15-50)  L 


 


Unsaturated Iron Binding


  


  


  241 ug/dL


(112-346) 


 


 


Amylase Level


  


  


  72 U/L


() 


 


 


Carcinoembryonic Antigen   12.2 ng/mL  H 


 


CA 19-9 Antigen


  


  


  52.16 U/mL (<


37)  H 


 


 


Free Thyroxine


  


  


  1.01 ng/dL


(0.86-1.85) 


 


 


Free Triiodothyronine   Pending   


 


Urine Color    Yellow  


 


Urine Appearance    Clear  


 


Urine pH    8 (4.5-8.0)  


 


Urine Specific Gravity


  


  


  


  1.010


(1.005-1.035)


 


Urine Protein


  


  


  


  Negative


(NEGATIVE)


 


Urine Glucose (UA)


  


  


  


  Negative


(NEGATIVE)


 


Urine Ketones


  


  


  


  Negative


(NEGATIVE)


 


Urine Occult Blood


  


  


  


  2+ (NEGATIVE)


H


 


Urine Nitrite


  


  


  


  Negative


(NEGATIVE)


 


Urine Bilirubin


  


  


  


  Negative


(NEGATIVE)


 


Urine Urobilinogen


  


  


  


  Normal MG/DL


(0.0-1.0)


 


Urine Leukocyte Esterase


  


  


  


  1+ (NEGATIVE)


H


 


Urine RBC


  


  


  


  5-10 /HPF (0 -


2)  H


 


Urine WBC


  


  


  


  2-4 /HPF (0 -


2)


 


Urine Squamous Epithelial


Cells 


  


  


  Few /LPF


(NONE/OCC)


 


Urine Amorphous Sediment


  


  


  


  Few /LPF


(NONE)  H


 


Urine Bacteria


  


  


  


  Few /HPF


(NONE)


 


Urine Opiates Screen


  


  


  


  Positive


(NEGATIVE)  H


 


Urine Barbiturates Screen


  


  


  


  Negative


(NEGATIVE)


 


Phencyclidine (PCP) Screen


  


  


  


  Negative


(NEGATIVE)


 


Urine Amphetamines Screen


  


  


  


  Negative


(NEGATIVE)


 


Urine Benzodiazepines Screen


  


  


  


  Negative


(NEGATIVE)


 


Urine Cocaine Screen


  


  


  


  Negative


(NEGATIVE)


 


Urine Marijuana (THC) Screen


  


  


  


  Negative


(NEGATIVE)








General Appearance:  well appearing, no apparent distress, alert, thin


Head:  normocephalic


EENT:  normal ENT inspection


Neck:  supple


Respiratory:  normal breath sounds, no respiratory distress


Cardiovascular:  normal rate


Rectal:  deferred


Neurologic:  normal inspection, alert, oriented x3, responsive


Psychiatric:  normal inspection, judgement/insight normal, memory normal


Skin:  normal inspection, normal color, no rash


Lymphatic:  normal inspection, no adenopathy


Current Medications





Current Medications








 Medications


  (Trade)  Dose


 Ordered  Sig/Jed


 Route


 PRN Reason  Start Time


 Stop Time Status Last Admin


Dose Admin


 


 Acetaminophen


  (Tylenol)  650 mg  Q4H  PRN


 ORAL


 fever  10/4/17 16:00


 11/3/17 15:59   


 


 


 Al Hydroxide/Mg


 Hydroxide


  (Mylanta II)  30 ml  Q6H  PRN


 ORAL


 dyspepsia  10/4/17 16:00


 11/3/17 15:59   


 


 


 Dextrose


  (Dextrose 50%)    STAT  PRN


 IV


 Hypoglycemia  10/4/17 16:00


 11/3/17 15:59   


 


 


 Dextrose/Sodium


 Chloride  1,000 ml @ 


 50 mls/hr  Q20H


 IV


   10/5/17 12:00


 11/4/17 11:59   


 


 


 Diphenhydramine


 HCl


  (Benadryl)  25 mg  Q6H  PRN


 ORAL


 Itching/Pruritis  10/4/17 16:00


 11/3/17 15:59   


 


 


 Levothyroxine


 Sodium


  (Synthroid)  75 mcg  DAILY@0630


 ORAL


   10/6/17 06:30


 11/5/17 06:29   


 


 


 Mesalamine


  (Asacol)  800 mg  THREE TIMES A  DAY


 ORAL


   10/5/17 09:00


 11/4/17 08:59  10/5/17 08:41


 


 


 Morphine Sulfate


  (Morphine


 Sulfate)  4 mg  Q4H  PRN


 IVP


 Severe Pain (Pain Scale 7-10)  10/5/17 12:00


 10/12/17 11:59   


 


 


 Nitroglycerin


  (Ntg)  0.4 mg  Q5M X 3 DOSES PRN


 SL


 Prn Chest Pain  10/4/17 16:00


 11/3/17 15:59   


 


 


 Ondansetron HCl


  (Zofran)  4 mg  Q6H  PRN


 IVP


 Nausea & Vomiting  10/4/17 16:00


 11/3/17 15:59   


 


 


 Pantoprazole


  (Protonix)  40 mg  DAILY


 IVP


   10/5/17 09:00


 11/4/17 08:59  10/5/17 08:41


 


 


 Polyethylene


 Glycol


  (Miralax)  17 gm  HSPRN  PRN


 ORAL


 Constipation  10/4/17 16:00


 11/3/17 15:59   


 


 


 Temazepam


  (Restoril)  15 mg  HSPRN  PRN


 ORAL


 Insomnia  10/4/17 16:00


 10/11/17 15:59   


 











GI: Plan


Problems:  


(1) History of exploratory laparotomy


(2) Elevated CEA


(3) IBD (inflammatory bowel disease)


(4) Chronic pain syndrome


(5) Crohn's disease


(6) Anemia


Plan


s/p EGD/colon 2016 >> , crohns


non functional implanted morphine pump LLQ


CEA elevation >> 12


 elevation >> 52





symptomatic treatment at this time


adv to regular diet


OB stool r/o GI bleed


mesalamine for Crohns as inpatient,  rx for Pentasa when discharged (this med 

not avail in the hospital)


electrolyte replacement


iron deficiency >> venofer x 1


pain mgmt


ppi


Imodium prn, consider lomotil if diarrhea persists


fu labs, ESR, C-reactive





Discussed with Dr. Mcleod.


Thank you for this patient referral.





DIMITRY MCLEOD 10/5/17 5868:


History of Present Illness


General


Reason for Hospitalization:  Dizziness





Present Illness


Home Meds


Reported Medications


Theophylline (THEODUR*) 100 Mg Tab.er.12h, 100 MG ORAL TWICE A DAY, #30 TAB 0 

Refills


   5/30/17


Mesalamine (ASACOL HD) 800 Mg Tablet.dr, 800 MG ORAL THREE TIMES A DAY, TAB


   Do not break outer coating


   5/30/17


Azithromycin* (ZITHROMAX*) 250 Mg Tablet, 250 MG ORAL DAILY for 4 Days, TAB


   5/30/17


Atorvastatin Calcium* (LIPITOR*) 40 Mg Tablet, 40 MG ORAL BEDTIME, #30 TAB 0 

Refills


   5/30/17


Aspirin* (ASPIR 81*) 81 Mg Tablet.dr, 81 MG ORAL DAILY, TAB


   5/30/17


Linaclotide (LINZESS) 145 Mcg Capsule, 145 MCG PO DAILY, CAP


   3/23/17


Acetaminophen (Acetaminophen) 650 Mg/20.3 Ml Solution, 650 MG ORAL Q8H Y for 

Fever/Headache/Mild Pain, ML 0 Refills


   2/13/17


Mesalamine (PENTASA) 500 Mg Capsule.er, 1000 MG ORAL TID, #30 CAP 0 Refills


   2/9/17


Montelukast Sodium* (MONTELUKAST SODIUM*) 10 Mg Tablet, 10 MG ORAL DAILY, TAB


   2/9/17


Risperidone* (RISPERDAL*) 0.5 Mg Tablet, 0.5 MG ORAL BEDTIME, #30 TAB 0 Refills


   2/9/17


Bupropion Hcl* (BUPROPION HCL*) 100 Mg Tablet, 100 MG ORAL DAILY, TAB


   2/9/17


Trazodone* (TRAZODONE*) 150 Mg Tablet, 200 MG ORAL BEDTIME Y for Insomnia, TAB


   2/9/17


Ibuprofen* (MOTRIN*) 600 Mg Tablet, 800 MG ORAL THREE TIMES A DAY, #30 TAB 0 

Refills


   2/9/17


Polyethylene Glycol 3350* (MIRALAX*) 17 Gm Powd.pack, 17 GM ORAL BEDTIME, PACKET


   1/30/17


Topiramate* (TOPAMAX*) 25 Mg Tablet, 25 MG ORAL TWICE A DAY, #60 TAB 0 Refills


   12/25/16


Quetiapine Fumarate* (SEROQUEL*) 200 Mg Tablet, 100 MG ORAL BEDTIME, TAB


   12/25/16


Mirtazapine* (MIRTAZAPINE*) 15 Mg Tablet, 30 MG ORAL BEDTIME, TAB


   12/25/16


Levetiracetam* (LEVETIRACETAM*) 500 Mg Tablet, 1000 MG ORAL TID, #60 TAB 0 

Refills


   12/25/16


Duloxetine Hcl* (CYMBALTA*) 60 Mg Capsule.dr, 60 MG ORAL DAILY, CAP


   12/25/16


Diphenhydramine HCl (Benadryl) 25 Mg Capsule, 25 MG PO Q6HR Y for Itching, CAP


   12/25/16


Allergies:  


Coded Allergies:  


     No Known Allergies (Verified , 10/27/06)





GI: Plan


Plan


The patient was seen and examined at bedside and all new and available data was 

reviewed in the patients chart. I agree with the above findings, impression 

and plan.  (Patient seen earlier today. Signature stamp does not reflect 

patient encounter time.). -[facility] N.P[facility] Oct 5, 2017 12:03


DIMITRY MCLEOD Oct 5, 2017 14:56

## 2017-10-05 NOTE — PULMONOLOGY PROGRESS NOTE
Assessment/Plan


Problems:  


(1) Pre-syncope


(2) COPD (chronic obstructive pulmonary disease)


(3) Interstitial lung disease


(4) Elevated CEA


(5) Chronic pancreatitis


(6) Chronic pain syndrome


(7) IBD (inflammatory bowel disease)


Assessment/Plan


respiratory treatment


check electrolytes


teli records reviewed


pain management





Subjective


ROS Limited/Unobtainable:  No


Interval Events:  feeling better


Allergies:  


Coded Allergies:  


     No Known Allergies (Verified , 10/27/06)





Objective





Last 24 Hour Vital Signs








  Date Time  Temp Pulse Resp B/P (MAP) Pulse Ox O2 Delivery O2 Flow Rate FiO2


 


10/5/17 08:02 97.7 74 18 111/68 100 Room Air  


 


10/5/17 08:00  77      


 


10/5/17 04:30 98.0 72 20 98/58 100 Room Air  


 


10/5/17 03:54  75      


 


10/5/17 00:07 97.0 69 20 98/62 97 Room Air  


 


10/4/17 23:52  72      


 


10/4/17 21:00 98.0 74 20 100/63 97 Room Air  


 


10/4/17 18:06 97.8 72 20 101/68 99 Room Air  


 


10/4/17 18:00  72      


 


10/4/17 17:30  77 16 122/74 95 Room Air  


 


10/4/17 17:30  77 16 122/74 95 Room Air  


 


10/4/17 16:30  77 15 115/66 95 Room Air  


 


10/4/17 15:30  79 10 113/65 95 Room Air  


 


10/4/17 14:20 98.2 85 19 108/71 95 Room Air  


 


10/4/17 14:00 97.2 92 18 114/69 94 Room Air  

















Intake and Output  


 


 10/5/17 10/6/17





 19:00 07:00


 


Intake Total 570 ml 


 


Output Total 150 ml 


 


Balance 420 ml 


 


  


 


Intake Oral 570 ml 


 


Output Urine Total 150 ml 








General Appearance:  WD/WN


HEENT:  normocephalic, atraumatic


Respiratory/Chest:  chest wall non-tender, lungs clear


Breasts:  no masses


Cardiovascular:  normal peripheral pulses, regularly irregular


Abdomen:  normal bowel sounds, soft, non tender, no scars


Skin:  no rash


Laboratory Tests


10/4/17 14:38: 


White Blood Count 11.1H, Red Blood Count 3.87L, Hemoglobin 10.4L, Hematocrit 

33.8L, Mean Corpuscular Volume 87, Mean Corpuscular Hemoglobin 26.8L, Mean 

Corpuscular Hemoglobin Concent 30.6L, Red Cell Distribution Width 15.8H, 

Platelet Count 383, Mean Platelet Volume 7.5, Neutrophils (%) (Auto) 54.2, 

Lymphocytes (%) (Auto) 35.2, Monocytes (%) (Auto) 8.2, Eosinophils (%) (Auto) 

1.4, Basophils (%) (Auto) 1.1, Prothrombin Time 10.9, Prothromb Time 

International Ratio 1.0, Activated Partial Thromboplast Time 26


10/4/17 15:55: 


D-Dimer 1324H, Sodium Level 137, Potassium Level 3.9, Chloride Level 100, 

Carbon Dioxide Level 28, Anion Gap 9, Blood Urea Nitrogen 24H, Creatinine 0.7, 

Estimat Glomerular Filtration Rate > 60, Glucose Level 88, Calcium Level 9.1, 

Total Bilirubin < 0.2, Aspartate Amino Transf (AST/SGOT) 15, Alanine 

Aminotransferase (ALT/SGPT) 10, Alkaline Phosphatase 81, Troponin I < 0.30, Pro-

B-Type Natriuretic Peptide 589H, Total Protein 8.2, Albumin 3.1L, Globulin 5.1, 

Albumin/Globulin Ratio 0.6L, Lipase 23, Thyroid Stimulating Hormone (TSH) 

61.090H


10/5/17 09:00: 


White Blood Count 8.6, Red Blood Count 4.34, Hemoglobin 11.9L, Hematocrit 38.2, 

Mean Corpuscular Volume 88, Mean Corpuscular Hemoglobin 27.4, Mean Corpuscular 

Hemoglobin Concent 31.1L, Red Cell Distribution Width 15.5H, Platelet Count 366

, Mean Platelet Volume 6.7, Neutrophils (%) (Auto) 65.5, Lymphocytes (%) (Auto) 

25.8, Monocytes (%) (Auto) 6.7, Eosinophils (%) (Auto) 1.4, Basophils (%) (Auto

) 0.7, Prothrombin Time 10.9, Prothromb Time International Ratio 1.0, Activated 

Partial Thromboplast Time 29, Sodium Level 140, Potassium Level 4.3, Chloride 

Level 103, Carbon Dioxide Level 29, Anion Gap 8, Blood Urea Nitrogen 16, 

Creatinine 0.7, Estimat Glomerular Filtration Rate > 60, Glucose Level 98, 

Calcium Level 9.3, Total Bilirubin < 0.2, Aspartate Amino Transf (AST/SGOT) 15, 

Alanine Aminotransferase (ALT/SGPT) 9, Alkaline Phosphatase 86, Total Protein 

8.4, Albumin 3.1L, Globulin 5.3, Albumin/Globulin Ratio 0.5L, Lipase 17, Iron 

Level 34L, Total Iron Binding Capacity 275, Percent Iron Saturation 12L, 

Unsaturated Iron Binding 241, Amylase Level 72, Carcinoembryonic Antigen 12.2H, 

CA 19-9 Antigen [Pending], Free Thyroxine [Pending], Free Triiodothyronine [

Pending]


10/5/17 10:35: 


Urine Color Yellow, Urine Appearance Clear, Urine pH 8, Urine Specific Gravity 

1.010, Urine Protein Negative, Urine Glucose (UA) Negative, Urine Ketones 

Negative, Urine Occult Blood 2+H, Urine Nitrite Negative, Urine Bilirubin 

Negative, Urine Urobilinogen Normal, Urine Leukocyte Esterase 1+H, Urine RBC [

Pending], Urine WBC [Pending], Urine Squamous Epithelial Cells [Pending], Urine 

Bacteria [Pending], Urine Opiates Screen [Pending], Urine Barbiturates Screen [

Pending], Phencyclidine (PCP) Screen [Pending], Urine Amphetamines Screen [

Pending], Urine Benzodiazepines Screen [Pending], Urine Cocaine Screen [Pending]

, Urine Marijuana (THC) Screen [Pending]





Current Medications








 Medications


  (Trade)  Dose


 Ordered  Sig/Jed


 Route


 PRN Reason  Start Time


 Stop Time Status Last Admin


Dose Admin


 


 Acetaminophen


  (Tylenol)  650 mg  Q4H  PRN


 ORAL


 fever  10/4/17 16:00


 11/3/17 15:59   


 


 


 Al Hydroxide/Mg


 Hydroxide


  (Mylanta II)  30 ml  Q6H  PRN


 ORAL


 dyspepsia  10/4/17 16:00


 11/3/17 15:59   


 


 


 Dextrose


  (Dextrose 50%)    STAT  PRN


 IV


 Hypoglycemia  10/4/17 16:00


 11/3/17 15:59   


 


 


 Dextrose/Sodium


 Chloride  1,000 ml @ 


 100 mls/hr  Q10H


 IV


   10/4/17 17:30


 11/3/17 17:29  10/5/17 03:58


 


 


 Diphenhydramine


 HCl


  (Benadryl)  25 mg  Q6H  PRN


 ORAL


 Itching/Pruritis  10/4/17 16:00


 11/3/17 15:59   


 


 


 Levothyroxine


 Sodium


  (Synthroid)  75 mcg  DAILY@0630


 ORAL


   10/6/17 06:30


 11/5/17 06:29   


 


 


 Mesalamine


  (Asacol)  800 mg  THREE TIMES A  DAY


 ORAL


   10/5/17 09:00


 11/4/17 08:59  10/5/17 08:41


 


 


 Morphine Sulfate


  (Morphine


 Sulfate)  2 mg  Q4H  PRN


 IVP


 severe  Pain (Pain Scale 7-10)  10/4/17 16:00


 10/11/17 15:59  10/5/17 08:40


 


 


 Nitroglycerin


  (Ntg)  0.4 mg  Q5M X 3 DOSES PRN


 SL


 Prn Chest Pain  10/4/17 16:00


 11/3/17 15:59   


 


 


 Ondansetron HCl


  (Zofran)  4 mg  Q6H  PRN


 IVP


 Nausea & Vomiting  10/4/17 16:00


 11/3/17 15:59   


 


 


 Pantoprazole


  (Protonix)  40 mg  DAILY


 IVP


   10/5/17 09:00


 11/4/17 08:59  10/5/17 08:41


 


 


 Polyethylene


 Glycol


  (Miralax)  17 gm  HSPRN  PRN


 ORAL


 Constipation  10/4/17 16:00


 11/3/17 15:59   


 


 


 Temazepam


  (Restoril)  15 mg  HSPRN  PRN


 ORAL


 Insomnia  10/4/17 16:00


 10/11/17 15:59   


 

















JULIETTE KLEIN Oct 5, 2017 11:33

## 2017-10-05 NOTE — INFECTIOUS DISEASES PROG NOTE
Assessment/Plan


Problems:  


(1) UTI (urinary tract infection)


Assessment & Plan:  not symptomatic, will send urine culture, keep off 

antibiotics 





(2) Crohn's disease


Assessment & Plan:  continue meds as per GI





(3) CHF (congestive heart failure)


Assessment & Plan:  with exacerbation, continue diuresis, cardiology is 

following  








Subjective


Allergies:  


Coded Allergies:  


     No Known Allergies (Verified , 10/27/06)





Objective


Vital Signs





Last 24 Hour Vital Signs








  Date Time  Temp Pulse Resp B/P (MAP) Pulse Ox O2 Delivery O2 Flow Rate FiO2


 


10/5/17 12:00  82      


 


10/5/17 11:40 97.5 80 18 104/66 100 Room Air  


 


10/5/17 08:02 97.7 74 18 111/68 100 Room Air  


 


10/5/17 08:00  77      


 


10/5/17 04:30 98.0 72 20 98/58 100 Room Air  


 


10/5/17 03:54  75      


 


10/5/17 00:07 97.0 69 20 98/62 97 Room Air  


 


10/4/17 23:52  72      


 


10/4/17 21:00 98.0 74 20 100/63 97 Room Air  


 


10/4/17 18:06 97.8 72 20 101/68 99 Room Air  


 


10/4/17 18:00  72      


 


10/4/17 17:30  77 16 122/74 95 Room Air  


 


10/4/17 17:30  77 16 122/74 95 Room Air  


 


10/4/17 16:30  77 15 115/66 95 Room Air  


 


10/4/17 15:30  79 10 113/65 95 Room Air  








Height (Feet):  5


Height (Inches):  6.00


Weight (Pounds):  130





Laboratory Tests








Test


  10/4/17


15:55 10/5/17


09:00 10/5/17


10:35


 


D-Dimer


  1324 ng/mL


(<500)  H 


  


 


 


Sodium Level


  137 mEQ/L


(135-145) 140 mEQ/L


(135-145) 


 


 


Potassium Level


  3.9 mEQ/L


(3.4-4.9) 4.3 mEQ/L


(3.4-4.9) 


 


 


Chloride Level


  100 mEQ/L


() 103 mEQ/L


() 


 


 


Carbon Dioxide Level


  28 mEQ/L


(20-30) 29 mEQ/L


(20-30) 


 


 


Anion Gap 9 (5-15)   8 (5-15)   


 


Blood Urea Nitrogen


  24 mg/dL


(7-23)  H 16 mg/dL


(7-23) 


 


 


Creatinine


  0.7 mg/dL


(0.5-0.9) 0.7 mg/dL


(0.5-0.9) 


 


 


Estimat Glomerular Filtration


Rate > 60 mL/min


(>60) > 60 mL/min


(>60) 


 


 


Glucose Level


  88 mg/dL


() 98 mg/dL


() 


 


 


Calcium Level


  9.1 mg/dL


(8.6-10.2) 9.3 mg/dL


(8.6-10.2) 


 


 


Total Bilirubin


  < 0.2 mg/dL


(0.0-1.2) < 0.2 mg/dL


(0.0-1.2) 


 


 


Aspartate Amino Transf


(AST/SGOT) 15 U/L (5-40)  


  15 U/L (5-40)  


  


 


 


Alanine Aminotransferase


(ALT/SGPT) 10 U/L (3-33)  


  9 U/L (3-33)  


  


 


 


Alkaline Phosphatase


  81 U/L


() 86 U/L


() 


 


 


Troponin I


  < 0.30 ng/mL


(<=0.30) 


  


 


 


Pro-B-Type Natriuretic Peptide


  589 pg/mL


(0-125)  H 


  


 


 


Total Protein


  8.2 g/dL


(6.6-8.7) 8.4 g/dL


(6.6-8.7) 


 


 


Albumin


  3.1 g/dL


(3.5-5.2)  L 3.1 g/dL


(3.5-5.2)  L 


 


 


Globulin 5.1 g/dL   5.3 g/dL   


 


Albumin/Globulin Ratio


  0.6 (1.0-2.7)


L 0.5 (1.0-2.7)


L 


 


 


Lipase 23 U/L (< 60)   17 U/L (< 60)   


 


Thyroid Stimulating Hormone


(TSH) 61.090 uIU/mL


(0.300-4.500) 


  


 


 


White Blood Count


  


  8.6 K/UL


(4.8-10.8) 


 


 


Red Blood Count


  


  4.34 M/UL


(4.20-5.40) 


 


 


Hemoglobin


  


  11.9 G/DL


(12.0-16.0)  L 


 


 


Hematocrit


  


  38.2 %


(37.0-47.0) 


 


 


Mean Corpuscular Volume  88 FL (80-99)   


 


Mean Corpuscular Hemoglobin


  


  27.4 PG


(27.0-31.0) 


 


 


Mean Corpuscular Hemoglobin


Concent 


  31.1 G/DL


(32.0-36.0)  L 


 


 


Red Cell Distribution Width


  


  15.5 %


(11.6-14.8)  H 


 


 


Platelet Count


  


  366 K/UL


(150-450) 


 


 


Mean Platelet Volume


  


  6.7 FL


(6.5-10.1) 


 


 


Neutrophils (%) (Auto)


  


  65.5 %


(45.0-75.0) 


 


 


Lymphocytes (%) (Auto)


  


  25.8 %


(20.0-45.0) 


 


 


Monocytes (%) (Auto)


  


  6.7 %


(1.0-10.0) 


 


 


Eosinophils (%) (Auto)


  


  1.4 %


(0.0-3.0) 


 


 


Basophils (%) (Auto)


  


  0.7 %


(0.0-2.0) 


 


 


Prothrombin Time


  


  10.9 SEC


(9.30-11.50) 


 


 


Prothromb Time International


Ratio 


  1.0 (0.9-1.1)  


  


 


 


Activated Partial


Thromboplast Time 


  29 SEC (23-33)


  


 


 


Iron Level


  


  34 ug/dL


()  L 


 


 


Total Iron Binding Capacity


  


  275 ug/dL


(250-400) 


 


 


Percent Iron Saturation  12 % (15-50)  L 


 


Unsaturated Iron Binding


  


  241 ug/dL


(112-346) 


 


 


Amylase Level


  


  72 U/L


() 


 


 


Carcinoembryonic Antigen  12.2 ng/mL  H 


 


CA 19-9 Antigen


  


  52.16 U/mL (<


37)  H 


 


 


Free Thyroxine


  


  1.01 ng/dL


(0.86-1.85) 


 


 


Free Triiodothyronine  Pending   


 


Urine Color   Yellow  


 


Urine Appearance   Clear  


 


Urine pH   8 (4.5-8.0)  


 


Urine Specific Gravity


  


  


  1.010


(1.005-1.035)


 


Urine Protein


  


  


  Negative


(NEGATIVE)


 


Urine Glucose (UA)


  


  


  Negative


(NEGATIVE)


 


Urine Ketones


  


  


  Negative


(NEGATIVE)


 


Urine Occult Blood


  


  


  2+ (NEGATIVE)


H


 


Urine Nitrite


  


  


  Negative


(NEGATIVE)


 


Urine Bilirubin


  


  


  Negative


(NEGATIVE)


 


Urine Urobilinogen


  


  


  Normal MG/DL


(0.0-1.0)


 


Urine Leukocyte Esterase


  


  


  1+ (NEGATIVE)


H


 


Urine RBC


  


  


  5-10 /HPF (0 -


2)  H


 


Urine WBC


  


  


  2-4 /HPF (0 -


2)


 


Urine Squamous Epithelial


Cells 


  


  Few /LPF


(NONE/OCC)


 


Urine Amorphous Sediment


  


  


  Few /LPF


(NONE)  H


 


Urine Bacteria


  


  


  Few /HPF


(NONE)


 


Urine Opiates Screen


  


  


  Positive


(NEGATIVE)  H


 


Urine Barbiturates Screen


  


  


  Negative


(NEGATIVE)


 


Phencyclidine (PCP) Screen


  


  


  Negative


(NEGATIVE)


 


Urine Amphetamines Screen


  


  


  Negative


(NEGATIVE)


 


Urine Benzodiazepines Screen


  


  


  Negative


(NEGATIVE)


 


Urine Cocaine Screen


  


  


  Negative


(NEGATIVE)


 


Urine Marijuana (THC) Screen


  


  


  Negative


(NEGATIVE)











Current Medications








 Medications


  (Trade)  Dose


 Ordered  Sig/Jed


 Route


 PRN Reason  Start Time


 Stop Time Status Last Admin


Dose Admin


 


 Acetaminophen


  (Tylenol)  650 mg  Q4H  PRN


 ORAL


 fever  10/4/17 16:00


 11/3/17 15:59   


 


 


 Al Hydroxide/Mg


 Hydroxide


  (Mylanta II)  30 ml  Q6H  PRN


 ORAL


 dyspepsia  10/4/17 16:00


 11/3/17 15:59   


 


 


 Dextrose


  (Dextrose 50%)    STAT  PRN


 IV


 Hypoglycemia  10/4/17 16:00


 11/3/17 15:59   


 


 


 Dextrose/Sodium


 Chloride  1,000 ml @ 


 50 mls/hr  Q20H


 IV


   10/5/17 12:00


 11/4/17 11:59  10/5/17 12:06


 


 


 Diphenhydramine


 HCl


  (Benadryl)  25 mg  Q6H  PRN


 ORAL


 Itching/Pruritis  10/4/17 16:00


 11/3/17 15:59  10/5/17 13:03


 


 


 Duloxetine HCl


  (Cymbalta)  90 mg  DAILY


 ORAL


   10/6/17 09:00


 11/5/17 08:59   


 


 


 Levothyroxine


 Sodium


  (Synthroid)  75 mcg  DAILY@0630


 ORAL


   10/6/17 06:30


 11/5/17 06:29   


 


 


 Mesalamine


  (Asacol)  800 mg  THREE TIMES A  DAY


 ORAL


   10/5/17 09:00


 11/4/17 08:59  10/5/17 13:02


 


 


 Mirtazapine


  (Remeron)  15 mg  BEDTIME


 ORAL


   10/5/17 21:00


 11/4/17 20:59   


 


 


 Morphine Sulfate


  (Morphine


 Sulfate)  4 mg  Q4H  PRN


 IVP


 Severe Pain (Pain Scale 7-10)  10/5/17 12:00


 10/12/17 11:59  10/5/17 13:03


 


 


 Nitroglycerin


  (Ntg)  0.4 mg  Q5M X 3 DOSES PRN


 SL


 Prn Chest Pain  10/4/17 16:00


 11/3/17 15:59   


 


 


 Ondansetron HCl


  (Zofran)  4 mg  Q6H  PRN


 IVP


 Nausea & Vomiting  10/4/17 16:00


 11/3/17 15:59   


 


 


 Pantoprazole


  (Protonix)  40 mg  DAILY


 IVP


   10/5/17 09:00


 11/4/17 08:59  10/5/17 08:41


 


 


 Polyethylene


 Glycol


  (Miralax)  17 gm  HSPRN  PRN


 ORAL


 Constipation  10/4/17 16:00


 11/3/17 15:59   


 


 


 Quetiapine


 Fumarate


  (SEROquel)  200 mg  QHS


 ORAL


   10/5/17 21:00


 11/4/17 20:59   


 


 


 Temazepam


  (Restoril)  15 mg  HSPRN  PRN


 ORAL


 Insomnia  10/4/17 16:00


 10/11/17 15:59   


 


 


 Trazodone HCl


  (Desyrel)  150 mg  BEDTIME


 ORAL


   10/5/17 21:00


 11/4/17 20:59   


 

















Violet Dior M.D. Oct 5, 2017 15:13

## 2017-10-05 NOTE — CONSULTATION
DATE OF CONSULTATION:  10/05/2017



INFECTIOUS DISEASES CONSULTATION



CONSULTING PHYSICIAN:  Violet Dior M.D.



REQUESTING PHYSICIAN:  Maxim Hopkins D.O.



Reason For Consultation:  Urinary tract infection, recommendation for

antibiotics therapy.



History of Present Illness:  The patient is a 62-year-old female, well

known to me from previous admission, presented to [facility]

emergency room with progressive generalized weakness.  The patient had

lightheadedness associated with nausea.  She is well known to have prior

history of Crohn disease.  The patient recently had laboratories drawn as

an outpatient showed low potassium.  She denied any cough or phlegm.  No

runny nose or earache.  No sore throat.  No abdominal pain, nausea,

vomiting, or diarrhea.  No fever or chills.  The patient had extensive

workup in the emergency room including urinalysis, which showed possible

infection, so I was consulted by the primary provider for antibiotics

treatment and further management.



Past Medical History:  Significant for coronary artery disease, CHF, COPD

with asthma, GERD, CVA, and seizure.



PAST SURGICAL HISTORY:  Negative.



Medications:  She is on Cymbalta, Synthroid, Remeron, Seroquel, trazodone,

morphine, pantoprazole, mesalamine, polyethylene glycol, Zofran, Restoril,

Benadryl, and Mylanta.



ALLERGIES:  No known drug allergy.



Social History:  The patient lives at home with family.  No recent drugs,

tobacco, or alcohol.



FAMILY HISTORY:  Not contributory.



Review of Systems:  A 14-point of system reviewed were all negative apart

from the one I mentioned above in my History and Physical.



PHYSICAL EXAMINATION:

General:  The patient is a middle-aged female, lying in bed, awake, alert,

lethargic, not in acute distress.

Vital Signs:  Temperature 97.5 degrees, pulse 80, respirations 18, blood

pressure 104/66, and saturation 100% on room air.

HEENT:  Normocephalic and atraumatic.  Pupils reactive to light.  Pale

sclera.  Moist oral mucosa.  No exudate or thrush.

NECK:  Supple.  No lymphadenopathy.

CARDIOVASCULAR:  Regular rate and rhythm.  No murmur.  No gallop.

LUNGS:  Clear bilaterally.  Diminished breathing sounds at the

bases.

Abdomen:  Soft, nontender, and nondistended.  Normal bowel sounds.  No

hepatosplenomegaly.  No ascites.

EXTREMITIES:  No edema.  No cyanosis.

SKIN:  No rash.  No hives.



Laboratory And Diagnostic Data:  Labs showed white count of 11.1,

hemoglobin of 10.4, and platelet count of 383,000.  BUN of 16 and

creatinine of 0.7.  AST of 15 and ALT of 9.  Urinalysis showed +1

leukocyte esterase, WBC 2 to 4, and few bacteria.  Imaging, chest x-ray

showed interstitial edema.



Hip and pelvic x-ray showed no acute injury.



ASSESSMENT AND RECOMMENDATION:

1. Urinary tract infection.  The patient is not symptomatic.  We will

send urine culture.  Keep off antibiotics for now pending culture

results.

2. Crohn's disease.  Continue mesalamine as per GI.  Further

recommendation as per Gastroenterology team.

3. Congestive heart failure with acute exacerbation.  Continue diuresis.

Cardiology is following.  Monitor daily weight and urine output.



Thank you for the consult.  Infectious Disease will continue to

follow.









  ______________________________________________

  Violet Dior M.D.





DR:  MICHELLE

D:  10/05/2017 15:18

T:  10/05/2017 21:29

JOB#:  [id number]

CC:

## 2017-10-05 NOTE — GENERAL PROGRESS NOTE
Progress Note


Progress Note


[id number] full note dictated











NICOLE PAINTER Oct 5, 2017 08:05

## 2017-10-05 NOTE — DIAGNOSTIC IMAGING REPORT
Indication: Dyspnea



Comparison:  [id number]



A single view chest radiograph was obtained.



Findings:



Reticular suture markings are present bilaterally. The heart is borderline 

enlarged.

Aorta is slightly ectatic. Bones are osteopenic.



Impression:



Interstitial edema suspected

## 2017-10-05 NOTE — CARDIOLOGY PROGRESS NOTE
Assessment/Plan


Assessment/Plan


1. Acute CHF, BNP is elevated, review 2D echocardiography, low dose diuretics.


2. Pre-syncope


3. Hypothyroidism


4. COPD


5. CVA


6. Crohn's disease





Subjective


Subjective


Sinus rhythm at 80.





Objective





Last 24 Hour Vital Signs








  Date Time  Temp Pulse Resp B/P (MAP) Pulse Ox O2 Delivery O2 Flow Rate FiO2


 


10/5/17 20:00 98.6 80 20 96/54 97 Room Air  


 


10/5/17 16:14 98.2 81 20 108/67 96 Room Air  


 


10/5/17 12:00  82      


 


10/5/17 11:40 97.5 80 18 104/66 100 Room Air  


 


10/5/17 08:02 97.7 74 18 111/68 100 Room Air  


 


10/5/17 08:00  77      


 


10/5/17 04:30 98.0 72 20 98/58 100 Room Air  


 


10/5/17 03:54  75      


 


10/5/17 00:07 97.0 69 20 98/62 97 Room Air  


 


10/4/17 23:52  72      

















Intake and Output  


 


 10/5/17 10/6/17





 19:00 07:00


 


Intake Total 1270 ml 


 


Output Total 250 ml 


 


Balance 1020 ml 


 


  


 


Intake Oral 1170 ml 


 


IV Total 100 ml 


 


Output Urine Total 250 ml 








2D Echo:  LVEF 55%, increased RA pressure, RVSP 41 mmHg, Grade I LVDD





Laboratory Tests








Test


  10/5/17


09:00 10/5/17


10:35


 


White Blood Count


  8.6 K/UL


(4.8-10.8) 


 


 


Red Blood Count


  4.34 M/UL


(4.20-5.40) 


 


 


Hemoglobin


  11.9 G/DL


(12.0-16.0)  L 


 


 


Hematocrit


  38.2 %


(37.0-47.0) 


 


 


Mean Corpuscular Volume 88 FL (80-99)   


 


Mean Corpuscular Hemoglobin


  27.4 PG


(27.0-31.0) 


 


 


Mean Corpuscular Hemoglobin


Concent 31.1 G/DL


(32.0-36.0)  L 


 


 


Red Cell Distribution Width


  15.5 %


(11.6-14.8)  H 


 


 


Platelet Count


  366 K/UL


(150-450) 


 


 


Mean Platelet Volume


  6.7 FL


(6.5-10.1) 


 


 


Neutrophils (%) (Auto)


  65.5 %


(45.0-75.0) 


 


 


Lymphocytes (%) (Auto)


  25.8 %


(20.0-45.0) 


 


 


Monocytes (%) (Auto)


  6.7 %


(1.0-10.0) 


 


 


Eosinophils (%) (Auto)


  1.4 %


(0.0-3.0) 


 


 


Basophils (%) (Auto)


  0.7 %


(0.0-2.0) 


 


 


Prothrombin Time


  10.9 SEC


(9.30-11.50) 


 


 


Prothromb Time International


Ratio 1.0 (0.9-1.1)  


  


 


 


Activated Partial


Thromboplast Time 29 SEC (23-33)


  


 


 


Sodium Level


  140 mEQ/L


(135-145) 


 


 


Potassium Level


  4.3 mEQ/L


(3.4-4.9) 


 


 


Chloride Level


  103 mEQ/L


() 


 


 


Carbon Dioxide Level


  29 mEQ/L


(20-30) 


 


 


Anion Gap 8 (5-15)   


 


Blood Urea Nitrogen


  16 mg/dL


(7-23) 


 


 


Creatinine


  0.7 mg/dL


(0.5-0.9) 


 


 


Estimat Glomerular Filtration


Rate > 60 mL/min


(>60) 


 


 


Glucose Level


  98 mg/dL


() 


 


 


Calcium Level


  9.3 mg/dL


(8.6-10.2) 


 


 


Iron Level


  34 ug/dL


()  L 


 


 


Total Iron Binding Capacity


  275 ug/dL


(250-400) 


 


 


Percent Iron Saturation 12 % (15-50)  L 


 


Unsaturated Iron Binding


  241 ug/dL


(112-346) 


 


 


Total Bilirubin


  < 0.2 mg/dL


(0.0-1.2) 


 


 


Aspartate Amino Transf


(AST/SGOT) 15 U/L (5-40)  


  


 


 


Alanine Aminotransferase


(ALT/SGPT) 9 U/L (3-33)  


  


 


 


Alkaline Phosphatase


  86 U/L


() 


 


 


Total Protein


  8.4 g/dL


(6.6-8.7) 


 


 


Albumin


  3.1 g/dL


(3.5-5.2)  L 


 


 


Globulin 5.3 g/dL   


 


Albumin/Globulin Ratio


  0.5 (1.0-2.7)


L 


 


 


Amylase Level


  72 U/L


() 


 


 


Lipase 17 U/L (< 60)   


 


Carcinoembryonic Antigen 12.2 ng/mL  H 


 


CA 19-9 Antigen


  52.16 U/mL (<


37)  H 


 


 


Free Thyroxine


  1.01 ng/dL


(0.86-1.85) 


 


 


Free Triiodothyronine Pending   


 


Urine Color  Yellow  


 


Urine Appearance  Clear  


 


Urine pH  8 (4.5-8.0)  


 


Urine Specific Gravity


  


  1.010


(1.005-1.035)


 


Urine Protein


  


  Negative


(NEGATIVE)


 


Urine Glucose (UA)


  


  Negative


(NEGATIVE)


 


Urine Ketones


  


  Negative


(NEGATIVE)


 


Urine Occult Blood


  


  2+ (NEGATIVE)


H


 


Urine Nitrite


  


  Negative


(NEGATIVE)


 


Urine Bilirubin


  


  Negative


(NEGATIVE)


 


Urine Urobilinogen


  


  Normal MG/DL


(0.0-1.0)


 


Urine Leukocyte Esterase


  


  1+ (NEGATIVE)


H


 


Urine RBC


  


  5-10 /HPF (0 -


2)  H


 


Urine WBC


  


  2-4 /HPF (0 -


2)


 


Urine Squamous Epithelial


Cells 


  Few /LPF


(NONE/OCC)


 


Urine Amorphous Sediment


  


  Few /LPF


(NONE)  H


 


Urine Bacteria


  


  Few /HPF


(NONE)


 


Urine Opiates Screen


  


  Positive


(NEGATIVE)  H


 


Urine Barbiturates Screen


  


  Negative


(NEGATIVE)


 


Phencyclidine (PCP) Screen


  


  Negative


(NEGATIVE)


 


Urine Amphetamines Screen


  


  Negative


(NEGATIVE)


 


Urine Benzodiazepines Screen


  


  Negative


(NEGATIVE)


 


Urine Cocaine Screen


  


  Negative


(NEGATIVE)


 


Urine Marijuana (THC) Screen


  


  Negative


(NEGATIVE)








Objective


HEENT:  Atraumatic, anicteric, Extraocular movement intact.  Pupils are 

reactive to light and accommodation.


NECK: No JVD, no carotid bruit, carotid upstroke 2+ B/L


LUNGS:  Clear to auscultation.


CARDIAC:  Regular rate and rhythm. Normal S1 and S2,  No murmur, gallops or 

rubs.


ABDOMEN:  Soft, nontender, nondistended, no hepatosplenomegaly.


EXTREMITIES:  No edema, clubbing or cyanosis.











MARLENE GUTIERREZ Oct 5, 2017 22:28

## 2017-10-05 NOTE — HISTORY AND PHYSICAL REPORT
DATE OF ADMISSION:  10/04/2017



TIME SEEN:  At 2 p.m.



ATTENDING PHYSICIAN:  Maxim Hopkins D.O.



CONSULTANTS:

1. Blaze Fraga M.D.

2. Delon Harris M.D.

3. Peyton French M.D.

4. Demetri Rapp M.D.

5. Samantha Rick M.D.

6. Behnoush Zarrini, M.D.

7. Dimitry Mcleod M.D.



CHIEF COMPLAINT:  Weakness, lethargy, and chronic pain.



Brief History:  This is a 62-year-old female, who lives at home with

history of Crohn, chronic pain, COPD, and seizure, presenting with

increased weakness and lethargy, and diagnosed with the above, admitted to

the telemetry for further care.  Currently calm in bed, fairly little bit

better.  No complaints.



Past Medical History:  Weakness, Crohn, COPD, asthma, chronic pain, and

seizure.



PAST SURGICAL HISTORY:  Abdominal surgery.



Medications:  Cymbalta, Synthroid, Remeron, Seroquel, Desyrel, morphine,

Protonix, Asacol, Tylenol, and Zofran.



ALLERGIES:  Denies.



Social History:  Positive smoking.  No alcohol.  No intravenous drug

abuse.



FAMILY HISTORY:  Noncontributory.



Review Of Systems:  No chest pain.  Slight shortness of breath.  No nausea,

vomiting, or diarrhea.



PHYSICAL EXAMINATION:

GENERAL:  Calm in bed, oriented x3, in no acute distress.

Vital Signs:  Temperature is 97 degrees, pulse 80, respirations 18, and

blood pressure 104/66.

CARDIOVASCULAR:  No murmur.

LUNGS:  Poor exchange.

ABDOMEN:  Bowel sounds positive.  Nontender and nondistended.

EXTREMITIES:  No cyanosis, clubbing, or edema.

NEUROLOGIC:  The patient moves all extremities, but slightly weak.



Laboratory Data:  Lab exam show hemoglobin 11.9, otherwise CBC is normal.

BMP shows albumin 3.1.  Otherwise, CA 19-9 is elevated.  D-dimer is 1324.

Urine toxicology is positive for opiates.  Urinalysis 1+ leukocyte

esterase.



ASSESSMENT:

1. Weakness.

2. Crohn.

3. Urinary tract infection.

4. Chronic obstructive pulmonary disease.

5. Asthma.

6. Chronic pain.

7. Anemia.

8. Seizure.

9. Congestive heart failure.

10. Malnutrition.



PLAN:

1. Continue premedications.

2. OT, PT, and dietary followup.

3. CBC and BMP in the morning.

4. Antibiotics per Infectious Disease.

5. Seizure control.

6. Blood pressure control.

7. Dr. Little, Dr. Russell, Dr. Shetty, Dr. Fraga, Dr. Harris, Dr. French, Dr. Rapp, Dr. Rick, and Dr. Mcleod to consult.









  ______________________________________________

  Maxim Hopkins D.O.





DR:  Luda

D:  10/05/2017 14:53

T:  10/05/2017 21:21

JOB#:  [id number]

CC:

## 2017-10-05 NOTE — CONSULTATION
DATE OF CONSULTATION:  10/05/2017



NEPHROLOGY CONSULTATION



REFERRING PHYSICIAN:  Maxim Hopkins D.O.



REASON FOR CONSULTATION:  Electrolyte imbalance.



History Of Present Illness:  The patient is a 62-year-old 

female very pleasant with past medical history significant for history of

seizure disorder, history of CVA in , history of Crohn disease since

, and hypertension, who presented to emergency room complaining of

generalized weakness and syncopal episode and passing out.  Apparently a

couple of days ago, the patient was reaching to the cabinet to pickup

plate from the cabinet.  She lost her consciousness and she fell on her

back and she came to emergency room.  In the ER, the patient was

completely evaluated and found to be hypotensive, dehydrated, and

consequently was admitted in the hospital.  I was called for management of

renal disease and electrolyte imbalance.



PAST MEDICAL HISTORY:  Includin. History of COPD.

2. History of seizure disorder.

3. History of CVA in .

4. History of Crohn disease, which at this point is not active.



PAST SURGICAL HISTORY:  None.



ALLERGIES:  Not known drug allergies.



Review Of Systems:  General:  She complained of generalized weakness.

Denies any fever, chills, or night sweats.  Head And Neck:  Denies any

dysphagia, odynophagia, blurry vision, headache, or neck stiffness.

Pulmonary:  No shortness of breath.  No cough.  No sputum.

Cardiovascular:  Denies any chest pain or palpitations.  Gastrointestinal:

She felt nauseated before the episode of syncope, but she denies having

any nausea, vomiting, diarrhea, melena, hematemesis, or hematochezia.

Genitourinary:  Denies any dysuria, frequency, or hematuria.

Musculoskeletal:  She complained of back pain, which happened after she

fall.  She denies any localized weakness or numbness.



PHYSICAL EXAMINATION:

Vital Signs:  The patient has temperature of 97, blood pressure of 101/68,

and pulse rate of 72.

Head And Neck:  No JVP.  No LAD.  No thyromegaly.  Extraocular movement

intact.  Pupils are reactive to light and accommodation.

LUNGS:  Clear to auscultation.

CARDIAC:  Regular rate and rhythm.  S1 and S2 .  No murmur.  No

rub.

ABDOMEN:  Soft, nontender, and nondistended.

EXTREMITIES:  No edema.  No clubbing.  No cyanosis.



Laboratory And Diagnostic Data:  The patient has sodium of 137, potassium

3.9, chloride 100 , bicarb 28 , BUN of 24, creatinine of 0.7, glucose of

88, and calcium of 9.1.  AST of 15 and ALT of 10.  BNP of 589.  Total

protein of 8.2, albumin of 3.1, and TSH of ____.  CBC revealed WBC count

of 11.1, hemoglobin of 10.4, hematocrit of 33.8, and platelet count of

388,000.  There is no UA.



ASSESSMENT:

1. Dehydration prerenal azotemia.

2. Hypokalemia, resolved.

3. Severe hypothyroidism.



PLAN:

1. The patient to repeat the chemistry.  Check the urine potassium and

serum potassium for evaluation of the hypokalemia and nutritional

evaluation.  The patient seems to be malnourished.

2. Check the iron panel for anemia of chronic kidney disease.

3. Start the patient on levothyroxine.



Again, I would like to thank, Dr. Maxim Hopkins, for allowing me to

participate in the care of this patient..









  ______________________________________________

  Peyton French M.D. DR:  KATY

D:  10/05/2017 08:05

T:  10/05/2017 15:55

JOB#:  [id number]

CC:

## 2017-10-05 NOTE — CONSULTATION
History of Present Illness


General


Date patient seen:  Oct 4, 2017


Chief Complaint:  Dizziness


Referring physician:  Dr Hopkins


Reason for Consultation:  inpatient management





Present Illness


HPI


 62-year-old female with hx of chronic pain, Crohn disease presented  by 

paramedics after increased generalized weakness.  Patient states that she been 

having increased feeling like his pass out associated with increased nausea.   

Patient denies any diarrhea.  she had not been having any fever. she is 

admitted for presyncopal episode


Allergies:  


Coded Allergies:  


     No Known Allergies (Verified , 10/27/06)





Medication History


Scheduled


Aspirin* (Aspir 81*), 81 MG ORAL DAILY, (Reported)


Atorvastatin Calcium* (Lipitor*), 40 MG ORAL BEDTIME, (Reported)


Azithromycin* (Zithromax*), 250 MG ORAL DAILY, (Reported)


Bupropion Hcl* (Bupropion Hcl*), 100 MG ORAL DAILY, (Reported)


Duloxetine Hcl* (Cymbalta*), 60 MG ORAL DAILY, (Reported)


Ibuprofen* (Motrin*), 800 MG ORAL THREE TIMES A DAY, (Reported)


Levetiracetam* (Levetiracetam*), 1,000 MG ORAL TID, (Reported)


Linaclotide (Linzess), 145 MCG PO DAILY, (Reported)


Mesalamine (Pentasa), 1,000 MG ORAL TID, (Reported)


Mesalamine (Asacol Hd), 800 MG ORAL THREE TIMES A DAY, (Reported)


Mirtazapine* (Mirtazapine*), 30 MG ORAL BEDTIME, (Reported)


Montelukast Sodium* (Montelukast Sodium*), 10 MG ORAL DAILY, (Reported)


Polyethylene Glycol 3350* (Miralax*), 17 GM ORAL BEDTIME, (Reported)


Quetiapine Fumarate* (Seroquel*), 100 MG ORAL BEDTIME, (Reported)


Risperidone* (Risperdal*), 0.5 MG ORAL BEDTIME, (Reported)


Theophylline (Theodur*), 100 MG ORAL TWICE A DAY, (Reported)


Topiramate* (Topamax*), 25 MG ORAL TWICE A DAY, (Reported)





Scheduled PRN


Acetaminophen (Acetaminophen), 650 MG ORAL Q8H PRN for Fever/Headache/Mild Pain,

 (Reported)


Diphenhydramine HCl (Benadryl), 25 MG PO Q6HR PRN for Itching, (Reported)


Trazodone* (Trazodone*), 200 MG ORAL BEDTIME PRN for Insomnia, (Reported)





Patient History


Healthcare decision maker


N


Resuscitation status


Full Code


Advanced Directive on File








Past Medical/Surgical History


Past Medical/Surgical History:  


(1) Chronic pancreatitis


(2) Chronic pain syndrome


(3) Crohn's disease


(4) COPD (chronic obstructive pulmonary disease)


(5) Elevated CEA


(6) IBD (inflammatory bowel disease)





Review of Systems


All Other Systems:  negative except mentioned in HPI





Physical Exam


General Appearance:  WD/WN, cachetic


Lines, tubes and drains:  peripheral, PICC


HEENT:  normocephalic, atraumatic


Neck:  non-tender, normal alignment


Respiratory/Chest:  lungs clear


Breasts:  no masses


Cardiovascular/Chest:  normal peripheral pulses


Abdomen:  normal bowel sounds, non tender


Genitourinary/Rectal:  normal genital exam


Extremities:  normal range of motion





Last 24 Hour Vital Signs








  Date Time  Temp Pulse Resp B/P (MAP) Pulse Ox O2 Delivery O2 Flow Rate FiO2


 


10/5/17 08:02 97.7 74 18 111/68 100 Room Air  


 


10/5/17 08:00  77      


 


10/5/17 04:30 98.0 72 20 98/58 100 Room Air  


 


10/5/17 03:54  75      


 


10/5/17 00:07 97.0 69 20 98/62 97 Room Air  


 


10/4/17 23:52  72      


 


10/4/17 21:00 98.0 74 20 100/63 97 Room Air  


 


10/4/17 18:06 97.8 72 20 101/68 99 Room Air  


 


10/4/17 18:00  72      


 


10/4/17 17:30  77 16 122/74 95 Room Air  


 


10/4/17 17:30  77 16 122/74 95 Room Air  


 


10/4/17 16:30  77 15 115/66 95 Room Air  


 


10/4/17 15:30  79 10 113/65 95 Room Air  


 


10/4/17 14:20 98.2 85 19 108/71 95 Room Air  


 


10/4/17 14:00 97.2 92 18 114/69 94 Room Air  

















Intake and Output  


 


 10/5/17 10/6/17





 19:00 07:00


 


Intake Total 570 ml 


 


Output Total 150 ml 


 


Balance 420 ml 


 


  


 


Intake Oral 570 ml 


 


Output Urine Total 150 ml 











Laboratory Tests








Test


  10/4/17


14:38 10/4/17


15:55 10/5/17


09:00 10/5/17


10:35


 


White Blood Count


  11.1 K/UL


(4.8-10.8)  H 


  8.6 K/UL


(4.8-10.8) 


 


 


Red Blood Count


  3.87 M/UL


(4.20-5.40)  L 


  4.34 M/UL


(4.20-5.40) 


 


 


Hemoglobin


  10.4 G/DL


(12.0-16.0)  L 


  11.9 G/DL


(12.0-16.0)  L 


 


 


Hematocrit


  33.8 %


(37.0-47.0)  L 


  38.2 %


(37.0-47.0) 


 


 


Mean Corpuscular Volume 87 FL (80-99)    88 FL (80-99)   


 


Mean Corpuscular Hemoglobin


  26.8 PG


(27.0-31.0)  L 


  27.4 PG


(27.0-31.0) 


 


 


Mean Corpuscular Hemoglobin


Concent 30.6 G/DL


(32.0-36.0)  L 


  31.1 G/DL


(32.0-36.0)  L 


 


 


Red Cell Distribution Width


  15.8 %


(11.6-14.8)  H 


  15.5 %


(11.6-14.8)  H 


 


 


Platelet Count


  383 K/UL


(150-450) 


  366 K/UL


(150-450) 


 


 


Mean Platelet Volume


  7.5 FL


(6.5-10.1) 


  6.7 FL


(6.5-10.1) 


 


 


Neutrophils (%) (Auto)


  54.2 %


(45.0-75.0) 


  65.5 %


(45.0-75.0) 


 


 


Lymphocytes (%) (Auto)


  35.2 %


(20.0-45.0) 


  25.8 %


(20.0-45.0) 


 


 


Monocytes (%) (Auto)


  8.2 %


(1.0-10.0) 


  6.7 %


(1.0-10.0) 


 


 


Eosinophils (%) (Auto)


  1.4 %


(0.0-3.0) 


  1.4 %


(0.0-3.0) 


 


 


Basophils (%) (Auto)


  1.1 %


(0.0-2.0) 


  0.7 %


(0.0-2.0) 


 


 


Prothrombin Time


  10.9 SEC


(9.30-11.50) 


  10.9 SEC


(9.30-11.50) 


 


 


Prothromb Time International


Ratio 1.0 (0.9-1.1)  


  


  1.0 (0.9-1.1)  


  


 


 


Activated Partial


Thromboplast Time 26 SEC (23-33)


  


  29 SEC (23-33)


  


 


 


D-Dimer


  


  1324 ng/mL


(<500)  H 


  


 


 


Sodium Level


  


  137 mEQ/L


(135-145) 140 mEQ/L


(135-145) 


 


 


Potassium Level


  


  3.9 mEQ/L


(3.4-4.9) 4.3 mEQ/L


(3.4-4.9) 


 


 


Chloride Level


  


  100 mEQ/L


() 103 mEQ/L


() 


 


 


Carbon Dioxide Level


  


  28 mEQ/L


(20-30) 29 mEQ/L


(20-30) 


 


 


Anion Gap  9 (5-15)   8 (5-15)   


 


Blood Urea Nitrogen


  


  24 mg/dL


(7-23)  H 16 mg/dL


(7-23) 


 


 


Creatinine


  


  0.7 mg/dL


(0.5-0.9) 0.7 mg/dL


(0.5-0.9) 


 


 


Estimat Glomerular Filtration


Rate 


  > 60 mL/min


(>60) > 60 mL/min


(>60) 


 


 


Glucose Level


  


  88 mg/dL


() 98 mg/dL


() 


 


 


Calcium Level


  


  9.1 mg/dL


(8.6-10.2) 9.3 mg/dL


(8.6-10.2) 


 


 


Total Bilirubin


  


  < 0.2 mg/dL


(0.0-1.2) < 0.2 mg/dL


(0.0-1.2) 


 


 


Aspartate Amino Transf


(AST/SGOT) 


  15 U/L (5-40)  


  15 U/L (5-40)  


  


 


 


Alanine Aminotransferase


(ALT/SGPT) 


  10 U/L (3-33)  


  9 U/L (3-33)  


  


 


 


Alkaline Phosphatase


  


  81 U/L


() 86 U/L


() 


 


 


Troponin I


  


  < 0.30 ng/mL


(<=0.30) 


  


 


 


Pro-B-Type Natriuretic Peptide


  


  589 pg/mL


(0-125)  H 


  


 


 


Total Protein


  


  8.2 g/dL


(6.6-8.7) 8.4 g/dL


(6.6-8.7) 


 


 


Albumin


  


  3.1 g/dL


(3.5-5.2)  L 3.1 g/dL


(3.5-5.2)  L 


 


 


Globulin  5.1 g/dL   5.3 g/dL   


 


Albumin/Globulin Ratio


  


  0.6 (1.0-2.7)


L 0.5 (1.0-2.7)


L 


 


 


Lipase  23 U/L (< 60)   17 U/L (< 60)   


 


Thyroid Stimulating Hormone


(TSH) 


  61.090 uIU/mL


(0.300-4.500) 


  


 


 


Iron Level   Pending   


 


Unsaturated Iron Binding   Pending   


 


Amylase Level


  


  


  72 U/L


() 


 


 


Carcinoembryonic Antigen   12.2 ng/mL  H 


 


CA 19-9 Antigen   Pending   


 


Free Thyroxine   Pending   


 


Free Triiodothyronine   Pending   


 


Urine Color    Yellow  


 


Urine Appearance    Clear  


 


Urine pH    8 (4.5-8.0)  


 


Urine Specific Gravity


  


  


  


  1.010


(1.005-1.035)


 


Urine Protein


  


  


  


  Negative


(NEGATIVE)


 


Urine Glucose (UA)


  


  


  


  Negative


(NEGATIVE)


 


Urine Ketones


  


  


  


  Negative


(NEGATIVE)


 


Urine Occult Blood


  


  


  


  2+ (NEGATIVE)


H


 


Urine Nitrite


  


  


  


  Negative


(NEGATIVE)


 


Urine Bilirubin


  


  


  


  Negative


(NEGATIVE)


 


Urine Urobilinogen


  


  


  


  Normal MG/DL


(0.0-1.0)


 


Urine Leukocyte Esterase


  


  


  


  1+ (NEGATIVE)


H


 


Urine RBC    Pending  


 


Urine WBC    Pending  


 


Urine Squamous Epithelial


Cells 


  


  


  Pending  


 


 


Urine Bacteria    Pending  


 


Urine Opiates Screen    Pending  


 


Urine Barbiturates Screen    Pending  


 


Phencyclidine (PCP) Screen    Pending  


 


Urine Amphetamines Screen    Pending  


 


Urine Benzodiazepines Screen    Pending  


 


Urine Cocaine Screen    Pending  


 


Urine Marijuana (THC) Screen    Pending  








Height (Feet):  5


Height (Inches):  6.00


Weight (Pounds):  130


Medications





Current Medications








 Medications


  (Trade)  Dose


 Ordered  Sig/Jed


 Route


 PRN Reason  Start Time


 Stop Time Status Last Admin


Dose Admin


 


 Acetaminophen


  (Tylenol)  650 mg  Q4H  PRN


 ORAL


 fever  10/4/17 16:00


 11/3/17 15:59   


 


 


 Al Hydroxide/Mg


 Hydroxide


  (Mylanta II)  30 ml  Q6H  PRN


 ORAL


 dyspepsia  10/4/17 16:00


 11/3/17 15:59   


 


 


 Dextrose


  (Dextrose 50%)    STAT  PRN


 IV


 Hypoglycemia  10/4/17 16:00


 11/3/17 15:59   


 


 


 Dextrose/Sodium


 Chloride  1,000 ml @ 


 100 mls/hr  Q10H


 IV


   10/4/17 17:30


 11/3/17 17:29  10/5/17 03:58


 


 


 Diphenhydramine


 HCl


  (Benadryl)  25 mg  Q6H  PRN


 ORAL


 Itching/Pruritis  10/4/17 16:00


 11/3/17 15:59   


 


 


 Levothyroxine


 Sodium


  (Synthroid)  75 mcg  DAILY@0630


 ORAL


   10/6/17 06:30


 11/5/17 06:29   


 


 


 Mesalamine


  (Asacol)  800 mg  THREE TIMES A  DAY


 ORAL


   10/5/17 09:00


 11/4/17 08:59  10/5/17 08:41


 


 


 Morphine Sulfate


  (Morphine


 Sulfate)  2 mg  Q4H  PRN


 IVP


 severe  Pain (Pain Scale 7-10)  10/4/17 16:00


 10/11/17 15:59  10/5/17 08:40


 


 


 Nitroglycerin


  (Ntg)  0.4 mg  Q5M X 3 DOSES PRN


 SL


 Prn Chest Pain  10/4/17 16:00


 11/3/17 15:59   


 


 


 Ondansetron HCl


  (Zofran)  4 mg  Q6H  PRN


 IVP


 Nausea & Vomiting  10/4/17 16:00


 11/3/17 15:59   


 


 


 Pantoprazole


  (Protonix)  40 mg  DAILY


 IVP


   10/5/17 09:00


 11/4/17 08:59  10/5/17 08:41


 


 


 Polyethylene


 Glycol


  (Miralax)  17 gm  HSPRN  PRN


 ORAL


 Constipation  10/4/17 16:00


 11/3/17 15:59   


 


 


 Temazepam


  (Restoril)  15 mg  HSPRN  PRN


 ORAL


 Insomnia  10/4/17 16:00


 10/11/17 15:59   


 











Assessment/Plan


Problem List:  


(1) Pre-syncope


ICD Codes:  R55 - Syncope and collapse


SNOMED:  959385462


(2) IBD (inflammatory bowel disease)


ICD Codes:  K63.89 - Other specified diseases of intestine


SNOMED:  61555090


(3) Chronic pain syndrome


ICD Codes:  G89.4 - Chronic pain syndrome


SNOMED:  136611594


(4) Chronic pancreatitis


ICD Codes:  K86.1 - Other chronic pancreatitis


SNOMED:  415035697


(5) COPD (chronic obstructive pulmonary disease)


ICD Codes:  J44.9 - Chronic obstructive pulmonary disease, unspecified


SNOMED:  98646070


(6) Interstitial lung disease


ICD Codes:  J84.9 - Interstitial pulmonary disease, unspecified


SNOMED:  59534239, 061099851


(7) Elevated CEA


ICD Codes:  R97.0 - Elevated CEA


SNOMED:  018963949


Assessment/Plan


telemetry monitoring


check electrolytes


echo 


cardiology evaluation


respiratory treatment


pain management











JULIETTE KLEIN Oct 5, 2017 11:31

## 2017-10-06 VITALS — DIASTOLIC BLOOD PRESSURE: 59 MMHG | SYSTOLIC BLOOD PRESSURE: 100 MMHG

## 2017-10-06 VITALS — SYSTOLIC BLOOD PRESSURE: 96 MMHG | DIASTOLIC BLOOD PRESSURE: 56 MMHG

## 2017-10-06 VITALS — DIASTOLIC BLOOD PRESSURE: 52 MMHG | SYSTOLIC BLOOD PRESSURE: 86 MMHG

## 2017-10-06 VITALS — SYSTOLIC BLOOD PRESSURE: 96 MMHG | DIASTOLIC BLOOD PRESSURE: 54 MMHG

## 2017-10-06 LAB
ANION GAP SERPL CALC-SCNC: 10 MMOL/L (ref 5–15)
BASOPHILS NFR BLD AUTO: 0.7 % (ref 0–2)
CALCIUM SERPL-MCNC: 9.3 MG/DL (ref 8.6–10.2)
CHLORIDE SERPL-SCNC: 103 MEQ/L (ref 98–107)
CO2 SERPL-SCNC: 26 MEQ/L (ref 20–30)
CREAT SERPL-MCNC: 0.6 MG/DL (ref 0.5–0.9)
EOSINOPHIL NFR BLD AUTO: 2.6 % (ref 0–3)
ERYTHROCYTE [DISTWIDTH] IN BLOOD BY AUTOMATED COUNT: 15.6 % (ref 11.6–14.8)
GFR SERPLBLD BASED ON 1.73 SQ M-ARVRAT: > 60 ML/MIN (ref 60–?)
HEMOLYSIS: 27
LYMPHOCYTES NFR BLD AUTO: 23.6 % (ref 20–45)
MAGNESIUM SERPL-MCNC: 1.8 MG/DL (ref 1.7–2.5)
MCH RBC QN AUTO: 26.7 PG (ref 27–31)
MCHC RBC AUTO-ENTMCNC: 30.8 G/DL (ref 32–36)
MCV RBC AUTO: 87 FL (ref 80–99)
MONOCYTES NFR BLD AUTO: 12.5 % (ref 1–10)
NEUTROPHILS NFR BLD AUTO: 60.6 % (ref 45–75)
PHOSPHATE SERPL-MCNC: 4.6 MG/DL (ref 2.5–4.8)
PLATELET # BLD: 311 K/UL (ref 150–450)
PMV BLD AUTO: 6.2 FL (ref 6.5–10.1)
POTASSIUM SERPL-SCNC: 4.2 MEQ/L (ref 3.4–4.9)
RBC # BLD AUTO: 4.07 M/UL (ref 4.2–5.4)
SODIUM SERPL-SCNC: 139 MEQ/L (ref 135–145)
WBC # BLD AUTO: 8.1 K/UL (ref 4.8–10.8)

## 2017-10-06 RX ADMIN — HUMAN INSULIN SCH MLS/HR: 100 INJECTION, SOLUTION SUBCUTANEOUS at 12:25

## 2017-10-06 NOTE — CONSULTATION
DATE OF CONSULTATION:  10/05/2017



CONSULTING PHYSICIAN:  Erick Russell M.D.



REQUESTING PHYSICIAN:  Maxim Hopkins D.O.



REASON FOR CONSULTATION:  Evaluation of anemia.



IDENTIFICATION DATA:  Dear Dr. Maxim Hopkins:



The patient is a pleasant 62-year-old female with past medical history

significant for Crohn disease, hypertension, history of multiple

infections in the past _____ increased _____ episode.  Denies any

diarrhea.  Denies any fevers.  _____ Service was consulted for further

evaluation.



Past Medical History:  Elevated CEA, _____, Crohn disease, chronic

pancreatitis, and chronic pain syndrome.



PAST SURGICAL HISTORY:  _____.



MEDICATIONS:  MiraLAX, azithromycin, _____ Keppra _____.



ALLERGIES:  _____ No known drug allergies.



Review Of Systems:  Review of systems is otherwise negative except as

mentioned in the history of present illness.



PHYSICAL EXAMINATION:

VITAL SIGNS:  Reviewed.

GENERAL:  The patient is cachectic and ill-appearing.

PULMONARY:  Decreased breath sounds.

CARDIOVASCULAR:  Regular rate.  No S3 or S4.

ABDOMEN:  Soft, nontender, and nondistended.

EXTREMITIES:  There is 1+ edema.



Laboratory Data:  WBC of 8.6, hemoglobin _____, hematocrit 38, and platelet

count _____.  CA 19-9 is pending.  CEA is 12.2.



ASSESSMENT AND PLAN:

1. Elevated CEA level.  Recommend GI service consultation, seeing the

patient today.

2. History of anemia and Crohn disease.  The patient is given iron

treatment.

3. Anemia _____ iron deficiency.  Reviewed patient's labs again.  _____

saturation is 12, TIBC 275, therefore it is appropriate the patient gets

IV iron.

4. Elevated CA 19-9.  Imaging has been reviewed with abdomen and pelvis

CAT scan back in May 2017 postsurgical abdominal surgery.  At this time,

obtain ultrasound of the abdomen.

5. Crohn disease.  Continue to closely monitor _____.



I appreciate the consultation.









  ______________________________________________

  Erick Russell M.D.





DR:  PABLO

D:  10/05/2017 22:04

T:  10/06/2017 03:52

JOB#:  [id number]

CC:

## 2017-10-06 NOTE — DIAGNOSTIC IMAGING REPORT
--------------- APPROVED REPORT --------------





CPT Code: 95691



Present Symptoms

Lower Extremity Pain:   





BILATERAL: Imaging reveals a patent deep venous system bilaterally. There is no evidence 

of thrombus within the femoral, popliteal or tibial segments. The greater saphenous veins 

are also within normal limits. Doppler indicates normal spontaneous flow within these 

segments.

## 2017-10-06 NOTE — PULMONOLOGY PROGRESS NOTE
Assessment/Plan


Problems:  


(1) Pre-syncope


(2) COPD (chronic obstructive pulmonary disease)


(3) Interstitial lung disease


(4) Elevated CEA


(5) Chronic pancreatitis


(6) Chronic pain syndrome


(7) IBD (inflammatory bowel disease)


Assessment/Plan


respiratory treatment


check electrolytes


teli records reviewed


pain management





Subjective


ROS Limited/Unobtainable:  No


Constitutional:  Reports: fatigue, anorexia


Respiratory:  Reports: productive cough, shortness of breath, dyspnea on 

exertion


Gastrointestinal/Abdominal:  Reports: nausea, bloating


Allergies:  


Coded Allergies:  


     No Known Allergies (Verified , 10/27/06)





Objective





Last 24 Hour Vital Signs








  Date Time  Temp Pulse Resp B/P (MAP) Pulse Ox O2 Delivery O2 Flow Rate FiO2


 


10/6/17 12:00 98.6 80 20 96/54 97 Room Air  


 


10/6/17 08:00 97.9 84 20 96/56 94 Room Air  


 


10/6/17 04:00 97.9 74 20 86/52 91 Room Air  


 


10/6/17 00:00 98.4 82 18 100/59 93 Room Air  


 


10/5/17 20:00 98.6 80 20 96/54 97 Room Air  

















Intake and Output  


 


 10/6/17 10/7/17





 19:00 07:00


 


Intake Total 150 ml 


 


Balance 150 ml 


 


  


 


IV Total 150 ml 








General Appearance:  no acute distress


HEENT:  normocephalic, atraumatic, PERRL


Respiratory/Chest:  chest wall non-tender, decreased breath sounds, accessory 

muscle use, rhonchi, expiratory wheezing, inspiratory wheezing


Breasts:  no masses


Cardiovascular:  normal peripheral pulses, normal rate, regular rhythm, no JVD


Abdomen:  hyperactive bowel sounds, distended, rebound tenderness


Genitourinary:  normal external genitalia


Extremities:  no cyanosis


Skin:  no rash


Neurologic/Psychiatric:  CNs II-XII grossly normal, no motor/sensory deficits


Laboratory Tests


10/6/17 05:20: 


White Blood Count 8.1, Red Blood Count 4.07L, Hemoglobin 10.9L, Hematocrit 35.3L

, Mean Corpuscular Volume 87, Mean Corpuscular Hemoglobin 26.7L, Mean 

Corpuscular Hemoglobin Concent 30.8L, Red Cell Distribution Width 15.6H, 

Platelet Count 311, Mean Platelet Volume 6.2L, Neutrophils (%) (Auto) 60.6, 

Lymphocytes (%) (Auto) 23.6, Monocytes (%) (Auto) 12.5H, Eosinophils (%) (Auto) 

2.6, Basophils (%) (Auto) 0.7, Erythrocyte Sedimentation Rate 25, Sodium Level 

139, Potassium Level 4.2, Chloride Level 103, Carbon Dioxide Level 26, Anion 

Gap 10, Blood Urea Nitrogen 14, Creatinine 0.6, Estimat Glomerular Filtration 

Rate > 60, Glucose Level 90, Calcium Level 9.3, Phosphorus Level 4.6, Magnesium 

Level 1.8, C-Reactive Protein, Quantitative 1.1H


10/6/17 12:00: 


Urine Random Creatinine [Pending], Urine Random Microalbumin [Pending], Urine 

Random Total Protein 9, Urine Random Sodium 126, Urine Microalbumin/Creatinine 

Ratio [Pending], Urine Potassium Timed 25











JULIETTE KLEIN Oct 6, 2017 18:48

## 2017-10-06 NOTE — GI PROGRESS NOTE
Assessment/Plan


Problems:  


(1) History of exploratory laparotomy


ICD Codes:  Z98.89 - Other specified postprocedural states


SNOMED:  51084305, 76886534, 188208540


(2) Anemia


(3) Elevated CEA


ICD Codes:  R97.0 - Elevated CEA


SNOMED:  848224762


(4) IBD (inflammatory bowel disease)


ICD Codes:  K63.89 - Other specified diseases of intestine


SNOMED:  97768500


(5) Chronic pain syndrome


ICD Codes:  G89.4 - Chronic pain syndrome


SNOMED:  910771670


(6) Crohn's disease


ICD Codes:  K50.90 - Crohn's disease


SNOMED:  97956877


Status:  stable


Status Narrative


Discussed with Dr. Mcleod.


Assessment/Plan


s/p EGD/colon 2016 >> , crohns


non functional implanted morphine pump LLQ


CEA elevation >> 12


 elevation >> 52


ESR WNL


C Reactive mild elevation


iron deficiency 





ok for DC per GI standpoint


symptomatic treatment at this time


adv to regular diet


OB stool r/o GI bleed


mesalamine for Crohns as inpatient,  rx for Pentasa when discharged (this med 

not avail in the hospital)


electrolyte replacement


pain mgmt


ppi


Imodium prn, consider lomotil if diarrhea persists


fu as outpatient





Subjective


Subjective


no complaints


chronic abdominal pain





Objective





Last 24 Hour Vital Signs








  Date Time  Temp Pulse Resp B/P (MAP) Pulse Ox O2 Delivery O2 Flow Rate FiO2


 


10/6/17 08:00 97.9 84 20 96/56 94 Room Air  


 


10/6/17 04:00 97.9 74 20 86/52 91 Room Air  


 


10/6/17 00:00 98.4 82 18 100/59 93 Room Air  


 


10/5/17 20:00 98.6 80 20 96/54 97 Room Air  


 


10/5/17 16:14 98.2 81 20 108/67 96 Room Air  


 


10/5/17 12:00  82      


 


10/5/17 11:40 97.5 80 18 104/66 100 Room Air  











Laboratory Tests








Test


  10/6/17


05:20


 


White Blood Count


  8.1 K/UL


(4.8-10.8)


 


Red Blood Count


  4.07 M/UL


(4.20-5.40)  L


 


Hemoglobin


  10.9 G/DL


(12.0-16.0)  L


 


Hematocrit


  35.3 %


(37.0-47.0)  L


 


Mean Corpuscular Volume 87 FL (80-99)  


 


Mean Corpuscular Hemoglobin


  26.7 PG


(27.0-31.0)  L


 


Mean Corpuscular Hemoglobin


Concent 30.8 G/DL


(32.0-36.0)  L


 


Red Cell Distribution Width


  15.6 %


(11.6-14.8)  H


 


Platelet Count


  311 K/UL


(150-450)


 


Mean Platelet Volume


  6.2 FL


(6.5-10.1)  L


 


Neutrophils (%) (Auto)


  60.6 %


(45.0-75.0)


 


Lymphocytes (%) (Auto)


  23.6 %


(20.0-45.0)


 


Monocytes (%) (Auto)


  12.5 %


(1.0-10.0)  H


 


Eosinophils (%) (Auto)


  2.6 %


(0.0-3.0)


 


Basophils (%) (Auto)


  0.7 %


(0.0-2.0)


 


Erythrocyte Sedimentation Rate


  25 MM/HR


(0-30)


 


Sodium Level


  139 mEQ/L


(135-145)


 


Potassium Level


  4.2 mEQ/L


(3.4-4.9)


 


Chloride Level


  103 mEQ/L


()


 


Carbon Dioxide Level


  26 mEQ/L


(20-30)


 


Anion Gap 10 (5-15)  


 


Blood Urea Nitrogen


  14 mg/dL


(7-23)


 


Creatinine


  0.6 mg/dL


(0.5-0.9)


 


Estimat Glomerular Filtration


Rate > 60 mL/min


(>60)


 


Glucose Level


  90 mg/dL


()


 


Calcium Level


  9.3 mg/dL


(8.6-10.2)


 


Phosphorus Level


  4.6 mg/dL


(2.5-4.8)


 


Magnesium Level


  1.8 mg/dL


(1.7-2.5)


 


C-Reactive Protein,


Quantitative 1.1 mg/dL (<


0.5)  H








Height (Feet):  5


Height (Inches):  6.00


Weight (Pounds):  130


General Appearance:  no apparent distress, alert


Cardiovascular:  normal rate


Respiratory/Chest:  normal breath sounds, no respiratory distress


Abdominal Exam:  normal bowel sounds, non tender, soft


Extremities:  normal range of motion











Elena Seya N.P. Oct 6, 2017 10:51

## 2017-10-06 NOTE — GENERAL PROGRESS NOTE
Assessment/Plan


Assessment/Plan


ASSESSMENT AND PLAN:


1. Elevated CEA level.  Has been cleared per GI, to follow up as outpatient.


2. Anemia 2/2  iron deficiency.Saturation is 12, TIBC 275, therefore it is 

appropriate the patient gets IV iron.


3. Elevated CA 19-9.  Imaging has been reviewed with abdomen and pelvis CAT 

scan back in May 2017 postsurgical abdominal surgery.  


4. Crohn's disease.





Subjective


Allergies:  


Coded Allergies:  


     No Known Allergies (Verified , 10/27/06)





Objective





Last 24 Hour Vital Signs








  Date Time  Temp Pulse Resp B/P (MAP) Pulse Ox O2 Delivery O2 Flow Rate FiO2


 


10/6/17 12:00 98.6 80 20 96/54 97 Room Air  


 


10/6/17 08:00 97.9 84 20 96/56 94 Room Air  


 


10/6/17 04:00 97.9 74 20 86/52 91 Room Air  


 


10/6/17 00:00 98.4 82 18 100/59 93 Room Air  

















Intake and Output  


 


 10/6/17 10/7/17





 19:00 07:00


 


Intake Total 150 ml 


 


Balance 150 ml 


 


  


 


IV Total 150 ml 








Laboratory Tests


10/6/17 05:20: 


White Blood Count 8.1, Red Blood Count 4.07L, Hemoglobin 10.9L, Hematocrit 35.3L

, Mean Corpuscular Volume 87, Mean Corpuscular Hemoglobin 26.7L, Mean 

Corpuscular Hemoglobin Concent 30.8L, Red Cell Distribution Width 15.6H, 

Platelet Count 311, Mean Platelet Volume 6.2L, Neutrophils (%) (Auto) 60.6, 

Lymphocytes (%) (Auto) 23.6, Monocytes (%) (Auto) 12.5H, Eosinophils (%) (Auto) 

2.6, Basophils (%) (Auto) 0.7, Erythrocyte Sedimentation Rate 25, Sodium Level 

139, Potassium Level 4.2, Chloride Level 103, Carbon Dioxide Level 26, Anion 

Gap 10, Blood Urea Nitrogen 14, Creatinine 0.6, Estimat Glomerular Filtration 

Rate > 60, Glucose Level 90, Calcium Level 9.3, Phosphorus Level 4.6, Magnesium 

Level 1.8, C-Reactive Protein, Quantitative 1.1H


10/6/17 12:00: 


Urine Random Creatinine [Pending], Urine Random Microalbumin [Pending], Urine 

Random Total Protein 9, Urine Random Sodium 126, Urine Microalbumin/Creatinine 

Ratio [Pending], Urine Potassium Timed 25


Height (Feet):  5


Height (Inches):  6.00


Weight (Pounds):  130











Erick Russell Oct 6, 2017 22:25

## 2017-10-09 NOTE — DISCHARGE SUMMARY
Discharge Summary


Hospital Course


Date of Admission


Oct 4, 2017 at 15:48


Date of Discharge


Oct 6, 2017 at 16:00


Admitting Diagnosis


NON-STEMI


HPI


Radha Gaviria is a 62 year old female who was admitted on Oct 4, 2017 at 15:48 

for Non-St-Elevation Myocardial Infarction


Hospital Course


dc summary #[id number]





Discharge Medications


Continued Medications:  


Acetaminophen* (Acetaminophen 325MG Tablet*) 325 Mg Tablet


650 MG ORAL Q4H PRN for Fever/Headache/Mild Pain, TAB





Al Hydroxide/mg Hydroxide (Mag-Al Plus Suspension) 30 Ml Oral.susp


30 ML PO Q6HR for Constipation, ML





Diphenhydramine HCl (Benadryl) 25 Mg Capsule


25 MG PO Q6HR PRN for Itching, CAP





Duloxetine Hcl* (Cymbalta*) 60 Mg Capsule.dr


60 MG ORAL DAILY, CAP





Levothyroxine Sodium* (Levothyroxine Sodium*) 75 Mcg Tablet


75 MCG ORAL ACBREAKFAST, TAB


Take in the morning on an empty stomach, at least 30 minutes before


 food.


Mesalamine (Asacol Hd) 800 Mg Tablet.dr


800 MG ORAL THREE TIMES A DAY, TAB


Do not break outer coating


Mirtazapine* (Mirtazapine*) 15 Mg Tablet


15 MG ORAL BEDTIME, TAB





Nitroglycerin (Nitroglycerin) 0.4 Mg Tab.subl


0.4 MG SL [id number] PRN for Prn Chest Pain, TAB





Ondansetron* (Zofran*) 4 Mg Tablet


4 MG ORAL Q6H PRN for Nausea & Vomiting, TAB





Pantoprazole* (Protonix*) 40 Mg Tablet.dr


40 MG ORAL DAILY, TAB





Polyethylene Glycol 3350* (Miralax*) 17 Gm Powd.pack


17 GM ORAL BEDTIME, PACKET





Quetiapine Fumarate (Seroquel) 400 Mg Tablet


200 MG ORAL QHS, #15 TAB 0 Refills





Temazepam* (Restoril*) 15 Mg Capsule


15 MG ORAL BEDTIME PRN for Insomnia, CAP





Trazodone* (Trazodone*) 150 Mg Tablet


150 MG ORAL BEDTIME, TAB





 


Discontinued Medications:  


Mirtazapine* (Mirtazapine*) 15 Mg Tablet


30 MG ORAL BEDTIME, TAB





Quetiapine Fumarate* (Seroquel*) 200 Mg Tablet


100 MG ORAL BEDTIME, TAB





Trazodone* (Trazodone*) 150 Mg Tablet


200 MG ORAL BEDTIME PRN for Insomnia, TAB











Discharge


Condition Upon Discharge:  stable


Discharge Disposition


Patient was discharged to Home with Home Health([id number])


Discharge Diagnoses:  





Discharge Instructions


Discharge Instructions


Special Instructions


I have been assigned to complete a D/C Summary on this account. I was not 

involved in the patient management











Rozina Henry NP ([facility]) Oct 9, 2017 12:21

## 2017-10-09 NOTE — CARDIOLOGY REPORT
--------------- APPROVED REPORT --------------





EXAM: Two-dimensional and M-mode echocardiogram with Doppler and color 

Doppler.



INDICATION

Left Ventricular Function



M-Mode DIMENSIONS 

IVSd1.0 (0.7-1.1cm)Left Atrium (MM)4.0 (1.6-4.0cm)

LVDd5.4 (3.5-5.6cm)Aortic Root3.1 (2.0-3.7cm)

PWd0.9 (0.7-1.1cm)Aortic Cusp Exc.1.8 (1.5-2.0cm)



LVDs3.1 (2.5-4.0cm)

PWs1.8 cm





Technically difficult study due to poor acoustic windows. 

Normal left ventricular chamber size, systolic function and wall motion.

Left ventricular ejection fraction estimated to be 55 %.

No evidence of left ventricular hypertrophy.

No evidence of pericardial effusion. 

All other cardiac chamber sizes are within normal limits. 

Mild focal aortic valve sclerosis with adequate cusp excursion.

Mildly thickened mitral valve leaflets with normal excursion.

Mild mitral annulus and aortic root calcification.

Pulmonic valve not well visualized.

Normal tricuspid valve structure. 

IVC dilated at 2.2 cm and partial-collapsing with respiration suggestive of increased RA 

pressure.



A  color flow and spectral Doppler study was performed and revealed:

No aortic insufficiency.

Trace mitral regurgitation.

Mitral diastolic velocities suggest reduced left ventricular relaxation c/w impaired 

relaxation grade one diastolic dysfunction.

Trace tricuspid regurgitation.

Tricuspid  systolic velocities suggests peak right ventricular systolic pressure of 41 

mmHg, consistent with mild pulmonary hypertension.

No pulmonic regurgitation present.

## 2017-10-10 NOTE — DISCHARGE SUMMARY 2 SIG
DATE OF ADMISSION:  10/04/2017



DATE OF DISCHARGE:  10/06/2017



CONSULTANTS:

1. Delon Harris M.D., Cardiologist.

2. Peyton French M.D., Nephrologist.

3. Blaze Fraga M.D., Pulmonologist.

4. Violet Dior M.D., Infectious Disease.

5. Samantha Rick M.D., Psychiatrist.

6. Erick Russell M.D., Hematologist.

7. Dimitry Mcleod M.D., Gastroenterologist.



BRIEF HOSPITAL STAY:    62-year-old female with a history of

Crohn disease, seizure disorder, chronic pain syndrome, and bipolar

disorder, presented to emergency room with generalized weakness.  She

reported feeling like she was about to pass out.  She also had nausea but

denied any vomiting.  No blood in the stool.  BUN was slightly

elevated.  Pro BNP was elevated.  The patient admitted for further

management.  Cardiologist seen and evaluated the patient.  Echocardiogram

revealed preserved ejection fraction of 55%, right ventricular systolic

pressure of 41 consistent with mild pulmonary hypertension.  The patient

was on the low dose of diuretic.  Initial chest x-ray revealed

interstitial congestion.  Renal parameters and  electrolytes were closely

monitored.  BUN down to 14.  Prerenal azotemia resolved likely

secondary to dehydration.  Cardiologist diagnosed the patient with acute

diastolic congestive heart failure exacerbation and recommended to

continue low dose of diuretic. Cardiologist concluded that  presyncope symptoms

were likely secondary to dehydration.  

The patient  was noted to have a severely elevated TSH- 61.  The patient 

was started on levothyroxine.  Check thyroid panel in three to four weeks.

Supplemental oxygen was ordered as needed to keep saturation above 92%.

Pulse oximetry was stable on room air, no need for supplemental oxygen.

Pulmonary toilet was provided on as needed basis.  Pain management was 
addressed.  

Hemoglobin and hematocrit were closely monitored.  Anemia workup revealed iron

deficiency anemia.  The patient was on IV Venofer.  The patient also noted to

have elevated tumor markers.  Elevated CEA =12.2, elevated CA 19-9 - 52.16.  

Gastrointestinal consult was requested.  Gastroenterologist seen and evaluated 

the patient.  ESR within normal limits. C-reactive protein with mild elevation.
  

The patient with a known history of Crohn disease.  The patient had  in 2016 

esophagogastroduodenoscopy and colonoscopy.  The patient received IV iron.  

Prescription for Pentasa was given when the patient was discharged.  While in 

the hospital, the patient was on mesalamine.  Diet was advanced as tolerated.  

Patient was able to tolerate diet. Symptomatic treatment was  provided.  
Antiemetic 

provided as needed.  Electrolytes were replaced as needed.  The patient was on 
PPI, 

Imodium was given as needed for diarrhea.  The patient to follow up as 
outpatient 

with GI specialist.  Venous duplex of bilateral lower extremities  was 
negative.  

DVT prophylaxis provided.  X-ray of the hip and pelvis was negative for any 
acute injury.  

The patient had an abdominal pelvis CT done in May, which revealed no evidence 
of

malignancy.  Infectious Disease  specialist seen and evaluated

the patient.  Initially was a question about possible urinary tract infection.

Urinalysis revealed +1 leukocyte esterase and 5 to 10 WBC, but

the patient was asymptomatic, afebrile and was kept off antibiotics.

Psychiatrist seen and evaluated the patient and diagnosed her

with bipolar disorder type 2, He  optimized psychiatric medication regimen.

Patient clinically improved. The patient was stable for discharge home with

home health services.



FINAL DIAGNOSES:

1. Acute diastolic congestive heart failure exacerbation.

2. Crohn disease.

3. Presyncope, secondary to dehydration.

4. Prerenal azotemia, due to dehydration - resolved.

5. Severe hypothyroidism.

6. Chronic pain syndrome.

7. Chronic pancreatitis.

8. Chronic obstructive pulmonary disease.

9. Interstitial lung disease.

10. Elevated CEA and CA 19-9.

11. Iron-deficiency anemia.

12. Bipolar type 2.









DISCHARGE MEDICATIONS:  See medication reconciliation list.



DISCHARGE INSTRUCTIONS:  The patient was  discharged home with home health

services.  Follow up with primary medical doctor.







  ______________________________________________

  Maxim Hopkins D.O.



I have been assigned to dictate discharge summary on this account and I

was not involved in the patient's management.



  ______________________________________________

  Rozina MeltonNorth[facility]Magdy N.P.





DR:  JÚNIOR

D:  10/09/2017 12:20

T:  10/10/2017 03:53

JOB#:  [id number]

CC:



MEL

## 2017-10-12 NOTE — CARDIOLOGY REPORT
--------------- APPROVED REPORT --------------





EKG Measurement

Heart [id number]

WV [id number]

[id number]

[id number]

[id number]





Normal sinus rhythm

Normal ECG

## 2017-12-02 ENCOUNTER — HOSPITAL ENCOUNTER (INPATIENT)
Dept: HOSPITAL 72 - EMR | Age: 62
LOS: 3 days | Discharge: HOME HEALTH | DRG: 190 | End: 2017-12-05
Payer: MEDICARE

## 2017-12-02 VITALS — WEIGHT: 144 LBS | HEIGHT: 66 IN | BODY MASS INDEX: 23.14 KG/M2

## 2017-12-02 VITALS — SYSTOLIC BLOOD PRESSURE: 117 MMHG | DIASTOLIC BLOOD PRESSURE: 77 MMHG

## 2017-12-02 VITALS — SYSTOLIC BLOOD PRESSURE: 136 MMHG | DIASTOLIC BLOOD PRESSURE: 69 MMHG

## 2017-12-02 VITALS — DIASTOLIC BLOOD PRESSURE: 76 MMHG | SYSTOLIC BLOOD PRESSURE: 121 MMHG

## 2017-12-02 DIAGNOSIS — F32.9: ICD-10-CM

## 2017-12-02 DIAGNOSIS — K59.03: ICD-10-CM

## 2017-12-02 DIAGNOSIS — K50.90: ICD-10-CM

## 2017-12-02 DIAGNOSIS — N39.0: ICD-10-CM

## 2017-12-02 DIAGNOSIS — Z86.73: ICD-10-CM

## 2017-12-02 DIAGNOSIS — D64.9: ICD-10-CM

## 2017-12-02 DIAGNOSIS — T40.605A: ICD-10-CM

## 2017-12-02 DIAGNOSIS — Z59.0: ICD-10-CM

## 2017-12-02 DIAGNOSIS — D72.829: ICD-10-CM

## 2017-12-02 DIAGNOSIS — R10.9: ICD-10-CM

## 2017-12-02 DIAGNOSIS — M54.5: ICD-10-CM

## 2017-12-02 DIAGNOSIS — J44.1: Primary | ICD-10-CM

## 2017-12-02 DIAGNOSIS — T38.0X5A: ICD-10-CM

## 2017-12-02 DIAGNOSIS — E46: ICD-10-CM

## 2017-12-02 DIAGNOSIS — F31.81: ICD-10-CM

## 2017-12-02 DIAGNOSIS — J18.9: ICD-10-CM

## 2017-12-02 DIAGNOSIS — G89.4: ICD-10-CM

## 2017-12-02 DIAGNOSIS — F17.200: ICD-10-CM

## 2017-12-02 DIAGNOSIS — G40.909: ICD-10-CM

## 2017-12-02 DIAGNOSIS — E03.9: ICD-10-CM

## 2017-12-02 DIAGNOSIS — J44.0: ICD-10-CM

## 2017-12-02 LAB
ALBUMIN/GLOB SERPL: 0.4 {RATIO} (ref 1–2.7)
ALT SERPL-CCNC: 13 U/L (ref 12–78)
ANION GAP SERPL CALC-SCNC: 5 MMOL/L (ref 5–15)
APPEARANCE UR: CLEAR
ARTERIAL PATENCY WRIST A: POSITIVE
AST SERPL-CCNC: 18 U/L (ref 15–37)
BACTERIA #/AREA URNS HPF: (no result) /HPF
BASE EXCESS STD BLDA CALC-SCNC: 2.6 MMOL/L
BASOPHILS NFR BLD AUTO: 0.5 % (ref 0–2)
CALCIUM SERPL-MCNC: 9.1 MG/DL (ref 8.5–10.1)
CHLORIDE SERPL-SCNC: 101 MMOL/L (ref 98–107)
CO2 SERPL-SCNC: 30 MMOL/L (ref 21–32)
CREAT SERPL-MCNC: 0.8 MG/DL (ref 0.55–1.3)
EOSINOPHIL NFR BLD AUTO: 1.5 % (ref 0–3)
ERYTHROCYTE [DISTWIDTH] IN BLOOD BY AUTOMATED COUNT: 14.5 % (ref 11.6–14.8)
GFR SERPLBLD BASED ON 1.73 SQ M-ARVRAT: > 60 ML/MIN (ref 60–?)
GLOBULIN SER-MCNC: 6.7 G/DL
KETONES UR QL STRIP: NEGATIVE
LEUKOCYTE ESTERASE UR QL STRIP: (no result)
LIPASE SERPL-CCNC: 57 U/L (ref 73–393)
LYMPHOCYTES NFR BLD AUTO: 26.2 % (ref 20–45)
MCH RBC QN AUTO: 26.4 PG (ref 27–31)
MCHC RBC AUTO-ENTMCNC: 30.3 G/DL (ref 32–36)
MCV RBC AUTO: 87 FL (ref 80–99)
MONOCYTES NFR BLD AUTO: 11.1 % (ref 1–10)
MUCOUS THREADS #/AREA URNS LPF: (no result) /LPF
NEUTROPHILS NFR BLD AUTO: 60.6 % (ref 45–75)
NITRITE UR QL STRIP: NEGATIVE
PCO2 BLDA: 44.6 MMHG (ref 35–45)
PH UR STRIP: 7 [PH] (ref 4.5–8)
PLATELET # BLD: 309 K/UL (ref 150–450)
PMV BLD AUTO: 7.6 FL (ref 6.5–10.1)
POTASSIUM SERPL-SCNC: 3.9 MMOL/L (ref 3.5–5.1)
PROT SERPL-MCNC: 9.3 G/DL (ref 6.4–8.2)
PROT UR QL STRIP: (no result)
RBC # BLD AUTO: 4.46 M/UL (ref 4.2–5.4)
RBC #/AREA URNS HPF: (no result) /HPF (ref 0–2)
SODIUM SERPL-SCNC: 136 MMOL/L (ref 136–145)
SP GR UR STRIP: 1.01 (ref 1–1.03)
SQUAMOUS #/AREA URNS LPF: (no result) /LPF
UROBILINOGEN UR-MCNC: NORMAL MG/DL (ref 0–1)
WBC # BLD AUTO: 9.4 K/UL (ref 4.8–10.8)
WBC #/AREA URNS HPF: (no result) /HPF (ref 0–2)

## 2017-12-02 PROCEDURE — 99291 CRITICAL CARE FIRST HOUR: CPT

## 2017-12-02 PROCEDURE — 80048 BASIC METABOLIC PNL TOTAL CA: CPT

## 2017-12-02 PROCEDURE — 85651 RBC SED RATE NONAUTOMATED: CPT

## 2017-12-02 PROCEDURE — 70450 CT HEAD/BRAIN W/O DYE: CPT

## 2017-12-02 PROCEDURE — 83540 ASSAY OF IRON: CPT

## 2017-12-02 PROCEDURE — 71010: CPT

## 2017-12-02 PROCEDURE — 84443 ASSAY THYROID STIM HORMONE: CPT

## 2017-12-02 PROCEDURE — 82962 GLUCOSE BLOOD TEST: CPT

## 2017-12-02 PROCEDURE — 85025 COMPLETE CBC W/AUTO DIFF WBC: CPT

## 2017-12-02 PROCEDURE — 97802 MEDICAL NUTRITION INDIV IN: CPT

## 2017-12-02 PROCEDURE — 80053 COMPREHEN METABOLIC PANEL: CPT

## 2017-12-02 PROCEDURE — 83550 IRON BINDING TEST: CPT

## 2017-12-02 PROCEDURE — 36600 WITHDRAWAL OF ARTERIAL BLOOD: CPT

## 2017-12-02 PROCEDURE — 83690 ASSAY OF LIPASE: CPT

## 2017-12-02 PROCEDURE — 84484 ASSAY OF TROPONIN QUANT: CPT

## 2017-12-02 PROCEDURE — 95819 EEG AWAKE AND ASLEEP: CPT

## 2017-12-02 PROCEDURE — 80299 QUANTITATIVE ASSAY DRUG: CPT

## 2017-12-02 PROCEDURE — 94640 AIRWAY INHALATION TREATMENT: CPT

## 2017-12-02 PROCEDURE — 81003 URINALYSIS AUTO W/O SCOPE: CPT

## 2017-12-02 PROCEDURE — 36415 COLL VENOUS BLD VENIPUNCTURE: CPT

## 2017-12-02 PROCEDURE — 85007 BL SMEAR W/DIFF WBC COUNT: CPT

## 2017-12-02 PROCEDURE — 86140 C-REACTIVE PROTEIN: CPT

## 2017-12-02 PROCEDURE — 82803 BLOOD GASES ANY COMBINATION: CPT

## 2017-12-02 PROCEDURE — 94664 DEMO&/EVAL PT USE INHALER: CPT

## 2017-12-02 PROCEDURE — 93005 ELECTROCARDIOGRAM TRACING: CPT

## 2017-12-02 RX ADMIN — SODIUM CHLORIDE SCH MLS/HR: 0.9 INJECTION INTRAVENOUS at 22:08

## 2017-12-02 RX ADMIN — ALBUTEROL SULFATE SCH MG: 2.5 SOLUTION RESPIRATORY (INHALATION) at 16:02

## 2017-12-02 RX ADMIN — HEPARIN SODIUM SCH UNITS: 5000 INJECTION INTRAVENOUS; SUBCUTANEOUS at 21:00

## 2017-12-02 RX ADMIN — ALBUTEROL SULFATE SCH MG: 2.5 SOLUTION RESPIRATORY (INHALATION) at 15:44

## 2017-12-02 RX ADMIN — Medication SCH MCG: at 16:18

## 2017-12-02 RX ADMIN — MORPHINE SULFATE PRN MG: 2 INJECTION, SOLUTION INTRAMUSCULAR; INTRAVENOUS at 23:02

## 2017-12-02 RX ADMIN — Medication SCH MCG: at 16:02

## 2017-12-02 RX ADMIN — METHYLPREDNISOLONE SODIUM SUCCINATE SCH MG: 125 INJECTION, POWDER, FOR SOLUTION INTRAMUSCULAR; INTRAVENOUS at 23:25

## 2017-12-02 RX ADMIN — Medication SCH MCG: at 15:45

## 2017-12-02 RX ADMIN — ALBUTEROL SULFATE SCH MG: 2.5 SOLUTION RESPIRATORY (INHALATION) at 16:17

## 2017-12-02 RX ADMIN — THEOPHYLLINE ANHYDROUS SCH MG: 100 CAPSULE, EXTENDED RELEASE ORAL at 22:09

## 2017-12-02 SDOH — ECONOMIC STABILITY - HOUSING INSECURITY: HOMELESSNESS: Z59.0
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
## 2017-12-03 VITALS — DIASTOLIC BLOOD PRESSURE: 68 MMHG | SYSTOLIC BLOOD PRESSURE: 135 MMHG

## 2017-12-03 VITALS — SYSTOLIC BLOOD PRESSURE: 108 MMHG | DIASTOLIC BLOOD PRESSURE: 59 MMHG

## 2017-12-03 VITALS — SYSTOLIC BLOOD PRESSURE: 119 MMHG | DIASTOLIC BLOOD PRESSURE: 65 MMHG

## 2017-12-03 VITALS — DIASTOLIC BLOOD PRESSURE: 78 MMHG | SYSTOLIC BLOOD PRESSURE: 136 MMHG

## 2017-12-03 RX ADMIN — SODIUM CHLORIDE SCH MLS/HR: 0.9 INJECTION INTRAVENOUS at 06:03

## 2017-12-03 RX ADMIN — MORPHINE SULFATE PRN MG: 2 INJECTION, SOLUTION INTRAMUSCULAR; INTRAVENOUS at 22:19

## 2017-12-03 RX ADMIN — HEPARIN SODIUM SCH UNITS: 5000 INJECTION INTRAVENOUS; SUBCUTANEOUS at 09:15

## 2017-12-03 RX ADMIN — THEOPHYLLINE ANHYDROUS SCH MG: 100 CAPSULE, EXTENDED RELEASE ORAL at 21:08

## 2017-12-03 RX ADMIN — METHYLPREDNISOLONE SODIUM SUCCINATE SCH MG: 125 INJECTION, POWDER, FOR SOLUTION INTRAMUSCULAR; INTRAVENOUS at 13:52

## 2017-12-03 RX ADMIN — THEOPHYLLINE ANHYDROUS SCH MG: 100 CAPSULE, EXTENDED RELEASE ORAL at 09:14

## 2017-12-03 RX ADMIN — METHYLPREDNISOLONE SODIUM SUCCINATE SCH MG: 125 INJECTION, POWDER, FOR SOLUTION INTRAMUSCULAR; INTRAVENOUS at 21:09

## 2017-12-03 RX ADMIN — METHYLPREDNISOLONE SODIUM SUCCINATE SCH MG: 125 INJECTION, POWDER, FOR SOLUTION INTRAMUSCULAR; INTRAVENOUS at 06:03

## 2017-12-03 RX ADMIN — MORPHINE SULFATE PRN MG: 2 INJECTION, SOLUTION INTRAMUSCULAR; INTRAVENOUS at 13:54

## 2017-12-03 RX ADMIN — HEPARIN SODIUM SCH UNITS: 5000 INJECTION INTRAVENOUS; SUBCUTANEOUS at 21:12

## 2017-12-03 RX ADMIN — MORPHINE SULFATE PRN MG: 2 INJECTION, SOLUTION INTRAMUSCULAR; INTRAVENOUS at 18:07

## 2017-12-03 RX ADMIN — MORPHINE SULFATE PRN MG: 2 INJECTION, SOLUTION INTRAMUSCULAR; INTRAVENOUS at 09:23

## 2017-12-03 RX ADMIN — ATORVASTATIN CALCIUM SCH MG: 20 TABLET, FILM COATED ORAL at 21:08

## 2017-12-03 RX ADMIN — TRAZODONE HYDROCHLORIDE SCH MG: 50 TABLET ORAL at 21:08

## 2017-12-03 RX ADMIN — SODIUM CHLORIDE SCH MLS/HR: 0.9 INJECTION INTRAVENOUS at 15:48

## 2017-12-03 NOTE — CONSULTATION
DATE OF CONSULTATION:  12/03/2017



ADDENDUM



PHYSICAL EXAMINATION:

GENERAL:  This is a well-developed and well-nourished female, not in acute

distress, lying comfortably in bed.

VITAL SIGNS:  Her vital signs now are stable, blood pressure 119/65, and

she is afebrile.

HEENT:  Head is normocephalic.  No evidence of trauma.  Eyes, ears, and

throat are clear.  There is a left upper lip boil noted.

EXTREMITIES:  Upper and lower extremities without clubbing, cyanosis, or

edema.  Peripheral pulses 1+ symmetric.

MENTAL STATUS:  The patient is alert and oriented x3 with no evidence of

aphasia or apraxia.  Cognitive function is normal.

CRANIAL NERVES II:  Pupils both responding to light and accommodation.

Extraocular movements intact.  No nystagmus.

CRANIAL NERVES V:  Normal corneal responses.

CRANIAL NERVES VII:  No facial asymmetry.

CRANIAL NERVES VIII:  Normal hearing.

CRANIAL NERVES IX TO XII:  Within normal limits.  Motor examination

revealed normal muscle tone and strength 5/5 in all extremities.  No

involuntary movement.  Deep tendon reflexes 1+ symmetric with downgoing

toes on both sides.  Sensory examination, normal to pinprick and light

touch. Gait not tested.  The patient felt weakness.



IMPRESSION:

1. Chronic seizure disorder, exacerbation.

2. Chronic obstructive pulmonary disease, exacerbation.

3. Urinary tract infection.

4. History of depression.

5. Chronic low back pain.



RECOMMENDATIONS:  The patient is to continue supportive care.  In addition,

we will restart her on Keppra 1000 mg b.i.d. (the patient indicates that

she was on 500 mg t.i.d. previously).



We will observe for any paroxysmal events.  We will recheck

electroencephalogram to rule out any ongoing seizure activities, there was

no previous.  We will have baseline CT of the brain.



We will follow with you.  Thank you for allowing me to see this

interesting patient in neurological consultation.









  ______________________________________________

  Demetri Rapp M.D.





DR:  IG/as

D:  12/03/2017 16:12

T:  12/03/2017 20:41

JOB#:  [id number]

CC:

## 2017-12-03 NOTE — HISTORY AND PHYSICAL REPORT
DATE OF ADMISSION:  12/02/2017



TIME SEEN:  8 a.m.



ATTENDING PHYSICIAN:  Maxim Hopkins D.O.



CONSULTANTS:

1. Blaze Fraga M.D.

2. Demetri Rapp M.D.

3. Dimitry Mcleod M.D.

4. Dr. Little.



CHIEF COMPLAINT:  Shortness breath, weakness, lethargy, and mouth pain.



BRIEF HISTORY:  This is a 62-year-old female, who lives at home, presents

with increased shortness of breath for two days, slight weakness, and

mouth pain too, diagnosed the above exacerbation of COPD and abdominal

pain as well and admitted to telemetry for further care.  Currently,

slightly anxious, oriented x3 in bed.  She has no complaints.



PAST MEDICAL HISTORY:  COPD, seizure, and Crohn's.



PAST SURGICAL HISTORY:  Exploratory laparotomy.



MEDICATIONS:  Include methylprednisolone, Cymbalta, Keppra, Zantac,

Synthroid, Zosyn, Lipitor, Desyrel, and heparin.



ALLERGIES:  Denies.



SOCIAL HISTORY:  Positive smoking.  No alcohol.  No intravenous drug

abuse.



FAMILY HISTORY:  Noncontributory.



REVIEW OF SYSTEMS:  No chest pain.  Slight shortness of breath.  No nausea,

vomiting, or diarrhea.



PHYSICAL EXAMINATION:

GENERAL:  Slightly anxious in bed, oriented x3, in no acute

distress.

VITAL SIGNS:  Show temperature is 97 degrees, pulse 53, respirations 18,

and blood pressure 108/59.

CARDIOVASCULAR:  No murmur.  Poor exchange.

ABDOMEN:  Bowel sounds positive.  Nontender and nondistended.

EXTREMITIES:  No cyanosis, clubbing, or edema.

NEUROLOGIC:  The patient moves all extremities slightly weak.



LABORATORY AND DIAGNOSTIC DATA:  Show hemoglobin 11.8, otherwise CBC is

normal.  BMP show albumin 2.6, otherwise BMP is normal.  Urinalysis show

4+ occult blood and 1+ leukocyte esterase.



ASSESSMENT:

1. Exacerbation of chronic obstructive pulmonary disease.

2. Urinary tract infection.

3. Seizure.

4. Anemia.

5. Malnutrition.

6. Crohn disease.

7. Abdominal pain.

8. Mouth pain.



PLAN:

1. Continue premeds.

2. O2 pulmonary treatment.

3. Antibiotics per Infectious Disease.

4. Taper off steroids.

5. Seizure control.

6. Pain control.

7. Dietary followup.

8. Dr. Fraga, Dr. Rapp, Dr. Mcleod, and Dr. Little to consult.

9. OT/PT, dietary evaluation, CBC, and BMP in the morning.

10. We will continue to follow this patient medically.









  ______________________________________________

  Maxim Hopkins D.O.





DR:  Luda

D:  12/03/2017 08:32

T:  12/03/2017 15:22

JOB#:  [id number]

CC:
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
## 2017-12-03 NOTE — NEUROLOGY PROGRESS NOTE
Objective


Physical Exam





Last Vital Signs








  Date Time  Temp Pulse Resp B/P (MAP) Pulse Ox O2 Delivery O2 Flow Rate FiO2


 


12/3/17 14:24 97.7       


 


12/3/17 08:00  68      


 


12/3/17 08:00   18 119/65  Room Air  


 


12/3/17 07:46        21


 


12/2/17 18:50     96   











Laboratory Tests








Test


  12/2/17


17:50


 


Urine Color Yellow  


 


Urine Appearance Clear  


 


Urine pH 7 (4.5-8.0)  


 


Urine Specific Gravity


  1.010


(1.005-1.035)


 


Urine Protein


  2+ (NEGATIVE)


H


 


Urine Glucose (UA)


  Negative


(NEGATIVE)


 


Urine Ketones


  Negative


(NEGATIVE)


 


Urine Occult Blood


  4+ (NEGATIVE)


H


 


Urine Nitrite


  Negative


(NEGATIVE)


 


Urine Bilirubin


  Negative


(NEGATIVE)


 


Urine Urobilinogen


  Normal MG/DL


(0.0-1.0)


 


Urine Leukocyte Esterase


  1+ (NEGATIVE)


H


 


Urine RBC


  15-20 /HPF (0


- 2)  H


 


Urine WBC


  2-4 /HPF (0 -


2)


 


Urine Squamous Epithelial


Cells Occasional


/LPF


 


Urine Bacteria


  Few /HPF


(NONE)


 


Urine Mucus


  Few /LPF


(NONE/OCC)  H











Impression/Recommendations


Problems:  


(1) seizure disorder, exacerbation


(2) chr psych disorder


(3) Chronic pain disorder


(4) recurrent syncope


(5) COPD exacerbation


(6) Crohn's disease


(7) UTI (urinary tract infection)


Status:  stable


Recommendations


# [id number]











TAMANNA TELLEZ Dec 3, 2017 16:16

## 2017-12-03 NOTE — DIAGNOSTIC IMAGING REPORT
Indication: Chest pain



Comparison:  10/5/17



A single view chest radiograph was obtained.



Findings:



Interstitial infiltrates versus edema demonstrated. Heart is enlarged. Bones 

are

osteopenic.



Impression:



Similar findings. Interstitial edema versus infiltrates
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
## 2017-12-04 VITALS — DIASTOLIC BLOOD PRESSURE: 77 MMHG | SYSTOLIC BLOOD PRESSURE: 137 MMHG

## 2017-12-04 VITALS — SYSTOLIC BLOOD PRESSURE: 135 MMHG | DIASTOLIC BLOOD PRESSURE: 79 MMHG

## 2017-12-04 VITALS — SYSTOLIC BLOOD PRESSURE: 136 MMHG | DIASTOLIC BLOOD PRESSURE: 77 MMHG

## 2017-12-04 VITALS — DIASTOLIC BLOOD PRESSURE: 75 MMHG | SYSTOLIC BLOOD PRESSURE: 144 MMHG

## 2017-12-04 VITALS — DIASTOLIC BLOOD PRESSURE: 85 MMHG | SYSTOLIC BLOOD PRESSURE: 147 MMHG

## 2017-12-04 LAB
%HYPO/RBC NFR BLD AUTO: (no result) %
ANION GAP SERPL CALC-SCNC: 3 MMOL/L (ref 5–15)
ANISOCYTOSIS BLD QL SMEAR: (no result)
BASOPHILS NFR BLD MANUAL: 0 % (ref 0–2)
CALCIUM SERPL-MCNC: 9 MG/DL (ref 8.5–10.1)
CHLORIDE SERPL-SCNC: 108 MMOL/L (ref 98–107)
CO2 SERPL-SCNC: 27 MMOL/L (ref 21–32)
CREAT SERPL-MCNC: 0.8 MG/DL (ref 0.55–1.3)
EOSINOPHIL NFR BLD MANUAL: 0 % (ref 0–3)
ERYTHROCYTE [DISTWIDTH] IN BLOOD BY AUTOMATED COUNT: 14.9 % (ref 11.6–14.8)
GFR SERPLBLD BASED ON 1.73 SQ M-ARVRAT: > 60 ML/MIN (ref 60–?)
MCH RBC QN AUTO: 26.7 PG (ref 27–31)
MCHC RBC AUTO-ENTMCNC: 30.5 G/DL (ref 32–36)
MCV RBC AUTO: 88 FL (ref 80–99)
NEUTS BAND NFR BLD MANUAL: 0 % (ref 0–8)
NEUTS BAND NFR BLD MANUAL: 86 % (ref 45–75)
PLAT MORPH BLD: NORMAL
PLATELET # BLD EST: ADEQUATE 10*3/UL
PLATELET # BLD: 335 K/UL (ref 150–450)
PMV BLD AUTO: 7.7 FL (ref 6.5–10.1)
POTASSIUM SERPL-SCNC: 4.6 MMOL/L (ref 3.5–5.1)
RBC # BLD AUTO: 4.2 M/UL (ref 4.2–5.4)
SODIUM SERPL-SCNC: 138 MMOL/L (ref 136–145)
TOTAL CELLS COUNTED BLD: 100
TSH SERPL-ACNC: 0.16 UIU/ML (ref 0.36–3.74)
VARIANT LYMPHS NFR BLD MANUAL: 8 % (ref 20–45)
WBC # BLD AUTO: 18 K/UL (ref 4.8–10.8)

## 2017-12-04 RX ADMIN — TRAZODONE HYDROCHLORIDE SCH MG: 50 TABLET ORAL at 20:52

## 2017-12-04 RX ADMIN — HEPARIN SODIUM SCH UNITS: 5000 INJECTION INTRAVENOUS; SUBCUTANEOUS at 20:55

## 2017-12-04 RX ADMIN — DULOXETINE HYDROCHLORIDE SCH MG: 30 CAPSULE, DELAYED RELEASE ORAL at 08:55

## 2017-12-04 RX ADMIN — METHYLPREDNISOLONE SODIUM SUCCINATE SCH MG: 125 INJECTION, POWDER, FOR SOLUTION INTRAMUSCULAR; INTRAVENOUS at 20:52

## 2017-12-04 RX ADMIN — METHYLPREDNISOLONE SODIUM SUCCINATE SCH MG: 125 INJECTION, POWDER, FOR SOLUTION INTRAMUSCULAR; INTRAVENOUS at 05:55

## 2017-12-04 RX ADMIN — THEOPHYLLINE ANHYDROUS SCH MG: 100 CAPSULE, EXTENDED RELEASE ORAL at 08:55

## 2017-12-04 RX ADMIN — MORPHINE SULFATE PRN MG: 2 INJECTION, SOLUTION INTRAMUSCULAR; INTRAVENOUS at 14:01

## 2017-12-04 RX ADMIN — HEPARIN SODIUM SCH UNITS: 5000 INJECTION INTRAVENOUS; SUBCUTANEOUS at 08:57

## 2017-12-04 RX ADMIN — THEOPHYLLINE ANHYDROUS SCH MG: 100 CAPSULE, EXTENDED RELEASE ORAL at 20:53

## 2017-12-04 RX ADMIN — MORPHINE SULFATE PRN MG: 2 INJECTION, SOLUTION INTRAMUSCULAR; INTRAVENOUS at 19:45

## 2017-12-04 RX ADMIN — METHYLPREDNISOLONE SODIUM SUCCINATE SCH MG: 125 INJECTION, POWDER, FOR SOLUTION INTRAMUSCULAR; INTRAVENOUS at 14:01

## 2017-12-04 RX ADMIN — ATORVASTATIN CALCIUM SCH MG: 20 TABLET, FILM COATED ORAL at 20:53

## 2017-12-04 RX ADMIN — SODIUM CHLORIDE SCH MLS/HR: 0.9 INJECTION INTRAVENOUS at 14:00

## 2017-12-04 NOTE — NEUROLOGY PROGRESS NOTE
Interim History


Interim History


ROS Limited/Unobtainable:  No


Complaints:  still weakness when walking


Events:  stable





Objective


Physical Exam





Last Vital Signs








  Date Time  Temp Pulse Resp B/P (MAP) Pulse Ox O2 Delivery O2 Flow Rate FiO2


 


12/4/17 08:00 97.0 50 18 147/85 97 Room Air  


 


12/4/17 08:00        21











Laboratory Tests








Test


  12/4/17


05:05


 


White Blood Count


  18.0 K/UL


(4.8-10.8)  H


 


Red Blood Count


  4.20 M/UL


(4.20-5.40)


 


Hemoglobin


  11.2 G/DL


(12.0-16.0)  L


 


Hematocrit


  36.9 %


(37.0-47.0)  L


 


Mean Corpuscular Volume 88 FL (80-99)  


 


Mean Corpuscular Hemoglobin


  26.7 PG


(27.0-31.0)  L


 


Mean Corpuscular Hemoglobin


Concent 30.5 G/DL


(32.0-36.0)  L


 


Red Cell Distribution Width


  14.9 %


(11.6-14.8)  H


 


Platelet Count


  335 K/UL


(150-450)


 


Mean Platelet Volume


  7.7 FL


(6.5-10.1)


 


Neutrophils (%) (Auto)


  % (45.0-75.0)


 


 


Lymphocytes (%) (Auto)


  % (20.0-45.0)


 


 


Monocytes (%) (Auto)  % (1.0-10.0)  


 


Eosinophils (%) (Auto)  % (0.0-3.0)  


 


Basophils (%) (Auto)  % (0.0-2.0)  


 


Differential Total Cells


Counted 100  


 


 


Neutrophils % (Manual) 86 % (45-75)  H


 


Lymphocytes % (Manual) 8 % (20-45)  L


 


Monocytes % (Manual) 6 % (1-10)  


 


Eosinophils % (Manual) 0 % (0-3)  


 


Basophils % (Manual) 0 % (0-2)  


 


Band Neutrophils 0 % (0-8)  


 


Platelet Estimate Adequate  


 


Platelet Morphology Normal  


 


Red Blood Cell Morphology   


 


Hypochromasia 1+  


 


Anisocytosis 1+  


 


Sodium Level


  138 MMOL/L


(136-145)


 


Potassium Level


  4.6 MMOL/L


(3.5-5.1)


 


Chloride Level


  108 MMOL/L


()  H


 


Carbon Dioxide Level


  27 MMOL/L


(21-32)


 


Anion Gap


  3 mmol/L


(5-15)  L


 


Blood Urea Nitrogen


  19 mg/dL


(7-18)  H


 


Creatinine


  0.8 MG/DL


(0.55-1.30)


 


Estimat Glomerular Filtration


Rate > 60 mL/min


(>60)


 


Glucose Level


  122 MG/DL


()  H


 


Calcium Level


  9.0 MG/DL


(8.5-10.1)


 


Thyroid Stimulating Hormone


(TSH) 0.156 uiU/mL


(0.358-3.740)








General:  well developed, well nourished, no acute distress


Head:  normocophalic, atraumatic


Neck:  no rigidity, other





Neurologic Exam


Mental Status:  awake, alert, oriented x4, normal cognition, good mathematical 

skills, normal recent memory, normal remote memory, preserved visuospatial 

function


Speech:  normal speech, no dysarthia


Language:  normal language, no aphasia


Cranial Nerve II:  fundus normal, visual fields, no papilledema


Cranial Nerves III, IV, VI:  PERRLA, EOMI, pupils


Cranial Nerve V:  normal facial sensations, temporales function normal, 

masseters function normal, pterygoids function normal


Cranial Nerve VII:  no facial asymmetry, normal facial expressions


Cranial Nerve VIII:  normal hearing, no nystagmus


Cranial Nerve IX:  normal palate elevation, gag response


Cranial Nerve X:  no voice hoarseness


Cranial Nerve XI:  SCM symmetric, trapezii function normal


Cranial Nerve XII:  tongue midline, no tongue atrophy/fasciculations


Motor System:  normal muscle tone, strength 5/5, no involuntary movement, no 

muscle wasting


Sensory:  normal pinprick, normal light touch, normal position sense, normal 

graphesthesia


Coordination:  normal finger to nose bilaterally, normal heel to shin 

bilaterally, negative Romberg test


Deep Tendon Reflexes:  1+ bicep (L), 1+ bicep (R), 1+ tricep (L), 1+ tricep (R)

, 1+ brachioradialis (L), 1+ brachioradialis (R), 1+ knee (L), 1+ knee (R), 1+ 

ankle (L), 1+ ankle (R)


Reflexes:  flexor plantar (L), flexor plantar (R)


Stance:  other


Gait:  normal regular, other





Impression/Recommendations


Problems:  


(1) seizure disorder, exacerbation


(2) chr psych disorder


(3) Chronic pain disorder


(4) recurrent syncope


(5) COPD exacerbation


(6) Crohn's disease


(7) UTI (urinary tract infection)


Status:  stable


Recommendations


# [id number]


 [id number] [id number]


CT brain nl











TAMANNA TELLEZ Dec 4, 2017 12:09

## 2017-12-04 NOTE — CONSULTATION
History of Present Illness


General


Chief Complaint:  Abdominal Pain


Referring physician:  dr Hopkins


Reason for Consultation:  SOB, COPD





Present Illness


HPI


62-year-old female, who lives at home, presents with increased shortness of 

breath for two days, slight weakness, and mouth pain too, diagnosed the above 

exacerbation of COPD and abdominal pain


Allergies:  


Coded Allergies:  


     No Known Allergies (Verified , 10/27/06)





Medication History


Scheduled


Al Hydroxide/mg Hydroxide (Mag-Al Plus Suspension), 30 ML PO Q6HR, (Reported)


Aspirin* (Aspir 81*), 81 MG ORAL DAILY, (Reported)


Atorvastatin Calcium* (Lipitor*), 40 MG ORAL BEDTIME, (Reported)


Azithromycin* (Zithromax*), 250 MG ORAL DAILY, (Reported)


Bupropion Hcl* (Bupropion Hcl*), 100 MG ORAL DAILY, (Reported)


Duloxetine Hcl* (Cymbalta*), 60 MG ORAL DAILY, (Reported)


Ibuprofen* (Motrin*), 800 MG ORAL THREE TIMES A DAY, (Reported)


Levetiracetam* (Levetiracetam*), 1,000 MG ORAL TID, (Reported)


Levothyroxine Sodium* (Levothyroxine Sodium*), 75 MCG ORAL ACBREAKFAST, (

Reported)


Linaclotide (Linzess), 145 MCG PO DAILY, (Reported)


Mesalamine (Pentasa), 1,000 MG ORAL TID, (Reported)


Mesalamine (Asacol Hd), 800 MG ORAL THREE TIMES A DAY, (Reported)


Mirtazapine (Remeron), 30 MG ORAL BEDTIME, (Reported)


Mirtazapine* (Mirtazapine*), 15 MG ORAL BEDTIME, (Reported)


Montelukast Sodium* (Montelukast Sodium*), 10 MG ORAL DAILY, (Reported)


Pantoprazole* (Protonix*), 40 MG ORAL DAILY, (Reported)


Polyethylene Glycol 3350* (Miralax*), 17 GM ORAL BEDTIME, (Reported)


Polyethylene Glycol 3350* (Miralax*), 17 GM ORAL DAILY, (Reported)


Quetiapine Fumarate (Seroquel), 200 MG ORAL QHS, (Reported)


Risperidone* (Risperdal*), 0.5 MG ORAL BEDTIME, (Reported)


Theophylline (Theodur*), 100 MG ORAL TWICE A DAY, (Reported)


Topiramate* (Topamax*), 25 MG ORAL TWICE A DAY, (Reported)


Trazodone* (Trazodone*), 150 MG ORAL BEDTIME, (Reported)





Scheduled PRN


Acetaminophen (Acetaminophen), 650 MG ORAL Q8H PRN for Fever/Headache/Mild Pain,

 (Reported)


Acetaminophen* (Acetaminophen 325MG Tablet*), 325 MG ORAL Q4H PRN for Fever/

Headache/Mild Pain, (Reported)


Acetaminophen* (Acetaminophen 325MG Tablet*), 650 MG ORAL Q4H PRN for Fever/

Headache/Mild Pain, (Reported)


Diphenhydramine HCl (Benadryl), 25 MG PO Q6HR PRN for Itching, (Reported)


Nitroglycerin (Nitroglycerin), 0.4 MG SL [id number] PRN for Prn Chest Pain, (

Reported)


Ondansetron* (Zofran*), 4 MG ORAL Q6H PRN for Nausea & Vomiting, (Reported)


Temazepam* (Restoril*), 15 MG ORAL BEDTIME PRN for Insomnia, (Reported)


Zolpidem Tartrate* (Ambien*), 5 MG ORAL BEDTIME PRN for Insomnia, (Reported)





Patient History


History Provided By:  Patient, Medical Record


Healthcare decision maker


N


Resuscitation status





Advanced Directive on File








Past Medical/Surgical History


Past Medical/Surgical History:  


(1) Slow transit constipation


(2) Vertigo


(3) 78318


(4) 92242


(5) Slow transit constipation


(6) 16866


(7) 830768


(8) 890840


(9) Abnormal laboratory test result


(10) Cellulitis of female genitalia


(11) Abscess of right genital labia


(12) Colonoscopy planned


(13) Edema


(14) Purulent bronchitis


(15) Sprain of right hand


(16) Chronic back pain


(17) History of colonic polyps


(18) Intractable abdominal pain


(19) Acute encephalopathy


(20) Abdominal pain


(21) Psychosis


(22) Respiratory distress


(23) Ileus


(24) Constipation


(25) Abdominal pain


(26) Diarrhea


(27) Nausea and vomiting


(28) Chronic abdominal pain


(29) Sepsis


(30) Chest pain


(31) Pneumonia


(32) Lower GI bleed


(33) CHF (congestive heart failure)


(34) Chronic pancreatitis


(35) IBD (inflammatory bowel disease)


(36) Elevated CEA


(37) Pre-syncope


(38) Shortness of breath


(39) Asthma


(40) Generalized weakness


(41) COPD (chronic obstructive pulmonary disease)


(42) Perianal abscess


(43) Opioid dependence


(44) Emphysema


(45) Anemia


(46) Crohns disease


(47) Depression


(48) Lumbar spondylosis


(49) Interstitial lung disease


(50) Asthma


(51) Chronic pain


(52) Herniated nucleus pulposus, lumbar


(53) Radiculopathy of lumbar region


(54) PNA (pneumonia)


(55) Crohn's disease


(56) Chronic pain syndrome


(57) UTI (urinary tract infection)


(58) Chronic pain disorder


(59) COPD exacerbation


(60) chr psych disorder


(61) recurrent syncope


(62) seizure disorder, exacerbation


(63) Abdominal pain


(64) COPD (chronic obstructive pulmonary disease)


(65) Dyspnea


(66) SOB (shortness of breath)


(67) Leukocytosis


(68) Seizure


(69) Nausea


(70) Hx SBO


(71) Abdominal pain


(72) Abdominal pain





Physical Exam


General Appearance:  no apparent distress, alert


Neurologic:  alert, oriented x 3, responsive, depressed affect





Last 24 Hour Vital Signs








  Date Time  Temp Pulse Resp B/P (MAP) Pulse Ox O2 Delivery O2 Flow Rate FiO2


 


12/4/17 08:00 97.0 50 18 147/85 97 Room Air  


 


12/4/17 08:00  78 18   Room Air  21 12/4/17 04:00 97.7 57 18 144/75 100 Room Air  


 


12/4/17 00:00 97.0 79 20 135/79 100 Room Air  


 


12/3/17 21:34  71 20   Room Air  21


 


12/3/17 20:00 98.6 71 20 135/68 100 Room Air  


 


12/3/17 18:37 98.1       


 


12/3/17 16:28 98.1 66 14 136/78 100 Room Air  











Laboratory Tests








Test


  12/4/17


05:05


 


White Blood Count


  18.0 K/UL


(4.8-10.8)  H


 


Red Blood Count


  4.20 M/UL


(4.20-5.40)


 


Hemoglobin


  11.2 G/DL


(12.0-16.0)  L


 


Hematocrit


  36.9 %


(37.0-47.0)  L


 


Mean Corpuscular Volume 88 FL (80-99)  


 


Mean Corpuscular Hemoglobin


  26.7 PG


(27.0-31.0)  L


 


Mean Corpuscular Hemoglobin


Concent 30.5 G/DL


(32.0-36.0)  L


 


Red Cell Distribution Width


  14.9 %


(11.6-14.8)  H


 


Platelet Count


  335 K/UL


(150-450)


 


Mean Platelet Volume


  7.7 FL


(6.5-10.1)


 


Neutrophils (%) (Auto)


  % (45.0-75.0)


 


 


Lymphocytes (%) (Auto)


  % (20.0-45.0)


 


 


Monocytes (%) (Auto)  % (1.0-10.0)  


 


Eosinophils (%) (Auto)  % (0.0-3.0)  


 


Basophils (%) (Auto)  % (0.0-2.0)  


 


Differential Total Cells


Counted 100  


 


 


Neutrophils % (Manual) 86 % (45-75)  H


 


Lymphocytes % (Manual) 8 % (20-45)  L


 


Monocytes % (Manual) 6 % (1-10)  


 


Eosinophils % (Manual) 0 % (0-3)  


 


Basophils % (Manual) 0 % (0-2)  


 


Band Neutrophils 0 % (0-8)  


 


Platelet Estimate Adequate  


 


Platelet Morphology Normal  


 


Red Blood Cell Morphology   


 


Hypochromasia 1+  


 


Anisocytosis 1+  


 


Sodium Level


  138 MMOL/L


(136-145)


 


Potassium Level


  4.6 MMOL/L


(3.5-5.1)


 


Chloride Level


  108 MMOL/L


()  H


 


Carbon Dioxide Level


  27 MMOL/L


(21-32)


 


Anion Gap


  3 mmol/L


(5-15)  L


 


Blood Urea Nitrogen


  19 mg/dL


(7-18)  H


 


Creatinine


  0.8 MG/DL


(0.55-1.30)


 


Estimat Glomerular Filtration


Rate > 60 mL/min


(>60)


 


Glucose Level


  122 MG/DL


()  H


 


Calcium Level


  9.0 MG/DL


(8.5-10.1)


 


Thyroid Stimulating Hormone


(TSH) 0.156 uiU/mL


(0.358-3.740)








Height (Feet):  5


Height (Inches):  6.00


Weight (Pounds):  144


Medications





Current Medications








 Medications


  (Trade)  Dose


 Ordered  Sig/Jed


 Route


 PRN Reason  Start Time


 Stop Time Status Last Admin


Dose Admin


 


 Albuterol/


 Ipratropium


  (Albuterol/


 Ipratropium)  3 ml  Q4H  PRN


 HHN


 dyspnea  12/3/17 14:00


 12/7/17 13:59   


 


 


 Atorvastatin


 Calcium


  (Lipitor)  40 mg  BEDTIME


 ORAL


   12/3/17 21:00


 1/1/18 20:59  12/3/17 21:08


 


 


 Azithromycin


  (Zithromax)  250 mg  DAILY


 ORAL


   12/5/17 09:00


 12/12/17 08:59   


 


 


 Ceftriaxone


 Sodium 1 gm/


 Dextrose  55 ml @ 


 110 mls/hr  Q24H


 IVPB


   12/3/17 14:00


 12/10/17 13:59  12/3/17 15:48


 


 


 Dextrose


  (Dextrose 50%)    STAT  PRN


 IV


 Hypoglycemia  12/3/17 14:00


 1/1/18 13:59   


 


 


 Duloxetine HCl


  (Cymbalta)  60 mg  DAILY


 ORAL


   12/4/17 09:00


 1/2/18 08:59  12/4/17 08:55


 


 


 Heparin Sodium


  (Porcine)


  (Heparin 5000


 units/ml)  5,000 units  EVERY 12  HOURS


 SUBQ


   12/3/17 21:00


 1/1/18 20:59  12/4/17 08:57


 


 


 Ketorolac


 Tromethamine


  (Toradol 30mg)  30 mg  Q8H  PRN


 IV


 moderate pain 4-6  12/3/17 14:00


 12/7/17 13:59   


 


 


 Levetiracetam


  (Keppra)  1,000 mg  BID


 ORAL


   12/3/17 18:00


 1/2/18 08:59  12/4/17 08:56


 


 


 Levothyroxine


 Sodium


  (Synthroid)  75 mcg  ACBREAKFAST


 ORAL


   12/4/17 06:30


 1/2/18 06:29   


 


 


 Lorazepam


  (Ativan 2mg/ml


 1ml)  0.5 mg  Q4H  PRN


 IV


 For Anxiety  12/3/17 14:00


 12/9/17 13:59   


 


 


 Mesalamine


  (Asacol)  800 mg  THREE TIMES A  DAY


 ORAL


   12/4/17 13:00


 1/3/18 12:59   


 


 


 Methylprednisolone


 Sodium Succinate


  (Solu-MEDROL)  60 mg  Q8HR


 IV


   12/3/17 14:00


 1/2/18 00:00  12/4/17 05:55


 


 


 Mirtazapine


  (Remeron)  15 mg  BEDTIME


 ORAL


   12/3/17 21:00


 1/1/18 20:59  12/3/17 21:08


 


 


 Morphine Sulfate


  (Morphine


 Sulfate)  2 mg  Q4H  PRN


 IVP


 severe pain 7-10  12/3/17 14:00


 12/9/17 13:59  12/3/17 22:19


 


 


 Nicotine


  (Nicoderm)  1 patch  Q24H


 TDERMAL


   12/4/17 09:00


 1/2/18 08:59  12/4/17 08:56


 


 


 Nitroglycerin


  (Ntg)  0.4 mg  Q5M X 3 DOSES PRN


 SL


 Prn Chest Pain  12/3/17 13:30


 1/1/18 19:44   


 


 


 Ondansetron HCl


  (Zofran)  4 mg  Q6H  PRN


 IVP


 Nausea & Vomiting  12/3/17 13:45


 1/1/18 19:44   


 


 


 Promethazine HCl/


 Codeine


  (Phenergan with


 Codeine)  5 ml  Q6H  PRN


 ORAL


 cough  12/3/17 13:45


 1/1/18 19:44   


 


 


 Ranitidine HCl


  (Zantac)  150 mg  TWICE A  DAY


 ORAL


   12/3/17 18:00


 1/2/18 08:59  12/4/17 08:55


 


 


 Sodium Chloride  1,000 ml @ 


 50 mls/hr  Q20H


 IV


   12/3/17 13:30


 1/2/18 08:59  12/4/17 05:55


 


 


 Temazepam


  (Restoril)  15 mg  HSPRN  PRN


 ORAL


 Insomnia  12/3/17 19:45


 12/9/17 19:44  12/3/17 22:19


 


 


 Theophylline


  (Shiv-Dur)  100 mg  EVERY 12  HOURS


 ORAL


   12/3/17 21:00


 1/1/18 20:59  12/4/17 08:55


 


 


 Trazodone HCl


  (Desyrel)  150 mg  BEDTIME


 ORAL


   12/3/17 21:00


 1/1/18 20:59  12/3/17 21:08


 











Assessment/Plan


Status:  stable


Status Narrative


mdd


Assessment/Plan


dc Restoril


dc Risperdal


dc Remeron


cont trazodone


cont Seroquel


start Dm Mccabe M.D. Dec 4, 2017 11:43

## 2017-12-04 NOTE — CONSULTATION
DATE OF CONSULTATION:



CONSULTING PHYSICIAN:  Samantha Rick M.D.



REFERRING PHYSICIAN:  Maixm Hopkins D.O.



HISTORY OF PRESENT ILLNESS:  This is a 52-year-old female patient.  She is

admitted to [facility] secondary to COPD exacerbation as well

as history of depression, rule out bipolar 2 disorder, and also has a

history of irritable bowel.  She states that she is suffering from anxiety

and depression secondary to the progression of her medical illness.



ALLERGIES:  No known drug allergies.



MEDICAL PROBLEMS:  See Internal Medicine notes.



SUBSTANCE USE HISTORY:  Denies drug or alcohol use.



PSYCHIATRIC HISTORY:  Major depression, bipolar 2.



MENTAL STATUS EXAMINATION:  This is a 62-year-old female with psychomotor

retardation.  Mood is depressed.  Affect guarded and restricted.  Thought

process disorganized and illogical.  Denies any current suicidal or

homicidal ideation.  Her insight and judgement is poor.



DIAGNOSIS:  Major depressive disorder, mild, recurrent, without psychotic

features.



PLAN:  Treat her with Remeron 15 mg at bedside and Cymbalta _____ nightly.

Chart reviewed and discussed with staff.



I would like to thank Dr. Maxim Hopkins for this interesting consultation.

Seen and assessed at bedside.









  ______________________________________________

  Samantha Rick M.D.





:  Tim

D:  12/04/2017 14:05

T:  12/04/2017 20:50

JOB#:  [id number]

CC:

## 2017-12-04 NOTE — CARDIOLOGY REPORT
--------------- APPROVED REPORT --------------





EKG Measurement

Heart [id number]

MT [id number]

[id number]

[id number]

[id number]





Normal sinus rhythm

Normal ECG

## 2017-12-04 NOTE — DIAGNOSTIC IMAGING REPORT
Indications: Altered metal status



Technique: Spiral acquisitions obtained through the brain. Angled axial and coronal

5 x 5 mm slices were reconstructed. Total dose length product 1559 mGycm.  CTDI

vol(s) 70 mGy. Dose reduction achieved using automated exposure control



Comparison: 12/16/2013



Findings: Prominent sella turcica may indicate empty sella. No acute hemorrhage or

edema. No mass effect or midline shift. There is mild age-related enlargement of 

the

ventricles and extra-axial CSF spaces. There is mild periventricular deep white

matter chronic ischemic change normal gray-white differentiation otherwise.

Visualized orbits are unremarkable. The visualized sinuses are clear. There is

minimal mastoid opacification on the right. The calvarium is intact. There is 

no

significant interim change



Impression: Mild chronic and age-related changes, as described



Negative for acute intracranial bleed or mass effect



Incidental finding of minimal right mastoid opacification



Incidental finding possible empty sella



The CT scanner at [facility] is accredited by the [employer] College 

of

Radiology and the scans are performed using protocols designed to limit 

radiation

exposure to as low as reasonably achievable to attain images of sufficient

resolution adequate for diagnostic evaluation.

## 2017-12-04 NOTE — GENERAL PROGRESS NOTE
Assessment/Plan


Problem List:  


(1) Abdominal pain


(2) Hx SBO


ICD Codes:  Z87.19 - Personal history of other diseases of the digestive system


SNOMED:  785581809


(3) Dyspnea


(4) SOB (shortness of breath)


ICD Codes:  R06.02 - Shortness of breath


SNOMED:  369044120


(5) Nausea


ICD Codes:  R11.0 - Nausea


SNOMED:  546112218


(6) Abdominal pain


(7) Leukocytosis


ICD Codes:  D72.829 - Leukocytosis


SNOMED:  569831004


(8) Seizure


ICD Codes:  R56.9 - Unspecified convulsions


SNOMED:  61841914


(9) COPD (chronic obstructive pulmonary disease)


ICD Codes:  J44.9 - Chronic obstructive pulmonary disease, unspecified


SNOMED:  73478861


(10) Generalized weakness


ICD Codes:  R53.1 - Weakness


SNOMED:  45377086


(11) Crohns disease


ICD Codes:  K50.90 - Crohns disease


SNOMED:  79454217


(12) Chronic pain


ICD Codes:  G89.29 - Other chronic pain


SNOMED:  31606670


(13) UTI (urinary tract infection)


ICD Codes:  N39.0 - Urinary tract infection, site not specified


SNOMED:  57190649


Status:  stable, progressing, tolerating diet


Assessment/Plan


ot pt diet abx o2 pulm tx pain control cbc bmp am





Subjective


Constitutional:  Reports: weakness


Allergies:  


Coded Allergies:  


     No Known Allergies (Verified , 10/27/06)


All Systems:  reviewed and negative except above


Subjective


sl weak in bed





Objective





Last 24 Hour Vital Signs








  Date Time  Temp Pulse Resp B/P (MAP) Pulse Ox O2 Delivery O2 Flow Rate FiO2


 


12/4/17 12:00 97.7 95 19 136/77 97 Room Air  


 


12/4/17 08:00 97.0 50 18 147/85 97 Room Air  


 


12/4/17 08:00  78 18   Room Air  21


 


12/4/17 04:00 97.7 57 18 144/75 100 Room Air  


 


12/4/17 00:00 97.0 79 20 135/79 100 Room Air  


 


12/3/17 21:34  71 20   Room Air  21


 


12/3/17 20:00 98.6 71 20 135/68 100 Room Air  


 


12/3/17 18:37 98.1       


 


12/3/17 16:28 98.1 66 14 136/78 100 Room Air  

















Intake and Output  


 


 12/4/17 12/5/17





 19:00 07:00


 


Intake Total 150 ml 


 


Balance 150 ml 


 


  


 


IV Total 150 ml 








Laboratory Tests


12/4/17 05:05: 


White Blood Count 18.0H, Red Blood Count 4.20, Hemoglobin 11.2L, Hematocrit 

36.9L, Mean Corpuscular Volume 88, Mean Corpuscular Hemoglobin 26.7L, Mean 

Corpuscular Hemoglobin Concent 30.5L, Red Cell Distribution Width 14.9H, 

Platelet Count 335, Mean Platelet Volume 7.7, Neutrophils (%) (Auto) , 

Lymphocytes (%) (Auto) , Monocytes (%) (Auto) , Eosinophils (%) (Auto) , 

Basophils (%) (Auto) , Differential Total Cells Counted 100, Neutrophils % (

Manual) 86H, Lymphocytes % (Manual) 8L, Monocytes % (Manual) 6, Eosinophils % (

Manual) 0, Basophils % (Manual) 0, Band Neutrophils 0, Platelet Estimate 

Adequate, Platelet Morphology Normal, Red Blood Cell Morphology , Hypochromasia 

1+, Anisocytosis 1+, Sodium Level 138, Potassium Level 4.6, Chloride Level 108H

, Carbon Dioxide Level 27, Anion Gap 3L, Blood Urea Nitrogen 19H, Creatinine 0.8

, Estimat Glomerular Filtration Rate > 60, Glucose Level 122H, Calcium Level 9.0

, Thyroid Stimulating Hormone (TSH) 0.156L


Height (Feet):  5


Height (Inches):  6.00


Weight (Pounds):  144


General Appearance:  lethargic


EENT:  normal ENT inspection


Neck:  normal alignment


Cardiovascular:  normal peripheral pulses, normal rate, regular rhythm


Respiratory/Chest:  chest wall non-tender, lungs clear, normal breath sounds


Abdomen:  normal bowel sounds, non tender, soft


Extremities:  normal inspection


Edema:  no edema noted Arm (L), no edema noted Arm (R), no edema noted Leg (L), 

no edema noted Leg (R), no edema noted Pedal (L), no edema noted Pedal (R), no 

edema noted Generalized


Neurologic:  responsive, motor weakness


Skin:  normal pigmentation, warm/dry











MAICOL LANG Dec 4, 2017 13:24
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
## 2017-12-04 NOTE — INFECTIOUS DISEASES PROG NOTE
Assessment/Plan


Assessment/Plan


A;


COPD/ Pneumonia


Leukocytosis may be steroid related


Nicotine dependence


Crohn's disease


P;


Continue Rocephin, add zithromax





Subjective


ROS Limited/Unobtainable:  Yes


Allergies:  


Coded Allergies:  


     No Known Allergies (Verified , 10/27/06)





Objective


Vital Signs





Last 24 Hour Vital Signs








  Date Time  Temp Pulse Resp B/P (MAP) Pulse Ox O2 Delivery O2 Flow Rate FiO2


 


12/4/17 08:00 97.0 50 18 147/85 97 Room Air  


 


12/4/17 04:00 97.7 57 18 144/75 100 Room Air  


 


12/4/17 00:00 97.0 79 20 135/79 100 Room Air  


 


12/3/17 21:34  71 20   Room Air  21


 


12/3/17 20:00 98.6 71 20 135/68 100 Room Air  


 


12/3/17 18:37 98.1       


 


12/3/17 16:28 98.1 66 14 136/78 100 Room Air  








Height (Feet):  5


Height (Inches):  6.00


Weight (Pounds):  144


General Appearance:  no acute distress


HEENT:  mucous membranes moist


Respiratory/Chest:  lungs clear


Cardiovascular:  normal rate


Abdomen:  soft, non tender


Extremities:  no edema


Neurologic/Psychiatric:  other - sleeping





Laboratory Tests








Test


  12/4/17


05:05


 


White Blood Count


  18.0 K/UL


(4.8-10.8)  H


 


Red Blood Count


  4.20 M/UL


(4.20-5.40)


 


Hemoglobin


  11.2 G/DL


(12.0-16.0)  L


 


Hematocrit


  36.9 %


(37.0-47.0)  L


 


Mean Corpuscular Volume 88 FL (80-99)  


 


Mean Corpuscular Hemoglobin


  26.7 PG


(27.0-31.0)  L


 


Mean Corpuscular Hemoglobin


Concent 30.5 G/DL


(32.0-36.0)  L


 


Red Cell Distribution Width


  14.9 %


(11.6-14.8)  H


 


Platelet Count


  335 K/UL


(150-450)


 


Mean Platelet Volume


  7.7 FL


(6.5-10.1)


 


Neutrophils (%) (Auto)


  % (45.0-75.0)


 


 


Lymphocytes (%) (Auto)


  % (20.0-45.0)


 


 


Monocytes (%) (Auto)  % (1.0-10.0)  


 


Eosinophils (%) (Auto)  % (0.0-3.0)  


 


Basophils (%) (Auto)  % (0.0-2.0)  


 


Differential Total Cells


Counted 100  


 


 


Neutrophils % (Manual) 86 % (45-75)  H


 


Lymphocytes % (Manual) 8 % (20-45)  L


 


Monocytes % (Manual) 6 % (1-10)  


 


Eosinophils % (Manual) 0 % (0-3)  


 


Basophils % (Manual) 0 % (0-2)  


 


Band Neutrophils 0 % (0-8)  


 


Platelet Estimate Adequate  


 


Platelet Morphology Normal  


 


Red Blood Cell Morphology   


 


Hypochromasia 1+  


 


Anisocytosis 1+  


 


Sodium Level


  138 MMOL/L


(136-145)


 


Potassium Level


  4.6 MMOL/L


(3.5-5.1)


 


Chloride Level


  108 MMOL/L


()  H


 


Carbon Dioxide Level


  27 MMOL/L


(21-32)


 


Anion Gap


  3 mmol/L


(5-15)  L


 


Blood Urea Nitrogen


  19 mg/dL


(7-18)  H


 


Creatinine


  0.8 MG/DL


(0.55-1.30)


 


Estimat Glomerular Filtration


Rate > 60 mL/min


(>60)


 


Glucose Level


  122 MG/DL


()  H


 


Calcium Level


  9.0 MG/DL


(8.5-10.1)


 


Thyroid Stimulating Hormone


(TSH) 0.156 uiU/mL


(0.358-3.740)











Current Medications








 Medications


  (Trade)  Dose


 Ordered  Sig/Jed


 Route


 PRN Reason  Start Time


 Stop Time Status Last Admin


Dose Admin


 


 Albuterol/


 Ipratropium


  (Albuterol/


 Ipratropium)  3 ml  Q4H  PRN


 HHN


 dyspnea  12/3/17 14:00


 12/7/17 13:59   


 


 


 Atorvastatin


 Calcium


  (Lipitor)  40 mg  BEDTIME


 ORAL


   12/3/17 21:00


 1/1/18 20:59  12/3/17 21:08


 


 


 Ceftriaxone


 Sodium 1 gm/


 Dextrose  55 ml @ 


 110 mls/hr  Q24H


 IVPB


   12/3/17 14:00


 12/10/17 13:59  12/3/17 15:48


 


 


 Dextrose


  (Dextrose 50%)    STAT  PRN


 IV


 Hypoglycemia  12/3/17 14:00


 1/1/18 13:59   


 


 


 Duloxetine HCl


  (Cymbalta)  60 mg  DAILY


 ORAL


   12/4/17 09:00


 1/2/18 08:59  12/4/17 08:55


 


 


 Heparin Sodium


  (Porcine)


  (Heparin 5000


 units/ml)  5,000 units  EVERY 12  HOURS


 SUBQ


   12/3/17 21:00


 1/1/18 20:59  12/4/17 08:57


 


 


 Ketorolac


 Tromethamine


  (Toradol 30mg)  30 mg  Q8H  PRN


 IV


 moderate pain 4-6  12/3/17 14:00


 12/7/17 13:59   


 


 


 Levetiracetam


  (Keppra)  1,000 mg  BID


 ORAL


   12/3/17 18:00


 1/2/18 08:59  12/4/17 08:56


 


 


 Levothyroxine


 Sodium


  (Synthroid)  75 mcg  ACBREAKFAST


 ORAL


   12/4/17 06:30


 1/2/18 06:29   


 


 


 Lorazepam


  (Ativan 2mg/ml


 1ml)  0.5 mg  Q4H  PRN


 IV


 For Anxiety  12/3/17 14:00


 12/9/17 13:59   


 


 


 Methylprednisolone


 Sodium Succinate


  (Solu-MEDROL)  60 mg  Q8HR


 IV


   12/3/17 14:00


 1/2/18 00:00  12/4/17 05:55


 


 


 Mirtazapine


  (Remeron)  15 mg  BEDTIME


 ORAL


   12/3/17 21:00


 1/1/18 20:59  12/3/17 21:08


 


 


 Morphine Sulfate


  (Morphine


 Sulfate)  2 mg  Q4H  PRN


 IVP


 severe pain 7-10  12/3/17 14:00


 12/9/17 13:59  12/3/17 22:19


 


 


 Nicotine


  (Nicoderm)  1 patch  Q24H


 TDERMAL


   12/4/17 09:00


 1/2/18 08:59  12/4/17 08:56


 


 


 Nitroglycerin


  (Ntg)  0.4 mg  Q5M X 3 DOSES PRN


 SL


 Prn Chest Pain  12/3/17 13:30


 1/1/18 19:44   


 


 


 Ondansetron HCl


  (Zofran)  4 mg  Q6H  PRN


 IVP


 Nausea & Vomiting  12/3/17 13:45


 1/1/18 19:44   


 


 


 Promethazine HCl/


 Codeine


  (Phenergan with


 Codeine)  5 ml  Q6H  PRN


 ORAL


 cough  12/3/17 13:45


 1/1/18 19:44   


 


 


 Ranitidine HCl


  (Zantac)  150 mg  TWICE A  DAY


 ORAL


   12/3/17 18:00


 1/2/18 08:59  12/4/17 08:55


 


 


 Sodium Chloride  1,000 ml @ 


 50 mls/hr  Q20H


 IV


   12/3/17 13:30


 1/2/18 08:59  12/4/17 05:55


 


 


 Temazepam


  (Restoril)  15 mg  HSPRN  PRN


 ORAL


 Insomnia  12/3/17 19:45


 12/9/17 19:44  12/3/17 22:19


 


 


 Theophylline


  (Shiv-Dur)  100 mg  EVERY 12  HOURS


 ORAL


   12/3/17 21:00


 1/1/18 20:59  12/4/17 08:55


 


 


 Trazodone HCl


  (Desyrel)  150 mg  BEDTIME


 ORAL


   12/3/17 21:00


 1/1/18 20:59  12/3/17 21:08


 

















SONJA LEZAMA Dec 4, 2017 09:47

## 2017-12-04 NOTE — PULMONOLOGY PROGRESS NOTE
Assessment/Plan


Problems:  


(1) COPD exacerbation


(2) Chronic pain disorder


(3) seizure disorder, exacerbation


(4) Crohn's disease


Assessment/Plan


taper steroids


respiratory treatment


symptomatic treatment


titrate fio2 to sat of 92





Subjective


ROS Limited/Unobtainable:  No


Interval Events:  c/o sore lesion in the mucosal area of mouth


Constitutional:  Reports: no symptoms


HEENT:  Repors: no symptoms


Allergies:  


Coded Allergies:  


     No Known Allergies (Verified , 10/27/06)





Objective





Last 24 Hour Vital Signs








  Date Time  Temp Pulse Resp B/P (MAP) Pulse Ox O2 Delivery O2 Flow Rate FiO2


 


12/4/17 14:48 97.7       


 


12/4/17 12:00 97.7 95 19 136/77 97 Room Air  


 


12/4/17 08:00 97.0 50 18 147/85 97 Room Air  


 


12/4/17 08:00  78 18   Room Air  21


 


12/4/17 04:00 97.7 57 18 144/75 100 Room Air  


 


12/4/17 00:00 97.0 79 20 135/79 100 Room Air  


 


12/3/17 21:34  71 20   Room Air  21


 


12/3/17 20:00 98.6 71 20 135/68 100 Room Air  


 


12/3/17 16:28 98.1 66 14 136/78 100 Room Air  

















Intake and Output  


 


 12/4/17 12/5/17





 19:00 07:00


 


Intake Total 150 ml 


 


Balance 150 ml 


 


  


 


IV Total 150 ml 








General Appearance:  WD/WN


HEENT:  normocephalic, atraumatic


Breasts:  no masses


Cardiovascular:  normal peripheral pulses, normal rate


Abdomen:  normal bowel sounds, soft, non tender, no scars


Extremities:  no cyanosis


Skin:  no rash, no ulcers


Neurologic/Psychiatric:  CNs II-XII grossly normal, abnormal gait


Laboratory Tests


12/4/17 05:05: 


White Blood Count 18.0H, Red Blood Count 4.20, Hemoglobin 11.2L, Hematocrit 

36.9L, Mean Corpuscular Volume 88, Mean Corpuscular Hemoglobin 26.7L, Mean 

Corpuscular Hemoglobin Concent 30.5L, Red Cell Distribution Width 14.9H, 

Platelet Count 335, Mean Platelet Volume 7.7, Neutrophils (%) (Auto) , 

Lymphocytes (%) (Auto) , Monocytes (%) (Auto) , Eosinophils (%) (Auto) , 

Basophils (%) (Auto) , Differential Total Cells Counted 100, Neutrophils % (

Manual) 86H, Lymphocytes % (Manual) 8L, Monocytes % (Manual) 6, Eosinophils % (

Manual) 0, Basophils % (Manual) 0, Band Neutrophils 0, Platelet Estimate 

Adequate, Platelet Morphology Normal, Red Blood Cell Morphology , Hypochromasia 

1+, Anisocytosis 1+, Sodium Level 138, Potassium Level 4.6, Chloride Level 108H

, Carbon Dioxide Level 27, Anion Gap 3L, Blood Urea Nitrogen 19H, Creatinine 0.8

, Estimat Glomerular Filtration Rate > 60, Glucose Level 122H, Calcium Level 9.0

, Thyroid Stimulating Hormone (TSH) 0.156L





Current Medications








 Medications


  (Trade)  Dose


 Ordered  Sig/Jed


 Route


 PRN Reason  Start Time


 Stop Time Status Last Admin


Dose Admin


 


 Albuterol/


 Ipratropium


  (Albuterol/


 Ipratropium)  3 ml  Q4H  PRN


 HHN


 dyspnea  12/3/17 14:00


 12/7/17 13:59   


 


 


 Atorvastatin


 Calcium


  (Lipitor)  40 mg  BEDTIME


 ORAL


   12/3/17 21:00


 1/1/18 20:59  12/3/17 21:08


 


 


 Azithromycin


  (Zithromax)  250 mg  DAILY


 ORAL


   12/5/17 09:00


 12/12/17 08:59   


 


 


 Ceftriaxone


 Sodium 1 gm/


 Dextrose  55 ml @ 


 110 mls/hr  Q24H


 IVPB


   12/3/17 14:00


 12/10/17 13:59  12/4/17 14:00


 


 


 Dextrose


  (Dextrose 50%)    STAT  PRN


 IV


 Hypoglycemia  12/3/17 14:00


 1/1/18 13:59   


 


 


 Duloxetine HCl


  (Cymbalta)  60 mg  DAILY


 ORAL


   12/4/17 09:00


 1/2/18 08:59  12/4/17 08:55


 


 


 Heparin Sodium


  (Porcine)


  (Heparin 5000


 units/ml)  5,000 units  EVERY 12  HOURS


 SUBQ


   12/3/17 21:00


 1/1/18 20:59  12/4/17 08:57


 


 


 Ketorolac


 Tromethamine


  (Toradol 30mg)  30 mg  Q8H  PRN


 IV


 moderate pain 4-6  12/3/17 14:00


 12/7/17 13:59   


 


 


 Levetiracetam


  (Keppra)  1,500 mg  Q12HR


 ORAL


   12/4/17 21:00


 1/3/18 20:59   


 


 


 Levothyroxine


 Sodium


  (Synthroid)  75 mcg  ACBREAKFAST


 ORAL


   12/4/17 06:30


 1/2/18 06:29   


 


 


 Lorazepam


  (Ativan 2mg/ml


 1ml)  0.5 mg  Q4H  PRN


 IV


 For Anxiety  12/3/17 14:00


 12/9/17 13:59   


 


 


 Mesalamine


  (Asacol)  800 mg  THREE TIMES A  DAY


 ORAL


   12/4/17 13:00


 1/3/18 12:59  12/4/17 14:00


 


 


 Methylprednisolone


 Sodium Succinate


  (Solu-MEDROL)  60 mg  Q8HR


 IV


   12/3/17 14:00


 1/2/18 00:00  12/4/17 14:01


 


 


 Morphine Sulfate


  (Morphine


 Sulfate)  2 mg  Q4H  PRN


 IVP


 severe pain 7-10  12/3/17 14:00


 12/9/17 13:59  12/4/17 14:01


 


 


 Nicotine


  (Nicoderm)  1 patch  Q24H


 TDERMAL


   12/4/17 09:00


 1/2/18 08:59  12/4/17 08:56


 


 


 Nitroglycerin


  (Ntg)  0.4 mg  Q5M X 3 DOSES PRN


 SL


 Prn Chest Pain  12/3/17 13:30


 1/1/18 19:44   


 


 


 Ondansetron HCl


  (Zofran)  4 mg  Q6H  PRN


 IVP


 Nausea & Vomiting  12/3/17 13:45


 1/1/18 19:44   


 


 


 Promethazine HCl/


 Codeine


  (Phenergan with


 Codeine)  5 ml  Q6H  PRN


 ORAL


 cough  12/3/17 13:45


 1/1/18 19:44   


 


 


 Quetiapine


 Fumarate


  (SEROquel)  100 mg  BEDTIME


 ORAL


   12/4/17 21:00


 1/3/18 20:59   


 


 


 Ranitidine HCl


  (Zantac)  150 mg  TWICE A  DAY


 ORAL


   12/3/17 18:00


 1/2/18 08:59  12/4/17 08:55


 


 


 Sodium Chloride  1,000 ml @ 


 50 mls/hr  Q20H


 IV


   12/3/17 13:30


 1/2/18 08:59  12/4/17 05:55


 


 


 Theophylline


  (Shiv-Dur)  100 mg  EVERY 12  HOURS


 ORAL


   12/3/17 21:00


 1/1/18 20:59  12/4/17 08:55


 


 


 Trazodone HCl


  (Desyrel)  150 mg  BEDTIME


 ORAL


   12/3/17 21:00


 1/1/18 20:59  12/3/17 21:08


 


 


 Zolpidem Tartrate


  (Ambien)  5 mg  HSPRN  PRN


 ORAL


 Insomnia  12/4/17 21:00


 12/11/17 20:59   


 

















JULIETTE KLEIN Dec 4, 2017 16:17

## 2017-12-04 NOTE — DIAGNOSTIC IMAGING REPORT
Indication: SOB



Technique: One view of the chest



Comparison: 12/2/2017



Findings: Bilateral diffuse and extensive interstitial disease persists, unchanged.

There is minimal blunting of left costophrenic sulcus, may indicate a small amount

of pleural fluid.



Impression: Unchanged bilateral diffuse interstitial disease, over 2 days



Possible trace left pleural effusion developing

## 2017-12-05 VITALS — DIASTOLIC BLOOD PRESSURE: 80 MMHG | SYSTOLIC BLOOD PRESSURE: 140 MMHG

## 2017-12-05 VITALS — SYSTOLIC BLOOD PRESSURE: 139 MMHG | DIASTOLIC BLOOD PRESSURE: 77 MMHG

## 2017-12-05 VITALS — SYSTOLIC BLOOD PRESSURE: 135 MMHG | DIASTOLIC BLOOD PRESSURE: 79 MMHG

## 2017-12-05 VITALS — DIASTOLIC BLOOD PRESSURE: 83 MMHG | SYSTOLIC BLOOD PRESSURE: 137 MMHG

## 2017-12-05 LAB
ANION GAP SERPL CALC-SCNC: 3 MMOL/L (ref 5–15)
BASOPHILS NFR BLD AUTO: 0.5 % (ref 0–2)
CALCIUM SERPL-MCNC: 9.1 MG/DL (ref 8.5–10.1)
CHLORIDE SERPL-SCNC: 108 MMOL/L (ref 98–107)
CO2 SERPL-SCNC: 30 MMOL/L (ref 21–32)
CREAT SERPL-MCNC: 0.8 MG/DL (ref 0.55–1.3)
CRP SERPL-MCNC: 3.6 MG/DL (ref 0–0.9)
EOSINOPHIL NFR BLD AUTO: 0 % (ref 0–3)
ERYTHROCYTE [DISTWIDTH] IN BLOOD BY AUTOMATED COUNT: 15 % (ref 11.6–14.8)
ERYTHROCYTE [SEDIMENTATION RATE] IN BLOOD: 70 MM/HR (ref 0–30)
GFR SERPLBLD BASED ON 1.73 SQ M-ARVRAT: > 60 ML/MIN (ref 60–?)
IRON SERPL-MCNC: 11 UG/DL (ref 50–175)
LYMPHOCYTES NFR BLD AUTO: 9.8 % (ref 20–45)
MCH RBC QN AUTO: 26.9 PG (ref 27–31)
MCHC RBC AUTO-ENTMCNC: 30 G/DL (ref 32–36)
MCV RBC AUTO: 90 FL (ref 80–99)
MONOCYTES NFR BLD AUTO: 8.6 % (ref 1–10)
NEUTROPHILS NFR BLD AUTO: 81.1 % (ref 45–75)
PLATELET # BLD: 293 K/UL (ref 150–450)
PMV BLD AUTO: 6.6 FL (ref 6.5–10.1)
POTASSIUM SERPL-SCNC: 4.4 MMOL/L (ref 3.5–5.1)
RBC # BLD AUTO: 4.17 M/UL (ref 4.2–5.4)
SODIUM SERPL-SCNC: 141 MMOL/L (ref 136–145)
TIBC SERPL-MCNC: 226 UG/DL (ref 250–450)
WBC # BLD AUTO: 14.8 K/UL (ref 4.8–10.8)

## 2017-12-05 RX ADMIN — SODIUM CHLORIDE SCH MLS/HR: 0.9 INJECTION INTRAVENOUS at 13:02

## 2017-12-05 RX ADMIN — METHYLPREDNISOLONE SODIUM SUCCINATE SCH MG: 125 INJECTION, POWDER, FOR SOLUTION INTRAMUSCULAR; INTRAVENOUS at 08:59

## 2017-12-05 RX ADMIN — MORPHINE SULFATE PRN MG: 2 INJECTION, SOLUTION INTRAMUSCULAR; INTRAVENOUS at 02:24

## 2017-12-05 RX ADMIN — HEPARIN SODIUM SCH UNITS: 5000 INJECTION INTRAVENOUS; SUBCUTANEOUS at 09:02

## 2017-12-05 RX ADMIN — DULOXETINE HYDROCHLORIDE SCH MG: 30 CAPSULE, DELAYED RELEASE ORAL at 09:01

## 2017-12-05 RX ADMIN — MORPHINE SULFATE PRN MG: 2 INJECTION, SOLUTION INTRAMUSCULAR; INTRAVENOUS at 09:04

## 2017-12-05 RX ADMIN — THEOPHYLLINE ANHYDROUS SCH MG: 100 CAPSULE, EXTENDED RELEASE ORAL at 09:01

## 2017-12-05 NOTE — ELECTROENCEPHALOGRAM
DATE OF PROCEDURE:  12/04/2017



PROCEDURE PERFORMED:  Electroencephalography.



READING PHYSICIAN:  Demetri Rapp M.D.



REQUESTING PHYSICIAN:  Maxim Hopkins D.O.



HISTORY:  The patient is a 62-year-old female with a history of chronic

seizure disorder, presenting now with changes in mental status.  EEG was

requested to assess presence of her seizure activities.  Past medical

history includes previous strokes, heart attacks, CHF, coronary artery

disease, and recurrent syncope.



The patient's treatment include Zithromax and Keppra.



EEG was done using 18 electrodes placed scalp-to-scalp, scalp-to-ear

montages according to 10/20 [employer] System.  During the recording,

the patient described as awake, drowsy, or asleep, but fairly

cooperative.



Most wakeful portions of recording, background activities consist of

somewhat disorganized 5-7 cycles per second theta activities with good

response to physiological stimulation, specifically eye opening and eye

closure.



Most of the recording appears to be while patient was asleep or drowsy

with the background consists of a mixture of 4-6 cycles per second

activities bilaterally.  Amplitude remained mild to moderate.



There were no significant spike and wave activities.  There were some

movement artifacts noted.



IMPRESSION:  Abnormal EEG in the presence of mild to moderate diffuse

slowing.



COMMENT:  Above abnormality is a nonspecific finding, may reflect

underlying toxic metabolic derangement, multiple old diffuse structural

abnormalities.



Absence of paroxysmal event on a single recording does not rule out

seizure disorder.









  ______________________________________________

  Demetri Rapp M.D.





DR:  CHARLI

D:  12/05/2017 12:51

T:  12/05/2017 16:41

JOB#:  [id number]

CC:

## 2017-12-05 NOTE — INFECTIOUS DISEASES PROG NOTE
Assessment/Plan


Assessment/Plan


A;


COPD/ Pneumonia


Leukocytosis may be steroid related, improving


Nicotine dependence


Crohn's disease


P;


Change to PO Levaquin 750 mg X 4days


Nicotine patch





Subjective


ROS Limited/Unobtainable:  No


Constitutional:  Reports: no symptoms, other - feels better


Respiratory:  Reports: productive cough


Gastrointestinal/Abdominal:  Reports: no symptoms


Genitourinary:  Reports: no symptoms


Allergies:  


Coded Allergies:  


     No Known Allergies (Verified , 10/27/06)





Objective


Vital Signs





Last 24 Hour Vital Signs








  Date Time  Temp Pulse Resp B/P (MAP) Pulse Ox O2 Delivery O2 Flow Rate FiO2


 


12/5/17 11:47 97.5 60 19 135/79 100 Nasal Cannula 2.0 


 


12/5/17 07:52 97.4 58 18 137/83 97 Nasal Cannula 2.0 


 


12/5/17 07:10  74 16   Room Air  21


 


12/5/17 04:20      Room Air  


 


12/5/17 04:00 97.6 69 18 139/77 100   


 


12/5/17 00:45 98.0 60 17 140/80 97   


 


12/4/17 20:24      Room Air  


 


12/4/17 20:02 98.1 69 18 137/77 97   


 


12/4/17 19:00  72 18   Room Air  21


 


12/4/17 14:48 97.7       








Height (Feet):  5


Height (Inches):  6.00


Weight (Pounds):  144


General Appearance:  no acute distress


HEENT:  mucous membranes moist


Respiratory/Chest:  lungs clear


Cardiovascular:  normal rate


Abdomen:  soft, non tender


Extremities:  no edema


Neurologic/Psychiatric:  alert, oriented x 3, responsive





Laboratory Tests








Test


  12/5/17


06:30


 


White Blood Count


  14.8 K/UL


(4.8-10.8)  H


 


Red Blood Count


  4.17 M/UL


(4.20-5.40)  L


 


Hemoglobin


  11.2 G/DL


(12.0-16.0)  L


 


Hematocrit


  37.4 %


(37.0-47.0)


 


Mean Corpuscular Volume 90 FL (80-99)  


 


Mean Corpuscular Hemoglobin


  26.9 PG


(27.0-31.0)  L


 


Mean Corpuscular Hemoglobin


Concent 30.0 G/DL


(32.0-36.0)  L


 


Red Cell Distribution Width


  15.0 %


(11.6-14.8)  H


 


Platelet Count


  293 K/UL


(150-450)


 


Mean Platelet Volume


  6.6 FL


(6.5-10.1)


 


Neutrophils (%) (Auto)


  81.1 %


(45.0-75.0)  H


 


Lymphocytes (%) (Auto)


  9.8 %


(20.0-45.0)  L


 


Monocytes (%) (Auto)


  8.6 %


(1.0-10.0)


 


Eosinophils (%) (Auto)


  0.0 %


(0.0-3.0)


 


Basophils (%) (Auto)


  0.5 %


(0.0-2.0)


 


Erythrocyte Sedimentation Rate


  70 MM/HR


(0-30)  H


 


Sodium Level


  141 MMOL/L


(136-145)


 


Potassium Level


  4.4 MMOL/L


(3.5-5.1)


 


Chloride Level


  108 MMOL/L


()  H


 


Carbon Dioxide Level


  30 MMOL/L


(21-32)


 


Anion Gap


  3 mmol/L


(5-15)  L


 


Blood Urea Nitrogen


  31 mg/dL


(7-18)  H


 


Creatinine


  0.8 MG/DL


(0.55-1.30)


 


Estimat Glomerular Filtration


Rate > 60 mL/min


(>60)


 


Glucose Level


  102 MG/DL


()


 


Calcium Level


  9.1 MG/DL


(8.5-10.1)


 


Iron Level


  11 ug/dL


()  L


 


Total Iron Binding Capacity


  226 ug/dL


(250-450)  L


 


Percent Iron Saturation 5 % (15-50)  L


 


Unsaturated Iron Binding


  215 ug/dL


(112-346)


 


C-Reactive Protein,


Quantitative 3.6 mg/dL


(0.00-0.90)  H











Current Medications








 Medications


  (Trade)  Dose


 Ordered  Sig/Jed


 Route


 PRN Reason  Start Time


 Stop Time Status Last Admin


Dose Admin


 


 Albuterol/


 Ipratropium


  (Albuterol/


 Ipratropium)  3 ml  Q4H  PRN


 HHN


 dyspnea  12/3/17 14:00


 12/7/17 13:59   


 


 


 Atorvastatin


 Calcium


  (Lipitor)  40 mg  BEDTIME


 ORAL


   12/3/17 21:00


 1/1/18 20:59  12/4/17 20:53


 


 


 Azithromycin


  (Zithromax)  250 mg  DAILY


 ORAL


   12/5/17 09:00


 12/12/17 08:59  12/5/17 09:00


 


 


 Benzocaine


  (Orajel)  1 applic  QIDPRN  PRN


 MINGO


 pain from mouth sores  12/4/17 16:30


 1/3/18 16:29  12/4/17 17:32


 


 


 Ceftriaxone


 Sodium 1 gm/


 Dextrose  55 ml @ 


 110 mls/hr  Q24H


 IVPB


   12/3/17 14:00


 12/10/17 13:59  12/5/17 13:02


 


 


 Dextrose


  (Dextrose 50%)    STAT  PRN


 IV


 Hypoglycemia  12/3/17 14:00


 1/1/18 13:59   


 


 


 Duloxetine HCl


  (Cymbalta)  60 mg  DAILY


 ORAL


   12/4/17 09:00


 1/2/18 08:59  12/5/17 09:01


 


 


 Heparin Sodium


  (Porcine)


  (Heparin 5000


 units/ml)  5,000 units  EVERY 12  HOURS


 SUBQ


   12/3/17 21:00


 1/1/18 20:59  12/5/17 09:02


 


 


 Iron Sucrose 100


 mg/Sodium Chloride  60 ml @ 


 240 mls/hr  BEDTIME


 IV


   12/5/17 21:00


 12/9/17 21:14   


 


 


 Ketorolac


 Tromethamine


  (Toradol 30mg)  30 mg  Q8H  PRN


 IV


 moderate pain 4-6  12/3/17 14:00


 12/7/17 13:59   


 


 


 Levetiracetam


  (Keppra)  1,500 mg  Q12HR


 ORAL


   12/4/17 21:00


 1/3/18 20:59  12/5/17 09:00


 


 


 Levothyroxine


 Sodium


  (Synthroid)  75 mcg  ACBREAKFAST


 ORAL


   12/4/17 06:30


 1/2/18 06:29  12/5/17 06:14


 


 


 Lorazepam


  (Ativan 2mg/ml


 1ml)  0.5 mg  Q4H  PRN


 IV


 For Anxiety  12/3/17 14:00


 12/9/17 13:59  12/5/17 05:13


 


 


 Mesalamine


  (Asacol)  800 mg  THREE TIMES A  DAY


 ORAL


   12/4/17 13:00


 1/3/18 12:59  12/5/17 13:02


 


 


 Methylprednisolone


 Sodium Succinate


  (Solu-MEDROL)  60 mg  EVERY 12  HOURS


 IV


   12/4/17 21:00


 1/2/18 00:00  12/5/17 08:59


 


 


 Morphine Sulfate


  (Morphine


 Sulfate)  2 mg  Q4H  PRN


 IVP


 severe pain 7-10  12/3/17 14:00


 12/9/17 13:59  12/5/17 02:24


 


 


 Nicotine


  (Nicoderm)  1 patch  Q24H


 TDERMAL


   12/4/17 09:00


 1/2/18 08:59  12/5/17 09:02


 


 


 Nitroglycerin


  (Ntg)  0.4 mg  Q5M X 3 DOSES PRN


 SL


 Prn Chest Pain  12/3/17 13:30


 1/1/18 19:44   


 


 


 Ondansetron HCl


  (Zofran)  4 mg  Q6H  PRN


 IVP


 Nausea & Vomiting  12/3/17 13:45


 1/1/18 19:44   


 


 


 Promethazine HCl/


 Codeine


  (Phenergan with


 Codeine)  5 ml  Q6H  PRN


 ORAL


 cough  12/3/17 13:45


 1/1/18 19:44   


 


 


 Quetiapine


 Fumarate


  (SEROquel)  100 mg  BEDTIME


 ORAL


   12/4/17 21:00


 1/3/18 20:59  12/4/17 20:53


 


 


 Ranitidine HCl


  (Zantac)  150 mg  TWICE A  DAY


 ORAL


   12/3/17 18:00


 1/2/18 08:59  12/5/17 09:00


 


 


 Sodium Chloride  1,000 ml @ 


 50 mls/hr  Q20H


 IV


   12/3/17 13:30


 1/2/18 08:59  12/5/17 04:13


 


 


 Theophylline


  (Shiv-Dur)  100 mg  EVERY 12  HOURS


 ORAL


   12/3/17 21:00


 1/1/18 20:59  12/5/17 09:01


 


 


 Trazodone HCl


  (Desyrel)  150 mg  BEDTIME


 ORAL


   12/3/17 21:00


 1/1/18 20:59  12/4/17 20:52


 


 


 Zolpidem Tartrate


  (Ambien)  5 mg  HSPRN  PRN


 ORAL


 Insomnia  12/4/17 21:00


 12/11/17 20:59  12/4/17 21:07


 

















SONJA LEZAMA Dec 5, 2017 13:58

## 2017-12-05 NOTE — GI PROGRESS NOTE
Assessment/Plan


Problems:  


(1) Crohns disease


ICD Codes:  K50.90 - Crohns disease


SNOMED:  26458675


(2) Anemia


(3) Chronic pain syndrome


ICD Codes:  G89.4 - Chronic pain syndrome


SNOMED:  134953601


(4) Abdominal pain


(5) Ileus


ICD Codes:  K56.7 - Ileus, unspecified


SNOMED:  715097716


(6) Therapeutic opioid-induced constipation (OIC)


ICD Codes:  K59.03 - Drug induced constipation; T40.2X5A - Adverse effect of 

other opioids, initial encounter


SNOMED:  589916429782217


Status:  stable


Status Narrative


Discussed with Dr. Mcleod.


Assessment/Plan


s/p EGD/colon 2016 >> , crohns


non functional implanted morphine pump LLQ


Hx of CEA &  elevation





okay for DC per GI standpoint


symptomatic treatment at this time


adv to regular diet


electrolyte replacement


pain mgmt


ppi


Imodium prn, consider lomotil if diarrhea persists


Rx give for Pentasa + 6MP, patient to follow up with us in clinic.








The patient was seen and examined at bedside and all new and available data was 

reviewed in the patients chart. I agree with the above findings, impression 

and plan.  (Patient seen earlier today. Signature stamp does not reflect 

patient encounter time.). - Elizabeth Mcleod MD





Subjective


Gastrointestinal/Abdominal:  Reports: no symptoms


Subjective


ready to go home





Objective





Last 24 Hour Vital Signs








  Date Time  Temp Pulse Resp B/P (MAP) Pulse Ox O2 Delivery O2 Flow Rate FiO2


 


12/5/17 11:47 97.5 60 19 135/79 100 Nasal Cannula 2.0 


 


12/5/17 07:52 97.4 58 18 137/83 97 Nasal Cannula 2.0 


 


12/5/17 07:10  74 16   Room Air  21


 


12/5/17 04:20      Room Air  


 


12/5/17 04:00 97.6 69 18 139/77 100   


 


12/5/17 00:45 98.0 60 17 140/80 97   


 


12/4/17 20:24      Room Air  


 


12/4/17 20:02 98.1 69 18 137/77 97   


 


12/4/17 19:00  72 18   Room Air  21

















Intake and Output  


 


 12/5/17 12/6/17





 19:00 07:00


 


Intake Total 530 ml 


 


Balance 530 ml 


 


  


 


Intake Oral 480 ml 


 


IV Total 50 ml 











Laboratory Tests








Test


  12/5/17


06:30


 


White Blood Count


  14.8 K/UL


(4.8-10.8)  H


 


Red Blood Count


  4.17 M/UL


(4.20-5.40)  L


 


Hemoglobin


  11.2 G/DL


(12.0-16.0)  L


 


Hematocrit


  37.4 %


(37.0-47.0)


 


Mean Corpuscular Volume 90 FL (80-99)  


 


Mean Corpuscular Hemoglobin


  26.9 PG


(27.0-31.0)  L


 


Mean Corpuscular Hemoglobin


Concent 30.0 G/DL


(32.0-36.0)  L


 


Red Cell Distribution Width


  15.0 %


(11.6-14.8)  H


 


Platelet Count


  293 K/UL


(150-450)


 


Mean Platelet Volume


  6.6 FL


(6.5-10.1)


 


Neutrophils (%) (Auto)


  81.1 %


(45.0-75.0)  H


 


Lymphocytes (%) (Auto)


  9.8 %


(20.0-45.0)  L


 


Monocytes (%) (Auto)


  8.6 %


(1.0-10.0)


 


Eosinophils (%) (Auto)


  0.0 %


(0.0-3.0)


 


Basophils (%) (Auto)


  0.5 %


(0.0-2.0)


 


Erythrocyte Sedimentation Rate


  70 MM/HR


(0-30)  H


 


Sodium Level


  141 MMOL/L


(136-145)


 


Potassium Level


  4.4 MMOL/L


(3.5-5.1)


 


Chloride Level


  108 MMOL/L


()  H


 


Carbon Dioxide Level


  30 MMOL/L


(21-32)


 


Anion Gap


  3 mmol/L


(5-15)  L


 


Blood Urea Nitrogen


  31 mg/dL


(7-18)  H


 


Creatinine


  0.8 MG/DL


(0.55-1.30)


 


Estimat Glomerular Filtration


Rate > 60 mL/min


(>60)


 


Glucose Level


  102 MG/DL


()


 


Calcium Level


  9.1 MG/DL


(8.5-10.1)


 


Iron Level


  11 ug/dL


()  L


 


Total Iron Binding Capacity


  226 ug/dL


(250-450)  L


 


Percent Iron Saturation 5 % (15-50)  L


 


Unsaturated Iron Binding


  215 ug/dL


(112-346)


 


C-Reactive Protein,


Quantitative 3.6 mg/dL


(0.00-0.90)  H








Height (Feet):  5


Height (Inches):  6.00


Weight (Pounds):  144


General Appearance:  WD/WN, no apparent distress, alert


Cardiovascular:  normal rate


Respiratory/Chest:  normal breath sounds, no respiratory distress


Abdominal Exam:  normal bowel sounds, non tender, soft


Extremities:  normal range of motion, non-tender











Elena Seay N.P. Dec 5, 2017 16:03


LESLY MCLEOD Dec 6, 2017 12:45

## 2017-12-05 NOTE — PROGRESS NOTE
DATE:  12/05/2017



SUBJECTIVE:  The patient is a 62-year-old female with chronic obstructive

pulmonary disease exacerbation, who is confused and disorganized.  Mood

labile.  She has got no logical plan for her own self care.  I am going to

treat with medication to stabilize her mood and encourage her to interact

appropriately with staff and other patient.



PLAN:  Since has bipolar 2 disorder, my plan is to continue her on Seroquel

with Cymbalta to stabilize her mood to reduce mood lability and anxiety.

Seen and assessed at bedside.  Supportive therapy provided.  Chart

reviewed.  Discussed with staff.









  ______________________________________________

  Samantha Rick M.D.





DR:  GRAY

D:  12/05/2017 15:21

T:  12/05/2017 20:57

JOB#:  [id number]

CC:

## 2017-12-05 NOTE — GENERAL PROGRESS NOTE
Assessment/Plan


Problem List:  


(1) Abdominal pain


(2) Hx SBO


ICD Codes:  Z87.19 - Personal history of other diseases of the digestive system


SNOMED:  634012519


(3) Dyspnea


(4) SOB (shortness of breath)


ICD Codes:  R06.02 - Shortness of breath


SNOMED:  736614635


(5) Nausea


ICD Codes:  R11.0 - Nausea


SNOMED:  736470517


(6) Abdominal pain


(7) Leukocytosis


ICD Codes:  D72.829 - Leukocytosis


SNOMED:  636160867


(8) Seizure


ICD Codes:  R56.9 - Unspecified convulsions


SNOMED:  91394429


(9) COPD (chronic obstructive pulmonary disease)


ICD Codes:  J44.9 - Chronic obstructive pulmonary disease, unspecified


SNOMED:  63601508


(10) Generalized weakness


ICD Codes:  R53.1 - Weakness


SNOMED:  25836761


(11) Crohns disease


ICD Codes:  K50.90 - Crohns disease


SNOMED:  83868722


(12) Chronic pain


ICD Codes:  G89.29 - Other chronic pain


SNOMED:  67224363


(13) UTI (urinary tract infection)


ICD Codes:  N39.0 - Urinary tract infection, site not specified


SNOMED:  97168971


Status:  stable, progressing, tolerating diet


Assessment/Plan


ot pt diet abx o2 pulm tx pain control dc if clear





Subjective


Constitutional:  Reports: weakness


Allergies:  


Coded Allergies:  


     No Known Allergies (Verified , 10/27/06)


All Systems:  reviewed and negative except above


Subjective


feeling better  wants to go home





Objective





Last 24 Hour Vital Signs








  Date Time  Temp Pulse Resp B/P (MAP) Pulse Ox O2 Delivery O2 Flow Rate FiO2


 


12/5/17 11:47 97.5 60 19 135/79 100 Nasal Cannula 2.0 


 


12/5/17 07:52 97.4 58 18 137/83 97 Nasal Cannula 2.0 


 


12/5/17 07:10  74 16   Room Air  21


 


12/5/17 04:20      Room Air  


 


12/5/17 04:00 97.6 69 18 139/77 100   


 


12/5/17 00:45 98.0 60 17 140/80 97   


 


12/4/17 20:24      Room Air  


 


12/4/17 20:02 98.1 69 18 137/77 97   


 


12/4/17 19:00  72 18   Room Air  21


 


12/4/17 14:48 97.7       

















Intake and Output  


 


 12/5/17 12/6/17





 19:00 07:00


 


Intake Total 50 ml 


 


Balance 50 ml 


 


  


 


IV Total 50 ml 








Laboratory Tests


12/5/17 06:30: 


White Blood Count 14.8H, Red Blood Count 4.17L, Hemoglobin 11.2L, Hematocrit 

37.4, Mean Corpuscular Volume 90, Mean Corpuscular Hemoglobin 26.9L, Mean 

Corpuscular Hemoglobin Concent 30.0L, Red Cell Distribution Width 15.0H, 

Platelet Count 293, Mean Platelet Volume 6.6, Neutrophils (%) (Auto) 81.1H, 

Lymphocytes (%) (Auto) 9.8L, Monocytes (%) (Auto) 8.6, Eosinophils (%) (Auto) 

0.0, Basophils (%) (Auto) 0.5, Erythrocyte Sedimentation Rate 70H, Sodium Level 

141, Potassium Level 4.4, Chloride Level 108H, Carbon Dioxide Level 30, Anion 

Gap 3L, Blood Urea Nitrogen 31H, Creatinine 0.8, Estimat Glomerular Filtration 

Rate > 60, Glucose Level 102, Calcium Level 9.1, Iron Level 11L, Total Iron 

Binding Capacity 226L, Percent Iron Saturation 5L, Unsaturated Iron Binding 215

, C-Reactive Protein, Quantitative 3.6H


Height (Feet):  5


Height (Inches):  6.00


Weight (Pounds):  144


General Appearance:  alert


EENT:  normal ENT inspection


Neck:  normal alignment


Cardiovascular:  normal peripheral pulses, normal rate, regular rhythm


Respiratory/Chest:  chest wall non-tender, lungs clear, normal breath sounds


Abdomen:  normal bowel sounds, non tender, soft


Extremities:  normal inspection


Edema:  no edema noted Arm (L), no edema noted Arm (R), no edema noted Leg (L), 

no edema noted Leg (R), no edema noted Pedal (L), no edema noted Pedal (R), no 

edema noted Generalized


Neurologic:  responsive, motor weakness


Skin:  normal pigmentation, warm/dry











MAICOL LANG Dec 5, 2017 13:35

## 2017-12-05 NOTE — GENERAL PROGRESS NOTE
Assessment/Plan


Status:  stable, progressing


Assessment/Plan


cont current meds





Subjective


Date patient seen:  Dec 5, 2017


Neurologic/Psychiatric:  Reports: anxiety, depressed, emotional problems


Allergies:  


Coded Allergies:  


     No Known Allergies (Verified , 10/27/06)


Subjective


the pt is doing well tolerating meds





Objective





Last 24 Hour Vital Signs








  Date Time  Temp Pulse Resp B/P (MAP) Pulse Ox O2 Delivery O2 Flow Rate FiO2


 


12/5/17 11:47 97.5 60 19 135/79 100 Nasal Cannula 2.0 


 


12/5/17 07:52 97.4 58 18 137/83 97 Nasal Cannula 2.0 


 


12/5/17 07:10  74 16   Room Air  21


 


12/5/17 04:20      Room Air  


 


12/5/17 04:00 97.6 69 18 139/77 100   


 


12/5/17 00:45 98.0 60 17 140/80 97   


 


12/4/17 20:24      Room Air  


 


12/4/17 20:02 98.1 69 18 137/77 97   


 


12/4/17 19:00  72 18   Room Air  21

















Intake and Output  


 


 12/5/17 12/6/17





 19:00 07:00


 


Intake Total 530 ml 


 


Balance 530 ml 


 


  


 


Intake Oral 480 ml 


 


IV Total 50 ml 








Laboratory Tests


12/5/17 06:30: 


White Blood Count 14.8H, Red Blood Count 4.17L, Hemoglobin 11.2L, Hematocrit 

37.4, Mean Corpuscular Volume 90, Mean Corpuscular Hemoglobin 26.9L, Mean 

Corpuscular Hemoglobin Concent 30.0L, Red Cell Distribution Width 15.0H, 

Platelet Count 293, Mean Platelet Volume 6.6, Neutrophils (%) (Auto) 81.1H, 

Lymphocytes (%) (Auto) 9.8L, Monocytes (%) (Auto) 8.6, Eosinophils (%) (Auto) 

0.0, Basophils (%) (Auto) 0.5, Erythrocyte Sedimentation Rate 70H, Sodium Level 

141, Potassium Level 4.4, Chloride Level 108H, Carbon Dioxide Level 30, Anion 

Gap 3L, Blood Urea Nitrogen 31H, Creatinine 0.8, Estimat Glomerular Filtration 

Rate > 60, Glucose Level 102, Calcium Level 9.1, Iron Level 11L, Total Iron 

Binding Capacity 226L, Percent Iron Saturation 5L, Unsaturated Iron Binding 215

, C-Reactive Protein, Quantitative 3.6H


Height (Feet):  5


Height (Inches):  6.00


Weight (Pounds):  144


General Appearance:  no apparent distress, alert


Neurologic:  alert, oriented x 3, responsive, depressed affect











Dm Venegas M.D. Dec 5, 2017 15:22

## 2017-12-07 NOTE — DISCHARGE SUMMARY 2 SIG
DATE OF ADMISSION:  12/02/2017



DATE OF DISCHARGE:  12/05/2017



CONSULTANTS:

1. Blaze Fraga M.D.

2. Demetri Rapp M.D.

3. Sathya Mi M.D.

4. Dimitry Mcleod M.D.

5. Dm Venegas M.D.

6. Samantha Rick M.D.



BRIEF HOSPITAL COURSE:  The patient is a 62-year-old female, who lives at

home, presented to the ED with increased shortness of breath for two days

and slight weakness for three days.  The patient stated that she has been

locked out of her apartment and unable to use her nebulizer medications.

She complained of shortness of breath that occurred both on rest and on

exertion with productive cough and yellowish sputum.  She has a history of

COPD and Crohn's disease, history of coronary artery disease with no

stent.  She is a current smoker.  She has chronic pain and has a morphine

pump, that the patient states is nonfunctional.  On evaluation at ED, she

was placed on O2 nasal cannula and was given a Combivent x3 and was

started on IV steroids.  She had blood work done that showed no

leukocytosis.  Chest x-ray showed increased interstitial lung markings.

EKG was in normal sinus rhythm with no acute changes.  She had persistent

wheezing and was admitted to telemetry for acute COPD exacerbation.  She

was initially given Zosyn.  Antibiotic was transition to ceftriaxone and

Zithromax.  She was placed on IV Solu-Medrol.  She has history of seizure

disorder who has been on Keppra.  She had exacerbation of seizure and was

restarted on Keppra 1000 mg b.i.d.  She was previously on 500 mg t.i.d.

She was placed on seizure precautions and EEG done showed abnormal EEG in

the presence of mild to moderate diffuse slowing, which may reflect

underlying toxic metabolic derangement, multiple old diffuse structural

abnormalities.  She has anemia and Crohn's disease.  She was given

mesalamine as inpatient, but was given prescription for Pentasa upon

discharge as this medication is not available in the hospital.  Her

antipsychotic medications were adjusted.  She had a head CT, which showed

mild chronic age-related changes.  Negative for acute intracranial bleed

or mass effect with incidental findings of minimal right mastoid

opacification and a possible empty sella.  She had slight fever, T-max

100.3 degrees on arrival to ED. She developed leukocytosis possibly

secondary to steroid use.  She was treated for COPD and pneumonia and

antibiotic was eventually transitioned to p.o. Levaquin to continue

antibiotic treatment at home.



FINAL DIAGNOSES:

1. Acute chronic obstructive pulmonary disease exacerbation.

2. Chronic seizure disorder with acute exacerbation.

3. Crohn's disease.

4. Bipolar 2 disorder.

5. Anemia.

6. Opiate-induced constipation.

7. Leukocytosis, may be steroid-related.

8. Nicotine dependence.

9. Possible pneumonia.

10. Urinary tract infection.



DISPOSITION:  The patient was discharged home with Replaced by [facility].



DISCHARGE MEDICATIONS:  Continue with Levaquin 750 mg daily x4 more days.



FOLLOWUP:  Follow up with PCP.







  ______________________________________________

  Maxim Hopkins D.O.



I have been assigned to dictate discharge summary on this account and I

was not involved in the patient's management.



  ______________________________________________

  Modesta Webb N.P.





DR:  LEAT

D:  12/07/2017 13:08

T:  12/07/2017 18:06

JOB#:  [id number]

CC:



MEL

## 2017-12-07 NOTE — DISCHARGE SUMMARY
Discharge Summary


Hospital Course


Date of Admission


Dec 2, 2017 at 16:44


Date of Discharge


Dec 5, 2017 at 15:35


Admitting Diagnosis


COPD exacerbation, generalized weakness


HPI


Radha Gaviria is a 62 year old female who was admitted on Dec 2, 2017 at 16:44 

for Copd Exacerbation, Generalized Weakness


Hospital Course


[id number]





Discharge


Discharge Disposition


Patient was discharged to Home with Home Health(06)


Discharge Diagnoses:  











Modesta Webb NP Dec 7, 2017 13:10

## 2018-02-01 ENCOUNTER — HOSPITAL ENCOUNTER (EMERGENCY)
Dept: HOSPITAL 72 - EMR | Age: 63
Discharge: HOME | End: 2018-02-01
Payer: MEDICARE

## 2018-02-01 VITALS — WEIGHT: 150 LBS | BODY MASS INDEX: 24.11 KG/M2 | HEIGHT: 66 IN

## 2018-02-01 VITALS — SYSTOLIC BLOOD PRESSURE: 99 MMHG | DIASTOLIC BLOOD PRESSURE: 61 MMHG

## 2018-02-01 DIAGNOSIS — Z53.21: ICD-10-CM

## 2018-02-01 DIAGNOSIS — Z45.2: Primary | ICD-10-CM

## 2018-02-01 PROCEDURE — 99282 EMERGENCY DEPT VISIT SF MDM: CPT

## 2018-02-05 ENCOUNTER — HOSPITAL ENCOUNTER (OUTPATIENT)
Dept: HOSPITAL 72 - RAD | Age: 63
Discharge: HOME | End: 2018-02-05
Payer: MEDICARE

## 2018-02-05 VITALS — HEIGHT: 55 IN | WEIGHT: 1 LBS | BODY MASS INDEX: 0.23 KG/M2

## 2018-02-05 DIAGNOSIS — Z79.899: Primary | ICD-10-CM

## 2018-02-05 PROCEDURE — 36569 INSJ PICC 5 YR+ W/O IMAGING: CPT

## 2018-02-05 PROCEDURE — 76937 US GUIDE VASCULAR ACCESS: CPT

## 2018-02-05 NOTE — DIAGNOSTIC IMAGING REPORT
Indications: Needs long-term IV access

 

Technique: Ultrasound confirms patent compressible left basilic vein. Total sterile

technique, including  sterile probe cover and sterile gel, hat, mask,, sterile gown,

large sterile drape, and preparation with 2% chlorhexidine utilized. Local anesthesia

with 1% lidocaine. Under real-time ultrasound guidance, puncture basilic vein using

21-gauge needle, documented and archived, passage 0.018 guidewire under direct

fluoroscopy, which was used to determine appropriate catheter length, exchange for 5

Amharic peel-away sheath. 5 Amharic Bard  dual-lumen power PICC cut to 37 cm. It was

inserted through the peel-away sheath. Peel-away sheath and guidewire removed.

Catheter fixed to the skin. Both catheter ports aspirated and flushed. Patient

tolerated procedure well, without immediate complication. Digital radiograph

documents satisfactory catheter tip position, at the cavoatrial junction. 

 

Total fluoroscopy time 0.3 minutes.

Total dose area product  3.8 dGycm2

 

 

Impression: Successful placement of PICC under sonographic and fluoroscopic guidance,

as described above.

## 2018-03-05 ENCOUNTER — HOSPITAL ENCOUNTER (EMERGENCY)
Dept: HOSPITAL 72 - EMR | Age: 63
Discharge: HOME | End: 2018-03-05
Payer: MEDICARE

## 2018-03-05 VITALS — HEIGHT: 66 IN | WEIGHT: 145 LBS | BODY MASS INDEX: 23.3 KG/M2

## 2018-03-05 VITALS — SYSTOLIC BLOOD PRESSURE: 110 MMHG | DIASTOLIC BLOOD PRESSURE: 76 MMHG

## 2018-03-05 VITALS — DIASTOLIC BLOOD PRESSURE: 64 MMHG | SYSTOLIC BLOOD PRESSURE: 95 MMHG
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
## 2018-03-05 DIAGNOSIS — I25.2: ICD-10-CM

## 2018-03-05 DIAGNOSIS — M25.561: ICD-10-CM

## 2018-03-05 DIAGNOSIS — J44.9: ICD-10-CM

## 2018-03-05 DIAGNOSIS — S20.211A: Primary | ICD-10-CM

## 2018-03-05 DIAGNOSIS — Z86.73: ICD-10-CM
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
## 2018-03-05 DIAGNOSIS — M17.11: ICD-10-CM

## 2018-03-05 PROCEDURE — 73030 X-RAY EXAM OF SHOULDER: CPT

## 2018-03-05 PROCEDURE — 73562 X-RAY EXAM OF KNEE 3: CPT

## 2018-03-05 PROCEDURE — 29240 STRAPPING OF SHOULDER: CPT

## 2018-03-05 PROCEDURE — 96372 THER/PROPH/DIAG INJ SC/IM: CPT

## 2018-03-05 PROCEDURE — 99283 EMERGENCY DEPT VISIT LOW MDM: CPT

## 2018-03-05 PROCEDURE — 71045 X-RAY EXAM CHEST 1 VIEW: CPT

## 2018-03-05 PROCEDURE — 71100 X-RAY EXAM RIBS UNI 2 VIEWS: CPT

## 2018-03-05 NOTE — EMERGENCY ROOM REPORT
History of Present Illness


General


Chief Complaint:  Multiple Trauma/Fall


Source:  Patient





Present Illness


HPI


61 yo female patient presents to ER complaining of shoulder, rib, and knee pain 

s/p fall a few hours ago.


Patient reports she missed the curb and tripped and feel onto her knee and 

shoulder.


Patient denies hitting head, denies LOC.


Patient denies SOB.


Complains of rib pain with deep inspiration.


Denies use of medication for relief of symptoms.


Patient reports hx of fracture right kneecap.


Allergies:  


Coded Allergies:  


     No Known Allergies (Verified , 10/27/06)





Patient History


Past Medical History:  see triage record


Pregnant Now:  No


Reviewed Nursing Documentation:  PMH: Agreed, PSxH: Agreed





Nursing Documentation-PMH


Past Medical History:  No History, Except For


Hx Cardiac Problems:  Yes - MI


Hx Hypertension:  No


Hx Pacemaker:  No


Hx Asthma:  Yes


Hx COPD:  Yes


Hx Diabetes:  No


Hx Cancer:  No


Hx Gastrointestinal Problems:  Yes - Crohn's disease, C. Diff


Hx Dialysis:  No


Hx Neurological Problems:  Yes


Hx Cerebrovascular Accident:  Yes - 2005


Hx Seizures:  Yes


Hx Epilepsy:  Yes


Hx Vertigo:  Yes


Hx Dizziness:  Yes


Hx Syncope:  Yes


Hx Headaches:  Yes


Hx Weakness:  Yes


Hx Fatigue:  Yes





Review of Systems


All Other Systems:  negative except mentioned in HPI





Physical Exam





Vital Signs








  Date Time  Temp Pulse Resp B/P (MAP) Pulse Ox O2 Delivery O2 Flow Rate FiO2


 


3/5/18 15:53 100.6 95 20 95/64 94 Room Air  





 100.6       








Sp02 EP Interpretation:  reviewed, normal


General Appearance:  well appearing, no apparent distress, alert, GCS 15


Head:  normocephalic, atraumatic


Eyes:  bilateral eye normal inspection, bilateral eye PERRL


ENT:  hearing grossly normal, normal pharynx, no angioedema, normal voice, 

uvula midline, moist mucus membranes


Neck:  full range of motion


Respiratory:  lungs clear, normal breath sounds, no rhonchi, no respiratory 

distress, no accessory muscle use, no wheezing, speaking full sentences


Cardiovascular #1:  regular rate, rhythm, no edema


Gastrointestinal:  non tender, soft, no mass, non-distended, no guarding, no 

rebound


Genitourinary:  no CVA tenderness


Musculoskeletal:  back normal, digits/nails normal, gait/station normal, normal 

range of motion - shoulder, non-tender, decreased range of motion - knee 

secondary to pain


Neurologic:  alert, oriented x3, responsive, motor strength/tone normal, 

sensory intact


Psychiatric:  mood/affect normal


Skin:  no rash


Lymphatic:  no adenopathy





Medical Decision Making


PA Attestation


Dr. Duran  is my supervising Physician whom patient management has been 

discussed with.


Diagnostic Impression:  


 Primary Impression:  


 Shoulder pain


 Additional Impressions:  


 Rib contusion


 Patellar pain


ER Course


Pt. presents to the ED c/o knee rib and shoulder pain.





Ddx considered but are not limited to fracture, sprain, strain, contusion.





Vital signs: are WNL, pt. is afebrile





Ordered x-rays of chest, right shoulder, right ribs, and right knee and also 

pain medication.





ER COURSE:


Provided patient with Toradol.


Xray results reviewed, no acute fracture. Osteoarthritis seen in knee image. 

Patient reports history of knee fracture, surgery, and osteoarthritis in knee.


Discuss results of x-ray with patient and patient caregiver.





Patient reports feeling better following administration of medication.


Patient reports ready to be discharged. Caregiver will drive patient home.


Patient knee placed in knee immobilizer pending followup with orthopedic 

specialist. Patient reports appointment scheduled for a "few days from now".


Crutches provided to patient.


Patient reports understanding and agreement to treatment plan.





DISCHARGE:


Patient has pain medication, will not provide Rx at this time.





At this time pt. is stable for d/c to home. Patient resting comfortably, in no 

acute distress, nontoxic appearing, speaking full sentences without distress.


Will provide printed patient care instructions, and any necessary prescriptions.


Patient instructed to follow with primary care provider in 3 - 5 days and to 

request further orthopedic follow-up.


Care plan and follow up instructions have been discussed with the patient prior 

to discharge.


Patient instructed on RICE method: rest, ice, compression, elevation.


Patient instructed to WBAT.


Take medications as directed. 


Patient questions asked and answered.


ER precautions given, patient instructed to return to ER immediately for any 

new or worsening of symptoms.


Chest X-Ray Diagnostic Results


Chest X-Ray Diagnostic Results :  


   Chest X-Ray Ordered:  Yes


   # of Views/Limited/Complete:  1 View


   Indication:  Chest Pain


   EP Interpretation:  Yes


   PA Xray:  Interpretation reviewed


   Interpretation:  other - Generalized mild interstitial prominence, appears 

similar to prior exam. Acuity is indeterminate, may reflect recurrent pulmonary 

edema versus chronic nterstitial fibrotic change. Similar to multiple earlier 

prior studies suggests the latter. Correlate with clinical findings.


   Impression:  No acute disease


   PA Scribe Text


David Rivas PA-C





Other X-Ray Diagnostic Results


Other X-Ray Diagnostic Results #1:  


   X-Ray ordered:  right ribs


   # of Views/Limited Vs Complete:  3 View


   Indication:  Pain


   EP Interpretation:  Yes


   PA Xray:  Interpretation reviewed, by supervising MD, and agrees with 

findings.


   Interpretation:  no dislocation, no soft tissue swelling, no fractures


   Impression:  No acute disease


   PA Scribe Text


David Rivas PA-C


Other X-Ray Diagnostic Results #2:  


   X-Ray ordered:  right shoulder


   # of Views/Limited Vs Complete:  3 View


   Indication:  Pain


   EP Interpretation:  Yes


   PA Xray:  Interpretation reviewed, by supervising MD, and agrees with 

findings.


   Interpretation:  no dislocation, no soft tissue swelling, no fractures


   Impression:  No acute disease


   PA Scribe Text


David Rivas PA-C


Other X-Ray Diagnostic Results #3:  


   X-Ray ordered:  right knee


   # of Views/Limited Vs Complete:  3 View


   Indication:  Pain


   EP Interpretation:  Yes


   PA Xray:  Interpretation reviewed, by supervising MD, and agrees with 

findings.


   Interpretation:  no dislocation, no soft tissue swelling, no fractures


   Impression:  No acute disease


   PA Scribe Text


David Rivas PA-C





Last Vital Signs








  Date Time  Temp Pulse Resp B/P (MAP) Pulse Ox O2 Delivery O2 Flow Rate FiO2


 


3/5/18 15:53 100.6 95 20 95/64 94 Room Air  





 100.6       








Disposition:  HOME, SELF-CARE


Condition:  Stable





Additional Instructions:  


Patient instructed to follow up with primary care provider and discuss further 

referral to orthopedics.


Patient instructed on RICE method: rest, ice, compression, elevation.


Patient instructed to WBAT.


Take medications as directed. 


Patient questions asked and answered.


ER precautions given, patient instructed to return to ER immediately for any 

new or worsening of symptoms.











Brown Rivas Mar 5, 2018 16:33

## 2018-03-05 NOTE — DIAGNOSTIC IMAGING REPORT
Indication: Chest pain, status post fall

 

Technique: One view of the chest, multiple views of the right ribs

 

Comparison: 12/4/2017

 

Findings: Generalized interstitial prominence appears similar to the previous exam.

There is a left arm PICC now present. No focal airspace consolidation. No

pneumothorax. Total spaces are clear. There are some healed appearing rib fracture

deformities of the left lower lateral ribs, appearing similar to the previous exam.

The heart size is normal. Previously demonstrated left costophrenic angle blunting is

not evident. Incidentally noted are hernia mesh anchor sutures in the upper abdomen.

 

Radiographs of the right ribs demonstrate a questionable old healed fracture

deformity of the lateral right third rib. No acute fractures. No pneumothorax.

 

Impression: Generalized mild interstitial prominence, appears similar to prior exam.

Acuity is indeterminate, may reflect recurrent pulmonary edema versus chronic

interstitial fibrotic change. Similar to multiple earlier prior studies suggests the

latter. Correlate with clinical findings

 

Left arm PICC

 

No evidence of acute rib fracture

## 2018-03-06 ENCOUNTER — HOSPITAL ENCOUNTER (OUTPATIENT)
Dept: HOSPITAL 72 - PAN | Age: 63
Discharge: HOME | End: 2018-03-06
Payer: MEDICARE

## 2018-03-06 VITALS — DIASTOLIC BLOOD PRESSURE: 53 MMHG | SYSTOLIC BLOOD PRESSURE: 102 MMHG

## 2018-03-06 DIAGNOSIS — K59.03: Primary | ICD-10-CM

## 2018-03-06 DIAGNOSIS — Z91.81: ICD-10-CM

## 2018-03-06 DIAGNOSIS — K59.00: ICD-10-CM

## 2018-03-06 DIAGNOSIS — R11.2: ICD-10-CM

## 2018-03-06 DIAGNOSIS — R10.9: ICD-10-CM

## 2018-03-06 DIAGNOSIS — T40.2X5A: ICD-10-CM
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
## 2018-03-06 NOTE — DIAGNOSTIC IMAGING REPORT
Indication: Pain

 

Findings: 3  views of the right shoulder were obtained. 

 

No acute fractures, malalignment, erosions or periostitis are identified. Bones are

osteopenic. Soft tissues are unremarkable.

 

Impression: Negative for acute injury

## 2018-03-06 NOTE — DIAGNOSTIC IMAGING REPORT
Indication: Pain  Knee pain

 

 3 views of the right knee were obtained. 

 

Findings:  Moderate arthrosis demonstrated with marginal spur formation and joint

space narrowing. No joint effusion identified. The bones appear osteopenic.

 

IMPRESSION:

 

Osteoarthritis

## 2018-03-07 NOTE — GI PROGRESS NOTE
Assessment/Plan


Problems:  


(1) Therapeutic opioid-induced constipation (OIC)


ICD Codes:  K59.03 - Drug induced constipation; T40.2X5A - Adverse effect of 

other opioids, initial encounter


SNOMED:  [id number]


(2) Nausea and vomiting


(3) Constipation


(4) Abdominal pain


Status:  stable


Status Narrative


Seen with Dr. Mcleod.


Assessment/Plan


Apriso 4 tabs daily given insurance will not cont Pentasa


plan EGD/colonoscopy with Moviprep >> patient to call for scheduling


add movantic 


RTC x prn/3 months





Subjective


Subjective


occasional abdominal pain


constipation, with linzess 145


weight loss


appetite loss


fell yesterday, will see ortho next week


Hx of right knee hair line fracture


No longer taking Pentasa, not dispensing at pharmacy





Objective


T 99.5 


/53


78 HR


96 RA





140 lbs stated weight


General Appearance:  WD/WN, no apparent distress, alert


Cardiovascular:  normal rate


Respiratory/Chest:  normal breath sounds, no respiratory distress


Abdominal Exam:  normal bowel sounds, non tender, soft


Extremities:  normal range of motion, non-tender











Elena Seay N.P. Mar 7, 2018 15:55

## 2018-03-08 NOTE — EMERGENCY ROOM REPORT
History of Present Illness


General


Chief Complaint:  General Complaint


Source:  Patient





Present Illness


HPI


Patient left prior to being seen.


Allergies:  


Coded Allergies:  


     No Known Allergies (Verified , 10/27/06)





Patient History


Last Menstrual Period:  hystrectomy





Nursing Documentation-PMH


Past Medical History:  No History, Except For


Hx Cardiac Problems:  Yes - MI


Hx Hypertension:  No


Hx Pacemaker:  No


Hx Asthma:  Yes


Hx COPD:  Yes


Hx Diabetes:  No


Hx Cancer:  No


Hx Gastrointestinal Problems:  Yes - Crohn's disease, C. Diff


Hx Dialysis:  No


History Of Psychiatric Problem:  No


Hx Neurological Problems:  Yes


Hx Cerebrovascular Accident:  Yes - 2005


Hx Seizures:  Yes


Hx Epilepsy:  Yes


Hx Vertigo:  Yes


Hx Dizziness:  Yes


Hx Syncope:  Yes


Hx Headaches:  Yes


Hx Weakness:  Yes


Hx Fatigue:  Yes





Physical Exam





Vital Signs








  Date Time  Temp Pulse Resp B/P (MAP) Pulse Ox O2 Delivery O2 Flow Rate FiO2


 


2/1/18 15:46 99.0 80 16 99/61 95 Room Air  











Medical Decision Making


PA Attestation


Dr. Aguilar is my supervising physician with whom patient management has been 

discussed with.


Diagnostic Impression:  


 Primary Impression:  


 Encounter for generalized patient complaints


ER Course


Patient left prior to being seen.





Last Vital Signs








  Date Time  Temp Pulse Resp B/P (MAP) Pulse Ox O2 Delivery O2 Flow Rate FiO2


 


2/1/18 16:30 99.0  16 99/61 95 Room Air  


 


2/1/18 15:46  80      








Disposition:  ELOPED


Condition:  Stable


Referrals:  


NON PHYSICIAN (PCP)











Brown Rivas Mar 8, 2018 10:15

## 2018-03-15 ENCOUNTER — HOSPITAL ENCOUNTER (INPATIENT)
Dept: HOSPITAL 72 - EMR | Age: 63
LOS: 6 days | Discharge: NURSING, REHAB OR LONG-TERM CARE | DRG: 871 | End: 2018-03-21
Payer: MEDICARE

## 2018-03-15 VITALS — WEIGHT: 140 LBS | BODY MASS INDEX: 21.97 KG/M2 | HEIGHT: 67 IN

## 2018-03-15 VITALS — DIASTOLIC BLOOD PRESSURE: 63 MMHG | SYSTOLIC BLOOD PRESSURE: 104 MMHG

## 2018-03-15 VITALS — SYSTOLIC BLOOD PRESSURE: 105 MMHG | DIASTOLIC BLOOD PRESSURE: 64 MMHG

## 2018-03-15 VITALS — DIASTOLIC BLOOD PRESSURE: 58 MMHG | SYSTOLIC BLOOD PRESSURE: 105 MMHG

## 2018-03-15 VITALS — SYSTOLIC BLOOD PRESSURE: 104 MMHG | DIASTOLIC BLOOD PRESSURE: 56 MMHG

## 2018-03-15 DIAGNOSIS — F31.81: ICD-10-CM

## 2018-03-15 DIAGNOSIS — K29.60: ICD-10-CM

## 2018-03-15 DIAGNOSIS — R19.7: ICD-10-CM

## 2018-03-15 DIAGNOSIS — J44.0: ICD-10-CM

## 2018-03-15 DIAGNOSIS — E88.09: ICD-10-CM

## 2018-03-15 DIAGNOSIS — A41.9: Primary | ICD-10-CM

## 2018-03-15 DIAGNOSIS — F11.20: ICD-10-CM

## 2018-03-15 DIAGNOSIS — Z79.891: ICD-10-CM

## 2018-03-15 DIAGNOSIS — Z79.82: ICD-10-CM

## 2018-03-15 DIAGNOSIS — J44.1: ICD-10-CM

## 2018-03-15 DIAGNOSIS — K44.9: ICD-10-CM

## 2018-03-15 DIAGNOSIS — K58.9: ICD-10-CM

## 2018-03-15 DIAGNOSIS — N39.0: ICD-10-CM

## 2018-03-15 DIAGNOSIS — K63.3: ICD-10-CM

## 2018-03-15 DIAGNOSIS — I25.10: ICD-10-CM

## 2018-03-15 DIAGNOSIS — K63.1: ICD-10-CM

## 2018-03-15 DIAGNOSIS — K59.03: ICD-10-CM

## 2018-03-15 DIAGNOSIS — G89.4: ICD-10-CM

## 2018-03-15 DIAGNOSIS — J18.9: ICD-10-CM

## 2018-03-15 DIAGNOSIS — G40.909: ICD-10-CM

## 2018-03-15 DIAGNOSIS — F17.200: ICD-10-CM

## 2018-03-15 DIAGNOSIS — K50.918: ICD-10-CM

## 2018-03-15 DIAGNOSIS — R97.0: ICD-10-CM

## 2018-03-15 DIAGNOSIS — D64.9: ICD-10-CM
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
## 2018-03-15 DIAGNOSIS — T40.2X5A: ICD-10-CM

## 2018-03-15 LAB
ADD MANUAL DIFF: YES
ALBUMIN SERPL-MCNC: 2.5 G/DL (ref 3.4–5)
ALBUMIN/GLOB SERPL: 0.4 {RATIO} (ref 1–2.7)
ALP SERPL-CCNC: 128 U/L (ref 46–116)
ALT SERPL-CCNC: 25 U/L (ref 12–78)
ANION GAP SERPL CALC-SCNC: 9 MMOL/L (ref 5–15)
APPEARANCE UR: CLEAR
APTT PPP: YELLOW S
AST SERPL-CCNC: 21 U/L (ref 15–37)
BILIRUB SERPL-MCNC: 0.2 MG/DL (ref 0.2–1)
BUN SERPL-MCNC: 29 MG/DL (ref 7–18)
CALCIUM SERPL-MCNC: 9.3 MG/DL (ref 8.5–10.1)
CHLORIDE SERPL-SCNC: 105 MMOL/L (ref 98–107)
CO2 SERPL-SCNC: 23 MMOL/L (ref 21–32)
CREAT SERPL-MCNC: 1.1 MG/DL (ref 0.55–1.3)
ERYTHROCYTE [DISTWIDTH] IN BLOOD BY AUTOMATED COUNT: 16.4 % (ref 11.6–14.8)
GLOBULIN SER-MCNC: 6.7 G/DL
GLUCOSE UR STRIP-MCNC: NEGATIVE MG/DL
HCT VFR BLD CALC: 35.1 % (ref 37–47)
HGB BLD-MCNC: 11 G/DL (ref 12–16)
KETONES UR QL STRIP: NEGATIVE
LEUKOCYTE ESTERASE UR QL STRIP: (no result)
MCV RBC AUTO: 94 FL (ref 80–99)
NITRITE UR QL STRIP: NEGATIVE
PH UR STRIP: 6 [PH] (ref 4.5–8)
PLATELET # BLD: 335 K/UL (ref 150–450)
POTASSIUM SERPL-SCNC: 3.6 MMOL/L (ref 3.5–5.1)
PROT UR QL STRIP: (no result)
RBC # BLD AUTO: 3.73 M/UL (ref 4.2–5.4)
SODIUM SERPL-SCNC: 137 MMOL/L (ref 136–145)
SP GR UR STRIP: 1.01 (ref 1–1.03)
UROBILINOGEN UR-MCNC: NORMAL MG/DL (ref 0–1)
WBC # BLD AUTO: 23.8 K/UL (ref 4.8–10.8)

## 2018-03-15 PROCEDURE — 83550 IRON BINDING TEST: CPT

## 2018-03-15 PROCEDURE — 83540 ASSAY OF IRON: CPT

## 2018-03-15 PROCEDURE — 94150 VITAL CAPACITY TEST: CPT

## 2018-03-15 PROCEDURE — 83605 ASSAY OF LACTIC ACID: CPT

## 2018-03-15 PROCEDURE — 85610 PROTHROMBIN TIME: CPT

## 2018-03-15 PROCEDURE — 94664 DEMO&/EVAL PT USE INHALER: CPT

## 2018-03-15 PROCEDURE — 94640 AIRWAY INHALATION TREATMENT: CPT

## 2018-03-15 PROCEDURE — 80048 BASIC METABOLIC PNL TOTAL CA: CPT

## 2018-03-15 PROCEDURE — 83690 ASSAY OF LIPASE: CPT

## 2018-03-15 PROCEDURE — 87324 CLOSTRIDIUM AG IA: CPT

## 2018-03-15 PROCEDURE — 80053 COMPREHEN METABOLIC PANEL: CPT

## 2018-03-15 PROCEDURE — 85025 COMPLETE CBC W/AUTO DIFF WBC: CPT

## 2018-03-15 PROCEDURE — 85007 BL SMEAR W/DIFF WBC COUNT: CPT

## 2018-03-15 PROCEDURE — 36415 COLL VENOUS BLD VENIPUNCTURE: CPT

## 2018-03-15 PROCEDURE — 93005 ELECTROCARDIOGRAM TRACING: CPT

## 2018-03-15 PROCEDURE — 87040 BLOOD CULTURE FOR BACTERIA: CPT

## 2018-03-15 PROCEDURE — 87205 SMEAR GRAM STAIN: CPT

## 2018-03-15 PROCEDURE — 83735 ASSAY OF MAGNESIUM: CPT

## 2018-03-15 PROCEDURE — 87070 CULTURE OTHR SPECIMN AEROBIC: CPT

## 2018-03-15 PROCEDURE — 86140 C-REACTIVE PROTEIN: CPT

## 2018-03-15 PROCEDURE — 81003 URINALYSIS AUTO W/O SCOPE: CPT

## 2018-03-15 PROCEDURE — 71045 X-RAY EXAM CHEST 1 VIEW: CPT

## 2018-03-15 PROCEDURE — 82378 CARCINOEMBRYONIC ANTIGEN: CPT

## 2018-03-15 PROCEDURE — 80299 QUANTITATIVE ASSAY DRUG: CPT

## 2018-03-15 PROCEDURE — 87181 SC STD AGAR DILUTION PER AGT: CPT

## 2018-03-15 PROCEDURE — 85651 RBC SED RATE NONAUTOMATED: CPT

## 2018-03-15 PROCEDURE — 74177 CT ABD & PELVIS W/CONTRAST: CPT

## 2018-03-15 PROCEDURE — 93306 TTE W/DOPPLER COMPLETE: CPT

## 2018-03-15 PROCEDURE — 99291 CRITICAL CARE FIRST HOUR: CPT

## 2018-03-15 PROCEDURE — 94003 VENT MGMT INPAT SUBQ DAY: CPT

## 2018-03-15 PROCEDURE — 84100 ASSAY OF PHOSPHORUS: CPT

## 2018-03-15 RX ADMIN — ALBUTEROL SULFATE SCH MG: 2.5 SOLUTION RESPIRATORY (INHALATION) at 15:45

## 2018-03-15 RX ADMIN — ALBUTEROL SULFATE SCH MG: 2.5 SOLUTION RESPIRATORY (INHALATION) at 15:30

## 2018-03-15 RX ADMIN — VANCOMYCIN HYDROCHLORIDE SCH MG: 500 INJECTION, POWDER, LYOPHILIZED, FOR SOLUTION INTRAVENOUS at 23:15

## 2018-03-15 RX ADMIN — ALBUTEROL SULFATE SCH MG: 2.5 SOLUTION RESPIRATORY (INHALATION) at 16:00

## 2018-03-15 RX ADMIN — METHYLPREDNISOLONE SODIUM SUCCINATE SCH MG: 125 INJECTION, POWDER, FOR SOLUTION INTRAMUSCULAR; INTRAVENOUS at 22:49

## 2018-03-15 RX ADMIN — Medication SCH MCG: at 15:45

## 2018-03-15 RX ADMIN — Medication SCH MCG: at 15:30

## 2018-03-15 RX ADMIN — MORPHINE SULFATE PRN MG: 2 INJECTION, SOLUTION INTRAMUSCULAR; INTRAVENOUS at 22:50

## 2018-03-15 RX ADMIN — Medication SCH MCG: at 16:00

## 2018-03-15 NOTE — CONSULTATION
History of Present Illness


General


Date patient seen:  Mar 16, 2018


Chief Complaint:  General Complaint


Reason for Consultation:  dypsnea





Present Illness


HPI


62-year-old female with hx of COPD/asthma, presenting with abdominal pain and 

shortness of breath.  Patient states that she recently took amoxicillin, 

finished her last dose today, as prescribed by her dentist. She has had wheezing

, has been using her nebulizer with only minimal relief.  No chest pain Patient 

has a left PICC line for B-12 injections because she has a hard stick.  Also 

has a morphine pump for chronic pain.


Allergies:  


Coded Allergies:  


     No Known Allergies (Verified , 10/27/06)





Medication History


Scheduled


Al Hydroxide/mg Hydroxide (Mag-Al Plus Suspension), 30 ML PO Q6HR, (Reported)


Aspirin* (Aspir 81*), 81 MG ORAL DAILY, (Reported)


Atorvastatin Calcium* (Lipitor*), 40 MG ORAL BEDTIME, (Reported)


Bupropion Hcl* (Bupropion Hcl*), 100 MG ORAL DAILY, (Reported)


Duloxetine Hcl* (Cymbalta*), 60 MG ORAL DAILY, (Reported)


Ibuprofen* (Motrin*), 800 MG ORAL THREE TIMES A DAY, (Reported)


Levetiracetam* (Levetiracetam*), 1,000 MG ORAL TID, (Reported)


Levothyroxine Sodium* (Levothyroxine Sodium*), 75 MCG ORAL ACBREAKFAST, (

Reported)


Linaclotide (Linzess), 145 MCG PO DAILY, (Reported)


Mesalamine (Pentasa), 1,000 MG ORAL TID, (Reported)


Mirtazapine (Remeron), 30 MG ORAL BEDTIME, (Reported)


Montelukast Sodium* (Montelukast Sodium*), 10 MG ORAL DAILY, (Reported)


Pantoprazole* (Protonix*), 40 MG ORAL DAILY, (Reported)


Quetiapine Fumarate (Seroquel), 200 MG ORAL QHS, (Reported)


Risperidone* (Risperdal*), 0.5 MG ORAL BEDTIME, (Reported)


Theophylline (Theodur*), 100 MG ORAL TWICE A DAY, (Reported)


Topiramate* (Topamax*), 25 MG ORAL TWICE A DAY, (Reported)


Trazodone* (Trazodone*), 150 MG ORAL BEDTIME, (Reported)





Scheduled PRN


Acetaminophen (Acetaminophen), 650 MG ORAL Q8H PRN for Fever/Headache/Mild Pain,

 (Reported)


Acetaminophen* (Acetaminophen 325MG Tablet*), 325 MG ORAL Q4H PRN for Fever/

Headache/Mild Pain, (Reported)


Diphenhydramine HCl (Benadryl), 25 MG PO Q6HR PRN for Itching, (Reported)


Nitroglycerin (Nitroglycerin), 0.4 MG SL [id number] PRN for Prn Chest Pain, (

Reported)


Ondansetron* (Zofran*), 4 MG ORAL Q6H PRN for Nausea & Vomiting, (Reported)


Temazepam* (Restoril*), 15 MG ORAL BEDTIME PRN for Insomnia, (Reported)


Zolpidem Tartrate* (Ambien*), 5 MG ORAL BEDTIME PRN for Insomnia, (Reported)





Patient History


Healthcare decision maker





Resuscitation status





Advanced Directive on File








Past Medical/Surgical History


Past Medical/Surgical History:  


(1) COPD (chronic obstructive pulmonary disease)


(2) Elevated CEA


(3) IBD (inflammatory bowel disease)


(4) Asthma


(5) Opioid dependence





Review of Systems


Respiratory:  Reports: cough, shortness of breath, wheezing


All Other Systems:  negative except mentioned in HPI





Physical Exam


General Appearance:  WD/WN, no apparent distress


Lines, tubes and drains:  peripheral, central line


HEENT:  normocephalic, anicteric


Neck:  non-tender, normal alignment


Respiratory/Chest:  chest wall non-tender, lungs clear


Cardiovascular/Chest:  normal peripheral pulses


Abdomen:  normal bowel sounds





Last 24 Hour Vital Signs








  Date Time  Temp Pulse Resp B/P (MAP) Pulse Ox O2 Delivery O2 Flow Rate FiO2


 


3/15/18 20:00 98.6 86 19 105/64 99 Room Air  





 98.6       


 


3/15/18 17:58 99.3 88 16 104/56 95 Room Air  





 99.3       


 


3/15/18 17:26 99.3       


 


3/15/18 17:00  88 16 104/56 95 Room Air  


 


3/15/18 16:17  88 18  98 Room Air  21


 


3/15/18 16:00  90 18   Room Air  21


 


3/15/18 16:00  91 18  100 Room Air  21


 


3/15/18 16:00 99.3 84 20 105/58 100 Venturi Mask 10.0 





 99.3       


 


3/15/18 16:00        21


 


3/15/18 15:32 99.3       


 


3/15/18 15:31  91 18   Room Air  21


 


3/15/18 15:31  90 18  100 Room Air  21


 


3/15/18 15:31        21


 


3/15/18 14:46 99.3 89 20 104/63 98 Room Air  





 99.3       


 


3/15/18 14:01 99.4 100 20 95/63 98 Room Air  





 99.3       











Laboratory Tests








Test


  3/15/18


14:55 3/15/18


16:00 3/15/18


17:20


 


White Blood Count


  23.8 K/UL


(4.8-10.8)  *H 


  


 


 


Red Blood Count


  3.73 M/UL


(4.20-5.40)  L 


  


 


 


Hemoglobin


  11.0 G/DL


(12.0-16.0)  L 


  


 


 


Hematocrit


  35.1 %


(37.0-47.0)  L 


  


 


 


Mean Corpuscular Volume 94 FL (80-99)    


 


Mean Corpuscular Hemoglobin


  29.6 PG


(27.0-31.0) 


  


 


 


Mean Corpuscular Hemoglobin


Concent 31.4 G/DL


(32.0-36.0)  L 


  


 


 


Red Cell Distribution Width


  16.4 %


(11.6-14.8)  H 


  


 


 


Platelet Count


  335 K/UL


(150-450) 


  


 


 


Mean Platelet Volume


  6.9 FL


(6.5-10.1) 


  


 


 


Neutrophils (%) (Auto)


  % (45.0-75.0)


  


  


 


 


Lymphocytes (%) (Auto)


  % (20.0-45.0)


  


  


 


 


Monocytes (%) (Auto)  % (1.0-10.0)    


 


Eosinophils (%) (Auto)  % (0.0-3.0)    


 


Basophils (%) (Auto)  % (0.0-2.0)    


 


Differential Total Cells


Counted 100  


  


  


 


 


Neutrophils % (Manual) 83 % (45-75)  H  


 


Lymphocytes % (Manual) 8 % (20-45)  L  


 


Monocytes % (Manual) 5 % (1-10)    


 


Eosinophils % (Manual) 1 % (0-3)    


 


Basophils % (Manual) 0 % (0-2)    


 


Band Neutrophils 3 % (0-8)    


 


Platelet Estimate Adequate    


 


Platelet Morphology Normal    


 


Polychromasia 1+    


 


Hypochromasia 1+    


 


Anisocytosis 1+    


 


Sodium Level


  137 MMOL/L


(136-145) 


  


 


 


Potassium Level


  3.6 MMOL/L


(3.5-5.1) 


  


 


 


Chloride Level


  105 MMOL/L


() 


  


 


 


Carbon Dioxide Level


  23 MMOL/L


(21-32) 


  


 


 


Anion Gap


  9 mmol/L


(5-15) 


  


 


 


Blood Urea Nitrogen


  29 mg/dL


(7-18)  H 


  


 


 


Creatinine


  1.1 MG/DL


(0.55-1.30) 


  


 


 


Estimat Glomerular Filtration


Rate > 60 mL/min


(>60) 


  


 


 


Glucose Level


  118 MG/DL


()  H 


  


 


 


Calcium Level


  9.3 MG/DL


(8.5-10.1) 


  


 


 


Total Bilirubin


  0.2 MG/DL


(0.2-1.0) 


  


 


 


Aspartate Amino Transf


(AST/SGOT) 21 U/L (15-37)


  


  


 


 


Alanine Aminotransferase


(ALT/SGPT) 25 U/L (12-78)


  


  


 


 


Alkaline Phosphatase


  128 U/L


()  H 


  


 


 


Total Protein


  9.2 G/DL


(6.4-8.2)  H 


  


 


 


Albumin


  2.5 G/DL


(3.4-5.0)  L 


  


 


 


Globulin 6.7 g/dL    


 


Albumin/Globulin Ratio


  0.4 (1.0-2.7)


L 


  


 


 


Lipase


  48 U/L


()  L 


  


 


 


Lactic Acid Level


  


  0.80 mmol/L


(0.66-2.22) 


 


 


Urine Color   Yellow  


 


Urine Appearance   Clear  


 


Urine pH   6 (4.5-8.0)  


 


Urine Specific Gravity


  


  


  1.015


(1.005-1.035)


 


Urine Protein


  


  


  2+ (NEGATIVE)


H


 


Urine Glucose (UA)


  


  


  Negative


(NEGATIVE)


 


Urine Ketones


  


  


  Negative


(NEGATIVE)


 


Urine Occult Blood


  


  


  3+ (NEGATIVE)


H


 


Urine Nitrite


  


  


  Negative


(NEGATIVE)


 


Urine Bilirubin


  


  


  Negative


(NEGATIVE)


 


Urine Urobilinogen


  


  


  Normal MG/DL


(0.0-1.0)


 


Urine Leukocyte Esterase


  


  


  1+ (NEGATIVE)


H


 


Urine RBC


  


  


  20-30 /HPF (0


- 2)  H


 


Urine WBC


  


  


  5-10 /HPF (0 -


2)  H


 


Urine Squamous Epithelial


Cells 


  


  Occasional


/LPF


 


Urine Bacteria


  


  


  Few /HPF


(NONE)








Height (Feet):  5


Height (Inches):  7.00


Weight (Pounds):  140


Medications





Current Medications








 Medications


  (Trade)  Dose


 Ordered  Sig/Jed


 Route


 PRN Reason  Start Time


 Stop Time Status Last Admin


Dose Admin


 


 Vancomycin HCl


  (Vancomycin)  125 mg  FOUR TIMES A  DAY


 ORAL


   3/15/18 22:00


 3/22/18 21:59   


 











Assessment/Plan


Problem List:  


(1) COPD (chronic obstructive pulmonary disease)


ICD Codes:  J44.9 - Chronic obstructive pulmonary disease, unspecified


SNOMED:  29772538


(2) PNA (pneumonia)


ICD Codes:  J18.9 - Pneumonia, unspecified organism


SNOMED:  183988039


(3) IBD (inflammatory bowel disease)


ICD Codes:  K63.89 - Other specified diseases of intestine


SNOMED:  65355076


(4) Asthma


ICD Codes:  J45.909 - Unspecified asthma, uncomplicated


SNOMED:  520414455


(5) Opioid dependence


ICD Codes:  F11.20 - Opioid dependence


SNOMED:  34296671


(6) COPD exacerbation


ICD Codes:  J44.1 - COPD exacerbation


SNOMED:  449420463


(7) Elevated CEA


ICD Codes:  R97.0 - Elevated CEA


SNOMED:  583036202


Assessment/Plan


respiratory treatment


iv abx


check cultures


chest pt


titrate fio2 to sat of 92%


iv steroids











Blaze Fraga MD Mar 15, 2018 22:19

## 2018-03-15 NOTE — EMERGENCY ROOM REPORT
History of Present Illness


General


Chief Complaint:  General Complaint


Source:  Patient, Medical Record





Present Illness


HPI


62-year-old female, history of multiple abdominal surgeries in the past, COPD/

asthma, presenting with abdominal pain and shortness of breath.  Patient states 

that for 3 days she has had watery nonbloody diarrhea, more than 10 times a day

, and generalized abdominal pain.  No nausea vomiting fever or chills.


Patient states that she recently took amoxicillin, finished her last dose today

, as prescribed by her dentist


Also states that she has had wheezing, has been using her nebulizer with only 

minimal relief.  No chest pain


Patient has a left PICC line for B-12 injections because she has a hard stick.  

Also has a morphine pump for chronic pain


Allergies:  


Coded Allergies:  


     No Known Allergies (Verified , 10/27/06)





Patient History


Past Medical History:  see triage record


Past Surgical History:  none


Pertinent Family History:  none


Reviewed Nursing Documentation:  PMH: Agreed, PSxH: Agreed





Nursing Documentation-PMH


Past Medical History:  No History, Except For


Hx Cardiac Problems:  Yes - MI


Hx Hypertension:  No


Hx Pacemaker:  No


Hx Asthma:  Yes


Hx COPD:  Yes


Hx Diabetes:  No


Hx Cancer:  No


Hx Gastrointestinal Problems:  Yes - Crohn's disease, C. Diff


Hx Dialysis:  No


Hx Neurological Problems:  Yes


Hx Cerebrovascular Accident:  Yes - 2005


Hx Seizures:  Yes


Hx Epilepsy:  Yes


Hx Vertigo:  Yes


Hx Dizziness:  Yes


Hx Syncope:  Yes


Hx Headaches:  Yes


Hx Weakness:  Yes


Hx Fatigue:  Yes





Review of Systems


All Other Systems:  negative except mentioned in HPI





Physical Exam





Vital Signs








  Date Time  Temp Pulse Resp B/P (MAP) Pulse Ox O2 Delivery O2 Flow Rate FiO2


 


3/15/18 14:01 99.4 100 20 95/63 98 Room Air  





 99.3       








Sp02 EP Interpretation:  reviewed, normal


General Appearance:  alert, GCS 15, non-toxic, mild distress


Head:  normocephalic, atraumatic


Eyes:  bilateral eye normal inspection, bilateral eye PERRL, bilateral eye EOMI


ENT:  normal ENT inspection, normal pharynx, normal voice, moist mucus membranes


Neck:  normal inspection, full range of motion, supple


Respiratory:  respiratory distress, speaking full sentences, wheezing, 

expiration


Cardiovascular #1:  normal inspection, regular rate, rhythm, normal capillary 

refill


Cardiovascular #2:  2+ radial (R), 2+ radial (L)


Gastrointestinal:  other - Morphine pump palpated in abdomen, well-healed 

surgical scars on abdomen, abdomen is slightly distended, generalized tenderness

, normal bowel sounds


Musculoskeletal:  normal inspection, back normal, normal range of motion, non-

tender


Neurologic:  normal inspection, alert, oriented x3, responsive, motor strength/

tone normal, sensory intact, normal gait, speech normal


Psychiatric:  normal inspection, judgement/insight normal, memory normal


Skin:  normal inspection, normal color, no rash, warm/dry, well hydrated, 

normal turgor





Procedures


Critical Care Time


Critical Care Time


40 minutes of CC time


62-year-old female with abdominal pain, diarrhea, shortness of breath


VS: Hypoxic and tachypneic








PLAN: IV access, labs, lactate, troponin, Blood/Urine Cx, Abx, IVF, CT abdomen 

pelvis





Anticipate admission to Tele vs. [facility]


CC time also includes review of labs, review of EMR, discussion with family and 

paperwork from SNF, d/w hospitalist


CC could include dosing of pressors, additional Abx


CC time does not include procedures





Medical Decision Making


Diagnostic Impression:  


 Primary Impression:  


 COPD exacerbation


 Additional Impressions:  


 Diarrhea


 Small bowel obstruction


 Rib fracture


ER Course


62-year-old female with abdominal pain, diarrhea, shortness of breath





DDX:


Abdominal pain/diarrhea: Diverticulitis, appendicitis, small bowel obstruction, 

enteritis, C. difficile


Shortness of breath rule out ACS, asthma exacerbation, URI, pneumonia





Plan:


Obtain labs, ua, EKG, CXR


CT abdomen pelvis





ER course:


Patient has been monitored during ED stay


given nebs/steroids, speaking in complete sentences, sob improved


WBC count of 23 --- flagyl given for possible C.diff


fluids given, pt normotensive


pt also with possible SBO -- notified surgery


pt also with 11th rib fx, had a recent fall





Disposition:


Patient is to be admitted to tele


D/W hospitalist Dr Lang





Please note that this Emergency Department Report was dictated using [employer] technology software, occasionally this can lead to 

erroneous entry secondary to interpretation by the dictation equipment.





EKG Diagnostic Results


EP Interpretation: Yes


Rate: normal


Rhythm: NSR


ST Segments: No acute changes 


ASA given to patient: No





Rhythm Strip


EP Interpretation: Yes


Rate: 84


Rhythm: NSR, no PVCs, no ectopy





Chest X-ray


CXR: Ordered: Yes


1 view


Indication: Shortness of breath


EP interpretation: Yes


Interpretation: Some interstitial infiltrates, PICC line noted


Impression: Some interstitial infiltrates, PICC line noted





Electronically signed by Job Duran MD





Laboratory Tests








Test


  3/15/18


14:55 3/15/18


16:00


 


White Blood Count


  23.8 K/UL


(4.8-10.8)  *H 


 


 


Red Blood Count


  3.73 M/UL


(4.20-5.40)  L 


 


 


Hemoglobin


  11.0 G/DL


(12.0-16.0)  L 


 


 


Hematocrit


  35.1 %


(37.0-47.0)  L 


 


 


Mean Corpuscular Volume 94 FL (80-99)   


 


Mean Corpuscular Hemoglobin


  29.6 PG


(27.0-31.0) 


 


 


Mean Corpuscular Hemoglobin


Concent 31.4 G/DL


(32.0-36.0)  L 


 


 


Red Cell Distribution Width


  16.4 %


(11.6-14.8)  H 


 


 


Platelet Count


  335 K/UL


(150-450) 


 


 


Mean Platelet Volume


  6.9 FL


(6.5-10.1) 


 


 


Neutrophils (%) (Auto)


  % (45.0-75.0)


  


 


 


Lymphocytes (%) (Auto)


  % (20.0-45.0)


  


 


 


Monocytes (%) (Auto)  % (1.0-10.0)   


 


Eosinophils (%) (Auto)  % (0.0-3.0)   


 


Basophils (%) (Auto)  % (0.0-2.0)   


 


Differential Total Cells


Counted 100  


  


 


 


Neutrophils % (Manual) 83 % (45-75)  H 


 


Lymphocytes % (Manual) 8 % (20-45)  L 


 


Monocytes % (Manual) 5 % (1-10)   


 


Eosinophils % (Manual) 1 % (0-3)   


 


Basophils % (Manual) 0 % (0-2)   


 


Band Neutrophils 3 % (0-8)   


 


Platelet Estimate Adequate   


 


Platelet Morphology Normal   


 


Polychromasia 1+   


 


Hypochromasia 1+   


 


Anisocytosis 1+   


 


Sodium Level


  137 MMOL/L


(136-145) 


 


 


Potassium Level


  3.6 MMOL/L


(3.5-5.1) 


 


 


Chloride Level


  105 MMOL/L


() 


 


 


Carbon Dioxide Level


  23 MMOL/L


(21-32) 


 


 


Anion Gap


  9 mmol/L


(5-15) 


 


 


Blood Urea Nitrogen


  29 mg/dL


(7-18)  H 


 


 


Creatinine


  1.1 MG/DL


(0.55-1.30) 


 


 


Estimate Glomerular


Filtration Rate > 60 mL/min


(>60) 


 


 


Glucose Level


  118 MG/DL


()  H 


 


 


Calcium Level


  9.3 MG/DL


(8.5-10.1) 


 


 


Total Bilirubin


  0.2 MG/DL


(0.2-1.0) 


 


 


Aspartate Amino Transferase


(AST) 21 U/L (15-37)


  


 


 


Alanine Aminotransferase (ALT)


  25 U/L (12-78)


  


 


 


Alkaline Phosphatase


  128 U/L


()  H 


 


 


Total Protein


  9.2 G/DL


(6.4-8.2)  H 


 


 


Albumin


  2.5 G/DL


(3.4-5.0)  L 


 


 


Globulin 6.7 g/dL   


 


Albumin/Globulin Ratio


  0.4 (1.0-2.7)


L 


 


 


Lipase


  48 U/L


()  L 


 


 


Lactic Acid Level


  


  0.80 mmol/L


(0.66-2.22)








CT/MRI/US Diagnostic Results


CT/MRI/US Diagnostic Results :  


   Imaging Test Ordered:  CT ABDO PELVIS


   Impression


Findings: Lack of enteric contrast limits assessment of the GI tract. Gas 

bubbles are


seen adjacent to the gallbladder. Uncertain as to whether these are 

extraluminal or


within a collapsed bowel loop, although somewhat favor the latter. There is 

evidence


of prior resection of the ascending, transverse, and sigmoid colon, with an 

ileocolic


anastomosis in the left mid abdomen. The colon distal to the anastomosis is 

dilated,


but there is no evidence of downstream obstruction. This was also dilated on the


previous study. The downstream colon is filled with fluid. Another enteroenteric


anastomosis is seen in the right upper quadrant. There are multiple dilated 

small


bowel loops, predominantly in the right upper quadrant. These are also evident


previously. There is a transition to normal caliber small bowel in the right 

lower


quadrant and another transition point is seen in the left upper quadrant, and 

it is


unclear what represents distal versus proximal small bowel. Nondilated small 

bowel


loops are seen in the left upper quadrant. Again demonstrated are anterior 

abdominal


wall mesh anchors. No free or loculated intraperitoneal fluid. Distal esophagus,


stomach, duodenum are unremarkable.


 


The liver, gallbladder are unremarkable. There is mild ectasia of the common 

hepatic


duct, but the common bile duct is nondilated. The pancreas, spleen, adrenals are


unremarkable. Subcentimeter low-attenuation lesions are seen in the kidneys


bilaterally which are too small to characterize. No retroperitoneal or 

mesenteric


mass or adenopathy. No pelvic mass or adenopathy. Uterus and ovaries not 

visualized,


presumed surgically absent.


 


The included lung bases demonstrate fibrotic changes which are also evident


previously. There is a fracture of the right posterior 11th rib which is 

ununited and


new since the previous study. There are old healed fracture deformities of the 

left


ninth and 10th ribs. There are mild degenerative changes of the lumbosacral 

junction.


There is a reservoir in the left lower abdominal subcutaneous fat, with tubing


entering the thecal sac, presumably a pain pump.


 


Impression: Limited assessment of the GI tract, due to lack of enteric contrast


administration


 


Evidence of extensive prior bowel surgery, with evidence of resection of much 

of the


colon, enterocolic and entero-enteral anastomoses


 


Dilated right upper quadrant small bowel loops. Most likely on the basis of


disordered motility related to the prior surgery, particularly given similarity 

to


the previous exam. However, given evidence of nondilated small bowel loops 

elsewhere,


the possibility of small bowel obstruction should be considered.


 


Gas bubbles adjacent to the gallbladder fossa. Probably within collapsed 

adjacent


small bowel, but the possibility that these represent extraluminal gas bubbles 

and


which would therefore indicate bowel perforation should also be considered


 


Considerable fluid within the residual colon. Per discussion with referring


physician, patient has recent history of diarrhea. Findings are certainly 

concordant


with that.


 


Subcentimeter low-attenuation renal lesions, too small to characterize, most 

likely


benign simple cysts. No further follow-up necessary


 


Right posterior 11th rib fracture, appears acute


 


Bilateral basilar pulmonary fibrotic changes, also previously demonstrated


 


Other findings as noted, including intrathecal pain pump, old healed left ninth 

and


10th rib fracture deformities





Last Vital Signs








  Date Time  Temp Pulse Resp B/P (MAP) Pulse Ox O2 Delivery O2 Flow Rate FiO2


 


3/15/18 14:46 99.3 89 20 104/63 98 Room Air  





 99.3       








Disposition:  ADMITTED AS INPATIENT


Condition:  Serious


Referrals:  


MAICOL LANG (PCP)











Job Duran M.D. Mar 15, 2018 15:12

## 2018-03-15 NOTE — DIAGNOSTIC IMAGING REPORT
Clinical Indication: Abdominal pain

 

Technique:   No oral contrast utilized, per emergency room physician request  IV

administration nonionic contrast. Venous phase spiral acquisition obtained through

the abdomen and pelvis. Multiplanar reconstructions were generated. Total dose length

product 611.72 mGycm. CTDIvol(s) 12.54 mGy. Dose reduction achieved using automated

exposure control

 

 

Comparison: 5/20/2017

 

Findings: Lack of enteric contrast limits assessment of the GI tract. Gas bubbles are

seen adjacent to the gallbladder. Uncertain as to whether these are extraluminal or

within a collapsed bowel loop, although somewhat favor the latter. There is evidence

of prior resection of the ascending, transverse, and sigmoid colon, with an ileocolic

anastomosis in the left mid abdomen. The colon distal to the anastomosis is dilated,

but there is no evidence of downstream obstruction. This was also dilated on the

previous study. The downstream colon is filled with fluid. Another enteroenteric

anastomosis is seen in the right upper quadrant. There are multiple dilated small

bowel loops, predominantly in the right upper quadrant. These are also evident

previously. There is a transition to normal caliber small bowel in the right lower

quadrant and another transition point is seen in the left upper quadrant, and it is

unclear what represents distal versus proximal small bowel. Nondilated small bowel

loops are seen in the left upper quadrant. Again demonstrated are anterior abdominal

wall mesh anchors. No free or loculated intraperitoneal fluid. Distal esophagus,

stomach, duodenum are unremarkable.

 

The liver, gallbladder are unremarkable. There is mild ectasia of the common hepatic

duct, but the common bile duct is nondilated. The pancreas, spleen, adrenals are

unremarkable. Subcentimeter low-attenuation lesions are seen in the kidneys

bilaterally which are too small to characterize. No retroperitoneal or mesenteric

mass or adenopathy. No pelvic mass or adenopathy. Uterus and ovaries not visualized,

presumed surgically absent.

 

The included lung bases demonstrate fibrotic changes which are also evident

previously. There is a fracture of the right posterior 11th rib which is ununited and

new since the previous study. There are old healed fracture deformities of the left

ninth and 10th ribs. There are mild degenerative changes of the lumbosacral junction.

There is a reservoir in the left lower abdominal subcutaneous fat, with tubing

entering the thecal sac, presumably a pain pump.

 

Impression: Limited assessment of the GI tract, due to lack of enteric contrast

administration

 

Evidence of extensive prior bowel surgery, with evidence of resection of much of the

colon, enterocolic and entero-enteral anastomoses

 

Dilated right upper quadrant small bowel loops. Most likely on the basis of

disordered motility related to the prior surgery, particularly given similarity to

the previous exam. However, given evidence of nondilated small bowel loops elsewhere,

the possibility of small bowel obstruction should be considered.

 

Gas bubbles adjacent to the gallbladder fossa. Probably within collapsed adjacent

small bowel, but the possibility that these represent extraluminal gas bubbles and

which would therefore indicate bowel perforation should also be considered

 

Considerable fluid within the residual colon. Per discussion with referring

physician, patient has recent history of diarrhea. Findings are certainly concordant

with that.

 

Subcentimeter low-attenuation renal lesions, too small to characterize, most likely

benign simple cysts. No further follow-up necessary

 

Right posterior 11th rib fracture, appears acute

 

Bilateral basilar pulmonary fibrotic changes, also previously demonstrated

 

Other findings as noted, including intrathecal pain pump, old healed left ninth and

10th rib fracture deformities

 

Findings discussed by phone with Dr. Duran at the time of interpretation

 

The CT scanner at [facility] is accredited by the [employer]

Radiology and the scans are performed using protocols designed to limit radiation

exposure to as low as reasonably achievable to attain images of sufficient resolution

adequate for diagnostic evaluation.

## 2018-03-15 NOTE — DIAGNOSTIC IMAGING REPORT
Indication: Cough

 

Technique: One view of the chest

 

Comparison: 3/5/2018

 

Findings: There is a left arm PICC again demonstrated. Mild interstitial prominence

is similar to the previous exam. Heart size is normal. The pleural spaces are clear. 

 

Impression: Mild interstitial prominence, similar to the previous study and likely

reflecting fibrotic changes demonstrated on a CT scan of June 2014. No definite acute

infiltrate

 

Other findings as noted

## 2018-03-16 VITALS — DIASTOLIC BLOOD PRESSURE: 70 MMHG | SYSTOLIC BLOOD PRESSURE: 127 MMHG

## 2018-03-16 VITALS — DIASTOLIC BLOOD PRESSURE: 62 MMHG | SYSTOLIC BLOOD PRESSURE: 119 MMHG

## 2018-03-16 VITALS — DIASTOLIC BLOOD PRESSURE: 68 MMHG | SYSTOLIC BLOOD PRESSURE: 105 MMHG

## 2018-03-16 VITALS — DIASTOLIC BLOOD PRESSURE: 65 MMHG | SYSTOLIC BLOOD PRESSURE: 113 MMHG

## 2018-03-16 VITALS — SYSTOLIC BLOOD PRESSURE: 129 MMHG | DIASTOLIC BLOOD PRESSURE: 75 MMHG

## 2018-03-16 VITALS — SYSTOLIC BLOOD PRESSURE: 110 MMHG | DIASTOLIC BLOOD PRESSURE: 60 MMHG

## 2018-03-16 RX ADMIN — DEXTROSE MONOHYDRATE SCH MLS/HR: 50 INJECTION, SOLUTION INTRAVENOUS at 06:34

## 2018-03-16 RX ADMIN — VANCOMYCIN HYDROCHLORIDE SCH MG: 500 INJECTION, POWDER, LYOPHILIZED, FOR SOLUTION INTRAVENOUS at 17:36

## 2018-03-16 RX ADMIN — MORPHINE SULFATE PRN MG: 2 INJECTION, SOLUTION INTRAMUSCULAR; INTRAVENOUS at 09:05

## 2018-03-16 RX ADMIN — TRAZODONE HYDROCHLORIDE SCH MG: 100 TABLET ORAL at 22:08

## 2018-03-16 RX ADMIN — HEPARIN SODIUM SCH UNITS: 5000 INJECTION INTRAVENOUS; SUBCUTANEOUS at 21:00

## 2018-03-16 RX ADMIN — VANCOMYCIN HYDROCHLORIDE SCH MG: 500 INJECTION, POWDER, LYOPHILIZED, FOR SOLUTION INTRAVENOUS at 13:23

## 2018-03-16 RX ADMIN — TOPIRAMATE SCH MG: 25 TABLET, COATED ORAL at 09:06

## 2018-03-16 RX ADMIN — VANCOMYCIN HYDROCHLORIDE SCH MG: 500 INJECTION, POWDER, LYOPHILIZED, FOR SOLUTION INTRAVENOUS at 09:06

## 2018-03-16 RX ADMIN — MORPHINE SULFATE PRN MG: 2 INJECTION, SOLUTION INTRAMUSCULAR; INTRAVENOUS at 17:36

## 2018-03-16 RX ADMIN — ATORVASTATIN CALCIUM SCH MG: 20 TABLET, FILM COATED ORAL at 22:08

## 2018-03-16 RX ADMIN — THEOPHYLLINE ANHYDROUS SCH MG: 100 CAPSULE, EXTENDED RELEASE ORAL at 22:07

## 2018-03-16 RX ADMIN — TOPIRAMATE SCH MG: 25 TABLET, COATED ORAL at 17:36

## 2018-03-16 RX ADMIN — DEXTROSE MONOHYDRATE SCH MLS/HR: 50 INJECTION, SOLUTION INTRAVENOUS at 13:24

## 2018-03-16 RX ADMIN — METHYLPREDNISOLONE SODIUM SUCCINATE SCH MG: 125 INJECTION, POWDER, FOR SOLUTION INTRAMUSCULAR; INTRAVENOUS at 12:00

## 2018-03-16 RX ADMIN — DEXTROSE MONOHYDRATE SCH MLS/HR: 50 INJECTION, SOLUTION INTRAVENOUS at 23:03

## 2018-03-16 RX ADMIN — MORPHINE SULFATE PRN MG: 2 INJECTION, SOLUTION INTRAMUSCULAR; INTRAVENOUS at 13:23

## 2018-03-16 RX ADMIN — METHYLPREDNISOLONE SODIUM SUCCINATE SCH MG: 125 INJECTION, POWDER, FOR SOLUTION INTRAMUSCULAR; INTRAVENOUS at 06:34

## 2018-03-16 NOTE — PULMONOLOGY PROGRESS NOTE
Assessment/Plan


Problems:  


(1) COPD (chronic obstructive pulmonary disease)


(2) PNA (pneumonia)


(3) IBD (inflammatory bowel disease)


(4) Asthma


(5) Opioid dependence


(6) COPD exacerbation


(7) Elevated CEA


Assessment/Plan


improving


still lots of sputum


continue abx


taper steroids


chest pt


symptomatic treatment


pain management.





Subjective


ROS Limited/Unobtainable:  No


Respiratory:  Reports: productive cough


Allergies:  


Coded Allergies:  


     No Known Allergies (Verified , 10/27/06)





Objective





Last 24 Hour Vital Signs








  Date Time  Temp Pulse Resp B/P (MAP) Pulse Ox O2 Delivery O2 Flow Rate FiO2


 


3/16/18 09:35 97.1       


 


3/16/18 09:05 97.1       


 


3/16/18 08:00  64      


 


3/16/18 08:00 97.1 71 18 119/62 100 Room Air  





 97.1       


 


3/16/18 04:00  66      


 


3/16/18 04:00 96.6 76 18 110/60 97 Room Air  





 96.6       


 


3/16/18 00:00 96.4 80 18 113/65 97 Room Air  





 96.4       


 


3/16/18 00:00  76      


 


3/15/18 22:50 98.6       


 


3/15/18 20:00 98.6 86 19 105/64 99 Room Air  





 98.6       


 


3/15/18 20:00  86      


 


3/15/18 17:58 99.3 88 16 104/56 95 Room Air  





 99.3       


 


3/15/18 17:26 99.3       


 


3/15/18 17:00  88 16 104/56 95 Room Air  


 


3/15/18 16:17  88 18  98 Room Air  21


 


3/15/18 16:00  90 18   Room Air  21


 


3/15/18 16:00  91 18  100 Room Air  21


 


3/15/18 16:00 99.3 84 20 105/58 100 Venturi Mask 10.0 





 99.3       


 


3/15/18 16:00        21


 


3/15/18 15:32 99.3       


 


3/15/18 15:31  91 18   Room Air  21


 


3/15/18 15:31  90 18  100 Room Air  21


 


3/15/18 15:31        21


 


3/15/18 14:46 99.3 89 20 104/63 98 Room Air  





 99.3       


 


3/15/18 14:01 99.4 100 20 95/63 98 Room Air  





 99.3       

















Intake and Output  


 


 3/15/18 3/16/18





 19:00 07:00


 


Intake Total 1000 ml 


 


Output Total 100 ml 300 ml


 


Balance 900 ml -300 ml


 


  


 


Intake IV Total 1000 ml 


 


Output Urine Total 100 ml 300 ml


 


# Voids 1 








Objective


General Appearance:  WD/WN, no apparent distress


Lines, tubes and drains:  peripheral


HEENT:  normocephalic, anicteric


Neck:  non-tender, normal alignment


Respiratory/Chest:  chest wall non-tender, + rhonchi


Cardiovascular/Chest:  normal peripheral pulses


Abdomen:  normal bowel sounds


EXt: no C/C/E





Microbiology








 Date/Time


Source Procedure


Growth Status


 


 


 3/15/18 16:29


Blood Blood Culture - Preliminary Resulted


 


 3/15/18 16:10


Blood Blood Culture - Preliminary Resulted


 


 3/15/18 15:30


Stool Clostridium difficile Toxin Assay - Final Complete








Laboratory Tests


3/15/18 14:55: 


White Blood Count 23.8*H, Red Blood Count 3.73L, Hemoglobin 11.0L, Hematocrit 

35.1L, Mean Corpuscular Volume 94, Mean Corpuscular Hemoglobin 29.6, Mean 

Corpuscular Hemoglobin Concent 31.4L, Red Cell Distribution Width 16.4H, 

Platelet Count 335, Mean Platelet Volume 6.9, Neutrophils (%) (Auto) , 

Lymphocytes (%) (Auto) , Monocytes (%) (Auto) , Eosinophils (%) (Auto) , 

Basophils (%) (Auto) , Differential Total Cells Counted 100, Neutrophils % (

Manual) 83H, Lymphocytes % (Manual) 8L, Monocytes % (Manual) 5, Eosinophils % (

Manual) 1, Basophils % (Manual) 0, Band Neutrophils 3, Platelet Estimate 

Adequate, Platelet Morphology Normal, Polychromasia 1+, Hypochromasia 1+, 

Anisocytosis 1+, Sodium Level 137, Potassium Level 3.6, Chloride Level 105, 

Carbon Dioxide Level 23, Anion Gap 9, Blood Urea Nitrogen 29H, Creatinine 1.1, 

Estimat Glomerular Filtration Rate > 60, Glucose Level 118H, Calcium Level 9.3, 

Total Bilirubin 0.2, Aspartate Amino Transf (AST/SGOT) 21, Alanine 

Aminotransferase (ALT/SGPT) 25, Alkaline Phosphatase 128H, Total Protein 9.2H, 

Albumin 2.5L, Globulin 6.7, Albumin/Globulin Ratio 0.4L, Lipase 48L


3/15/18 16:00: Lactic Acid Level 0.80


3/15/18 17:20: 


Urine Color Yellow, Urine Appearance Clear, Urine pH 6, Urine Specific Gravity 

1.015, Urine Protein 2+H, Urine Glucose (UA) Negative, Urine Ketones Negative, 

Urine Occult Blood 3+H, Urine Nitrite Negative, Urine Bilirubin Negative, Urine 

Urobilinogen Normal, Urine Leukocyte Esterase 1+H, Urine RBC 20-30H, Urine WBC 5

-10H, Urine Squamous Epithelial Cells Occasional, Urine Bacteria Few





Current Medications








 Medications


  (Trade)  Dose


 Ordered  Sig/Jed


 Route


 PRN Reason  Start Time


 Stop Time Status Last Admin


Dose Admin


 


 Albuterol/


 Ipratropium


  (Albuterol/


 Ipratropium)  3 ml  EVERY 4 HOURS  PRN


 HHN


 dyspnea  3/15/18 22:30


 3/20/18 22:29   


 


 


 Aspirin


  (Ecotrin)  81 mg  DAILY


 ORAL


   3/16/18 09:00


 4/15/18 08:59  3/16/18 09:06


 


 


 Atorvastatin


 Calcium


  (Lipitor)  40 mg  BEDTIME


 ORAL


   3/16/18 21:00


 4/15/18 20:59   


 


 


 Bupropion HCl


  (Wellbutrin)  100 mg  DAILY


 ORAL


   3/16/18 09:00


 4/15/18 08:59  3/16/18 09:06


 


 


 Dextrose


  (Dextrose 50%)    STAT  PRN


 IV


 Hypoglycemia  3/15/18 22:30


 4/14/18 22:29   


 


 


 Duloxetine HCl


  (Cymbalta)  60 mg  DAILY


 ORAL


   3/16/18 09:00


 4/15/18 08:59  3/16/18 09:06


 


 


 Heparin Sodium


  (Porcine)


  (Heparin 5000


 units/ml)  5,000 units  EVERY 12  HOURS


 SUBQ


   3/16/18 09:00


 4/15/18 08:59  3/16/18 09:08


 


 


 Ketorolac


 Tromethamine


  (Toradol 30mg)  30 mg  EVERY 8 HOURS  PRN


 IV


 moderate pain 4-6  3/15/18 22:30


 3/20/18 22:29   


 


 


 Levetiracetam


  (Keppra)  1,000 mg  TID


 ORAL


   3/16/18 09:00


 4/15/18 08:59  3/16/18 09:06


 


 


 Levothyroxine


 Sodium


  (Synthroid)  75 mcg  ACBREAKFAST


 ORAL


   3/16/18 06:30


 4/15/18 06:29  3/16/18 06:34


 


 


 Lorazepam


  (Ativan 2mg/ml


 1ml)  0.5 mg  Q4H  PRN


 IV


 For Anxiety  3/15/18 22:30


 3/22/18 22:29   


 


 


 Methylprednisolone


 Sodium Succinate


  (Solu-MEDROL)  60 mg  EVERY 6  HOURS


 IV


   3/16/18 00:00


 4/15/18 00:00  3/16/18 06:34


 


 


 Morphine Sulfate


  (Morphine


 Sulfate)  2 mg  EVERY 4 HOURS  PRN


 IVP


 severe pain 7-10  3/15/18 22:30


 3/22/18 22:29  3/16/18 09:05


 


 


 Nitroglycerin


  (Ntg)  0.4 mg  Q5M X 3 DOSES PRN


 SL


 Prn Chest Pain  3/15/18 22:30


 4/14/18 22:29   


 


 


 Ondansetron HCl


  (Zofran)  4 mg  Q6H  PRN


 IVP


 Nausea & Vomiting  3/15/18 22:30


 4/14/18 22:29  3/15/18 23:16


 


 


 Piperacillin Sod/


 Tazobactam Sod


 3.375 gm/Sodium


 Chloride  110 ml @ 


 27.5 mls/hr  EVERY 8  HOURS


 IVPB


   3/16/18 06:00


 3/20/18 05:59  3/16/18 06:34


 


 


 Promethazine HCl/


 Codeine


  (Phenergan with


 Codeine)  5 ml  EVERY 6 HOURS  PRN


 ORAL


 cough  3/15/18 22:30


 4/14/18 22:29   


 


 


 Temazepam


  (Restoril)  15 mg  HSPRN  PRN


 ORAL


 Insomnia  3/15/18 22:30


 3/22/18 22:29   


 


 


 Theophylline


  (Shiv-Dur)  100 mg  EVERY 12  HOURS


 ORAL


   3/16/18 09:00


 4/15/18 08:59  3/16/18 09:06


 


 


 Topiramate


  (Topamax)  25 mg  TWICE A  DAY


 ORAL


   3/16/18 09:00


 4/15/18 08:59  3/16/18 09:06


 


 


 Trazodone HCl


  (Desyrel)  150 mg  BEDTIME


 ORAL


   3/16/18 21:00


 4/15/18 20:59   


 


 


 Vancomycin HCl


  (Vancomycin)  125 mg  FOUR TIMES A  DAY


 ORAL


   3/15/18 22:00


 3/22/18 21:59  3/16/18 09:06


 

















Blaze Fraga MD Mar 16, 2018 13:22

## 2018-03-16 NOTE — CONSULTATION
History of Present Illness


General


Date patient seen:  Mar 16, 2018


Chief Complaint:  General Complaint


Referring physician:  JULIETTE CULLEN


Reason for Consultation:  abdominal pain





Present Illness


HPI


62 year old female with pmhx of COPD/asthma, on B-12 injections via PICC line, 

CAD/MI, chronic pain syndrome on morphine pump, Cdiff, CVA 2005, seizure 

disorder, Crohn's diease w/ multiple abd surgeries presents to ED on 3/15 with 

abd pain, SOB and 3 days of watery, non bloody diarrhea (> 10 x/day).  Patient 

states that she is felling mildly better since admission but is still having 

pain.  when asked about pain states that pain is in her back, abdomen, chest, 

arms, shoulders, head.  Denies n/v/f/c.  has been having diarrhea for days and 

her bottom hurts.  states that she has a morphine pain pump that is not 

functional.  surgery called to evaluate for abdominal pain.  on admission noted 

to have UTI and leukocytosis of 23k.  CT as below


Allergies:  


Coded Allergies:  


     No Known Allergies (Verified , 10/27/06)





Medication History


Scheduled


Al Hydroxide/mg Hydroxide (Mag-Al Plus Suspension), 30 ML PO Q6HR, (Reported)


Aspirin* (Aspir 81*), 81 MG ORAL DAILY, (Reported)


Atorvastatin Calcium* (Lipitor*), 40 MG ORAL BEDTIME, (Reported)


Bupropion Hcl* (Bupropion Hcl*), 100 MG ORAL DAILY, (Reported)


Duloxetine Hcl* (Cymbalta*), 60 MG ORAL DAILY, (Reported)


Ibuprofen* (Motrin*), 800 MG ORAL THREE TIMES A DAY, (Reported)


Levetiracetam* (Levetiracetam*), 1,000 MG ORAL TID, (Reported)


Levothyroxine Sodium* (Levothyroxine Sodium*), 75 MCG ORAL ACBREAKFAST, (

Reported)


Linaclotide (Linzess), 145 MCG PO DAILY, (Reported)


Mesalamine (Pentasa), 1,000 MG ORAL TID, (Reported)


Mirtazapine (Remeron), 30 MG ORAL BEDTIME, (Reported)


Montelukast Sodium* (Montelukast Sodium*), 10 MG ORAL DAILY, (Reported)


Pantoprazole* (Protonix*), 40 MG ORAL DAILY, (Reported)


Quetiapine Fumarate (Seroquel), 200 MG ORAL QHS, (Reported)


Risperidone* (Risperdal*), 0.5 MG ORAL BEDTIME, (Reported)


Theophylline (Theodur*), 100 MG ORAL TWICE A DAY, (Reported)


Topiramate* (Topamax*), 25 MG ORAL TWICE A DAY, (Reported)


Trazodone* (Trazodone*), 150 MG ORAL BEDTIME, (Reported)





Scheduled PRN


Acetaminophen (Acetaminophen), 650 MG ORAL Q8H PRN for Fever/Headache/Mild Pain,

 (Reported)


Acetaminophen* (Acetaminophen 325MG Tablet*), 325 MG ORAL Q4H PRN for Fever/

Headache/Mild Pain, (Reported)


Diphenhydramine HCl (Benadryl), 25 MG PO Q6HR PRN for Itching, (Reported)


Nitroglycerin (Nitroglycerin), 0.4 MG SL [id number] PRN for Prn Chest Pain, (

Reported)


Ondansetron* (Zofran*), 4 MG ORAL Q6H PRN for Nausea & Vomiting, (Reported)


Temazepam* (Restoril*), 15 MG ORAL BEDTIME PRN for Insomnia, (Reported)


Zolpidem Tartrate* (Ambien*), 5 MG ORAL BEDTIME PRN for Insomnia, (Reported)





Patient History


History Provided By:  Patient, Medical Record, PMD


Healthcare decision maker





Resuscitation status


Full Code


Advanced Directive on File








Past Medical/Surgical History


Past Medical/Surgical History:  


(1) Abnormal laboratory test result


(2) COPD (chronic obstructive pulmonary disease)


(3) Perianal abscess


(4) Emphysema


(5) Anemia


(6) Depression


(7) Lumbar spondylosis


(8) Interstitial lung disease


(9) Asthma


(10) Chronic pain


(11) Herniated nucleus pulposus, lumbar


(12) Radiculopathy of lumbar region


(13) Crohn's disease


(14) Chronic pain syndrome


(15) UTI (urinary tract infection)


(16) Chronic pain disorder


(17) chr psych disorder


(18) recurrent syncope


(19) seizure disorder, exacerbation


(20) Abdominal pain


(21) Dyspnea


(22) SOB (shortness of breath)


(23) Leukocytosis


(24) Seizure


(25) Nausea


(26) Hx SBO


(27) Abdominal pain


(28) Abdominal pain


(29) Purulent bronchitis


(30) Shortness of breath


[phone] [id number]


[phone] [phone]


(33) Respiratory distress


(34) Edema


(35) CHF (congestive heart failure)


(36) Ileus


(37) Psychosis


(38) Vertigo


[phone]


(40) Chronic pancreatitis


(41) Sepsis


(42) Chest pain


(43) Pneumonia


(44) Lower GI bleed


(45) Pre-syncope


[phone] [id number]


(47) Abnormal laboratory test result


(48) Chronic back pain


[phone] [id number]


(50) History of colonic polyps


(51) Sprain of right hand


(52) Acute encephalopathy


(53) Intractable abdominal pain


(54) Cellulitis of female genitalia


(55) Abscess of right genital labia


(56) Colonoscopy planned


(57) Abdominal pain


(58) Chronic abdominal pain


(59) Slow transit constipation


(60) Slow transit constipation


(61) Abdominal pain


(62) Constipation


(63) Nausea and vomiting


(64) Encounter for generalized patient complaints


(65) Rib fracture


(66) Small bowel obstruction


(67) COPD (chronic obstructive pulmonary disease)


(68) Opioid dependence


(69) Asthma


(70) IBD (inflammatory bowel disease)


(71) Elevated CEA


(72) COPD exacerbation


(73) PNA (pneumonia)


(74) Crohns disease


(75) Diarrhea


(76) Therapeutic opioid-induced constipation (OIC)





Review of Systems


All Other Systems:  negative except mentioned in HPI





Physical Exam


General Appearance:  no apparent distress, alert


Lines, tubes and drains:  PICC


HEENT:  normocephalic, mucous membranes moist


Neck:  supple


Respiratory/Chest:  lungs clear, normal breath sounds, no respiratory distress, 

no accessory muscle use


Cardiovascular/Chest:  normal peripheral pulses, normal rate


Abdomen:  non tender, soft, no organomegaly, no mass, other - left sided pain 

pump noted.  prior wounds well healed.


Extremities:  normal range of motion, non-tender, normal inspection


Skin Exam:  normal pigmentation, warm/dry


Neurologic:  alert, oriented x 3





Last 24 Hour Vital Signs








  Date Time  Temp Pulse Resp B/P (MAP) Pulse Ox O2 Delivery O2 Flow Rate FiO2


 


3/16/18 13:53 97.1       


 


3/16/18 13:23 97.1       


 


3/16/18 12:00 97.7 79 21 105/68 98 Room Air  





 97.7       


 


3/16/18 09:05 97.1       


 


3/16/18 08:00  64      


 


3/16/18 08:00 97.1 71 18 119/62 100 Room Air  





 97.1       


 


3/16/18 04:00  66      


 


3/16/18 04:00 96.6 76 18 110/60 97 Room Air  





 96.6       


 


3/16/18 00:00 96.4 80 18 113/65 97 Room Air  





 96.4       


 


3/16/18 00:00  76      


 


3/15/18 22:50 98.6       


 


3/15/18 20:00 98.6 86 19 105/64 99 Room Air  





 98.6       


 


3/15/18 20:00  86      


 


3/15/18 17:58 99.3 88 16 104/56 95 Room Air  





 99.3       


 


3/15/18 17:26 99.3       


 


3/15/18 17:00  88 16 104/56 95 Room Air  


 


3/15/18 16:17  88 18  98 Room Air  21


 


3/15/18 16:00  90 18   Room Air  21


 


3/15/18 16:00  91 18  100 Room Air  21


 


3/15/18 16:00 99.3 84 20 105/58 100 Venturi Mask 10.0 





 99.3       


 


3/15/18 16:00        21


 


3/15/18 15:32 99.3       


 


3/15/18 15:31  91 18   Room Air  21


 


3/15/18 15:31  90 18  100 Room Air  21


 


3/15/18 15:31        21

















Intake and Output  


 


 3/15/18 3/16/18





 19:00 07:00


 


Intake Total 1000 ml 


 


Output Total 100 ml 300 ml


 


Balance 900 ml -300 ml


 


  


 


Intake IV Total 1000 ml 


 


Output Urine Total 100 ml 300 ml


 


# Voids 1 











Laboratory Tests








Test


  3/15/18


16:00 3/15/18


17:20


 


Lactic Acid Level


  0.80 mmol/L


(0.66-2.22) 


 


 


Urine Color  Yellow  


 


Urine Appearance  Clear  


 


Urine pH  6 (4.5-8.0)  


 


Urine Specific Gravity


  


  1.015


(1.005-1.035)


 


Urine Protein


  


  2+ (NEGATIVE)


H


 


Urine Glucose (UA)


  


  Negative


(NEGATIVE)


 


Urine Ketones


  


  Negative


(NEGATIVE)


 


Urine Occult Blood


  


  3+ (NEGATIVE)


H


 


Urine Nitrite


  


  Negative


(NEGATIVE)


 


Urine Bilirubin


  


  Negative


(NEGATIVE)


 


Urine Urobilinogen


  


  Normal MG/DL


(0.0-1.0)


 


Urine Leukocyte Esterase


  


  1+ (NEGATIVE)


H


 


Urine RBC


  


  20-30 /HPF (0


- 2)  H


 


Urine WBC


  


  5-10 /HPF (0 -


2)  H


 


Urine Squamous Epithelial


Cells 


  Occasional


/LPF


 


Urine Bacteria


  


  Few /HPF


(NONE)











Microbiology








 Date/Time


Source Procedure


Growth Status


 


 


 3/15/18 16:29


Blood Blood Culture - Preliminary Resulted


 


 3/15/18 16:10


Blood Blood Culture - Preliminary Resulted


 


 3/15/18 15:30


Stool Clostridium difficile Toxin Assay - Final Complete








Height (Feet):  5


Height (Inches):  7.00


Weight (Pounds):  140


Medications





Current Medications








 Medications


  (Trade)  Dose


 Ordered  Sig/Jed


 Route


 PRN Reason  Start Time


 Stop Time Status Last Admin


Dose Admin


 


 Albuterol/


 Ipratropium


  (Albuterol/


 Ipratropium)  3 ml  EVERY 4 HOURS  PRN


 HHN


 dyspnea  3/15/18 22:30


 3/20/18 22:29   


 


 


 Aspirin


  (Ecotrin)  81 mg  DAILY


 ORAL


   3/16/18 09:00


 4/15/18 08:59  3/16/18 09:06


 


 


 Atorvastatin


 Calcium


  (Lipitor)  40 mg  BEDTIME


 ORAL


   3/16/18 21:00


 4/15/18 20:59   


 


 


 Bupropion HCl


  (Wellbutrin)  100 mg  DAILY


 ORAL


   3/16/18 09:00


 4/15/18 08:59  3/16/18 09:06


 


 


 Dextrose


  (Dextrose 50%)    STAT  PRN


 IV


 Hypoglycemia  3/15/18 22:30


 4/14/18 22:29   


 


 


 Duloxetine HCl


  (Cymbalta)  60 mg  DAILY


 ORAL


   3/16/18 09:00


 4/15/18 08:59  3/16/18 09:06


 


 


 Heparin Sodium


  (Porcine)


  (Heparin 5000


 units/ml)  5,000 units  EVERY 12  HOURS


 SUBQ


   3/16/18 09:00


 4/15/18 08:59  3/16/18 09:08


 


 


 Ketorolac


 Tromethamine


  (Toradol 30mg)  30 mg  EVERY 8 HOURS  PRN


 IV


 moderate pain 4-6  3/15/18 22:30


 3/20/18 22:29   


 


 


 Levetiracetam


  (Keppra)  1,000 mg  TID


 ORAL


   3/16/18 09:00


 4/15/18 08:59  3/16/18 13:23


 


 


 Levothyroxine


 Sodium


  (Synthroid)  75 mcg  ACBREAKFAST


 ORAL


   3/16/18 06:30


 4/15/18 06:29  3/16/18 06:34


 


 


 Loperamide HCl


  (Imodium)  2 mg  Q4H  PRN


 ORAL


 Diarrhea  3/16/18 13:45


 4/15/18 13:44   


 


 


 Lorazepam


  (Ativan 2mg/ml


 1ml)  0.5 mg  Q4H  PRN


 IV


 For Anxiety  3/15/18 22:30


 3/22/18 22:29   


 


 


 Mesalamine


  (Asacol)  800 mg  THREE TIMES A  DAY


 ORAL


   3/16/18 18:00


 4/15/18 17:59   


 


 


 Methylprednisolone


 Sodium Succinate


  (Solu-MEDROL)  60 mg  DAILY


 IV


   3/17/18 09:00


 4/15/18 00:00   


 


 


 Morphine Sulfate


  (Morphine


 Sulfate)  2 mg  EVERY 4 HOURS  PRN


 IVP


 severe pain 7-10  3/15/18 22:30


 3/22/18 22:29  3/16/18 13:23


 


 


 Nitroglycerin


  (Ntg)  0.4 mg  Q5M X 3 DOSES PRN


 SL


 Prn Chest Pain  3/15/18 22:30


 4/14/18 22:29   


 


 


 Ondansetron HCl


  (Zofran)  4 mg  Q6H  PRN


 IVP


 Nausea & Vomiting  3/15/18 22:30


 4/14/18 22:29  3/15/18 23:16


 


 


 Piperacillin Sod/


 Tazobactam Sod


 3.375 gm/Sodium


 Chloride  110 ml @ 


 27.5 mls/hr  EVERY 8  HOURS


 IVPB


   3/16/18 06:00


 3/20/18 05:59  3/16/18 13:24


 


 


 Promethazine HCl/


 Codeine


  (Phenergan with


 Codeine)  5 ml  EVERY 6 HOURS  PRN


 ORAL


 cough  3/15/18 22:30


 4/14/18 22:29   


 


 


 Temazepam


  (Restoril)  15 mg  HSPRN  PRN


 ORAL


 Insomnia  3/15/18 22:30


 3/22/18 22:29   


 


 


 Theophylline


  (Shiv-Dur)  100 mg  EVERY 12  HOURS


 ORAL


   3/16/18 09:00


 4/15/18 08:59  3/16/18 09:06


 


 


 Topiramate


  (Topamax)  25 mg  TWICE A  DAY


 ORAL


   3/16/18 09:00


 4/15/18 08:59  3/16/18 09:06


 


 


 Trazodone HCl


  (Desyrel)  150 mg  BEDTIME


 ORAL


   3/16/18 21:00


 4/15/18 20:59   


 


 


 Vancomycin HCl


  (Vancomycin)  125 mg  FOUR TIMES A  DAY


 ORAL


   3/15/18 22:00


 3/22/18 21:59  3/16/18 13:23


 











Assessment/Plan


Problem List:  


(1) Abdominal pain


Assessment & Plan:  62 year old female with pain everywhere as per her.  states 

has pain pump that is not functional.  pain from head to toes.  


on exam abd soft, non tender, prior incisions well healed, pain pump noted.  no 

rebound, no guarding.  


CT reviewed and has had fair amount of colon and some sb resections.  area of 

air noted around gallbladder and liver and agree with radiologist that likely 

collapsed small bowel.  they do not particularly appear extraluminal.  





-no acute surgical intervention necessary. 


-okay for diet


-will monitor exam


-GI for IBD treatment. 


thank you for this consultation.  will follow with recs.


Qualifiers:  


   Qualified Codes:  R10.84 - Generalized abdominal pain


Status:  stable











EmmanuelDarrius Mar 16, 2018 15:02

## 2018-03-16 NOTE — GI INITIAL CONSULT NOTE
Seay,Elena Saul N.PMatt 3/16/18 1334:


History of Present Illness


General


Date patient seen:  Mar 16, 2018


Time patient seen:  13:30


Reason for Hospitalization:  General Complaint


Referring physician:  JULIETTE CULLEN


Reason for Consultation:  CROHNS





Present Illness


HPI


62-year-old female, history of multiple abdominal surgeries in the past, COPD/

asthma, presenting with abdominal pain and shortness of breath.  Patient states 

that for 3 days she has had watery nonbloody diarrhea, more than 10 times a day

, and generalized abdominal pain.  No nausea vomiting fever or chills.


Patient states that she recently took amoxicillin, finished her last dose today

, as prescribed by her dentist


Also states that she has had wheezing, has been using her nebulizer with only 

minimal relief.  No chest pain


Patient has a left PICC line for B-12 injections because she has a hard stick.  

Also has a morphine pump for chronic pain


GI consulted for crohn's flare.  Pt seen, awake A&Ox4 NAD with c/o of recent 

diarrhea that she has been having the last few days.  Denies any melena or 

coffee grounds.  Has implanted morphine pump, which states never relieves her 

back pain.  Hx of chronic abdominal pain and has been known to have OIC.  

Patient has had multiple surgeries in the past for bowel obstruction as well as 

lysis of adhesions.  She presents today with leukocytosis and anemia.


Home Meds


Reported Medications


Levofloxacin-D5w 500 Mg/100 Ml* (LEVOFLOXACIN-D5W 500 MG/100 ML*) 500 Mg/100 Ml 

Piggyback, 500 MG IVPB Q24H, BAG


   3/21/18


Metronidazole* (FLAGYL*) 250 Mg Tablet, 200 MG ORAL DAILY for 11 Days, TAB


   3/21/18


Duloxetine Hcl* (CYMBALTA*) 60 Mg Capsule.dr, 60 MG ORAL DAILY, CAP


   3/21/18


Levothyroxine Sodium (SYNTHROID) 137 Mcg Tablet, 75 MCG ORAL DAILY, TAB


   Take in the morning on an empty stomach, at least 30 minutes before


   food.


   3/21/18


Topiramate (TOPAMAX) 25 Mg Cap.sprink, 25 MG ORAL EVERY 12 HOURS, CAP


   3/21/18


Theophylline (THEODUR*) 100 Mg Tab.er.12h, 100 MG ORAL TWICE A DAY, #30 TAB 0 

Refills


   3/21/18


Fluconazole (FLUCONAZOLE) 200 Mg Tablet, 200 MG ORAL DAILY for 11 Days, #7 TAB 

0 Refills


   3/21/18


Atorvastatin Calcium* (LIPITOR*) 40 Mg Tablet, 40 MG ORAL BEDTIME, #30 TAB 0 

Refills


   3/21/18


Trazodone Hcl* (DESYREL*) 100 Mg Tablet, 200 MG ORAL BEDTIME, TAB


   3/21/18


Mesalamine (ASACOL HD) 800 Mg Tablet.dr, 1600 MG ORAL THREE TIMES A DAY, TAB


   Do not break outer coating


   3/21/18


Zolpidem Tartrate* (AMBIEN*) 5 Mg Tablet, 5 MG ORAL BEDTIME PRN for Insomnia, 

TAB


   3/15/18


Mirtazapine (REMERON) 30 Mg Tab.rapdis, 30 MG ORAL BEDTIME, TAB


   12/2/17


Trazodone* (TRAZODONE*) 150 Mg Tablet, 150 MG ORAL BEDTIME, TAB


   10/6/17


Temazepam* (RESTORIL*) 15 Mg Capsule, 15 MG ORAL BEDTIME PRN for Insomnia, CAP


   10/6/17


Quetiapine Fumarate (SEROQUEL) 400 Mg Tablet, 200 MG ORAL QHS, #15 TAB 0 Refills


   10/6/17


Pantoprazole* (PROTONIX*) 40 Mg Tablet.dr, 40 MG ORAL DAILY, TAB


   10/6/17


Ondansetron* (ZOFRAN*) 4 Mg Tablet, 4 MG ORAL Q6H PRN for Nausea & Vomiting, TAB


   10/6/17


Nitroglycerin (NITROGLYCERIN) 0.4 Mg Tab.subl, 0.4 MG SL [id number] PRN for Prn 

Chest Pain, TAB


   10/6/17


Levothyroxine Sodium* (LEVOTHYROXINE SODIUM*) 75 Mcg Tablet, 75 MCG ORAL 

ACBREAKFAST, TAB


   Take in the morning on an empty stomach, at least 30 minutes before


   food.


   10/6/17


Al Hydroxide/mg Hydroxide (Mag-Al Plus Suspension) 30 Ml Oral.susp, 30 ML PO 

Q6HR for Constipation, ML


   10/6/17


Acetaminophen* (ACETAMINOPHEN 325MG TABLET*) 325 Mg Tablet, 325 MG ORAL Q4H PRN 

for Fever/Headache/Mild Pain, TAB


   10/6/17


Theophylline (THEODUR*) 100 Mg Tab.er.12h, 100 MG ORAL TWICE A DAY, #30 TAB 0 

Refills


   5/30/17


Atorvastatin Calcium* (LIPITOR*) 40 Mg Tablet, 40 MG ORAL BEDTIME, #30 TAB 0 

Refills


   5/30/17


Aspirin* (ASPIR 81*) 81 Mg Tablet.dr, 81 MG ORAL DAILY, TAB


   5/30/17


Linaclotide (LINZESS) 145 Mcg Capsule, 145 MCG PO DAILY, CAP


   3/23/17


Acetaminophen (Acetaminophen) 650 Mg/20.3 Ml Solution, 650 MG ORAL Q8H PRN for 

Fever/Headache/Mild Pain, ML 0 Refills


   2/13/17


Mesalamine (PENTASA) 500 Mg Capsule.er, 1000 MG ORAL TID, #30 CAP 0 Refills


   2/9/17


Montelukast Sodium* (MONTELUKAST SODIUM*) 10 Mg Tablet, 10 MG ORAL DAILY, TAB


   2/9/17


Risperidone* (RISPERDAL*) 0.5 Mg Tablet, 0.5 MG ORAL BEDTIME, #30 TAB 0 Refills


   2/9/17


Bupropion Hcl* (BUPROPION HCL*) 100 Mg Tablet, 100 MG ORAL DAILY, TAB


   2/9/17


Ibuprofen* (MOTRIN*) 600 Mg Tablet, 800 MG ORAL THREE TIMES A DAY, #30 TAB 0 

Refills


   2/9/17


Topiramate* (TOPAMAX*) 25 Mg Tablet, 25 MG ORAL TWICE A DAY, #60 TAB 0 Refills


   12/25/16


Levetiracetam* (LEVETIRACETAM*) 500 Mg Tablet, 1000 MG ORAL TID, #60 TAB 0 

Refills


   12/25/16


Duloxetine Hcl* (CYMBALTA*) 60 Mg Capsule.dr, 60 MG ORAL DAILY, CAP


   12/25/16


Diphenhydramine HCl (Benadryl) 25 Mg Capsule, 25 MG PO Q6HR PRN for Itching, CAP


   12/25/16


Med list reviewed/reconciled:  Yes


Allergies:  


Coded Allergies:  


     No Known Allergies (Verified , 10/27/06)





Patient History


History Provided By:  Patient, Medical Record


PMH Narrative


Past Medical History:  see triage record


Past Surgical History:  none


Pertinent Family History:  none


Reviewed Nursing Documentation:  PMH: Agreed, PSxH: Agreed





Nursing Documentation-PMH


Past Medical History:  No History, Except For


Hx Cardiac Problems:  Yes - MI


Hx Hypertension:  No


Hx Pacemaker:  No


Hx Asthma:  Yes


Hx COPD:  Yes


Hx Diabetes:  No


Hx Cancer:  No


Hx Gastrointestinal Problems:  Yes - Crohn's disease, C. Diff


Hx Dialysis:  No


Hx Neurological Problems:  Yes


Hx Cerebrovascular Accident:  Yes - 2005


Hx Seizures:  Yes


Hx Epilepsy:  Yes


Hx Vertigo:  Yes


Hx Dizziness:  Yes


Hx Syncope:  Yes


Hx Headaches:  Yes


Hx Weakness:  Yes


Hx Fatigue:  Yes





Review of Systems


All Other Systems:  negative except mentioned in HPI





Physical Exam





Vital Signs








  Date Time  Temp Pulse Resp B/P (MAP) Pulse Ox O2 Delivery O2 Flow Rate FiO2


 


3/15/18 14:01 99.4 100 20 95/63 98 Room Air  





 99.3       


 


3/15/18 15:31        21


 


3/15/18 16:00       10.0 








Sp02 EP Interpretation:  reviewed, normal


Labs





Laboratory Tests








Test


  3/15/18


14:55 3/15/18


16:00 3/15/18


17:20


 


White Blood Count


  23.8 K/UL


(4.8-10.8)  *H 


  


 


 


Red Blood Count


  3.73 M/UL


(4.20-5.40)  L 


  


 


 


Hemoglobin


  11.0 G/DL


(12.0-16.0)  L 


  


 


 


Hematocrit


  35.1 %


(37.0-47.0)  L 


  


 


 


Mean Corpuscular Volume 94 FL (80-99)    


 


Mean Corpuscular Hemoglobin


  29.6 PG


(27.0-31.0) 


  


 


 


Mean Corpuscular Hemoglobin


Concent 31.4 G/DL


(32.0-36.0)  L 


  


 


 


Red Cell Distribution Width


  16.4 %


(11.6-14.8)  H 


  


 


 


Platelet Count


  335 K/UL


(150-450) 


  


 


 


Mean Platelet Volume


  6.9 FL


(6.5-10.1) 


  


 


 


Neutrophils (%) (Auto)


  % (45.0-75.0)


  


  


 


 


Lymphocytes (%) (Auto)


  % (20.0-45.0)


  


  


 


 


Monocytes (%) (Auto)  % (1.0-10.0)    


 


Eosinophils (%) (Auto)  % (0.0-3.0)    


 


Basophils (%) (Auto)  % (0.0-2.0)    


 


Differential Total Cells


Counted 100  


  


  


 


 


Neutrophils % (Manual) 83 % (45-75)  H  


 


Lymphocytes % (Manual) 8 % (20-45)  L  


 


Monocytes % (Manual) 5 % (1-10)    


 


Eosinophils % (Manual) 1 % (0-3)    


 


Basophils % (Manual) 0 % (0-2)    


 


Band Neutrophils 3 % (0-8)    


 


Platelet Estimate Adequate    


 


Platelet Morphology Normal    


 


Polychromasia 1+    


 


Hypochromasia 1+    


 


Anisocytosis 1+    


 


Sodium Level


  137 MMOL/L


(136-145) 


  


 


 


Potassium Level


  3.6 MMOL/L


(3.5-5.1) 


  


 


 


Chloride Level


  105 MMOL/L


() 


  


 


 


Carbon Dioxide Level


  23 MMOL/L


(21-32) 


  


 


 


Anion Gap


  9 mmol/L


(5-15) 


  


 


 


Blood Urea Nitrogen


  29 mg/dL


(7-18)  H 


  


 


 


Creatinine


  1.1 MG/DL


(0.55-1.30) 


  


 


 


Estimat Glomerular Filtration


Rate > 60 mL/min


(>60) 


  


 


 


Glucose Level


  118 MG/DL


()  H 


  


 


 


Calcium Level


  9.3 MG/DL


(8.5-10.1) 


  


 


 


Total Bilirubin


  0.2 MG/DL


(0.2-1.0) 


  


 


 


Aspartate Amino Transf


(AST/SGOT) 21 U/L (15-37)


  


  


 


 


Alanine Aminotransferase


(ALT/SGPT) 25 U/L (12-78)


  


  


 


 


Alkaline Phosphatase


  128 U/L


()  H 


  


 


 


Total Protein


  9.2 G/DL


(6.4-8.2)  H 


  


 


 


Albumin


  2.5 G/DL


(3.4-5.0)  L 


  


 


 


Globulin 6.7 g/dL    


 


Albumin/Globulin Ratio


  0.4 (1.0-2.7)


L 


  


 


 


Lipase


  48 U/L


()  L 


  


 


 


Lactic Acid Level


  


  0.80 mmol/L


(0.66-2.22) 


 


 


Urine Color   Yellow  


 


Urine Appearance   Clear  


 


Urine pH   6 (4.5-8.0)  


 


Urine Specific Gravity


  


  


  1.015


(1.005-1.035)


 


Urine Protein


  


  


  2+ (NEGATIVE)


H


 


Urine Glucose (UA)


  


  


  Negative


(NEGATIVE)


 


Urine Ketones


  


  


  Negative


(NEGATIVE)


 


Urine Occult Blood


  


  


  3+ (NEGATIVE)


H


 


Urine Nitrite


  


  


  Negative


(NEGATIVE)


 


Urine Bilirubin


  


  


  Negative


(NEGATIVE)


 


Urine Urobilinogen


  


  


  Normal MG/DL


(0.0-1.0)


 


Urine Leukocyte Esterase


  


  


  1+ (NEGATIVE)


H


 


Urine RBC


  


  


  20-30 /HPF (0


- 2)  H


 


Urine WBC


  


  


  5-10 /HPF (0 -


2)  H


 


Urine Squamous Epithelial


Cells 


  


  Occasional


/LPF


 


Urine Bacteria


  


  


  Few /HPF


(NONE)








General Appearance:  well appearing, no apparent distress, alert, thin


Head:  normocephalic


EENT:  PERRL/EOMI, normal ENT inspection


Neck:  supple


Respiratory:  normal breath sounds, no respiratory distress


Cardiovascular:  normal rate


Gastrointestinal:  normal inspection, non tender, soft, normal bowel sounds, non

-distended


Rectal:  deferred


Genitourinary:  no CVA tenderness


Musculoskeletal:  normal inspection, back normal


Neurologic:  normal inspection, alert, oriented x3, responsive


Psychiatric:  normal inspection, judgement/insight normal, memory normal


Skin:  normal inspection, normal color, no rash, warm/dry, palpation normal, 

well hydrated


Lymphatic:  normal inspection, no adenopathy


Current Medications





Current Medications








 Medications


  (Trade)  Dose


 Ordered  Sig/Jed


 Route


 PRN Reason  Start Time


 Stop Time Status Last Admin


Dose Admin


 


 Albuterol/


 Ipratropium


  (Albuterol/


 Ipratropium)  3 ml  EVERY 4 HOURS  PRN


 HHN


 dyspnea  3/15/18 22:30


 3/20/18 22:29   


 


 


 Aspirin


  (Ecotrin)  81 mg  DAILY


 ORAL


   3/16/18 09:00


 4/15/18 08:59  3/16/18 09:06


 


 


 Atorvastatin


 Calcium


  (Lipitor)  40 mg  BEDTIME


 ORAL


   3/16/18 21:00


 4/15/18 20:59   


 


 


 Bupropion HCl


  (Wellbutrin)  100 mg  DAILY


 ORAL


   3/16/18 09:00


 4/15/18 08:59  3/16/18 09:06


 


 


 Dextrose


  (Dextrose 50%)    STAT  PRN


 IV


 Hypoglycemia  3/15/18 22:30


 4/14/18 22:29   


 


 


 Duloxetine HCl


  (Cymbalta)  60 mg  DAILY


 ORAL


   3/16/18 09:00


 4/15/18 08:59  3/16/18 09:06


 


 


 Heparin Sodium


  (Porcine)


  (Heparin 5000


 units/ml)  5,000 units  EVERY 12  HOURS


 SUBQ


   3/16/18 09:00


 4/15/18 08:59  3/16/18 09:08


 


 


 Ketorolac


 Tromethamine


  (Toradol 30mg)  30 mg  EVERY 8 HOURS  PRN


 IV


 moderate pain 4-6  3/15/18 22:30


 3/20/18 22:29   


 


 


 Levetiracetam


  (Keppra)  1,000 mg  TID


 ORAL


   3/16/18 09:00


 4/15/18 08:59  3/16/18 13:23


 


 


 Levothyroxine


 Sodium


  (Synthroid)  75 mcg  ACBREAKFAST


 ORAL


   3/16/18 06:30


 4/15/18 06:29  3/16/18 06:34


 


 


 Lorazepam


  (Ativan 2mg/ml


 1ml)  0.5 mg  Q4H  PRN


 IV


 For Anxiety  3/15/18 22:30


 3/22/18 22:29   


 


 


 Methylprednisolone


 Sodium Succinate


  (Solu-MEDROL)  60 mg  DAILY


 IV


   3/17/18 09:00


 4/15/18 00:00   


 


 


 Morphine Sulfate


  (Morphine


 Sulfate)  2 mg  EVERY 4 HOURS  PRN


 IVP


 severe pain 7-10  3/15/18 22:30


 3/22/18 22:29  3/16/18 13:23


 


 


 Nitroglycerin


  (Ntg)  0.4 mg  Q5M X 3 DOSES PRN


 SL


 Prn Chest Pain  3/15/18 22:30


 4/14/18 22:29   


 


 


 Ondansetron HCl


  (Zofran)  4 mg  Q6H  PRN


 IVP


 Nausea & Vomiting  3/15/18 22:30


 4/14/18 22:29  3/15/18 23:16


 


 


 Piperacillin Sod/


 Tazobactam Sod


 3.375 gm/Sodium


 Chloride  110 ml @ 


 27.5 mls/hr  EVERY 8  HOURS


 IVPB


   3/16/18 06:00


 3/20/18 05:59  3/16/18 13:24


 


 


 Promethazine HCl/


 Codeine


  (Phenergan with


 Codeine)  5 ml  EVERY 6 HOURS  PRN


 ORAL


 cough  3/15/18 22:30


 4/14/18 22:29   


 


 


 Temazepam


  (Restoril)  15 mg  HSPRN  PRN


 ORAL


 Insomnia  3/15/18 22:30


 3/22/18 22:29   


 


 


 Theophylline


  (Shiv-Dur)  100 mg  EVERY 12  HOURS


 ORAL


   3/16/18 09:00


 4/15/18 08:59  3/16/18 09:06


 


 


 Topiramate


  (Topamax)  25 mg  TWICE A  DAY


 ORAL


   3/16/18 09:00


 4/15/18 08:59  3/16/18 09:06


 


 


 Trazodone HCl


  (Desyrel)  150 mg  BEDTIME


 ORAL


   3/16/18 21:00


 4/15/18 20:59   


 


 


 Vancomycin HCl


  (Vancomycin)  125 mg  FOUR TIMES A  DAY


 ORAL


   3/15/18 22:00


 3/22/18 21:59  3/16/18 13:23


 











GI: Plan


Problems:  


(1) IBD (inflammatory bowel disease)


(2) Opioid dependence


(3) Diarrhea


(4) Therapeutic opioid-induced constipation (OIC)


(5) Crohns disease


Plan


s/p EGD/colon 2016 >> , crohns


non functional implanted morphine pump LLQ


Hx of CEA &  elevation


cdiff negative





Crohns management


- Mesalamine TID


- CRP, ESR r/o Crohn's flare


adv diet


pain mgmt


ppi


electrolyte replacement


Imodium prn, consider lomotil if diarrhea persists


will consider endoscopy if necessary


fu labs





Discussed with Dr. Pina.


Thank you for this patient referral, we will follow.





LESLY PINA 3/23/18 0906:


History of Present Illness


General


Reason for Hospitalization:  General Complaint





Present Illness


Home Meds


Reported Medications


Levofloxacin-D5w 500 Mg/100 Ml* (LEVOFLOXACIN-D5W 500 MG/100 ML*) 500 Mg/100 Ml 

Piggyback, 500 MG IVPB Q24H, BAG


   3/21/18


Metronidazole* (FLAGYL*) 250 Mg Tablet, 200 MG ORAL DAILY for 11 Days, TAB


   3/21/18


Duloxetine Hcl* (CYMBALTA*) 60 Mg Capsule.dr, 60 MG ORAL DAILY, CAP


   3/21/18


Levothyroxine Sodium (SYNTHROID) 137 Mcg Tablet, 75 MCG ORAL DAILY, TAB


   Take in the morning on an empty stomach, at least 30 minutes before


   food.


   3/21/18


Topiramate (TOPAMAX) 25 Mg Cap.sprink, 25 MG ORAL EVERY 12 HOURS, CAP


   3/21/18


Theophylline (THEODUR*) 100 Mg Tab.er.12h, 100 MG ORAL TWICE A DAY, #30 TAB 0 

Refills


   3/21/18


Fluconazole (FLUCONAZOLE) 200 Mg Tablet, 200 MG ORAL DAILY for 11 Days, #7 TAB 

0 Refills


   3/21/18


Atorvastatin Calcium* (LIPITOR*) 40 Mg Tablet, 40 MG ORAL BEDTIME, #30 TAB 0 

Refills


   3/21/18


Trazodone Hcl* (DESYREL*) 100 Mg Tablet, 200 MG ORAL BEDTIME, TAB


   3/21/18


Mesalamine (ASACOL HD) 800 Mg Tablet.dr, 1600 MG ORAL THREE TIMES A DAY, TAB


   Do not break outer coating


   3/21/18


Zolpidem Tartrate* (AMBIEN*) 5 Mg Tablet, 5 MG ORAL BEDTIME PRN for Insomnia, 

TAB


   3/15/18


Mirtazapine (REMERON) 30 Mg Tab.rapdis, 30 MG ORAL BEDTIME, TAB


   12/2/17


Trazodone* (TRAZODONE*) 150 Mg Tablet, 150 MG ORAL BEDTIME, TAB


   10/6/17


Temazepam* (RESTORIL*) 15 Mg Capsule, 15 MG ORAL BEDTIME PRN for Insomnia, CAP


   10/6/17


Quetiapine Fumarate (SEROQUEL) 400 Mg Tablet, 200 MG ORAL QHS, #15 TAB 0 Refills


   10/6/17


Pantoprazole* (PROTONIX*) 40 Mg Tablet.dr, 40 MG ORAL DAILY, TAB


   10/6/17


Ondansetron* (ZOFRAN*) 4 Mg Tablet, 4 MG ORAL Q6H PRN for Nausea & Vomiting, TAB


   10/6/17


Nitroglycerin (NITROGLYCERIN) 0.4 Mg Tab.subl, 0.4 MG SL [id number] PRN for Prn 

Chest Pain, TAB


   10/6/17


Levothyroxine Sodium* (LEVOTHYROXINE SODIUM*) 75 Mcg Tablet, 75 MCG ORAL 

ACBREAKFAST, TAB


   Take in the morning on an empty stomach, at least 30 minutes before


   food.


   10/6/17


Al Hydroxide/mg Hydroxide (Mag-Al Plus Suspension) 30 Ml Oral.susp, 30 ML PO 

Q6HR for Constipation, ML


   10/6/17


Acetaminophen* (ACETAMINOPHEN 325MG TABLET*) 325 Mg Tablet, 325 MG ORAL Q4H PRN 

for Fever/Headache/Mild Pain, TAB


   10/6/17


Theophylline (THEODUR*) 100 Mg Tab.er.12h, 100 MG ORAL TWICE A DAY, #30 TAB 0 

Refills


   5/30/17


Atorvastatin Calcium* (LIPITOR*) 40 Mg Tablet, 40 MG ORAL BEDTIME, #30 TAB 0 

Refills


   5/30/17


Aspirin* (ASPIR 81*) 81 Mg Tablet.dr, 81 MG ORAL DAILY, TAB


   5/30/17


Linaclotide (LINZESS) 145 Mcg Capsule, 145 MCG PO DAILY, CAP


   3/23/17


Acetaminophen (Acetaminophen) 650 Mg/20.3 Ml Solution, 650 MG ORAL Q8H PRN for 

Fever/Headache/Mild Pain, ML 0 Refills


   2/13/17


Mesalamine (PENTASA) 500 Mg Capsule.er, 1000 MG ORAL TID, #30 CAP 0 Refills


   2/9/17


Montelukast Sodium* (MONTELUKAST SODIUM*) 10 Mg Tablet, 10 MG ORAL DAILY, TAB


   2/9/17


Risperidone* (RISPERDAL*) 0.5 Mg Tablet, 0.5 MG ORAL BEDTIME, #30 TAB 0 Refills


   2/9/17


Bupropion Hcl* (BUPROPION HCL*) 100 Mg Tablet, 100 MG ORAL DAILY, TAB


   2/9/17


Ibuprofen* (MOTRIN*) 600 Mg Tablet, 800 MG ORAL THREE TIMES A DAY, #30 TAB 0 

Refills


   2/9/17


Topiramate* (TOPAMAX*) 25 Mg Tablet, 25 MG ORAL TWICE A DAY, #60 TAB 0 Refills


   12/25/16


Levetiracetam* (LEVETIRACETAM*) 500 Mg Tablet, 1000 MG ORAL TID, #60 TAB 0 

Refills


   12/25/16


Duloxetine Hcl* (CYMBALTA*) 60 Mg Capsule.dr, 60 MG ORAL DAILY, CAP


   12/25/16


Diphenhydramine HCl (Benadryl) 25 Mg Capsule, 25 MG PO Q6HR PRN for Itching, CAP


   12/25/16


Allergies:  


Coded Allergies:  


     No Known Allergies (Verified , 10/27/06)





GI: Plan


Plan


The patient was seen and examined at bedside and all new and available data was 

reviewed in the patients chart. I agree with the above findings, impression 

and plan.  (Patient seen earlier today. Signature stamp does not reflect 

patient encounter time.). - MD Geena Andersen,[facility] Mar 16, 2018 13:34


LESLY PINA Mar 23, 2018 09:06

## 2018-03-16 NOTE — CONSULTATION
History of Present Illness


General


Date patient seen:  Mar 16, 2018


Time patient seen:  11:56


Chief Complaint:  General Complaint





Present Illness


HPI


61 y/o F with hx of COPD/asthma, on B-12 injections via PICC line, CAD/MI, 

chronic pain syndrome on morphine pump, Cdiff, CVA 2005, seizure disorder, Crohn

's diease w/ multiple abd surgeries presents to ED on 3/15 with abd pain, SOB 

and 3 days of watery, non bloody diarrhea (> 10 x/day). Also wheezing with no 

significan relieve from nebulizer treatment.





Denies n/v, f/c, CP





Afebrile


leukocytosis up to 23.8. Cdiff neg but bacteremic with GNRs.


Allergies:  


Coded Allergies:  


     No Known Allergies (Verified , 10/27/06)





Medication History


Scheduled


Al Hydroxide/mg Hydroxide (Mag-Al Plus Suspension), 30 ML PO Q6HR, (Reported)


Aspirin* (Aspir 81*), 81 MG ORAL DAILY, (Reported)


Atorvastatin Calcium* (Lipitor*), 40 MG ORAL BEDTIME, (Reported)


Bupropion Hcl* (Bupropion Hcl*), 100 MG ORAL DAILY, (Reported)


Duloxetine Hcl* (Cymbalta*), 60 MG ORAL DAILY, (Reported)


Ibuprofen* (Motrin*), 800 MG ORAL THREE TIMES A DAY, (Reported)


Levetiracetam* (Levetiracetam*), 1,000 MG ORAL TID, (Reported)


Levothyroxine Sodium* (Levothyroxine Sodium*), 75 MCG ORAL ACBREAKFAST, (

Reported)


Linaclotide (Linzess), 145 MCG PO DAILY, (Reported)


Mesalamine (Pentasa), 1,000 MG ORAL TID, (Reported)


Mirtazapine (Remeron), 30 MG ORAL BEDTIME, (Reported)


Montelukast Sodium* (Montelukast Sodium*), 10 MG ORAL DAILY, (Reported)


Pantoprazole* (Protonix*), 40 MG ORAL DAILY, (Reported)


Quetiapine Fumarate (Seroquel), 200 MG ORAL QHS, (Reported)


Risperidone* (Risperdal*), 0.5 MG ORAL BEDTIME, (Reported)


Theophylline (Theodur*), 100 MG ORAL TWICE A DAY, (Reported)


Topiramate* (Topamax*), 25 MG ORAL TWICE A DAY, (Reported)


Trazodone* (Trazodone*), 150 MG ORAL BEDTIME, (Reported)





Scheduled PRN


Acetaminophen (Acetaminophen), 650 MG ORAL Q8H PRN for Fever/Headache/Mild Pain,

 (Reported)


Acetaminophen* (Acetaminophen 325MG Tablet*), 325 MG ORAL Q4H PRN for Fever/

Headache/Mild Pain, (Reported)


Diphenhydramine HCl (Benadryl), 25 MG PO Q6HR PRN for Itching, (Reported)


Nitroglycerin (Nitroglycerin), 0.4 MG SL [id number] PRN for Prn Chest Pain, (

Reported)


Ondansetron* (Zofran*), 4 MG ORAL Q6H PRN for Nausea & Vomiting, (Reported)


Temazepam* (Restoril*), 15 MG ORAL BEDTIME PRN for Insomnia, (Reported)


Zolpidem Tartrate* (Ambien*), 5 MG ORAL BEDTIME PRN for Insomnia, (Reported)





Patient History


Healthcare decision maker





Resuscitation status


Full Code


Advanced Directive on File








Review of Systems


All Other Systems:  negative except mentioned in HPI





Physical Exam


Physical Exam Narrative





General Appearance:  alert, mild distress


HEENT:  normocephalic, normal pharynx,moist mucus membranes


Neck:  normal inspection, full range of motion, supple


Respiratory:  respiratory distress, speaking full sentences, wheezing, 

expiration


Cardiovascular :  normal inspection, regular rate, rhythm, normal capillary 

refill


Gastrointestinal:  other - Morphine pump palpated in abdomen, well-healed 

surgical scars on abdomen, abdomen is slightly distended, generalized tenderness

, normal bowel sounds


Musculoskeletal:  normal inspection, back normal, normal range of motion, non-

tender


Neurologic:  normal inspection, alert, oriented x3, responsive, motor strength/

tone normal, sensory intact, normal gait, speech normal


Skin:  normal inspection, normal color, no rash, warm/dry, well hydrated, 

normal turgor





Last 24 Hour Vital Signs








  Date Time  Temp Pulse Resp B/P (MAP) Pulse Ox O2 Delivery O2 Flow Rate FiO2


 


3/16/18 09:35 97.1       


 


3/16/18 09:05 97.1       


 


3/16/18 08:00  64      


 


3/16/18 08:00 97.1 71 18 119/62 100 Room Air  





 97.1       


 


3/16/18 04:00  66      


 


3/16/18 04:00 96.6 76 18 110/60 97 Room Air  





 96.6       


 


3/16/18 00:00 96.4 80 18 113/65 97 Room Air  





 96.4       


 


3/16/18 00:00  76      


 


3/15/18 22:50 98.6       


 


3/15/18 20:00 98.6 86 19 105/64 99 Room Air  





 98.6       


 


3/15/18 20:00  86      


 


3/15/18 17:58 99.3 88 16 104/56 95 Room Air  





 99.3       


 


3/15/18 17:26 99.3       


 


3/15/18 17:00  88 16 104/56 95 Room Air  


 


3/15/18 16:17  88 18  98 Room Air  21


 


3/15/18 16:00  90 18   Room Air  21


 


3/15/18 16:00  91 18  100 Room Air  21


 


3/15/18 16:00 99.3 84 20 105/58 100 Venturi Mask 10.0 





 99.3       


 


3/15/18 16:00        21


 


3/15/18 15:32 99.3       


 


3/15/18 15:31  91 18   Room Air  21


 


3/15/18 15:31  90 18  100 Room Air  21


 


3/15/18 15:31        21


 


3/15/18 14:46 99.3 89 20 104/63 98 Room Air  





 99.3       


 


3/15/18 14:01 99.4 100 20 95/63 98 Room Air  





 99.3       

















Intake and Output  


 


 3/15/18 3/16/18





 19:00 07:00


 


Intake Total 1000 ml 


 


Output Total 100 ml 300 ml


 


Balance 900 ml -300 ml


 


  


 


Intake IV Total 1000 ml 


 


Output Urine Total 100 ml 300 ml


 


# Voids 1 











Laboratory Tests








Test


  3/15/18


14:55 3/15/18


16:00 3/15/18


17:20


 


White Blood Count


  23.8 K/UL


(4.8-10.8)  *H 


  


 


 


Red Blood Count


  3.73 M/UL


(4.20-5.40)  L 


  


 


 


Hemoglobin


  11.0 G/DL


(12.0-16.0)  L 


  


 


 


Hematocrit


  35.1 %


(37.0-47.0)  L 


  


 


 


Mean Corpuscular Volume 94 FL (80-99)    


 


Mean Corpuscular Hemoglobin


  29.6 PG


(27.0-31.0) 


  


 


 


Mean Corpuscular Hemoglobin


Concent 31.4 G/DL


(32.0-36.0)  L 


  


 


 


Red Cell Distribution Width


  16.4 %


(11.6-14.8)  H 


  


 


 


Platelet Count


  335 K/UL


(150-450) 


  


 


 


Mean Platelet Volume


  6.9 FL


(6.5-10.1) 


  


 


 


Neutrophils (%) (Auto)


  % (45.0-75.0)


  


  


 


 


Lymphocytes (%) (Auto)


  % (20.0-45.0)


  


  


 


 


Monocytes (%) (Auto)  % (1.0-10.0)    


 


Eosinophils (%) (Auto)  % (0.0-3.0)    


 


Basophils (%) (Auto)  % (0.0-2.0)    


 


Differential Total Cells


Counted 100  


  


  


 


 


Neutrophils % (Manual) 83 % (45-75)  H  


 


Lymphocytes % (Manual) 8 % (20-45)  L  


 


Monocytes % (Manual) 5 % (1-10)    


 


Eosinophils % (Manual) 1 % (0-3)    


 


Basophils % (Manual) 0 % (0-2)    


 


Band Neutrophils 3 % (0-8)    


 


Platelet Estimate Adequate    


 


Platelet Morphology Normal    


 


Polychromasia 1+    


 


Hypochromasia 1+    


 


Anisocytosis 1+    


 


Sodium Level


  137 MMOL/L


(136-145) 


  


 


 


Potassium Level


  3.6 MMOL/L


(3.5-5.1) 


  


 


 


Chloride Level


  105 MMOL/L


() 


  


 


 


Carbon Dioxide Level


  23 MMOL/L


(21-32) 


  


 


 


Anion Gap


  9 mmol/L


(5-15) 


  


 


 


Blood Urea Nitrogen


  29 mg/dL


(7-18)  H 


  


 


 


Creatinine


  1.1 MG/DL


(0.55-1.30) 


  


 


 


Estimat Glomerular Filtration


Rate > 60 mL/min


(>60) 


  


 


 


Glucose Level


  118 MG/DL


()  H 


  


 


 


Calcium Level


  9.3 MG/DL


(8.5-10.1) 


  


 


 


Total Bilirubin


  0.2 MG/DL


(0.2-1.0) 


  


 


 


Aspartate Amino Transf


(AST/SGOT) 21 U/L (15-37)


  


  


 


 


Alanine Aminotransferase


(ALT/SGPT) 25 U/L (12-78)


  


  


 


 


Alkaline Phosphatase


  128 U/L


()  H 


  


 


 


Total Protein


  9.2 G/DL


(6.4-8.2)  H 


  


 


 


Albumin


  2.5 G/DL


(3.4-5.0)  L 


  


 


 


Globulin 6.7 g/dL    


 


Albumin/Globulin Ratio


  0.4 (1.0-2.7)


L 


  


 


 


Lipase


  48 U/L


()  L 


  


 


 


Lactic Acid Level


  


  0.80 mmol/L


(0.66-2.22) 


 


 


Urine Color   Yellow  


 


Urine Appearance   Clear  


 


Urine pH   6 (4.5-8.0)  


 


Urine Specific Gravity


  


  


  1.015


(1.005-1.035)


 


Urine Protein


  


  


  2+ (NEGATIVE)


H


 


Urine Glucose (UA)


  


  


  Negative


(NEGATIVE)


 


Urine Ketones


  


  


  Negative


(NEGATIVE)


 


Urine Occult Blood


  


  


  3+ (NEGATIVE)


H


 


Urine Nitrite


  


  


  Negative


(NEGATIVE)


 


Urine Bilirubin


  


  


  Negative


(NEGATIVE)


 


Urine Urobilinogen


  


  


  Normal MG/DL


(0.0-1.0)


 


Urine Leukocyte Esterase


  


  


  1+ (NEGATIVE)


H


 


Urine RBC


  


  


  20-30 /HPF (0


- 2)  H


 


Urine WBC


  


  


  5-10 /HPF (0 -


2)  H


 


Urine Squamous Epithelial


Cells 


  


  Occasional


/LPF


 


Urine Bacteria


  


  


  Few /HPF


(NONE)











Microbiology








 Date/Time


Source Procedure


Growth Status


 


 


 3/15/18 16:29


Blood Blood Culture - Preliminary Resulted


 


 3/15/18 16:10


Blood Blood Culture - Preliminary Resulted


 


 3/15/18 15:30


Stool Clostridium difficile Toxin Assay - Final Complete








Height (Feet):  5


Height (Inches):  7.00


Weight (Pounds):  140


Medications





Current Medications








 Medications


  (Trade)  Dose


 Ordered  Sig/Jed


 Route


 PRN Reason  Start Time


 Stop Time Status Last Admin


Dose Admin


 


 Albuterol/


 Ipratropium


  (Albuterol/


 Ipratropium)  3 ml  EVERY 4 HOURS  PRN


 HHN


 dyspnea  3/15/18 22:30


 3/20/18 22:29   


 


 


 Aspirin


  (Ecotrin)  81 mg  DAILY


 ORAL


   3/16/18 09:00


 4/15/18 08:59  3/16/18 09:06


 


 


 Atorvastatin


 Calcium


  (Lipitor)  40 mg  BEDTIME


 ORAL


   3/16/18 21:00


 4/15/18 20:59   


 


 


 Bupropion HCl


  (Wellbutrin)  100 mg  DAILY


 ORAL


   3/16/18 09:00


 4/15/18 08:59  3/16/18 09:06


 


 


 Dextrose


  (Dextrose 50%)    STAT  PRN


 IV


 Hypoglycemia  3/15/18 22:30


 4/14/18 22:29   


 


 


 Duloxetine HCl


  (Cymbalta)  60 mg  DAILY


 ORAL


   3/16/18 09:00


 4/15/18 08:59  3/16/18 09:06


 


 


 Heparin Sodium


  (Porcine)


  (Heparin 5000


 units/ml)  5,000 units  EVERY 12  HOURS


 SUBQ


   3/16/18 09:00


 4/15/18 08:59  3/16/18 09:08


 


 


 Ketorolac


 Tromethamine


  (Toradol 30mg)  30 mg  EVERY 8 HOURS  PRN


 IV


 moderate pain 4-6  3/15/18 22:30


 3/20/18 22:29   


 


 


 Levetiracetam


  (Keppra)  1,000 mg  TID


 ORAL


   3/16/18 09:00


 4/15/18 08:59  3/16/18 09:06


 


 


 Levothyroxine


 Sodium


  (Synthroid)  75 mcg  ACBREAKFAST


 ORAL


   3/16/18 06:30


 4/15/18 06:29  3/16/18 06:34


 


 


 Lorazepam


  (Ativan 2mg/ml


 1ml)  0.5 mg  Q4H  PRN


 IV


 For Anxiety  3/15/18 22:30


 3/22/18 22:29   


 


 


 Methylprednisolone


 Sodium Succinate


  (Solu-MEDROL)  60 mg  EVERY 6  HOURS


 IV


   3/16/18 00:00


 4/15/18 00:00  3/16/18 06:34


 


 


 Morphine Sulfate


  (Morphine


 Sulfate)  2 mg  EVERY 4 HOURS  PRN


 IVP


 severe pain 7-10  3/15/18 22:30


 3/22/18 22:29  3/16/18 09:05


 


 


 Nitroglycerin


  (Ntg)  0.4 mg  Q5M X 3 DOSES PRN


 SL


 Prn Chest Pain  3/15/18 22:30


 4/14/18 22:29   


 


 


 Ondansetron HCl


  (Zofran)  4 mg  Q6H  PRN


 IVP


 Nausea & Vomiting  3/15/18 22:30


 4/14/18 22:29  3/15/18 23:16


 


 


 Piperacillin Sod/


 Tazobactam Sod


 3.375 gm/Sodium


 Chloride  110 ml @ 


 27.5 mls/hr  EVERY 8  HOURS


 IVPB


   3/16/18 06:00


 3/20/18 05:59  3/16/18 06:34


 


 


 Promethazine HCl/


 Codeine


  (Phenergan with


 Codeine)  5 ml  EVERY 6 HOURS  PRN


 ORAL


 cough  3/15/18 22:30


 4/14/18 22:29   


 


 


 Temazepam


  (Restoril)  15 mg  HSPRN  PRN


 ORAL


 Insomnia  3/15/18 22:30


 3/22/18 22:29   


 


 


 Theophylline


  (Shiv-Dur)  100 mg  EVERY 12  HOURS


 ORAL


   3/16/18 09:00


 4/15/18 08:59  3/16/18 09:06


 


 


 Topiramate


  (Topamax)  25 mg  TWICE A  DAY


 ORAL


   3/16/18 09:00


 4/15/18 08:59  3/16/18 09:06


 


 


 Trazodone HCl


  (Desyrel)  150 mg  BEDTIME


 ORAL


   3/16/18 21:00


 4/15/18 20:59   


 


 


 Vancomycin HCl


  (Vancomycin)  125 mg  FOUR TIMES A  DAY


 ORAL


   3/15/18 22:00


 3/22/18 21:59  3/16/18 09:06


 











Assessment/Plan


Assessment/Plan


Abx:


Zosyn 3/16-


PO Vancomcyin 3/15-





Assessment:


SEpsis 2ry to Gram negative bacteremia- likely from GI source- r/o Crohn's flare

, colitis, ?bowel perforation given CT findings- r/o PICC line infection


 -Bcx 4/4 GNRs


  -CT abd/p w/: Limited assessment of the GI tract, due to lack of enteric 

contrast administration. Evidence of extensive prior bowel surgery, with 

evidence of resection of much of the


colon, enterocolic and entero-enteral anastomoses. Dilated right upper quadrant 

small bowel loops. Most likely on the basis of disordered motility related to 

the prior surgery, particularly given similarity to the previous exam. However, 

given evidence of nondilated small bowel loops elsewhere, the possibility of 

small bowel obstruction should be considered. Gas bubbles adjacent to the 

gallbladder fossa. Probably within collapsed adjacent small bowel, but the 

possibility that these represent extraluminal gas bubbles and which would 

therefore indicate bowel perforation should also be considered. Considerable 

fluid within the residual colon. Per discussion with referring physician, 

patient has recent history of diarrhea. Findings are certainly concordant with 

that. Subcentimeter low-attenuation renal lesions, too small to characterize, 

most likely benign simple cysts.  Right posterior 11th rib fracture, appears 

acute. Bilateral basilar pulmonary fibrotic changes, also previously 

demonstrated





Leukocytosis


  -afebrile


  -CXR 3/15: Mild interstitial prominence, similar to the previous study and 

likely reflecting fibrotic changes demonstrated on a CT scan of June 2014. No 

definite acute infiltrate


   -u/a WBC 5-10, nit neg, leuk +1





Hx of Cdiff


  -Cdiff 3/15 neg





Crohn's disease w/ multiple abd surgeries





COPD/asthma


on B-12 injections via PICC line


 CAD/MI


chronic pain syndrome on morphine pump


CVA 2005


seizure disorder








Plan:


-Continue Zosyn pending GNRs ID and sensi


-2 sets of Bcx


-stool cx


-switch PO Vancomycin to prophlylatic dose given prior hx of Cdiff


-f/u cx


-Monitor CBC/BMP, temperatures


-surgery eval





Thank you for this consultation. Will continue to follow along with you.





Discussed with Selene Augustin M.D. Mar 16, 2018 12:16

## 2018-03-16 NOTE — CONSULTATION
DATE OF CONSULTATION:  03/16/2018



INITIAL PSYCHIATRIC EVALUATION



CONSULTING PHYSICIAN:  Samantha Rick M.D.



HISTORY:  This is a 62-year-old female patient.  She has a history of COPD.

The patient is admitted to the hospital.  She also has abdominal pain.

She does have some confusion, disorganized thought process, and mood

lability.  Daily psychiatric consultation was requested because the

patient has overlying history of depression and anxiety as well.  Thus,

psychiatric consultation to evaluate her psychotropic medications at this

point.  Currently, she is on a psychotropic medication regimen of Cymbalta

60 mg daily, Wellbutrin 100 mg daily, and trazodone 150 mg at bedtime and

on those medications, she states she is doing well, but she states that

she is having a lot of abdominal pain as well and that is causing a lot of

pain and difficulties with her.



ALLERGIES:  She has no known drug allergies.



SOCIAL HISTORY:  She lives in a private residence.  Financially supported

by [employer] and Medicare.



PSYCHIATRIC HISTORY:  Bipolar 2.  She has had psychiatric admissions

before.



MENTAL STATUS EXAMINATION:  The patient is a 62-year-old female, appearance

disheveled, irritable, agitated.  Affect guarded and restricted.

Intellect poor.  Mood depressed and anxious.  Motor activity, psychomotor

agitation.  Attention span is poor.  Orientation x2.  Speech is pressured.

Thought process, disorganized and illogical.  Thought content, no

auditory hallucinations or paranoid delusions.  Insight and judgment is

poor.  No suicidal or homicidal delusions.  Insight and judgment is poor

to fair.



DIAGNOSES:  Bipolar 2.  Medical, abdominal pain, hypothyroidism, and

hyperlipidemia.  Psychosocial stressors, financial.



PLAN:  She will be treated with Wellbutrin  daily, Cymbalta 30 mg a

day, Topamax 25 mg twice a day, trazodone 150 mg at bedtime.  Provided 20

minutes of supportive therapy.  Encourage her to interact appropriately

with staff and other patients.  The patient's chart has been reviewed and

discussed with staff.  The patient was seen assessed at bedside.  She will

continue to be followed by Psychiatry throughout hospital course.



I would like to thank, Dr. Maxim Hopkins, for this interesting

consultation.  The patient was seen and assessed at bedside.









  ______________________________________________

  Samantha Rick M.D.





DR:  GRAY

D:  03/16/2018 13:48

T:  03/16/2018 22:09

JOB#:  [id number]

CC:

## 2018-03-16 NOTE — CARDIOLOGY REPORT
--------------- APPROVED REPORT --------------





EXAM: Two-dimensional and M-mode echocardiogram with Doppler and color 

Doppler.



INDICATION

LV FUNCTION 



M-Mode DIMENSIONS 

IVSd1.3 (0.7-1.1cm)Left Atrium (MM)2.3 (1.6-4.0cm)

LVDd5.8 (3.5-5.6cm)Aortic Root3.9 (2.0-3.7cm)

PWd1.5 (0.7-1.1cm)Aortic Cusp Exc.2.0 (1.5-2.0cm)

IVSs1.8 cm

LVDs4.2 (2.5-4.0cm)

PWs2.0 cm





Technically difficult study due to poor acoustical windows .

Normal left ventricular chamber size, systolic function and wall motion to extent 

visualized.

Left ventricular ejection fraction estimated to be  60-65 %.

Study quality precludes accurate  assessment of regional wall motion.

No evidence of  left ventricular hypertrophy.

No evidence of pericardial effusion. 

All other cardiac chamber sizes are within normal limits. 

Focal aortic valve sclerosis with adequate cusp excursion.

Thickened mitral valve leaflets with normal excursion.

Mitral annulus and aortic root calcification.

Pulmonic valve not well visualized.

Normal tricuspid valve structure. 

IVC dilated at 2.5 cm without  physiologic collapse , suggestive of increased RA 

pressure.



A  color flow and spectral Doppler study was performed and revealed:

No aortic regurgitation.

Mild mitral regurgitation.

Mitral diastolic velocities suggest reduced left ventricular relaxation c/w mild LV 

diastolic dysfunction (Grade I ). 

Mild  tricuspid regurgitation.

Tricuspid  systolic velocities suggests peak right ventricular systolic pressure of  45  

mmHg,consistent with mild pulmonary hypertension.

No Pulmonic regurgitation present.

## 2018-03-16 NOTE — HISTORY AND PHYSICAL REPORT
DATE OF ADMISSION:  03/15/2018



TIME SEEN:  On 03/16/2018 at 1 p.m.



CONSULTANTS:

1. Blaze Fraga M.D.

2. Dimitry Mcleod M.D.

3. Raman Oconnor M.D.

4. Darrius Benitez M.D.

5. Behnoush Zarrini, M.D.



CHIEF COMPLAINT:  Shortness of breath, sepsis, abdominal pain.



BRIEF HISTORY:  This is a 62-year-old female with above-mentioned

diagnoses, 1 week of getting weaker, lethargy with nausea, vomiting, and

diarrhea at home and slight shortness of breath, who came to [facility],

diagnosed with the above, admitted to telemetry floor.  Currently, slight

short of breath, feeling little bit better, and complained of some

diarrhea.



PAST MEDICAL HISTORY:  COPD, Crohn's, chronic pain, history of small-bowel

obstruction.



PAST SURGICAL HISTORY:  Includes abdominal surgery.



ALLERGIES:  Denies.



SOCIAL HISTORY:  Positive smoking.  No alcohol.  No intravenous drug

abuse.



FAMILY HISTORY:  Noncontributory.



PHYSICAL EXAMINATION:

GENERAL:  Calm in bed, oriented x3, slight abdominal pain.

VITAL SIGNS:  Temperature is 97, pulse _____, respiration 18, blood

pressure 119/62.

CARDIOVASCULAR:  No murmur.

LUNGS:  Poor exchange.

ABDOMEN:  Soft.  Bowel sounds distant.  Slightly tender.  No guarding.  No

rigidity.  No rebound.

EXTREMITIES:  No cyanosis, clubbing, or edema.

NEUROLOGIC:  The patient moves all extremities.  Slightly weak.



LABORATORY STUDIES:  Labs at this time show white count 23, hemoglobin and

hematocrit 11/35, platelets 335,000.  BMP showed BUN 29, glucose 118.

Albumin 2.5.  Lipase 48.  Urinalysis showed 3+ occult blood and 1+

leukocyte esterase.



MEDICATIONS:  Solu-Medrol, Lipitor, Desyrel, Ecotrin, Wellbutrin, Cymbalta,

Keppra, Topamax, heparin, Shiv-Dur, levofloxacin, Zosyn, lorazepam,

morphine.



ASSESSMENT:

1. COPD.

2. UTI.

3. Sepsis.

4. Hypoalbuminemia.

5. Abdominal pain.

6. Anemia.

7. History of SBO.

8. Crohn disease.

9. Chronic pain.



PLAN:

1. O2 and pulmonary treatments as needed.

2. Antibiotics per Infectious Disease.

3. OT, PT, and dietary evaluation.

4. CBC, BMP in the morning.

5. Resume home medications.

6. We will continue to follow the patient medically.









  ______________________________________________

  Maxim Hopkins D.O.





DR:  Erika

D:  03/16/2018 13:40

T:  03/16/2018 18:01

JOB#:  [id number]

CC:

## 2018-03-17 VITALS — DIASTOLIC BLOOD PRESSURE: 69 MMHG | SYSTOLIC BLOOD PRESSURE: 114 MMHG

## 2018-03-17 VITALS — SYSTOLIC BLOOD PRESSURE: 110 MMHG | DIASTOLIC BLOOD PRESSURE: 62 MMHG

## 2018-03-17 VITALS — SYSTOLIC BLOOD PRESSURE: 110 MMHG | DIASTOLIC BLOOD PRESSURE: 65 MMHG

## 2018-03-17 VITALS — SYSTOLIC BLOOD PRESSURE: 104 MMHG | DIASTOLIC BLOOD PRESSURE: 66 MMHG

## 2018-03-17 VITALS — SYSTOLIC BLOOD PRESSURE: 105 MMHG | DIASTOLIC BLOOD PRESSURE: 65 MMHG

## 2018-03-17 VITALS — SYSTOLIC BLOOD PRESSURE: 106 MMHG | DIASTOLIC BLOOD PRESSURE: 61 MMHG

## 2018-03-17 VITALS — DIASTOLIC BLOOD PRESSURE: 57 MMHG | SYSTOLIC BLOOD PRESSURE: 99 MMHG

## 2018-03-17 LAB
ADD MANUAL DIFF: YES
ALBUMIN SERPL-MCNC: 2.3 G/DL (ref 3.4–5)
ALBUMIN/GLOB SERPL: 0.3 {RATIO} (ref 1–2.7)
ALP SERPL-CCNC: 120 U/L (ref 46–116)
ALT SERPL-CCNC: 20 U/L (ref 12–78)
ANION GAP SERPL CALC-SCNC: 9 MMOL/L (ref 5–15)
AST SERPL-CCNC: 14 U/L (ref 15–37)
BILIRUB SERPL-MCNC: 0.2 MG/DL (ref 0.2–1)
BUN SERPL-MCNC: 29 MG/DL (ref 7–18)
CALCIUM SERPL-MCNC: 9.7 MG/DL (ref 8.5–10.1)
CHLORIDE SERPL-SCNC: 107 MMOL/L (ref 98–107)
CO2 SERPL-SCNC: 25 MMOL/L (ref 21–32)
CREAT SERPL-MCNC: 1 MG/DL (ref 0.55–1.3)
ERYTHROCYTE [DISTWIDTH] IN BLOOD BY AUTOMATED COUNT: 17.3 % (ref 11.6–14.8)
GLOBULIN SER-MCNC: 6.8 G/DL
HCT VFR BLD CALC: 36.1 % (ref 37–47)
HGB BLD-MCNC: 11.5 G/DL (ref 12–16)
MCV RBC AUTO: 95 FL (ref 80–99)
PHOSPHATE SERPL-MCNC: 2.9 MG/DL (ref 2.5–4.9)
PLATELET # BLD: 330 K/UL (ref 150–450)
POTASSIUM SERPL-SCNC: 3.9 MMOL/L (ref 3.5–5.1)
RBC # BLD AUTO: 3.81 M/UL (ref 4.2–5.4)
SODIUM SERPL-SCNC: 141 MMOL/L (ref 136–145)
WBC # BLD AUTO: 24 K/UL (ref 4.8–10.8)

## 2018-03-17 RX ADMIN — ATORVASTATIN CALCIUM SCH MG: 20 TABLET, FILM COATED ORAL at 21:16

## 2018-03-17 RX ADMIN — HEPARIN SODIUM SCH UNITS: 5000 INJECTION INTRAVENOUS; SUBCUTANEOUS at 21:18

## 2018-03-17 RX ADMIN — HEPARIN SODIUM SCH UNITS: 5000 INJECTION INTRAVENOUS; SUBCUTANEOUS at 09:27

## 2018-03-17 RX ADMIN — THEOPHYLLINE ANHYDROUS SCH MG: 100 CAPSULE, EXTENDED RELEASE ORAL at 21:16

## 2018-03-17 RX ADMIN — DULOXETINE HYDROCHLORIDE SCH MG: 30 CAPSULE, DELAYED RELEASE ORAL at 09:24

## 2018-03-17 RX ADMIN — TOPIRAMATE SCH MG: 25 TABLET, COATED ORAL at 21:16

## 2018-03-17 RX ADMIN — THEOPHYLLINE ANHYDROUS SCH MG: 100 CAPSULE, EXTENDED RELEASE ORAL at 09:24

## 2018-03-17 RX ADMIN — DEXTROSE MONOHYDRATE SCH MLS/HR: 50 INJECTION, SOLUTION INTRAVENOUS at 06:15

## 2018-03-17 RX ADMIN — TRAZODONE HYDROCHLORIDE SCH MG: 100 TABLET ORAL at 21:16

## 2018-03-17 RX ADMIN — DEXTROSE MONOHYDRATE SCH MLS/HR: 50 INJECTION, SOLUTION INTRAVENOUS at 23:44

## 2018-03-17 RX ADMIN — TOPIRAMATE SCH MG: 25 TABLET, COATED ORAL at 09:25

## 2018-03-17 RX ADMIN — MORPHINE SULFATE PRN MG: 2 INJECTION, SOLUTION INTRAMUSCULAR; INTRAVENOUS at 14:16

## 2018-03-17 RX ADMIN — DEXTROSE MONOHYDRATE SCH MLS/HR: 50 INJECTION, SOLUTION INTRAVENOUS at 16:08

## 2018-03-17 NOTE — GENERAL SURGERY PROGRESS NOTE
General Surgery-Progress Note


Subjective


Symptoms:  improved


Additional Comments


still has pain all over.  abdomen, back, shoulders, extremities, head.





Objective





Last 24 Hour Vital Signs








  Date Time  Temp Pulse Resp B/P (MAP) Pulse Ox O2 Delivery O2 Flow Rate FiO2


 


3/17/18 12:00 98.1 64 19 99/57 100   





 98.1       


 


3/17/18 08:00 98.1 62 19 104/66 95   





 98.1       


 


3/17/18 08:00 98.1 62 19 104/66 95   





 98.1       


 


3/17/18 04:32 96.9 62 18 110/62 94   





 96.9       


 


3/17/18 00:00 97.9 69 18 110/65 96   





 97.9       


 


3/16/18 20:00 97.9 81 18 129/75    





 97.9       


 


3/16/18 18:00 97.7       


 


3/16/18 17:36 97.7       


 


3/16/18 16:00 97.7 73 20 127/70 95 Room Air  





 97.7       


 


3/16/18 16:00  74      


 


3/16/18 13:23 97.1       








I&O











Intake and Output  


 


 3/16/18 3/17/18





 19:00 07:00


 


Intake Total 465.0 ml 710.0 ml


 


Output Total  475 ml


 


Balance 465.0 ml 235.0 ml


 


  


 


Intake Oral 300 ml 600 ml


 


IV Total 165.0 ml 110.0 ml


 


Output Urine Total  475 ml


 


# Voids 2 2








Cardiovascular:  RSR


Respiratory:  clear


Abdomen:  soft, distended, non-tender, present bowel sounds


Extremities:  no edema, no cyanosis





Laboratory Tests








Test


  3/17/18


05:50


 


White Blood Count


  24.0 K/UL


(4.8-10.8)  *H


 


Red Blood Count


  3.81 M/UL


(4.20-5.40)  L


 


Hemoglobin


  11.5 G/DL


(12.0-16.0)  L


 


Hematocrit


  36.1 %


(37.0-47.0)  L


 


Mean Corpuscular Volume 95 FL (80-99)  


 


Mean Corpuscular Hemoglobin


  30.2 PG


(27.0-31.0)


 


Mean Corpuscular Hemoglobin


Concent 31.9 G/DL


(32.0-36.0)  L


 


Red Cell Distribution Width


  17.3 %


(11.6-14.8)  H


 


Platelet Count


  330 K/UL


(150-450)


 


Mean Platelet Volume


  7.6 FL


(6.5-10.1)


 


Neutrophils (%) (Auto)


  % (45.0-75.0)


 


 


Lymphocytes (%) (Auto)


  % (20.0-45.0)


 


 


Monocytes (%) (Auto)  % (1.0-10.0)  


 


Eosinophils (%) (Auto)  % (0.0-3.0)  


 


Basophils (%) (Auto)  % (0.0-2.0)  


 


Differential Total Cells


Counted 100  


 


 


Neutrophils % (Manual) 84 % (45-75)  H


 


Lymphocytes % (Manual) 14 % (20-45)  L


 


Monocytes % (Manual) 2 % (1-10)  


 


Eosinophils % (Manual) 0 % (0-3)  


 


Basophils % (Manual) 0 % (0-2)  


 


Band Neutrophils 0 % (0-8)  


 


Platelet Estimate Adequate  


 


Platelet Morphology Normal  


 


Anisocytosis 1+  


 


Erythrocyte Sedimentation Rate


  49 MM/HR


(0-30)  H


 


Sodium Level


  141 MMOL/L


(136-145)


 


Potassium Level


  3.9 MMOL/L


(3.5-5.1)


 


Chloride Level


  107 MMOL/L


()


 


Carbon Dioxide Level


  25 MMOL/L


(21-32)


 


Anion Gap


  9 mmol/L


(5-15)


 


Blood Urea Nitrogen


  29 mg/dL


(7-18)  H


 


Creatinine


  1.0 MG/DL


(0.55-1.30)


 


Estimat Glomerular Filtration


Rate > 60 mL/min


(>60)


 


Glucose Level


  77 MG/DL


()


 


Calcium Level


  9.7 MG/DL


(8.5-10.1)


 


Phosphorus Level


  2.9 MG/DL


(2.5-4.9)


 


Magnesium Level


  2.2 MG/DL


(1.8-2.4)


 


Total Bilirubin


  0.2 MG/DL


(0.2-1.0)


 


Aspartate Amino Transf


(AST/SGOT) 14 U/L (15-37)


L


 


Alanine Aminotransferase


(ALT/SGPT) 20 U/L (12-78)


 


 


Alkaline Phosphatase


  120 U/L


()  H


 


C-Reactive Protein,


Quantitative 13.2 mg/dL


(0.00-0.90)  H


 


Total Protein


  9.1 G/DL


(6.4-8.2)  H


 


Albumin


  2.3 G/DL


(3.4-5.0)  L


 


Globulin 6.8 g/dL  


 


Albumin/Globulin Ratio


  0.3 (1.0-2.7)


L











Plan


Problems:  


(1) Abdominal pain


Assessment & Plan:  62 year old female with pain everywhere as per her.  states 

has pain pump that is not functional.  pain from head to toes.  


on exam abd soft, non tender, prior incisions well healed, pain pump noted.  no 

rebound, no guarding.  


CT reviewed and has had fair amount of colon and some sb resections.  area of 

air noted around gallbladder and liver and agree with radiologist that likely 

collapsed small bowel.  they do not particularly appear extraluminal.  





afebrile, HD stable, abdominal exam benign.  significant leukocytosis.  UTI 

with gram negative rods bacteremia likely etiology.  





-no acute surgical intervention necessary. 


-okay for diet


-will monitor exam


-Abx as per ID


thank you for this consultation.  will follow with simón. 














Darrius Benitez Mar 17, 2018 12:22

## 2018-03-17 NOTE — CONSULTATION
DATE OF CONSULTATION:  03/17/2018



CONSULTING PHYSICIAN:  Samantha Rick M.D.



HISTORY:  This is a 62-year-old female patient with COPD, continues to have

some mood lability, confusion, disorganized thought process.  Attending

has requested daily psychiatric consultation.



MENTAL STATUS EXAMINATION:  The patient is a 62-year-old female with

psychomotor retardation.  Mood is depressed.  Affect guarded and

restricted.  Thought process, disorganized and illogical.  Denies current

suicidal or homicidal thoughts.  Insight and judgment is poor.



DIAGNOSIS:  Bipolar 2.



PLAN:  Treat with Topamax 25 mg twice a day, Cymbalta 30 mg _____.  Provide

behavioral management and also provide 18 to 20 minutes of supportive

therapy.  Encourage him to interact appropriately with staff.  Chart

reviewed.  Discussed with staff.  Seen and assessed in her room.









  ______________________________________________

  Samantha Rick M.D.





DR:  GRAY

D:  03/17/2018 14:08

T:  03/17/2018 21:15

JOB#:  [id number]

CC:

## 2018-03-17 NOTE — PULMONOLOGY PROGRESS NOTE
Assessment/Plan


Problems:  


(1) Gram-negative bacteremia


(2) PNA (pneumonia)


(3) IBD (inflammatory bowel disease)


(4) Asthma


(5) Opioid dependence


(6) COPD exacerbation


(7) Elevated CEA


Assessment/Plan


improving clinically, WBC high, ? steroids


still lots of sputum


continue abx


taper steroids to 30 QD


chest pt


symptomatic treatment


pain management.





Subjective


ROS Limited/Unobtainable:  No


Constitutional:  Reports: no symptoms


HEENT:  Repors: no symptoms


Respiratory:  Reports: no symptoms


Allergies:  


Coded Allergies:  


     No Known Allergies (Verified , 10/27/06)





Objective





Last 24 Hour Vital Signs








  Date Time  Temp Pulse Resp B/P (MAP) Pulse Ox O2 Delivery O2 Flow Rate FiO2


 


3/17/18 15:44 98.4 72 20 105/65 96   





 98.4       


 


3/17/18 13:07 97.8 67 18 106/61 98 Room Air  





 97.8       


 


3/17/18 12:00 98.1 64 19 99/57 100   





 98.1       


 


3/17/18 12:00      Room Air  


 


3/17/18 08:00      Room Air  


 


3/17/18 08:00 98.1 62 19 104/66 95   





 98.1       


 


3/17/18 08:00 98.1 62 19 104/66 95   





 98.1       


 


3/17/18 04:32 96.9 62 18 110/62 94   





 96.9       


 


3/17/18 00:00 97.9 69 18 110/65 96   





 97.9       


 


3/16/18 20:00 97.9 81 18 129/75    





 97.9       


 


3/16/18 18:00 97.7       


 


3/16/18 17:36 97.7       

















Intake and Output  


 


 3/16/18 3/17/18





 19:00 07:00


 


Intake Total 465.0 ml 710.0 ml


 


Output Total  475 ml


 


Balance 465.0 ml 235.0 ml


 


  


 


Intake Oral 300 ml 600 ml


 


IV Total 165.0 ml 110.0 ml


 


Output Urine Total  475 ml


 


# Voids 2 2








Objective


General Appearance:  WD/WN, no apparent distress


Lines, tubes and drains:  peripheral


HEENT:  normocephalic, anicteric


Neck:  non-tender, normal alignment


Respiratory/Chest:  chest wall non-tender, + rhonchi


Cardiovascular/Chest:  normal peripheral pulses


Abdomen:  normal bowel sounds


EXt: no C/C/E





Microbiology








 Date/Time


Source Procedure


Growth Status


 


 


 3/15/18 16:29


Blood Blood Culture - Preliminary Resulted


 


 3/15/18 16:10


Blood Blood Culture - Preliminary


Gram Negative Parish Resulted


 


 3/15/18 15:30


Stool Clostridium difficile Toxin Assay - Final Complete








Laboratory Tests


3/17/18 05:50: 


White Blood Count 24.0*H, Red Blood Count 3.81L, Hemoglobin 11.5L, Hematocrit 

36.1L, Mean Corpuscular Volume 95, Mean Corpuscular Hemoglobin 30.2, Mean 

Corpuscular Hemoglobin Concent 31.9L, Red Cell Distribution Width 17.3H, 

Platelet Count 330, Mean Platelet Volume 7.6, Neutrophils (%) (Auto) , 

Lymphocytes (%) (Auto) , Monocytes (%) (Auto) , Eosinophils (%) (Auto) , 

Basophils (%) (Auto) , Differential Total Cells Counted 100, Neutrophils % (

Manual) 84H, Lymphocytes % (Manual) 14L, Monocytes % (Manual) 2, Eosinophils % (

Manual) 0, Basophils % (Manual) 0, Band Neutrophils 0, Platelet Estimate 

Adequate, Platelet Morphology Normal, Anisocytosis 1+, Erythrocyte 

Sedimentation Rate 49H, Sodium Level 141, Potassium Level 3.9, Chloride Level 

107, Carbon Dioxide Level 25, Anion Gap 9, Blood Urea Nitrogen 29H, Creatinine 

1.0, Estimat Glomerular Filtration Rate > 60, Glucose Level 77, Calcium Level 

9.7, Phosphorus Level 2.9, Magnesium Level 2.2, Total Bilirubin 0.2, Aspartate 

Amino Transf (AST/SGOT) 14L, Alanine Aminotransferase (ALT/SGPT) 20, Alkaline 

Phosphatase 120H, C-Reactive Protein, Quantitative 13.2H, Total Protein 9.1H, 

Albumin 2.3L, Globulin 6.8, Albumin/Globulin Ratio 0.3L





Current Medications








 Medications


  (Trade)  Dose


 Ordered  Sig/Jed


 Route


 PRN Reason  Start Time


 Stop Time Status Last Admin


Dose Admin


 


 Albuterol/


 Ipratropium


  (Albuterol/


 Ipratropium)  3 ml  EVERY 4 HOURS  PRN


 HHN


 dyspnea  3/16/18 19:34


 3/20/18 19:33   


 


 


 Atorvastatin


 Calcium


  (Lipitor)  40 mg  BEDTIME


 ORAL


   3/16/18 21:00


 4/15/18 20:59  3/16/18 22:08


 


 


 Bupropion HCl


  (Wellbutrin)  100 mg  DAILY


 ORAL


   3/17/18 09:00


 4/15/18 08:59  3/17/18 09:24


 


 


 Dextrose


  (Dextrose 50%)    STAT  PRN


 IV


 Hypoglycemia  3/16/18 19:34


 4/14/18 19:33   


 


 


 Duloxetine HCl


  (Cymbalta)  60 mg  DAILY


 ORAL


   3/17/18 09:00


 4/15/18 08:59  3/17/18 09:24


 


 


 Heparin Sodium


  (Porcine)


  (Heparin 5000


 units/ml)  5,000 units  EVERY 12  HOURS


 SUBQ


   3/16/18 21:00


 4/15/18 08:59  3/17/18 09:27


 


 


 Ketorolac


 Tromethamine


  (Toradol 30mg)  30 mg  EVERY 8 HOURS  PRN


 IV


 moderate pain 4-6  3/16/18 19:39


 3/20/18 19:38   


 


 


 Levetiracetam


  (Keppra)  1,000 mg  Q8HR


 ORAL


   3/17/18 06:00


 4/16/18 05:59  3/17/18 13:56


 


 


 Levothyroxine


 Sodium


  (Synthroid)  75 mcg  ACBREAKFAST


 ORAL


   3/17/18 06:30


 4/15/18 06:29  3/17/18 06:39


 


 


 Loperamide HCl


  (Imodium)  2 mg  Q4H  PRN


 ORAL


 Diarrhea  3/16/18 19:35


 4/15/18 19:34   


 


 


 Lorazepam


  (Ativan 2mg/ml


 1ml)  0.5 mg  Q4H  PRN


 IV


 For Anxiety  3/16/18 19:35


 3/22/18 19:34   


 


 


 Mesalamine


  (Asacol)  800 mg  THREE TIMES A  DAY


 ORAL


   3/17/18 09:00


 4/15/18 17:59  3/17/18 13:56


 


 


 Methylprednisolone


 Sodium Succinate


  (Solu-MEDROL)  60 mg  DAILY


 IV


   3/17/18 09:00


 4/15/18 00:00  3/17/18 09:25


 


 


 Metronidazole  100 ml @ 


 100 mls/hr  Q8HR


 IVPB


   3/17/18 14:00


 3/24/18 13:59  3/17/18 13:56


 


 


 Morphine Sulfate


  (Morphine


 Sulfate)  2 mg  EVERY 4 HOURS  PRN


 IVP


 severe pain 7-10  3/16/18 19:36


 3/22/18 19:35  3/17/18 14:16


 


 


 Nitroglycerin


  (Ntg)  0.4 mg  Q5M X 3 DOSES PRN


 SL


 Prn Chest Pain  3/16/18 19:15


 4/14/18 22:29   


 


 


 Ondansetron HCl


  (Zofran)  4 mg  Q6H  PRN


 IVP


 Nausea & Vomiting  3/16/18 19:36


 4/14/18 19:35   


 


 


 Piperacillin Sod/


 Tazobactam Sod


 3.375 gm/Sodium


 Chloride  110 ml @ 


 27.5 mls/hr  EVERY 8  HOURS


 IVPB


   3/16/18 22:00


 3/20/18 05:59  3/17/18 16:08


 


 


 Promethazine HCl/


 Codeine


  (Phenergan with


 Codeine)  5 ml  EVERY 6 HOURS  PRN


 ORAL


 cough  3/16/18 19:36


 4/15/18 19:35   


 


 


 Temazepam


  (Restoril)  15 mg  HSPRN  PRN


 ORAL


 Insomnia  3/16/18 19:36


 3/22/18 19:35   


 


 


 Theophylline


  (Shiv-Dur)  100 mg  EVERY 12  HOURS


 ORAL


   3/16/18 21:00


 4/15/18 08:59  3/17/18 09:24


 


 


 Topiramate


  (Topamax)  25 mg  Q12HR


 ORAL


   3/17/18 09:00


 4/16/18 08:59  3/17/18 09:25


 


 


 Trazodone HCl


  (Desyrel)  150 mg  BEDTIME


 ORAL


   3/16/18 21:00


 4/15/18 20:59  3/16/18 22:08


 

















Blaze Fraga MD Mar 17, 2018 17:08

## 2018-03-17 NOTE — INFECTIOUS DISEASES PROG NOTE
Assessment/Plan


Assessment/Plan


Assessment:








Sepsis 


Gram negative bacteremia- likely from GI source- r/o Crohn's flare, colitis, ?

bowel perforation given CT findings- r/o PICC line infection


 -Bcx 4/4 GNRs


  -CT abd/p w/: Limited assessment of the GI tract, due to lack of enteric 

contrast administration. Evidence of extensive prior bowel surgery, with 

evidence of resection of much of the


colon, enterocolic and entero-enteral anastomoses. Dilated right upper quadrant 

small bowel loops. Most likely on the basis of disordered motility related to 

the prior surgery, particularly given similarity to the previous exam. However, 

given evidence of nondilated small bowel loops elsewhere, the possibility of 

small bowel obstruction should be considered. Gas bubbles adjacent to the 

gallbladder fossa. Probably within collapsed adjacent small bowel, but the 

possibility that these represent extraluminal gas bubbles and which would 

therefore indicate bowel perforation should also be considered. Considerable 

fluid within the residual colon. Per discussion with referring physician, 

patient has recent history of diarrhea. Findings are certainly concordant with 

that. Subcentimeter low-attenuation renal lesions, too small to characterize, 

most likely benign simple cysts.  Right posterior 11th rib fracture, appears 

acute. Bilateral basilar pulmonary fibrotic changes, also previously 

demonstrated


Leukocytosis Persistent  ( on steroids ) 


  -afebrile


  -CXR 3/15: Mild interstitial prominence, similar to the previous study and 

likely reflecting fibrotic changes demonstrated on a CT scan of June 2014. No 

definite acute infiltrate


   -u/a WBC 5-10, nit neg, leuk +1


Hx of Cdiff


  -Cdiff 3/15 neg














Crohn's disease w/ multiple abd surgeries


COPD/asthma


on B-12 injections via PICC line


 CAD/MI


chronic pain syndrome on morphine pump


CVA 2005


seizure disorder








Plan:








-Continue Zosyn  d # 2 pending GNRs ID and sensi and IV Flagyl d# 1





 SP oral Vanco d# 2  


-2 sets of Bcx


-stool cx


-f/u cx


-Monitor CBC/BMP, temperatures


-surgery eval


-  remove PICC and send the tip for culture 


- MRI of Lumbar spine ro intrathecal  abscess  ,( due to Morphine pump)





Subjective


Allergies:  


Coded Allergies:  


     No Known Allergies (Verified , 10/27/06)


Subjective








persistent leukocytosis





Objective


Vital Signs





Last 24 Hour Vital Signs








  Date Time  Temp Pulse Resp B/P (MAP) Pulse Ox O2 Delivery O2 Flow Rate FiO2


 


3/17/18 15:44 98.4 72 20 105/65 96   





 98.4       


 


3/17/18 13:07 97.8 67 18 106/61 98 Room Air  





 97.8       


 


3/17/18 12:00 98.1 64 19 99/57 100   





 98.1       


 


3/17/18 12:00      Room Air  


 


3/17/18 08:00      Room Air  


 


3/17/18 08:00 98.1 62 19 104/66 95   





 98.1       


 


3/17/18 08:00 98.1 62 19 104/66 95   





 98.1       


 


3/17/18 04:32 96.9 62 18 110/62 94   





 96.9       


 


3/17/18 00:00 97.9 69 18 110/65 96   





 97.9       


 


3/16/18 20:00 97.9 81 18 129/75    





 97.9       








Height (Feet):  5


Height (Inches):  7.00


Weight (Pounds):  140


HEENT:  atraumatic


Respiratory/Chest:  normal breath sounds


Abdomen:  tender





Microbiology








 Date/Time


Source Procedure


Growth Status


 


 


 3/15/18 16:29


Blood Blood Culture - Preliminary Resulted


 


 3/15/18 16:10


Blood Blood Culture - Preliminary


Gram Negative Parish Resulted


 


 3/15/18 15:30


Stool Clostridium difficile Toxin Assay - Final Complete











Laboratory Tests








Test


  3/17/18


05:50


 


White Blood Count


  24.0 K/UL


(4.8-10.8)  *H


 


Red Blood Count


  3.81 M/UL


(4.20-5.40)  L


 


Hemoglobin


  11.5 G/DL


(12.0-16.0)  L


 


Hematocrit


  36.1 %


(37.0-47.0)  L


 


Mean Corpuscular Volume 95 FL (80-99)  


 


Mean Corpuscular Hemoglobin


  30.2 PG


(27.0-31.0)


 


Mean Corpuscular Hemoglobin


Concent 31.9 G/DL


(32.0-36.0)  L


 


Red Cell Distribution Width


  17.3 %


(11.6-14.8)  H


 


Platelet Count


  330 K/UL


(150-450)


 


Mean Platelet Volume


  7.6 FL


(6.5-10.1)


 


Neutrophils (%) (Auto)


  % (45.0-75.0)


 


 


Lymphocytes (%) (Auto)


  % (20.0-45.0)


 


 


Monocytes (%) (Auto)  % (1.0-10.0)  


 


Eosinophils (%) (Auto)  % (0.0-3.0)  


 


Basophils (%) (Auto)  % (0.0-2.0)  


 


Differential Total Cells


Counted 100  


 


 


Neutrophils % (Manual) 84 % (45-75)  H


 


Lymphocytes % (Manual) 14 % (20-45)  L


 


Monocytes % (Manual) 2 % (1-10)  


 


Eosinophils % (Manual) 0 % (0-3)  


 


Basophils % (Manual) 0 % (0-2)  


 


Band Neutrophils 0 % (0-8)  


 


Platelet Estimate Adequate  


 


Platelet Morphology Normal  


 


Anisocytosis 1+  


 


Erythrocyte Sedimentation Rate


  49 MM/HR


(0-30)  H


 


Sodium Level


  141 MMOL/L


(136-145)


 


Potassium Level


  3.9 MMOL/L


(3.5-5.1)


 


Chloride Level


  107 MMOL/L


()


 


Carbon Dioxide Level


  25 MMOL/L


(21-32)


 


Anion Gap


  9 mmol/L


(5-15)


 


Blood Urea Nitrogen


  29 mg/dL


(7-18)  H


 


Creatinine


  1.0 MG/DL


(0.55-1.30)


 


Estimat Glomerular Filtration


Rate > 60 mL/min


(>60)


 


Glucose Level


  77 MG/DL


()


 


Calcium Level


  9.7 MG/DL


(8.5-10.1)


 


Phosphorus Level


  2.9 MG/DL


(2.5-4.9)


 


Magnesium Level


  2.2 MG/DL


(1.8-2.4)


 


Total Bilirubin


  0.2 MG/DL


(0.2-1.0)


 


Aspartate Amino Transf


(AST/SGOT) 14 U/L (15-37)


L


 


Alanine Aminotransferase


(ALT/SGPT) 20 U/L (12-78)


 


 


Alkaline Phosphatase


  120 U/L


()  H


 


C-Reactive Protein,


Quantitative 13.2 mg/dL


(0.00-0.90)  H


 


Total Protein


  9.1 G/DL


(6.4-8.2)  H


 


Albumin


  2.3 G/DL


(3.4-5.0)  L


 


Globulin 6.8 g/dL  


 


Albumin/Globulin Ratio


  0.3 (1.0-2.7)


L











Current Medications








 Medications


  (Trade)  Dose


 Ordered  Sig/Jed


 Route


 PRN Reason  Start Time


 Stop Time Status Last Admin


Dose Admin


 


 Albuterol/


 Ipratropium


  (Albuterol/


 Ipratropium)  3 ml  EVERY 4 HOURS  PRN


 HHN


 dyspnea  3/16/18 19:34


 3/20/18 19:33   


 


 


 Atorvastatin


 Calcium


  (Lipitor)  40 mg  BEDTIME


 ORAL


   3/16/18 21:00


 4/15/18 20:59  3/16/18 22:08


 


 


 Bupropion HCl


  (Wellbutrin)  100 mg  DAILY


 ORAL


   3/17/18 09:00


 4/15/18 08:59  3/17/18 09:24


 


 


 Dextrose


  (Dextrose 50%)    STAT  PRN


 IV


 Hypoglycemia  3/16/18 19:34


 4/14/18 19:33   


 


 


 Duloxetine HCl


  (Cymbalta)  60 mg  DAILY


 ORAL


   3/17/18 09:00


 4/15/18 08:59  3/17/18 09:24


 


 


 Heparin Sodium


  (Porcine)


  (Heparin 5000


 units/ml)  5,000 units  EVERY 12  HOURS


 SUBQ


   3/16/18 21:00


 4/15/18 08:59  3/17/18 09:27


 


 


 Ketorolac


 Tromethamine


  (Toradol 30mg)  30 mg  EVERY 8 HOURS  PRN


 IV


 moderate pain 4-6  3/16/18 19:39


 3/20/18 19:38   


 


 


 Levetiracetam


  (Keppra)  1,000 mg  Q8HR


 ORAL


   3/17/18 06:00


 4/16/18 05:59  3/17/18 13:56


 


 


 Levothyroxine


 Sodium


  (Synthroid)  75 mcg  ACBREAKFAST


 ORAL


   3/17/18 06:30


 4/15/18 06:29  3/17/18 06:39


 


 


 Loperamide HCl


  (Imodium)  2 mg  Q4H  PRN


 ORAL


 Diarrhea  3/16/18 19:35


 4/15/18 19:34   


 


 


 Lorazepam


  (Ativan 2mg/ml


 1ml)  0.5 mg  Q4H  PRN


 IV


 For Anxiety  3/16/18 19:35


 3/22/18 19:34   


 


 


 Mesalamine


  (Asacol)  800 mg  THREE TIMES A  DAY


 ORAL


   3/17/18 09:00


 4/15/18 17:59  3/17/18 18:35


 


 


 Methylprednisolone


 Sodium Succinate


  (Solu-MEDROL)  30 mg  DAILY


 IVP


   3/18/18 09:00


 4/17/18 08:59   


 


 


 Metronidazole  100 ml @ 


 100 mls/hr  Q8HR


 IVPB


   3/17/18 14:00


 3/24/18 13:59  3/17/18 13:56


 


 


 Morphine Sulfate


  (Morphine


 Sulfate)  2 mg  EVERY 4 HOURS  PRN


 IVP


 severe pain 7-10  3/16/18 19:36


 3/22/18 19:35  3/17/18 14:16


 


 


 Nitroglycerin


  (Ntg)  0.4 mg  Q5M X 3 DOSES PRN


 SL


 Prn Chest Pain  3/16/18 19:15


 4/14/18 22:29   


 


 


 Ondansetron HCl


  (Zofran)  4 mg  Q6H  PRN


 IVP


 Nausea & Vomiting  3/16/18 19:36


 4/14/18 19:35   


 


 


 Piperacillin Sod/


 Tazobactam Sod


 3.375 gm/Sodium


 Chloride  110 ml @ 


 27.5 mls/hr  EVERY 8  HOURS


 IVPB


   3/16/18 22:00


 3/20/18 05:59  3/17/18 16:08


 


 


 Promethazine HCl/


 Codeine


  (Phenergan with


 Codeine)  5 ml  EVERY 6 HOURS  PRN


 ORAL


 cough  3/16/18 19:36


 4/15/18 19:35   


 


 


 Temazepam


  (Restoril)  15 mg  HSPRN  PRN


 ORAL


 Insomnia  3/16/18 19:36


 3/22/18 19:35   


 


 


 Theophylline


  (Shiv-Dur)  100 mg  EVERY 12  HOURS


 ORAL


   3/16/18 21:00


 4/15/18 08:59  3/17/18 09:24


 


 


 Topiramate


  (Topamax)  25 mg  Q12HR


 ORAL


   3/17/18 09:00


 4/16/18 08:59  3/17/18 09:25


 


 


 Trazodone HCl


  (Desyrel)  150 mg  BEDTIME


 ORAL


   3/16/18 21:00


 4/15/18 20:59  3/16/18 22:08


 

















KELSEY RIGGS M.D. Mar 17, 2018 19:47

## 2018-03-17 NOTE — GENERAL PROGRESS NOTE
Assessment/Plan


Problem List:  


(1) History of exploratory laparotomy


ICD Codes:  Z98.89 - Other specified postprocedural states


SNOMED:  71107679, 95691827, 829953360


(2) Smoker


ICD Codes:  F17.200 - Nicotine dependence, unspecified, uncomplicated


SNOMED:  24589957


(3) COPD (chronic obstructive pulmonary disease)


ICD Codes:  J44.9 - Chronic obstructive pulmonary disease, unspecified


SNOMED:  42938466


(4) Opioid dependence


ICD Codes:  F11.20 - Opioid dependence


SNOMED:  00318187


(5) IBD (inflammatory bowel disease)


ICD Codes:  K63.89 - Other specified diseases of intestine


SNOMED:  39054246


(6) Elevated CEA


ICD Codes:  R97.0 - Elevated CEA


SNOMED:  237239687


(7) Crohns disease


ICD Codes:  K50.90 - Crohns disease


SNOMED:  23681717


Assessment/Plan


negative C.diff >> dc po vanco


add iv flagyl


fu labs


plan EGd and colonoscopy for Monday


dc ASA


repeat CEA and Ca 19-9





Subjective


ROS Limited/Unobtainable:  Yes


Allergies:  


Coded Allergies:  


     No Known Allergies (Verified , 10/27/06)


Subjective


abd pain


no BM





Objective





Last 24 Hour Vital Signs








  Date Time  Temp Pulse Resp B/P (MAP) Pulse Ox O2 Delivery O2 Flow Rate FiO2


 


3/17/18 08:00 98.1 62 19 104/66 95   





 98.1       


 


3/17/18 08:00 98.1 62 19 104/66 95   





 98.1       


 


3/17/18 04:32 96.9 62 18 110/62 94   





 96.9       


 


3/17/18 00:00 97.9 69 18 110/65 96   





 97.9       


 


3/16/18 20:00 97.9 81 18 129/75    





 97.9       


 


3/16/18 18:00 97.7       


 


3/16/18 17:36 97.7       


 


3/16/18 16:00 97.7 73 20 127/70 95 Room Air  





 97.7       


 


3/16/18 16:00  74      


 


3/16/18 13:23 97.1       


 


3/16/18 12:00 97.7 79 21 105/68 98 Room Air  





 97.7       


 


3/16/18 12:00  75      

















Intake and Output  


 


 3/16/18 3/17/18





 19:00 07:00


 


Intake Total 465.0 ml 710.0 ml


 


Output Total  475 ml


 


Balance 465.0 ml 235.0 ml


 


  


 


Intake Oral 300 ml 600 ml


 


IV Total 165.0 ml 110.0 ml


 


Output Urine Total  475 ml


 


# Voids 2 2








Laboratory Tests


3/17/18 05:50: 


White Blood Count 24.0*H, Red Blood Count 3.81L, Hemoglobin 11.5L, Hematocrit 

36.1L, Mean Corpuscular Volume 95, Mean Corpuscular Hemoglobin 30.2, Mean 

Corpuscular Hemoglobin Concent 31.9L, Red Cell Distribution Width 17.3H, 

Platelet Count 330, Mean Platelet Volume 7.6, Neutrophils (%) (Auto) , 

Lymphocytes (%) (Auto) , Monocytes (%) (Auto) , Eosinophils (%) (Auto) , 

Basophils (%) (Auto) , Differential Total Cells Counted 100, Neutrophils % (

Manual) 84H, Lymphocytes % (Manual) 14L, Monocytes % (Manual) 2, Eosinophils % (

Manual) 0, Basophils % (Manual) 0, Band Neutrophils 0, Platelet Estimate 

Adequate, Platelet Morphology Normal, Anisocytosis 1+, Erythrocyte 

Sedimentation Rate 49H, Sodium Level 141, Potassium Level 3.9, Chloride Level 

107, Carbon Dioxide Level 25, Anion Gap 9, Blood Urea Nitrogen 29H, Creatinine 

1.0, Estimat Glomerular Filtration Rate > 60, Glucose Level 77, Calcium Level 

9.7, Phosphorus Level 2.9, Magnesium Level 2.2, Total Bilirubin 0.2, Aspartate 

Amino Transf (AST/SGOT) 14L, Alanine Aminotransferase (ALT/SGPT) 20, Alkaline 

Phosphatase 120H, C-Reactive Protein, Quantitative 13.2H, Total Protein 9.1H, 

Albumin 2.3L, Globulin 6.8, Albumin/Globulin Ratio 0.3L


Height (Feet):  5


Height (Inches):  7.00


Weight (Pounds):  140


General Appearance:  alert


EENT:  normal ENT inspection


Neck:  supple


Cardiovascular:  normal rate


Respiratory/Chest:  decreased breath sounds


Abdomen:  soft, hypoactive bowel sounds, distended


Extremities:  non-tender











LESLY PINA Mar 17, 2018 11:37

## 2018-03-17 NOTE — GENERAL PROGRESS NOTE
Assessment/Plan


Problem List:  


(1) Opioid dependence


ICD Codes:  F11.20 - Opioid dependence


SNOMED:  54090889


(2) COPD (chronic obstructive pulmonary disease)


ICD Codes:  J44.9 - Chronic obstructive pulmonary disease, unspecified


SNOMED:  86170850


(3) Crohns disease


ICD Codes:  K50.90 - Crohns disease


SNOMED:  94082956


(4) Diarrhea


ICD Codes:  R19.7 - Diarrhea, unspecified


SNOMED:  78637140


(5) Shortness of breath


(6) Anemia


(7) Small bowel obstruction


ICD Codes:  K56.609 - Unspecified intestinal obstruction, unspecified as to 

partial versus complete obstruction


SNOMED:  199316459


(8) Dyspnea


(9) Chronic pain


ICD Codes:  G89.29 - Other chronic pain


SNOMED:  77974610


(10) Abdominal pain


Qualifiers:  


   Qualified Codes:  R10.84 - Generalized abdominal pain


Status:  unchanged


Assessment/Plan


o2 pulm tx abx gi sx f/u cbc bmp am





Subjective


Constitutional:  Reports: weakness


Respiratory:  Reports: shortness of breath


Allergies:  


Coded Allergies:  


     No Known Allergies (Verified , 10/27/06)


All Systems:  reviewed and negative except above


Subjective


sleepy calm in bed





Objective





Last 24 Hour Vital Signs








  Date Time  Temp Pulse Resp B/P (MAP) Pulse Ox O2 Delivery O2 Flow Rate FiO2


 


3/17/18 08:00 98.1 62 19 104/66 95   





 98.1       


 


3/17/18 08:00 98.1 62 19 104/66 95   





 98.1       


 


3/17/18 04:32 96.9 62 18 110/62 94   





 96.9       


 


3/17/18 00:00 97.9 69 18 110/65 96   





 97.9       


 


3/16/18 20:00 97.9 81 18 129/75    





 97.9       


 


3/16/18 18:00 97.7       


 


3/16/18 17:36 97.7       


 


3/16/18 16:00 97.7 73 20 127/70 95 Room Air  





 97.7       


 


3/16/18 16:00  74      


 


3/16/18 13:23 97.1       


 


3/16/18 12:00 97.7 79 21 105/68 98 Room Air  





 97.7       


 


3/16/18 12:00  75      


 


3/16/18 09:05 97.1       

















Intake and Output  


 


 3/16/18 3/17/18





 19:00 07:00


 


Intake Total 465.0 ml 710.0 ml


 


Output Total  475 ml


 


Balance 465.0 ml 235.0 ml


 


  


 


Intake Oral 300 ml 600 ml


 


IV Total 165.0 ml 110.0 ml


 


Output Urine Total  475 ml


 


# Voids 2 2








Laboratory Tests


3/17/18 05:50: 


White Blood Count 24.0*H, Red Blood Count 3.81L, Hemoglobin 11.5L, Hematocrit 

36.1L, Mean Corpuscular Volume 95, Mean Corpuscular Hemoglobin 30.2, Mean 

Corpuscular Hemoglobin Concent 31.9L, Red Cell Distribution Width 17.3H, 

Platelet Count 330, Mean Platelet Volume 7.6, Neutrophils (%) (Auto) , 

Lymphocytes (%) (Auto) , Monocytes (%) (Auto) , Eosinophils (%) (Auto) , 

Basophils (%) (Auto) , Neutrophils % (Manual) [Pending], Lymphocytes % (Manual) 

[Pending], Platelet Estimate [Pending], Platelet Morphology [Pending], 

Erythrocyte Sedimentation Rate [Pending], Sodium Level 141, Potassium Level 3.9

, Chloride Level 107, Carbon Dioxide Level 25, Anion Gap 9, Blood Urea Nitrogen 

29H, Creatinine 1.0, Estimat Glomerular Filtration Rate > 60, Glucose Level 77, 

Calcium Level 9.7, Phosphorus Level 2.9, Magnesium Level 2.2, Total Bilirubin 

0.2, Aspartate Amino Transf (AST/SGOT) 14L, Alanine Aminotransferase (ALT/SGPT) 

20, Alkaline Phosphatase 120H, C-Reactive Protein, Quantitative 13.2H, Total 

Protein 9.1H, Albumin 2.3L, Globulin 6.8, Albumin/Globulin Ratio 0.3L


Height (Feet):  5


Height (Inches):  7.00


Weight (Pounds):  140


General Appearance:  lethargic


EENT:  normal ENT inspection


Neck:  normal alignment


Cardiovascular:  normal peripheral pulses, normal rate, regular rhythm


Respiratory/Chest:  decreased breath sounds


Abdomen:  normal bowel sounds, non tender, soft


Extremities:  normal inspection


Edema:  no edema noted Arm (L), no edema noted Arm (R), no edema noted Leg (L), 

no edema noted Leg (R), no edema noted Pedal (L), no edema noted Pedal (R), no 

edema noted Generalized


Neurologic:  motor weakness


Skin:  normal pigmentation, warm/dry











MAICOL LANG Mar 17, 2018 08:34

## 2018-03-18 VITALS — DIASTOLIC BLOOD PRESSURE: 79 MMHG | SYSTOLIC BLOOD PRESSURE: 121 MMHG

## 2018-03-18 VITALS — SYSTOLIC BLOOD PRESSURE: 103 MMHG | DIASTOLIC BLOOD PRESSURE: 65 MMHG

## 2018-03-18 VITALS — SYSTOLIC BLOOD PRESSURE: 99 MMHG | DIASTOLIC BLOOD PRESSURE: 83 MMHG

## 2018-03-18 VITALS — DIASTOLIC BLOOD PRESSURE: 66 MMHG | SYSTOLIC BLOOD PRESSURE: 96 MMHG

## 2018-03-18 VITALS — SYSTOLIC BLOOD PRESSURE: 127 MMHG | DIASTOLIC BLOOD PRESSURE: 66 MMHG

## 2018-03-18 VITALS — SYSTOLIC BLOOD PRESSURE: 95 MMHG | DIASTOLIC BLOOD PRESSURE: 57 MMHG

## 2018-03-18 VITALS — DIASTOLIC BLOOD PRESSURE: 70 MMHG | SYSTOLIC BLOOD PRESSURE: 102 MMHG

## 2018-03-18 LAB
% IRON SATURATION: 18 % (ref 15–50)
ADD MANUAL DIFF: NO
ALBUMIN SERPL-MCNC: 2.1 G/DL (ref 3.4–5)
ALBUMIN/GLOB SERPL: 0.4 {RATIO} (ref 1–2.7)
ALP SERPL-CCNC: 109 U/L (ref 46–116)
ALT SERPL-CCNC: 23 U/L (ref 12–78)
ANION GAP SERPL CALC-SCNC: 6 MMOL/L (ref 5–15)
AST SERPL-CCNC: 22 U/L (ref 15–37)
BASOPHILS NFR BLD AUTO: 0.4 % (ref 0–2)
BILIRUB SERPL-MCNC: 0.2 MG/DL (ref 0.2–1)
BUN SERPL-MCNC: 33 MG/DL (ref 7–18)
CALCIUM SERPL-MCNC: 8.6 MG/DL (ref 8.5–10.1)
CHLORIDE SERPL-SCNC: 106 MMOL/L (ref 98–107)
CO2 SERPL-SCNC: 27 MMOL/L (ref 21–32)
CREAT SERPL-MCNC: 0.9 MG/DL (ref 0.55–1.3)
EOSINOPHIL NFR BLD AUTO: 1.4 % (ref 0–3)
ERYTHROCYTE [DISTWIDTH] IN BLOOD BY AUTOMATED COUNT: 16.6 % (ref 11.6–14.8)
GLOBULIN SER-MCNC: 5.4 G/DL
HCT VFR BLD CALC: 35.4 % (ref 37–47)
HGB BLD-MCNC: 11.5 G/DL (ref 12–16)
INR PPP: 1.1 (ref 0.9–1.1)
IRON SERPL-MCNC: 37 UG/DL (ref 50–175)
LYMPHOCYTES NFR BLD AUTO: 17.3 % (ref 20–45)
MCV RBC AUTO: 93 FL (ref 80–99)
MONOCYTES NFR BLD AUTO: 3.9 % (ref 1–10)
NEUTROPHILS NFR BLD AUTO: 77.1 % (ref 45–75)
PLATELET # BLD: 355 K/UL (ref 150–450)
POTASSIUM SERPL-SCNC: 4.2 MMOL/L (ref 3.5–5.1)
RBC # BLD AUTO: 3.83 M/UL (ref 4.2–5.4)
SODIUM SERPL-SCNC: 139 MMOL/L (ref 136–145)
TIBC SERPL-MCNC: 210 UG/DL (ref 250–450)
UNSATURATED IRON BINDING: 173 UG/DL (ref 112–346)
WBC # BLD AUTO: 8.4 K/UL (ref 4.8–10.8)

## 2018-03-18 RX ADMIN — CHLORHEXIDINE GLUCONATE SCH APPLIC: 213 SOLUTION TOPICAL at 23:18

## 2018-03-18 RX ADMIN — METHYLPREDNISOLONE SODIUM SUCCINATE SCH MG: 40 INJECTION, POWDER, LYOPHILIZED, FOR SOLUTION INTRAMUSCULAR; INTRAVENOUS at 08:43

## 2018-03-18 RX ADMIN — HEPARIN SODIUM SCH UNITS: 5000 INJECTION INTRAVENOUS; SUBCUTANEOUS at 08:46

## 2018-03-18 RX ADMIN — DEXTROSE MONOHYDRATE SCH MLS/HR: 50 INJECTION, SOLUTION INTRAVENOUS at 06:46

## 2018-03-18 RX ADMIN — HEPARIN SODIUM SCH UNITS: 5000 INJECTION INTRAVENOUS; SUBCUTANEOUS at 20:19

## 2018-03-18 RX ADMIN — THEOPHYLLINE ANHYDROUS SCH MG: 100 CAPSULE, EXTENDED RELEASE ORAL at 21:34

## 2018-03-18 RX ADMIN — THEOPHYLLINE ANHYDROUS SCH MG: 100 CAPSULE, EXTENDED RELEASE ORAL at 08:43

## 2018-03-18 RX ADMIN — DEXTROSE MONOHYDRATE SCH MLS/HR: 50 INJECTION, SOLUTION INTRAVENOUS at 15:44

## 2018-03-18 RX ADMIN — TOPIRAMATE SCH MG: 25 TABLET, COATED ORAL at 08:43

## 2018-03-18 RX ADMIN — CHLORHEXIDINE GLUCONATE SCH APPLIC: 213 SOLUTION TOPICAL at 00:54

## 2018-03-18 RX ADMIN — DEXTROSE, SODIUM CHLORIDE, AND POTASSIUM CHLORIDE SCH MLS/HR: 5; .45; .15 INJECTION INTRAVENOUS at 17:03

## 2018-03-18 RX ADMIN — TOPIRAMATE SCH MG: 25 TABLET, COATED ORAL at 21:34

## 2018-03-18 RX ADMIN — TRAZODONE HYDROCHLORIDE SCH MG: 100 TABLET ORAL at 21:35

## 2018-03-18 RX ADMIN — DEXTROSE MONOHYDRATE SCH MLS/HR: 50 INJECTION, SOLUTION INTRAVENOUS at 23:00

## 2018-03-18 RX ADMIN — DULOXETINE HYDROCHLORIDE SCH MG: 30 CAPSULE, DELAYED RELEASE ORAL at 08:43

## 2018-03-18 RX ADMIN — ATORVASTATIN CALCIUM SCH MG: 20 TABLET, FILM COATED ORAL at 21:35

## 2018-03-18 NOTE — GENERAL SURGERY PROGRESS NOTE
General Surgery-Progress Note


Subjective


Symptoms:  improved


Additional Comments


no acute events.  leukocytosis resolved.





Objective





Last 24 Hour Vital Signs








  Date Time  Temp Pulse Resp B/P (MAP) Pulse Ox O2 Delivery O2 Flow Rate FiO2


 


3/18/18 12:00 98.2 72 20 102/70 100   





 98.2       


 


3/18/18 08:00      Room Air  


 


3/18/18 08:00 98.1 83 19 99/83 100   





 98.1       


 


3/18/18 06:13  85  96/66    


 


3/18/18 05:52      Room Air  


 


3/18/18 04:00 98.0 73 16 95/57 98   





 98.0       


 


3/18/18 01:11      Room Air  


 


3/18/18 00:00 98.1 74 21 127/66 92 Room Air  





 98.1       


 


3/17/18 20:00 98.4 82 19 114/69 92 Room Air  





 98.4       


 


3/17/18 20:00      Room Air  


 


3/17/18 15:44 98.4 72 20 105/65 96   





 98.4       








I&O











Intake and Output  


 


 3/17/18 3/18/18





 19:00 07:00


 


Intake Total 240 ml 450.0 ml


 


Balance 240 ml 450.0 ml


 


  


 


Intake Oral 240 ml 240 ml


 


IV Total  210.0 ml


 


# Voids 2 2








Drains:  none


Cardiovascular:  RSR


Respiratory:  clear


Abdomen:  soft, distended, non-tender, present bowel sounds


Extremities:  no tenderness, no cyanosis





Laboratory Tests








Test


  3/18/18


05:30


 


White Blood Count


  8.4 K/UL


(4.8-10.8)  #


 


Red Blood Count


  3.83 M/UL


(4.20-5.40)  L


 


Hemoglobin


  11.5 G/DL


(12.0-16.0)  L


 


Hematocrit


  35.4 %


(37.0-47.0)  L


 


Mean Corpuscular Volume 93 FL (80-99)  


 


Mean Corpuscular Hemoglobin


  30.0 PG


(27.0-31.0)


 


Mean Corpuscular Hemoglobin


Concent 32.3 G/DL


(32.0-36.0)


 


Red Cell Distribution Width


  16.6 %


(11.6-14.8)  H


 


Platelet Count


  355 K/UL


(150-450)


 


Mean Platelet Volume


  7.0 FL


(6.5-10.1)


 


Neutrophils (%) (Auto)


  77.1 %


(45.0-75.0)  H


 


Lymphocytes (%) (Auto)


  17.3 %


(20.0-45.0)  L


 


Monocytes (%) (Auto)


  3.9 %


(1.0-10.0)


 


Eosinophils (%) (Auto)


  1.4 %


(0.0-3.0)


 


Basophils (%) (Auto)


  0.4 %


(0.0-2.0)


 


Prothrombin Time


  11.0 SEC


(9.30-11.50)


 


Prothromb Time International


Ratio 1.1 (0.9-1.1)  


 


 


Sodium Level


  139 MMOL/L


(136-145)


 


Potassium Level


  4.2 MMOL/L


(3.5-5.1)


 


Chloride Level


  106 MMOL/L


()


 


Carbon Dioxide Level


  27 MMOL/L


(21-32)


 


Anion Gap


  6 mmol/L


(5-15)


 


Blood Urea Nitrogen


  33 mg/dL


(7-18)  H


 


Creatinine


  0.9 MG/DL


(0.55-1.30)


 


Estimat Glomerular Filtration


Rate > 60 mL/min


(>60)


 


Glucose Level


  82 MG/DL


()


 


Calcium Level


  8.6 MG/DL


(8.5-10.1)


 


Iron Level


  37 ug/dL


()  L


 


Total Iron Binding Capacity


  210 ug/dL


(250-450)  L


 


Percent Iron Saturation 18 % (15-50)  


 


Unsaturated Iron Binding


  173 ug/dL


(112-346)


 


Total Bilirubin


  0.2 MG/DL


(0.2-1.0)


 


Aspartate Amino Transf


(AST/SGOT) 22 U/L (15-37)


 


 


Alanine Aminotransferase


(ALT/SGPT) 23 U/L (12-78)


 


 


Alkaline Phosphatase


  109 U/L


()


 


Total Protein


  7.5 G/DL


(6.4-8.2)


 


Albumin


  2.1 G/DL


(3.4-5.0)  L


 


Globulin 5.4 g/dL  


 


Albumin/Globulin Ratio


  0.4 (1.0-2.7)


L











Plan


Problems:  


(1) Abdominal pain


Assessment & Plan:  62 year old female with pain everywhere as per her.  states 

has pain pump that is not functional.  pain from head to toes.  


on exam abd soft, non tender, prior incisions well healed, pain pump noted.  no 

rebound, no guarding.  


CT reviewed and has had fair amount of colon and some sb resections.  area of 

air noted around gallbladder and liver and agree with radiologist that likely 

collapsed small bowel.  they do not particularly appear extraluminal.  





afebrile, HD stable, abdominal exam benign.  leukocytosis resolved. UTI with 

gram negative rods bacteremia likely etiology.  





-no acute surgical intervention necessary. 


-okay for diet


-will monitor exam


-Abx as per ID


thank you for this consultation.  will follow with recs. 














Darrius Benitez Mar 18, 2018 13:37

## 2018-03-18 NOTE — GENERAL PROGRESS NOTE
Assessment/Plan


Assessment/Plan


(1) Lumbar spondylosis


(2) Chronic abdominal pain


(3) Radiculopathy of lumbar region


(4) Herniated nucleus pulposus, lumbar


(5) Chronic pancreatitis


(6) Chronic pain


Pt will be continued on Morphine and pt has intrathecal pump.


Pt was d/w Dr. Shetty and he concurred.





Subjective


Date patient seen:  Mar 18, 2018


Time patient seen:  11:15 - am


Allergies:  


Coded Allergies:  


     No Known Allergies (Verified , 10/27/06)


Subjective


Constitutional:  Denies: chills, diaphoresis, fever, malaise, no symptoms, other

, Reports: weakness


HEENT:  Denies: blurred vision, double vision, ear discharge, ear pain, eye pain

, mouth pain, mouth swelling, no symptoms, nose congestion, nose pain, other, 

tearing, throat pain, throat swelling


Cardiovascular:  Denies: chest pain, edema, irregular heart rate, 

lightheadedness, no symptoms, other, palpitations, syncope


Respiratory:  Denies: SOB at rest, SOB with excertion, cough, no symptoms, 

orthopnea, other, shortness of breath, sputum, stridor, wheezing


Gastrointestinal/Abdominal:  Denies: abdomen distended, black stools, blood in 

stool, constipated, diarrhea, difficulty swallowing, nausea, no symptoms, other

, poor appetite, poor fluid intake, rectal bleeding, tarry stools, vomiting, 

Reports: abdominal pain


Genitourinary:  Denies: burning, discharge, flank pain, frequency, hematuria, 

incontinence, no symptoms, other, pain, urgency


Neurologic/Psychiatric:  Denies: anxiety, depressed, emotional problems, 

headache, no symptoms, numbness, other, paresthesia, pre-existing deficit, 

seizure, tingling, tremors, weakness


Endocrine:  Denies: excessive sweating, flushing, increased hunger, increased 

thirst, increased urine, intolerance to cold, intolerance to heat, no symptoms, 

other, unexplained weight gain, unexplained weight loss


Hematologic/Lymphatic:  Denies: anemia, easy bleeding, easy bruising, no 

symptoms, other





Subjective


Patient is a known patient from prior admissions and from Dr. Costello office. 

She has chronic abdominal pain and a intrathecal pump for pain medications. Pt 

has been 


admitted under the care fo Dr. Hopkins due to COPD. Started on Morphine 2mg IV 

Q4H PRN severe pain using it it has needed.





Objective





Last 24 Hour Vital Signs








  Date Time  Temp Pulse Resp B/P (MAP) Pulse Ox O2 Delivery O2 Flow Rate FiO2


 


3/18/18 08:00      Room Air  


 


3/18/18 08:00 98.1 83 19 99/83 100   





 98.1       


 


3/18/18 06:13  85  96/66    


 


3/18/18 05:52      Room Air  


 


3/18/18 04:00 98.0 73 16 95/57 98   





 98.0       


 


3/18/18 01:11      Room Air  


 


3/18/18 00:00 98.1 74 21 127/66 92 Room Air  





 98.1       


 


3/17/18 20:00 98.4 82 19 114/69 92 Room Air  





 98.4       


 


3/17/18 20:00      Room Air  


 


3/17/18 15:44 98.4 72 20 105/65 96   





 98.4       


 


3/17/18 13:07 97.8 67 18 106/61 98 Room Air  





 97.8       


 


3/17/18 12:00 98.1 64 19 99/57 100   





 98.1       


 


3/17/18 12:00      Room Air  

















Intake and Output  


 


 3/17/18 3/18/18





 19:00 07:00


 


Intake Total 240 ml 450.0 ml


 


Balance 240 ml 450.0 ml


 


  


 


Intake Oral 240 ml 240 ml


 


IV Total  210.0 ml


 


# Voids 2 2








Laboratory Tests


3/18/18 05:30: 


White Blood Count 8.4#, Red Blood Count 3.83L, Hemoglobin 11.5L, Hematocrit 

35.4L, Mean Corpuscular Volume 93, Mean Corpuscular Hemoglobin 30.0, Mean 

Corpuscular Hemoglobin Concent 32.3, Red Cell Distribution Width 16.6H, 

Platelet Count 355, Mean Platelet Volume 7.0, Neutrophils (%) (Auto) 77.1H, 

Lymphocytes (%) (Auto) 17.3L, Monocytes (%) (Auto) 3.9, Eosinophils (%) (Auto) 

1.4, Basophils (%) (Auto) 0.4, Prothrombin Time 11.0, Prothromb Time 

International Ratio 1.1, Sodium Level 139, Potassium Level 4.2, Chloride Level 

106, Carbon Dioxide Level 27, Anion Gap 6, Blood Urea Nitrogen 33H, Creatinine 

0.9, Estimat Glomerular Filtration Rate > 60, Glucose Level 82, Calcium Level 

8.6, Iron Level 37L, Total Iron Binding Capacity 210L, Percent Iron Saturation 

18, Unsaturated Iron Binding 173, Total Bilirubin 0.2, Aspartate Amino Transf (

AST/SGOT) 22, Alanine Aminotransferase (ALT/SGPT) 23, Alkaline Phosphatase 109, 

Total Protein 7.5, Albumin 2.1L, Globulin 5.4, Albumin/Globulin Ratio 0.4L


Height (Feet):  5


Height (Inches):  7.00


Weight (Pounds):  140


Objective


General Appearance:  no apparent distress, alert


EENT:  normal ENT inspection, TMs normal


Neck:  normal alignment, supple


Cardiovascular:  normal rate, regular rhythm


Respiratory/Chest:  decreased breath sounds


Abdomen:  tender, soft


Extremities:  non-tender


Edema:  no edema noted Arm (L), no edema noted Arm (R), no edema noted Leg (L), 

no edema noted Leg (R), no edema noted Pedal (L), no edema noted Pedal (R), no 

edema noted Generalized


Neurologic:  alert, oriented x 3


Skin:  warm/dry











LEA BRAVO Mar 18, 2018 11:37

## 2018-03-18 NOTE — PULMONOLOGY PROGRESS NOTE
Assessment/Plan


Problems:  


(1) Gram-negative bacteremia


(2) PNA (pneumonia)


(3) IBD (inflammatory bowel disease)


(4) Asthma


(5) Opioid dependence


(6) COPD exacerbation


(7) Elevated CEA


Assessment/Plan


improving clinically, WBC wnl





still lots of sputum


continue abx


taper steroids to 30 QD


chest pt


symptomatic treatment


pain management.





Subjective


ROS Limited/Unobtainable:  No


Allergies:  


Coded Allergies:  


     No Known Allergies (Verified , 10/27/06)





Objective





Last 24 Hour Vital Signs








  Date Time  Temp Pulse Resp B/P (MAP) Pulse Ox O2 Delivery O2 Flow Rate FiO2


 


3/18/18 20:16 98.2 78 19 103/65 100 Room Air  





 98.2       


 


3/18/18 16:00      Room Air  


 


3/18/18 16:00 96.8 70 20 121/79 100   





 96.8       


 


3/18/18 12:00 98.2 72 20 102/70 100   





 98.2       


 


3/18/18 12:00      Room Air  


 


3/18/18 08:00      Room Air  


 


3/18/18 08:00 98.1 83 19 99/83 100   





 98.1       


 


3/18/18 06:13  85  96/66    


 


3/18/18 05:52      Room Air  


 


3/18/18 04:00 98.0 73 16 95/57 98   





 98.0       


 


3/18/18 01:11      Room Air  


 


3/18/18 00:00 98.1 74 21 127/66 92 Room Air  





 98.1       

















Intake and Output  


 


 3/17/18 3/18/18





 19:00 07:00


 


Intake Total 240 ml 450.0 ml


 


Balance 240 ml 450.0 ml


 


  


 


Intake Oral 240 ml 240 ml


 


IV Total  210.0 ml


 


# Voids 2 2








Objective


General Appearance:  WD/WN, no apparent distress


Lines, tubes and drains:  peripheral


HEENT:  normocephalic, anicteric


Neck:  non-tender, normal alignment


Respiratory/Chest:  chest wall non-tender, + rhonchi


Cardiovascular/Chest:  normal peripheral pulses


Abdomen:  normal bowel sounds


EXt: no C/C/E





Microbiology








 Date/Time


Source Procedure


Growth Status


 


 


 3/17/18 00:35


Blood Blood Culture - Preliminary


NO GROWTH AFTER 24 HOURS Resulted


 


 3/17/18 00:20


Blood Blood Culture - Preliminary


NO GROWTH AFTER 24 HOURS Resulted








Laboratory Tests


3/18/18 05:30: 


White Blood Count 8.4#, Red Blood Count 3.83L, Hemoglobin 11.5L, Hematocrit 

35.4L, Mean Corpuscular Volume 93, Mean Corpuscular Hemoglobin 30.0, Mean 

Corpuscular Hemoglobin Concent 32.3, Red Cell Distribution Width 16.6H, 

Platelet Count 355, Mean Platelet Volume 7.0, Neutrophils (%) (Auto) 77.1H, 

Lymphocytes (%) (Auto) 17.3L, Monocytes (%) (Auto) 3.9, Eosinophils (%) (Auto) 

1.4, Basophils (%) (Auto) 0.4, Prothrombin Time 11.0, Prothromb Time 

International Ratio 1.1, Sodium Level 139, Potassium Level 4.2, Chloride Level 

106, Carbon Dioxide Level 27, Anion Gap 6, Blood Urea Nitrogen 33H, Creatinine 

0.9, Estimat Glomerular Filtration Rate > 60, Glucose Level 82, Calcium Level 

8.6, Iron Level 37L, Total Iron Binding Capacity 210L, Percent Iron Saturation 

18, Unsaturated Iron Binding 173, Total Bilirubin 0.2, Aspartate Amino Transf (

AST/SGOT) 22, Alanine Aminotransferase (ALT/SGPT) 23, Alkaline Phosphatase 109, 

Total Protein 7.5, Albumin 2.1L, Globulin 5.4, Albumin/Globulin Ratio 0.4L





Current Medications








 Medications


  (Trade)  Dose


 Ordered  Sig/Jed


 Route


 PRN Reason  Start Time


 Stop Time Status Last Admin


Dose Admin


 


 Albuterol/


 Ipratropium


  (Albuterol/


 Ipratropium)  3 ml  EVERY 4 HOURS  PRN


 HHN


 dyspnea  3/16/18 19:34


 3/20/18 19:33   


 


 


 Atorvastatin


 Calcium


  (Lipitor)  40 mg  BEDTIME


 ORAL


   3/16/18 21:00


 4/15/18 20:59  3/18/18 21:35


 


 


 Bupropion HCl


  (Wellbutrin)  100 mg  DAILY


 ORAL


   3/17/18 09:00


 4/15/18 08:59  3/18/18 08:43


 


 


 Chlorhexidine


 Gluconate


  (Rosy-Hex 2%)  1 applic  Q24H


 TOPIC


   3/18/18 00:15


 4/17/18 00:14  3/18/18 00:54


 


 


 Dextrose


  (Dextrose 50%)    STAT  PRN


 IV


 Hypoglycemia  3/16/18 19:34


 4/14/18 19:33   


 


 


 Dextrose/


 Electrolytes  1,000 ml @ 


 75 mls/hr  [id number]


 IV


   3/18/18 16:00


 4/17/18 15:59  3/18/18 17:03


 


 


 Duloxetine HCl


  (Cymbalta)  60 mg  DAILY


 ORAL


   3/17/18 09:00


 4/15/18 08:59  3/18/18 08:43


 


 


 Heparin Sodium


  (Porcine)


  (Heparin 5000


 units/ml)  5,000 units  EVERY 12  HOURS


 SUBQ


   3/16/18 21:00


 4/15/18 08:59  3/18/18 08:46


 


 


 Ketorolac


 Tromethamine


  (Toradol 30mg)  30 mg  EVERY 8 HOURS  PRN


 IV


 moderate pain 4-6  3/16/18 19:39


 3/20/18 19:38   


 


 


 Levetiracetam


  (Keppra)  1,000 mg  Q8HR


 ORAL


   3/17/18 06:00


 4/16/18 05:59  3/18/18 15:44


 


 


 Levothyroxine


 Sodium


  (Synthroid)  75 mcg  ACBREAKFAST


 ORAL


   3/17/18 06:30


 4/15/18 06:29  3/18/18 06:09


 


 


 Loperamide HCl


  (Imodium)  2 mg  Q4H  PRN


 ORAL


 Diarrhea  3/16/18 19:35


 4/15/18 19:34   


 


 


 Lorazepam


  (Ativan 2mg/ml


 1ml)  0.5 mg  Q4H  PRN


 IV


 For Anxiety  3/16/18 19:35


 3/22/18 19:34   


 


 


 Mesalamine


  (Asacol)  800 mg  THREE TIMES A  DAY


 ORAL


   3/17/18 09:00


 4/15/18 17:59  3/18/18 17:08


 


 


 Methylprednisolone


 Sodium Succinate


  (Solu-MEDROL)  30 mg  DAILY


 IVP


   3/18/18 09:00


 4/17/18 08:59  3/18/18 08:43


 


 


 Metronidazole  100 ml @ 


 100 mls/hr  Q8HR


 IVPB


   3/17/18 14:00


 3/24/18 13:59  3/18/18 21:38


 


 


 Morphine Sulfate


  (Morphine


 Sulfate)  2 mg  EVERY 4 HOURS  PRN


 IVP


 severe pain 7-10  3/16/18 19:36


 3/22/18 19:35  3/17/18 14:16


 


 


 Nitroglycerin


  (Ntg)  0.4 mg  Q5M X 3 DOSES PRN


 SL


 Prn Chest Pain  3/16/18 19:15


 4/14/18 22:29   


 


 


 Ondansetron HCl


  (Zofran)  4 mg  Q6H  PRN


 IVP


 Nausea & Vomiting  3/16/18 19:36


 4/14/18 19:35   


 


 


 Piperacillin Sod/


 Tazobactam Sod


 3.375 gm/Sodium


 Chloride  110 ml @ 


 27.5 mls/hr  EVERY 8  HOURS


 IVPB


   3/16/18 22:00


 3/20/18 05:59  3/18/18 15:44


 


 


 Promethazine HCl/


 Codeine


  (Phenergan with


 Codeine)  5 ml  EVERY 6 HOURS  PRN


 ORAL


 cough  3/16/18 19:36


 4/15/18 19:35   


 


 


 Temazepam


  (Restoril)  15 mg  HSPRN  PRN


 ORAL


 Insomnia  3/16/18 19:36


 3/22/18 19:35   


 


 


 Theophylline


  (Shiv-Dur)  100 mg  EVERY 12  HOURS


 ORAL


   3/16/18 21:00


 4/15/18 08:59  3/18/18 21:34


 


 


 Topiramate


  (Topamax)  25 mg  Q12HR


 ORAL


   3/17/18 09:00


 4/16/18 08:59  3/18/18 21:34


 


 


 Trazodone HCl


  (Desyrel)  150 mg  BEDTIME


 ORAL


   3/16/18 21:00


 4/15/18 20:59  3/18/18 21:35


 

















Blaze Fraga MD Mar 18, 2018 22:17

## 2018-03-18 NOTE — PROGRESS NOTE
DATE:  03/18/2018



SUBJECTIVE:  The patient is a 62-year-old female patient with COPD.  Mood

is labile, confused, disorganized, has lot of mood lability, agitation,

racing thoughts that is why her attending as requested daily psychiatric

consultation.



MENTAL STATUS EXAMINATION:  The patient is a 62-year-old female with

psychomotor agitation.  Mood is irritable and agitated.  Affect guarded

and restricted.  Thought process, disorganized and illogical.  Denies any

suicidal or homicidal thoughts.  Insight and judgment is poor.



DIAGNOSES:  Bipolar 2.



PLAN:  Treat with Topamax 25 mg twice a day, Cymbalta 30 mg daily,

Wellbutrin  daily, trazodone 150 bedtime.  A 20 minutes supportive

therapy provided.  Chart reviewed and discussed with staff.  Seen and

assessed at bedside.









  ______________________________________________

  Samantha Rick M.D.





DR:  Ramone

D:  03/18/2018 13:56

T:  03/18/2018 19:59

JOB#:  [id number]

CC:

## 2018-03-18 NOTE — GENERAL PROGRESS NOTE
Assessment/Plan


Problem List:  


(1) Opioid dependence


ICD Codes:  F11.20 - Opioid dependence


SNOMED:  68585794


(2) COPD (chronic obstructive pulmonary disease)


ICD Codes:  J44.9 - Chronic obstructive pulmonary disease, unspecified


SNOMED:  93787814


(3) Crohns disease


ICD Codes:  K50.90 - Crohns disease


SNOMED:  23959548


(4) Diarrhea


ICD Codes:  R19.7 - Diarrhea, unspecified


SNOMED:  52167568


(5) Shortness of breath


(6) Anemia


(7) Small bowel obstruction


ICD Codes:  K56.609 - Unspecified intestinal obstruction, unspecified as to 

partial versus complete obstruction


SNOMED:  781102049


(8) Dyspnea


(9) Chronic pain


ICD Codes:  G89.29 - Other chronic pain


SNOMED:  77744167


(10) Abdominal pain


Qualifiers:  


   Qualified Codes:  R10.84 - Generalized abdominal pain


Status:  unchanged


Assessment/Plan


o2 pulm tx abx gi sx f/u cbc bmp am





Subjective


Constitutional:  Reports: weakness


Respiratory:  Reports: shortness of breath


Allergies:  


Coded Allergies:  


     No Known Allergies (Verified , 10/27/06)


Subjective


sleepy calm in bed





Objective





Last 24 Hour Vital Signs








  Date Time  Temp Pulse Resp B/P (MAP) Pulse Ox O2 Delivery O2 Flow Rate FiO2


 


3/18/18 08:00      Room Air  


 


3/18/18 08:00 98.1 83 19 99/83 100   





 98.1       


 


3/18/18 06:13  85  96/66    


 


3/18/18 05:52      Room Air  


 


3/18/18 04:00 98.0 73 16 95/57 98   





 98.0       


 


3/18/18 01:11      Room Air  


 


3/18/18 00:00 98.1 74 21 127/66 92 Room Air  





 98.1       


 


3/17/18 20:00 98.4 82 19 114/69 92 Room Air  





 98.4       


 


3/17/18 20:00      Room Air  


 


3/17/18 15:44 98.4 72 20 105/65 96   





 98.4       


 


3/17/18 13:07 97.8 67 18 106/61 98 Room Air  





 97.8       


 


3/17/18 12:00 98.1 64 19 99/57 100   





 98.1       


 


3/17/18 12:00      Room Air  

















Intake and Output  


 


 3/17/18 3/18/18





 19:00 07:00


 


Intake Total 240 ml 450.0 ml


 


Balance 240 ml 450.0 ml


 


  


 


Intake Oral 240 ml 240 ml


 


IV Total  210.0 ml


 


# Voids 2 2








Laboratory Tests


3/18/18 05:30: 


White Blood Count 8.4#, Red Blood Count 3.83L, Hemoglobin 11.5L, Hematocrit 

35.4L, Mean Corpuscular Volume 93, Mean Corpuscular Hemoglobin 30.0, Mean 

Corpuscular Hemoglobin Concent 32.3, Red Cell Distribution Width 16.6H, 

Platelet Count 355, Mean Platelet Volume 7.0, Neutrophils (%) (Auto) 77.1H, 

Lymphocytes (%) (Auto) 17.3L, Monocytes (%) (Auto) 3.9, Eosinophils (%) (Auto) 

1.4, Basophils (%) (Auto) 0.4, Prothrombin Time 11.0, Prothromb Time 

International Ratio 1.1, Sodium Level 139, Potassium Level 4.2, Chloride Level 

106, Carbon Dioxide Level 27, Anion Gap 6, Blood Urea Nitrogen 33H, Creatinine 

0.9, Estimat Glomerular Filtration Rate > 60, Glucose Level 82, Calcium Level 

8.6, Iron Level 37L, Total Iron Binding Capacity 210L, Percent Iron Saturation 

18, Unsaturated Iron Binding 173, Total Bilirubin 0.2, Aspartate Amino Transf (

AST/SGOT) 22, Alanine Aminotransferase (ALT/SGPT) 23, Alkaline Phosphatase 109, 

Total Protein 7.5, Albumin 2.1L, Globulin 5.4, Albumin/Globulin Ratio 0.4L


Height (Feet):  5


Height (Inches):  7.00


Weight (Pounds):  140


General Appearance:  lethargic


EENT:  normal ENT inspection


Neck:  normal alignment


Cardiovascular:  normal peripheral pulses, normal rate, regular rhythm


Respiratory/Chest:  chest wall non-tender, decreased breath sounds


Abdomen:  normal bowel sounds, non tender, soft


Extremities:  normal inspection


Edema:  no edema noted Arm (L), no edema noted Arm (R), no edema noted Leg (L), 

no edema noted Leg (R), no edema noted Pedal (L), no edema noted Pedal (R), no 

edema noted Generalized


Neurologic:  responsive, motor weakness


Skin:  normal pigmentation, warm/dry











MAICOL LANG Mar 18, 2018 08:57

## 2018-03-18 NOTE — GENERAL PROGRESS NOTE
Assessment/Plan


Problem List:  


(1) History of exploratory laparotomy


ICD Codes:  Z98.89 - Other specified postprocedural states


SNOMED:  21992079, 48227691, 787624349


(2) Smoker


ICD Codes:  F17.200 - Nicotine dependence, unspecified, uncomplicated


SNOMED:  42792672


(3) COPD (chronic obstructive pulmonary disease)


ICD Codes:  J44.9 - Chronic obstructive pulmonary disease, unspecified


SNOMED:  22197498


(4) Opioid dependence


ICD Codes:  F11.20 - Opioid dependence


SNOMED:  15787672


(5) IBD (inflammatory bowel disease)


ICD Codes:  K63.89 - Other specified diseases of intestine


SNOMED:  01162534


(6) Elevated CEA


ICD Codes:  R97.0 - Elevated CEA


SNOMED:  224342812


(7) Crohns disease


ICD Codes:  K50.90 - Crohns disease


SNOMED:  03625658


Assessment/Plan


negative C.diff >> dc po vanco


add iv flagyl


improved WBC


fu labs


plan EGD and colonoscopy for Monday


dc ASA


repeat CEA and Ca 19-9





Subjective


ROS Limited/Unobtainable:  Yes


Allergies:  


Coded Allergies:  


     No Known Allergies (Verified , 10/27/06)


Subjective


abd pain


no BM





Objective





Last 24 Hour Vital Signs








  Date Time  Temp Pulse Resp B/P (MAP) Pulse Ox O2 Delivery O2 Flow Rate FiO2


 


3/18/18 08:00      Room Air  


 


3/18/18 08:00 98.1 83 19 99/83 100   





 98.1       


 


3/18/18 06:13  85  96/66    


 


3/18/18 05:52      Room Air  


 


3/18/18 04:00 98.0 73 16 95/57 98   





 98.0       


 


3/18/18 01:11      Room Air  


 


3/18/18 00:00 98.1 74 21 127/66 92 Room Air  





 98.1       


 


3/17/18 20:00 98.4 82 19 114/69 92 Room Air  





 98.4       


 


3/17/18 20:00      Room Air  


 


3/17/18 15:44 98.4 72 20 105/65 96   





 98.4       


 


3/17/18 13:07 97.8 67 18 106/61 98 Room Air  





 97.8       


 


3/17/18 12:00 98.1 64 19 99/57 100   





 98.1       


 


3/17/18 12:00      Room Air  

















Intake and Output  


 


 3/17/18 3/18/18





 19:00 07:00


 


Intake Total 240 ml 450.0 ml


 


Balance 240 ml 450.0 ml


 


  


 


Intake Oral 240 ml 240 ml


 


IV Total  210.0 ml


 


# Voids 2 2








Laboratory Tests


3/18/18 05:30: 


White Blood Count 8.4#, Red Blood Count 3.83L, Hemoglobin 11.5L, Hematocrit 

35.4L, Mean Corpuscular Volume 93, Mean Corpuscular Hemoglobin 30.0, Mean 

Corpuscular Hemoglobin Concent 32.3, Red Cell Distribution Width 16.6H, 

Platelet Count 355, Mean Platelet Volume 7.0, Neutrophils (%) (Auto) 77.1H, 

Lymphocytes (%) (Auto) 17.3L, Monocytes (%) (Auto) 3.9, Eosinophils (%) (Auto) 

1.4, Basophils (%) (Auto) 0.4, Prothrombin Time 11.0, Prothromb Time 

International Ratio 1.1, Sodium Level 139, Potassium Level 4.2, Chloride Level 

106, Carbon Dioxide Level 27, Anion Gap 6, Blood Urea Nitrogen 33H, Creatinine 

0.9, Estimat Glomerular Filtration Rate > 60, Glucose Level 82, Calcium Level 

8.6, Iron Level 37L, Total Iron Binding Capacity 210L, Percent Iron Saturation 

18, Unsaturated Iron Binding 173, Total Bilirubin 0.2, Aspartate Amino Transf (

AST/SGOT) 22, Alanine Aminotransferase (ALT/SGPT) 23, Alkaline Phosphatase 109, 

Total Protein 7.5, Albumin 2.1L, Globulin 5.4, Albumin/Globulin Ratio 0.4L


Height (Feet):  5


Height (Inches):  7.00


Weight (Pounds):  140


General Appearance:  alert


EENT:  normal ENT inspection


Neck:  supple


Cardiovascular:  normal rate


Respiratory/Chest:  decreased breath sounds


Abdomen:  normal bowel sounds, non tender, soft


Extremities:  non-tender











LESLY PINA Mar 18, 2018 08:55

## 2018-03-19 VITALS — DIASTOLIC BLOOD PRESSURE: 69 MMHG | SYSTOLIC BLOOD PRESSURE: 103 MMHG

## 2018-03-19 VITALS — DIASTOLIC BLOOD PRESSURE: 70 MMHG | SYSTOLIC BLOOD PRESSURE: 106 MMHG

## 2018-03-19 VITALS — DIASTOLIC BLOOD PRESSURE: 65 MMHG | SYSTOLIC BLOOD PRESSURE: 102 MMHG

## 2018-03-19 VITALS — DIASTOLIC BLOOD PRESSURE: 69 MMHG | SYSTOLIC BLOOD PRESSURE: 112 MMHG

## 2018-03-19 VITALS — SYSTOLIC BLOOD PRESSURE: 102 MMHG | DIASTOLIC BLOOD PRESSURE: 72 MMHG

## 2018-03-19 VITALS — DIASTOLIC BLOOD PRESSURE: 74 MMHG | SYSTOLIC BLOOD PRESSURE: 108 MMHG

## 2018-03-19 VITALS — SYSTOLIC BLOOD PRESSURE: 103 MMHG | DIASTOLIC BLOOD PRESSURE: 67 MMHG

## 2018-03-19 VITALS — DIASTOLIC BLOOD PRESSURE: 70 MMHG | SYSTOLIC BLOOD PRESSURE: 104 MMHG

## 2018-03-19 VITALS — DIASTOLIC BLOOD PRESSURE: 69 MMHG | SYSTOLIC BLOOD PRESSURE: 95 MMHG

## 2018-03-19 VITALS — SYSTOLIC BLOOD PRESSURE: 104 MMHG | DIASTOLIC BLOOD PRESSURE: 68 MMHG

## 2018-03-19 VITALS — DIASTOLIC BLOOD PRESSURE: 3 MMHG | SYSTOLIC BLOOD PRESSURE: 103 MMHG

## 2018-03-19 VITALS — DIASTOLIC BLOOD PRESSURE: 65 MMHG | SYSTOLIC BLOOD PRESSURE: 96 MMHG

## 2018-03-19 VITALS — DIASTOLIC BLOOD PRESSURE: 73 MMHG | SYSTOLIC BLOOD PRESSURE: 106 MMHG

## 2018-03-19 VITALS — SYSTOLIC BLOOD PRESSURE: 102 MMHG | DIASTOLIC BLOOD PRESSURE: 69 MMHG

## 2018-03-19 LAB
ADD MANUAL DIFF: NO
ANION GAP SERPL CALC-SCNC: 8 MMOL/L (ref 5–15)
BASOPHILS NFR BLD AUTO: 0.8 % (ref 0–2)
BUN SERPL-MCNC: 26 MG/DL (ref 7–18)
CALCIUM SERPL-MCNC: 9.4 MG/DL (ref 8.5–10.1)
CHLORIDE SERPL-SCNC: 105 MMOL/L (ref 98–107)
CO2 SERPL-SCNC: 25 MMOL/L (ref 21–32)
CREAT SERPL-MCNC: 1 MG/DL (ref 0.55–1.3)
EOSINOPHIL NFR BLD AUTO: 1.1 % (ref 0–3)
ERYTHROCYTE [DISTWIDTH] IN BLOOD BY AUTOMATED COUNT: 16.7 % (ref 11.6–14.8)
HCT VFR BLD CALC: 40.3 % (ref 37–47)
HGB BLD-MCNC: 12.8 G/DL (ref 12–16)
LYMPHOCYTES NFR BLD AUTO: 35.5 % (ref 20–45)
MCV RBC AUTO: 93 FL (ref 80–99)
MONOCYTES NFR BLD AUTO: 9.4 % (ref 1–10)
NEUTROPHILS NFR BLD AUTO: 53.2 % (ref 45–75)
PLATELET # BLD: 348 K/UL (ref 150–450)
POTASSIUM SERPL-SCNC: 3.3 MMOL/L (ref 3.5–5.1)
RBC # BLD AUTO: 4.33 M/UL (ref 4.2–5.4)
SODIUM SERPL-SCNC: 138 MMOL/L (ref 136–145)
WBC # BLD AUTO: 6.9 K/UL (ref 4.8–10.8)

## 2018-03-19 PROCEDURE — 0DBB8ZX EXCISION OF ILEUM, VIA NATURAL OR ARTIFICIAL OPENING ENDOSCOPIC, DIAGNOSTIC: ICD-10-PCS

## 2018-03-19 PROCEDURE — 0DD58ZX EXTRACTION OF ESOPHAGUS, VIA NATURAL OR ARTIFICIAL OPENING ENDOSCOPIC, DIAGNOSTIC: ICD-10-PCS

## 2018-03-19 RX ADMIN — METRONIDAZOLE SCH MG: 500 TABLET ORAL at 14:24

## 2018-03-19 RX ADMIN — TOPIRAMATE SCH MG: 25 TABLET, COATED ORAL at 09:00

## 2018-03-19 RX ADMIN — TOPIRAMATE SCH MG: 25 TABLET, COATED ORAL at 21:10

## 2018-03-19 RX ADMIN — THEOPHYLLINE ANHYDROUS SCH MG: 100 CAPSULE, EXTENDED RELEASE ORAL at 21:10

## 2018-03-19 RX ADMIN — TRAZODONE HYDROCHLORIDE SCH MG: 100 TABLET ORAL at 21:09

## 2018-03-19 RX ADMIN — THEOPHYLLINE ANHYDROUS SCH MG: 100 CAPSULE, EXTENDED RELEASE ORAL at 09:00

## 2018-03-19 RX ADMIN — ATORVASTATIN CALCIUM SCH MG: 20 TABLET, FILM COATED ORAL at 21:09

## 2018-03-19 RX ADMIN — DEXTROSE MONOHYDRATE SCH MLS/HR: 50 INJECTION, SOLUTION INTRAVENOUS at 06:34

## 2018-03-19 RX ADMIN — HEPARIN SODIUM SCH UNITS: 5000 INJECTION INTRAVENOUS; SUBCUTANEOUS at 09:39

## 2018-03-19 RX ADMIN — DULOXETINE HYDROCHLORIDE SCH MG: 30 CAPSULE, DELAYED RELEASE ORAL at 09:00

## 2018-03-19 RX ADMIN — METHYLPREDNISOLONE SODIUM SUCCINATE SCH MG: 40 INJECTION, POWDER, LYOPHILIZED, FOR SOLUTION INTRAMUSCULAR; INTRAVENOUS at 09:36

## 2018-03-19 RX ADMIN — METRONIDAZOLE SCH MG: 500 TABLET ORAL at 21:10

## 2018-03-19 RX ADMIN — SODIUM CHLORIDE SCH MLS/HR: 0.9 INJECTION INTRAVENOUS at 14:29

## 2018-03-19 RX ADMIN — DEXTROSE, SODIUM CHLORIDE, AND POTASSIUM CHLORIDE SCH MLS/HR: 5; .45; .15 INJECTION INTRAVENOUS at 17:07

## 2018-03-19 RX ADMIN — HEPARIN SODIUM SCH UNITS: 5000 INJECTION INTRAVENOUS; SUBCUTANEOUS at 21:18

## 2018-03-19 RX ADMIN — DEXTROSE, SODIUM CHLORIDE, AND POTASSIUM CHLORIDE SCH MLS/HR: 5; .45; .15 INJECTION INTRAVENOUS at 05:20

## 2018-03-19 NOTE — GENERAL SURGERY PROGRESS NOTE
General Surgery-Progress Note


Subjective


Additional Comments


no acute events.





Objective





Last 24 Hour Vital Signs








  Date Time  Temp Pulse Resp B/P (MAP) Pulse Ox O2 Delivery O2 Flow Rate FiO2


 


3/19/18 08:00 97.2 81 19 102/69 96   





 97.2       


 


3/19/18 05:15  79  102/65    


 


3/19/18 04:17 96.8 71 18 96/65 94 Room Air  





 96.8       


 


3/19/18 01:54      Room Air  


 


3/19/18 00:00 97.4 71 19 112/69 98   





 97.4       


 


3/18/18 20:16 98.2 78 19 103/65 100 Room Air  





 98.2       


 


3/18/18 16:00      Room Air  


 


3/18/18 16:00 96.8 70 20 121/79 100   





 96.8       








I&O











Intake and Output  


 


 3/18/18 3/19/18





 19:00 07:00


 


Intake Total 580 ml 610.0 ml


 


Balance 580 ml 610.0 ml


 


  


 


Intake Oral 580 ml 


 


IV Total  610.0 ml


 


# Voids 7 2








Drains:  none


Cardiovascular:  RSR


Respiratory:  clear


Abdomen:  soft, distended, non-tender, present bowel sounds


Extremities:  no cyanosis





Laboratory Tests








Test


  3/19/18


05:20


 


White Blood Count


  6.9 K/UL


(4.8-10.8)


 


Red Blood Count


  4.33 M/UL


(4.20-5.40)


 


Hemoglobin


  12.8 G/DL


(12.0-16.0)


 


Hematocrit


  40.3 %


(37.0-47.0)


 


Mean Corpuscular Volume 93 FL (80-99)  


 


Mean Corpuscular Hemoglobin


  29.6 PG


(27.0-31.0)


 


Mean Corpuscular Hemoglobin


Concent 31.8 G/DL


(32.0-36.0)  L


 


Red Cell Distribution Width


  16.7 %


(11.6-14.8)  H


 


Platelet Count


  348 K/UL


(150-450)


 


Mean Platelet Volume


  7.0 FL


(6.5-10.1)


 


Neutrophils (%) (Auto)


  53.2 %


(45.0-75.0)


 


Lymphocytes (%) (Auto)


  35.5 %


(20.0-45.0)


 


Monocytes (%) (Auto)


  9.4 %


(1.0-10.0)


 


Eosinophils (%) (Auto)


  1.1 %


(0.0-3.0)


 


Basophils (%) (Auto)


  0.8 %


(0.0-2.0)


 


Sodium Level


  138 MMOL/L


(136-145)


 


Potassium Level


  3.3 MMOL/L


(3.5-5.1)  L


 


Chloride Level


  105 MMOL/L


()


 


Carbon Dioxide Level


  25 MMOL/L


(21-32)


 


Anion Gap


  8 mmol/L


(5-15)


 


Blood Urea Nitrogen


  26 mg/dL


(7-18)  H


 


Creatinine


  1.0 MG/DL


(0.55-1.30)


 


Estimat Glomerular Filtration


Rate > 60 mL/min


(>60)


 


Glucose Level


  112 MG/DL


()  H


 


Calcium Level


  9.4 MG/DL


(8.5-10.1)











Plan


Problems:  


(1) Abdominal pain


Assessment & Plan:  62 year old female with pain everywhere as per her.  states 

has pain pump that is not functional.  pain from head to toes.  


on exam abd soft, non tender, prior incisions well healed, pain pump noted.  no 

rebound, no guarding.  


CT reviewed and has had fair amount of colon and some sb resections.  area of 

air noted around gallbladder and liver and agree with radiologist that likely 

collapsed small bowel.  they do not particularly appear extraluminal.  





afebrile, HD stable, abdominal exam benign.  leukocytosis resolved. 

ENTEROBACTER CLOACAE COMPLEX blood culture. 





-no acute surgical intervention necessary. 


-okay for diet


-will monitor exam


-Abx as per ID


thank you for this consultation.  will follow with recs. 














Darrius Benitez Mar 19, 2018 12:26

## 2018-03-19 NOTE — GENERAL PROGRESS NOTE
Assessment/Plan


Assessment/Plan


(1) Lumbar spondylosis


(2) Chronic abdominal pain


(3) Radiculopathy of lumbar region


(4) Herniated nucleus pulposus, lumbar


(5) Chronic pancreatitis


(6) Chronic pain


Pt will be continued on Morphine and pt has intrathecal pump.


Pt was d/w Dr. Shetty and he concurred.





Subjective


Date patient seen:  Mar 19, 2018


Time patient seen:  07:00 - am


Allergies:  


Coded Allergies:  


     No Known Allergies (Verified , 10/27/06)


Subjective


Constitutional:  Denies: chills, diaphoresis, fever, malaise, no symptoms, other

, Reports: weakness


HEENT:  Denies: blurred vision, double vision, ear discharge, ear pain, eye pain

, mouth pain, mouth swelling, no symptoms, nose congestion, nose pain, other, 

tearing, throat pain, throat swelling


Cardiovascular:  Denies: chest pain, edema, irregular heart rate, 

lightheadedness, no symptoms, other, palpitations, syncope


Respiratory:  Denies: SOB at rest, SOB with excertion, cough, no symptoms, 

orthopnea, other, shortness of breath, sputum, stridor, wheezing


Gastrointestinal/Abdominal:  Denies: abdomen distended, black stools, blood in 

stool, constipated, diarrhea, difficulty swallowing, nausea, no symptoms, other

, poor appetite, poor fluid intake, rectal bleeding, tarry stools, vomiting, 

Reports: abdominal pain


Genitourinary:  Denies: burning, discharge, flank pain, frequency, hematuria, 

incontinence, no symptoms, other, pain, urgency


Neurologic/Psychiatric:  Denies: anxiety, depressed, emotional problems, 

headache, no symptoms, numbness, other, paresthesia, pre-existing deficit, 

seizure, tingling, tremors, weakness


Endocrine:  Denies: excessive sweating, flushing, increased hunger, increased 

thirst, increased urine, intolerance to cold, intolerance to heat, no symptoms, 

other, unexplained weight gain, unexplained weight loss


Hematologic/Lymphatic:  Denies: anemia, easy bleeding, easy bruising, no 

symptoms, other





Subjective


Patient continues to c/o abdominal pain will be going for EGD later this 

morning.





Objective





Last 24 Hour Vital Signs








  Date Time  Temp Pulse Resp B/P (MAP) Pulse Ox O2 Delivery O2 Flow Rate FiO2


 


3/19/18 05:15  79  102/65    


 


3/19/18 04:17 96.8 71 18 96/65 94 Room Air  





 96.8       


 


3/19/18 01:54      Room Air  


 


3/19/18 00:00 97.4 71 19 112/69 98   





 97.4       


 


3/18/18 20:16 98.2 78 19 103/65 100 Room Air  





 98.2       


 


3/18/18 16:00      Room Air  


 


3/18/18 16:00 96.8 70 20 121/79 100   





 96.8       


 


3/18/18 12:00 98.2 72 20 102/70 100   





 98.2       


 


3/18/18 12:00      Room Air  

















Intake and Output  


 


 3/18/18 3/19/18





 19:00 07:00


 


Intake Total 580 ml 610.0 ml


 


Balance 580 ml 610.0 ml


 


  


 


Intake Oral 580 ml 


 


IV Total  610.0 ml


 


# Voids 7 2








Laboratory Tests


3/19/18 05:20: 


White Blood Count 6.9, Red Blood Count 4.33, Hemoglobin 12.8, Hematocrit 40.3, 

Mean Corpuscular Volume 93, Mean Corpuscular Hemoglobin 29.6, Mean Corpuscular 

Hemoglobin Concent 31.8L, Red Cell Distribution Width 16.7H, Platelet Count 348

, Mean Platelet Volume 7.0, Neutrophils (%) (Auto) 53.2, Lymphocytes (%) (Auto) 

35.5, Monocytes (%) (Auto) 9.4, Eosinophils (%) (Auto) 1.1, Basophils (%) (Auto

) 0.8, Sodium Level 138, Potassium Level 3.3L, Chloride Level 105, Carbon 

Dioxide Level 25, Anion Gap 8, Blood Urea Nitrogen 26H, Creatinine 1.0, Estimat 

Glomerular Filtration Rate > 60, Glucose Level 112H, Calcium Level 9.4


Height (Feet):  5


Height (Inches):  7.00


Weight (Pounds):  140


Objective


General Appearance:  no apparent distress, alert


EENT:  normal ENT inspection, TMs normal


Neck:  normal alignment, supple


Cardiovascular:  normal rate, regular rhythm


Respiratory/Chest:  decreased breath sounds


Abdomen:  tender, soft


Extremities:  non-tender


Edema:  no edema noted Arm (L), no edema noted Arm (R), no edema noted Leg (L), 

no edema noted Leg (R), no edema noted Pedal (L), no edema noted Pedal (R), no 

edema noted Generalized


Neurologic:  alert, oriented x 3


Skin:  warm/dry











LEA BRAVO Mar 19, 2018 08:33

## 2018-03-19 NOTE — IMMEDIATE POST-OP EVALUATION
Immediate Post-Op Evalulation


Immediate Post-Op Evalulation


Procedure:  egd/colonoscopy


Date of Evaluation:  Mar 19, 2018


Time of Evaluation:  13:18


IV Fluids:  300ml 0.9ns


Blood Products:  none


Estimated Blood Loss:  negligible


Blood Pressure Systolic:  105


Blood Pressure Diastolic:  72


Pulse Rate:  66


Respiratory Rate:  18


O2 Sat by Pulse Oximetry:  99


Temperature (Fahrenheit):  98.2


Pain Score (1-10):  0


Nausea:  No


Vomiting:  No


Complications


none


Patient Status:  awake, reacts, patent


Hydration Status:  adequate


Drug:  NISHA Singh Mar 19, 2018 13:27

## 2018-03-19 NOTE — INFECTIOUS DISEASES PROG NOTE
Assessment/Plan


Assessment/Plan








Sepsis , improving


Persistent Gram negative bacteremia- likely from GI source- r/o Crohn's flare, 

colitis, - r/o PICC line infection


 -3/15 Bcx 4/4 Enterobacter cloacae complex (R ancef, otherwise S); 3/17 Bcx 1/

4 GNRs


 --s/p PICC line removal 3/18, cath tip cx p


 -s/p EGD/Colonoscopy 3/19: ESOPHAGITIS, COLON ULCER


  -CT abd/p w/: Limited assessment of the GI tract, due to lack of enteric 

contrast administration. Evidence of extensive prior bowel surgery, with 

evidence of resection of much of the


colon, enterocolic and entero-enteral anastomoses. Dilated right upper quadrant 

small bowel loops. Most likely on the basis of disordered motility related to 

the prior surgery, particularly given similarity to the previous exam. However, 

given evidence of nondilated small bowel loops elsewhere, the possibility of 

small bowel obstruction should be considered. Gas bubbles adjacent to the 

gallbladder fossa. Probably within collapsed adjacent small bowel, but the 

possibility that these represent extraluminal gas bubbles and which would 

therefore indicate bowel perforation should also be considered. Considerable 

fluid within the residual colon. Per discussion with referring physician, 

patient has recent history of diarrhea. Findings are certainly concordant with 

that. Subcentimeter low-attenuation renal lesions, too small to characterize, 

most likely benign simple cysts.  Right posterior 11th rib fracture, appears 

acute. Bilateral basilar pulmonary fibrotic changes, also previously 

demonstrated








Leukocytosis  ( on steroids ) -resolved


  -afebrile


  -CXR 3/15: Mild interstitial prominence, similar to the previous study and 

likely reflecting fibrotic changes demonstrated on a CT scan of June 2014. No 

definite acute infiltrate; sp cx NTD


   -u/a WBC 5-10, nit neg, leuk +1





Hx of Cdiff


  -Cdiff 3/15 neg








Crohn's disease w/ multiple abd surgeries


COPD/asthma


on B-12 injections via PICC line


 CAD/MI


chronic pain syndrome on morphine pump


CVA 2005


seizure disorder








Plan:


-Switch Zosyn  d # 4 to Ceftriaxone 2 g daily for enterobacter bacteremia and 

IV Flagyl d# 3 (switch to PO) for anaerobic coverage


   SP oral Vanco d# 2


  


-Repeat 2 sets of Bcx- if persistent bacteremia after PICC line removal then 

will need to obtain MRI lumbar spine to r/o intrathecal abscess- at current low 

suspicion as contrast CT abd/p did not showed abnormality in that area


  -if MRI done, will need [employer] tech to come after and reset pump


  -duration of abx will be guided by repeat Bcx; upon discharge a PO regimen 

with Cipro and Flagyl can be done





-f/u cx


-Monitor CBC/BMP, temperatures


-appreciate GI and surgery input





Subjective


Allergies:  


Coded Allergies:  


     No Known Allergies (Verified , 10/27/06)





Objective


Vital Signs





Last 24 Hour Vital Signs








  Date Time  Temp Pulse Resp B/P (MAP) Pulse Ox O2 Delivery O2 Flow Rate FiO2


 


3/19/18 12:00 97.3 83 18 106/70 99   





 97.3       


 


3/19/18 08:00 97.2 81 19 102/69 96   





 97.2       


 


3/19/18 05:15  79  102/65    


 


3/19/18 04:17 96.8 71 18 96/65 94 Room Air  





 96.8       


 


3/19/18 01:54      Room Air  


 


3/19/18 00:00 97.4 71 19 112/69 98   





 97.4       


 


3/18/18 20:16 98.2 78 19 103/65 100 Room Air  





 98.2       


 


3/18/18 16:00      Room Air  


 


3/18/18 16:00 96.8 70 20 121/79 100   





 96.8       








Height (Feet):  5


Height (Inches):  7.00


Weight (Pounds):  140


Objective


General Appearance:  alert, mild distress


HEENT:  normocephalic, normal pharynx,moist mucus membranes


Neck:  normal inspection, full range of motion, supple


Respiratory:  respiratory distress, speaking full sentences, wheezing, 

expiration


Cardiovascular :  normal inspection, regular rate, rhythm, normal capillary 

refill


Gastrointestinal:  other - Morphine pump palpated in abdomen, well-healed 

surgical scars on abdomen, abdomen is slightly distended, generalized tenderness

, normal bowel sounds


Musculoskeletal:  normal inspection, back normal, normal range of motion, non-

tender


Neurologic:  normal inspection, alert, oriented x3, responsive, motor strength/

tone normal, sensory intact, normal gait, speech normal


Skin:  normal inspection, normal color, no rash, warm/dry, well hydrated, 

normal turgor





Microbiology








 Date/Time


Source Procedure


Growth Status


 


 


 3/17/18 00:35


Blood Blood Culture - Preliminary Resulted


 


 3/17/18 00:20


Blood Blood Culture - Preliminary


NO GROWTH AFTER 48 HOURS Resulted





 3/17/18 21:39


Sputum Gram Stain


Pending Resulted


 


 3/17/18 21:39


Sputum Sputum Culture - Preliminary


NO GROWTH Resulted











Laboratory Tests








Test


  3/19/18


05:20


 


White Blood Count


  6.9 K/UL


(4.8-10.8)


 


Red Blood Count


  4.33 M/UL


(4.20-5.40)


 


Hemoglobin


  12.8 G/DL


(12.0-16.0)


 


Hematocrit


  40.3 %


(37.0-47.0)


 


Mean Corpuscular Volume 93 FL (80-99)  


 


Mean Corpuscular Hemoglobin


  29.6 PG


(27.0-31.0)


 


Mean Corpuscular Hemoglobin


Concent 31.8 G/DL


(32.0-36.0)  L


 


Red Cell Distribution Width


  16.7 %


(11.6-14.8)  H


 


Platelet Count


  348 K/UL


(150-450)


 


Mean Platelet Volume


  7.0 FL


(6.5-10.1)


 


Neutrophils (%) (Auto)


  53.2 %


(45.0-75.0)


 


Lymphocytes (%) (Auto)


  35.5 %


(20.0-45.0)


 


Monocytes (%) (Auto)


  9.4 %


(1.0-10.0)


 


Eosinophils (%) (Auto)


  1.1 %


(0.0-3.0)


 


Basophils (%) (Auto)


  0.8 %


(0.0-2.0)


 


Sodium Level


  138 MMOL/L


(136-145)


 


Potassium Level


  3.3 MMOL/L


(3.5-5.1)  L


 


Chloride Level


  105 MMOL/L


()


 


Carbon Dioxide Level


  25 MMOL/L


(21-32)


 


Anion Gap


  8 mmol/L


(5-15)


 


Blood Urea Nitrogen


  26 mg/dL


(7-18)  H


 


Creatinine


  1.0 MG/DL


(0.55-1.30)


 


Estimat Glomerular Filtration


Rate > 60 mL/min


(>60)


 


Glucose Level


  112 MG/DL


()  H


 


Calcium Level


  9.4 MG/DL


(8.5-10.1)











Current Medications








 Medications


  (Trade)  Dose


 Ordered  Sig/Jed


 Route


 PRN Reason  Start Time


 Stop Time Status Last Admin


Dose Admin


 


 Albuterol/


 Ipratropium


  (Albuterol/


 Ipratropium)  3 ml  EVERY 4 HOURS  PRN


 HHN


 dyspnea  3/16/18 19:34


 3/20/18 19:33   


 


 


 Atorvastatin


 Calcium


  (Lipitor)  40 mg  BEDTIME


 ORAL


   3/16/18 21:00


 4/15/18 20:59  3/18/18 21:35


 


 


 Bupropion HCl


  (Wellbutrin)  100 mg  DAILY


 ORAL


   3/17/18 09:00


 4/15/18 08:59  3/18/18 08:43


 


 


 Chlorhexidine


 Gluconate


  (Rosy-Hex 2%)  1 applic  Q24H


 TOPIC


   3/18/18 00:15


 4/17/18 00:14  3/18/18 00:54


 


 


 Dextrose


  (Dextrose 50%)    STAT  PRN


 IV


 Hypoglycemia  3/16/18 19:34


 4/14/18 19:33   


 


 


 Dextrose/


 Electrolytes  1,000 ml @ 


 75 mls/hr  [id number]


 IV


   3/18/18 16:00


 4/17/18 15:59  3/18/18 17:03


 


 


 Duloxetine HCl


  (Cymbalta)  60 mg  DAILY


 ORAL


   3/17/18 09:00


 4/15/18 08:59  3/18/18 08:43


 


 


 Heparin Sodium


  (Porcine)


  (Heparin 5000


 units/ml)  5,000 units  EVERY 12  HOURS


 SUBQ


   3/16/18 21:00


 4/15/18 08:59  3/19/18 09:39


 


 


 Ketorolac


 Tromethamine


  (Toradol 30mg)  30 mg  EVERY 8 HOURS  PRN


 IV


 moderate pain 4-6  3/16/18 19:39


 3/20/18 19:38   


 


 


 Levetiracetam


  (Keppra)  1,000 mg  Q8HR


 ORAL


   3/17/18 06:00


 4/16/18 05:59  3/19/18 06:15


 


 


 Levothyroxine


 Sodium


  (Synthroid)  75 mcg  ACBREAKFAST


 ORAL


   3/17/18 06:30


 4/15/18 06:29  3/19/18 06:15


 


 


 Loperamide HCl


  (Imodium)  2 mg  Q4H  PRN


 ORAL


 Diarrhea  3/16/18 19:35


 4/15/18 19:34   


 


 


 Lorazepam


  (Ativan 2mg/ml


 1ml)  0.5 mg  Q4H  PRN


 IV


 For Anxiety  3/16/18 19:35


 3/22/18 19:34   


 


 


 Mesalamine


  (Asacol)  800 mg  THREE TIMES A  DAY


 ORAL


   3/17/18 09:00


 4/15/18 17:59  3/18/18 17:08


 


 


 Methylprednisolone


 Sodium Succinate


  (Solu-MEDROL)  30 mg  DAILY


 IVP


   3/18/18 09:00


 4/17/18 08:59  3/19/18 09:36


 


 


 Metronidazole  100 ml @ 


 100 mls/hr  Q8HR


 IVPB


   3/17/18 14:00


 3/24/18 13:59  3/19/18 05:21


 


 


 Morphine Sulfate


  (Morphine


 Sulfate)  2 mg  EVERY 4 HOURS  PRN


 IVP


 severe pain 7-10  3/16/18 19:36


 3/22/18 19:35  3/17/18 14:16


 


 


 Nitroglycerin


  (Ntg)  0.4 mg  Q5M X 3 DOSES PRN


 SL


 Prn Chest Pain  3/16/18 19:15


 4/14/18 22:29   


 


 


 Ondansetron HCl


  (Zofran)  4 mg  Q6H  PRN


 IVP


 Nausea & Vomiting  3/16/18 19:36


 4/14/18 19:35   


 


 


 Piperacillin Sod/


 Tazobactam Sod


 3.375 gm/Sodium


 Chloride  110 ml @ 


 27.5 mls/hr  EVERY 8  HOURS


 IVPB


   3/16/18 22:00


 3/20/18 05:59  3/19/18 06:34


 


 


 Potassium Chloride


  (K-Dur)  20 meq  ONCE  ONCE


 ORAL


   3/19/18 15:00


 3/19/18 15:01   


 


 


 Promethazine HCl/


 Codeine


  (Phenergan with


 Codeine)  5 ml  EVERY 6 HOURS  PRN


 ORAL


 cough  3/16/18 19:36


 4/15/18 19:35   


 


 


 Temazepam


  (Restoril)  15 mg  HSPRN  PRN


 ORAL


 Insomnia  3/16/18 19:36


 3/22/18 19:35   


 


 


 Theophylline


  (Shiv-Dur)  100 mg  EVERY 12  HOURS


 ORAL


   3/16/18 21:00


 4/15/18 08:59  3/18/18 21:34


 


 


 Topiramate


  (Topamax)  25 mg  Q12HR


 ORAL


   3/17/18 09:00


 4/16/18 08:59  3/18/18 21:34


 


 


 Trazodone HCl


  (Desyrel)  150 mg  BEDTIME


 ORAL


   3/16/18 21:00


 4/15/18 20:59  3/18/18 21:35


 

















Selene Garcia M.D. Mar 19, 2018 13:31

## 2018-03-19 NOTE — 48 HOUR POST ANESTHESIA EVAL
Post Anesthesia Evaluation


Procedure:  egd/colonoscopy


Date of Evaluation:  Mar 19, 2018


Time of Evaluation:  13:27


Blood Pressure Systolic:  106


0:  70


Pulse Rate:  70


Respiratory Rate:  18


Temperature (Fahrenheit):  98.2


O2 Sat by Pulse Oximetry:  100


Airway:  patent


Nausea:  No


Vomiting:  No


Pain Intensity:  0


Hydration Status:  adequate


Cardiopulmonary Status:


stable


Mental Status/LOC:  patient returned to baseline


Post-Anesthesia Complications:


none


Follow-up care needed:  N/A











NISHA BONNER Mar 19, 2018 13:28

## 2018-03-19 NOTE — GENERAL PROGRESS NOTE
Assessment/Plan


Problem List:  


(1) Opioid dependence


ICD Codes:  F11.20 - Opioid dependence


SNOMED:  00473205


(2) COPD (chronic obstructive pulmonary disease)


ICD Codes:  J44.9 - Chronic obstructive pulmonary disease, unspecified


SNOMED:  53505499


(3) Crohns disease


ICD Codes:  K50.90 - Crohns disease


SNOMED:  00289790


(4) Diarrhea


ICD Codes:  R19.7 - Diarrhea, unspecified


SNOMED:  58322771


(5) Shortness of breath


(6) Anemia


(7) Small bowel obstruction


ICD Codes:  K56.609 - Unspecified intestinal obstruction, unspecified as to 

partial versus complete obstruction


SNOMED:  497472411


(8) Dyspnea


(9) Chronic pain


ICD Codes:  G89.29 - Other chronic pain


SNOMED:  84206481


(10) Abdominal pain


Qualifiers:  


   Qualified Codes:  R10.84 - Generalized abdominal pain


Status:  stable, progressing, tolerating diet


Assessment/Plan


o2 pulm tx abx gi sx f/u dc if clear





Subjective


Constitutional:  Reports: weakness


Allergies:  


Coded Allergies:  


     No Known Allergies (Verified , 10/27/06)


All Systems:  reviewed and negative except above


Subjective


sleepy calm in bed





Objective





Last 24 Hour Vital Signs








  Date Time  Temp Pulse Resp B/P (MAP) Pulse Ox O2 Delivery O2 Flow Rate FiO2


 


3/19/18 13:55 98.6 76 15 103/69 100 Nasal Cannula 3.0 





 98.6       


 


3/19/18 13:40  77 16 103/3 99 Nasal Cannula 3.0 


 


3/19/18 13:28 208.8 70 18  100   


 


3/19/18 13:27 208.8 66 18  99   


 


3/19/18 13:25  74 14 104/70 99 Nasal Cannula 3.0 


 


3/19/18 13:15  71 15 106/73 99 Nasal Cannula 3.0 


 


3/19/18 13:10  68 13 108/74 99 Nasal Cannula 3.0 


 


3/19/18 13:06 98.2 67 19 102/72 99 Nasal Cannula 3.0 





 98.2       


 


3/19/18 12:00 97.3 83 18 106/70 99   





 97.3       


 


3/19/18 08:00 97.2 81 19 102/69 96   





 97.2       


 


3/19/18 05:15  79  102/65    


 


3/19/18 04:17 96.8 71 18 96/65 94 Room Air  





 96.8       


 


3/19/18 01:54      Room Air  


 


3/19/18 00:00 97.4 71 19 112/69 98   





 97.4       


 


3/18/18 20:16 98.2 78 19 103/65 100 Room Air  





 98.2       


 


3/18/18 16:00      Room Air  


 


3/18/18 16:00 96.8 70 20 121/79 100   





 96.8       

















Intake and Output  


 


 3/18/18 3/19/18





 19:00 07:00


 


Intake Total 580 ml 610.0 ml


 


Balance 580 ml 610.0 ml


 


  


 


Intake Oral 580 ml 


 


IV Total  610.0 ml


 


# Voids 7 2








Laboratory Tests


3/19/18 05:20: 


White Blood Count 6.9, Red Blood Count 4.33, Hemoglobin 12.8, Hematocrit 40.3, 

Mean Corpuscular Volume 93, Mean Corpuscular Hemoglobin 29.6, Mean Corpuscular 

Hemoglobin Concent 31.8L, Red Cell Distribution Width 16.7H, Platelet Count 348

, Mean Platelet Volume 7.0, Neutrophils (%) (Auto) 53.2, Lymphocytes (%) (Auto) 

35.5, Monocytes (%) (Auto) 9.4, Eosinophils (%) (Auto) 1.1, Basophils (%) (Auto

) 0.8, Sodium Level 138, Potassium Level 3.3L, Chloride Level 105, Carbon 

Dioxide Level 25, Anion Gap 8, Blood Urea Nitrogen 26H, Creatinine 1.0, Estimat 

Glomerular Filtration Rate > 60, Glucose Level 112H, Calcium Level 9.4


Height (Feet):  5


Height (Inches):  7.00


Weight (Pounds):  140


General Appearance:  alert


EENT:  normal ENT inspection


Neck:  normal alignment


Cardiovascular:  normal peripheral pulses, normal rate, regular rhythm


Respiratory/Chest:  chest wall non-tender, lungs clear, normal breath sounds


Abdomen:  normal bowel sounds, non tender, soft


Extremities:  normal inspection


Edema:  no edema noted Arm (L), no edema noted Arm (R), no edema noted Leg (L), 

no edema noted Leg (R), no edema noted Pedal (L), no edema noted Pedal (R), no 

edema noted Generalized


Neurologic:  responsive, motor weakness


Skin:  normal pigmentation, warm/dry











MAICOL LANG Mar 19, 2018 14:46

## 2018-03-19 NOTE — PROCEDURE NOTE
DATE OF PROCEDURE:  03/19/2018



SURGEON:  Dimitry Mcleod M.D.



ANESTHESIOLOGIST:  Dr. Jones.



REFERRING PHYSICIAN:  Maxim Hopkins D.O.



PROCEDURE:  Upper endoscopy with biopsy and colonoscopy with

biopsy.



ANESTHESIA:  Per Dr. Jones.



INSTRUMENT:  Olympus adult flexible upper endoscope and

colonoscope.



INDICATION:  Crohn disease, abdominal pain.



The procedure, risks, benefits, and possible consequences, including

hemorrhage, aspiration, perforation and infection, and alternative

treatments, were explained to the patient/legal guardian by Dr. Dimitry Mcleod and the patient/legal guardian understood and accepted these

risks.



DESCRIPTION OF PROCEDURE:  After informed consent was obtained and the

patient was adequately sedated, Olympus upper endoscope was advanced from

the mouth into the second portion of the duodenum and retroflexion was

performed in the stomach.



The patient had evidence of diffuse gastritis.  No biopsy was obtained

because the patient had biopsies before.  The patient had also evidence of

small hiatal hernia.  In the esophagus, there were some white patches

suspicious for Candida esophagitis which was brush-biopsied.  At this

time, the upper endoscope was retrieved.  The patient was turned over for

colonoscopy.



First, rectal exam was performed which was normal.  Then, the scope was

advanced from the rectum into the anastomosis and _____ ileum or small

intestine.  Quality of prep was good.  The patient had a very short

segment of the colon left.  There was one ulcer at the anastomosis roughly

measuring about 1 cm clean-based ulcer which was biopsied.  Most probably,

either anastomotic ulcer versus flare of Crohn's.  Doubt it was Crohn's

flare because the rest of the terminal ileum when we looked at it, it

looked okay.  The patient tolerated the procedure very well without any

complication.



SUMMARY OF FINDINGS:

1. Possible Candida esophagitis, status post brush biopsy.

2. Gastritis.

3. Small hiatal hernia.

4. Short segment of colon left.

5. A 1-cm anastomotic ulcer, status post biopsy.



RECOMMENDATIONS:  Follow brush biopsy results and treat for Candida if it

is positive.  Resume diet.  Resume medications.  Consider doing outpatient

capsule endoscopy to look at the rest of the small intestine before

changing medication around.



I want to thank, Dr. Maxim Hopkins, for this kind referral.









  ______________________________________________

  Dimitry SANDEEP Mcleod





DR:  Du

D:  03/19/2018 12:53

T:  03/19/2018 16:16

JOB#:  [id number]

CC:  Maxim Hopkins D.O.

## 2018-03-19 NOTE — ENDOSCOPY PROCEDURE NOTE
Endoscopy Procedure Note


General


Indication for Procedure:  /CROHNS dz


Procedures Performed:  EGD, colonoscopy


Operative Findings/Diagnosis:  ESOPHAGITIS, COLO ULCER


Specimen:  yes


Pt Tolerated Procedure Well:  Yes


Estimated Blood Loss:  none





Anesthesia


Anesthesiologist:  peter


Anesthesia:  MAC





Inserted Devices


Implant(s) used?:  No





Quality


Quality of Bowel Preparation:  Good


Did scope reach the cecum?:  Yes





GI Core Measures


50 yrs or older w/o bx or poly:  Not Applicable


10yrs. F/U not recommended:  Not Applicable











LESLY PINA Mar 19, 2018 12:54

## 2018-03-19 NOTE — ANETHESIA PREOPERATIVE EVAL
Anesthesia Pre-op PMH/ROS


General


Date of Evaluation:  Mar 19, 2018


Time of Evaluation:  12:08


Anesthesiologist:  milagros


ASA Score:  ASA 3


Mallampati Score


Class I : Soft palate, uvula, fauces, pillars visible


Class II: Soft palate, uvula, fauces visible


Class III: Soft palate, base of uvula visible


Class IV: Only hard plate visible


Mallampati Classification:  Class II


Surgeon:  randell


Diagnosis:  abdominal pain


Surgical Procedure:  egd/colonoscopy


Anesthesia History:  none


Social History:  smoking


Family History:  no anesthesia problems


Allergies:  


Coded Allergies:  


     No Known Allergies (Verified , 10/27/06)


Medications:  see eMAR





Past Medical History


Cardiovascular:  Reports: CAD, MI, other - chf


Pulmonary:  Reports: asthma, COPD


Gastrointestinal/Genitourinary:  Reports: other - crohn's disease


Neurologic/Psychiatric:  Reports: CVA, other - seizure disorder,





Anesthesia Pre-op Phys. Exam


Physician Exam





Last Vital Signs








  Date Time  Temp Pulse Resp B/P (MAP) Pulse Ox O2 Delivery O2 Flow Rate FiO2


 


3/19/18 12:00 97.3 83 18 106/70 99   





 97.3       


 


3/19/18 04:17      Room Air  


 


3/15/18 16:17        21


 


3/15/18 16:00       10.0 








Constitutional:  NAD


Neurologic:  CN 2-12 intact


Cardiovascular:  RRR


Respiratory:  CTA


Gastrointestinal:  S/NT/ND





Airway Exam


Mallampati Score:  Class II


MO:  full


Neck:  supple


TMD:  2fb


ROM:  limited





Anesthesia Pre-op A/P


Labs





Hematology








Test


  3/19/18


05:20


 


White Blood Count


  6.9 K/UL


(4.8-10.8)


 


Red Blood Count


  4.33 M/UL


(4.20-5.40)


 


Hemoglobin


  12.8 G/DL


(12.0-16.0)


 


Hematocrit


  40.3 %


(37.0-47.0)


 


Mean Corpuscular Volume 93 FL (80-99)  


 


Mean Corpuscular Hemoglobin


  29.6 PG


(27.0-31.0)


 


Mean Corpuscular Hemoglobin


Concent 31.8 G/DL


(32.0-36.0)  L


 


Red Cell Distribution Width


  16.7 %


(11.6-14.8)  H


 


Platelet Count


  348 K/UL


(150-450)


 


Mean Platelet Volume


  7.0 FL


(6.5-10.1)


 


Neutrophils (%) (Auto)


  53.2 %


(45.0-75.0)


 


Lymphocytes (%) (Auto)


  35.5 %


(20.0-45.0)


 


Monocytes (%) (Auto)


  9.4 %


(1.0-10.0)


 


Eosinophils (%) (Auto)


  1.1 %


(0.0-3.0)


 


Basophils (%) (Auto)


  0.8 %


(0.0-2.0)








Chemistry








Test


  3/19/18


05:20


 


Sodium Level


  138 MMOL/L


(136-145)


 


Potassium Level


  3.3 MMOL/L


(3.5-5.1)  L


 


Chloride Level


  105 MMOL/L


()


 


Carbon Dioxide Level


  25 MMOL/L


(21-32)


 


Anion Gap


  8 mmol/L


(5-15)


 


Blood Urea Nitrogen


  26 mg/dL


(7-18)  H


 


Creatinine


  1.0 MG/DL


(0.55-1.30)


 


Estimat Glomerular Filtration


Rate > 60 mL/min


(>60)


 


Glucose Level


  112 MG/DL


()  H


 


Calcium Level


  9.4 MG/DL


(8.5-10.1)











Risk Assessment & Plan


Assessment:


asa3


Plan:


mac


Status Change Before Surgery:  No





Pre-Antibiotics


Drug:  NISHA Singh Mar 19, 2018 13:05

## 2018-03-19 NOTE — GENERAL PROGRESS NOTE
Assessment/Plan


Problem List:  


(1) History of exploratory laparotomy


ICD Codes:  Z98.89 - Other specified postprocedural states


SNOMED:  32216938, 59282313, 951312808


(2) Smoker


ICD Codes:  F17.200 - Nicotine dependence, unspecified, uncomplicated


SNOMED:  48213954


(3) COPD (chronic obstructive pulmonary disease)


ICD Codes:  J44.9 - Chronic obstructive pulmonary disease, unspecified


SNOMED:  55245683


(4) Opioid dependence


ICD Codes:  F11.20 - Opioid dependence


SNOMED:  22317425


(5) IBD (inflammatory bowel disease)


ICD Codes:  K63.89 - Other specified diseases of intestine


SNOMED:  87942419


(6) Elevated CEA


ICD Codes:  R97.0 - Elevated CEA


SNOMED:  518300040


(7) Crohns disease


ICD Codes:  K50.90 - Crohns disease


SNOMED:  11760988


Assessment/Plan


plan EGD and colonoscopy today





Subjective


ROS Limited/Unobtainable:  Yes


Allergies:  


Coded Allergies:  


     No Known Allergies (Verified , 10/27/06)


Subjective


abd pain


no BM





Objective





Last 24 Hour Vital Signs








  Date Time  Temp Pulse Resp B/P (MAP) Pulse Ox O2 Delivery O2 Flow Rate FiO2


 


3/19/18 08:00 97.2 81 19 102/69 96   





 97.2       


 


3/19/18 05:15  79  102/65    


 


3/19/18 04:17 96.8 71 18 96/65 94 Room Air  





 96.8       


 


3/19/18 01:54      Room Air  


 


3/19/18 00:00 97.4 71 19 112/69 98   





 97.4       


 


3/18/18 20:16 98.2 78 19 103/65 100 Room Air  





 98.2       


 


3/18/18 16:00      Room Air  


 


3/18/18 16:00 96.8 70 20 121/79 100   





 96.8       


 


3/18/18 12:00 98.2 72 20 102/70 100   





 98.2       


 


3/18/18 12:00      Room Air  

















Intake and Output  


 


 3/18/18 3/19/18





 19:00 07:00


 


Intake Total 580 ml 610.0 ml


 


Balance 580 ml 610.0 ml


 


  


 


Intake Oral 580 ml 


 


IV Total  610.0 ml


 


# Voids 7 2








Laboratory Tests


3/19/18 05:20: 


White Blood Count 6.9, Red Blood Count 4.33, Hemoglobin 12.8, Hematocrit 40.3, 

Mean Corpuscular Volume 93, Mean Corpuscular Hemoglobin 29.6, Mean Corpuscular 

Hemoglobin Concent 31.8L, Red Cell Distribution Width 16.7H, Platelet Count 348

, Mean Platelet Volume 7.0, Neutrophils (%) (Auto) 53.2, Lymphocytes (%) (Auto) 

35.5, Monocytes (%) (Auto) 9.4, Eosinophils (%) (Auto) 1.1, Basophils (%) (Auto

) 0.8, Sodium Level 138, Potassium Level 3.3L, Chloride Level 105, Carbon 

Dioxide Level 25, Anion Gap 8, Blood Urea Nitrogen 26H, Creatinine 1.0, Estimat 

Glomerular Filtration Rate > 60, Glucose Level 112H, Calcium Level 9.4


Height (Feet):  5


Height (Inches):  7.00


Weight (Pounds):  140


General Appearance:  alert


EENT:  normal ENT inspection


Neck:  supple


Cardiovascular:  normal rate


Respiratory/Chest:  decreased breath sounds


Abdomen:  normal bowel sounds, non tender, soft


Extremities:  non-tender











LESLY PINA Mar 19, 2018 11:46

## 2018-03-19 NOTE — PROGRESS NOTE
DATE:  03/19/2018



SUBJECTIVE:  The patient is a 62-year-old female.  She has COPD, but she

does have some confusion, disorganized thought process, and depression, so

daily psychiatric consultation continued to be requested by her attending

physician.



MENTAL STATUS EXAMINATION:  This is a 62-year-old female with  psychomotor

retardation.  Mood is depressed.  Affect guarded and restricted.  Thought

process, disorganized and illogical.  Denies any current suicidal or

homicidal thoughts.  Insight and judgment is poor.



DIAGNOSIS:  Bipolar 2.



PLAN:  Continue titrating up on her psychotropic medications to stabilize

her mood.  An 18 to 20 minutes of supportive therapy was provided.

Encouraged her to interact appropriately with staff and other patients.

Chart reviewed and discussed with staff.  Seen and assessed at bedside.









  ______________________________________________

  Samantha Rick M.D.





DR:  PRADEEP

D:  03/19/2018 05:19

T:  03/19/2018 11:05

JOB#:  [id number]

CC:

## 2018-03-19 NOTE — PULMONOLOGY PROGRESS NOTE
Assessment/Plan


Problems:  


(1) Gram-negative bacteremia


(2) PNA (pneumonia)


(3) IBD (inflammatory bowel disease)


(4) Asthma


(5) Opioid dependence


(6) COPD exacerbation


(7) Elevated CEA


Assessment/Plan


improving clinically, WBC wnl





still lots of sputum


continue abx


dc steroids


chest pt


symptomatic treatment


pain management. 





dc planning





Subjective


ROS Limited/Unobtainable:  No


Constitutional:  Reports: no symptoms


HEENT:  Repors: no symptoms


Allergies:  


Coded Allergies:  


     No Known Allergies (Verified , 10/27/06)





Objective





Last 24 Hour Vital Signs








  Date Time  Temp Pulse Resp B/P (MAP) Pulse Ox O2 Delivery O2 Flow Rate FiO2


 


3/19/18 21:00 96.8 70 20 103/67 96 Room Air  





 96.8       


 


3/19/18 20:02 96.8 70 20 95/69 95 Room Air  





 96.8       


 


3/19/18 15:54 97.9 78 19 104/68 99   





 97.9       


 


3/19/18 13:55 98.6 76 15 103/69 100 Nasal Cannula 3.0 





 98.6       


 


3/19/18 13:40  77 16 103/62 99 Nasal Cannula 3.0 


 


3/19/18 13:28 208.8 70 18  100   


 


3/19/18 13:27 208.8 66 18  99   


 


3/19/18 13:25  74 14 104/70 99 Nasal Cannula 3.0 


 


3/19/18 13:15  71 15 106/73 99 Nasal Cannula 3.0 


 


3/19/18 13:10  68 13 108/74 99 Nasal Cannula 3.0 


 


3/19/18 13:06 98.2 67 19 102/72 99 Nasal Cannula 3.0 





 98.2       


 


3/19/18 12:00 97.3 83 18 106/70 99   





 97.3       


 


3/19/18 08:00 97.2 81 19 102/69 96   





 97.2       


 


3/19/18 05:15  79  102/65    


 


3/19/18 04:17 96.8 71 18 96/65 94 Room Air  





 96.8       


 


3/19/18 01:54      Room Air  


 


3/19/18 00:00 97.4 71 19 112/69 98   





 97.4       

















Intake and Output  


 


 3/18/18 3/19/18





 19:00 07:00


 


Intake Total 580 ml 610.0 ml


 


Balance 580 ml 610.0 ml


 


  


 


Intake Oral 580 ml 


 


IV Total  610.0 ml


 


# Voids 7 2








Objective


General Appearance:  WD/WN, no apparent distress


Lines, tubes and drains:  peripheral


HEENT:  normocephalic, anicteric


Neck:  non-tender, normal alignment


Respiratory/Chest:  chest wall non-tender, + rhonchi


Cardiovascular/Chest:  normal peripheral pulses


Abdomen:  normal bowel sounds


EXt: no C/C/E





Microbiology








 Date/Time


Source Procedure


Growth Status


 


 


 3/17/18 00:35


Blood Blood Culture - Preliminary Resulted


 


 3/17/18 00:20


Blood Blood Culture - Preliminary


NO GROWTH AFTER 48 HOURS Resulted





 3/17/18 21:39


Sputum Gram Stain - Final Resulted


 


 3/17/18 21:39


Sputum Sputum Culture - Preliminary


NO GROWTH Resulted








Laboratory Tests


3/19/18 05:20: 


White Blood Count 6.9, Red Blood Count 4.33, Hemoglobin 12.8, Hematocrit 40.3, 

Mean Corpuscular Volume 93, Mean Corpuscular Hemoglobin 29.6, Mean Corpuscular 

Hemoglobin Concent 31.8L, Red Cell Distribution Width 16.7H, Platelet Count 348

, Mean Platelet Volume 7.0, Neutrophils (%) (Auto) 53.2, Lymphocytes (%) (Auto) 

35.5, Monocytes (%) (Auto) 9.4, Eosinophils (%) (Auto) 1.1, Basophils (%) (Auto

) 0.8, Sodium Level 138, Potassium Level 3.3L, Chloride Level 105, Carbon 

Dioxide Level 25, Anion Gap 8, Blood Urea Nitrogen 26H, Creatinine 1.0, Estimat 

Glomerular Filtration Rate > 60, Glucose Level 112H, Calcium Level 9.4





Current Medications








 Medications


  (Trade)  Dose


 Ordered  Sig/Jed


 Route


 PRN Reason  Start Time


 Stop Time Status Last Admin


Dose Admin


 


 Albuterol/


 Ipratropium


  (Albuterol/


 Ipratropium)  3 ml  EVERY 4 HOURS  PRN


 HHN


 dyspnea  3/16/18 19:34


 3/20/18 19:33   


 


 


 Atorvastatin


 Calcium


  (Lipitor)  40 mg  BEDTIME


 ORAL


   3/16/18 21:00


 4/15/18 20:59  3/19/18 21:09


 


 


 Bupropion HCl


  (Wellbutrin)  100 mg  DAILY


 ORAL


   3/17/18 09:00


 4/15/18 08:59  3/18/18 08:43


 


 


 Ceftriaxone


 Sodium 2 gm/


 Sodium Chloride  55 ml @ 


 110 mls/hr  Q24H


 IVPB


   3/19/18 14:30


 3/26/18 14:29  3/19/18 14:29


 


 


 Chlorhexidine


 Gluconate


  (Rosy-Hex 2%)  1 applic  Q24H


 TOPIC


   3/18/18 00:15


 4/17/18 00:14  3/18/18 00:54


 


 


 Dextrose


  (Dextrose 50%)    STAT  PRN


 IV


 Hypoglycemia  3/16/18 19:34


 4/14/18 19:33   


 


 


 Dextrose/


 Electrolytes  1,000 ml @ 


 75 mls/hr  [id number]


 IV


   3/18/18 16:00


 4/17/18 15:59  3/19/18 17:07


 


 


 Duloxetine HCl


  (Cymbalta)  60 mg  DAILY


 ORAL


   3/17/18 09:00


 4/15/18 08:59  3/18/18 08:43


 


 


 Fluconazole


  (Diflucan)  200 mg  Q24H


 ORAL


   3/20/18 18:00


 3/27/18 17:59   


 


 


 Heparin Sodium


  (Porcine)


  (Heparin 5000


 units/ml)  5,000 units  EVERY 12  HOURS


 SUBQ


   3/16/18 21:00


 4/15/18 08:59  3/19/18 21:18


 


 


 Ketorolac


 Tromethamine


  (Toradol 30mg)  30 mg  EVERY 8 HOURS  PRN


 IV


 moderate pain 4-6  3/16/18 19:39


 3/20/18 19:38   


 


 


 Levetiracetam


  (Keppra)  1,000 mg  Q8HR


 ORAL


   3/17/18 06:00


 4/16/18 05:59  3/19/18 21:10


 


 


 Levothyroxine


 Sodium


  (Synthroid)  75 mcg  ACBREAKFAST


 ORAL


   3/17/18 06:30


 4/15/18 06:29  3/19/18 06:15


 


 


 Loperamide HCl


  (Imodium)  2 mg  Q4H  PRN


 ORAL


 Diarrhea  3/16/18 19:35


 4/15/18 19:34   


 


 


 Lorazepam


  (Ativan 2mg/ml


 1ml)  0.5 mg  Q4H  PRN


 IV


 For Anxiety  3/16/18 19:35


 3/22/18 19:34   


 


 


 Mesalamine


  (Asacol)  800 mg  THREE TIMES A  DAY


 ORAL


   3/17/18 09:00


 4/15/18 17:59  3/19/18 17:07


 


 


 Methylprednisolone


 Sodium Succinate


  (Solu-MEDROL)  30 mg  DAILY


 IVP


   3/18/18 09:00


 4/17/18 08:59  3/19/18 09:36


 


 


 Metronidazole


  (Flagyl)  500 mg  Q8HR


 ORAL


   3/19/18 14:00


 3/26/18 13:59  3/19/18 21:10


 


 


 Morphine Sulfate


  (Morphine


 Sulfate)  2 mg  EVERY 4 HOURS  PRN


 IVP


 severe pain 7-10  3/16/18 19:36


 3/22/18 19:35  3/17/18 14:16


 


 


 Nitroglycerin


  (Ntg)  0.4 mg  Q5M X 3 DOSES PRN


 SL


 Prn Chest Pain  3/16/18 19:15


 4/14/18 22:29   


 


 


 Ondansetron HCl


  (Zofran)  4 mg  Q6H  PRN


 IVP


 Nausea & Vomiting  3/16/18 19:36


 4/14/18 19:35   


 


 


 Promethazine HCl/


 Codeine


  (Phenergan with


 Codeine)  5 ml  EVERY 6 HOURS  PRN


 ORAL


 cough  3/16/18 19:36


 4/15/18 19:35   


 


 


 Temazepam


  (Restoril)  15 mg  HSPRN  PRN


 ORAL


 Insomnia  3/16/18 19:36


 3/22/18 19:35  3/19/18 21:10


 


 


 Theophylline


  (Shiv-Dur)  100 mg  EVERY 12  HOURS


 ORAL


   3/16/18 21:00


 4/15/18 08:59  3/19/18 21:10


 


 


 Topiramate


  (Topamax)  25 mg  Q12HR


 ORAL


   3/17/18 09:00


 4/16/18 08:59  3/19/18 21:10


 


 


 Trazodone HCl


  (Desyrel)  150 mg  BEDTIME


 ORAL


   3/16/18 21:00


 4/15/18 20:59  3/19/18 21:09


 

















Blaze Fraga MD Mar 19, 2018 22:22

## 2018-03-20 VITALS — DIASTOLIC BLOOD PRESSURE: 70 MMHG | SYSTOLIC BLOOD PRESSURE: 104 MMHG

## 2018-03-20 VITALS — DIASTOLIC BLOOD PRESSURE: 83 MMHG | SYSTOLIC BLOOD PRESSURE: 142 MMHG

## 2018-03-20 VITALS — DIASTOLIC BLOOD PRESSURE: 83 MMHG | SYSTOLIC BLOOD PRESSURE: 132 MMHG

## 2018-03-20 VITALS — SYSTOLIC BLOOD PRESSURE: 103 MMHG | DIASTOLIC BLOOD PRESSURE: 61 MMHG

## 2018-03-20 VITALS — DIASTOLIC BLOOD PRESSURE: 71 MMHG | SYSTOLIC BLOOD PRESSURE: 105 MMHG

## 2018-03-20 VITALS — SYSTOLIC BLOOD PRESSURE: 111 MMHG | DIASTOLIC BLOOD PRESSURE: 78 MMHG

## 2018-03-20 LAB
ADD MANUAL DIFF: NO
BASOPHILS NFR BLD AUTO: 0.4 % (ref 0–2)
EOSINOPHIL NFR BLD AUTO: 0.7 % (ref 0–3)
ERYTHROCYTE [DISTWIDTH] IN BLOOD BY AUTOMATED COUNT: 16.2 % (ref 11.6–14.8)
HCT VFR BLD CALC: 41.2 % (ref 37–47)
HGB BLD-MCNC: 13.2 G/DL (ref 12–16)
LYMPHOCYTES NFR BLD AUTO: 23.9 % (ref 20–45)
MCV RBC AUTO: 94 FL (ref 80–99)
MONOCYTES NFR BLD AUTO: 10.1 % (ref 1–10)
NEUTROPHILS NFR BLD AUTO: 65 % (ref 45–75)
PLATELET # BLD: 398 K/UL (ref 150–450)
RBC # BLD AUTO: 4.4 M/UL (ref 4.2–5.4)
WBC # BLD AUTO: 9.2 K/UL (ref 4.8–10.8)

## 2018-03-20 RX ADMIN — CHLORHEXIDINE GLUCONATE SCH APPLIC: 213 SOLUTION TOPICAL at 00:15

## 2018-03-20 RX ADMIN — METRONIDAZOLE SCH MG: 500 TABLET ORAL at 13:18

## 2018-03-20 RX ADMIN — DULOXETINE HYDROCHLORIDE SCH MG: 30 CAPSULE, DELAYED RELEASE ORAL at 09:19

## 2018-03-20 RX ADMIN — METRONIDAZOLE SCH MG: 500 TABLET ORAL at 22:33

## 2018-03-20 RX ADMIN — CHLORHEXIDINE GLUCONATE SCH APPLIC: 213 SOLUTION TOPICAL at 23:23

## 2018-03-20 RX ADMIN — METRONIDAZOLE SCH MG: 500 TABLET ORAL at 05:16

## 2018-03-20 RX ADMIN — THEOPHYLLINE ANHYDROUS SCH MG: 100 CAPSULE, EXTENDED RELEASE ORAL at 09:19

## 2018-03-20 RX ADMIN — TRAZODONE HYDROCHLORIDE SCH MG: 100 TABLET ORAL at 20:53

## 2018-03-20 RX ADMIN — TOPIRAMATE SCH MG: 25 TABLET, COATED ORAL at 09:19

## 2018-03-20 RX ADMIN — FLUCONAZOLE SCH MG: 100 TABLET ORAL at 17:07

## 2018-03-20 RX ADMIN — ATORVASTATIN CALCIUM SCH MG: 20 TABLET, FILM COATED ORAL at 20:53

## 2018-03-20 RX ADMIN — SODIUM CHLORIDE SCH MLS/HR: 0.9 INJECTION INTRAVENOUS at 14:08

## 2018-03-20 RX ADMIN — THEOPHYLLINE ANHYDROUS SCH MG: 100 CAPSULE, EXTENDED RELEASE ORAL at 20:53

## 2018-03-20 RX ADMIN — HEPARIN SODIUM SCH UNITS: 5000 INJECTION INTRAVENOUS; SUBCUTANEOUS at 09:20

## 2018-03-20 RX ADMIN — TOPIRAMATE SCH MG: 25 TABLET, COATED ORAL at 20:53

## 2018-03-20 RX ADMIN — HEPARIN SODIUM SCH UNITS: 5000 INJECTION INTRAVENOUS; SUBCUTANEOUS at 20:57

## 2018-03-20 NOTE — PULMONOLOGY PROGRESS NOTE
Assessment/Plan


Problems:  


(1) Bacteremia


(2) Gram-negative bacteremia


(3) PNA (pneumonia)


(4) IBD (inflammatory bowel disease)


(5) Asthma


(6) Opioid dependence


(7) COPD exacerbation


(8) Elevated CEA


Assessment/Plan


improving clinically, WBC wnl





continue abx


dc steroids


chest pt


abx as per ID d/w Dr Garcia


symptomatic treatment


pain management.





Subjective


ROS Limited/Unobtainable:  No


Interval Events:  feeling better


Allergies:  


Coded Allergies:  


     No Known Allergies (Verified , 10/27/06)





Objective





Last 24 Hour Vital Signs








  Date Time  Temp Pulse Resp B/P (MAP) Pulse Ox O2 Delivery O2 Flow Rate FiO2


 


3/20/18 19:28 97.5 79 20 142/83 100 Room Air  





 97.5       


 


3/20/18 19:12  84 16   Room Air  21


 


3/20/18 15:49 98.2 86 18 111/78 100   





 98.2       


 


3/20/18 12:00 96.8 79 18 105/71 95   





 96.8       


 


3/20/18 08:00 97.1 83 14 103/61 96   





 97.1       


 


3/20/18 07:52  81 16   Room Air  21


 


3/20/18 04:30 97.4 67 20 132/83 97 Room Air  





 97.4       


 


3/20/18 00:16 97.5 80 20 104/70 100 Room Air  





 97.5       


 


3/19/18 21:00 96.8 70 20 103/67 96 Room Air  





 96.8       


 


3/19/18 20:02 96.8 70 20 95/69 95 Room Air  





 96.8       

















Intake and Output  


 


 3/19/18 3/20/18





 19:00 07:00


 


Intake Total 810 ml 240 ml


 


Balance 810 ml 240 ml


 


  


 


Intake Oral 360 ml 240 ml


 


IV Total 450 ml 


 


# Voids  1


 


# Bowel Movements  1








Objective


General Appearance:  WD/WN, no apparent distress


Lines, tubes and drains:  peripheral


HEENT:  normocephalic, anicteric


Neck:  non-tender, normal alignment


Respiratory/Chest:  chest wall non-tender, + rhonchi


Cardiovascular/Chest:  normal peripheral pulses


Abdomen:  normal bowel sounds


EXt: no C/C/E





Microbiology








 Date/Time


Source Procedure


Growth Status


 


 


 3/17/18 21:39


Sputum Gram Stain - Final Complete


 


 3/17/18 21:39 Sputum Culture - Final


Candida Albicans Complete


 


 3/18/18 05:54


Catheter Site Catheter Tip Culture - Preliminary


NO GROWTH Resulted








Laboratory Tests


3/20/18 06:05: 


White Blood Count 9.2, Red Blood Count 4.40, Hemoglobin 13.2, Hematocrit 41.2, 

Mean Corpuscular Volume 94, Mean Corpuscular Hemoglobin 30.0, Mean Corpuscular 

Hemoglobin Concent 32.0, Red Cell Distribution Width 16.2H, Platelet Count 398, 

Mean Platelet Volume 7.4, Neutrophils (%) (Auto) 65.0, Lymphocytes (%) (Auto) 

23.9, Monocytes (%) (Auto) 10.1H, Eosinophils (%) (Auto) 0.7, Basophils (%) (

Auto) 0.4





Current Medications








 Medications


  (Trade)  Dose


 Ordered  Sig/Jed


 Route


 PRN Reason  Start Time


 Stop Time Status Last Admin


Dose Admin


 


 Albuterol/


 Ipratropium


  (Albuterol/


 Ipratropium)  3 ml  EVERY 4 HOURS  PRN


 HHN


 dyspnea  3/16/18 19:34


 3/20/18 19:33   


 


 


 Atorvastatin


 Calcium


  (Lipitor)  40 mg  BEDTIME


 ORAL


   3/16/18 21:00


 4/15/18 20:59  3/19/18 21:09


 


 


 Bupropion HCl


  (Wellbutrin)  100 mg  DAILY


 ORAL


   3/17/18 09:00


 4/15/18 08:59  3/20/18 09:18


 


 


 Ceftriaxone


 Sodium 2 gm/


 Sodium Chloride  55 ml @ 


 110 mls/hr  Q24H


 IVPB


   3/19/18 14:30


 3/26/18 14:29  3/20/18 14:08


 


 


 Chlorhexidine


 Gluconate


  (Rosy-Hex 2%)  1 applic  Q24H


 TOPIC


   3/18/18 00:15


 4/17/18 00:14  3/18/18 00:54


 


 


 Dextrose


  (Dextrose 50%)    STAT  PRN


 IV


 Hypoglycemia  3/16/18 19:34


 4/14/18 19:33   


 


 


 Duloxetine HCl


  (Cymbalta)  60 mg  DAILY


 ORAL


   3/17/18 09:00


 4/15/18 08:59  3/20/18 09:19


 


 


 Fluconazole


  (Diflucan)  200 mg  Q24H


 ORAL


   3/20/18 18:00


 3/27/18 17:59  3/20/18 17:07


 


 


 Heparin Sodium


  (Porcine)


  (Heparin 5000


 units/ml)  5,000 units  EVERY 12  HOURS


 SUBQ


   3/16/18 21:00


 4/15/18 08:59  3/20/18 09:20


 


 


 Levetiracetam


  (Keppra)  1,000 mg  Q8HR


 ORAL


   3/17/18 06:00


 4/16/18 05:59  3/20/18 13:18


 


 


 Levothyroxine


 Sodium


  (Synthroid)  75 mcg  ACBREAKFAST


 ORAL


   3/17/18 06:30


 4/15/18 06:29  3/20/18 05:16


 


 


 Loperamide HCl


  (Imodium)  2 mg  Q4H  PRN


 ORAL


 Diarrhea  3/16/18 19:35


 4/15/18 19:34   


 


 


 Lorazepam


  (Ativan 2mg/ml


 1ml)  0.5 mg  Q4H  PRN


 IV


 For Anxiety  3/16/18 19:35


 3/22/18 19:34   


 


 


 Mesalamine


  (Asacol)  800 mg  THREE TIMES A  DAY


 ORAL


   3/17/18 09:00


 4/15/18 17:59  3/20/18 17:07


 


 


 Metronidazole


  (Flagyl)  500 mg  Q8HR


 ORAL


   3/19/18 14:00


 3/26/18 13:59  3/20/18 13:18


 


 


 Morphine Sulfate


  (Morphine


 Sulfate)  2 mg  EVERY 4 HOURS  PRN


 IVP


 severe pain 7-10  3/16/18 19:36


 3/22/18 19:35  3/17/18 14:16


 


 


 Nitroglycerin


  (Ntg)  0.4 mg  Q5M X 3 DOSES PRN


 SL


 Prn Chest Pain  3/16/18 19:15


 4/14/18 22:29   


 


 


 Ondansetron HCl


  (Zofran)  4 mg  Q6H  PRN


 IVP


 Nausea & Vomiting  3/16/18 19:36


 4/14/18 19:35  3/20/18 04:27


 


 


 Promethazine HCl/


 Codeine


  (Phenergan with


 Codeine)  5 ml  EVERY 6 HOURS  PRN


 ORAL


 cough  3/16/18 19:36


 4/15/18 19:35   


 


 


 Temazepam


  (Restoril)  15 mg  HSPRN  PRN


 ORAL


 Insomnia  3/16/18 19:36


 3/22/18 19:35  3/19/18 21:10


 


 


 Theophylline


  (Shiv-Dur)  100 mg  EVERY 12  HOURS


 ORAL


   3/16/18 21:00


 4/15/18 08:59  3/20/18 09:19


 


 


 Topiramate


  (Topamax)  25 mg  Q12HR


 ORAL


   3/17/18 09:00


 4/16/18 08:59  3/20/18 09:19


 


 


 Trazodone HCl


  (Desyrel)  150 mg  BEDTIME


 ORAL


   3/16/18 21:00


 4/15/18 20:59  3/19/18 21:09


 

















Blaze Fraga MD Mar 20, 2018 19:31

## 2018-03-20 NOTE — GI PROGRESS NOTE
Assessment/Plan


Problems:  


(1) Nausea and vomiting


(2) Constipation


(3) Chronic abdominal pain


(4) Abdominal pain


(5) Colonoscopy planned


ICD Codes:  CTF6293 - Reserved for non-ICD-10-CM codable problem concepts


SNOMED:  902951592


(6) Chronic back pain


ICD Codes:  G89.29 - Other chronic pain; M54.9 - Chronic back pain


SNOMED:  254149080


(7) Opioid dependence


ICD Codes:  F11.20 - Opioid dependence


SNOMED:  17806653


(8) Therapeutic opioid-induced constipation (OIC)


ICD Codes:  K59.03 - Drug induced constipation; T40.2X5A - Adverse effect of 

other opioids, initial encounter


SNOMED:  407026262503418


Status:  stable


Status Narrative


Discussed with Dr. Mcleod.


Assessment/Plan


EGD/colonoscopy SUMMARY OF FINDINGS:


1. Possible Candida esophagitis, status post brush biopsy.


2. Gastritis.


3. Small hiatal hernia.


4. Short segment of colon left.


5. A 1-cm anastomotic ulcer, status post biopsy.





RECOMMENDATIONS:


okay for DC per GI standpoint


Follow brush biopsy results and treat for Candida if it is positive.


Resume diet. 


outpatient capsule endoscopy





Subjective


Subjective


better





Objective





Last 24 Hour Vital Signs








  Date Time  Temp Pulse Resp B/P (MAP) Pulse Ox O2 Delivery O2 Flow Rate FiO2


 


3/20/18 08:00 97.1 83 14 103/61 96   





 97.1       


 


3/20/18 07:52  81 16   Room Air  21


 


3/20/18 04:30 97.4 67 20 132/83 97 Room Air  





 97.4       


 


3/20/18 00:16 97.5 80 20 104/70 100 Room Air  





 97.5       


 


3/19/18 21:00 96.8 70 20 103/67 96 Room Air  





 96.8       


 


3/19/18 20:02 96.8 70 20 95/69 95 Room Air  





 96.8       


 


3/19/18 18:50  73 16   Room Air  21


 


3/19/18 15:54 97.9 78 19 104/68 99   





 97.9       


 


3/19/18 13:55 98.6 76 15 103/69 100 Nasal Cannula 3.0 





 98.6       


 


3/19/18 13:40  77 16 103/62 99 Nasal Cannula 3.0 


 


3/19/18 13:28 208.8 70 18  100   


 


3/19/18 13:27 208.8 66 18  99   


 


3/19/18 13:25  74 14 104/70 99 Nasal Cannula 3.0 


 


3/19/18 13:15  71 15 106/73 99 Nasal Cannula 3.0 


 


3/19/18 13:10  68 13 108/74 99 Nasal Cannula 3.0 


 


3/19/18 13:06 98.2 67 19 102/72 99 Nasal Cannula 3.0 





 98.2       


 


3/19/18 12:00 97.3 83 18 106/70 99   





 97.3       

















Intake and Output  


 


 3/19/18 3/20/18





 19:00 07:00


 


Intake Total 810 ml 240 ml


 


Balance 810 ml 240 ml


 


  


 


Intake Oral 360 ml 240 ml


 


IV Total 450 ml 


 


# Voids  1


 


# Bowel Movements  1











Laboratory Tests








Test


  3/20/18


06:05


 


White Blood Count


  9.2 K/UL


(4.8-10.8)


 


Red Blood Count


  4.40 M/UL


(4.20-5.40)


 


Hemoglobin


  13.2 G/DL


(12.0-16.0)


 


Hematocrit


  41.2 %


(37.0-47.0)


 


Mean Corpuscular Volume 94 FL (80-99)  


 


Mean Corpuscular Hemoglobin


  30.0 PG


(27.0-31.0)


 


Mean Corpuscular Hemoglobin


Concent 32.0 G/DL


(32.0-36.0)


 


Red Cell Distribution Width


  16.2 %


(11.6-14.8)  H


 


Platelet Count


  398 K/UL


(150-450)


 


Mean Platelet Volume


  7.4 FL


(6.5-10.1)


 


Neutrophils (%) (Auto)


  65.0 %


(45.0-75.0)


 


Lymphocytes (%) (Auto)


  23.9 %


(20.0-45.0)


 


Monocytes (%) (Auto)


  10.1 %


(1.0-10.0)  H


 


Eosinophils (%) (Auto)


  0.7 %


(0.0-3.0)


 


Basophils (%) (Auto)


  0.4 %


(0.0-2.0)








Height (Feet):  5


Height (Inches):  7.00


Weight (Pounds):  140


General Appearance:  WD/WN, no apparent distress, alert, thin


Cardiovascular:  normal rate


Respiratory/Chest:  normal breath sounds, no respiratory distress


Abdominal Exam:  normal bowel sounds, non tender, soft


Extremities:  normal range of motion, non-tender











Elena Seay N.P. Mar 20, 2018 11:09

## 2018-03-20 NOTE — PROGRESS NOTE
DATE:  03/20/2018



SUBJECTIVE:  The patient is a 62-year-old female patient with COPD.  She

continues to have some mood lability, agitation, secondary to stress of

her medical illness.  She has some mood lability, confusion, _____00:17

daily psychiatric consultation has been requested by her attending

physician.  She has _____00:24 COPD.



MENTAL STATUS EXAMINATION:  This is a 62-year-old female with psychomotor

retardation.  Mood is depressed.  Affect is guarded and restricted.

Thought process is linear and goal directed.  Denies auditory or visual

hallucinations.  Denies any delusional thoughts.  Denies any current

suicidal or homicidal thoughts.  Insight and judgment is poor.



DIAGNOSIS:  Bipolar 2.



PLAN:  Continue titrating up on her psychotropic medications to stabilize

her mood.  An 18 to 20 minutes of supportive therapy was provided.

Encouraged her to interact appropriately with staff.  Chart reviewed.

Discussed with staff.  Seen and assessed at bedside.  _____01:08.









  ______________________________________________

  Samantha Rick M.D.





DR:  CHERYL

D:  03/20/2018 04:10

T:  03/20/2018 15:25

JOB#:  [id number]

CC:

## 2018-03-20 NOTE — GENERAL PROGRESS NOTE
Assessment/Plan


Assessment/Plan


(1) Lumbar spondylosis


(2) Chronic abdominal pain


(3) Radiculopathy of lumbar region


(4) Herniated nucleus pulposus, lumbar


(5) Chronic pancreatitis


(6) Chronic pain


Pt will be continued on Morphine and pt has intrathecal pump.


Pt was d/w Dr. Shetty and he concurred.





Subjective


Date patient seen:  Mar 20, 2018


Time patient seen:  07:15 - am


Allergies:  


Coded Allergies:  


     No Known Allergies (Verified , 10/27/06)


Subjective


Constitutional:  Denies: chills, diaphoresis, fever, malaise, no symptoms, other

, Reports: weakness


HEENT:  Denies: blurred vision, double vision, ear discharge, ear pain, eye pain

, mouth pain, mouth swelling, no symptoms, nose congestion, nose pain, other, 

tearing, throat pain, throat swelling


Cardiovascular:  Denies: chest pain, edema, irregular heart rate, 

lightheadedness, no symptoms, other, palpitations, syncope


Respiratory:  Denies: SOB at rest, SOB with excertion, cough, no symptoms, 

orthopnea, other, shortness of breath, sputum, stridor, wheezing


Gastrointestinal/Abdominal:  Denies: abdomen distended, black stools, blood in 

stool, constipated, diarrhea, difficulty swallowing, nausea, no symptoms, other

, poor appetite, poor fluid intake, rectal bleeding, tarry stools, vomiting, 

Reports: abdominal pain


Genitourinary:  Denies: burning, discharge, flank pain, frequency, hematuria, 

incontinence, no symptoms, other, pain, urgency


Neurologic/Psychiatric:  Denies: anxiety, depressed, emotional problems, 

headache, no symptoms, numbness, other, paresthesia, pre-existing deficit, 

seizure, tingling, tremors, weakness


Endocrine:  Denies: excessive sweating, flushing, increased hunger, increased 

thirst, increased urine, intolerance to cold, intolerance to heat, no symptoms, 

other, unexplained weight gain, unexplained weight loss


Hematologic/Lymphatic:  Denies: anemia, easy bleeding, easy bruising, no 

symptoms, other





Subjective


Patient is in bed and shows no signs of pain at this time. Pain has been 

controlled with the intrathecal pump.





Objective





Last 24 Hour Vital Signs








  Date Time  Temp Pulse Resp B/P (MAP) Pulse Ox O2 Delivery O2 Flow Rate FiO2


 


3/20/18 07:52  81 16   Room Air  21


 


3/20/18 04:30 97.4 67 20 132/83 97 Room Air  





 97.4       


 


3/20/18 00:16 97.5 80 20 104/70 100 Room Air  





 97.5       


 


3/19/18 21:00 96.8 70 20 103/67 96 Room Air  





 96.8       


 


3/19/18 20:02 96.8 70 20 95/69 95 Room Air  





 96.8       


 


3/19/18 18:50  73 16   Room Air  21


 


3/19/18 15:54 97.9 78 19 104/68 99   





 97.9       


 


3/19/18 13:55 98.6 76 15 103/69 100 Nasal Cannula 3.0 





 98.6       


 


3/19/18 13:40  77 16 103/62 99 Nasal Cannula 3.0 


 


3/19/18 13:28 208.8 70 18  100   


 


3/19/18 13:27 208.8 66 18  99   


 


3/19/18 13:25  74 14 104/70 99 Nasal Cannula 3.0 


 


3/19/18 13:15  71 15 106/73 99 Nasal Cannula 3.0 


 


3/19/18 13:10  68 13 108/74 99 Nasal Cannula 3.0 


 


3/19/18 13:06 98.2 67 19 102/72 99 Nasal Cannula 3.0 





 98.2       


 


3/19/18 12:00 97.3 83 18 106/70 99   





 97.3       

















Intake and Output  


 


 3/19/18 3/20/18





 19:00 07:00


 


Intake Total 810 ml 240 ml


 


Balance 810 ml 240 ml


 


  


 


Intake Oral 360 ml 240 ml


 


IV Total 450 ml 


 


# Voids  1


 


# Bowel Movements  1








Laboratory Tests


3/20/18 06:05: 


White Blood Count 9.2, Red Blood Count 4.40, Hemoglobin 13.2, Hematocrit 41.2, 

Mean Corpuscular Volume 94, Mean Corpuscular Hemoglobin 30.0, Mean Corpuscular 

Hemoglobin Concent 32.0, Red Cell Distribution Width 16.2H, Platelet Count 398, 

Mean Platelet Volume 7.4, Neutrophils (%) (Auto) 65.0, Lymphocytes (%) (Auto) 

23.9, Monocytes (%) (Auto) 10.1H, Eosinophils (%) (Auto) 0.7, Basophils (%) (

Auto) 0.4


Height (Feet):  5


Height (Inches):  7.00


Weight (Pounds):  140


Objective


General Appearance:  no apparent distress, alert


EENT:  normal ENT inspection, TMs normal


Neck:  normal alignment, supple


Cardiovascular:  normal rate, regular rhythm


Respiratory/Chest:  decreased breath sounds


Abdomen:  tender, soft


Extremities:  non-tender


Edema:  no edema noted Arm (L), no edema noted Arm (R), no edema noted Leg (L), 

no edema noted Leg (R), no edema noted Pedal (L), no edema noted Pedal (R), no 

edema noted Generalized


Neurologic:  alert, oriented x 3


Skin:  warm/dry











LEA BRAVO Mar 20, 2018 08:45

## 2018-03-20 NOTE — CARDIOLOGY REPORT
--------------- APPROVED REPORT --------------





EKG Measurement

Heart [id number]

GA [id number]

[id number]

[id number]

[id number]





Normal sinus rhythm

Normal ECG

## 2018-03-20 NOTE — INFECTIOUS DISEASES PROG NOTE
Assessment/Plan


Assessment/Plan








Sepsis , improving


Persistent Gram negative bacteremia- likely from GI source- r/o Crohn's flare, 

colitis, - r/o PICC line infection


 -3/15 Bcx 4/4 Enterobacter cloacae complex (R ancef, otherwise S); 3/17 Bcx 1/

4 GNRs; Bcx 3/19 pending


 --s/p PICC line removal 3/18, cath tip cx NTD


 -s/p EGD/Colonoscopy 3/19: 


1. Possible Candida esophagitis, status post brush biopsy.


2. Gastritis.


3. Small hiatal hernia.


4. Short segment of colon left.


5. A 1-cm anastomotic ulcer, status post biopsy.





  -CT abd/p w/: Limited assessment of the GI tract, due to lack of enteric 

contrast administration. Evidence of extensive prior bowel surgery, with 

evidence of resection of much of the


colon, enterocolic and entero-enteral anastomoses. Dilated right upper quadrant 

small bowel loops. Most likely on the basis of disordered motility related to 

the prior surgery, particularly given similarity to the previous exam. However, 

given evidence of nondilated small bowel loops elsewhere, the possibility of 

small bowel obstruction should be considered. Gas bubbles adjacent to the 

gallbladder fossa. Probably within collapsed adjacent small bowel, but the 

possibility that these represent extraluminal gas bubbles and which would 

therefore indicate bowel perforation should also be considered. Considerable 

fluid within the residual colon. Per discussion with referring physician, 

patient has recent history of diarrhea. Findings are certainly concordant with 

that. Subcentimeter low-attenuation renal lesions, too small to characterize, 

most likely benign simple cysts.  Right posterior 11th rib fracture, appears 

acute. Bilateral basilar pulmonary fibrotic changes, also previously 

demonstrated








Leukocytosis  ( on steroids ) -resolved


  -afebrile


  -CXR 3/15: Mild interstitial prominence, similar to the previous study and 

likely reflecting fibrotic changes demonstrated on a CT scan of June 2014. No 

definite acute infiltrate; sp cx NTD


   -u/a WBC 5-10, nit neg, leuk +1





Hx of Cdiff


  -Cdiff 3/15 neg








Crohn's disease w/ multiple abd surgeries


COPD/asthma


on B-12 injections via PICC line


 CAD/MI


chronic pain syndrome on morphine pump


CVA 2005


seizure disorder








Plan:


-Continue Ceftriaxone 2 g daily abx d#5 for enterobacter bacteremia and PO 

Flagyl abx d# 5 for anaerobic coverage


   3/19 SP ZOsyn #4


   SP oral Vanco d# 2


  


-Repeat 2 sets of Bcx- if persistent bacteremia after PICC line removal then 

will need to obtain MRI lumbar spine to r/o intrathecal abscess- at current low 

suspicion as contrast CT abd/p did not showed abnormality in that area


  -if MRI done, will need [employer] tech to come after and reset pump


  -duration of abx will be guided by repeat Bcx, if negative will treat for 10 

days from 1st neg Bcx; upon discharge a PO regimen with Cipro and Flagyl can be 

done





-await repeat Bcx 3/19 prior to discharge to ensure clearance of bacteremia





-f/u cx


-Monitor CBC/BMP, temperatures


-appreciate GI and surgery input





Subjective


Allergies:  


Coded Allergies:  


     No Known Allergies (Verified , 10/27/06)





Objective


Vital Signs





Last 24 Hour Vital Signs








  Date Time  Temp Pulse Resp B/P (MAP) Pulse Ox O2 Delivery O2 Flow Rate FiO2


 


3/20/18 08:00 97.1 83 14 103/61 96   





 97.1       


 


3/20/18 07:52  81 16   Room Air  21


 


3/20/18 04:30 97.4 67 20 132/83 97 Room Air  





 97.4       


 


3/20/18 00:16 97.5 80 20 104/70 100 Room Air  





 97.5       


 


3/19/18 21:00 96.8 70 20 103/67 96 Room Air  





 96.8       


 


3/19/18 20:02 96.8 70 20 95/69 95 Room Air  





 96.8       


 


3/19/18 18:50  73 16   Room Air  21


 


3/19/18 15:54 97.9 78 19 104/68 99   





 97.9       


 


3/19/18 13:55 98.6 76 15 103/69 100 Nasal Cannula 3.0 





 98.6       


 


3/19/18 13:40  77 16 103/62 99 Nasal Cannula 3.0 


 


3/19/18 13:28 208.8 70 18  100   


 


3/19/18 13:27 208.8 66 18  99   


 


3/19/18 13:25  74 14 104/70 99 Nasal Cannula 3.0 


 


3/19/18 13:15  71 15 106/73 99 Nasal Cannula 3.0 


 


3/19/18 13:10  68 13 108/74 99 Nasal Cannula 3.0 


 


3/19/18 13:06 98.2 67 19 102/72 99 Nasal Cannula 3.0 





 98.2       


 


3/19/18 12:00 97.3 83 18 106/70 99   





 97.3       








Height (Feet):  5


Height (Inches):  7.00


Weight (Pounds):  140


Objective


General Appearance:  alert, mild distress


HEENT:  normocephalic, normal pharynx,moist mucus membranes


Neck:  normal inspection, full range of motion, supple


Respiratory:  respiratory distress, speaking full sentences, wheezing, 

expiration


Cardiovascular :  normal inspection, regular rate, rhythm, normal capillary 

refill


Gastrointestinal:  other - Morphine pump palpated in abdomen, well-healed 

surgical scars on abdomen, abdomen is slightly distended, generalized tenderness

, normal bowel sounds


Musculoskeletal:  normal inspection, back normal, normal range of motion, non-

tender


Neurologic:  normal inspection, alert, oriented x3, responsive, motor strength/

tone normal, sensory intact, normal gait, speech normal


Skin:  normal inspection, normal color, no rash, warm/dry, well hydrated, 

normal turgor





Microbiology








 Date/Time


Source Procedure


Growth Status


 


 


 3/17/18 21:39


Sputum Gram Stain - Final Complete


 


 3/17/18 21:39 Sputum Culture - Final


Candida Albicans Complete


 


 3/18/18 05:54


Catheter Site Catheter Tip Culture - Preliminary


NO GROWTH Resulted











Laboratory Tests








Test


  3/20/18


06:05


 


White Blood Count


  9.2 K/UL


(4.8-10.8)


 


Red Blood Count


  4.40 M/UL


(4.20-5.40)


 


Hemoglobin


  13.2 G/DL


(12.0-16.0)


 


Hematocrit


  41.2 %


(37.0-47.0)


 


Mean Corpuscular Volume 94 FL (80-99)  


 


Mean Corpuscular Hemoglobin


  30.0 PG


(27.0-31.0)


 


Mean Corpuscular Hemoglobin


Concent 32.0 G/DL


(32.0-36.0)


 


Red Cell Distribution Width


  16.2 %


(11.6-14.8)  H


 


Platelet Count


  398 K/UL


(150-450)


 


Mean Platelet Volume


  7.4 FL


(6.5-10.1)


 


Neutrophils (%) (Auto)


  65.0 %


(45.0-75.0)


 


Lymphocytes (%) (Auto)


  23.9 %


(20.0-45.0)


 


Monocytes (%) (Auto)


  10.1 %


(1.0-10.0)  H


 


Eosinophils (%) (Auto)


  0.7 %


(0.0-3.0)


 


Basophils (%) (Auto)


  0.4 %


(0.0-2.0)











Current Medications








 Medications


  (Trade)  Dose


 Ordered  Sig/Jed


 Route


 PRN Reason  Start Time


 Stop Time Status Last Admin


Dose Admin


 


 Albuterol/


 Ipratropium


  (Albuterol/


 Ipratropium)  3 ml  EVERY 4 HOURS  PRN


 HHN


 dyspnea  3/16/18 19:34


 3/20/18 19:33   


 


 


 Atorvastatin


 Calcium


  (Lipitor)  40 mg  BEDTIME


 ORAL


   3/16/18 21:00


 4/15/18 20:59  3/19/18 21:09


 


 


 Bupropion HCl


  (Wellbutrin)  100 mg  DAILY


 ORAL


   3/17/18 09:00


 4/15/18 08:59  3/20/18 09:18


 


 


 Ceftriaxone


 Sodium 2 gm/


 Sodium Chloride  55 ml @ 


 110 mls/hr  Q24H


 IVPB


   3/19/18 14:30


 3/26/18 14:29  3/19/18 14:29


 


 


 Chlorhexidine


 Gluconate


  (Rosy-Hex 2%)  1 applic  Q24H


 TOPIC


   3/18/18 00:15


 4/17/18 00:14  3/18/18 00:54


 


 


 Dextrose


  (Dextrose 50%)    STAT  PRN


 IV


 Hypoglycemia  3/16/18 19:34


 4/14/18 19:33   


 


 


 Duloxetine HCl


  (Cymbalta)  60 mg  DAILY


 ORAL


   3/17/18 09:00


 4/15/18 08:59  3/20/18 09:19


 


 


 Fluconazole


  (Diflucan)  200 mg  Q24H


 ORAL


   3/20/18 18:00


 3/27/18 17:59   


 


 


 Heparin Sodium


  (Porcine)


  (Heparin 5000


 units/ml)  5,000 units  EVERY 12  HOURS


 SUBQ


   3/16/18 21:00


 4/15/18 08:59  3/20/18 09:20


 


 


 Levetiracetam


  (Keppra)  1,000 mg  Q8HR


 ORAL


   3/17/18 06:00


 4/16/18 05:59  3/20/18 05:16


 


 


 Levothyroxine


 Sodium


  (Synthroid)  75 mcg  ACBREAKFAST


 ORAL


   3/17/18 06:30


 4/15/18 06:29  3/20/18 05:16


 


 


 Loperamide HCl


  (Imodium)  2 mg  Q4H  PRN


 ORAL


 Diarrhea  3/16/18 19:35


 4/15/18 19:34   


 


 


 Lorazepam


  (Ativan 2mg/ml


 1ml)  0.5 mg  Q4H  PRN


 IV


 For Anxiety  3/16/18 19:35


 3/22/18 19:34   


 


 


 Mesalamine


  (Asacol)  800 mg  THREE TIMES A  DAY


 ORAL


   3/17/18 09:00


 4/15/18 17:59  3/20/18 09:19


 


 


 Metronidazole


  (Flagyl)  500 mg  Q8HR


 ORAL


   3/19/18 14:00


 3/26/18 13:59  3/20/18 05:16


 


 


 Morphine Sulfate


  (Morphine


 Sulfate)  2 mg  EVERY 4 HOURS  PRN


 IVP


 severe pain 7-10  3/16/18 19:36


 3/22/18 19:35  3/17/18 14:16


 


 


 Nitroglycerin


  (Ntg)  0.4 mg  Q5M X 3 DOSES PRN


 SL


 Prn Chest Pain  3/16/18 19:15


 4/14/18 22:29   


 


 


 Ondansetron HCl


  (Zofran)  4 mg  Q6H  PRN


 IVP


 Nausea & Vomiting  3/16/18 19:36


 4/14/18 19:35  3/20/18 04:27


 


 


 Promethazine HCl/


 Codeine


  (Phenergan with


 Codeine)  5 ml  EVERY 6 HOURS  PRN


 ORAL


 cough  3/16/18 19:36


 4/15/18 19:35   


 


 


 Temazepam


  (Restoril)  15 mg  HSPRN  PRN


 ORAL


 Insomnia  3/16/18 19:36


 3/22/18 19:35  3/19/18 21:10


 


 


 Theophylline


  (Shiv-Dur)  100 mg  EVERY 12  HOURS


 ORAL


   3/16/18 21:00


 4/15/18 08:59  3/20/18 09:19


 


 


 Topiramate


  (Topamax)  25 mg  Q12HR


 ORAL


   3/17/18 09:00


 4/16/18 08:59  3/20/18 09:19


 


 


 Trazodone HCl


  (Desyrel)  150 mg  BEDTIME


 ORAL


   3/16/18 21:00


 4/15/18 20:59  3/19/18 21:09


 

















Selene Garcia M.D. Mar 20, 2018 10:16

## 2018-03-20 NOTE — GENERAL PROGRESS NOTE
Assessment/Plan


Problem List:  


(1) Opioid dependence


ICD Codes:  F11.20 - Opioid dependence


SNOMED:  88885375


(2) COPD (chronic obstructive pulmonary disease)


ICD Codes:  J44.9 - Chronic obstructive pulmonary disease, unspecified


SNOMED:  03275236


(3) Crohns disease


ICD Codes:  K50.90 - Crohns disease


SNOMED:  00198976


(4) Diarrhea


ICD Codes:  R19.7 - Diarrhea, unspecified


SNOMED:  49473727


(5) Shortness of breath


(6) Anemia


(7) Small bowel obstruction


ICD Codes:  K56.609 - Unspecified intestinal obstruction, unspecified as to 

partial versus complete obstruction


SNOMED:  973869969


(8) Dyspnea


(9) Chronic pain


ICD Codes:  G89.29 - Other chronic pain


SNOMED:  15569697


(10) Abdominal pain


Qualifiers:  


   Qualified Codes:  R10.84 - Generalized abdominal pain


Status:  stable, progressing, tolerating diet


Assessment/Plan


o2 pulm tx abx gi sx f/u dc if clear





Subjective


Constitutional:  Reports: weakness


Allergies:  


Coded Allergies:  


     No Known Allergies (Verified , 10/27/06)


All Systems:  reviewed and negative except above


Subjective


sleepy calm in bed





Objective





Last 24 Hour Vital Signs








  Date Time  Temp Pulse Resp B/P (MAP) Pulse Ox O2 Delivery O2 Flow Rate FiO2


 


3/20/18 12:00 96.8 79 18 105/71 95   





 96.8       


 


3/20/18 08:00 97.1 83 14 103/61 96   





 97.1       


 


3/20/18 07:52  81 16   Room Air  21


 


3/20/18 04:30 97.4 67 20 132/83 97 Room Air  





 97.4       


 


3/20/18 00:16 97.5 80 20 104/70 100 Room Air  





 97.5       


 


3/19/18 21:00 96.8 70 20 103/67 96 Room Air  





 96.8       


 


3/19/18 20:02 96.8 70 20 95/69 95 Room Air  





 96.8       


 


3/19/18 18:50  73 16   Room Air  21


 


3/19/18 15:54 97.9 78 19 104/68 99   





 97.9       

















Intake and Output  


 


 3/19/18 3/20/18





 19:00 07:00


 


Intake Total 810 ml 240 ml


 


Balance 810 ml 240 ml


 


  


 


Intake Oral 360 ml 240 ml


 


IV Total 450 ml 


 


# Voids  1


 


# Bowel Movements  1








Laboratory Tests


3/20/18 06:05: 


White Blood Count 9.2, Red Blood Count 4.40, Hemoglobin 13.2, Hematocrit 41.2, 

Mean Corpuscular Volume 94, Mean Corpuscular Hemoglobin 30.0, Mean Corpuscular 

Hemoglobin Concent 32.0, Red Cell Distribution Width 16.2H, Platelet Count 398, 

Mean Platelet Volume 7.4, Neutrophils (%) (Auto) 65.0, Lymphocytes (%) (Auto) 

23.9, Monocytes (%) (Auto) 10.1H, Eosinophils (%) (Auto) 0.7, Basophils (%) (

Auto) 0.4


Height (Feet):  5


Height (Inches):  7.00


Weight (Pounds):  140


General Appearance:  lethargic


EENT:  normal ENT inspection


Neck:  normal alignment


Cardiovascular:  normal peripheral pulses, normal rate, regular rhythm


Respiratory/Chest:  chest wall non-tender, lungs clear, normal breath sounds


Abdomen:  normal bowel sounds, non tender, soft


Extremities:  normal inspection


Edema:  no edema noted Arm (L), no edema noted Arm (R), no edema noted Leg (L), 

no edema noted Leg (R), no edema noted Pedal (L), no edema noted Pedal (R), no 

edema noted Generalized


Neurologic:  responsive, motor weakness


Skin:  normal pigmentation, warm/dry











MAICOL LANG Mar 20, 2018 14:27

## 2018-03-20 NOTE — GENERAL SURGERY PROGRESS NOTE
General Surgery-Progress Note


Subjective


Additional Comments


doing well.  no acute events.





Objective





Last 24 Hour Vital Signs








  Date Time  Temp Pulse Resp B/P (MAP) Pulse Ox O2 Delivery O2 Flow Rate FiO2


 


3/20/18 12:00 96.8 79 18 105/71 95   





 96.8       


 


3/20/18 08:00 97.1 83 14 103/61 96   





 97.1       


 


3/20/18 07:52  81 16   Room Air  21


 


3/20/18 04:30 97.4 67 20 132/83 97 Room Air  





 97.4       


 


3/20/18 00:16 97.5 80 20 104/70 100 Room Air  





 97.5       


 


3/19/18 21:00 96.8 70 20 103/67 96 Room Air  





 96.8       


 


3/19/18 20:02 96.8 70 20 95/69 95 Room Air  





 96.8       


 


3/19/18 18:50  73 16   Room Air  21


 


3/19/18 15:54 97.9 78 19 104/68 99   





 97.9       


 


3/19/18 13:55 98.6 76 15 103/69 100 Nasal Cannula 3.0 





 98.6       


 


3/19/18 13:40  77 16 103/62 99 Nasal Cannula 3.0 








I&O











Intake and Output  


 


 3/19/18 3/20/18





 19:00 07:00


 


Intake Total 810 ml 240 ml


 


Balance 810 ml 240 ml


 


  


 


Intake Oral 360 ml 240 ml


 


IV Total 450 ml 


 


# Voids  1


 


# Bowel Movements  1








Drains:  none


Cardiovascular:  RSR


Respiratory:  clear


Abdomen:  soft, flat, non-tender, present bowel sounds


Extremities:  no tenderness, no cyanosis





Laboratory Tests








Test


  3/20/18


06:05


 


White Blood Count


  9.2 K/UL


(4.8-10.8)


 


Red Blood Count


  4.40 M/UL


(4.20-5.40)


 


Hemoglobin


  13.2 G/DL


(12.0-16.0)


 


Hematocrit


  41.2 %


(37.0-47.0)


 


Mean Corpuscular Volume 94 FL (80-99)  


 


Mean Corpuscular Hemoglobin


  30.0 PG


(27.0-31.0)


 


Mean Corpuscular Hemoglobin


Concent 32.0 G/DL


(32.0-36.0)


 


Red Cell Distribution Width


  16.2 %


(11.6-14.8)  H


 


Platelet Count


  398 K/UL


(150-450)


 


Mean Platelet Volume


  7.4 FL


(6.5-10.1)


 


Neutrophils (%) (Auto)


  65.0 %


(45.0-75.0)


 


Lymphocytes (%) (Auto)


  23.9 %


(20.0-45.0)


 


Monocytes (%) (Auto)


  10.1 %


(1.0-10.0)  H


 


Eosinophils (%) (Auto)


  0.7 %


(0.0-3.0)


 


Basophils (%) (Auto)


  0.4 %


(0.0-2.0)











Plan


Problems:  


(1) Abdominal pain


Assessment & Plan:  62 year old female with pain everywhere as per her.  states 

has pain pump that is not functional.  pain from head to toes.  


on exam abd soft, non tender, prior incisions well healed, pain pump noted.  no 

rebound, no guarding.  


CT reviewed and has had fair amount of colon and some sb resections.  area of 

air noted around gallbladder and liver and agree with radiologist that likely 

collapsed small bowel.  they do not particularly appear extraluminal.  





afebrile, HD stable, abdominal exam benign.  leukocytosis resolved. 





-no acute surgical intervention necessary. 


-okay for diet


-Abx as per ID


-Okay to D/C from surgical standpoint 


thank you for this consultation.  will follow with recs. 














Darrius Benitez Mar 20, 2018 13:30

## 2018-03-21 VITALS — DIASTOLIC BLOOD PRESSURE: 96 MMHG | SYSTOLIC BLOOD PRESSURE: 147 MMHG

## 2018-03-21 VITALS — SYSTOLIC BLOOD PRESSURE: 122 MMHG | DIASTOLIC BLOOD PRESSURE: 79 MMHG

## 2018-03-21 VITALS — SYSTOLIC BLOOD PRESSURE: 134 MMHG | DIASTOLIC BLOOD PRESSURE: 85 MMHG

## 2018-03-21 VITALS — DIASTOLIC BLOOD PRESSURE: 82 MMHG | SYSTOLIC BLOOD PRESSURE: 138 MMHG

## 2018-03-21 VITALS — SYSTOLIC BLOOD PRESSURE: 142 MMHG | DIASTOLIC BLOOD PRESSURE: 80 MMHG

## 2018-03-21 LAB
ALBUMIN SERPL-MCNC: 2.5 G/DL (ref 3.4–5)
ALBUMIN/GLOB SERPL: 0.4 {RATIO} (ref 1–2.7)
ALP SERPL-CCNC: 117 U/L (ref 46–116)
ALT SERPL-CCNC: 18 U/L (ref 12–78)
ANION GAP SERPL CALC-SCNC: 10 MMOL/L (ref 5–15)
AST SERPL-CCNC: 14 U/L (ref 15–37)
BILIRUB SERPL-MCNC: 0.2 MG/DL (ref 0.2–1)
BUN SERPL-MCNC: 31 MG/DL (ref 7–18)
CALCIUM SERPL-MCNC: 9.8 MG/DL (ref 8.5–10.1)
CHLORIDE SERPL-SCNC: 106 MMOL/L (ref 98–107)
CO2 SERPL-SCNC: 23 MMOL/L (ref 21–32)
CREAT SERPL-MCNC: 0.8 MG/DL (ref 0.55–1.3)
GLOBULIN SER-MCNC: 7 G/DL
POTASSIUM SERPL-SCNC: 3.4 MMOL/L (ref 3.5–5.1)
SODIUM SERPL-SCNC: 139 MMOL/L (ref 136–145)

## 2018-03-21 RX ADMIN — METRONIDAZOLE SCH MG: 500 TABLET ORAL at 14:17

## 2018-03-21 RX ADMIN — THEOPHYLLINE ANHYDROUS SCH MG: 100 CAPSULE, EXTENDED RELEASE ORAL at 09:12

## 2018-03-21 RX ADMIN — MORPHINE SULFATE PRN MG: 2 INJECTION, SOLUTION INTRAMUSCULAR; INTRAVENOUS at 14:01

## 2018-03-21 RX ADMIN — DULOXETINE HYDROCHLORIDE SCH MG: 30 CAPSULE, DELAYED RELEASE ORAL at 09:12

## 2018-03-21 RX ADMIN — TOPIRAMATE SCH MG: 25 TABLET, COATED ORAL at 09:12

## 2018-03-21 RX ADMIN — HEPARIN SODIUM SCH UNITS: 5000 INJECTION INTRAVENOUS; SUBCUTANEOUS at 09:00

## 2018-03-21 RX ADMIN — METRONIDAZOLE SCH MG: 500 TABLET ORAL at 05:46

## 2018-03-21 RX ADMIN — FLUCONAZOLE SCH MG: 100 TABLET ORAL at 18:38

## 2018-03-21 NOTE — CARDIOLOGY REPORT
--------------- APPROVED REPORT --------------





EKG Measurement

Heart [id number]

HI [id number]

[id number]

[id number]

[id number]





Normal sinus rhythm

Normal ECG

## 2018-03-21 NOTE — PULMONOLOGY PROGRESS NOTE
Assessment/Plan


Problems:  


(1) Bacteremia


(2) Gram-negative bacteremia


(3) PNA (pneumonia)


(4) IBD (inflammatory bowel disease)


(5) Asthma


(6) Opioid dependence


(7) COPD exacerbation


(8) Elevated CEA


Assessment/Plan


improving clinically, WBC wnl





continue abx


dc steroids


chest pt


abx as per ID d/w Dr Garcia


symptomatic treatment


pain management. 


two weeks of IV abx  total





Subjective


ROS Limited/Unobtainable:  No


Constitutional:  Reports: no symptoms


HEENT:  Repors: no symptoms


Respiratory:  Reports: no symptoms


Allergies:  


Coded Allergies:  


     No Known Allergies (Verified , 10/27/06)





Objective





Last 24 Hour Vital Signs








  Date Time  Temp Pulse Resp B/P (MAP) Pulse Ox O2 Delivery O2 Flow Rate FiO2


 


3/21/18 16:00 97.2 89 19 134/85 100   





 97.2       


 


3/21/18 14:31 98.8       


 


3/21/18 14:01 98.8       


 


3/21/18 12:00 98.8 85 19 122/79 99   





 98.8       


 


3/21/18 08:00 98.6 86 18 142/80 96   





 98.6       


 


3/21/18 07:40  80 16   Room Air  21


 


3/21/18 04:40      Room Air  


 


3/21/18 04:28 98.2 83 20 138/82 100 Room Air  





 98.2       


 


3/21/18 00:41      Room Air  


 


3/21/18 00:10 97.8 91 20 147/96 96 Room Air  





 97.8       


 


3/20/18 19:56      Room Air  


 


3/20/18 19:28 97.5 79 20 142/83 100 Room Air  





 97.5       


 


3/20/18 19:12  84 16   Room Air  21

















Intake and Output  


 


 3/20/18 3/21/18





 19:00 07:00


 


Intake Total 415 ml 


 


Balance 415 ml 


 


  


 


Intake Oral 360 ml 


 


IV Total 55 ml 


 


# Voids  2


 


# Bowel Movements  2








Objective


General Appearance:  WD/WN, no apparent distress


Lines, tubes and drains:  peripheral


HEENT:  normocephalic, anicteric


Neck:  non-tender, normal alignment


Respiratory/Chest:  chest wall non-tender, + rhonchi


Cardiovascular/Chest:  normal peripheral pulses


Abdomen:  normal bowel sounds


EXt: no C/C/E





Microbiology








 Date/Time


Source Procedure


Growth Status


 


 


 3/19/18 16:01


Blood Blood Culture - Preliminary


NO GROWTH AFTER 24 HOURS Resulted


 


 3/19/18 15:50


Blood Blood Culture - Preliminary


NO GROWTH AFTER 24 HOURS Resulted








Laboratory Tests


3/21/18 05:55: 


Sodium Level 139, Potassium Level 3.4L, Chloride Level 106, Carbon Dioxide 

Level 23, Anion Gap 10, Blood Urea Nitrogen 31H, Creatinine 0.8, Estimat 

Glomerular Filtration Rate > 60, Glucose Level 103, Calcium Level 9.8, Total 

Bilirubin 0.2, Aspartate Amino Transf (AST/SGOT) 14L, Alanine Aminotransferase (

ALT/SGPT) 18, Alkaline Phosphatase 117H, Total Protein 9.5H, Albumin 2.5L, 

Globulin 7.0, Albumin/Globulin Ratio 0.4L





Current Medications








 Medications


  (Trade)  Dose


 Ordered  Sig/Jed


 Route


 PRN Reason  Start Time


 Stop Time Status Last Admin


Dose Admin


 


 Atorvastatin


 Calcium


  (Lipitor)  40 mg  BEDTIME


 ORAL


   3/16/18 21:00


 4/15/18 20:59  3/20/18 20:53


 


 


 Bupropion HCl


  (Wellbutrin)  100 mg  DAILY


 ORAL


   3/17/18 09:00


 4/15/18 08:59  3/21/18 09:12


 


 


 Chlorhexidine


 Gluconate


  (Rosy-Hex 2%)  1 applic  Q24H


 TOPIC


   3/18/18 00:15


 4/17/18 00:14  3/18/18 00:54


 


 


 Dextrose


  (Dextrose 50%)    STAT  PRN


 IV


 Hypoglycemia  3/16/18 19:34


 4/14/18 19:33   


 


 


 Duloxetine HCl


  (Cymbalta)  60 mg  DAILY


 ORAL


   3/17/18 09:00


 4/15/18 08:59  3/21/18 09:12


 


 


 Fluconazole


  (Diflucan)  200 mg  Q24H


 ORAL


   3/20/18 18:00


 3/27/18 17:59  3/20/18 17:07


 


 


 Heparin Sodium


  (Porcine)


  (Heparin 5000


 units/ml)  5,000 units  EVERY 12  HOURS


 SUBQ


   3/16/18 21:00


 4/15/18 08:59  3/20/18 20:57


 


 


 Levetiracetam


  (Keppra)  1,000 mg  Q8HR


 ORAL


   3/17/18 06:00


 4/16/18 05:59  3/21/18 14:17


 


 


 Levofloxacin  150 ml @ 


 100 mls/hr  Q24H


 IVPB


   3/21/18 13:00


 3/28/18 12:59  3/21/18 14:02


 


 


 Levothyroxine


 Sodium


  (Synthroid)  75 mcg  ACBREAKFAST


 ORAL


   3/17/18 06:30


 4/15/18 06:29  3/21/18 05:46


 


 


 Loperamide HCl


  (Imodium)  2 mg  Q4H  PRN


 ORAL


 Diarrhea  3/16/18 19:35


 4/15/18 19:34   


 


 


 Lorazepam


  (Ativan 2mg/ml


 1ml)  0.5 mg  Q4H  PRN


 IV


 For Anxiety  3/16/18 19:35


 3/22/18 19:34   


 


 


 Mesalamine


  (Asacol)  800 mg  THREE TIMES A  DAY


 ORAL


   3/17/18 09:00


 4/15/18 17:59  3/21/18 13:09


 


 


 Metronidazole


  (Flagyl)  500 mg  Q8HR


 ORAL


   3/19/18 14:00


 3/26/18 13:59  3/21/18 14:17


 


 


 Morphine Sulfate


  (Morphine


 Sulfate)  2 mg  EVERY 4 HOURS  PRN


 IVP


 severe pain 7-10  3/16/18 19:36


 3/22/18 19:35  3/21/18 14:01


 


 


 Nitroglycerin


  (Ntg)  0.4 mg  Q5M X 3 DOSES PRN


 SL


 Prn Chest Pain  3/16/18 19:15


 4/14/18 22:29   


 


 


 Ondansetron HCl


  (Zofran)  4 mg  Q6H  PRN


 IVP


 Nausea & Vomiting  3/16/18 19:36


 4/14/18 19:35  3/21/18 14:01


 


 


 Promethazine HCl/


 Codeine


  (Phenergan with


 Codeine)  5 ml  EVERY 6 HOURS  PRN


 ORAL


 cough  3/16/18 19:36


 4/15/18 19:35   


 


 


 Temazepam


  (Restoril)  15 mg  HSPRN  PRN


 ORAL


 Insomnia  3/16/18 19:36


 3/22/18 19:35  3/19/18 21:10


 


 


 Theophylline


  (Shiv-Dur)  100 mg  EVERY 12  HOURS


 ORAL


   3/16/18 21:00


 4/15/18 08:59  3/21/18 09:12


 


 


 Topiramate


  (Topamax)  25 mg  Q12HR


 ORAL


   3/17/18 09:00


 4/16/18 08:59  3/21/18 09:12


 


 


 Trazodone HCl


  (Desyrel)  150 mg  BEDTIME


 ORAL


   3/16/18 21:00


 4/15/18 20:59  3/20/18 20:53


 


 


 Trimethoprim/


 Sulfamethoxazole


 20 ml/Dextrose  570 ml @ 


 380 mls/hr  EVERY 8  HOURS


 IV


   3/21/18 14:00


 3/28/18 13:59  3/21/18 15:14


 

















Blaze Fraga MD Mar 21, 2018 17:06

## 2018-03-21 NOTE — INFECTIOUS DISEASES PROG NOTE
Assessment/Plan


Assessment/Plan








Sepsis , SP


POlymicrobial Gram negative bacteremia- suspect 2ry to PICC line infection (S. 

maltophilia) and possible also GI source (enterobacter) , although not acute 

colitis on either CT or colonoscopy. S. maltophilia bacteremia is a serious 

infection which carries high mortality and should be treated with combination 

abx therapy.





 -3/15 Bcx 4/4 Enterobacter cloacae complex (R ancef, otherwise S); 3/17 Bcx 1/ 4 S. maltophila (S Bactrim, Levofloxacin); Bcx 3/19 NTD


 --s/p PICC line removal 3/18, cath tip cx NTD


 -s/p EGD/Colonoscopy 3/19: 


1. Possible Candida esophagitis, status post brush biopsy.


2. Gastritis.


3. Small hiatal hernia.


4. Short segment of colon left.


5. A 1-cm anastomotic ulcer, status post biopsy.





  -CT abd/p w/: Limited assessment of the GI tract, due to lack of enteric 

contrast administration. Evidence of extensive prior bowel surgery, with 

evidence of resection of much of the


colon, enterocolic and entero-enteral anastomoses. Dilated right upper quadrant 

small bowel loops. Most likely on the basis of disordered motility related to 

the prior surgery, particularly given similarity to the previous exam. However, 

given evidence of nondilated small bowel loops elsewhere, the possibility of 

small bowel obstruction should be considered. Gas bubbles adjacent to the 

gallbladder fossa. Probably within collapsed adjacent small bowel, but the 

possibility that these represent extraluminal gas bubbles and which would 

therefore indicate bowel perforation should also be considered. Considerable 

fluid within the residual colon. Per discussion with referring physician, 

patient has recent history of diarrhea. Findings are certainly concordant with 

that. Subcentimeter low-attenuation renal lesions, too small to characterize, 

most likely benign simple cysts.  Right posterior 11th rib fracture, appears 

acute. Bilateral basilar pulmonary fibrotic changes, also previously 

demonstrated








Leukocytosis  ( on steroids ) -resolved


  -afebrile


  -CXR 3/15: Mild interstitial prominence, similar to the previous study and 

likely reflecting fibrotic changes demonstrated on a CT scan of June 2014. No 

definite acute infiltrate; sp cx NTD


   -u/a WBC 5-10, nit neg, leuk +1





Hx of Cdiff


  -Cdiff 3/15 neg








Crohn's disease w/ multiple abd surgeries


COPD/asthma


on B-12 injections via PICC line


 CAD/MI


chronic pain syndrome on morphine pump


CVA 2005


seizure disorder








Plan:


-Start TMP-SMX 5mg/kg IV q8h and switch Ceftriaxone #5 to IV Levaquin 750mg 

daily for both S. maltophilia and Enterobacter bacteremia. Given her prior 

multiple abdominal surgeries and malabsorption she will require IV treatment as 

S.maltophilia bacteremia is a serious infection with high mortality.


   -duration of treatment is 14 days from 1st neg Bcx on 3/19; end date April 1st 2018. Will benefit from SNF.


     -weekly CBC, CMP





-Continue PO Flagyl abx d# 6/14 for anaerobic coverage


   3/19 SP ZOsyn #4


   SP oral Vanco d# 2





-f/u cx


-Monitor CBC/BMP, temperatures








Discussed with RN and pharmacist.





Subjective


Allergies:  


Coded Allergies:  


     No Known Allergies (Verified , 10/27/06)


Subjective


afebrile


no leukocytosis


latest Bcx NTD


previous Bcx grew S. maltophilia





Objective


Vital Signs





Last 24 Hour Vital Signs








  Date Time  Temp Pulse Resp B/P (MAP) Pulse Ox O2 Delivery O2 Flow Rate FiO2


 


3/21/18 08:00 98.6 86 18 142/80 96   





 98.6       


 


3/21/18 07:40  80 16   Room Air  21


 


3/21/18 04:40      Room Air  


 


3/21/18 04:28 98.2 83 20 138/82 100 Room Air  





 98.2       


 


3/21/18 00:41      Room Air  


 


3/21/18 00:10 97.8 91 20 147/96 96 Room Air  





 97.8       


 


3/20/18 19:56      Room Air  


 


3/20/18 19:28 97.5 79 20 142/83 100 Room Air  





 97.5       


 


3/20/18 19:12  84 16   Room Air  21


 


3/20/18 15:49 98.2 86 18 111/78 100   





 98.2       


 


3/20/18 12:00 96.8 79 18 105/71 95   





 96.8       








Height (Feet):  5


Height (Inches):  7.00


Weight (Pounds):  140


Objective


General Appearance:  alert, mild distress


HEENT:  normocephalic, normal pharynx,moist mucus membranes


Neck:  normal inspection, full range of motion, supple


Respiratory:  respiratory distress, speaking full sentences, wheezing, 

expiration


Cardiovascular :  normal inspection, regular rate, rhythm, normal capillary 

refill


Gastrointestinal:  other - Morphine pump palpated in abdomen, well-healed 

surgical scars on abdomen, abdomen is slightly distended, generalized tenderness

, normal bowel sounds


Musculoskeletal:  normal inspection, back normal, normal range of motion, non-

tender


Neurologic:  normal inspection, alert, oriented x3, responsive, motor strength/

tone normal, sensory intact, normal gait, speech normal


Skin:  normal inspection, normal color, no rash, warm/dry, well hydrated, 

normal turgor





Microbiology








 Date/Time


Source Procedure


Growth Status


 


 


 3/19/18 16:01


Blood Blood Culture - Preliminary


NO GROWTH AFTER 24 HOURS Resulted


 


 3/19/18 15:50


Blood Blood Culture - Preliminary


NO GROWTH AFTER 24 HOURS Resulted











Laboratory Tests








Test


  3/21/18


05:55


 


Sodium Level


  139 MMOL/L


(136-145)


 


Potassium Level


  3.4 MMOL/L


(3.5-5.1)  L


 


Chloride Level


  106 MMOL/L


()


 


Carbon Dioxide Level


  23 MMOL/L


(21-32)


 


Anion Gap


  10 mmol/L


(5-15)


 


Blood Urea Nitrogen


  31 mg/dL


(7-18)  H


 


Creatinine


  0.8 MG/DL


(0.55-1.30)


 


Estimat Glomerular Filtration


Rate > 60 mL/min


(>60)


 


Glucose Level


  103 MG/DL


()


 


Calcium Level


  9.8 MG/DL


(8.5-10.1)


 


Total Bilirubin


  0.2 MG/DL


(0.2-1.0)


 


Aspartate Amino Transf


(AST/SGOT) 14 U/L (15-37)


L


 


Alanine Aminotransferase


(ALT/SGPT) 18 U/L (12-78)


 


 


Alkaline Phosphatase


  117 U/L


()  H


 


Total Protein


  9.5 G/DL


(6.4-8.2)  H


 


Albumin


  2.5 G/DL


(3.4-5.0)  L


 


Globulin 7.0 g/dL  


 


Albumin/Globulin Ratio


  0.4 (1.0-2.7)


L











Current Medications








 Medications


  (Trade)  Dose


 Ordered  Sig/Jed


 Route


 PRN Reason  Start Time


 Stop Time Status Last Admin


Dose Admin


 


 Atorvastatin


 Calcium


  (Lipitor)  40 mg  BEDTIME


 ORAL


   3/16/18 21:00


 4/15/18 20:59  3/20/18 20:53


 


 


 Bupropion HCl


  (Wellbutrin)  100 mg  DAILY


 ORAL


   3/17/18 09:00


 4/15/18 08:59  3/21/18 09:12


 


 


 Ceftriaxone


 Sodium 2 gm/


 Sodium Chloride  55 ml @ 


 110 mls/hr  Q24H


 IVPB


   3/19/18 14:30


 3/26/18 14:29  3/20/18 14:08


 


 


 Chlorhexidine


 Gluconate


  (Rosy-Hex 2%)  1 applic  Q24H


 TOPIC


   3/18/18 00:15


 4/17/18 00:14  3/18/18 00:54


 


 


 Dextrose


  (Dextrose 50%)    STAT  PRN


 IV


 Hypoglycemia  3/16/18 19:34


 4/14/18 19:33   


 


 


 Duloxetine HCl


  (Cymbalta)  60 mg  DAILY


 ORAL


   3/17/18 09:00


 4/15/18 08:59  3/21/18 09:12


 


 


 Fluconazole


  (Diflucan)  200 mg  Q24H


 ORAL


   3/20/18 18:00


 3/27/18 17:59  3/20/18 17:07


 


 


 Heparin Sodium


  (Porcine)


  (Heparin 5000


 units/ml)  5,000 units  EVERY 12  HOURS


 SUBQ


   3/16/18 21:00


 4/15/18 08:59  3/20/18 20:57


 


 


 Levetiracetam


  (Keppra)  1,000 mg  Q8HR


 ORAL


   3/17/18 06:00


 4/16/18 05:59  3/21/18 05:46


 


 


 Levothyroxine


 Sodium


  (Synthroid)  75 mcg  ACBREAKFAST


 ORAL


   3/17/18 06:30


 4/15/18 06:29  3/21/18 05:46


 


 


 Loperamide HCl


  (Imodium)  2 mg  Q4H  PRN


 ORAL


 Diarrhea  3/16/18 19:35


 4/15/18 19:34   


 


 


 Lorazepam


  (Ativan 2mg/ml


 1ml)  0.5 mg  Q4H  PRN


 IV


 For Anxiety  3/16/18 19:35


 3/22/18 19:34   


 


 


 Mesalamine


  (Asacol)  800 mg  THREE TIMES A  DAY


 ORAL


   3/17/18 09:00


 4/15/18 17:59  3/21/18 09:12


 


 


 Metronidazole


  (Flagyl)  500 mg  Q8HR


 ORAL


   3/19/18 14:00


 3/26/18 13:59  3/21/18 05:46


 


 


 Morphine Sulfate


  (Morphine


 Sulfate)  2 mg  EVERY 4 HOURS  PRN


 IVP


 severe pain 7-10  3/16/18 19:36


 3/22/18 19:35  3/17/18 14:16


 


 


 Nitroglycerin


  (Ntg)  0.4 mg  Q5M X 3 DOSES PRN


 SL


 Prn Chest Pain  3/16/18 19:15


 4/14/18 22:29   


 


 


 Ondansetron HCl


  (Zofran)  4 mg  Q6H  PRN


 IVP


 Nausea & Vomiting  3/16/18 19:36


 4/14/18 19:35  3/20/18 04:27


 


 


 Promethazine HCl/


 Codeine


  (Phenergan with


 Codeine)  5 ml  EVERY 6 HOURS  PRN


 ORAL


 cough  3/16/18 19:36


 4/15/18 19:35   


 


 


 Temazepam


  (Restoril)  15 mg  HSPRN  PRN


 ORAL


 Insomnia  3/16/18 19:36


 3/22/18 19:35  3/19/18 21:10


 


 


 Theophylline


  (Shiv-Dur)  100 mg  EVERY 12  HOURS


 ORAL


   3/16/18 21:00


 4/15/18 08:59  3/21/18 09:12


 


 


 Topiramate


  (Topamax)  25 mg  Q12HR


 ORAL


   3/17/18 09:00


 4/16/18 08:59  3/21/18 09:12


 


 


 Trazodone HCl


  (Desyrel)  150 mg  BEDTIME


 ORAL


   3/16/18 21:00


 4/15/18 20:59  3/20/18 20:53


 

















Selene Garcia M.D. Mar 21, 2018 12:01

## 2018-03-21 NOTE — GENERAL SURGERY PROGRESS NOTE
General Surgery-Progress Note


Subjective


Symptoms:  improved, pain absent, tolerating diet, passing flatus, BM





Objective





Last 24 Hour Vital Signs








  Date Time  Temp Pulse Resp B/P (MAP) Pulse Ox O2 Delivery O2 Flow Rate FiO2


 


3/21/18 08:00 98.6 86 18 142/80 96   





 98.6       


 


3/21/18 07:40  80 16   Room Air  21


 


3/21/18 04:40      Room Air  


 


3/21/18 04:28 98.2 83 20 138/82 100 Room Air  





 98.2       


 


3/21/18 00:41      Room Air  


 


3/21/18 00:10 97.8 91 20 147/96 96 Room Air  





 97.8       


 


3/20/18 19:56      Room Air  


 


3/20/18 19:28 97.5 79 20 142/83 100 Room Air  





 97.5       


 


3/20/18 19:12  84 16   Room Air  21


 


3/20/18 15:49 98.2 86 18 111/78 100   





 98.2       








I&O











Intake and Output  


 


 3/20/18 3/21/18





 19:00 07:00


 


Intake Total 415 ml 


 


Balance 415 ml 


 


  


 


Intake Oral 360 ml 


 


IV Total 55 ml 


 


# Voids  2


 


# Bowel Movements  2








Drains:  none


Cardiovascular:  RSR


Respiratory:  clear


Abdomen:  soft, flat, non-tender, present bowel sounds


Extremities:  no edema, no tenderness, no cyanosis





Laboratory Tests








Test


  3/21/18


05:55


 


Sodium Level


  139 MMOL/L


(136-145)


 


Potassium Level


  3.4 MMOL/L


(3.5-5.1)  L


 


Chloride Level


  106 MMOL/L


()


 


Carbon Dioxide Level


  23 MMOL/L


(21-32)


 


Anion Gap


  10 mmol/L


(5-15)


 


Blood Urea Nitrogen


  31 mg/dL


(7-18)  H


 


Creatinine


  0.8 MG/DL


(0.55-1.30)


 


Estimat Glomerular Filtration


Rate > 60 mL/min


(>60)


 


Glucose Level


  103 MG/DL


()


 


Calcium Level


  9.8 MG/DL


(8.5-10.1)


 


Total Bilirubin


  0.2 MG/DL


(0.2-1.0)


 


Aspartate Amino Transf


(AST/SGOT) 14 U/L (15-37)


L


 


Alanine Aminotransferase


(ALT/SGPT) 18 U/L (12-78)


 


 


Alkaline Phosphatase


  117 U/L


()  H


 


Total Protein


  9.5 G/DL


(6.4-8.2)  H


 


Albumin


  2.5 G/DL


(3.4-5.0)  L


 


Globulin 7.0 g/dL  


 


Albumin/Globulin Ratio


  0.4 (1.0-2.7)


L











Plan


Problems:  


(1) Abdominal pain


Assessment & Plan:  62 year old female with pain everywhere as per her.  states 

has pain pump that is not functional.  pain from head to toes.  


on exam abd soft, non tender, prior incisions well healed, pain pump noted.  no 

rebound, no guarding.  


CT reviewed and has had fair amount of colon and some sb resections.  area of 

air noted around gallbladder and liver and agree with radiologist that likely 

collapsed small bowel.  they do not particularly appear extraluminal.  





afebrile, HD stable, abdominal exam benign.  leukocytosis resolved. 





-no acute surgical intervention necessary. 


-okay for diet


-Abx as per ID


-Okay to D/C from surgical standpoint when ready 


thank you for this consultation.  will follow with recs. 














Darrius Benitez Mar 21, 2018 12:24

## 2018-03-21 NOTE — GENERAL PROGRESS NOTE
Assessment/Plan


Problem List:  


(1) Opioid dependence


ICD Codes:  F11.20 - Opioid dependence


SNOMED:  77943817


(2) COPD (chronic obstructive pulmonary disease)


ICD Codes:  J44.9 - Chronic obstructive pulmonary disease, unspecified


SNOMED:  21902476


(3) Crohns disease


ICD Codes:  K50.90 - Crohns disease


SNOMED:  66426871


(4) Diarrhea


ICD Codes:  R19.7 - Diarrhea, unspecified


SNOMED:  45390526


(5) Shortness of breath


(6) Anemia


(7) Small bowel obstruction


ICD Codes:  K56.609 - Unspecified intestinal obstruction, unspecified as to 

partial versus complete obstruction


SNOMED:  172974091


(8) Dyspnea


(9) Chronic pain


ICD Codes:  G89.29 - Other chronic pain


SNOMED:  66138275


(10) Abdominal pain


Qualifiers:  


   Qualified Codes:  R10.84 - Generalized abdominal pain


Status:  unchanged


Assessment/Plan


o2 pulm tx abx gi sx f/u cbc bmp am dc plan to snf if clear





Subjective


Constitutional:  Reports: weakness


Allergies:  


Coded Allergies:  


     No Known Allergies (Verified , 10/27/06)


All Systems:  reviewed and negative except above


Subjective


sleepy calm in bed





Objective





Last 24 Hour Vital Signs








  Date Time  Temp Pulse Resp B/P (MAP) Pulse Ox O2 Delivery O2 Flow Rate FiO2


 


3/21/18 14:01 98.8       


 


3/21/18 12:00 98.8 85 19 122/79 99   





 98.8       


 


3/21/18 08:00 98.6 86 18 142/80 96   





 98.6       


 


3/21/18 07:40  80 16   Room Air  21


 


3/21/18 04:40      Room Air  


 


3/21/18 04:28 98.2 83 20 138/82 100 Room Air  





 98.2       


 


3/21/18 00:41      Room Air  


 


3/21/18 00:10 97.8 91 20 147/96 96 Room Air  





 97.8       


 


3/20/18 19:56      Room Air  


 


3/20/18 19:28 97.5 79 20 142/83 100 Room Air  





 97.5       


 


3/20/18 19:12  84 16   Room Air  21


 


3/20/18 15:49 98.2 86 18 111/78 100   





 98.2       

















Intake and Output  


 


 3/20/18 3/21/18





 19:00 07:00


 


Intake Total 415 ml 


 


Balance 415 ml 


 


  


 


Intake Oral 360 ml 


 


IV Total 55 ml 


 


# Voids  2


 


# Bowel Movements  2








Laboratory Tests


3/21/18 05:55: 


Sodium Level 139, Potassium Level 3.4L, Chloride Level 106, Carbon Dioxide 

Level 23, Anion Gap 10, Blood Urea Nitrogen 31H, Creatinine 0.8, Estimat 

Glomerular Filtration Rate > 60, Glucose Level 103, Calcium Level 9.8, Total 

Bilirubin 0.2, Aspartate Amino Transf (AST/SGOT) 14L, Alanine Aminotransferase (

ALT/SGPT) 18, Alkaline Phosphatase 117H, Total Protein 9.5H, Albumin 2.5L, 

Globulin 7.0, Albumin/Globulin Ratio 0.4L


Height (Feet):  5


Height (Inches):  7.00


Weight (Pounds):  140


General Appearance:  lethargic


EENT:  normal ENT inspection


Neck:  normal alignment


Cardiovascular:  normal peripheral pulses, normal rate, regular rhythm


Respiratory/Chest:  decreased breath sounds


Abdomen:  normal bowel sounds, non tender, soft


Extremities:  normal inspection


Edema:  no edema noted Arm (L), no edema noted Arm (R), no edema noted Leg (L), 

no edema noted Leg (R), no edema noted Pedal (L), no edema noted Pedal (R), no 

edema noted Generalized


Neurologic:  responsive, motor weakness


Skin:  normal pigmentation, warm/dry











MAICOL LANG Mar 21, 2018 14:59

## 2018-03-21 NOTE — GENERAL PROGRESS NOTE
Assessment/Plan


Assessment/Plan


(1) Lumbar spondylosis


(2) Chronic abdominal pain


(3) Radiculopathy of lumbar region


(4) Herniated nucleus pulposus, lumbar


(5) Chronic pancreatitis


(6) Chronic pain


Pt will be continued on Morphine and pt has intrathecal pump.


Pt was d/w Dr. Shetty and he concurred.





Subjective


Date patient seen:  Mar 21, 2018


Time patient seen:  07:30 - am


Allergies:  


Coded Allergies:  


     No Known Allergies (Verified , 10/27/06)


Subjective


Constitutional:  Denies: chills, diaphoresis, fever, malaise, no symptoms, other

, Reports: weakness


HEENT:  Denies: blurred vision, double vision, ear discharge, ear pain, eye pain

, mouth pain, mouth swelling, no symptoms, nose congestion, nose pain, other, 

tearing, throat pain, throat swelling


Cardiovascular:  Denies: chest pain, edema, irregular heart rate, 

lightheadedness, no symptoms, other, palpitations, syncope


Respiratory:  Denies: SOB at rest, SOB with excertion, cough, no symptoms, 

orthopnea, other, shortness of breath, sputum, stridor, wheezing


Gastrointestinal/Abdominal:  Denies: abdomen distended, black stools, blood in 

stool, constipated, diarrhea, difficulty swallowing, nausea, no symptoms, other

, poor appetite, poor fluid intake, rectal bleeding, tarry stools, vomiting, 

Reports: abdominal pain


Genitourinary:  Denies: burning, discharge, flank pain, frequency, hematuria, 

incontinence, no symptoms, other, pain, urgency


Neurologic/Psychiatric:  Denies: anxiety, depressed, emotional problems, 

headache, no symptoms, numbness, other, paresthesia, pre-existing deficit, 

seizure, tingling, tremors, weakness


Endocrine:  Denies: excessive sweating, flushing, increased hunger, increased 

thirst, increased urine, intolerance to cold, intolerance to heat, no symptoms, 

other, unexplained weight gain, unexplained weight loss


Hematologic/Lymphatic:  Denies: anemia, easy bleeding, easy bruising, no 

symptoms, other





Subjective


Patient has no signs of pain or distress at this time. She is comfortable and 

has no new complaints.





Objective





Last 24 Hour Vital Signs








  Date Time  Temp Pulse Resp B/P (MAP) Pulse Ox O2 Delivery O2 Flow Rate FiO2


 


3/21/18 04:40      Room Air  


 


3/21/18 04:28 98.2 83 20 138/82 100 Room Air  





 98.2       


 


3/21/18 00:41      Room Air  


 


3/21/18 00:10 97.8 91 20 147/96 96 Room Air  





 97.8       


 


3/20/18 19:56      Room Air  


 


3/20/18 19:28 97.5 79 20 142/83 100 Room Air  





 97.5       


 


3/20/18 19:12  84 16   Room Air  21


 


3/20/18 15:49 98.2 86 18 111/78 100   





 98.2       


 


3/20/18 12:00 96.8 79 18 105/71 95   





 96.8       

















Intake and Output  


 


 3/20/18 3/21/18





 19:00 07:00


 


Intake Total 415 ml 


 


Balance 415 ml 


 


  


 


Intake Oral 360 ml 


 


IV Total 55 ml 


 


# Voids  2


 


# Bowel Movements  2








Laboratory Tests


3/21/18 05:55: 


Sodium Level 139, Potassium Level 3.4L, Chloride Level 106, Carbon Dioxide 

Level 23, Anion Gap 10, Blood Urea Nitrogen 31H, Creatinine 0.8, Estimat 

Glomerular Filtration Rate > 60, Glucose Level 103, Calcium Level 9.8, Total 

Bilirubin 0.2, Aspartate Amino Transf (AST/SGOT) 14L, Alanine Aminotransferase (

ALT/SGPT) 18, Alkaline Phosphatase 117H, Total Protein 9.5H, Albumin 2.5L, 

Globulin 7.0, Albumin/Globulin Ratio 0.4L


Height (Feet):  5


Height (Inches):  7.00


Weight (Pounds):  140


Objective


General Appearance:  no apparent distress, alert


EENT:  normal ENT inspection, TMs normal


Neck:  normal alignment, supple


Cardiovascular:  normal rate, regular rhythm


Respiratory/Chest:  decreased breath sounds


Abdomen:  tender, soft


Extremities:  non-tender


Edema:  no edema noted Arm (L), no edema noted Arm (R), no edema noted Leg (L), 

no edema noted Leg (R), no edema noted Pedal (L), no edema noted Pedal (R), no 

edema noted Generalized


Neurologic:  alert, oriented x 3


Skin:  warm/dry











LEA BRAVO Mar 21, 2018 08:43

## 2018-03-21 NOTE — PROGRESS NOTE
DATE:  03/21/2018



HISTORY:  The patient is a 62-year-old female patient with COPD.  This

patient still does have some confusion, some disorganized thought process,

and mood lability, worsened by stress of her medical illness.  That is

why, her attending had requested daily psychiatric consultation.



MENTAL STATUS EXAMINATION:  This is a 62-year-old female with psychomotor

retardation.  Mood is depressed.  Affect guarded and restricted.  Thought

process, disorganized and illogical.  Denies any current suicidal or

homicidal thoughts.  Insight and judgment is poor.



DIAGNOSIS:  Bipolar 2.



PLAN:  Continue titrating up on her mood stabilizer medications to

stabilize her mood.  Provide 18-20 minutes of supportive therapy and

encourage her to interact appropriately with staff and other patients.

Chart reviewed.  Discussed with staff.  Seen and assessed in her room.









  ______________________________________________

  Samantha Rick M.D.





DR:  GRAY

D:  03/21/2018 12:15

T:  03/21/2018 18:51

JOB#:  [id number]

CC:

## 2018-03-21 NOTE — GI PROGRESS NOTE
Assessment/Plan


Problems:  


(1) Nausea and vomiting


(2) Constipation


(3) Chronic abdominal pain


(4) Abdominal pain


(5) Colonoscopy planned


ICD Codes:  UBJ2431 - Reserved for non-ICD-10-CM codable problem concepts


SNOMED:  023994563


(6) Chronic back pain


ICD Codes:  G89.29 - Other chronic pain; M54.9 - Chronic back pain


SNOMED:  772000423


(7) Opioid dependence


ICD Codes:  F11.20 - Opioid dependence


SNOMED:  99816020


(8) Therapeutic opioid-induced constipation (OIC)


ICD Codes:  K59.03 - Drug induced constipation; T40.2X5A - Adverse effect of 

other opioids, initial encounter


SNOMED:  458440067538911


Status:  stable


Status Narrative


Discussed with Dr. Mcleod.


Assessment/Plan


EGD/colonoscopy SUMMARY OF FINDINGS:


1. Possible Candida esophagitis, status post brush biopsy.


2. Gastritis.


3. Small hiatal hernia.


4. Short segment of colon left.


5. A 1-cm anastomotic ulcer, status post biopsy.





RECOMMENDATIONS:


okay for DC per GI standpoint


Follow brush biopsy results and treat for Candida if it is positive.


Resume diet. 


outpatient capsule endoscopy





Subjective


Subjective


better





Objective





Last 24 Hour Vital Signs








  Date Time  Temp Pulse Resp B/P (MAP) Pulse Ox O2 Delivery O2 Flow Rate FiO2


 


3/21/18 08:00 98.6 86 18 142/80 96   





 98.6       


 


3/21/18 07:40  80 16   Room Air  21


 


3/21/18 04:40      Room Air  


 


3/21/18 04:28 98.2 83 20 138/82 100 Room Air  





 98.2       


 


3/21/18 00:41      Room Air  


 


3/21/18 00:10 97.8 91 20 147/96 96 Room Air  





 97.8       


 


3/20/18 19:56      Room Air  


 


3/20/18 19:28 97.5 79 20 142/83 100 Room Air  





 97.5       


 


3/20/18 19:12  84 16   Room Air  21


 


3/20/18 15:49 98.2 86 18 111/78 100   





 98.2       


 


3/20/18 12:00 96.8 79 18 105/71 95   





 96.8       

















Intake and Output  


 


 3/20/18 3/21/18





 19:00 07:00


 


Intake Total 415 ml 


 


Balance 415 ml 


 


  


 


Intake Oral 360 ml 


 


IV Total 55 ml 


 


# Voids  2


 


# Bowel Movements  2











Laboratory Tests








Test


  3/21/18


05:55


 


Sodium Level


  139 MMOL/L


(136-145)


 


Potassium Level


  3.4 MMOL/L


(3.5-5.1)  L


 


Chloride Level


  106 MMOL/L


()


 


Carbon Dioxide Level


  23 MMOL/L


(21-32)


 


Anion Gap


  10 mmol/L


(5-15)


 


Blood Urea Nitrogen


  31 mg/dL


(7-18)  H


 


Creatinine


  0.8 MG/DL


(0.55-1.30)


 


Estimat Glomerular Filtration


Rate > 60 mL/min


(>60)


 


Glucose Level


  103 MG/DL


()


 


Calcium Level


  9.8 MG/DL


(8.5-10.1)


 


Total Bilirubin


  0.2 MG/DL


(0.2-1.0)


 


Aspartate Amino Transf


(AST/SGOT) 14 U/L (15-37)


L


 


Alanine Aminotransferase


(ALT/SGPT) 18 U/L (12-78)


 


 


Alkaline Phosphatase


  117 U/L


()  H


 


Total Protein


  9.5 G/DL


(6.4-8.2)  H


 


Albumin


  2.5 G/DL


(3.4-5.0)  L


 


Globulin 7.0 g/dL  


 


Albumin/Globulin Ratio


  0.4 (1.0-2.7)


L








Height (Feet):  5


Height (Inches):  7.00


Weight (Pounds):  140


General Appearance:  WD/WN, no apparent distress, alert


Cardiovascular:  normal rate


Respiratory/Chest:  normal breath sounds, no respiratory distress


Abdominal Exam:  normal bowel sounds, non tender, soft


Extremities:  normal range of motion, non-tender











Elena Seay NHAL Mar 21, 2018 11:13

## 2018-03-22 NOTE — DISCHARGE SUMMARY
Discharge Summary


Hospital Course


Date of Admission


Mar 15, 2018 at 15:33


Date of Discharge


Mar 21, 2018 at 19:30


Admitting Diagnosis


copd exacerbation/abd pain


HPI


Radha Gaviria is a 62 year old female who was admitted on Mar 15, 2018 at 15:33 

for Chronic Obstructive Pulmonary Disorder


Hospital Course


[id number]





Discharge


Discharge Disposition


Patient was discharged to snf











Modesta Webb NP Mar 22, 2018 11:44

## 2018-03-23 NOTE — DISCHARGE SUMMARY 2 SIG
DATE OF ADMISSION:  03/15/2018



DATE OF DISCHARGE:  03/21/2018



CONSULTANTS:

1. Samantha Rick M.D.

2. Blaze Fraga M.D.

3. Darrius Benitez M.D.

4. Selene Garcia M.D.

5. Behnoush Zarrini, M.D.

6. Dimitry Mcleod M.D.



BRIEF HOSPITAL COURSE:  The patient is a 62-year-old female, who presented

to [place] due to one week weakness and lethargy with nausea and vomiting.

The patient had diarrhea at home.  She has medical history significant

for chronic obstructive pulmonary disease, Crohn disease, chronic pain,

and history of small bowel obstruction with status post abdominal surgery.

     On evaluation at ED, she stated that she recently took amoxicillin as

prescribed by her dentist.  She had been using nebulizers with minimal

relief.  She came in with a PICC line on the left arm and also had a

morphine pump for chronic pain, however, she stated is nonfunctional.

Workup showed elevated WBC to 23.  She was started on IV fluids.  EKG was

in normal sinus rhythm.  Chest x-ray showed interstitial infiltrates.  

      She was then admitted to telemetry for chronic obstructive pulmonary 
disease

exacerbation and for evaluation of diarrhea, possible small bowel

obstruction, and rib fracture.  Abdominal and pelvic CT scan showed

evidence of extensive prior bowel surgery with evidence of resection of

the colon, enterocolic, and enteroenteral anastomosis.  There was dilated

right upper quadrant small bowel loops and right posterior eleventh rib

fracture, which appeared acute.  

      She was evaluated by General Surgery. The patient has pain 

all over abdomen, back, shoulders, extremities, and head.  

Abdominal exam was negative for rebound or guarding and abdominal

exam was benign.  There was no acute surgical intervention necessary.  She

was then ordered diet.  

      She underwent psychiatric evaluation.  She was

diagnosed with bipolar 2 disorder.  She was given Wellbutrin 150 daily,

Cymbalta 30 mg daily, and Topamax 25 mg b.i.d., and trazodone 150 at

bedtime.  

       She underwent esophagogastroduodenoscopy by Dr. Mcleod on

03/19/2018.  Findings showed possible Candida esophagitis status post

brush biopsy.  There was presence of small hiatal hernia, gastritis, short

segment of left colon, and a 1 cm anastomotic ulcer status post biopsy.

       She had a polymicrobial gram-negative bacteremia, suspect possibly

secondary to PICC line infection and also gastrointestinal source,

"Enterobacter."  PICC line was removed on 03/18/2018 and catheter culture

did not isolate any growth.  C. difficile was negative.  She was given

Bactrim and ceftriaxone was switched to Levaquin. End of 

therapy estimated 04/01/2018.  She was also given Flagyl for 

anaerobic coverage.  She was referred to SNF.  She

was eventually discharged to SNF.



FINAL DIAGNOSES:

1. Sepsis.

2. Polymicrobial gram-negative bacteremia.

3. Chronic obstructive pulmonary disease/asthma.

4. Chronic pain syndrome.

5. Seizure disorder.

6. Opioid dependence.

7. Acute chronic obstructive pulmonary disease exacerbation.

8. Pneumonia.

9. Anemia.

10. Diarrhea, negative for Clostridium difficile.

11. Crohn's disease.

12. Bipolar disorder.

13. Chronic pancreatitis.

14. Lumbar spondylosis.

15. Gastritis.

16. Possible Candida esophagitis.

17. Small hiatal hernia.

18. Anastomotic ulcer.



DISPOSITION:  The patient was discharged to SNF.



DISCHARGE MEDICATIONS:  Refer to medication list.







  ______________________________________________

  Maxim Hopkins D.O.



I have been assigned to dictate discharge summary on this account and I

was not involved in the patient's management.



  ______________________________________________

  Modesta Webb N.P.





DR:  YUMIKO

D:  03/22/2018 11:43

T:  03/23/2018 04:11

JOB#:  [id number]

CC:



MEL

## 2018-04-10 ENCOUNTER — HOSPITAL ENCOUNTER (OUTPATIENT)
Dept: HOSPITAL 72 - PAN | Age: 63
Discharge: HOME | End: 2018-04-10
Payer: MEDICARE

## 2018-04-10 VITALS — SYSTOLIC BLOOD PRESSURE: 106 MMHG | DIASTOLIC BLOOD PRESSURE: 73 MMHG

## 2018-04-10 DIAGNOSIS — G89.29: ICD-10-CM

## 2018-04-10 DIAGNOSIS — R19.7: ICD-10-CM

## 2018-04-10 DIAGNOSIS — K29.70: ICD-10-CM

## 2018-04-10 DIAGNOSIS — K50.90: Primary | ICD-10-CM

## 2018-04-10 DIAGNOSIS — R63.4: ICD-10-CM

## 2018-04-10 DIAGNOSIS — F11.20: ICD-10-CM

## 2018-04-10 DIAGNOSIS — K44.9: ICD-10-CM

## 2018-04-10 PROCEDURE — 99212 OFFICE O/P EST SF 10 MIN: CPT

## 2018-04-10 NOTE — GI PROGRESS NOTE
Assessment/Plan


Problems:  


(1) Crohn's disease


ICD Codes:  K50.90 - Crohn's disease


SNOMED:  24669270


(2) Chronic pain


ICD Codes:  G89.29 - Other chronic pain


SNOMED:  57171671


(3) Diarrhea


ICD Codes:  R19.7 - Diarrhea, unspecified


SNOMED:  68533083


(4) Opioid dependence


ICD Codes:  F11.20 - Opioid dependence


SNOMED:  09904035


(5) Weight loss


ICD Codes:  R63.4 - Abnormal weight loss


SNOMED:  03657039, 237231420


Status:  stable


Status Narrative


Seen with Dr. Mcleod.


Assessment/Plan


SUMMARY OF FINDINGS reviewed with patient:


1. Possible Candida esophagitis, status post brush biopsy.


2. Gastritis.


3. Small hiatal hernia.


4. Short segment of colon left.


5. A 1-cm anastomotic ulcer, status post biopsy.





RECOMMENDATIONS: 


Diflucan 200mg PO daily x 14 days


per pt had recent CT on 3/5/18, CT reviewed.


cont Apriso


plan for capsule endoscopy 4/17/18.


- - CLD & (Miralax) prep instructions given and acknowledged by patient.


- NPO @ [facility] day prior procedure explained.





Subjective


Subjective


abdominal pain


diarrhea x 2 days





Objective


T 98.0


/73


P 89


General Appearance:  WD/WN, no apparent distress, alert


Cardiovascular:  normal rate


Respiratory/Chest:  normal breath sounds, no respiratory distress


Abdominal Exam:  normal bowel sounds, non tender, soft


Extremities:  normal range of motion, non-tender











Elena Seay N.P. Apr 10, 2018 11:08

## 2018-04-17 ENCOUNTER — HOSPITAL ENCOUNTER (OUTPATIENT)
Dept: HOSPITAL 72 - PAN | Age: 63
Discharge: HOME | End: 2018-04-17
Payer: MEDICARE

## 2018-04-17 VITALS — DIASTOLIC BLOOD PRESSURE: 76 MMHG | SYSTOLIC BLOOD PRESSURE: 106 MMHG

## 2018-04-17 DIAGNOSIS — R19.7: ICD-10-CM

## 2018-04-17 DIAGNOSIS — D64.9: ICD-10-CM

## 2018-04-17 DIAGNOSIS — K50.90: ICD-10-CM

## 2018-04-17 DIAGNOSIS — R63.4: Primary | ICD-10-CM

## 2018-04-17 DIAGNOSIS — F11.20: ICD-10-CM

## 2018-04-17 NOTE — GI PROGRESS NOTE
Assessment/Plan


Problems:  


(1) Weight loss


ICD Codes:  R63.4 - Abnormal weight loss


SNOMED:  66331923, 940218716


(2) Diarrhea


ICD Codes:  R19.7 - Diarrhea, unspecified


SNOMED:  70172068


(3) Crohn's disease


ICD Codes:  K50.90 - Crohn's disease


SNOMED:  71986730


(4) Opioid dependence


ICD Codes:  F11.20 - Opioid dependence


SNOMED:  56777649


(5) Anemia


Status:  stable


Status Narrative


Seen with Dr. Mcleod.


Assessment/Plan


Small Bowel Capsule Endoscopy today.


RTC tomorrow for equipment return.





Subjective


Gastrointestinal/Abdominal:  Reports: no symptoms





Objective





Last 24 Hour Vital Signs








  Date Time  Temp Pulse Resp B/P (MAP) Pulse Ox O2 Delivery O2 Flow Rate FiO2


 


4/17/18 10:13 98.1 101 20 106/76    





 98.1       








General Appearance:  WD/WN, no apparent distress, alert


Cardiovascular:  normal rate


Respiratory/Chest:  normal breath sounds, no respiratory distress


Abdominal Exam:  normal bowel sounds, non tender, soft


Extremities:  normal range of motion, non-tender











Elena Seay N.P. Apr 17, 2018 11:15

## 2018-04-18 ENCOUNTER — HOSPITAL ENCOUNTER (EMERGENCY)
Dept: HOSPITAL 72 - EMR | Age: 63
Discharge: HOME | End: 2018-04-18
Payer: MEDICARE

## 2018-04-18 VITALS — HEIGHT: 66 IN | WEIGHT: 129 LBS | BODY MASS INDEX: 20.73 KG/M2

## 2018-04-18 VITALS — DIASTOLIC BLOOD PRESSURE: 78 MMHG | SYSTOLIC BLOOD PRESSURE: 130 MMHG

## 2018-04-18 VITALS — SYSTOLIC BLOOD PRESSURE: 138 MMHG | DIASTOLIC BLOOD PRESSURE: 70 MMHG

## 2018-04-18 DIAGNOSIS — Z87.19: ICD-10-CM

## 2018-04-18 DIAGNOSIS — J44.9: ICD-10-CM

## 2018-04-18 DIAGNOSIS — Z86.73: ICD-10-CM

## 2018-04-18 DIAGNOSIS — R10.9: Primary | ICD-10-CM

## 2018-04-18 DIAGNOSIS — G89.29: ICD-10-CM

## 2018-04-18 LAB
ADD MANUAL DIFF: NO
ALBUMIN SERPL-MCNC: 2.7 G/DL (ref 3.4–5)
ALBUMIN/GLOB SERPL: 0.4 {RATIO} (ref 1–2.7)
ALP SERPL-CCNC: 135 U/L (ref 46–116)
ALT SERPL-CCNC: 20 U/L (ref 12–78)
ANION GAP SERPL CALC-SCNC: 8 MMOL/L (ref 5–15)
APTT BLD: 31 SEC (ref 23–33)
AST SERPL-CCNC: 40 U/L (ref 15–37)
BASOPHILS NFR BLD AUTO: 1 % (ref 0–2)
BILIRUB SERPL-MCNC: 0.2 MG/DL (ref 0.2–1)
BUN SERPL-MCNC: 17 MG/DL (ref 7–18)
CALCIUM SERPL-MCNC: 9.4 MG/DL (ref 8.5–10.1)
CHLORIDE SERPL-SCNC: 101 MMOL/L (ref 98–107)
CK MB SERPL-MCNC: 1.4 NG/ML (ref 0–3.6)
CK SERPL-CCNC: 146 U/L (ref 26–308)
CO2 SERPL-SCNC: 27 MMOL/L (ref 21–32)
CREAT SERPL-MCNC: 0.8 MG/DL (ref 0.55–1.3)
EOSINOPHIL NFR BLD AUTO: 0.1 % (ref 0–3)
ERYTHROCYTE [DISTWIDTH] IN BLOOD BY AUTOMATED COUNT: 15.8 % (ref 11.6–14.8)
GLOBULIN SER-MCNC: 6.8 G/DL
HCT VFR BLD CALC: 40.3 % (ref 37–47)
HGB BLD-MCNC: 12.9 G/DL (ref 12–16)
INR PPP: 1 (ref 0.9–1.1)
LYMPHOCYTES NFR BLD AUTO: 14.1 % (ref 20–45)
MCV RBC AUTO: 94 FL (ref 80–99)
MONOCYTES NFR BLD AUTO: 3.4 % (ref 1–10)
NEUTROPHILS NFR BLD AUTO: 81.5 % (ref 45–75)
PLATELET # BLD: 274 K/UL (ref 150–450)
POTASSIUM SERPL-SCNC: 3.7 MMOL/L (ref 3.5–5.1)
RBC # BLD AUTO: 4.3 M/UL (ref 4.2–5.4)
SODIUM SERPL-SCNC: 136 MMOL/L (ref 136–145)
WBC # BLD AUTO: 12.9 K/UL (ref 4.8–10.8)

## 2018-04-18 PROCEDURE — 96372 THER/PROPH/DIAG INJ SC/IM: CPT

## 2018-04-18 PROCEDURE — 85730 THROMBOPLASTIN TIME PARTIAL: CPT

## 2018-04-18 PROCEDURE — 80053 COMPREHEN METABOLIC PANEL: CPT

## 2018-04-18 PROCEDURE — 85651 RBC SED RATE NONAUTOMATED: CPT

## 2018-04-18 PROCEDURE — 85610 PROTHROMBIN TIME: CPT

## 2018-04-18 PROCEDURE — 93005 ELECTROCARDIOGRAM TRACING: CPT

## 2018-04-18 PROCEDURE — 87040 BLOOD CULTURE FOR BACTERIA: CPT

## 2018-04-18 PROCEDURE — 85025 COMPLETE CBC W/AUTO DIFF WBC: CPT

## 2018-04-18 PROCEDURE — 83605 ASSAY OF LACTIC ACID: CPT

## 2018-04-18 PROCEDURE — 36415 COLL VENOUS BLD VENIPUNCTURE: CPT

## 2018-04-18 PROCEDURE — 82553 CREATINE MB FRACTION: CPT

## 2018-04-18 PROCEDURE — 74018 RADEX ABDOMEN 1 VIEW: CPT

## 2018-04-18 PROCEDURE — 82550 ASSAY OF CK (CPK): CPT

## 2018-04-18 PROCEDURE — 86140 C-REACTIVE PROTEIN: CPT

## 2018-04-18 PROCEDURE — 99283 EMERGENCY DEPT VISIT LOW MDM: CPT

## 2018-04-18 NOTE — EMERGENCY ROOM REPORT
History of Present Illness


General


Chief Complaint:  Pain


Source:  Patient





Present Illness


HPI


This patient complains of abdominal pain.  The patient has a history of chronic 

abdominal pain.  She is being followed by a gastroenterologist Dr. Mcleod.  

She states she has a history of Crohn's disease.  However, Dr. Mcleod states 

that her Crohn's disease has not been active.  She underwent a colonoscopy one 

month ago that was unremarkable.  Yesterday she took the pill camera to further 

assess her small bowel.  Dr. Mcleod states that this patient has chronic pain 

in his often in his office for pain management.  She does have a morphine pump 

that she states doesn't work.  She denies fever or chills.  She denies nausea 

or vomiting.  She denies chest pain or shortness of breath.  She has no other 

complaints.


Allergies:  


Coded Allergies:  


     No Known Allergies (Verified , 10/27/06)





Patient History


Past Medical History:  see triage record, HTN, MI, CAD, other - Chron's


Social History:  Denies: smoking, alcohol use, drug use


Reviewed Nursing Documentation:  PMH: Agreed; PSxH: Agreed





Nursing Documentation-PMH


Hx Cardiac Problems:  Yes - MI


Hx Hypertension:  No


Hx Pacemaker:  No


Hx Asthma:  Yes


Hx COPD:  Yes


Hx Diabetes:  No


Hx Cancer:  No


Hx Gastrointestinal Problems:  Yes - Crohn's disease, C. Diff


Hx Dialysis:  No


Hx Neurological Problems:  Yes


Hx Cerebrovascular Accident:  Yes - 2005


Hx Seizures:  Yes


Hx Epilepsy:  Yes


Hx Vertigo:  Yes


Hx Dizziness:  Yes


Hx Syncope:  Yes


Hx Headaches:  Yes


Hx Weakness:  Yes


Hx Fatigue:  Yes





Review of Systems


All Other Systems:  negative except mentioned in HPI





Physical Exam





Vital Signs








  Date Time  Temp Pulse Resp B/P (MAP) Pulse Ox O2 Delivery O2 Flow Rate FiO2


 


4/18/18 14:00 99.0 103 17 144/72 97 Room Air  





 99.0       








Sp02 EP Interpretation:  reviewed, normal


General Appearance:  no apparent distress, alert, GCS 15, non-toxic


Head:  normocephalic, atraumatic


Eyes:  bilateral eye normal inspection, bilateral eye PERRL


ENT:  hearing grossly normal, normal pharynx, no angioedema, normal voice


Neck:  full range of motion, supple/symm/no masses


Respiratory:  chest non-tender, lungs clear, normal breath sounds, speaking 

full sentences


Cardiovascular #1:  regular rate, rhythm, no edema


Gastrointestinal:  normal bowel sounds, soft, non-distended, no guarding, no 

rebound, tenderness - TTP in the LLQ


Rectal:  deferred


Musculoskeletal:  back normal, gait/station normal, normal range of motion


Neurologic:  alert, oriented x3, responsive, motor strength/tone normal, 

sensory intact, speech normal


Psychiatric:  judgement/insight normal, memory normal, mood/affect normal, no 

suicidal/homicidal ideation


Skin:  normal color, no rash, warm/dry, well hydrated





Medical Decision Making


Diagnostic Impression:  


 Primary Impression:  


 Abdominal pain


ER Course


Patient has chronic abdominal pain.  The patient has been seen by Dr. Mcleod 

yesterday.  She underwent pill camera.  Patient does have a slight elevation in 

her white blood cell count.  I discussed the case with the patient herself and 

Dr. Mcleod.  Dr. Mcleod did not think this patient needed a CT of the abdomen 

and pelvis.  The patient also states she has had many CTs and didn't want to 

undergo CT again.  I offered the patient to be admitted for monitoring and 

control of her abdominal pain.  However, the patient requested she get IM pain 

medication and go home.  The patient states she will follow-up tomorrow with 

Dr. Mcleod.  The patient is given close return precautions and follow-up 

instructions.





Laboratory Tests








Test


  4/18/18


18:20


 


White Blood Count


  12.9 K/UL


(4.8-10.8)  H


 


Red Blood Count


  4.30 M/UL


(4.20-5.40)


 


Hemoglobin


  12.9 G/DL


(12.0-16.0)


 


Hematocrit


  40.3 %


(37.0-47.0)


 


Mean Corpuscular Volume 94 FL (80-99)  


 


Mean Corpuscular Hemoglobin


  30.1 PG


(27.0-31.0)


 


Mean Corpuscular Hemoglobin


Concent 32.1 G/DL


(32.0-36.0)


 


Red Cell Distribution Width


  15.8 %


(11.6-14.8)  H


 


Platelet Count


  274 K/UL


(150-450)


 


Mean Platelet Volume


  6.8 FL


(6.5-10.1)


 


Neutrophils (%) (Auto)


  81.5 %


(45.0-75.0)  H


 


Lymphocytes (%) (Auto)


  14.1 %


(20.0-45.0)  L


 


Monocytes (%) (Auto)


  3.4 %


(1.0-10.0)


 


Eosinophils (%) (Auto)


  0.1 %


(0.0-3.0)


 


Basophils (%) (Auto)


  1.0 %


(0.0-2.0)


 


Erythrocyte Sedimentation Rate Pending  


 


Prothrombin Time


  10.9 SEC


(9.30-11.50)


 


Prothrombin Time INR 1.0 (0.9-1.1)  


 


PTT


  31 SEC (23-33)


 


 


Sodium Level


  136 MMOL/L


(136-145)


 


Potassium Level


  3.7 MMOL/L


(3.5-5.1)


 


Chloride Level


  101 MMOL/L


()


 


Carbon Dioxide Level


  27 MMOL/L


(21-32)


 


Anion Gap


  8 mmol/L


(5-15)


 


Blood Urea Nitrogen


  17 mg/dL


(7-18)


 


Creatinine


  0.8 MG/DL


(0.55-1.30)


 


Estimate Glomerular


Filtration Rate > 60 mL/min


(>60)


 


Glucose Level


  99 MG/DL


()


 


Lactic Acid Level


  2.00 mmol/L


(0.66-2.22)


 


Calcium Level


  9.4 MG/DL


(8.5-10.1)


 


Total Bilirubin


  0.2 MG/DL


(0.2-1.0)


 


Aspartate Amino Transferase


(AST) 40 U/L (15-37)


H


 


Alanine Aminotransferase (ALT)


  20 U/L (12-78)


 


 


Alkaline Phosphatase


  135 U/L


()  H


 


Total Creatine Kinase


  146 U/L


()


 


Creatine Kinase MB


  1.4 NG/ML


(0.0-3.6)


 


Creatine Kinase MB Relative


Index 0.9  


 


 


C-Reactive Protein,


Quantitative 2.2 mg/dL


(0.00-0.90)  H


 


Total Protein


  9.5 G/DL


(6.4-8.2)  H


 


Albumin


  2.7 G/DL


(3.4-5.0)  L


 


Globulin 6.8 g/dL  


 


Albumin/Globulin Ratio


  0.4 (1.0-2.7)


L








EKG Diagnostic Results


Rate:  normal


Rhythm:  NSR


ST Segments:  no acute changes





Rhythm Strip Diag. Results


EP Interpretation:  yes


Rate:  90's


Rhythm:  NSR, no PVC's, no ectopy





Other X-Ray Diagnostic Results


Other X-Ray Diagnostic Results :  


   X-Ray ordered:  KUB


   # of Views/Limited Vs Complete:  1 View


   Indication:  Pain


   EP Interpretation:  Yes


   Interpretation:  nonspecific bowel gas, other - Baseline for this patient.


   Impression:  No acute disease


   Electronically Signed by:  Telma





Last Vital Signs








  Date Time  Temp Pulse Resp B/P (MAP) Pulse Ox O2 Delivery O2 Flow Rate FiO2


 


4/18/18 18:30 99.0 99 17 138/70 97 Room Air  





 99.0       








Status:  improved


Disposition:  HOME, SELF-CARE


Condition:  Improved


Referrals:  


MAICOL LANG (PCP)


Patient Instructions:  Chronic Pain











DORIS KEENAN D.O. Apr 18, 2018 20:13

## 2018-04-19 ENCOUNTER — HOSPITAL ENCOUNTER (INPATIENT)
Dept: HOSPITAL 72 - EMR | Age: 63
LOS: 20 days | Discharge: NURSING, REHAB OR LONG-TERM CARE | DRG: 180 | End: 2018-05-09
Payer: MEDICARE

## 2018-04-19 VITALS — SYSTOLIC BLOOD PRESSURE: 113 MMHG | DIASTOLIC BLOOD PRESSURE: 72 MMHG

## 2018-04-19 VITALS — DIASTOLIC BLOOD PRESSURE: 84 MMHG | SYSTOLIC BLOOD PRESSURE: 118 MMHG

## 2018-04-19 VITALS — DIASTOLIC BLOOD PRESSURE: 82 MMHG | SYSTOLIC BLOOD PRESSURE: 147 MMHG

## 2018-04-19 VITALS — HEIGHT: 66 IN | WEIGHT: 129 LBS | BODY MASS INDEX: 20.73 KG/M2

## 2018-04-19 VITALS — SYSTOLIC BLOOD PRESSURE: 141 MMHG | DIASTOLIC BLOOD PRESSURE: 88 MMHG

## 2018-04-19 DIAGNOSIS — E87.0: ICD-10-CM

## 2018-04-19 DIAGNOSIS — R91.8: ICD-10-CM

## 2018-04-19 DIAGNOSIS — R59.1: ICD-10-CM

## 2018-04-19 DIAGNOSIS — F31.81: ICD-10-CM

## 2018-04-19 DIAGNOSIS — Z51.5: ICD-10-CM

## 2018-04-19 DIAGNOSIS — M51.16: ICD-10-CM

## 2018-04-19 DIAGNOSIS — D69.6: ICD-10-CM

## 2018-04-19 DIAGNOSIS — B96.20: ICD-10-CM

## 2018-04-19 DIAGNOSIS — N17.9: ICD-10-CM

## 2018-04-19 DIAGNOSIS — J96.01: ICD-10-CM

## 2018-04-19 DIAGNOSIS — J91.0: ICD-10-CM

## 2018-04-19 DIAGNOSIS — C78.00: Primary | ICD-10-CM

## 2018-04-19 DIAGNOSIS — I27.20: ICD-10-CM

## 2018-04-19 DIAGNOSIS — R10.9: ICD-10-CM

## 2018-04-19 DIAGNOSIS — K61.0: ICD-10-CM

## 2018-04-19 DIAGNOSIS — F17.200: ICD-10-CM

## 2018-04-19 DIAGNOSIS — R62.7: ICD-10-CM

## 2018-04-19 DIAGNOSIS — F11.20: ICD-10-CM

## 2018-04-19 DIAGNOSIS — I25.2: ICD-10-CM

## 2018-04-19 DIAGNOSIS — G89.4: ICD-10-CM

## 2018-04-19 DIAGNOSIS — J84.9: ICD-10-CM

## 2018-04-19 DIAGNOSIS — K56.609: ICD-10-CM

## 2018-04-19 DIAGNOSIS — N39.0: ICD-10-CM

## 2018-04-19 DIAGNOSIS — D63.0: ICD-10-CM

## 2018-04-19 DIAGNOSIS — I50.9: ICD-10-CM

## 2018-04-19 DIAGNOSIS — R00.0: ICD-10-CM

## 2018-04-19 DIAGNOSIS — K86.1: ICD-10-CM

## 2018-04-19 DIAGNOSIS — A41.9: ICD-10-CM

## 2018-04-19 DIAGNOSIS — J44.9: ICD-10-CM

## 2018-04-19 DIAGNOSIS — E46: ICD-10-CM

## 2018-04-19 DIAGNOSIS — F41.1: ICD-10-CM

## 2018-04-19 DIAGNOSIS — G89.3: ICD-10-CM

## 2018-04-19 DIAGNOSIS — G40.909: ICD-10-CM

## 2018-04-19 DIAGNOSIS — Z16.12: ICD-10-CM

## 2018-04-19 DIAGNOSIS — M47.26: ICD-10-CM

## 2018-04-19 DIAGNOSIS — Z66: ICD-10-CM

## 2018-04-19 DIAGNOSIS — K50.90: ICD-10-CM

## 2018-04-19 DIAGNOSIS — Z86.73: ICD-10-CM

## 2018-04-19 DIAGNOSIS — C78.7: ICD-10-CM

## 2018-04-19 LAB
ADD MANUAL DIFF: NO
ALBUMIN SERPL-MCNC: 2.3 G/DL (ref 3.4–5)
ALBUMIN/GLOB SERPL: 0.4 {RATIO} (ref 1–2.7)
ALP SERPL-CCNC: 118 U/L (ref 46–116)
ALT SERPL-CCNC: 17 U/L (ref 12–78)
ANION GAP SERPL CALC-SCNC: 7 MMOL/L (ref 5–15)
APPEARANCE UR: CLEAR
APTT PPP: YELLOW S
AST SERPL-CCNC: 37 U/L (ref 15–37)
BASOPHILS NFR BLD AUTO: 0.7 % (ref 0–2)
BILIRUB SERPL-MCNC: 0.2 MG/DL (ref 0.2–1)
BUN SERPL-MCNC: 16 MG/DL (ref 7–18)
CALCIUM SERPL-MCNC: 9.2 MG/DL (ref 8.5–10.1)
CHLORIDE SERPL-SCNC: 105 MMOL/L (ref 98–107)
CO2 SERPL-SCNC: 27 MMOL/L (ref 21–32)
CREAT SERPL-MCNC: 0.8 MG/DL (ref 0.55–1.3)
EOSINOPHIL NFR BLD AUTO: 0.8 % (ref 0–3)
ERYTHROCYTE [DISTWIDTH] IN BLOOD BY AUTOMATED COUNT: 15.5 % (ref 11.6–14.8)
GLOBULIN SER-MCNC: 6.2 G/DL
GLUCOSE UR STRIP-MCNC: NEGATIVE MG/DL
HCT VFR BLD CALC: 38.6 % (ref 37–47)
HGB BLD-MCNC: 12.6 G/DL (ref 12–16)
KETONES UR QL STRIP: NEGATIVE
LEUKOCYTE ESTERASE UR QL STRIP: (no result)
LYMPHOCYTES NFR BLD AUTO: 27.3 % (ref 20–45)
MCV RBC AUTO: 92 FL (ref 80–99)
MONOCYTES NFR BLD AUTO: 8.7 % (ref 1–10)
NEUTROPHILS NFR BLD AUTO: 62.6 % (ref 45–75)
NITRITE UR QL STRIP: NEGATIVE
PH UR STRIP: 6 [PH] (ref 4.5–8)
PLATELET # BLD: 258 K/UL (ref 150–450)
POTASSIUM SERPL-SCNC: 3.2 MMOL/L (ref 3.5–5.1)
PROT UR QL STRIP: (no result)
RBC # BLD AUTO: 4.17 M/UL (ref 4.2–5.4)
SODIUM SERPL-SCNC: 139 MMOL/L (ref 136–145)
SP GR UR STRIP: 1.01 (ref 1–1.03)
UROBILINOGEN UR-MCNC: 1 MG/DL (ref 0–1)
WBC # BLD AUTO: 11.3 K/UL (ref 4.8–10.8)

## 2018-04-19 PROCEDURE — 93970 EXTREMITY STUDY: CPT

## 2018-04-19 PROCEDURE — 94660 CPAP INITIATION&MGMT: CPT

## 2018-04-19 PROCEDURE — 84443 ASSAY THYROID STIM HORMONE: CPT

## 2018-04-19 PROCEDURE — 85025 COMPLETE CBC W/AUTO DIFF WBC: CPT

## 2018-04-19 PROCEDURE — 87205 SMEAR GRAM STAIN: CPT

## 2018-04-19 PROCEDURE — 83735 ASSAY OF MAGNESIUM: CPT

## 2018-04-19 PROCEDURE — 84484 ASSAY OF TROPONIN QUANT: CPT

## 2018-04-19 PROCEDURE — 81003 URINALYSIS AUTO W/O SCOPE: CPT

## 2018-04-19 PROCEDURE — 82150 ASSAY OF AMYLASE: CPT

## 2018-04-19 PROCEDURE — 94760 N-INVAS EAR/PLS OXIMETRY 1: CPT

## 2018-04-19 PROCEDURE — 80202 ASSAY OF VANCOMYCIN: CPT

## 2018-04-19 PROCEDURE — 83690 ASSAY OF LIPASE: CPT

## 2018-04-19 PROCEDURE — 86304 IMMUNOASSAY TUMOR CA 125: CPT

## 2018-04-19 PROCEDURE — 86300 IMMUNOASSAY TUMOR CA 15-3: CPT

## 2018-04-19 PROCEDURE — 76942 ECHO GUIDE FOR BIOPSY: CPT

## 2018-04-19 PROCEDURE — 97803 MED NUTRITION INDIV SUBSEQ: CPT

## 2018-04-19 PROCEDURE — 85610 PROTHROMBIN TIME: CPT

## 2018-04-19 PROCEDURE — 87040 BLOOD CULTURE FOR BACTERIA: CPT

## 2018-04-19 PROCEDURE — 76937 US GUIDE VASCULAR ACCESS: CPT

## 2018-04-19 PROCEDURE — 87086 URINE CULTURE/COLONY COUNT: CPT

## 2018-04-19 PROCEDURE — 84439 ASSAY OF FREE THYROXINE: CPT

## 2018-04-19 PROCEDURE — 74018 RADEX ABDOMEN 1 VIEW: CPT

## 2018-04-19 PROCEDURE — 83986 ASSAY PH BODY FLUID NOS: CPT

## 2018-04-19 PROCEDURE — 80053 COMPREHEN METABOLIC PANEL: CPT

## 2018-04-19 PROCEDURE — 83880 ASSAY OF NATRIURETIC PEPTIDE: CPT

## 2018-04-19 PROCEDURE — 36415 COLL VENOUS BLD VENIPUNCTURE: CPT

## 2018-04-19 PROCEDURE — 83605 ASSAY OF LACTIC ACID: CPT

## 2018-04-19 PROCEDURE — 80048 BASIC METABOLIC PNL TOTAL CA: CPT

## 2018-04-19 PROCEDURE — 87181 SC STD AGAR DILUTION PER AGT: CPT

## 2018-04-19 PROCEDURE — 36569 INSJ PICC 5 YR+ W/O IMAGING: CPT

## 2018-04-19 PROCEDURE — 82105 ALPHA-FETOPROTEIN SERUM: CPT

## 2018-04-19 PROCEDURE — 87070 CULTURE OTHR SPECIMN AEROBIC: CPT

## 2018-04-19 PROCEDURE — 36600 WITHDRAWAL OF ARTERIAL BLOOD: CPT

## 2018-04-19 PROCEDURE — 74177 CT ABD & PELVIS W/CONTRAST: CPT

## 2018-04-19 PROCEDURE — 82962 GLUCOSE BLOOD TEST: CPT

## 2018-04-19 PROCEDURE — 82140 ASSAY OF AMMONIA: CPT

## 2018-04-19 PROCEDURE — 93306 TTE W/DOPPLER COMPLETE: CPT

## 2018-04-19 PROCEDURE — 94664 DEMO&/EVAL PT USE INHALER: CPT

## 2018-04-19 PROCEDURE — 71045 X-RAY EXAM CHEST 1 VIEW: CPT

## 2018-04-19 PROCEDURE — 85007 BL SMEAR W/DIFF WBC COUNT: CPT

## 2018-04-19 PROCEDURE — 80299 QUANTITATIVE ASSAY DRUG: CPT

## 2018-04-19 PROCEDURE — 89051 BODY FLUID CELL COUNT: CPT

## 2018-04-19 PROCEDURE — 82378 CARCINOEMBRYONIC ANTIGEN: CPT

## 2018-04-19 PROCEDURE — 76700 US EXAM ABDOM COMPLETE: CPT

## 2018-04-19 PROCEDURE — 84100 ASSAY OF PHOSPHORUS: CPT

## 2018-04-19 PROCEDURE — 88104 CYTOPATH FL NONGYN SMEARS: CPT

## 2018-04-19 PROCEDURE — 94640 AIRWAY INHALATION TREATMENT: CPT

## 2018-04-19 PROCEDURE — 99285 EMERGENCY DEPT VISIT HI MDM: CPT

## 2018-04-19 PROCEDURE — 82803 BLOOD GASES ANY COMBINATION: CPT

## 2018-04-19 PROCEDURE — 71260 CT THORAX DX C+: CPT

## 2018-04-19 PROCEDURE — 85730 THROMBOPLASTIN TIME PARTIAL: CPT

## 2018-04-19 PROCEDURE — 93005 ELECTROCARDIOGRAM TRACING: CPT

## 2018-04-19 RX ADMIN — ATORVASTATIN CALCIUM SCH MG: 20 TABLET, FILM COATED ORAL at 21:08

## 2018-04-19 RX ADMIN — TRAZODONE HYDROCHLORIDE SCH MG: 100 TABLET ORAL at 21:09

## 2018-04-19 RX ADMIN — MORPHINE SULFATE PRN MG: 4 INJECTION INTRAVENOUS at 21:21

## 2018-04-19 RX ADMIN — DEXTROSE AND SODIUM CHLORIDE SCH MLS/HR: 5; .45 INJECTION, SOLUTION INTRAVENOUS at 18:54

## 2018-04-19 RX ADMIN — THEOPHYLLINE ANHYDROUS SCH MG: 100 CAPSULE, EXTENDED RELEASE ORAL at 21:09

## 2018-04-19 RX ADMIN — HEPARIN SODIUM SCH UNITS: 5000 INJECTION INTRAVENOUS; SUBCUTANEOUS at 21:11

## 2018-04-19 RX ADMIN — TOPIRAMATE SCH MG: 25 TABLET, COATED ORAL at 21:09

## 2018-04-19 NOTE — DIAGNOSTIC IMAGING REPORT
Indication: Abdominal pain

 

Technique: Supine view of the abdomen

 

Comparison: February 12, 2017

 

Findings: Again demonstrated is a spinal pain pump, reservoir the left buttock.

Mildly dilated gas-filled small bowel loops are Similar to the previous exam. There

is evidence of prior anterior abdominal wall hernia surgery

 

Impression: Mildly dilated gas-filled small bowel loops, similar to previous exam of

February 12 2017. As discussed on earlier CT scans, this may represent small bowel

obstruction or be functional in nature, related to multiple prior bowel surgeries.

 

Other stable findings as noted

## 2018-04-19 NOTE — CARDIOLOGY REPORT
--------------- APPROVED REPORT --------------





EKG Measurement

Heart [id number]

HI [id number]

[id number]

[id number]

[id number]





Normal sinus rhythm

Possible Left atrial enlargement

Septal infarct, age undetermined

Abnormal ECG

## 2018-04-19 NOTE — EMERGENCY ROOM REPORT
History of Present Illness


General


Chief Complaint:  Abdominal Pain


Source:  Patient





Present Illness


HPI


62-year-old female presents ED for evaluation.  Patient presenting with 

abdominal pain.  Left-sided, sharp, nonradiating, 9 out of 10.  Patient was 

here yesterday for similar pain.  Patient was given pain medications and asked 

to be discharged.  Patient returns today because the pain is getting worse.  

Referred here by PMD.  History of Crohn's disease and colitis.  Denies fevers 

or chills.  Notes some diarrhea.  Denies chest pain or shortness of breath.  No 

other aggravating relieving factors.  Denies any other associated symptoms


Allergies:  


Coded Allergies:  


     No Known Allergies (Verified , 10/27/06)





Patient History


Past Medical History:  MI, asthma, COPD, CVA/TIA, other - crohns


Past Surgical History:  none


Pertinent Family History:  none


Social History:  Denies: smoking, alcohol use, drug use


Last Menstrual Period:  Post


Pregnant Now:  No


Immunizations:  UTD


Reviewed Nursing Documentation:  PMH: Agreed; PSxH: Agreed





Nursing Documentation-PMH


Hx Cardiac Problems:  Yes - MI


Hx Hypertension:  No


Hx Pacemaker:  No


Hx Asthma:  Yes


Hx COPD:  Yes


Hx Diabetes:  No


Hx Cancer:  No


Hx Gastrointestinal Problems:  Yes - Crohn's disease, C. Diff


Hx Dialysis:  No


Hx Neurological Problems:  Yes


Hx Cerebrovascular Accident:  Yes - 2005


Hx Seizures:  Yes


Hx Epilepsy:  Yes


Hx Vertigo:  Yes


Hx Dizziness:  Yes


Hx Syncope:  Yes


Hx Headaches:  Yes


Hx Weakness:  Yes


Hx Fatigue:  Yes





Review of Systems


All Other Systems:  negative except mentioned in HPI





Physical Exam





Vital Signs








  Date Time  Temp Pulse Resp B/P (MAP) Pulse Ox O2 Delivery O2 Flow Rate FiO2


 


4/19/18 13:45 99.3 100 20 108/70 98 Room Air  





 99.3       








Sp02 EP Interpretation:  reviewed, normal


General Appearance:  no apparent distress, alert, GCS 15, non-toxic


Head:  normocephalic, atraumatic


Eyes:  bilateral eye normal inspection, bilateral eye PERRL


ENT:  hearing grossly normal, normal pharynx, no angioedema, normal voice


Neck:  full range of motion, supple/symm/no masses


Respiratory:  chest non-tender, lungs clear, normal breath sounds, speaking 

full sentences


Cardiovascular #1:  regular rate, rhythm, no edema


Cardiovascular #2:  2+ carotid (R), 2+ carotid (L), 2+ radial (R), 2+ radial (L)

, 2+ dorsalis pedis (R), 2+ dorsalis pedis (L)


Gastrointestinal:  normal bowel sounds, soft, non-distended, no guarding, no 

rebound, tenderness


Rectal:  deferred


Genitourinary:  normal inspection, no CVA tenderness


Musculoskeletal:  back normal, gait/station normal, normal range of motion, non-

tender


Neurologic:  alert, oriented x3, responsive, motor strength/tone normal, 

sensory intact, speech normal


Psychiatric:  judgement/insight normal, memory normal, mood/affect normal, no 

suicidal/homicidal ideation


Reflexes:  3+ bicep (R), 3+ bicep (L), 3+ tricep (R), 3+ tricep (L), 3+ knee (R)

, 3+ knee (L)


Skin:  normal color, no rash, warm/dry, well hydrated


Lymphatic:  no adenopathy





Medical Decision Making


Diagnostic Impression:  


 Primary Impression:  


 Intractable abdominal pain


 Additional Impression:  


 Crohn's disease


 Qualified Codes:  K50.919 - Crohn's disease, unspecified, with unspecified 

complications


ER Course


Hospital Course 


62-year-old female presents ED complaining of abdominal pain, diarrhea.  

History of Crohn's





Differential diagnoses include: BPH, cystitis, pyelonephritis, kidney stone 





Clinical course


Patient placed on stretcher.  Cardiac monitor.  After initial history and 

physical I ordered labs, IV fluids, UA, pain medication 





Labs - minimal leukocytosis, hb/hct stable, electrolytes ok





Patient has had multiple CT scans in the past.  Declined CT at this time.  

History of chronic pain.  Was seen here yesterday and offered admission but 

declined at the time.  Patient referred by PMD who agrees the patient requires 

admission at this time





Case discussed with Dr. Lang and he agreed to accept the patient to his 

service for further care and support 





I feel this is a highly complex case requiring extensive working including EKG/

Rhythm strip, Xray/CT/US, Blood/urine lab work, repeat exams while in ED, and 

administration of strong opiates/narcotics for pain control, admission to 

hospital or close patient follow up.  





Diagnosis - intractable abd pain, crohns disease





Patient admitted to floor in serious condition





Labs








Test


  4/19/18


14:25


 


White Blood Count


  11.3 K/UL


(4.8-10.8)


 


Red Blood Count


  4.17 M/UL


(4.20-5.40)


 


Hemoglobin


  12.6 G/DL


(12.0-16.0)


 


Hematocrit


  38.6 %


(37.0-47.0)


 


Mean Corpuscular Volume 92 FL (80-99) 


 


Mean Corpuscular Hemoglobin


  30.1 PG


(27.0-31.0)


 


Mean Corpuscular Hemoglobin


Concent 32.5 G/DL


(32.0-36.0)


 


Red Cell Distribution Width


  15.5 %


(11.6-14.8)


 


Platelet Count


  258 K/UL


(150-450)


 


Mean Platelet Volume


  7.2 FL


(6.5-10.1)


 


Neutrophils (%) (Auto)


  62.6 %


(45.0-75.0)


 


Lymphocytes (%) (Auto)


  27.3 %


(20.0-45.0)


 


Monocytes (%) (Auto)


  8.7 %


(1.0-10.0)


 


Eosinophils (%) (Auto)


  0.8 %


(0.0-3.0)


 


Basophils (%) (Auto)


  0.7 %


(0.0-2.0)


 


Sodium Level


  139 MMOL/L


(136-145)


 


Potassium Level


  3.2 MMOL/L


(3.5-5.1)


 


Chloride Level


  105 MMOL/L


()


 


Carbon Dioxide Level


  27 MMOL/L


(21-32)


 


Anion Gap


  7 mmol/L


(5-15)


 


Blood Urea Nitrogen


  16 mg/dL


(7-18)


 


Creatinine


  0.8 MG/DL


(0.55-1.30)


 


Estimat Glomerular Filtration


Rate > 60 mL/min


(>60)


 


Glucose Level


  114 MG/DL


()


 


Calcium Level


  9.2 MG/DL


(8.5-10.1)


 


Total Bilirubin


  0.2 MG/DL


(0.2-1.0)


 


Aspartate Amino Transf


(AST/SGOT) 37 U/L (15-37) 


 


 


Alanine Aminotransferase


(ALT/SGPT) 17 U/L (12-78) 


 


 


Alkaline Phosphatase


  118 U/L


()


 


Total Protein


  8.5 G/DL


(6.4-8.2)


 


Albumin


  2.3 G/DL


(3.4-5.0)


 


Globulin 6.2 g/dL 


 


Albumin/Globulin Ratio 0.4 (1.0-2.7) 


 


Lipase


  56 U/L


()











Last Vital Signs








  Date Time  Temp Pulse Resp B/P (MAP) Pulse Ox O2 Delivery O2 Flow Rate FiO2


 


4/19/18 13:45 99.3 100 20 108/70 98 Room Air  





 99.3       








Status:  improved


Disposition:  ADMITTED AS INPATIENT


Condition:  Serious


Referrals:  


MAICOL LANG (PCP)











Eliseo Richardson MD Apr 19, 2018 15:16

## 2018-04-20 VITALS — DIASTOLIC BLOOD PRESSURE: 76 MMHG | SYSTOLIC BLOOD PRESSURE: 108 MMHG

## 2018-04-20 VITALS — SYSTOLIC BLOOD PRESSURE: 113 MMHG | DIASTOLIC BLOOD PRESSURE: 68 MMHG

## 2018-04-20 VITALS — DIASTOLIC BLOOD PRESSURE: 77 MMHG | SYSTOLIC BLOOD PRESSURE: 127 MMHG

## 2018-04-20 VITALS — DIASTOLIC BLOOD PRESSURE: 97 MMHG | SYSTOLIC BLOOD PRESSURE: 169 MMHG

## 2018-04-20 VITALS — DIASTOLIC BLOOD PRESSURE: 83 MMHG | SYSTOLIC BLOOD PRESSURE: 138 MMHG

## 2018-04-20 LAB
ADD MANUAL DIFF: NO
ALBUMIN SERPL-MCNC: 2.2 G/DL (ref 3.4–5)
ALBUMIN/GLOB SERPL: 0.4 {RATIO} (ref 1–2.7)
ALP SERPL-CCNC: 112 U/L (ref 46–116)
ALT SERPL-CCNC: 18 U/L (ref 12–78)
AMYLASE SERPL-CCNC: 49 U/L (ref 25–115)
ANION GAP SERPL CALC-SCNC: 4 MMOL/L (ref 5–15)
AST SERPL-CCNC: 35 U/L (ref 15–37)
BASOPHILS NFR BLD AUTO: 0.7 % (ref 0–2)
BILIRUB SERPL-MCNC: 0.2 MG/DL (ref 0.2–1)
BUN SERPL-MCNC: 11 MG/DL (ref 7–18)
CALCIUM SERPL-MCNC: 9 MG/DL (ref 8.5–10.1)
CHLORIDE SERPL-SCNC: 105 MMOL/L (ref 98–107)
CO2 SERPL-SCNC: 27 MMOL/L (ref 21–32)
CREAT SERPL-MCNC: 0.8 MG/DL (ref 0.55–1.3)
EOSINOPHIL NFR BLD AUTO: 1.6 % (ref 0–3)
ERYTHROCYTE [DISTWIDTH] IN BLOOD BY AUTOMATED COUNT: 15.3 % (ref 11.6–14.8)
GLOBULIN SER-MCNC: 6 G/DL
HCT VFR BLD CALC: 38 % (ref 37–47)
HGB BLD-MCNC: 12.4 G/DL (ref 12–16)
LYMPHOCYTES NFR BLD AUTO: 33.9 % (ref 20–45)
MCV RBC AUTO: 92 FL (ref 80–99)
MONOCYTES NFR BLD AUTO: 10.8 % (ref 1–10)
NEUTROPHILS NFR BLD AUTO: 53 % (ref 45–75)
PLATELET # BLD: 242 K/UL (ref 150–450)
POTASSIUM SERPL-SCNC: 3.9 MMOL/L (ref 3.5–5.1)
RBC # BLD AUTO: 4.14 M/UL (ref 4.2–5.4)
SODIUM SERPL-SCNC: 136 MMOL/L (ref 136–145)
WBC # BLD AUTO: 9.5 K/UL (ref 4.8–10.8)

## 2018-04-20 PROCEDURE — 0W9B3ZX DRAINAGE OF LEFT PLEURAL CAVITY, PERCUTANEOUS APPROACH, DIAGNOSTIC: ICD-10-PCS

## 2018-04-20 RX ADMIN — DULOXETINE HYDROCHLORIDE SCH MG: 30 CAPSULE, DELAYED RELEASE ORAL at 09:00

## 2018-04-20 RX ADMIN — MORPHINE SULFATE PRN MG: 4 INJECTION INTRAVENOUS at 12:32

## 2018-04-20 RX ADMIN — MORPHINE SULFATE PRN MG: 4 INJECTION INTRAVENOUS at 17:51

## 2018-04-20 RX ADMIN — TRAZODONE HYDROCHLORIDE SCH MG: 100 TABLET ORAL at 21:24

## 2018-04-20 RX ADMIN — DEXTROSE AND SODIUM CHLORIDE SCH MLS/HR: 5; .45 INJECTION, SOLUTION INTRAVENOUS at 21:23

## 2018-04-20 RX ADMIN — DEXTROSE AND SODIUM CHLORIDE SCH MLS/HR: 5; .45 INJECTION, SOLUTION INTRAVENOUS at 05:18

## 2018-04-20 RX ADMIN — THEOPHYLLINE ANHYDROUS SCH MG: 100 CAPSULE, EXTENDED RELEASE ORAL at 21:24

## 2018-04-20 RX ADMIN — MORPHINE SULFATE PRN MG: 4 INJECTION INTRAVENOUS at 21:53

## 2018-04-20 RX ADMIN — HEPARIN SODIUM SCH UNITS: 5000 INJECTION INTRAVENOUS; SUBCUTANEOUS at 09:02

## 2018-04-20 RX ADMIN — METHYLNALTREXONE BROMIDE SCH MG: 12 INJECTION, SOLUTION SUBCUTANEOUS at 11:32

## 2018-04-20 RX ADMIN — PANTOPRAZOLE SODIUM SCH MG: 40 INJECTION, POWDER, FOR SOLUTION INTRAVENOUS at 09:00

## 2018-04-20 RX ADMIN — MORPHINE SULFATE PRN MG: 4 INJECTION INTRAVENOUS at 07:47

## 2018-04-20 RX ADMIN — DEXTROSE MONOHYDRATE SCH MLS/HR: 50 INJECTION, SOLUTION INTRAVENOUS at 17:50

## 2018-04-20 RX ADMIN — ATORVASTATIN CALCIUM SCH MG: 20 TABLET, FILM COATED ORAL at 21:25

## 2018-04-20 RX ADMIN — HEPARIN SODIUM SCH UNITS: 5000 INJECTION INTRAVENOUS; SUBCUTANEOUS at 21:26

## 2018-04-20 RX ADMIN — TOPIRAMATE SCH MG: 25 TABLET, COATED ORAL at 21:28

## 2018-04-20 RX ADMIN — MORPHINE SULFATE PRN MG: 4 INJECTION INTRAVENOUS at 03:45

## 2018-04-20 RX ADMIN — TOPIRAMATE SCH MG: 25 TABLET, COATED ORAL at 09:00

## 2018-04-20 RX ADMIN — THEOPHYLLINE ANHYDROUS SCH MG: 100 CAPSULE, EXTENDED RELEASE ORAL at 09:00

## 2018-04-20 NOTE — OPERATIVE NOTE - PDOC
Operative Note


Operative Note


Date of Operation/Procedure:  Apr 20, 2018


Pre-op Diagnosis:


pleural effusion/ niodularity concerning for malignancy


Procedure:


left US guided thoracentesis - diagnostic


Post-op Diagnosis:


same


Post-op Diagnosis:  same as pre-op


Anesthesia:  local


Specimen:  yes


Complications:  none


Condition:  stable


Estimated Blood Loss:  none


Drains:  none


Implant(s) used?:  No


Indications for Procedure


pleural fluid, nodularity concerning for malignancy


Description of Procedure


successful US guided thoracentesis - removed approx 960 mL of fluid.  specimen 

sent for laboratort analysis.











Luis Cortes M.D. Apr 20, 2018 17:23

## 2018-04-20 NOTE — DIAGNOSTIC IMAGING REPORT
Indications: Left-sided pleural effusion with pleural nodularity

 

Technique: Ultrasound used to localize optimal puncture site. Sterile prepping and

draping left chest. Local anesthesia with 1% lidocaine. Under real-time ultrasound

guidance, puncture pleural space using thoracentesis needle. Stylet removed. Catheter

placed to vacuum bottle suction. Total approximately 960 milliliters of fluid

aspirated. Patient tolerated procedure well, without immediate complication.

 

Findings: Followup sonography demonstrates complete resolution of pleural fluid.

 

Impression: Successful ultrasound-guided thoracentesis, yielding approximately  960

milliliters of fluid. Sample sent for better analysis, including cytology.

## 2018-04-20 NOTE — CONSULTATION
History of Present Illness


General


Date patient seen:  Apr 20, 2018


Chief Complaint:  Abdominal Pain


Referring physician:  MAICOL LANG


Reason for Consultation:  ABDOMINAL PAIN





Present Illness


HPI


62-year-old female with complex medical and surgical history including multiple 

abdominal surgeries in the past, COPD/asthma, recent weight loss, presenting 

with abdominal pain.  Patient had small bowel capsule endoscopy earlier this 

week and the day after her procedure she began having intractable abdominal 

pain.  Came to ED for evaluation and was noted to have generalized pain.  She 

states that she has been coughing for a few weeks, has pain has been worsening 

over the past 3 days and is associated with nausea but no emesis.  States she 

has not recently been passing gas.  Pain sharp generalized 10/10 abdominal 

pain.  she states that it is not relieved by morphine and wants dilaudid.  Pt 

has a morphine pump for chronic pain implanted is being followed by Pain 

management. Hx of chronic abdominal pain and has been known to have OIC.  

Patient has had multiple surgeries in the past for bowel obstruction as well as 

lysis of adhesions.  She has prior mesh placement in the midline.  





During work up she did not want another CT scan since she has had multiple 

prior.  Agreed to CT scan today which demonstrate multiple new and/or worsening 

findings.  Surgery called to evaluate.  When seen at the bedside with  

she states she has never had the pain like this before.  she can recall prior 

hospitalizations and asks for the dilaudid she usually receives.  she states 

she has been having fevers but does not know how high.


Allergies:  


Coded Allergies:  


     No Known Allergies (Verified , 10/27/06)





Medication History


Scheduled


Al Hydroxide/mg Hydroxide (Mag-Al Plus Suspension), 30 ML PO Q6HR, (Reported)


Aspirin* (Aspir 81*), 81 MG ORAL DAILY, (Reported)


Atorvastatin Calcium* (Lipitor*), 40 MG ORAL BEDTIME, (Reported)


Bupropion Hcl* (Bupropion Hcl*), 100 MG ORAL DAILY, (Reported)


Duloxetine Hcl* (Cymbalta*), 60 MG ORAL DAILY, (Reported)


Fluconazole (Fluconazole), 200 MG ORAL DAILY, (Reported)


Levetiracetam* (Levetiracetam*), 1,000 MG ORAL TID, (Reported)


Levothyroxine Sodium* (Levothyroxine Sodium*), 75 MCG ORAL ACBREAKFAST, (

Reported)


Linaclotide (Linzess), 145 MCG PO DAILY, (Reported)


Lorazepam* (Ativan*), 1 MG ORAL THREE TIMES A DAY, (Reported)


Mesalamine (Asacol Hd), 1,600 MG ORAL THREE TIMES A DAY, (Reported)


Mirtazapine (Remeron), 30 MG ORAL BEDTIME, (Reported)


Montelukast Sodium* (Montelukast Sodium*), 10 MG ORAL DAILY, (Reported)


Multivitamins* (Multivitamins*), 1 TAB ORAL DAILY, (Reported)


Pantoprazole* (Protonix*), 40 MG ORAL DAILY, (Reported)


Quetiapine Fumarate (Seroquel), 200 MG ORAL QHS, (Reported)


Theophylline (Theodur*), 100 MG ORAL TWICE A DAY, (Reported)


Topiramate* (Topamax*), 25 MG ORAL TWICE A DAY, (Reported)


Trazodone Hcl* (Desyrel*), 200 MG ORAL BEDTIME, (Reported)





Scheduled PRN


Acetaminophen* (Acetaminophen 325MG Tablet*), 325 MG ORAL Q4H PRN for Fever/

Headache/Mild Pain, (Reported)


Diphenhydramine HCl (Benadryl), 25 MG PO Q6HR PRN for Itching, (Reported)





Discontinued Medications


Ibuprofen* (Motrin*), 800 MG ORAL THREE TIMES A DAY, (Reported)


   Discontinued Reason: Therapy completed


Levofloxacin-D5w 500 Mg/100 Ml* (Levofloxacin-D5w 500 Mg/100 Ml*), 500 MG IVPB 

Q24H, (Reported)


   Discontinued Reason: Therapy completed


Levothyroxine Sodium (Synthroid), 75 MCG ORAL DAILY, (Reported)


   Discontinued Reason: Medication dose changed


Mesalamine (Pentasa), 1,000 MG ORAL TID, (Reported)


   Discontinued Reason: Pt stopped taking med


Metronidazole* (Flagyl*), 200 MG ORAL DAILY, (Reported)


   Discontinued Reason: Therapy completed


Nitroglycerin (Nitroglycerin), 0.4 MG SL [id number] PRN for Prn Chest Pain, (

Reported)


   Discontinued Reason: Therapy completed


Ondansetron* (Zofran*), 4 MG ORAL Q6H PRN for Nausea & Vomiting, (Reported)


   Discontinued Reason: Therapy completed


Risperidone* (Risperdal*), 0.5 MG ORAL BEDTIME, (Reported)


   Discontinued Reason: Pt stopped taking med


Temazepam* (Restoril*), 15 MG ORAL BEDTIME PRN for Insomnia, (Reported)


   Discontinued Reason: Medication dose changed


Zolpidem Tartrate* (Ambien*), 5 MG ORAL BEDTIME PRN for Insomnia, (Reported)


   Discontinued Reason: Pt stopped taking med





Patient History


History Provided By:  Patient, Medical Record, PMD


Healthcare decision maker


Patient


Resuscitation status


Full Code


Advanced Directive on File


No





Past Medical/Surgical History


Past Medical/Surgical History:  


(1) COPD (chronic obstructive pulmonary disease)


(2) Asthma


(3) IBD (inflammatory bowel disease)


(4) Elevated CEA


(5) COPD exacerbation


(6) PNA (pneumonia)


(7) Crohns disease


(8) Therapeutic opioid-induced constipation (OIC)


(9) Purulent bronchitis


(10) COPD (chronic obstructive pulmonary disease)


(11) Perianal abscess


(12) Shortness of breath


(13) Emphysema


[phone] [phone]


([phone] [id number]


(16) Depression


(17) Dyspnea


(18) Respiratory distress


(19) SOB (shortness of breath)


(20) Edema


(21) CHF (congestive heart failure)


(22) Ileus


(23) Leukocytosis


(24) Psychosis


(25) Seizure


(26) Vertigo


[phone]


(28) Chronic pancreatitis


(29) Lumbar spondylosis


(30) Chronic pain syndrome


(31) UTI (urinary tract infection)


(32) Nausea


(33) Interstitial lung disease


(34) Asthma


(35) Chronic pain disorder


(36) Herniated nucleus pulposus, lumbar


(37) Hx SBO


(38) Abnormal laboratory test result


(39) Radiculopathy of lumbar region


(40) Abdominal pain


(41) Abdominal pain


(42) Abdominal pain


(43) chr psych disorder


(44) recurrent syncope


(45) seizure disorder, exacerbation


(46) Sepsis


(47) Chest pain


(48) Pneumonia


(49) Lower GI bleed


(50) Pre-syncope


[phone] [id number]


(52) Abnormal laboratory test result


(53) Chronic back pain


([phone]


(55) History of colonic polyps


(56) Sprain of right hand


(57) Acute encephalopathy


(58) Intractable abdominal pain


(59) Cellulitis of female genitalia


(60) Encounter for generalized patient complaints


(61) Abscess of right genital labia


(62) Colonoscopy planned


(63) Abdominal pain


(64) Abdominal pain


(65) Chronic abdominal pain


(66) Constipation


(67) Nausea and vomiting


(68) Slow transit constipation


(69) Slow transit constipation


(70) Gram-negative bacteremia


(71) Bacteremia


(72) Chronic pain


(73) Anemia


(74) Diarrhea


(75) Crohn's disease


(76) Opioid dependence


(77) Weight loss


(78) Intractable abdominal pain


(79) SBO (small bowel obstruction)





Review of Systems


All Other Systems:  negative except mentioned in HPI





Physical Exam


General Appearance:  alert, mild distress


Lines, tubes and drains:  peripheral


HEENT:  mucous membranes moist, PERRL


Neck:  normal inspection


Respiratory/Chest:  no respiratory distress, no accessory muscle use, decreased 

breath sounds


Cardiovascular/Chest:  tachycardia


Abdomen:  normal bowel sounds, soft, distended, other - tenderness mainly in 

left flank and costal margin on ribs and muscles.  abdomen with minimal 

tenderness and similar to prior exams.


Extremities:  normal range of motion


Skin Exam:  normal pigmentation


Neurologic:  alert, oriented x 3





Last 24 Hour Vital Signs








  Date Time  Temp Pulse Resp B/P (MAP) Pulse Ox O2 Delivery O2 Flow Rate FiO2


 


4/20/18 12:32 100.0       


 


4/20/18 12:00 100.0 107 20 108/76 100 Room Air  





 100.0       


 


4/20/18 08:17 99.3       


 


4/20/18 08:00 99.3 88 12 127/77 95 Room Air  





 99.3       


 


4/20/18 07:47 99.6       


 


4/20/18 00:00 99.6 88 19 113/68 100 Room Air  





 99.6       


 


4/19/18 20:00 99.3 92 19 118/84 100 Room Air  





 99.3       


 


4/19/18 18:00 99.8 95 20 141/88 96 Room Air  





 99.8       


 


4/19/18 17:45 100.2 92 20 147/82 96 Room Air  





 100.2       


 


4/19/18 17:39 100.2 92 20 147/82 96 Room Air  





 100.2       


 


4/19/18 17:36 98.9       

















Intake and Output  


 


 4/19/18 4/20/18





 19:00 07:00


 


Intake Total 0 ml 750 ml


 


Balance 0 ml 750 ml


 


  


 


Intake Oral 0 ml 


 


IV Total  750 ml


 


# Voids  3











Laboratory Tests








Test


  4/20/18


04:27


 


White Blood Count


  9.5 K/UL


(4.8-10.8)


 


Red Blood Count


  4.14 M/UL


(4.20-5.40)  L


 


Hemoglobin


  12.4 G/DL


(12.0-16.0)


 


Hematocrit


  38.0 %


(37.0-47.0)


 


Mean Corpuscular Volume 92 FL (80-99)  


 


Mean Corpuscular Hemoglobin


  29.9 PG


(27.0-31.0)


 


Mean Corpuscular Hemoglobin


Concent 32.6 G/DL


(32.0-36.0)


 


Red Cell Distribution Width


  15.3 %


(11.6-14.8)  H


 


Platelet Count


  242 K/UL


(150-450)


 


Mean Platelet Volume


  6.9 FL


(6.5-10.1)


 


Neutrophils (%) (Auto)


  53.0 %


(45.0-75.0)


 


Lymphocytes (%) (Auto)


  33.9 %


(20.0-45.0)


 


Monocytes (%) (Auto)


  10.8 %


(1.0-10.0)  H


 


Eosinophils (%) (Auto)


  1.6 %


(0.0-3.0)


 


Basophils (%) (Auto)


  0.7 %


(0.0-2.0)


 


Activated Partial


Thromboplast Time 29 SEC (23-33)


 


 


Sodium Level


  136 MMOL/L


(136-145)


 


Potassium Level


  3.9 MMOL/L


(3.5-5.1)


 


Chloride Level


  105 MMOL/L


()


 


Carbon Dioxide Level


  27 MMOL/L


(21-32)


 


Anion Gap


  4 mmol/L


(5-15)  L


 


Blood Urea Nitrogen


  11 mg/dL


(7-18)


 


Creatinine


  0.8 MG/DL


(0.55-1.30)


 


Estimat Glomerular Filtration


Rate > 60 mL/min


(>60)


 


Glucose Level


  98 MG/DL


()


 


Calcium Level


  9.0 MG/DL


(8.5-10.1)


 


Total Bilirubin


  0.2 MG/DL


(0.2-1.0)


 


Aspartate Amino Transf


(AST/SGOT) 35 U/L (15-37)


 


 


Alanine Aminotransferase


(ALT/SGPT) 18 U/L (12-78)


 


 


Alkaline Phosphatase


  112 U/L


()


 


Total Protein


  8.2 G/DL


(6.4-8.2)


 


Albumin


  2.2 G/DL


(3.4-5.0)  L


 


Globulin 6.0 g/dL  


 


Albumin/Globulin Ratio


  0.4 (1.0-2.7)


L


 


Amylase Level


  49 U/L


()


 


Lipase


  52 U/L


()  L








Height (Feet):  5


Height (Inches):  6.00


Weight (Pounds):  129


Medications





Current Medications








 Medications


  (Trade)  Dose


 Ordered  Sig/Jed


 Route


 PRN Reason  Start Time


 Stop Time Status Last Admin


Dose Admin


 


 Acetaminophen


  (Tylenol)  650 mg  Q4H  PRN


 ORAL


 fever  4/19/18 15:30


 5/19/18 15:29   


 


 


 Al Hydroxide/Mg


 Hydroxide


  (Mylanta II)  30 ml  Q6H  PRN


 ORAL


 dyspepsia  4/19/18 15:30


 5/19/18 15:29   


 


 


 Atorvastatin


 Calcium


  (Lipitor)  40 mg  BEDTIME


 ORAL


   4/19/18 21:00


 5/19/18 20:59  4/19/18 21:08


 


 


 Bupropion HCl


  (Wellbutrin)  100 mg  DAILY


 ORAL


   4/20/18 09:00


 5/20/18 08:59  4/20/18 09:00


 


 


 Dextrose


  (Dextrose 50%)  50 ml  STAT  PRN


 IV


 Hypoglycemia BS<60mg/dL  4/19/18 15:30


 5/19/18 15:29   


 


 


 Dextrose/Sodium


 Chloride  1,000 ml @ 


 75 mls/hr  [id number]


 IV


   4/19/18 17:00


 5/19/18 16:59  4/20/18 05:18


 


 


 Diphenhydramine


 HCl


  (Benadryl)  25 mg  Q6H  PRN


 ORAL


 Itching/Pruritis  4/19/18 15:30


 5/19/18 15:29   


 


 


 Duloxetine HCl


  (Cymbalta)  60 mg  DAILY


 ORAL


   4/20/18 09:00


 5/20/18 08:59  4/20/18 09:00


 


 


 Gabapentin


  (Neurontin)  300 mg  THREE TIMES A  DAY


 ORAL


   4/20/18 10:00


 5/20/18 09:59  4/20/18 13:06


 


 


 Heparin Sodium


  (Porcine)


  (Heparin 5000


 units/ml)  5,000 units  EVERY 12  HOURS


 SUBQ


   4/19/18 21:00


 5/19/18 20:59  4/20/18 09:02


 


 


 Levetiracetam


  (Keppra)  1,000 mg  Q8HR


 ORAL


   4/19/18 22:00


 5/19/18 21:59  4/20/18 13:06


 


 


 Levothyroxine


 Sodium


  (Synthroid)  75 mcg  ACBREAKFAST


 ORAL


   4/20/18 06:30


 5/20/18 06:29  4/20/18 05:48


 


 


 Lorazepam


  (Ativan 2mg/ml


 1ml)  1 mg  Q4H  PRN


 IV


 agitation  4/19/18 15:30


 4/26/18 15:29   


 


 


 Methylnaltrexone


 Bromide


  (Relistor)  12 mg  DAILY


 SUBQ


   4/20/18 10:15


 5/20/18 10:14  4/20/18 11:32


 


 


 Metoclopramide HCl


  (Reglan)  10 mg  Q6H  PRN


 IVP


 Unrelieved Severe Nausea  4/19/18 15:30


 5/19/18 15:29   


 


 


 Morphine Sulfate


  (Morphine


 Sulfate)  2 mg  Q4H  PRN


 IVP


 severe  Pain (Pain Scale 7-10)  4/19/18 15:30


 4/26/18 15:29  4/20/18 12:32


 


 


 Nitroglycerin


  (Ntg)  0.4 mg  Q5M X 3 DOSES PRN


 SL


 Prn Chest Pain  4/19/18 15:30


 5/19/18 15:29   


 


 


 Ondansetron HCl


  (Zofran)  4 mg  Q6H  PRN


 IVP


 Nausea & Vomiting  4/19/18 15:30


 5/19/18 15:29  4/20/18 12:37


 


 


 Pantoprazole


  (Protonix)  40 mg  DAILY


 IV


   4/20/18 09:00


 5/20/18 08:59  4/20/18 09:00


 


 


 Piperacillin Sod/


 Tazobactam Sod


 3.375 gm/Sodium


 Chloride  110 ml @ 


 27.5 mls/hr  Q8H


 IVPB


   4/20/18 16:00


 4/27/18 15:59   


 


 


 Polyethylene


 Glycol


  (Miralax)  17 gm  HSPRN  PRN


 ORAL


 Constipation  4/19/18 15:30


 5/19/18 15:29   


 


 


 Promethazine HCl


 25 mg/Sodium


 Chloride  56 ml @ 


 110 mls/hr  Q6H  PRN


 IV


 Refractory N/V  4/19/18 15:30


 5/19/18 15:29   


 


 


 Simethicone


  (Mylicon)  80 mg  QIDPRN  PRN


 ORAL


 gas   4/20/18 10:00


 5/20/18 09:59   


 


 


 Temazepam


  (Restoril)  15 mg  HSPRN  PRN


 ORAL


 Insomnia  4/19/18 15:30


 4/26/18 15:29   


 


 


 Theophylline


  (Shiv-Dur)  100 mg  Q12HR


 ORAL


   4/19/18 21:00


 5/19/18 20:59  4/20/18 09:00


 


 


 Topiramate


  (Topamax)  25 mg  EVERY 12  HOURS


 ORAL


   4/19/18 21:00


 5/19/18 20:59  4/20/18 09:00


 


 


 Trazodone HCl


  (Desyrel)  200 mg  BEDTIME


 ORAL


   4/19/18 21:00


 5/19/18 20:59  4/19/18 21:09


 











Assessment/Plan


Problem List:  


(1) Intractable abdominal pain


Assessment & Plan:  62F with abdominal pain.  very complex medical and surgical 

history.  now with complex CT findings of left pleural effusion, left lower 

lung mass, large mediastinal and periaortic lymph nodes, new liver lesions, and 

air around the liver.  


unable to tell if bowel loop (possible blind end from prior surgery) noted in 

right upper quadrant above liver or if abscess or if free air.  exam not 

consistent with perforation and no significant free fluid noted on CT or area 

of perforation.  contrast into distal bowel without leak.  initial leukocytosis 

resolved.  low grade persistent fevers.  recent cough.  





unfortunately no clear explanation as to patients current condition.  lung mass

, effusion, new liver masses, and nodes very concerning for malignant process. 

Exam stable compared to prior exams (patient seen during last admission and 

known to me).  will need much more extensive work up. 





I discussed these findings with patient.  I explained possible need for urgent 

surgery but given history and complex surgical history we will monitor 

clinically first.  if declines will proceed with surgery which is VERY high 

risk in her.  if stable will continue with work up.  patient and partner 

express understand and agree with plan.  





-thoracentesis.  fluid for cytology


-monitor exam clinically


-trend labs


-iv abx


-will follow with recs. thank you for this consultation.


ICD Codes:  R10.9 - Unspecified abdominal pain


SNOMED:  [id number], [id number]


Status:  unchanged











Darrius Benitez Apr 20, 2018 16:09

## 2018-04-20 NOTE — DIAGNOSTIC IMAGING REPORT
Indication: Abdominal pain

 

Technique: US ABD Complete

 

Comparison: CT abdomen 3/15/2018; abdominal ultrasound 2/10/2017.

 

Findings: Imaged portions of the pancreatic head are grossly unremarkable. The body

and tail are not well seen. Liver is homogenous in echogenicity. No focal hepatic

mass lesion is appreciated sonographically. The liver is borderline enlarged, with

irregular measuring 17 cm in length. Portal and hepatic veins appear patent. No

appreciable biliary ductal dilatation. The gallbladder is contracted, with apparent

gallbladder wall thickening related to contraction. No definite cholelithiasis or

gallbladder sludge seen. No pericholecystic fluid. No intrahepatic biliary ductal

dilatation. Common bile duct measures 3.7 mm.

 

Kidneys demonstrate normal parenchymal thickness and echogenicity. No hydronephrosis

or sonographically appreciable renal stones.

 

Spleen is unremarkable in appearance.

 

No appreciable ascites. Portions of the aorta are not well seen. There is question of

bilateral pleural effusions.

 

Impression: 

Contracted/undistended gallbladder. No apparent gallstones. No pericholecystic fluid.

 

 

Mild hepatomegaly with the right lobe measuring up to 17 cm in length.

 

Probable bilateral pleural effusions.

 

Portions of aorta obscured.

 

This corresponds with the [facility] preliminary report.

## 2018-04-20 NOTE — PRE-PROCEDURE NOTE/ATTESTATION
Pre-Procedure Note/Attestation


Complete Prior to Procedure


Planned Procedure:  left


Procedure Narrative:


thoracentesis - US guided





Indications for Procedure


Pre-Operative Diagnosis:


pleural effusion/ niodularity concerning for malignancy





Attestation


I attest that I discussed the nature of the procedure; its benefits; risks and 

complications; and alternatives (and the risks and benefits of such alternatives

), prior to the procedure, with the patient (or the patient's legal 

representative).





I attest that, if there was a reasonable possibility of needing a blood 

transfusion, the patient (or the patient's legal representative) was given the 

[facility] standardized written summary, pursuant 

to the [place] Blood Safety Act ([place] Health and Safety Code # 1645, as 

amended).





I attest that I re-evaluated the patient just prior to the surgery and that 

there has been no change in the patient's H&P, except as documented below:











Luis Cortes M.D. Apr 20, 2018 17:11

## 2018-04-20 NOTE — DIAGNOSTIC IMAGING REPORT
Indication: Status post thoracentesis

 

Technique: XRAY Chest 1v

 

Comparison: 3/15/2018

 

Findings: Heart size and mediastinal contours are stable. There is no definite

pneumothorax status post thoracentesis. No large effusion. There is hilar opacity on

the left likely related to the mass lesion partially visualized on CT. There is

unchanged interstitial prominence. The PICC line has been removed since the prior

exam. No acute osseous abnormality seen.

 

IMPRESSION: 

No evidence of pneumothorax status post thoracentesis.

 

Left hilar mass versus adenopathy partially visualized on CT of the abdomen. Further

evaluation with dedicated CT of the chest with contrast recommended.

## 2018-04-20 NOTE — HISTORY AND PHYSICAL REPORT
DATE OF ADMISSION:  04/19/2018



APPROXIMATE TIME:  12 noon.



CONSULTANTS:

1. Blaze Fraga M.D.

2. Behnoush Zarrini, M.D.

3. Dimitry Mcleod M.D.



CHIEF COMPLAINT:

1. Abdominal pain.

2. Short of breath.



BRIEF HISTORY:  This 62-year-old female, who lives at home, presents to

[facility] last night with increased abdominal pain x2 days, left-sided

mostly, sharp, no radiation.  Slight nausea.  The patient came to [facility], diagnosed with above, admitted to medical floor for further treatment.

Currently, complaining of abdominal pain, 7/10, status post CT of the

abdomen, no complaint.



REVIEW OF SYSTEMS:  No chest pain.  Slight short of breath.  Slight nausea.

No vomiting.  No diarrhea.



PAST MEDICAL HISTORY:  Crohn disease, abdominal pain, COPD, CHF.



PAST SURGICAL HISTORY:  Exploratory laparotomy.



MEDICATIONS:  Include ______ , Neurontin, Mylicon, Wellbutrin, Cymbalta,

Protonix.  Synthroid, Keppra, Lipitor, Shiv-Dur, Topamax.



ALLERGIES:  Denies.



SOCIAL HISTORY:  Positive smoking.  No alcohol.  No intravenous drug

abuse.



FAMILY HISTORY:  Noncontributory.



PHYSICAL EXAMINATION:

GENERAL:  Anxious in bed, oriented x3, slight distress secondary to

abdominal pain.

VITAL SIGNS:  Temperature 100, pulse 107, respiratory rate 20, blood

pressure 108/76.

CARDIOVASCULAR:  No murmur.

LUNGS:  Distant and clear.

ABDOMEN:  Bowel sounds positive.  Distended.  Soft.  No rigidity.  No

rebound.  Slight guarding. Slightly tender.



LABORATORY DATA:  White count yesterday 11.3 today, CBC is normal.  BMP

shows albumin 2.2 otherwise BMP is normal.  PTT is 29.  Urinalysis show 3+

leukocyte esterase.



ASSESSMENT:

1. Shortness of breath.

2. Urinary tract infection.

3. Abdominal pain.

4. Crohn disease.

5. COPD.

6. CHF.



PLAN:

1. Continue previous medications.

2. OT/PT.

3. Dietary evaluation.

4. O2 pulmonary treatment as needed.

5. CBC and BMP in the morning.

6. Pain control.

7. Resume home medications.

8. We will continue to follow this patient.

9. Dr. Fraga, Dr. Shetty, Dr. Dr. Elvin Mcleod to consult.









  ______________________________________________

  Maxim Hopkins D.O.





DR:  Genevieve

D:  04/20/2018 12:41

T:  04/20/2018 18:48

JOB#:  [id number]

CC:

## 2018-04-20 NOTE — GI INITIAL CONSULT NOTE
Elena Seay N.PMatt 4/20/18 1044:


History of Present Illness


General


Date patient seen:  Apr 20, 2018


Time patient seen:  10:32


Reason for Hospitalization:  Abdominal Pain


Referring physician:  MAICOL LANG


Reason for Consultation:  ABDOMINAL PAIN





Present Illness


HPI


62-year-old female, history of multiple abdominal surgeries in the past, COPD/

asthma, recent weight loss, presenting with abdominal pain.   Patient was seen 

earlier this week in clinic s/p small bowel capsule endoscopy.   Day after her 

procedure, the patient began having intractable abdominal pain and presented to 

the ED.  Patient seen today, awake A&Ox4 c/o of LUQ abdominal pain and 

distention, with generalized tenderness on palpation all quads.  States she has 

not recently been passing gas.  Pt has a morphine pump for chronic pain 

implanted is being followed by Pain management.  Hx of chronic abdominal pain 

and has been known to have OIC.  Patient has had multiple surgeries in the past 

for bowel obstruction as well as lysis of adhesions.


Home Meds


Reported Medications


Lorazepam* (ATIVAN*) 1 Mg Tablet, 1 MG ORAL THREE TIMES A DAY, TAB


   4/19/18


Multivitamins* (MULTIVITAMINS*) 1 Each Tablet, 1 TAB ORAL DAILY, TAB 0 Refills


   4/19/18


Duloxetine Hcl* (CYMBALTA*) 60 Mg Capsule.dr, 60 MG ORAL DAILY, CAP


   3/21/18


Theophylline (THEODUR*) 100 Mg Tab.er.12h, 100 MG ORAL TWICE A DAY, #30 TAB 0 

Refills


   3/21/18


Fluconazole (FLUCONAZOLE) 200 Mg Tablet, 200 MG ORAL DAILY for 11 Days, #7 TAB 

0 Refills


   3/21/18


Atorvastatin Calcium* (LIPITOR*) 40 Mg Tablet, 40 MG ORAL BEDTIME, #30 TAB 0 

Refills


   3/21/18


Trazodone Hcl* (DESYREL*) 100 Mg Tablet, 200 MG ORAL BEDTIME, TAB


   3/21/18


Mesalamine (ASACOL HD) 800 Mg Tablet.dr, 1600 MG ORAL THREE TIMES A DAY, TAB


   Do not break outer coating


   3/21/18


Mirtazapine (REMERON) 30 Mg Tab.rapdis, 30 MG ORAL BEDTIME, TAB


   12/2/17


Quetiapine Fumarate (SEROQUEL) 400 Mg Tablet, 200 MG ORAL QHS, #15 TAB 0 Refills


   10/6/17


Pantoprazole* (PROTONIX*) 40 Mg Tablet.dr, 40 MG ORAL DAILY, TAB


   10/6/17


Levothyroxine Sodium* (LEVOTHYROXINE SODIUM*) 75 Mcg Tablet, 75 MCG ORAL 

ACBREAKFAST, TAB


   Take in the morning on an empty stomach, at least 30 minutes before


   food.


   10/6/17


Al Hydroxide/mg Hydroxide (Mag-Al Plus Suspension) 30 Ml Oral.susp, 30 ML PO 

Q6HR for Constipation, ML


   10/6/17


Acetaminophen* (ACETAMINOPHEN 325MG TABLET*) 325 Mg Tablet, 325 MG ORAL Q4H PRN 

for Fever/Headache/Mild Pain, TAB


   10/6/17


Aspirin* (ASPIR 81*) 81 Mg Tablet.dr, 81 MG ORAL DAILY, TAB


   5/30/17


Linaclotide (LINZESS) 145 Mcg Capsule, 145 MCG PO DAILY, CAP


   3/23/17


Montelukast Sodium* (MONTELUKAST SODIUM*) 10 Mg Tablet, 10 MG ORAL DAILY, TAB


   2/9/17


Bupropion Hcl* (BUPROPION HCL*) 100 Mg Tablet, 100 MG ORAL DAILY, TAB


   2/9/17


Topiramate* (TOPAMAX*) 25 Mg Tablet, 25 MG ORAL TWICE A DAY, #60 TAB 0 Refills


   12/25/16


Levetiracetam* (LEVETIRACETAM*) 500 Mg Tablet, 1000 MG ORAL TID, #60 TAB 0 

Refills


   12/25/16


Diphenhydramine HCl (Benadryl) 25 Mg Capsule, 25 MG PO Q6HR PRN for Itching, CAP


   12/25/16


Discontinued Reported Medications


Levofloxacin-D5w 500 Mg/100 Ml* (LEVOFLOXACIN-D5W 500 MG/100 ML*) 500 Mg/100 Ml 

Piggyback, 500 MG IVPB Q24H, BAG


   3/21/18


Metronidazole* (FLAGYL*) 250 Mg Tablet, 200 MG ORAL DAILY for 11 Days, TAB


   3/21/18


Levothyroxine Sodium (SYNTHROID) 137 Mcg Tablet, 75 MCG ORAL DAILY, TAB


   Take in the morning on an empty stomach, at least 30 minutes before


   food.


   3/21/18


Zolpidem Tartrate* (AMBIEN*) 5 Mg Tablet, 5 MG ORAL BEDTIME PRN for Insomnia, 

TAB


   3/15/18


Temazepam* (RESTORIL*) 15 Mg Capsule, 15 MG ORAL BEDTIME PRN for Insomnia, CAP


   10/6/17


Ondansetron* (ZOFRAN*) 4 Mg Tablet, 4 MG ORAL Q6H PRN for Nausea & Vomiting, TAB


   10/6/17


Nitroglycerin (NITROGLYCERIN) 0.4 Mg Tab.subl, 0.4 MG SL [id number] PRN for Prn 

Chest Pain, TAB


   10/6/17


Mesalamine (PENTASA) 500 Mg Capsule.er, 1000 MG ORAL TID, #30 CAP 0 Refills


   2/9/17


Risperidone* (RISPERDAL*) 0.5 Mg Tablet, 0.5 MG ORAL BEDTIME, #30 TAB 0 Refills


   2/9/17


Ibuprofen* (MOTRIN*) 600 Mg Tablet, 800 MG ORAL THREE TIMES A DAY, #30 TAB 0 

Refills


   2/9/17


Med list reviewed/reconciled:  Yes


Allergies:  


Coded Allergies:  


     No Known Allergies (Verified , 10/27/06)





Patient History


History Provided By:  Patient, Medical Record


PMH Narrative


Nursing Documentation-[facility]


Past Medical History:  No History, Except For


Hx Cardiac Problems:  Yes - MI


Hx Hypertension:  No


Hx Pacemaker:  No


Hx Asthma:  Yes


Hx COPD:  Yes


Hx Diabetes:  No


Hx Cancer:  No


Hx Gastrointestinal Problems:  Yes - Crohn's disease, C. Diff


Hx Dialysis:  No


Hx Neurological Problems:  Yes


Hx Cerebrovascular Accident:  Yes - 2005


Hx Seizures:  Yes


Hx Epilepsy:  Yes


Hx Vertigo:  Yes


Hx Dizziness:  Yes


Hx Syncope:  Yes


Hx Headaches:  Yes


Hx Weakness:  Yes


Hx Fatigue:  Yes





Review of Systems


All Other Systems:  negative except mentioned in HPI





Physical Exam





Vital Signs








  Date Time  Temp Pulse Resp B/P (MAP) Pulse Ox O2 Delivery O2 Flow Rate FiO2


 


4/19/18 13:45 99.3 100 20 108/70 98 Room Air  





 99.3       








Sp02 EP Interpretation:  reviewed, normal


Labs





Laboratory Tests








Test


  4/19/18


14:25 4/19/18


15:30 4/20/18


04:27


 


White Blood Count


  11.3 K/UL


(4.8-10.8)  H 


  9.5 K/UL


(4.8-10.8)


 


Red Blood Count


  4.17 M/UL


(4.20-5.40)  L 


  4.14 M/UL


(4.20-5.40)  L


 


Hemoglobin


  12.6 G/DL


(12.0-16.0) 


  12.4 G/DL


(12.0-16.0)


 


Hematocrit


  38.6 %


(37.0-47.0) 


  38.0 %


(37.0-47.0)


 


Mean Corpuscular Volume 92 FL (80-99)    92 FL (80-99)  


 


Mean Corpuscular Hemoglobin


  30.1 PG


(27.0-31.0) 


  29.9 PG


(27.0-31.0)


 


Mean Corpuscular Hemoglobin


Concent 32.5 G/DL


(32.0-36.0) 


  32.6 G/DL


(32.0-36.0)


 


Red Cell Distribution Width


  15.5 %


(11.6-14.8)  H 


  15.3 %


(11.6-14.8)  H


 


Platelet Count


  258 K/UL


(150-450) 


  242 K/UL


(150-450)


 


Mean Platelet Volume


  7.2 FL


(6.5-10.1) 


  6.9 FL


(6.5-10.1)


 


Neutrophils (%) (Auto)


  62.6 %


(45.0-75.0) 


  53.0 %


(45.0-75.0)


 


Lymphocytes (%) (Auto)


  27.3 %


(20.0-45.0) 


  33.9 %


(20.0-45.0)


 


Monocytes (%) (Auto)


  8.7 %


(1.0-10.0) 


  10.8 %


(1.0-10.0)  H


 


Eosinophils (%) (Auto)


  0.8 %


(0.0-3.0) 


  1.6 %


(0.0-3.0)


 


Basophils (%) (Auto)


  0.7 %


(0.0-2.0) 


  0.7 %


(0.0-2.0)


 


Sodium Level


  139 MMOL/L


(136-145) 


  136 MMOL/L


(136-145)


 


Potassium Level


  3.2 MMOL/L


(3.5-5.1)  L 


  3.9 MMOL/L


(3.5-5.1)


 


Chloride Level


  105 MMOL/L


() 


  105 MMOL/L


()


 


Carbon Dioxide Level


  27 MMOL/L


(21-32) 


  27 MMOL/L


(21-32)


 


Anion Gap


  7 mmol/L


(5-15) 


  4 mmol/L


(5-15)  L


 


Blood Urea Nitrogen


  16 mg/dL


(7-18) 


  11 mg/dL


(7-18)


 


Creatinine


  0.8 MG/DL


(0.55-1.30) 


  0.8 MG/DL


(0.55-1.30)


 


Estimat Glomerular Filtration


Rate > 60 mL/min


(>60) 


  > 60 mL/min


(>60)


 


Glucose Level


  114 MG/DL


()  H 


  98 MG/DL


()


 


Calcium Level


  9.2 MG/DL


(8.5-10.1) 


  9.0 MG/DL


(8.5-10.1)


 


Total Bilirubin


  0.2 MG/DL


(0.2-1.0) 


  0.2 MG/DL


(0.2-1.0)


 


Aspartate Amino Transf


(AST/SGOT) 37 U/L (15-37)


  


  35 U/L (15-37)


 


 


Alanine Aminotransferase


(ALT/SGPT) 17 U/L (12-78)


  


  18 U/L (12-78)


 


 


Alkaline Phosphatase


  118 U/L


()  H 


  112 U/L


()


 


Total Protein


  8.5 G/DL


(6.4-8.2)  H 


  8.2 G/DL


(6.4-8.2)


 


Albumin


  2.3 G/DL


(3.4-5.0)  L 


  2.2 G/DL


(3.4-5.0)  L


 


Globulin 6.2 g/dL    6.0 g/dL  


 


Albumin/Globulin Ratio


  0.4 (1.0-2.7)


L 


  0.4 (1.0-2.7)


L


 


Lipase


  56 U/L


()  L 


  52 U/L


()  L


 


Urine Color  Yellow   


 


Urine Appearance  Clear   


 


Urine pH  6 (4.5-8.0)   


 


Urine Specific Gravity


  


  1.015


(1.005-1.035) 


 


 


Urine Protein


  


  2+ (NEGATIVE)


H 


 


 


Urine Glucose (UA)


  


  Negative


(NEGATIVE) 


 


 


Urine Ketones


  


  Negative


(NEGATIVE) 


 


 


Urine Occult Blood


  


  3+ (NEGATIVE)


H 


 


 


Urine Nitrite


  


  Negative


(NEGATIVE) 


 


 


Urine Bilirubin


  


  Negative


(NEGATIVE) 


 


 


Urine Urobilinogen


  


  1 MG/DL


(0.0-1.0)  H 


 


 


Urine Leukocyte Esterase


  


  3+ (NEGATIVE)


H 


 


 


Urine RBC


  


  10-15 /HPF (0


- 2)  H 


 


 


Urine WBC


  


  2-4 /HPF (0 -


2) 


 


 


Urine Squamous Epithelial


Cells 


  Few /LPF


(NONE/OCC) 


 


 


Urine Bacteria


  


  Few /HPF


(NONE) 


 


 


Activated Partial


Thromboplast Time 


  


  29 SEC (23-33)


 


 


Amylase Level


  


  


  49 U/L


()








General Appearance:  well appearing, no apparent distress, alert


Head:  normocephalic


EENT:  PERRL/EOMI, normal ENT inspection


Neck:  supple


Respiratory:  normal breath sounds, no respiratory distress


Cardiovascular:  normal rate


Gastrointestinal:  normal inspection, non tender, soft, normal bowel sounds, non

-distended


Rectal:  deferred


Genitourinary:  no CVA tenderness


Musculoskeletal:  normal inspection, back normal


Neurologic:  normal inspection, alert, oriented x3, responsive


Psychiatric:  normal inspection, judgement/insight normal, memory normal


Skin:  normal inspection, normal color, no rash, warm/dry, palpation normal, 

well hydrated


Lymphatic:  normal inspection, no adenopathy


Current Medications





Current Medications








 Medications


  (Trade)  Dose


 Ordered  Sig/Jed


 Route


 PRN Reason  Start Time


 Stop Time Status Last Admin


Dose Admin


 


 Acetaminophen


  (Tylenol)  650 mg  Q4H  PRN


 ORAL


 fever  4/19/18 15:30


 5/19/18 15:29   


 


 


 Al Hydroxide/Mg


 Hydroxide


  (Mylanta II)  30 ml  Q6H  PRN


 ORAL


 dyspepsia  4/19/18 15:30


 5/19/18 15:29   


 


 


 Atorvastatin


 Calcium


  (Lipitor)  40 mg  BEDTIME


 ORAL


   4/19/18 21:00


 5/19/18 20:59  4/19/18 21:08


 


 


 Bupropion HCl


  (Wellbutrin)  100 mg  DAILY


 ORAL


   4/20/18 09:00


 5/20/18 08:59  4/20/18 09:00


 


 


 Dextrose


  (Dextrose 50%)  50 ml  STAT  PRN


 IV


 Hypoglycemia BS<60mg/dL  4/19/18 15:30


 5/19/18 15:29   


 


 


 Dextrose/Sodium


 Chloride  1,000 ml @ 


 75 mls/hr  [id number]


 IV


   4/19/18 17:00


 5/19/18 16:59  4/20/18 05:18


 


 


 Diphenhydramine


 HCl


  (Benadryl)  25 mg  Q6H  PRN


 ORAL


 Itching/Pruritis  4/19/18 15:30


 5/19/18 15:29   


 


 


 Duloxetine HCl


  (Cymbalta)  60 mg  DAILY


 ORAL


   4/20/18 09:00


 5/20/18 08:59  4/20/18 09:00


 


 


 Gabapentin


  (Neurontin)  300 mg  THREE TIMES A  DAY


 ORAL


   4/20/18 10:00


 5/20/18 09:59  4/20/18 10:11


 


 


 Heparin Sodium


  (Porcine)


  (Heparin 5000


 units/ml)  5,000 units  EVERY 12  HOURS


 SUBQ


   4/19/18 21:00


 5/19/18 20:59  4/20/18 09:02


 


 


 Levetiracetam


  (Keppra)  1,000 mg  Q8HR


 ORAL


   4/19/18 22:00


 5/19/18 21:59  4/20/18 05:48


 


 


 Levothyroxine


 Sodium


  (Synthroid)  75 mcg  ACBREAKFAST


 ORAL


   4/20/18 06:30


 5/20/18 06:29  4/20/18 05:48


 


 


 Lorazepam


  (Ativan 2mg/ml


 1ml)  1 mg  Q4H  PRN


 IV


 agitation  4/19/18 15:30


 4/26/18 15:29   


 


 


 Methylnaltrexone


 Bromide


  (Relistor)  12 mg  DAILY


 SUBQ


   4/20/18 10:15


 5/20/18 10:14   


 


 


 Metoclopramide HCl


  (Reglan)  10 mg  Q6H  PRN


 IVP


 Unrelieved Severe Nausea  4/19/18 15:30


 5/19/18 15:29   


 


 


 Morphine Sulfate


  (Morphine


 Sulfate)  2 mg  Q4H  PRN


 IVP


 severe  Pain (Pain Scale 7-10)  4/19/18 15:30


 4/26/18 15:29  4/20/18 07:47


 


 


 Nitroglycerin


  (Ntg)  0.4 mg  Q5M X 3 DOSES PRN


 SL


 Prn Chest Pain  4/19/18 15:30


 5/19/18 15:29   


 


 


 Ondansetron HCl


  (Zofran)  4 mg  Q6H  PRN


 IVP


 Nausea & Vomiting  4/19/18 15:30


 5/19/18 15:29   


 


 


 Pantoprazole


  (Protonix)  40 mg  DAILY


 IV


   4/20/18 09:00


 5/20/18 08:59  4/20/18 09:00


 


 


 Polyethylene


 Glycol


  (Miralax)  17 gm  HSPRN  PRN


 ORAL


 Constipation  4/19/18 15:30


 5/19/18 15:29   


 


 


 Promethazine HCl


 25 mg/Sodium


 Chloride  56 ml @ 


 110 mls/hr  Q6H  PRN


 IV


 Refractory N/V  4/19/18 15:30


 5/19/18 15:29   


 


 


 Simethicone


  (Mylicon)  80 mg  QIDPRN  PRN


 ORAL


 gas   4/20/18 10:00


 5/20/18 09:59   


 


 


 Temazepam


  (Restoril)  15 mg  HSPRN  PRN


 ORAL


 Insomnia  4/19/18 15:30


 4/26/18 15:29   


 


 


 Theophylline


  (Shiv-Dur)  100 mg  Q12HR


 ORAL


   4/19/18 21:00


 5/19/18 20:59  4/20/18 09:00


 


 


 Topiramate


  (Topamax)  25 mg  EVERY 12  HOURS


 ORAL


   4/19/18 21:00


 5/19/18 20:59  4/20/18 09:00


 


 


 Trazodone HCl


  (Desyrel)  200 mg  BEDTIME


 ORAL


   4/19/18 21:00


 5/19/18 20:59  4/19/18 21:09


 











GI: Plan


Problems:  


(1) Crohn's disease


(2) Weight loss


(3) Intractable abdominal pain


(4) Opioid dependence


(5) SBO (small bowel obstruction)


Plan


maintain NPO + IVFs


CT AP r/o SBO


fu abdominal U/S


pain mgmt


fu labs


will follow with additional recs





Discussed with Dr. Pina.


Thank you for this patient referral, we will follow.





LESLY PINA 4/20/18 1158:


History of Present Illness


General


Reason for Hospitalization:  Abdominal Pain





Present Illness


Home Meds


Reported Medications


Lorazepam* (ATIVAN*) 1 Mg Tablet, 1 MG ORAL THREE TIMES A DAY, TAB


   4/19/18


Multivitamins* (MULTIVITAMINS*) 1 Each Tablet, 1 TAB ORAL DAILY, TAB 0 Refills


   4/19/18


Duloxetine Hcl* (CYMBALTA*) 60 Mg Capsule.dr, 60 MG ORAL DAILY, CAP


   3/21/18


Theophylline (THEODUR*) 100 Mg Tab.er.12h, 100 MG ORAL TWICE A DAY, #30 TAB 0 

Refills


   3/21/18


Fluconazole (FLUCONAZOLE) 200 Mg Tablet, 200 MG ORAL DAILY for 11 Days, #7 TAB 

0 Refills


   3/21/18


Atorvastatin Calcium* (LIPITOR*) 40 Mg Tablet, 40 MG ORAL BEDTIME, #30 TAB 0 

Refills


   3/21/18


Trazodone Hcl* (DESYREL*) 100 Mg Tablet, 200 MG ORAL BEDTIME, TAB


   3/21/18


Mesalamine (ASACOL HD) 800 Mg Tablet.dr, 1600 MG ORAL THREE TIMES A DAY, TAB


   Do not break outer coating


   3/21/18


Mirtazapine (REMERON) 30 Mg Tab.rapdis, 30 MG ORAL BEDTIME, TAB


   12/2/17


Quetiapine Fumarate (SEROQUEL) 400 Mg Tablet, 200 MG ORAL QHS, #15 TAB 0 Refills


   10/6/17


Pantoprazole* (PROTONIX*) 40 Mg Tablet.dr, 40 MG ORAL DAILY, TAB


   10/6/17


Levothyroxine Sodium* (LEVOTHYROXINE SODIUM*) 75 Mcg Tablet, 75 MCG ORAL 

ACBREAKFAST, TAB


   Take in the morning on an empty stomach, at least 30 minutes before


   food.


   10/6/17


Al Hydroxide/mg Hydroxide (Mag-Al Plus Suspension) 30 Ml Oral.susp, 30 ML PO 

Q6HR for Constipation, ML


   10/6/17


Acetaminophen* (ACETAMINOPHEN 325MG TABLET*) 325 Mg Tablet, 325 MG ORAL Q4H PRN 

for Fever/Headache/Mild Pain, TAB


   10/6/17


Aspirin* (ASPIR 81*) 81 Mg Tablet.dr, 81 MG ORAL DAILY, TAB


   5/30/17


Linaclotide (LINZESS) 145 Mcg Capsule, 145 MCG PO DAILY, CAP


   3/23/17


Montelukast Sodium* (MONTELUKAST SODIUM*) 10 Mg Tablet, 10 MG ORAL DAILY, TAB


   2/9/17


Bupropion Hcl* (BUPROPION HCL*) 100 Mg Tablet, 100 MG ORAL DAILY, TAB


   2/9/17


Topiramate* (TOPAMAX*) 25 Mg Tablet, 25 MG ORAL TWICE A DAY, #60 TAB 0 Refills


   12/25/16


Levetiracetam* (LEVETIRACETAM*) 500 Mg Tablet, 1000 MG ORAL TID, #60 TAB 0 

Refills


   12/25/16


Diphenhydramine HCl (Benadryl) 25 Mg Capsule, 25 MG PO Q6HR PRN for Itching, CAP


   12/25/16


Discontinued Reported Medications


Levofloxacin-D5w 500 Mg/100 Ml* (LEVOFLOXACIN-D5W 500 MG/100 ML*) 500 Mg/100 Ml 

Piggyback, 500 MG IVPB Q24H, BAG


   3/21/18


Metronidazole* (FLAGYL*) 250 Mg Tablet, 200 MG ORAL DAILY for 11 Days, TAB


   3/21/18


Levothyroxine Sodium (SYNTHROID) 137 Mcg Tablet, 75 MCG ORAL DAILY, TAB


   Take in the morning on an empty stomach, at least 30 minutes before


   food.


   3/21/18


Zolpidem Tartrate* (AMBIEN*) 5 Mg Tablet, 5 MG ORAL BEDTIME PRN for Insomnia, 

TAB


   3/15/18


Temazepam* (RESTORIL*) 15 Mg Capsule, 15 MG ORAL BEDTIME PRN for Insomnia, CAP


   10/6/17


Ondansetron* (ZOFRAN*) 4 Mg Tablet, 4 MG ORAL Q6H PRN for Nausea & Vomiting, TAB


   10/6/17


Nitroglycerin (NITROGLYCERIN) 0.4 Mg Tab.subl, 0.4 MG SL [id number] PRN for Prn 

Chest Pain, TAB


   10/6/17


Mesalamine (PENTASA) 500 Mg Capsule.er, 1000 MG ORAL TID, #30 CAP 0 Refills


   2/9/17


Risperidone* (RISPERDAL*) 0.5 Mg Tablet, 0.5 MG ORAL BEDTIME, #30 TAB 0 Refills


   2/9/17


Ibuprofen* (MOTRIN*) 600 Mg Tablet, 800 MG ORAL THREE TIMES A DAY, #30 TAB 0 

Refills


   2/9/17


Allergies:  


Coded Allergies:  


     No Known Allergies (Verified , 10/27/06)





GI: Plan


Plan


The patient was seen and examined at bedside and all new and available data was 

reviewed in the patients chart. I agree with the above findings, impression 

and plan.  (Patient seen earlier today. Signature stamp does not reflect 

patient encounter time.). - MD Geena Andersen,[facility] Saul N.PMatt Apr 20, 2018 10:44


LESYL PINA Apr 20, 2018 11:58

## 2018-04-20 NOTE — CONSULTATION
DATE OF CONSULTATION:  04/20/2018



INFECTIOUS DISEASE CONSULTATION



CONSULTING PHYSICIAN:  Raman Oconnor M.D.



REQUESTING PHYSICIAN:  Maxim Hopkins D.O.



REASON FOR CONSULTATION:  Evaluation of the patient for fever, leukocytosis

in the setting of bowel obstruction, antibiotic treatment.



HISTORY OF PRESENT ILLNESS:  The patient is a 62-year-old female with

multiple medical problems, who was well known to our service from recent

admission.  The patient was admitted due to abdominal pain.  This started

2 days ago, was admitted with impression of bowel obstruction.  The

patient was found to have leukocytosis and low-grade fever.  Infectious

Disease consultation has been requested for further evaluation of the

patient and antibiotic management.



PAST MEDICAL HISTORY:

1. Sepsis.

2. History of polymicrobial gram-negative bacteremia including

Stenotrophomonas maltophilia and Enterobacter.

3. History of Candida esophagitis.

4. History of C. difficile in the past.

5. Crohn's disease.

6. History of bowel obstruction x2 with lysis of adhesions in 2005.

7. COPD.

8. History of chronic pain syndrome, on morphine pump.

9. CVA in 2005.

10. Seizure disorder.



MEDICATIONS:  Off of antibiotics.



ALLERGIES:  No known drug allergies.



SOCIAL HISTORY:  The patient is a smoker.



FAMILY HISTORY:  Not contributing.



PHYSICAL EXAMINATION:

VITAL SIGNS:  Temperature 100 degrees, pulse 86, blood pressure 108/74, and

T-max 100.2 degrees.

HEENT:  No pale conjunctivae.  No icterus.

NECK:  No lymphadenopathy.

CHEST:  Clear.

HEART:  S1 and S2.

ABDOMEN:  Soft.  Mild tenderness in all 4 quadrants.  The patient has a

morphine pump on the left lower quadrant.

EXTREMITIES:  No cyanosis at this time.

NEUROLOGIC:  Awake.



LABORATORY AND DIAGNOSTIC DATA:  White blood cells at the time of admission

was 12.9, today is 9.5; hemoglobin 12; and platelets 242.  BUN 11 and

creatinine 0.8.  Liver function tests unremarkable.  Blood culture is

pending.  Abdominal x-ray showed contracted/undistended gallbladder and

mild hepatomegaly.



ASSESSMENT:  The patient is a 62-year-old female with,



1. Low-grade fever.

2. Status post leukocytosis.

3. Probable small bowel obstruction (CT shows dilated right upper

quadrant small bowel loops).



PLAN:

1. We will start the patient on IV Zosyn.

2. Monitor cultures.

3. Monitor CBC and BMP.

4. We will follow GI recommendations.

5. Based on the patient's clinical course and labs, we will do further

recommendation.









  ______________________________________________

  Raman Oconnor M.D.





DR:  GRETEL

D:  04/20/2018 14:08

T:  04/20/2018 22:08

JOB#:  [id number]

CC:

## 2018-04-20 NOTE — GENERAL PROGRESS NOTE
Assessment/Plan


Assessment/Plan


(1) Chronic abdominal pain


(2) Chronic pancreatitis


(3) Crohn's disease 


(4) Herniated nucleus pulposus, lumbar


(5) Lumbar spondylosis


(6) Radiculopathy of lumbar region


Pt will be continued on Morphine and pt has intrathecal pump.


We will start Neurontint 300mg PO 1 cap TID.


Pt was d/w Dr. Shetty and he concurred.





Subjective


Date patient seen:  Apr 20, 2018


Time patient seen:  09:00 - am


Constitutional:  Reports: weakness


HEENT:  Reports: no symptoms


Cardiovascular:  Reports: no symptoms


Respiratory:  Reports: no symptoms


Gastrointestinal/Abdominal:  Reports: abdominal pain


Genitourinary:  Reports: no symptoms


Neurologic/Psychiatric:  Reports: weakness


Endocrine:  Reports: no symptoms


Hematologic/Lymphatic:  Reports: no symptoms


Allergies:  


Coded Allergies:  


     No Known Allergies (Verified , 10/27/06)


Subjective


Patient is a known patient from Dr. Shetty and prior admissions. She has been 

admitted due to severe abdominal 


pain on the RUQ being seen by GI. At this time has Intrathecal pain pump and a 

Morphine 2mg IV Q4H PRN severe pain


with minimal relief. I d/w her about Neurontin and she understands.





Objective





Last 24 Hour Vital Signs








  Date Time  Temp Pulse Resp B/P (MAP) Pulse Ox O2 Delivery O2 Flow Rate FiO2


 


4/20/18 08:17 99.3       


 


4/20/18 08:00 99.3 88 12 127/77 95 Room Air  





 99.3       


 


4/20/18 07:47 99.6       


 


4/20/18 00:00 99.6 88 19 113/68 100 Room Air  





 99.6       


 


4/19/18 20:00 99.3 92 19 118/84 100 Room Air  





 99.3       


 


4/19/18 18:00 99.8 95 20 141/88 96 Room Air  





 99.8       


 


4/19/18 17:45 100.2 92 20 147/82 96 Room Air  





 100.2       


 


4/19/18 17:39 100.2 92 20 147/82 96 Room Air  





 100.2       


 


4/19/18 17:36 98.9       


 


4/19/18 15:14 98.9 81 20 113/72 98 Room Air  





 98.9       


 


4/19/18 13:45 99.3 100 20 108/70 98 Room Air  





 99.3       

















Intake and Output  


 


 4/19/18 4/20/18





 19:00 07:00


 


Intake Total 0 ml 750 ml


 


Balance 0 ml 750 ml


 


  


 


Intake Oral 0 ml 


 


IV Total  750 ml


 


# Voids  3








Laboratory Tests


4/19/18 14:25: 


White Blood Count 11.3H, Red Blood Count 4.17L, Hemoglobin 12.6, Hematocrit 38.6

, Mean Corpuscular Volume 92, Mean Corpuscular Hemoglobin 30.1, Mean 

Corpuscular Hemoglobin Concent 32.5, Red Cell Distribution Width 15.5H, 

Platelet Count 258, Mean Platelet Volume 7.2, Neutrophils (%) (Auto) 62.6, 

Lymphocytes (%) (Auto) 27.3, Monocytes (%) (Auto) 8.7, Eosinophils (%) (Auto) 

0.8, Basophils (%) (Auto) 0.7, Sodium Level 139, Potassium Level 3.2L, Chloride 

Level 105, Carbon Dioxide Level 27, Anion Gap 7, Blood Urea Nitrogen 16, 

Creatinine 0.8, Estimat Glomerular Filtration Rate > 60, Glucose Level 114H, 

Calcium Level 9.2, Total Bilirubin 0.2, Aspartate Amino Transf (AST/SGOT) 37, 

Alanine Aminotransferase (ALT/SGPT) 17, Alkaline Phosphatase 118H, Total 

Protein 8.5H, Albumin 2.3L, Globulin 6.2, Albumin/Globulin Ratio 0.4L, Lipase 

56L


4/19/18 15:30: 


Urine Color Yellow, Urine Appearance Clear, Urine pH 6, Urine Specific Gravity 

1.015, Urine Protein 2+H, Urine Glucose (UA) Negative, Urine Ketones Negative, 

Urine Occult Blood 3+H, Urine Nitrite Negative, Urine Bilirubin Negative, Urine 

Urobilinogen 1H, Urine Leukocyte Esterase 3+H, Urine RBC 10-15H, Urine WBC 2-4, 

Urine Squamous Epithelial Cells Few, Urine Bacteria Few


4/20/18 04:27: 


White Blood Count 9.5, Red Blood Count 4.14L, Hemoglobin 12.4, Hematocrit 38.0, 

Mean Corpuscular Volume 92, Mean Corpuscular Hemoglobin 29.9, Mean Corpuscular 

Hemoglobin Concent 32.6, Red Cell Distribution Width 15.3H, Platelet Count 242, 

Mean Platelet Volume 6.9, Neutrophils (%) (Auto) 53.0, Lymphocytes (%) (Auto) 

33.9, Monocytes (%) (Auto) 10.8H, Eosinophils (%) (Auto) 1.6, Basophils (%) (

Auto) 0.7, Sodium Level 136, Potassium Level 3.9, Chloride Level 105, Carbon 

Dioxide Level 27, Anion Gap 4L, Blood Urea Nitrogen 11, Creatinine 0.8, Estimat 

Glomerular Filtration Rate > 60, Glucose Level 98, Calcium Level 9.0, Total 

Bilirubin 0.2, Aspartate Amino Transf (AST/SGOT) 35, Alanine Aminotransferase (

ALT/SGPT) 18, Alkaline Phosphatase 112, Total Protein 8.2, Albumin 2.2L, 

Globulin 6.0, Albumin/Globulin Ratio 0.4L, Lipase 52L, Activated Partial 

Thromboplast Time 29, Amylase Level 49


Height (Feet):  5


Height (Inches):  6.00


Weight (Pounds):  129


Objective


General Appearance:  no apparent distress, alert


EENT:  normal ENT inspection, TMs normal


Neck:  normal alignment, supple


Cardiovascular:  normal rate, regular rhythm


Respiratory/Chest:  decreased breath sounds


Abdomen:  tender, distended, intrathecal pump palpated


Extremities:  non-tender


Edema:  no edema noted 


Neurologic:  alert, oriented x 3


Skin:  warm/dry











LEA BRAVO PALFONSO Apr 20, 2018 10:02

## 2018-04-20 NOTE — DIAGNOSTIC IMAGING REPORT
Indication: Abdominal pain and distention

 

Technique: CT of the abdomen and pelvis utilizing automated exposure control with

intravenous contrast. Venous scanning performed. Oral contrast was also administered

 

CT dose: Total DLP 1017.59 mGycm; CTDI vol 19.75 mGy

 

Comparison: 3/15/2018; 5/28/2017

 

Findings: 

Images through the lower lungs demonstrate interval development small to moderate

left-sided pleural effusion with adjacent compressive atelectasis of the left lower

lobe. There is interstitial lung disease with peripheral reticulation. There is a 3.5

x 2.8 cm mass lesion in the left inferior hilar region (series 2 image #3) which is

partially visualized. There is a pleural-based mass at the left lung base which

measures approximately 2.6 cm AP by 3.7 cm transverse by approximately 4.1 cm

craniocaudal (series 5 image #26; series 2 image #24). Multiple left-sided soft

tissue nodules along the pleura and along the left paraspinal region which may

represent pleural nodules and enlarged lymph nodes/metastatic foci.

 

There is air anterior to the liver (series 2 image #24). This is most concerning for

free intraperitoneal air as this cannot definitively be placed within a lumen. There

is a possible loop of small bowel adjacent to the medial aspect of this air (series 2

image #28) however no such finding is seen laterally. Unable to reliably determine

any potential site of perforation. Oral contrast was administered and there is no

definite focus of contrast extravasation. There is again evidence of extensive prior

bowel surgery with section of the ascending, transverse and sigmoid colon with

ileocolic anastomosis. There is persistent mild dilatation of the colon distal to the

anastomosis, seen previously but decreased in degree of distention of the prior exam.

There is an enteroenteric anastomosis on the right. Some persistent mildly dilated

loops in the midabdomen with overall degree of bowel loop dilatation similar or

decreased compared to the prior exam. There are intervening nondilated loops of small

bowel. It is very difficult to track the bowel given paucity of intra-abdominal fat.

There are multiple anterior abdominal wall mesh anchors. There is no

well-defined/drainable fluid collection within the abdomen.

 

There are multiple low-attenuation lesions within the liver. These are not

definitively seen on the prior exam and are likely new. The largest is in the right

hepatic lobe and measures up to 1 cm diameter (series 2 image #27). Gallbladder is

grossly unremarkable in appearance.

 

Spleen and adrenal glands grossly unremarkable. Multiple low-attenuation lesions are

again noted in the kidneys likely representing cysts. No urinary tract stone or

hydronephrosis. Bladder is decompressed. Apparent wall thickening versus

underdistention of the bladder. The uterus is surgically absent.

 

There multiple enlarged retroperitoneal lymph nodes. A celiac axis node measures up

to 1.5 cm in short axis. Abdominal aorta is normal in caliber with mild

atherosclerotic calcification.

 

And implantable pain pump with reservoir in the left lower quadrant and tubing

entering the thecal sac unchanged in position. No acute osseous abnormality is seen.

There is chronic right-sided rib fracture.

 

IMPRESSION: 

Air anterior to the liver as above which is not definitively intraluminal concerning

for free intraperitoneal air.

 

Evidence of extensive prior bowel surgery with colonic resection and air collection

entero-enteral anastomoses. Dilatation of the colon distal to the enterocolonic

anastomosis and some mildly dilated loops in the midabdomen. These findings were seen

on the previous exam and degree of distention is either similar or decreased compared

to the prior exam. Again there are some intervening loops of nondilated small bowel

so the possibility of a degree of obstruction not entirely excluded. No complete

obstruction as oral contrast was noted to traverse to the rectum. 

 

Pleural-based mass at the left lung base with interval development of a

moderate-sized left pleural effusion, left-sided pleural nodularity and

retroperitoneal and paraspinal nodules/masses. Left inferior hilar mass lesion

partially visualized. There is also new low-attenuation liver lesion. Taken together

findings are highly concerning for malignancy/metastatic disease. Correlation with

tumor markers recommended. Consider diagnostic thoracentesis or biopsy.

 

Interstitial lung disease.

 

Additional findings as above.

 

Findings discussed with Dr. Mcleod via telephone conversation approximately 1:50 PM

on 4/20/18. 

 

The CT scanner at [facility] is accredited by the [employer] College [employer]

[employer] and the scans are performed using protocols designed to limit radiation

exposure to as low as reasonably achievable to attain images of sufficient resolution

adequate for diagnostic evaluation.

## 2018-04-21 VITALS — SYSTOLIC BLOOD PRESSURE: 104 MMHG | DIASTOLIC BLOOD PRESSURE: 64 MMHG

## 2018-04-21 VITALS — DIASTOLIC BLOOD PRESSURE: 89 MMHG | SYSTOLIC BLOOD PRESSURE: 139 MMHG

## 2018-04-21 VITALS — DIASTOLIC BLOOD PRESSURE: 72 MMHG | SYSTOLIC BLOOD PRESSURE: 115 MMHG

## 2018-04-21 VITALS — SYSTOLIC BLOOD PRESSURE: 124 MMHG | DIASTOLIC BLOOD PRESSURE: 70 MMHG

## 2018-04-21 VITALS — SYSTOLIC BLOOD PRESSURE: 125 MMHG | DIASTOLIC BLOOD PRESSURE: 76 MMHG

## 2018-04-21 LAB
ADD MANUAL DIFF: NO
ALBUMIN SERPL-MCNC: 2.3 G/DL (ref 3.4–5)
ALBUMIN/GLOB SERPL: 0.4 {RATIO} (ref 1–2.7)
ALP SERPL-CCNC: 107 U/L (ref 46–116)
ALT SERPL-CCNC: 17 U/L (ref 12–78)
ANION GAP SERPL CALC-SCNC: 10 MMOL/L (ref 5–15)
APTT BLD: 28 SEC (ref 23–33)
AST SERPL-CCNC: 41 U/L (ref 15–37)
BASOPHILS NFR BLD AUTO: 0.6 % (ref 0–2)
BILIRUB SERPL-MCNC: 0.2 MG/DL (ref 0.2–1)
BUN SERPL-MCNC: 13 MG/DL (ref 7–18)
CALCIUM SERPL-MCNC: 9.2 MG/DL (ref 8.5–10.1)
CHLORIDE SERPL-SCNC: 104 MMOL/L (ref 98–107)
CO2 SERPL-SCNC: 24 MMOL/L (ref 21–32)
CREAT SERPL-MCNC: 0.9 MG/DL (ref 0.55–1.3)
EOSINOPHIL NFR BLD AUTO: 1.5 % (ref 0–3)
ERYTHROCYTE [DISTWIDTH] IN BLOOD BY AUTOMATED COUNT: 15.5 % (ref 11.6–14.8)
GLOBULIN SER-MCNC: 6.1 G/DL
HCT VFR BLD CALC: 39.6 % (ref 37–47)
HGB BLD-MCNC: 13.2 G/DL (ref 12–16)
INR PPP: 1.1 (ref 0.9–1.1)
LYMPHOCYTES NFR BLD AUTO: 32.6 % (ref 20–45)
MCV RBC AUTO: 92 FL (ref 80–99)
MONOCYTES NFR BLD AUTO: 11.1 % (ref 1–10)
NEUTROPHILS NFR BLD AUTO: 54.2 % (ref 45–75)
PLATELET # BLD: 226 K/UL (ref 150–450)
POTASSIUM SERPL-SCNC: 3.9 MMOL/L (ref 3.5–5.1)
RBC # BLD AUTO: 4.31 M/UL (ref 4.2–5.4)
SODIUM SERPL-SCNC: 138 MMOL/L (ref 136–145)
WBC # BLD AUTO: 10 K/UL (ref 4.8–10.8)

## 2018-04-21 PROCEDURE — 05HP33Z INSERTION OF INFUSION DEVICE INTO RIGHT EXTERNAL JUGULAR VEIN, PERCUTANEOUS APPROACH: ICD-10-PCS

## 2018-04-21 RX ADMIN — PANTOPRAZOLE SODIUM SCH MG: 40 INJECTION, POWDER, FOR SOLUTION INTRAVENOUS at 08:56

## 2018-04-21 RX ADMIN — MORPHINE SULFATE PRN MG: 4 INJECTION INTRAVENOUS at 01:55

## 2018-04-21 RX ADMIN — THEOPHYLLINE ANHYDROUS SCH MG: 100 CAPSULE, EXTENDED RELEASE ORAL at 20:25

## 2018-04-21 RX ADMIN — DEXTROSE MONOHYDRATE SCH MLS/HR: 50 INJECTION, SOLUTION INTRAVENOUS at 08:00

## 2018-04-21 RX ADMIN — METHYLNALTREXONE BROMIDE SCH MG: 12 INJECTION, SOLUTION SUBCUTANEOUS at 09:32

## 2018-04-21 RX ADMIN — ATORVASTATIN CALCIUM SCH MG: 20 TABLET, FILM COATED ORAL at 20:25

## 2018-04-21 RX ADMIN — TOPIRAMATE SCH MG: 25 TABLET, COATED ORAL at 08:45

## 2018-04-21 RX ADMIN — DEXTROSE MONOHYDRATE SCH MLS/HR: 50 INJECTION, SOLUTION INTRAVENOUS at 12:06

## 2018-04-21 RX ADMIN — HEPARIN SODIUM SCH UNITS: 5000 INJECTION INTRAVENOUS; SUBCUTANEOUS at 08:48

## 2018-04-21 RX ADMIN — SODIUM CHLORIDE PRN MG: 9 INJECTION, SOLUTION INTRAVENOUS at 16:11

## 2018-04-21 RX ADMIN — PANTOPRAZOLE SODIUM SCH MG: 40 INJECTION, POWDER, FOR SOLUTION INTRAVENOUS at 08:44

## 2018-04-21 RX ADMIN — DEXTROSE MONOHYDRATE SCH MLS/HR: 50 INJECTION, SOLUTION INTRAVENOUS at 01:22

## 2018-04-21 RX ADMIN — HEPARIN SODIUM SCH UNITS: 5000 INJECTION INTRAVENOUS; SUBCUTANEOUS at 20:26

## 2018-04-21 RX ADMIN — DEXTROSE MONOHYDRATE SCH MLS/HR: 50 INJECTION, SOLUTION INTRAVENOUS at 12:05

## 2018-04-21 RX ADMIN — TOPIRAMATE SCH MG: 25 TABLET, COATED ORAL at 20:24

## 2018-04-21 RX ADMIN — TRAZODONE HYDROCHLORIDE SCH MG: 100 TABLET ORAL at 20:24

## 2018-04-21 RX ADMIN — DEXTROSE AND SODIUM CHLORIDE SCH MLS/HR: 5; .45 INJECTION, SOLUTION INTRAVENOUS at 08:23

## 2018-04-21 RX ADMIN — THEOPHYLLINE ANHYDROUS SCH MG: 100 CAPSULE, EXTENDED RELEASE ORAL at 08:45

## 2018-04-21 RX ADMIN — DEXTROSE AND SODIUM CHLORIDE SCH MLS/HR: 5; .45 INJECTION, SOLUTION INTRAVENOUS at 20:32

## 2018-04-21 RX ADMIN — DULOXETINE HYDROCHLORIDE SCH MG: 30 CAPSULE, DELAYED RELEASE ORAL at 08:45

## 2018-04-21 RX ADMIN — ZOLPIDEM TARTRATE SCH MG: 5 TABLET ORAL at 20:24

## 2018-04-21 RX ADMIN — SODIUM CHLORIDE PRN MG: 9 INJECTION, SOLUTION INTRAVENOUS at 20:24

## 2018-04-21 RX ADMIN — DEXTROSE MONOHYDRATE SCH MLS/HR: 50 INJECTION, SOLUTION INTRAVENOUS at 20:31

## 2018-04-21 RX ADMIN — MORPHINE SULFATE PRN MG: 4 INJECTION INTRAVENOUS at 06:03

## 2018-04-21 RX ADMIN — SODIUM CHLORIDE PRN MG: 9 INJECTION, SOLUTION INTRAVENOUS at 12:04

## 2018-04-21 NOTE — GENERAL PROGRESS NOTE
Assessment/Plan


Assessment/Plan


Assessment


- suspected SBO or PSBO


- Crohn's disease


- ? free air - exam not consistent with acute abdomen


- Pleural based mass with pleural effusion - r/o malignancy





Recommendations


- follow exam


- abx


- check fluid cytology


- pulmonary opinion


- NPO


- IVF





Subjective


Allergies:  


Coded Allergies:  


     No Known Allergies (Verified , 10/27/06)


Subjective


Above noted


feels "OK"


walking around in hallways


CT noted


notes reviewed





Objective





Last 24 Hour Vital Signs








  Date Time  Temp Pulse Resp B/P (MAP) Pulse Ox O2 Delivery O2 Flow Rate FiO2


 


4/21/18 12:04 97.9       


 


4/21/18 09:17 97.9       


 


4/21/18 08:47 97.9       


 


4/21/18 08:00 97.7 69 20 124/70 100   





 97.7       


 


4/21/18 04:00 97.9 80 19 104/64 97 Room Air  





 97.9       


 


4/21/18 00:00 97.5 83 19 115/72 100 Room Air  





 97.5       


 


4/20/18 20:00 98.3 92 19 138/83 100   





 98.3       


 


4/20/18 16:00 100.0 104 14 169/97 100 Room Air  





 100.0       

















Intake and Output  


 


 4/20/18 4/21/18





 19:00 07:00


 


Intake Total 627.5 ml 980.0 ml


 


Balance 627.5 ml 980.0 ml


 


  


 


Intake Oral  250 ml


 


IV Total 627.5 ml 730.0 ml


 


# Voids 3 3








Laboratory Tests


4/20/18 16:37: 


Body Fluid Source Left pleural fluid, Body Fluid Volume 28, Body Fluid 

Appearance Hazy, Body Fluid pH 8.0, Body Fluid RBC 7975, Body Fluid Total 

Nucleated Cells 613, Body Fluid Polynuclear WBCs (%) 6, Body Fluid Mononuclear 

WBCs (%) 87, Body Fluid Mesothelial Cells (%) 7, Body Fluid Glucose [Pending], 

Body Fluid Total Protein [Pending]


4/21/18 07:42: 


White Blood Count 10.0, Red Blood Count 4.31, Hemoglobin 13.2, Hematocrit 39.6, 

Mean Corpuscular Volume 92, Mean Corpuscular Hemoglobin 30.6, Mean Corpuscular 

Hemoglobin Concent 33.3, Red Cell Distribution Width 15.5H, Platelet Count 226, 

Mean Platelet Volume 6.8, Neutrophils (%) (Auto) 54.2, Lymphocytes (%) (Auto) 

32.6, Monocytes (%) (Auto) 11.1H, Eosinophils (%) (Auto) 1.5, Basophils (%) (

Auto) 0.6, Prothrombin Time 11.4, Prothromb Time International Ratio 1.1, 

Activated Partial Thromboplast Time 28, Sodium Level 138, Potassium Level 3.9, 

Chloride Level 104, Carbon Dioxide Level 24, Anion Gap 10, Blood Urea Nitrogen 

13, Creatinine 0.9, Estimat Glomerular Filtration Rate > 60, Glucose Level 95, 

Lactic Acid Level 1.70, Calcium Level 9.2, Total Bilirubin 0.2, Aspartate Amino 

Transf (AST/SGOT) 41H, Alanine Aminotransferase (ALT/SGPT) 17, Alkaline 

Phosphatase 107, Total Protein 8.4H, Albumin 2.3L, Globulin 6.1, Albumin/

Globulin Ratio 0.4L


Height (Feet):  5


Height (Inches):  6.00


Weight (Pounds):  129


Objective


WDWN


NCAT


supple


CTA


RRR


abd soft ND , mild diffuse TTP


no edema


non focal











DEMARIO BORREGO Apr 21, 2018 13:31

## 2018-04-21 NOTE — GENERAL PROGRESS NOTE
Assessment/Plan


Problem List:  


(1) Crohn's disease


ICD Codes:  K50.90 - Crohn's disease


SNOMED:  43994903


Qualifiers:  


   Qualified Codes:  K50.919 - Crohn's disease, unspecified, with unspecified 

complications


(2) Opioid dependence


ICD Codes:  F11.20 - Opioid dependence


SNOMED:  45339716


(3) Intractable abdominal pain


ICD Codes:  R10.9 - Unspecified abdominal pain


SNOMED:  16545990, 460708425


(4) COPD (chronic obstructive pulmonary disease)


ICD Codes:  J44.9 - Chronic obstructive pulmonary disease


SNOMED:  97452579


(5) Shortness of breath


(6) SOB (shortness of breath)


ICD Codes:  R06.02 - Shortness of breath


SNOMED:  535672677


(7) UTI (urinary tract infection)


ICD Codes:  N39.0 - Urinary tract infection, site not specified


SNOMED:  82106259


Status:  unchanged


Assessment/Plan


ot pt diet pain control sx eval cbc bmp am





Subjective


Constitutional:  Reports: weakness


Allergies:  


Coded Allergies:  


     No Known Allergies (Verified , 10/27/06)


All Systems:  reviewed and negative except above


Subjective


sl gen abd pain





Objective





Last 24 Hour Vital Signs








  Date Time  Temp Pulse Resp B/P (MAP) Pulse Ox O2 Delivery O2 Flow Rate FiO2


 


4/21/18 08:47 97.9       


 


4/21/18 08:00 97.7 69 20 124/70 100   





 97.7       


 


4/21/18 04:00 97.9 80 19 104/64 97 Room Air  





 97.9       


 


4/21/18 00:00 97.5 83 19 115/72 100 Room Air  





 97.5       


 


4/20/18 20:00 98.3 92 19 138/83 100   





 98.3       


 


4/20/18 16:00 100.0 104 14 169/97 100 Room Air  





 100.0       


 


4/20/18 12:32 100.0       


 


4/20/18 12:00 100.0 107 20 108/76 100 Room Air  





 100.0       

















Intake and Output  


 


 4/20/18 4/21/18





 19:00 07:00


 


Intake Total 627.5 ml 980.0 ml


 


Balance 627.5 ml 980.0 ml


 


  


 


Intake Oral  250 ml


 


IV Total 627.5 ml 730.0 ml


 


# Voids 3 3








Laboratory Tests


4/20/18 16:37: 


Body Fluid Source Left pleural fluid, Body Fluid Volume 28, Body Fluid 

Appearance Hazy, Body Fluid pH 8.0, Body Fluid RBC 7975, Body Fluid Total 

Nucleated Cells 613, Body Fluid Polynuclear WBCs (%) 6, Body Fluid Mononuclear 

WBCs (%) 87, Body Fluid Mesothelial Cells (%) 7, Body Fluid Glucose [Pending], 

Body Fluid Total Protein [Pending]


4/21/18 07:42: 


Sodium Level [Pending], Potassium Level [Pending], Chloride Level [Pending], 

Carbon Dioxide Level [Pending], Blood Urea Nitrogen [Pending], Creatinine [

Pending], Estimat Glomerular Filtration Rate [Pending], Glucose Level [Pending]

, Lactic Acid Level [Pending], Calcium Level [Pending], Total Bilirubin [Pending

], Aspartate Amino Transf (AST/SGOT) [Pending], Alanine Aminotransferase (ALT/

SGPT) [Pending], Alkaline Phosphatase [Pending], Total Protein [Pending], 

Albumin [Pending], Globulin [Pending]


Height (Feet):  5


Height (Inches):  6.00


Weight (Pounds):  129


General Appearance:  alert


EENT:  normal ENT inspection


Neck:  normal alignment


Cardiovascular:  normal peripheral pulses, normal rate, regular rhythm


Respiratory/Chest:  chest wall non-tender, lungs clear, normal breath sounds


Abdomen:  soft, hypoactive bowel sounds


Extremities:  normal inspection


Edema:  no edema noted Arm (L), no edema noted Arm (R), no edema noted Leg (L), 

no edema noted Leg (R), no edema noted Pedal (L), no edema noted Pedal (R), no 

edema noted Generalized


Neurologic:  responsive, motor weakness


Skin:  normal pigmentation, warm/dry











MAICOL LANG Apr 21, 2018 09:10

## 2018-04-21 NOTE — GENERAL SURGERY PROGRESS NOTE
General Surgery-Progress Note


Subjective


Additional Comments


no acute events.  still has pain but not consistent.  able to move without 

difficulty or pain or discomfort but states pain.





Objective





Last 24 Hour Vital Signs








  Date Time  Temp Pulse Resp B/P (MAP) Pulse Ox O2 Delivery O2 Flow Rate FiO2


 


4/21/18 09:17 97.9       


 


4/21/18 08:47 97.9       


 


4/21/18 08:00 97.7 69 20 124/70 100   





 97.7       


 


4/21/18 04:00 97.9 80 19 104/64 97 Room Air  





 97.9       


 


4/21/18 00:00 97.5 83 19 115/72 100 Room Air  





 97.5       


 


4/20/18 20:00 98.3 92 19 138/83 100   





 98.3       


 


4/20/18 16:00 100.0 104 14 169/97 100 Room Air  





 100.0       


 


4/20/18 12:32 100.0       


 


4/20/18 12:00 100.0 107 20 108/76 100 Room Air  





 100.0       








I&O











Intake and Output  


 


 4/20/18 4/21/18





 19:00 07:00


 


Intake Total 627.5 ml 980.0 ml


 


Balance 627.5 ml 980.0 ml


 


  


 


Intake Oral  250 ml


 


IV Total 627.5 ml 730.0 ml


 


# Voids 3 3








Drains:  none


Cardiovascular:  RSR


Respiratory:  clear


Abdomen:  soft, distended, non-tender, present bowel sounds


Extremities:  no edema, no tenderness, no cyanosis





Laboratory Tests








Test


  4/20/18


16:37 4/21/18


07:42


 


Body Fluid Source


  Left pleural


fluid 


 


 


Body Fluid Volume 28 mL   


 


Body Fluid Appearance Hazy   


 


Body Fluid pH 8.0   


 


Body Fluid RBC 7975 /CUMM   


 


Body Fluid Total Nucleated


Cells 613 /CUMM  


  


 


 


Body Fluid Polynuclear WBCs


(%) 6 %  


  


 


 


Body Fluid Mononuclear WBCs


(%) 87 %  


  


 


 


Body Fluid Mesothelial Cells


(%) 7 %  


  


 


 


Body Fluid Glucose Pending   


 


Body Fluid Total Protein Pending   


 


White Blood Count


  


  10.0 K/UL


(4.8-10.8)


 


Red Blood Count


  


  4.31 M/UL


(4.20-5.40)


 


Hemoglobin


  


  13.2 G/DL


(12.0-16.0)


 


Hematocrit


  


  39.6 %


(37.0-47.0)


 


Mean Corpuscular Volume  92 FL (80-99)  


 


Mean Corpuscular Hemoglobin


  


  30.6 PG


(27.0-31.0)


 


Mean Corpuscular Hemoglobin


Concent 


  33.3 G/DL


(32.0-36.0)


 


Red Cell Distribution Width


  


  15.5 %


(11.6-14.8)  H


 


Platelet Count


  


  226 K/UL


(150-450)


 


Mean Platelet Volume


  


  6.8 FL


(6.5-10.1)


 


Neutrophils (%) (Auto)


  


  54.2 %


(45.0-75.0)


 


Lymphocytes (%) (Auto)


  


  32.6 %


(20.0-45.0)


 


Monocytes (%) (Auto)


  


  11.1 %


(1.0-10.0)  H


 


Eosinophils (%) (Auto)


  


  1.5 %


(0.0-3.0)


 


Basophils (%) (Auto)


  


  0.6 %


(0.0-2.0)


 


Prothrombin Time


  


  11.4 SEC


(9.30-11.50)


 


Prothromb Time International


Ratio 


  1.1 (0.9-1.1)  


 


 


Activated Partial


Thromboplast Time 


  28 SEC (23-33)


 


 


Sodium Level


  


  138 MMOL/L


(136-145)


 


Potassium Level


  


  3.9 MMOL/L


(3.5-5.1)


 


Chloride Level


  


  104 MMOL/L


()


 


Carbon Dioxide Level


  


  24 MMOL/L


(21-32)


 


Anion Gap


  


  10 mmol/L


(5-15)


 


Blood Urea Nitrogen


  


  13 mg/dL


(7-18)


 


Creatinine


  


  0.9 MG/DL


(0.55-1.30)


 


Estimat Glomerular Filtration


Rate 


  > 60 mL/min


(>60)


 


Glucose Level


  


  95 MG/DL


()


 


Lactic Acid Level


  


  1.70 mmol/L


(0.66-2.22)


 


Calcium Level


  


  9.2 MG/DL


(8.5-10.1)


 


Total Bilirubin


  


  0.2 MG/DL


(0.2-1.0)


 


Aspartate Amino Transf


(AST/SGOT) 


  41 U/L (15-37)


H


 


Alanine Aminotransferase


(ALT/SGPT) 


  17 U/L (12-78)


 


 


Alkaline Phosphatase


  


  107 U/L


()


 


Total Protein


  


  8.4 G/DL


(6.4-8.2)  H


 


Albumin


  


  2.3 G/DL


(3.4-5.0)  L


 


Globulin  6.1 g/dL  


 


Albumin/Globulin Ratio


  


  0.4 (1.0-2.7)


L











Plan


Problems:  


(1) Intractable abdominal pain


Assessment & Plan:  62F with abdominal pain.  very complex medical and surgical 

history.  now with complex CT findings of left pleural effusion, left lower 

lung mass, large mediastinal and periaortic lymph nodes, new liver lesions, and 

air around the liver.  


unable to tell if bowel loop (possible blind end from prior surgery) noted in 

right upper quadrant above liver or if abscess or if free air.  exam not 

consistent with perforation and no significant free fluid noted on CT or area 

of perforation.  contrast into distal bowel without leak.  initial leukocytosis 

resolved.  low grade persistent fevers.  recent cough.  





unfortunately no clear explanation as to patients current condition.  lung mass

, effusion, new liver masses, and nodes very concerning for malignant process. 

Exam stable compared to prior exams (patient seen during last admission and 

known to me).  will need much more extensive work up. 





I discussed these findings with patient.  I explained possible need for urgent 

surgery but given history and complex surgical history we will monitor 

clinically first.  if declines will proceed with surgery which is VERY high 

risk in her.  if stable will continue with work up.  patient and partner 

express understand and agree with plan.  





s/p thoracentesis 4/20.  awaiting cytology





left EJ line not functional and removed.  did not have other access so right EJ 

line placed by myself.  if fails will need central venous access.  





-monitor exam clinically


-trend labs


-iv abx


-will follow with recs. thank you for this consultation.  














Darrius Benitez Apr 21, 2018 11:44

## 2018-04-21 NOTE — INFECTIOUS DISEASES PROG NOTE
Assessment/Plan


Assessment/Plan





ASSESSMENT:  The patient is a 62-year-old female with,





 Low-grade fever. probable due to SBO  , improving


Status post leukocytosis.


Probable small bowel obstruction


Recent history of polymicrobial gram-negative bacteremia including 

Stenotrophomonas maltophilia and Enterobacter.


History of Candida esophagitis.


History of C. difficile in the past.











Air anterior to the liver , ?  free intraperitoneal air ( Surg doubt 

perforation )


Lung and liver masses Ro Malignancy 


 CT:  Pleural-based mass at the left lung base / Moderate-sized left pleural 

effusion, left-sided pleural nodularity and retroperitoneal and paraspinal 

nodules/masses. 


         Left inferior hilar mass lesion partially visualized. New low-

attenuation liver lesion ( concerning for malignancy/metastatic disease )


Pl effusion SP ultrasound-guided thoracentesis,  960 cc  ( m/l 2/2 mets,  no 

evid of Empyema )


Crohn's disease.


History of bowel obstruction x2 with lysis of adhesions in 2005.


COPD.


History of chronic pain syndrome, on morphine pump.


CVA in 2005.


Seizure disorder.














PLAN:








cont  the patient on IV Zosyn d# 2


Monitor cultures. ( pl effusion ) 


Monitor CBC and BMP.


follow GI recommendations


hem cons :P 


NPO 








Subjective


Allergies:  


Coded Allergies:  


     No Known Allergies (Verified , 10/27/06)


Subjective








  pt wanted to leave earlier today





Objective


Vital Signs





Last 24 Hour Vital Signs








  Date Time  Temp Pulse Resp B/P (MAP) Pulse Ox O2 Delivery O2 Flow Rate FiO2


 


4/21/18 12:04 97.9       


 


4/21/18 09:17 97.9       


 


4/21/18 08:47 97.9       


 


4/21/18 08:00 97.7 69 20 124/70 100   





 97.7       


 


4/21/18 04:00 97.9 80 19 104/64 97 Room Air  





 97.9       


 


4/21/18 00:00 97.5 83 19 115/72 100 Room Air  





 97.5       


 


4/20/18 20:00 98.3 92 19 138/83 100   





 98.3       


 


4/20/18 16:00 100.0 104 14 169/97 100 Room Air  





 100.0       


 


4/20/18 12:32 100.0       








Height (Feet):  5


Height (Inches):  6.00


Weight (Pounds):  129


HEENT:  anicteric


Respiratory/Chest:  normal breath sounds


Cardiovascular:  regular rhythm


Abdomen:  tender





Microbiology








 Date/Time


Source Procedure


Growth Status


 


 


 4/20/18 16:37


Pleural Fluid Gram Stain - Final Resulted


 


 4/20/18 16:37


Pleural Fluid Body Fluid Culture - Preliminary


NO GROWTH AFTER 24 HOURS Resulted











Laboratory Tests








Test


  4/20/18


16:37 4/21/18


07:42


 


Body Fluid Source


  Left pleural


fluid 


 


 


Body Fluid Volume 28 mL   


 


Body Fluid Appearance Hazy   


 


Body Fluid pH 8.0   


 


Body Fluid RBC 7975 /CUMM   


 


Body Fluid Total Nucleated


Cells 613 /CUMM  


  


 


 


Body Fluid Polynuclear WBCs


(%) 6 %  


  


 


 


Body Fluid Mononuclear WBCs


(%) 87 %  


  


 


 


Body Fluid Mesothelial Cells


(%) 7 %  


  


 


 


Body Fluid Glucose Pending   


 


Body Fluid Total Protein Pending   


 


White Blood Count


  


  10.0 K/UL


(4.8-10.8)


 


Red Blood Count


  


  4.31 M/UL


(4.20-5.40)


 


Hemoglobin


  


  13.2 G/DL


(12.0-16.0)


 


Hematocrit


  


  39.6 %


(37.0-47.0)


 


Mean Corpuscular Volume  92 FL (80-99)  


 


Mean Corpuscular Hemoglobin


  


  30.6 PG


(27.0-31.0)


 


Mean Corpuscular Hemoglobin


Concent 


  33.3 G/DL


(32.0-36.0)


 


Red Cell Distribution Width


  


  15.5 %


(11.6-14.8)  H


 


Platelet Count


  


  226 K/UL


(150-450)


 


Mean Platelet Volume


  


  6.8 FL


(6.5-10.1)


 


Neutrophils (%) (Auto)


  


  54.2 %


(45.0-75.0)


 


Lymphocytes (%) (Auto)


  


  32.6 %


(20.0-45.0)


 


Monocytes (%) (Auto)


  


  11.1 %


(1.0-10.0)  H


 


Eosinophils (%) (Auto)


  


  1.5 %


(0.0-3.0)


 


Basophils (%) (Auto)


  


  0.6 %


(0.0-2.0)


 


Prothrombin Time


  


  11.4 SEC


(9.30-11.50)


 


Prothromb Time International


Ratio 


  1.1 (0.9-1.1)  


 


 


Activated Partial


Thromboplast Time 


  28 SEC (23-33)


 


 


Sodium Level


  


  138 MMOL/L


(136-145)


 


Potassium Level


  


  3.9 MMOL/L


(3.5-5.1)


 


Chloride Level


  


  104 MMOL/L


()


 


Carbon Dioxide Level


  


  24 MMOL/L


(21-32)


 


Anion Gap


  


  10 mmol/L


(5-15)


 


Blood Urea Nitrogen


  


  13 mg/dL


(7-18)


 


Creatinine


  


  0.9 MG/DL


(0.55-1.30)


 


Estimat Glomerular Filtration


Rate 


  > 60 mL/min


(>60)


 


Glucose Level


  


  95 MG/DL


()


 


Lactic Acid Level


  


  1.70 mmol/L


(0.66-2.22)


 


Calcium Level


  


  9.2 MG/DL


(8.5-10.1)


 


Total Bilirubin


  


  0.2 MG/DL


(0.2-1.0)


 


Aspartate Amino Transf


(AST/SGOT) 


  41 U/L (15-37)


H


 


Alanine Aminotransferase


(ALT/SGPT) 


  17 U/L (12-78)


 


 


Alkaline Phosphatase


  


  107 U/L


()


 


Total Protein


  


  8.4 G/DL


(6.4-8.2)  H


 


Albumin


  


  2.3 G/DL


(3.4-5.0)  L


 


Globulin  6.1 g/dL  


 


Albumin/Globulin Ratio


  


  0.4 (1.0-2.7)


L











Current Medications








 Medications


  (Trade)  Dose


 Ordered  Sig/Jed


 Route


 PRN Reason  Start Time


 Stop Time Status Last Admin


Dose Admin


 


 Acetaminophen


  (Tylenol)  650 mg  Q4H  PRN


 ORAL


 fever  4/19/18 15:30


 5/19/18 15:29   


 


 


 Acetaminophen/


 Hydrocodone Bitart


  (Norco 10/325)  1 tab  Q4H  PRN


 ORAL


 Pain Scale (3-5)  4/21/18 12:30


 4/28/18 08:29   


 


 


 Al Hydroxide/Mg


 Hydroxide


  (Mylanta II)  30 ml  Q6H  PRN


 ORAL


 dyspepsia  4/19/18 15:30


 5/19/18 15:29   


 


 


 Atorvastatin


 Calcium


  (Lipitor)  40 mg  BEDTIME


 ORAL


   4/19/18 21:00


 5/19/18 20:59  4/20/18 21:25


 


 


 Bupropion HCl


  (Wellbutrin)  100 mg  DAILY


 ORAL


   4/20/18 09:00


 5/20/18 08:59  4/21/18 08:45


 


 


 Dextrose


  (Dextrose 50%)  50 ml  STAT  PRN


 IV


 Hypoglycemia BS<60mg/dL  4/19/18 15:30


 5/19/18 15:29   


 


 


 Dextrose/Sodium


 Chloride  1,000 ml @ 


 75 mls/hr  [id number]


 IV


   4/19/18 17:00


 5/19/18 16:59  4/20/18 21:23


 


 


 Diphenhydramine


 HCl


  (Benadryl)  25 mg  Q6H  PRN


 ORAL


 Itching/Pruritis  4/19/18 15:30


 5/19/18 15:29   


 


 


 Duloxetine HCl


  (Cymbalta)  60 mg  DAILY


 ORAL


   4/20/18 09:00


 5/20/18 08:59  4/21/18 08:45


 


 


 Gabapentin


  (Neurontin)  300 mg  THREE TIMES A  DAY


 ORAL


   4/20/18 10:00


 5/20/18 09:59  4/21/18 08:45


 


 


 Heparin Sodium


  (Porcine)


  (Heparin 5000


 units/ml)  5,000 units  EVERY 12  HOURS


 SUBQ


   4/19/18 21:00


 5/19/18 20:59  4/21/18 08:48


 


 


 Hydromorphone HCl


  (Dilaudid)  0.5 mg  Q4H  PRN


 IVP


 For Pain  4/21/18 11:45


 4/28/18 11:44  4/21/18 12:04


 


 


 Levetiracetam


  (Keppra)  1,000 mg  Q8HR


 ORAL


   4/19/18 22:00


 5/19/18 21:59  4/21/18 06:03


 


 


 Levothyroxine


 Sodium


  (Synthroid)  75 mcg  ACBREAKFAST


 ORAL


   4/20/18 06:30


 5/20/18 06:29  4/21/18 06:03


 


 


 Lidocaine/


 Epinephrine


  (Xylocaine 1%/


 Epi  MPF 30ml)  30 ml  ONCE  PRN


 INJ


 .  4/21/18 11:15


 4/21/18 13:15   


 


 


 Lorazepam


  (Ativan 2mg/ml


 1ml)  1 mg  Q4H  PRN


 IV


 agitation  4/19/18 15:30


 4/26/18 15:29   


 


 


 Methylnaltrexone


 Bromide


  (Relistor)  12 mg  DAILY


 SUBQ


   4/20/18 10:15


 5/20/18 10:14  4/21/18 09:32


 


 


 Metoclopramide HCl


  (Reglan)  10 mg  Q6H  PRN


 IVP


 Unrelieved Severe Nausea  4/19/18 15:30


 5/19/18 15:29   


 


 


 Nitroglycerin


  (Ntg)  0.4 mg  Q5M X 3 DOSES PRN


 SL


 Prn Chest Pain  4/19/18 15:30


 5/19/18 15:29   


 


 


 Ondansetron HCl


  (Zofran)  4 mg  Q6H  PRN


 IVP


 Nausea & Vomiting  4/19/18 15:30


 5/19/18 15:29  4/20/18 12:37


 


 


 Pantoprazole


  (Protonix)  40 mg  DAILY


 IV


   4/20/18 09:00


 5/20/18 08:59  4/20/18 09:00


 


 


 Piperacillin Sod/


 Tazobactam Sod


 3.375 gm/Sodium


 Chloride  110 ml @ 


 27.5 mls/hr  Q8H


 IVPB


   4/20/18 16:00


 4/27/18 15:59  4/21/18 12:06


 


 


 Polyethylene


 Glycol


  (Miralax)  17 gm  HSPRN  PRN


 ORAL


 Constipation  4/19/18 15:30


 5/19/18 15:29   


 


 


 Promethazine HCl


 25 mg/Sodium


 Chloride  56 ml @ 


 110 mls/hr  Q6H  PRN


 IV


 Refractory N/V  4/19/18 15:30


 5/19/18 15:29   


 


 


 Simethicone


  (Mylicon)  80 mg  QIDPRN  PRN


 ORAL


 gas   4/20/18 10:00


 5/20/18 09:59   


 


 


 Temazepam


  (Restoril)  15 mg  HSPRN  PRN


 ORAL


 Insomnia  4/19/18 15:30


 4/26/18 15:29   


 


 


 Theophylline


  (Shiv-Dur)  100 mg  Q12HR


 ORAL


   4/19/18 21:00


 5/19/18 20:59  4/21/18 08:45


 


 


 Topiramate


  (Topamax)  25 mg  EVERY 12  HOURS


 ORAL


   4/19/18 21:00


 5/19/18 20:59  4/21/18 08:45


 


 


 Trazodone HCl


  (Desyrel)  200 mg  BEDTIME


 ORAL


   4/19/18 21:00


 5/19/18 20:59  4/20/18 21:24


 

















Raman Oconnor MD Apr 21, 2018 12:30

## 2018-04-22 VITALS — SYSTOLIC BLOOD PRESSURE: 135 MMHG | DIASTOLIC BLOOD PRESSURE: 81 MMHG

## 2018-04-22 VITALS — DIASTOLIC BLOOD PRESSURE: 55 MMHG | SYSTOLIC BLOOD PRESSURE: 95 MMHG

## 2018-04-22 VITALS — SYSTOLIC BLOOD PRESSURE: 118 MMHG | DIASTOLIC BLOOD PRESSURE: 71 MMHG

## 2018-04-22 VITALS — SYSTOLIC BLOOD PRESSURE: 114 MMHG | DIASTOLIC BLOOD PRESSURE: 70 MMHG

## 2018-04-22 VITALS — DIASTOLIC BLOOD PRESSURE: 86 MMHG | SYSTOLIC BLOOD PRESSURE: 136 MMHG

## 2018-04-22 VITALS — SYSTOLIC BLOOD PRESSURE: 111 MMHG | DIASTOLIC BLOOD PRESSURE: 76 MMHG

## 2018-04-22 LAB
ADD MANUAL DIFF: NO
ANION GAP SERPL CALC-SCNC: 7 MMOL/L (ref 5–15)
BASOPHILS NFR BLD AUTO: 0.9 % (ref 0–2)
BUN SERPL-MCNC: 11 MG/DL (ref 7–18)
CALCIUM SERPL-MCNC: 9 MG/DL (ref 8.5–10.1)
CHLORIDE SERPL-SCNC: 103 MMOL/L (ref 98–107)
CO2 SERPL-SCNC: 26 MMOL/L (ref 21–32)
CREAT SERPL-MCNC: 0.8 MG/DL (ref 0.55–1.3)
EOSINOPHIL NFR BLD AUTO: 1.4 % (ref 0–3)
ERYTHROCYTE [DISTWIDTH] IN BLOOD BY AUTOMATED COUNT: 14.9 % (ref 11.6–14.8)
HCT VFR BLD CALC: 37.1 % (ref 37–47)
HGB BLD-MCNC: 12.1 G/DL (ref 12–16)
LYMPHOCYTES NFR BLD AUTO: 33 % (ref 20–45)
MCV RBC AUTO: 92 FL (ref 80–99)
MONOCYTES NFR BLD AUTO: 10.8 % (ref 1–10)
NEUTROPHILS NFR BLD AUTO: 53.9 % (ref 45–75)
PLATELET # BLD: 227 K/UL (ref 150–450)
POTASSIUM SERPL-SCNC: 3.4 MMOL/L (ref 3.5–5.1)
RBC # BLD AUTO: 4.05 M/UL (ref 4.2–5.4)
SODIUM SERPL-SCNC: 136 MMOL/L (ref 136–145)
WBC # BLD AUTO: 9.2 K/UL (ref 4.8–10.8)

## 2018-04-22 RX ADMIN — TRAZODONE HYDROCHLORIDE SCH MG: 100 TABLET ORAL at 21:14

## 2018-04-22 RX ADMIN — DEXTROSE AND SODIUM CHLORIDE SCH MLS/HR: 5; .45 INJECTION, SOLUTION INTRAVENOUS at 23:39

## 2018-04-22 RX ADMIN — ZOLPIDEM TARTRATE SCH MG: 5 TABLET ORAL at 21:14

## 2018-04-22 RX ADMIN — THEOPHYLLINE ANHYDROUS SCH MG: 100 CAPSULE, EXTENDED RELEASE ORAL at 09:24

## 2018-04-22 RX ADMIN — METHYLNALTREXONE BROMIDE SCH MG: 12 INJECTION, SOLUTION SUBCUTANEOUS at 09:25

## 2018-04-22 RX ADMIN — DEXTROSE MONOHYDRATE SCH MLS/HR: 50 INJECTION, SOLUTION INTRAVENOUS at 23:39

## 2018-04-22 RX ADMIN — DEXTROSE MONOHYDRATE SCH MLS/HR: 50 INJECTION, SOLUTION INTRAVENOUS at 09:15

## 2018-04-22 RX ADMIN — HEPARIN SODIUM SCH UNITS: 5000 INJECTION INTRAVENOUS; SUBCUTANEOUS at 21:16

## 2018-04-22 RX ADMIN — PANTOPRAZOLE SODIUM SCH MG: 40 INJECTION, POWDER, FOR SOLUTION INTRAVENOUS at 09:23

## 2018-04-22 RX ADMIN — DEXTROSE AND SODIUM CHLORIDE SCH MLS/HR: 5; .45 INJECTION, SOLUTION INTRAVENOUS at 13:28

## 2018-04-22 RX ADMIN — THEOPHYLLINE ANHYDROUS SCH MG: 100 CAPSULE, EXTENDED RELEASE ORAL at 21:14

## 2018-04-22 RX ADMIN — DEXTROSE MONOHYDRATE SCH MLS/HR: 50 INJECTION, SOLUTION INTRAVENOUS at 16:50

## 2018-04-22 RX ADMIN — TOPIRAMATE SCH MG: 25 TABLET, COATED ORAL at 21:14

## 2018-04-22 RX ADMIN — DULOXETINE HYDROCHLORIDE SCH MG: 30 CAPSULE, DELAYED RELEASE ORAL at 09:24

## 2018-04-22 RX ADMIN — TOPIRAMATE SCH MG: 25 TABLET, COATED ORAL at 09:24

## 2018-04-22 RX ADMIN — ATORVASTATIN CALCIUM SCH MG: 20 TABLET, FILM COATED ORAL at 21:14

## 2018-04-22 RX ADMIN — HEPARIN SODIUM SCH UNITS: 5000 INJECTION INTRAVENOUS; SUBCUTANEOUS at 09:29

## 2018-04-22 NOTE — GENERAL PROGRESS NOTE
Assessment/Plan


Assessment/Plan


Assessment


- suspected SBO or PSBO


- Crohn's disease


- possible free air on xrays - surgery following


- Pleural based mass with pleural effusion - r/o malignancy





Recommendations


- follow exam


- abx


- check fluid cytology


- pulmonary opinion


- Diet per surgery


- IVF





Subjective


Allergies:  


Coded Allergies:  


     No Known Allergies (Verified , 10/27/06)


Subjective


Above noted


c/o abd pain





Objective





Last 24 Hour Vital Signs








  Date Time  Temp Pulse Resp B/P (MAP) Pulse Ox O2 Delivery O2 Flow Rate FiO2


 


4/22/18 12:00 98.8 89 20 136/86 96 Room Air  





 98.8       


 


4/22/18 11:01 98.7       


 


4/22/18 10:31 98.7       


 


4/22/18 08:00 98.7 83 19 114/70 93 Room Air  





 98.7       


 


4/22/18 04:00 97.2 75 20 95/55 95 Room Air  





 97.2       


 


4/22/18 00:00 97.6 83 20 118/71 95 Room Air  





 97.6       


 


4/21/18 16:40 97.9       


 


4/21/18 16:11 97.9       


 


4/21/18 16:00 97.0 89 20 125/76 93 Room Air  





 97.0       

















Intake and Output  


 


 4/21/18 4/22/18





 19:00 07:00


 


Intake Total 260.0 ml 1285.0 ml


 


Balance 260.0 ml 1285.0 ml


 


  


 


Intake Oral  500 ml


 


IV Total 260.0 ml 785.0 ml


 


# Voids 3 3


 


# Bowel Movements 1 








Laboratory Tests


4/22/18 05:35: 


White Blood Count 9.2, Red Blood Count 4.05L, Hemoglobin 12.1, Hematocrit 37.1, 

Mean Corpuscular Volume 92, Mean Corpuscular Hemoglobin 30.0, Mean Corpuscular 

Hemoglobin Concent 32.7, Red Cell Distribution Width 14.9H, Platelet Count 227, 

Mean Platelet Volume 7.3, Neutrophils (%) (Auto) 53.9, Lymphocytes (%) (Auto) 

33.0, Monocytes (%) (Auto) 10.8H, Eosinophils (%) (Auto) 1.4, Basophils (%) (

Auto) 0.9, Sodium Level 136, Potassium Level 3.4L, Chloride Level 103, Carbon 

Dioxide Level 26, Anion Gap 7, Blood Urea Nitrogen 11, Creatinine 0.8, Estimat 

Glomerular Filtration Rate > 60, Glucose Level 98, Calcium Level 9.0


Height (Feet):  5


Height (Inches):  6.00


Weight (Pounds):  129


Objective


WDWN


NCAT


supple


CTA


RRR


abd soft ND , mild diffuse TTP, L>R


no edema


non focal











DEMARIO BORREGO Apr 22, 2018 14:17

## 2018-04-22 NOTE — PROGRESS NOTE
DATE:  04/22/2018



SUBJECTIVE:  The patient is a 62-year-old female, high levels of anxiety

and abdominal pain, agitation, and mood lability.  That is why, her

attending has requested daily psychiatric consultation.



MENTAL STATUS EXAMINATION:  The patient is a 62-year-old female.  Confused,

disorganized, paranoid, depressed.  Denies suicidal or homicidal thoughts.

Denies auditory or visual hallucinations or delusions.  Insight and

judgement is fair.



DIAGNOSIS:  Major depressive disorder.



PLAN:  Treated with Neurontin 300 mg three times a day, Topamax 25 mg twice

a day, trazodone 200 mg at bedtime, Ambien 10 mg at bedtime, Wellbutrin

100 mg daily, and Cymbalta 60 mg daily.  Provided 18 to 20 minutes of

supportive therapy.  Seen and assessed at bedside.  Chart was reviewed and

discussed with staff.









  ______________________________________________

  Samantha Rick M.D. DR:  CHERYL

D:  04/22/2018 05:26

T:  04/22/2018 18:32

JOB#:  [id number]

CC:

## 2018-04-22 NOTE — GENERAL PROGRESS NOTE
Assessment/Plan


Assessment/Plan


(1) Chronic abdominal pain


(2) Chronic pancreatitis


(3) Crohn's disease 


(4) Herniated nucleus pulposus, lumbar


(5) Lumbar spondylosis


(6) Radiculopathy of lumbar region


Pt will be continued on Neurontin, Dilaudid and Norc and pt has intrathecal 

pump.


Pt was d/w Dr. Shetty and he concurred.





Subjective


Date patient seen:  Apr 22, 2018


Time patient seen:  05:15 - pm


Allergies:  


Coded Allergies:  


     No Known Allergies (Verified , 10/27/06)


Subjective


Constitutional:  Reports: weakness


HEENT:  Reports: no symptoms


Cardiovascular:  Reports: no symptoms


Respiratory:  Reports: no symptoms


Gastrointestinal/Abdominal:  Reports: abdominal pain


Genitourinary:  Reports: no symptoms


Neurologic/Psychiatric:  Reports: weakness


Endocrine:  Reports: no symptoms


Hematologic/Lymphatic:  Reports: no symptoms





Subjective


Patient is in bed and has been changed from Morphine to Dilaudid 0.5mg IV Q4H 

PRN, She is more comfortable. CT scan was reviewed showing possible malignancy.





Objective





Last 24 Hour Vital Signs








  Date Time  Temp Pulse Resp B/P (MAP) Pulse Ox O2 Delivery O2 Flow Rate FiO2


 


4/22/18 16:00 99.3 91 20 135/81 97 Room Air  





 99.3       


 


4/22/18 15:01 99.3       


 


4/22/18 14:31 98.8       


 


4/22/18 12:00 98.8 89 20 136/86 96 Room Air  





 98.8       


 


4/22/18 10:31 98.7       


 


4/22/18 08:00 98.7 83 19 114/70 93 Room Air  





 98.7       


 


4/22/18 04:00 97.2 75 20 95/55 95 Room Air  





 97.2       


 


4/22/18 00:00 97.6 83 20 118/71 95 Room Air  





 97.6       

















Intake and Output  


 


 4/21/18 4/22/18





 19:00 07:00


 


Intake Total 260.0 ml 1285.0 ml


 


Balance 260.0 ml 1285.0 ml


 


  


 


Intake Oral  500 ml


 


IV Total 260.0 ml 785.0 ml


 


# Voids 3 3


 


# Bowel Movements 1 








Laboratory Tests


4/22/18 05:35: 


White Blood Count 9.2, Red Blood Count 4.05L, Hemoglobin 12.1, Hematocrit 37.1, 

Mean Corpuscular Volume 92, Mean Corpuscular Hemoglobin 30.0, Mean Corpuscular 

Hemoglobin Concent 32.7, Red Cell Distribution Width 14.9H, Platelet Count 227, 

Mean Platelet Volume 7.3, Neutrophils (%) (Auto) 53.9, Lymphocytes (%) (Auto) 

33.0, Monocytes (%) (Auto) 10.8H, Eosinophils (%) (Auto) 1.4, Basophils (%) (

Auto) 0.9, Sodium Level 136, Potassium Level 3.4L, Chloride Level 103, Carbon 

Dioxide Level 26, Anion Gap 7, Blood Urea Nitrogen 11, Creatinine 0.8, Estimat 

Glomerular Filtration Rate > 60, Glucose Level 98, Calcium Level 9.0


Height (Feet):  5


Height (Inches):  6.00


Weight (Pounds):  129


Objective


General Appearance:  no apparent distress, alert


EENT:  normal ENT inspection, TMs normal


Neck:  normal alignment, supple


Cardiovascular:  normal rate, regular rhythm


Respiratory/Chest:  decreased breath sounds


Abdomen:  tender, distended, intrathecal pump palpated


Extremities:  non-tender


Edema:  no edema noted 


Neurologic:  alert, oriented x 3


Skin:  warm/dry











LEA BRAVO Apr 22, 2018 17:43

## 2018-04-22 NOTE — GENERAL SURGERY PROGRESS NOTE
General Surgery-Progress Note


Subjective


Symptoms:  improved


Additional Comments


happy with dilaudid.  doing well.  still pain but improved.  no acute events. 

passing flatus





Objective





Last 24 Hour Vital Signs








  Date Time  Temp Pulse Resp B/P (MAP) Pulse Ox O2 Delivery O2 Flow Rate FiO2


 


4/22/18 08:00 98.7 83 19 114/70 93 Room Air  





 98.7       


 


4/22/18 04:00 97.2 75 20 95/55 95 Room Air  





 97.2       


 


4/22/18 00:00 97.6 83 20 118/71 95 Room Air  





 97.6       


 


4/21/18 16:40 97.9       


 


4/21/18 16:11 97.9       


 


4/21/18 16:00 97.0 89 20 125/76 93 Room Air  





 97.0       


 


4/21/18 12:04 97.9       


 


4/21/18 12:00 97.3 83 20 139/89 100   





 97.3       


 


4/21/18 12:00 97.3 83 20 139/89 100 Room Air  





 97.3       








I&O











Intake and Output  


 


 4/21/18 4/22/18





 19:00 07:00


 


Intake Total 260.0 ml 1210.0 ml


 


Balance 260.0 ml 1210.0 ml


 


  


 


Intake Oral  500 ml


 


IV Total 260.0 ml 710.0 ml


 


# Voids 3 3


 


# Bowel Movements 1 








Drains:  none


Cardiovascular:  RSR


Respiratory:  clear


Abdomen:  soft, distended, present bowel sounds


Extremities:  no edema, no tenderness, no cyanosis





Laboratory Tests








Test


  4/22/18


05:35


 


White Blood Count


  9.2 K/UL


(4.8-10.8)


 


Red Blood Count


  4.05 M/UL


(4.20-5.40)  L


 


Hemoglobin


  12.1 G/DL


(12.0-16.0)


 


Hematocrit


  37.1 %


(37.0-47.0)


 


Mean Corpuscular Volume 92 FL (80-99)  


 


Mean Corpuscular Hemoglobin


  30.0 PG


(27.0-31.0)


 


Mean Corpuscular Hemoglobin


Concent 32.7 G/DL


(32.0-36.0)


 


Red Cell Distribution Width


  14.9 %


(11.6-14.8)  H


 


Platelet Count


  227 K/UL


(150-450)


 


Mean Platelet Volume


  7.3 FL


(6.5-10.1)


 


Neutrophils (%) (Auto)


  53.9 %


(45.0-75.0)


 


Lymphocytes (%) (Auto)


  33.0 %


(20.0-45.0)


 


Monocytes (%) (Auto)


  10.8 %


(1.0-10.0)  H


 


Eosinophils (%) (Auto)


  1.4 %


(0.0-3.0)


 


Basophils (%) (Auto)


  0.9 %


(0.0-2.0)


 


Sodium Level


  136 MMOL/L


(136-145)


 


Potassium Level


  3.4 MMOL/L


(3.5-5.1)  L


 


Chloride Level


  103 MMOL/L


()


 


Carbon Dioxide Level


  26 MMOL/L


(21-32)


 


Anion Gap


  7 mmol/L


(5-15)


 


Blood Urea Nitrogen


  11 mg/dL


(7-18)


 


Creatinine


  0.8 MG/DL


(0.55-1.30)


 


Estimat Glomerular Filtration


Rate > 60 mL/min


(>60)


 


Glucose Level


  98 MG/DL


()


 


Calcium Level


  9.0 MG/DL


(8.5-10.1)











Plan


Problems:  


(1) Intractable abdominal pain


Assessment & Plan:  62F with abdominal pain.  very complex medical and surgical 

history.  now with complex CT findings of left pleural effusion, left lower 

lung mass, large mediastinal and periaortic lymph nodes, new liver lesions, and 

air around the liver.  


unable to tell if bowel loop (possible blind end from prior surgery) noted in 

right upper quadrant above liver or if abscess or if free air.  exam not 

consistent with perforation and no significant free fluid noted on CT or area 

of perforation.  contrast into distal bowel without leak.  initial leukocytosis 

resolved.  low grade persistent fevers.  recent cough.  





unfortunately no clear explanation as to patients current condition.  lung mass

, effusion, new liver masses, and nodes very concerning for malignant process. 

Exam stable compared to prior exams (patient seen during last admission and 

known to me).  will need much more extensive work up. 





I discussed these findings with patient.  I explained possible need for urgent 

surgery but given history and complex surgical history we will monitor 

clinically first.  if declines will proceed with surgery which is VERY high 

risk in her.  if stable will continue with work up.  patient and partner 

express understand and agree with plan.  





s/p thoracentesis 4/20.  awaiting cytology





left EJ line not functional and removed.  did not have other access so right EJ 

line placed by myself.  if fails will need central venous access.  





-clear liquid diet today 


-monitor exam clinically


-trend labs


-iv abx


-will follow with recs. thank you for this consultation.  














Darrius Benitez Apr 22, 2018 10:35

## 2018-04-22 NOTE — GENERAL PROGRESS NOTE
Assessment/Plan


Problem List:  


(1) Crohn's disease


ICD Codes:  K50.90 - Crohn's disease


SNOMED:  19795792


Qualifiers:  


   Qualified Codes:  K50.919 - Crohn's disease, unspecified, with unspecified 

complications


(2) Opioid dependence


ICD Codes:  F11.20 - Opioid dependence


SNOMED:  46464274


(3) Intractable abdominal pain


ICD Codes:  R10.9 - Unspecified abdominal pain


SNOMED:  51978623, 203597220


(4) COPD (chronic obstructive pulmonary disease)


ICD Codes:  J44.9 - Chronic obstructive pulmonary disease


SNOMED:  60252111


(5) Shortness of breath


(6) SOB (shortness of breath)


ICD Codes:  R06.02 - Shortness of breath


SNOMED:  561464159


(7) UTI (urinary tract infection)


ICD Codes:  N39.0 - Urinary tract infection, site not specified


SNOMED:  21009746


Status:  unchanged


Assessment/Plan


ot pt diet pain control sx eval cbc bmp am brotman aru eval





Subjective


Constitutional:  Reports: weakness


Allergies:  


Coded Allergies:  


     No Known Allergies (Verified , 10/27/06)


All Systems:  reviewed and negative except above


Subjective


sleepy calm in bed





Objective





Last 24 Hour Vital Signs








  Date Time  Temp Pulse Resp B/P (MAP) Pulse Ox O2 Delivery O2 Flow Rate FiO2


 


4/22/18 04:00 97.2 75 20 95/55 95 Room Air  





 97.2       


 


4/22/18 00:00 97.6 83 20 118/71 95 Room Air  





 97.6       


 


4/21/18 16:40 97.9       


 


4/21/18 16:11 97.9       


 


4/21/18 16:00 97.0 89 20 125/76 93 Room Air  





 97.0       


 


4/21/18 12:04 97.9       


 


4/21/18 12:00 97.3 83 20 139/89 100   





 97.3       


 


4/21/18 12:00 97.3 83 20 139/89 100 Room Air  





 97.3       


 


4/21/18 09:17 97.9       


 


4/21/18 08:47 97.9       

















Intake and Output  


 


 4/21/18 4/22/18





 19:00 07:00


 


Intake Total 260.0 ml 1210.0 ml


 


Balance 260.0 ml 1210.0 ml


 


  


 


Intake Oral  500 ml


 


IV Total 260.0 ml 710.0 ml


 


# Voids 3 3


 


# Bowel Movements 1 








Laboratory Tests


4/22/18 05:35: 


White Blood Count 9.2, Red Blood Count 4.05L, Hemoglobin 12.1, Hematocrit 37.1, 

Mean Corpuscular Volume 92, Mean Corpuscular Hemoglobin 30.0, Mean Corpuscular 

Hemoglobin Concent 32.7, Red Cell Distribution Width 14.9H, Platelet Count 227, 

Mean Platelet Volume 7.3, Neutrophils (%) (Auto) 53.9, Lymphocytes (%) (Auto) 

33.0, Monocytes (%) (Auto) 10.8H, Eosinophils (%) (Auto) 1.4, Basophils (%) (

Auto) 0.9, Sodium Level 136, Potassium Level 3.4L, Chloride Level 103, Carbon 

Dioxide Level 26, Anion Gap 7, Blood Urea Nitrogen 11, Creatinine 0.8, Estimat 

Glomerular Filtration Rate > 60, Glucose Level 98, Calcium Level 9.0


Height (Feet):  5


Height (Inches):  6.00


Weight (Pounds):  129


General Appearance:  lethargic


EENT:  normal ENT inspection


Neck:  normal alignment


Cardiovascular:  normal peripheral pulses, normal rate, regular rhythm


Respiratory/Chest:  chest wall non-tender, lungs clear, normal breath sounds


Abdomen:  normal bowel sounds, non tender, soft


Extremities:  normal inspection


Edema:  no edema noted Arm (L), no edema noted Arm (R), no edema noted Leg (L), 

no edema noted Leg (R), no edema noted Pedal (L), no edema noted Pedal (R), no 

edema noted Generalized


Neurologic:  motor weakness


Skin:  normal pigmentation, warm/dry











MAICOL LANG Apr 22, 2018 08:20

## 2018-04-22 NOTE — CONSULTATION
DATE OF CONSULTATION:  04/21/2018



CONSULTING PHYSICIAN:  Samantha Rick M.D.



HISTORY OF PRESENT ILLNESS:  The patient is a 62-year-old female patient

who is admitted to the hospital secondary to intractable abdominal pain

and shortness of breath.  She had an episode of high levels of anxiety.

She also has a lot of anxiety secondary to the abdominal scan done and she

is waiting for the results of this.  She was in a lot of pain earlier

today.  She has a high level of anxiety because of waiting for the results

and stress of her medical illness caused increasing mood lability and

anxiety and agitation and that is why her attending physician has

requested daily psychiatric consultation.



PAST MEDICAL HISTORY:  She has a history of abdominal pain and GI

discomfort.  Please see internal medicine note for further details of her

medical history.



ALLERGIES:  None.



SUBSTANCE ABUSE HISTORY:  She denies any history of any drug and alcohol

use.



SOCIAL HISTORY:  Financially supported by [employer], family support, and Medicare

as well.  She also lives in a private residence.



PSYCHOTROPIC MEDICATIONS:  On admission, Cymbalta 60 mg daily, Wellbutrin

100 mg daily, Topamax 25 mg twice a day, trazodone 200 mg at bedtime,

Neurontin 300 mg three times a day, and Ativan 1 mg every four hours IV.



MEDICAL HISTORY:  Abdominal pain, shortness of breath.  Please see internal

medicine notes for further details.



PSYCHIATRIC HISTORY:  Major depressive disorder, mild, recurrent as well as

generalized anxiety disorder.  She is denying any previous psychiatric

admissions.



MENTAL STATUS EXAMINATION:  She is somewhat thin, she is well groomed.

Attitude _____ irritable.  Affect is guarded and restricted.  Intellect

fair.  Mood depressed and anxious.  Orientation x2.  Attention span is

poor.  Speech apparently is normal volume.  Thought process, linear and

goal directed.  She denies any auditory or visual hallucinations  or

delusions.  Denies any current suicidal or homicidal ideations.  Her

insight and judgement is fair.



DIAGNOSES:

1. Major depressive disorder, mild, recurrent, without psychotic

features, rule out bipolar 2.

2. Medical illness, intractable abdominal pain, shortness of breath.

3. Psychosocial stressors, financial.



PLAN:  Plan for this patient is to treat her with a mood regimen consisting

of Cymbalta 60 mg p.o. daily and Wellbutrin at a dose of 100 mg p.o.

daily, Topamax 25 mg twice a day to stabilize her mood, trazodone 200 mg

at bedtime, Neurontin 300 mg three times a day, and also I am going to

increase the dose of Ativan to 2 mg IV q.6 h. p.r.n. anxiety and agitation

and Ambien 10 mg at bedtime.  A 20 minutes of supportive therapy was

provided.  Chart was reviewed and discussed with staff.  Seen and assessed

at bedside.



I would like to thank, Dr. Maxim Hopkins, for this interesting

consultation.









  ______________________________________________

  Samantha Rick M.D.





DR:  Ramone

D:  04/21/2018 15:38

T:  04/22/2018 03:05

JOB#:  [id number]

CC:

## 2018-04-23 VITALS — DIASTOLIC BLOOD PRESSURE: 78 MMHG | SYSTOLIC BLOOD PRESSURE: 136 MMHG

## 2018-04-23 VITALS — SYSTOLIC BLOOD PRESSURE: 98 MMHG | DIASTOLIC BLOOD PRESSURE: 61 MMHG

## 2018-04-23 VITALS — SYSTOLIC BLOOD PRESSURE: 111 MMHG | DIASTOLIC BLOOD PRESSURE: 79 MMHG

## 2018-04-23 VITALS — DIASTOLIC BLOOD PRESSURE: 94 MMHG | SYSTOLIC BLOOD PRESSURE: 134 MMHG

## 2018-04-23 VITALS — SYSTOLIC BLOOD PRESSURE: 107 MMHG | DIASTOLIC BLOOD PRESSURE: 65 MMHG

## 2018-04-23 VITALS — SYSTOLIC BLOOD PRESSURE: 126 MMHG | DIASTOLIC BLOOD PRESSURE: 81 MMHG

## 2018-04-23 LAB
ADD MANUAL DIFF: NO
ANION GAP SERPL CALC-SCNC: 9 MMOL/L (ref 5–15)
BASOPHILS NFR BLD AUTO: 0.7 % (ref 0–2)
BUN SERPL-MCNC: 10 MG/DL (ref 7–18)
CALCIUM SERPL-MCNC: 9.2 MG/DL (ref 8.5–10.1)
CHLORIDE SERPL-SCNC: 103 MMOL/L (ref 98–107)
CO2 SERPL-SCNC: 25 MMOL/L (ref 21–32)
CREAT SERPL-MCNC: 0.9 MG/DL (ref 0.55–1.3)
EOSINOPHIL NFR BLD AUTO: 1.7 % (ref 0–3)
ERYTHROCYTE [DISTWIDTH] IN BLOOD BY AUTOMATED COUNT: 15 % (ref 11.6–14.8)
HCT VFR BLD CALC: 39.7 % (ref 37–47)
HGB BLD-MCNC: 13.2 G/DL (ref 12–16)
LYMPHOCYTES NFR BLD AUTO: 29.4 % (ref 20–45)
MCV RBC AUTO: 92 FL (ref 80–99)
MONOCYTES NFR BLD AUTO: 8.2 % (ref 1–10)
NEUTROPHILS NFR BLD AUTO: 59.9 % (ref 45–75)
PLATELET # BLD: 228 K/UL (ref 150–450)
POTASSIUM SERPL-SCNC: 3.6 MMOL/L (ref 3.5–5.1)
RBC # BLD AUTO: 4.34 M/UL (ref 4.2–5.4)
SODIUM SERPL-SCNC: 136 MMOL/L (ref 136–145)
WBC # BLD AUTO: 9.2 K/UL (ref 4.8–10.8)

## 2018-04-23 RX ADMIN — DULOXETINE HYDROCHLORIDE SCH MG: 30 CAPSULE, DELAYED RELEASE ORAL at 08:08

## 2018-04-23 RX ADMIN — THEOPHYLLINE ANHYDROUS SCH MG: 100 CAPSULE, EXTENDED RELEASE ORAL at 20:34

## 2018-04-23 RX ADMIN — DEXTROSE AND SODIUM CHLORIDE SCH MLS/HR: 5; .45 INJECTION, SOLUTION INTRAVENOUS at 14:20

## 2018-04-23 RX ADMIN — HEPARIN SODIUM SCH UNITS: 5000 INJECTION INTRAVENOUS; SUBCUTANEOUS at 20:45

## 2018-04-23 RX ADMIN — ZOLPIDEM TARTRATE SCH MG: 5 TABLET ORAL at 20:34

## 2018-04-23 RX ADMIN — TRAZODONE HYDROCHLORIDE SCH MG: 100 TABLET ORAL at 20:34

## 2018-04-23 RX ADMIN — DEXTROSE MONOHYDRATE SCH MLS/HR: 50 INJECTION, SOLUTION INTRAVENOUS at 16:20

## 2018-04-23 RX ADMIN — TOPIRAMATE SCH MG: 25 TABLET, COATED ORAL at 08:08

## 2018-04-23 RX ADMIN — METHYLNALTREXONE BROMIDE SCH MG: 12 INJECTION, SOLUTION SUBCUTANEOUS at 09:36

## 2018-04-23 RX ADMIN — HEPARIN SODIUM SCH UNITS: 5000 INJECTION INTRAVENOUS; SUBCUTANEOUS at 08:09

## 2018-04-23 RX ADMIN — THEOPHYLLINE ANHYDROUS SCH MG: 100 CAPSULE, EXTENDED RELEASE ORAL at 08:08

## 2018-04-23 RX ADMIN — ATORVASTATIN CALCIUM SCH MG: 20 TABLET, FILM COATED ORAL at 20:34

## 2018-04-23 RX ADMIN — TOPIRAMATE SCH MG: 25 TABLET, COATED ORAL at 20:34

## 2018-04-23 RX ADMIN — LORAZEPAM PRN MG: 2 INJECTION, SOLUTION INTRAMUSCULAR; INTRAVENOUS at 13:08

## 2018-04-23 RX ADMIN — DEXTROSE MONOHYDRATE SCH MLS/HR: 50 INJECTION, SOLUTION INTRAVENOUS at 08:08

## 2018-04-23 NOTE — GI PROGRESS NOTE
Assessment/Plan


Problems:  


(1) Crohn's disease


ICD Codes:  K50.90 - Crohn's disease


SNOMED:  86270526


Qualifiers:  


   Qualified Codes:  K50.919 - Crohn's disease, unspecified, with unspecified 

complications


(2) Opioid dependence


ICD Codes:  F11.20 - Opioid dependence


SNOMED:  59427630


(3) SBO (small bowel obstruction)


ICD Codes:  K56.609 - Unspecified intestinal obstruction, unspecified as to 

partial versus complete obstruction


SNOMED:  337051049


(4) Chronic abdominal pain


Status:  stable


Status Narrative


Discussed with Dr. Mcleod.


Assessment/Plan


Assessment


- suspected SBO or PSBO


- Crohn's disease


- possible free air on xrays - surgery following


- Pleural based mass with pleural effusion - r/o malignancy





Recommendations


- follow exam


- abx


- check fluid cytology


- pulmonary opinion


- Diet per surgery


- IVF





Subjective


Gastrointestinal/Abdominal:  Reports: abdominal pain





Objective





Last 24 Hour Vital Signs








  Date Time  Temp Pulse Resp B/P (MAP) Pulse Ox O2 Delivery O2 Flow Rate FiO2


 


4/23/18 10:25 97.2       


 


4/23/18 10:13 97.2       


 


4/23/18 09:14 97.2       


 


4/23/18 08:00 97.2 87 21 136/78 97 Room Air  





 97.2       


 


4/23/18 04:00 98.0 77 20 98/61 100   





 98.0       


 


4/23/18 00:00 99.2 82 20 107/65 100   





 99.2       


 


4/23/18 00:00      Room Air  


 


4/22/18 20:00      Room Air  


 


4/22/18 20:00 99.5 93 19 111/76 100   





 99.5       


 


4/22/18 19:01 99.3       


 


4/22/18 18:31 99.3       


 


4/22/18 16:00 99.3 91 20 135/81 97 Room Air  





 99.3       


 


4/22/18 14:31 98.8       


 


4/22/18 12:00 98.8 89 20 136/86 96 Room Air  





 98.8       

















Intake and Output  


 


 4/22/18 4/23/18





 19:00 07:00


 


Intake Total 965.0 ml 540.0 ml


 


Balance 965.0 ml 540.0 ml


 


  


 


Intake Oral 480 ml 


 


IV Total 485.0 ml 540.0 ml


 


# Voids 3 2











Laboratory Tests








Test


  4/23/18


07:05


 


White Blood Count


  9.2 K/UL


(4.8-10.8)


 


Red Blood Count


  4.34 M/UL


(4.20-5.40)


 


Hemoglobin


  13.2 G/DL


(12.0-16.0)


 


Hematocrit


  39.7 %


(37.0-47.0)


 


Mean Corpuscular Volume 92 FL (80-99)  


 


Mean Corpuscular Hemoglobin


  30.4 PG


(27.0-31.0)


 


Mean Corpuscular Hemoglobin


Concent 33.2 G/DL


(32.0-36.0)


 


Red Cell Distribution Width


  15.0 %


(11.6-14.8)  H


 


Platelet Count


  228 K/UL


(150-450)


 


Mean Platelet Volume


  6.6 FL


(6.5-10.1)


 


Neutrophils (%) (Auto)


  59.9 %


(45.0-75.0)


 


Lymphocytes (%) (Auto)


  29.4 %


(20.0-45.0)


 


Monocytes (%) (Auto)


  8.2 %


(1.0-10.0)


 


Eosinophils (%) (Auto)


  1.7 %


(0.0-3.0)


 


Basophils (%) (Auto)


  0.7 %


(0.0-2.0)


 


Sodium Level


  136 MMOL/L


(136-145)


 


Potassium Level


  3.6 MMOL/L


(3.5-5.1)


 


Chloride Level


  103 MMOL/L


()


 


Carbon Dioxide Level


  25 MMOL/L


(21-32)


 


Anion Gap


  9 mmol/L


(5-15)


 


Blood Urea Nitrogen


  10 mg/dL


(7-18)


 


Creatinine


  0.9 MG/DL


(0.55-1.30)


 


Estimat Glomerular Filtration


Rate > 60 mL/min


(>60)


 


Glucose Level


  90 MG/DL


()


 


Calcium Level


  9.2 MG/DL


(8.5-10.1)








Height (Feet):  5


Height (Inches):  6.00


Weight (Pounds):  129


General Appearance:  WD/WN, no apparent distress, alert, thin


Cardiovascular:  normal rate


Respiratory/Chest:  normal breath sounds, no respiratory distress


Abdominal Exam:  normal bowel sounds, non tender, soft


Extremities:  normal range of motion, non-tender











Elena Seay N.PMatt Apr 23, 2018 10:55

## 2018-04-23 NOTE — GENERAL PROGRESS NOTE
Assessment/Plan


Problem List:  


(1) Crohn's disease


ICD Codes:  K50.90 - Crohn's disease


SNOMED:  62908939


Qualifiers:  


   Qualified Codes:  K50.919 - Crohn's disease, unspecified, with unspecified 

complications


(2) Opioid dependence


ICD Codes:  F11.20 - Opioid dependence


SNOMED:  25062955


(3) Intractable abdominal pain


ICD Codes:  R10.9 - Unspecified abdominal pain


SNOMED:  84284990, 130401294


(4) COPD (chronic obstructive pulmonary disease)


ICD Codes:  J44.9 - Chronic obstructive pulmonary disease


SNOMED:  78941706


(5) Shortness of breath


(6) SOB (shortness of breath)


ICD Codes:  R06.02 - Shortness of breath


SNOMED:  069576564


(7) UTI (urinary tract infection)


ICD Codes:  N39.0 - Urinary tract infection, site not specified


SNOMED:  95133899


Status:  unchanged


Assessment/Plan


ot pt diet pain control sx eval cbc bmp am brotman aru eval





Subjective


Constitutional:  Reports: weakness


Allergies:  


Coded Allergies:  


     No Known Allergies (Verified , 10/27/06)


All Systems:  reviewed and negative except above


Subjective


sleepy calm in bed some general pain





Objective





Last 24 Hour Vital Signs








  Date Time  Temp Pulse Resp B/P (MAP) Pulse Ox O2 Delivery O2 Flow Rate FiO2


 


4/23/18 12:00 98.4 98 20 111/79 97 Room Air  





 98.4       


 


4/23/18 10:55 97.2       


 


4/23/18 10:25 97.2       


 


4/23/18 10:13 97.2       


 


4/23/18 09:14 97.2       


 


4/23/18 08:00 97.2 87 21 136/78 97 Room Air  





 97.2       


 


4/23/18 04:00 98.0 77 20 98/61 100   





 98.0       


 


4/23/18 00:00 99.2 82 20 107/65 100   





 99.2       


 


4/23/18 00:00      Room Air  


 


4/22/18 20:00      Room Air  


 


4/22/18 20:00 99.5 93 19 111/76 100   





 99.5       


 


4/22/18 18:31 99.3       


 


4/22/18 16:00 99.3 91 20 135/81 97 Room Air  





 99.3       


 


4/22/18 14:31 98.8       

















Intake and Output  


 


 4/22/18 4/23/18





 19:00 07:00


 


Intake Total 965.0 ml 540.0 ml


 


Balance 965.0 ml 540.0 ml


 


  


 


Intake Oral 480 ml 


 


IV Total 485.0 ml 540.0 ml


 


# Voids 3 2








Laboratory Tests


4/23/18 07:05: 


White Blood Count 9.2, Red Blood Count 4.34, Hemoglobin 13.2, Hematocrit 39.7, 

Mean Corpuscular Volume 92, Mean Corpuscular Hemoglobin 30.4, Mean Corpuscular 

Hemoglobin Concent 33.2, Red Cell Distribution Width 15.0H, Platelet Count 228, 

Mean Platelet Volume 6.6, Neutrophils (%) (Auto) 59.9, Lymphocytes (%) (Auto) 

29.4, Monocytes (%) (Auto) 8.2, Eosinophils (%) (Auto) 1.7, Basophils (%) (Auto

) 0.7, Sodium Level 136, Potassium Level 3.6, Chloride Level 103, Carbon 

Dioxide Level 25, Anion Gap 9, Blood Urea Nitrogen 10, Creatinine 0.9, Estimat 

Glomerular Filtration Rate > 60, Glucose Level 90, Calcium Level 9.2


Height (Feet):  5


Height (Inches):  6.00


Weight (Pounds):  129


General Appearance:  lethargic


EENT:  normal ENT inspection


Neck:  normal alignment


Cardiovascular:  normal peripheral pulses, normal rate, regular rhythm


Respiratory/Chest:  chest wall non-tender, lungs clear, normal breath sounds


Abdomen:  soft, decreased bowel sounds


Extremities:  normal inspection


Edema:  no edema noted Arm (L), no edema noted Arm (R), no edema noted Leg (L), 

no edema noted Leg (R), no edema noted Pedal (L), no edema noted Pedal (R), no 

edema noted Generalized


Neurologic:  responsive, motor weakness


Skin:  normal pigmentation, warm/dry











MAICOL LANG Apr 23, 2018 12:34

## 2018-04-23 NOTE — CONSULTATION
DATE OF CONSULTATION:  04/21/2018



PSYCHOTHERAPY CONSULTATION PROGRESS



TREATING ATTENDING PHYSICIAN:  Maxim Hopkins D.O.



HISTORY OF PRESENT ILLNESS:  The patient is a 62-year-old female patient.

The patient was admitted to the hospital for shortness of breath as well

as abdominal pain.  She has been complaining of anxiety, irritability, and

her mood has been unstable.  For this reason, she was referred for

psychotherapeutic services.  Clinician assessed this patient.  The patient

complains of constant anxiety and irritability.  Due to her current

medical condition, the morning of her pain, she was confused and more

irritable _____ report.  The patient is very agitated and irritable.

Denies suicidal or homicidal thoughts of ideation.  Denies auditory or

visual hallucinations.



PAST MEDICAL HISTORY:  Includes a history of COPD, CHF, Crohn disease, and

abdominal pain.



ALLERGIES:  The patient has no known drug allergies.



SUBSTANCE ABUSE HISTORY:  The patient does have a history of smoking

cigarettes.  Denies history of alcohol use or illicit substance use.



PSYCHIATRIC HISTORY:  The patient has a history of depression and anxiety.

The patient has been treated with psychotropic medications in the past.



SOCIAL HISTORY:  The patient is a 62-year-old female patient.  She is a

single female patient.  Financially sustained through [employer].  The patient

lives independently.



MENTAL STATUS EXAMINATION:  The patient is alert and oriented to person and

place.  Mood is anxious.  Affect is blunted.  Thought process is

disorganized.  The patient has poor attention and concentration.



DIAGNOSIS:  Major depressive disorder, mild recurrent without psychotic

features, and generalized anxiety disorder.



PLAN:  Clinician assessed the patient.  Provided the patient with reality

orientation and supportive psychotherapy.  Given the patient's thoughts

and feelings of depression, encouraging the patient to articulate her

needs utilizing positive communication skills, providing coping skills,

and relaxation exercises.  Continue with behavioral management.  This

clinician has reviewed the patient's chart and discussed the treatment

with treatment team.









  ______________________________________________

  Orestes Ross PsyD.





:  [place]

D:  04/22/2018 21:33

T:  04/23/2018 04:48

JOB#:  [id number]

CC:

## 2018-04-23 NOTE — GENERAL PROGRESS NOTE
Assessment/Plan


Assessment/Plan


(1) Chronic abdominal pain


(2) Chronic pancreatitis


(3) Crohn's disease 


(4) Herniated nucleus pulposus, lumbar


(5) Lumbar spondylosis


(6) Radiculopathy of lumbar region


Pt will be continued on Neurontin, Dilaudid and Norc and pt has intrathecal 

pump.


Pt was d/w Dr. Shetty and he concurred.





Subjective


Date patient seen:  Apr 23, 2018


Time patient seen:  07:00 - am


Allergies:  


Coded Allergies:  


     No Known Allergies (Verified , 10/27/06)


Subjective


Constitutional:  Reports: weakness


HEENT:  Reports: no symptoms


Cardiovascular:  Reports: no symptoms


Respiratory:  Reports: no symptoms


Gastrointestinal/Abdominal:  Reports: abdominal pain


Genitourinary:  Reports: no symptoms


Neurologic/Psychiatric:  Reports: weakness


Endocrine:  Reports: no symptoms


Hematologic/Lymphatic:  Reports: no symptoms





Subjective


Patient continues to c/o pain which is severe at times. The medications have 

helped to reduce her pain and she has no new complaints at this time.





Objective





Last 24 Hour Vital Signs








  Date Time  Temp Pulse Resp B/P (MAP) Pulse Ox O2 Delivery O2 Flow Rate FiO2


 


4/23/18 04:00 98.0 77 20 98/61 100   





 98.0       


 


4/23/18 00:00 99.2 82 20 107/65 100   





 99.2       


 


4/23/18 00:00      Room Air  


 


4/22/18 20:00      Room Air  


 


4/22/18 20:00 99.5 93 19 111/76 100   





 99.5       


 


4/22/18 19:01 99.3       


 


4/22/18 18:31 99.3       


 


4/22/18 16:00 99.3 91 20 135/81 97 Room Air  





 99.3       


 


4/22/18 14:31 98.8       


 


4/22/18 12:00 98.8 89 20 136/86 96 Room Air  





 98.8       


 


4/22/18 10:31 98.7       

















Intake and Output  


 


 4/22/18 4/23/18





 19:00 07:00


 


Intake Total 965.0 ml 540.0 ml


 


Balance 965.0 ml 540.0 ml


 


  


 


Intake Oral 480 ml 


 


IV Total 485.0 ml 540.0 ml


 


# Voids 3 2








Laboratory Tests


4/23/18 07:05: 


White Blood Count [Pending], Red Blood Count [Pending], Hemoglobin [Pending], 

Hematocrit [Pending], Mean Corpuscular Volume [Pending], Mean Corpuscular 

Hemoglobin [Pending], Mean Corpuscular Hemoglobin Concent [Pending], Red Cell 

Distribution Width [Pending], Platelet Count [Pending], Mean Platelet Volume [

Pending], Neutrophils (%) (Auto) [Pending], Lymphocytes (%) (Auto) [Pending], 

Monocytes (%) (Auto) [Pending], Eosinophils (%) (Auto) [Pending], Basophils (%) 

(Auto) [Pending], Sodium Level [Pending], Potassium Level [Pending], Chloride 

Level [Pending], Carbon Dioxide Level [Pending], Blood Urea Nitrogen [Pending], 

Creatinine [Pending], Estimat Glomerular Filtration Rate [Pending], Glucose 

Level [Pending], Calcium Level [Pending]


Height (Feet):  5


Height (Inches):  6.00


Weight (Pounds):  129


Objective


General Appearance:  no apparent distress, alert


EENT:  normal ENT inspection, TMs normal


Neck:  normal alignment, supple


Cardiovascular:  normal rate, regular rhythm


Respiratory/Chest:  decreased breath sounds


Abdomen:  tender, distended, intrathecal pump palpated


Extremities:  non-tender


Edema:  no edema noted 


Neurologic:  alert, oriented x 3


Skin:  warm/dry











LEA BRAVO Apr 23, 2018 08:31

## 2018-04-23 NOTE — GENERAL SURGERY PROGRESS NOTE
General Surgery-Progress Note


Subjective


Symptoms:  improved


Additional Comments


pain improved.  no acute events.  wants to go home.





Objective





Last 24 Hour Vital Signs








  Date Time  Temp Pulse Resp B/P (MAP) Pulse Ox O2 Delivery O2 Flow Rate FiO2


 


4/23/18 12:00 98.4 98 20 111/79 97 Room Air  





 98.4       


 


4/23/18 10:55 97.2       


 


4/23/18 10:25 97.2       


 


4/23/18 10:13 97.2       


 


4/23/18 09:14 97.2       


 


4/23/18 08:00 97.2 87 21 136/78 97 Room Air  





 97.2       


 


4/23/18 04:00 98.0 77 20 98/61 100   





 98.0       


 


4/23/18 00:00 99.2 82 20 107/65 100   





 99.2       


 


4/23/18 00:00      Room Air  


 


4/22/18 20:00      Room Air  


 


4/22/18 20:00 99.5 93 19 111/76 100   





 99.5       


 


4/22/18 18:31 99.3       


 


4/22/18 16:00 99.3 91 20 135/81 97 Room Air  





 99.3       


 


4/22/18 14:31 98.8       








I&O











Intake and Output  


 


 4/22/18 4/23/18





 19:00 07:00


 


Intake Total 965.0 ml 540.0 ml


 


Balance 965.0 ml 540.0 ml


 


  


 


Intake Oral 480 ml 


 


IV Total 485.0 ml 540.0 ml


 


# Voids 3 2








Drains:  none


Cardiovascular:  RSR


Respiratory:  clear


Abdomen:  soft, distended, non-tender, present bowel sounds


Extremities:  no edema, no tenderness, no cyanosis





Laboratory Tests








Test


  4/23/18


07:05


 


White Blood Count


  9.2 K/UL


(4.8-10.8)


 


Red Blood Count


  4.34 M/UL


(4.20-5.40)


 


Hemoglobin


  13.2 G/DL


(12.0-16.0)


 


Hematocrit


  39.7 %


(37.0-47.0)


 


Mean Corpuscular Volume 92 FL (80-99)  


 


Mean Corpuscular Hemoglobin


  30.4 PG


(27.0-31.0)


 


Mean Corpuscular Hemoglobin


Concent 33.2 G/DL


(32.0-36.0)


 


Red Cell Distribution Width


  15.0 %


(11.6-14.8)  H


 


Platelet Count


  228 K/UL


(150-450)


 


Mean Platelet Volume


  6.6 FL


(6.5-10.1)


 


Neutrophils (%) (Auto)


  59.9 %


(45.0-75.0)


 


Lymphocytes (%) (Auto)


  29.4 %


(20.0-45.0)


 


Monocytes (%) (Auto)


  8.2 %


(1.0-10.0)


 


Eosinophils (%) (Auto)


  1.7 %


(0.0-3.0)


 


Basophils (%) (Auto)


  0.7 %


(0.0-2.0)


 


Sodium Level


  136 MMOL/L


(136-145)


 


Potassium Level


  3.6 MMOL/L


(3.5-5.1)


 


Chloride Level


  103 MMOL/L


()


 


Carbon Dioxide Level


  25 MMOL/L


(21-32)


 


Anion Gap


  9 mmol/L


(5-15)


 


Blood Urea Nitrogen


  10 mg/dL


(7-18)


 


Creatinine


  0.9 MG/DL


(0.55-1.30)


 


Estimat Glomerular Filtration


Rate > 60 mL/min


(>60)


 


Glucose Level


  90 MG/DL


()


 


Calcium Level


  9.2 MG/DL


(8.5-10.1)











Plan


Problems:  


(1) Intractable abdominal pain


Assessment & Plan:  62F with abdominal pain.  very complex medical and surgical 

history.  now with complex CT findings of left pleural effusion, left lower 

lung mass, large mediastinal and periaortic lymph nodes, new liver lesions, and 

air around the liver.  


unable to tell if bowel loop (possible blind end from prior surgery) noted in 

right upper quadrant above liver or if abscess or if free air.  exam not 

consistent with perforation and no significant free fluid noted on CT or area 

of perforation.  contrast into distal bowel without leak.  initial leukocytosis 

resolved.  low grade persistent fevers.  recent cough.  





unfortunately no clear explanation as to patients current condition.  lung mass

, effusion, new liver masses, and nodes very concerning for malignant process. 

Exam stable compared to prior exams (patient seen during last admission and 

known to me).  will need much more extensive work up. 





I discussed these findings with patient.  I explained possible need for urgent 

surgery but given history and complex surgical history we will monitor 

clinically first.  if declines will proceed with surgery which is VERY high 

risk in her.  if stable will continue with work up.  patient and partner 

express understand and agree with plan.  





s/p thoracentesis 4/20.  awaiting cytology





left EJ line not functional and removed.  did not have other access so right EJ 

line placed by myself.  if fails will need central venous access.  





-regular diet today 


-monitor exam clinically


-trend labs


-iv abx


-will follow with recs. thank you for this consultation.  














Darrius Benitez Apr 23, 2018 13:56

## 2018-04-23 NOTE — PROGRESS NOTE
DATE:  04/23/2018



SUBJECTIVE:  The patient is a 62-year-old female patient.  She is admitted

because she has abdominal pain, but she has some confusion, disorganized

thought process, and mood lability.  She has got no logical plan for her

own self-care.  She has got _____ she does require daily follow up with

her attending has requested daily psychiatric consultation.



MENTAL STATUS EXAMINATION:  The patient is a 62-year-old female.

Appearance is disheveled.  Attitude is irritable and agitated.  Affect is

guarded and restricted.  Intellect poor.  Mood depressed and anxious.

Motor activity, psychomotor agitation.  Attention span is poor.

Orientation x2.  Speech is pressured.  Thought process, disorganized and

illogical.  Thought content, auditory hallucinations and paranoid

delusions.  Insight and judgment is poor.



DIAGNOSES:  Major depressive disorder, mild, recurrent, without psychotic

features secondary to generalized anxiety disorder.



PLAN:  Plan for this patient is to treat her on Trazodone, Lamictal, and

then also Remeron, Wellbutrin to help reduce depression and anxiety.  Also

Ativan as needed to reduce anxiety and agitation,  trazodone 200 mg at

bedtime for insomnia.  An 18 to 20 minutes of supportive psychotherapy

provided.  Chart reviewed.  Discussed with staff.  Seen and assessed at

bedside.









  ______________________________________________

  Samantha Rick M.D.





DR:  CHERYL

D:  04/23/2018 08:51

T:  04/23/2018 14:02

JOB#:  [id number]

CC:

## 2018-04-23 NOTE — DIAGNOSTIC IMAGING REPORT
Indication: Abdominal pain

 

Technique: Supine view of the abdomen

 

Comparison: For 18 2018

 

Findings: A small amount of contrast from recent CT scan is seen in the distal colon.

Gas-filled upper limits of normal caliber sigmoid colon is noted. Prominent

gas-filled small bowel loops are again noted. Bowel gas pattern is essentially

unchanged. The stomach is less distended. Pain pump, hernia mesh anchors are again

demonstrated. Findings are overall unchanged

 

Impression: Essentially unchanged from prior study of 4/18/2018

 

Note that contrast from prior CT scan is within the distal colon

## 2018-04-23 NOTE — INFECTIOUS DISEASES PROG NOTE
Assessment/Plan


Assessment/Plan





ASSESSMENT:  The patient is a 62-year-old female with,





 Low-grade fever. probable due to SBO  , improving


Status post leukocytosis.


Probable small bowel obstruction


Recent history of polymicrobial gram-negative bacteremia including 

Stenotrophomonas maltophilia and Enterobacter.


History of Candida esophagitis.


History of C. difficile in the past.











Air anterior to the liver , ?  free intraperitoneal air ( Surg doubt 

perforation )


Lung and liver masses Ro Malignancy 


 CT:  Pleural-based mass at the left lung base / Moderate-sized left pleural 

effusion, left-sided pleural nodularity and retroperitoneal and paraspinal 

nodules/masses. 


         Left inferior hilar mass lesion partially visualized. New low-

attenuation liver lesion ( concerning for malignancy/metastatic disease )


Pl effusion SP ultrasound-guided thoracentesis,  960 cc  ( m/l 2/2 mets,  no 

evid of Empyema )


  -exudate fluid:  (n 6), pH 8, lguc 98, prot 4.6 (serum 8.4); cx stain: 


  GRAM STAIN  Final  


        GRAM STAIN RESULT           FEW WHITE BLOOD CELLS


                                    NO ORGANISMS SEEN


cx p


Crohn's disease.


History of bowel obstruction x2 with lysis of adhesions in 2005.


COPD.


History of chronic pain syndrome, on morphine pump.


CVA in 2005.


Seizure disorder.








PLAN:





cont  the patient on empiric IV Zosyn d# 4/5 in the setting of SBO and pending 

pleural fluid cx


Monitor cultures. ( pl effusion ) 


Monitor CBC and BMP.


follow GI recommendations


hem, surg f/u





Subjective


Allergies:  


Coded Allergies:  


     No Known Allergies (Verified , 10/27/06)


Subjective


afebrile in ~72hrs


no leukocytosis


pleural fluid cx p





Objective


Vital Signs





Last 24 Hour Vital Signs








  Date Time  Temp Pulse Resp B/P (MAP) Pulse Ox O2 Delivery O2 Flow Rate FiO2


 


4/23/18 12:00 98.4 98 20 111/79 97 Room Air  





 98.4       


 


4/23/18 10:55 97.2       


 


4/23/18 10:25 97.2       


 


4/23/18 10:13 97.2       


 


4/23/18 09:14 97.2       


 


4/23/18 08:00 97.2 87 21 136/78 97 Room Air  





 97.2       


 


4/23/18 04:00 98.0 77 20 98/61 100   





 98.0       


 


4/23/18 00:00 99.2 82 20 107/65 100   





 99.2       


 


4/23/18 00:00      Room Air  


 


4/22/18 20:00      Room Air  


 


4/22/18 20:00 99.5 93 19 111/76 100   





 99.5       


 


4/22/18 18:31 99.3       


 


4/22/18 16:00 99.3 91 20 135/81 97 Room Air  





 99.3       


 


4/22/18 14:31 98.8       








Height (Feet):  5


Height (Inches):  6.00


Weight (Pounds):  129


Objective


HEENT:  anicteric


Respiratory/Chest:  normal breath sounds


Cardiovascular:  regular rhythm


Abdomen:  tender





Microbiology








 Date/Time


Source Procedure


Growth Status


 


 


 4/20/18 16:37


Pleural Fluid Gram Stain - Final Resulted


 


 4/20/18 16:37


Pleural Fluid Body Fluid Culture - Preliminary Resulted











Laboratory Tests








Test


  4/23/18


07:05


 


White Blood Count


  9.2 K/UL


(4.8-10.8)


 


Red Blood Count


  4.34 M/UL


(4.20-5.40)


 


Hemoglobin


  13.2 G/DL


(12.0-16.0)


 


Hematocrit


  39.7 %


(37.0-47.0)


 


Mean Corpuscular Volume 92 FL (80-99)  


 


Mean Corpuscular Hemoglobin


  30.4 PG


(27.0-31.0)


 


Mean Corpuscular Hemoglobin


Concent 33.2 G/DL


(32.0-36.0)


 


Red Cell Distribution Width


  15.0 %


(11.6-14.8)  H


 


Platelet Count


  228 K/UL


(150-450)


 


Mean Platelet Volume


  6.6 FL


(6.5-10.1)


 


Neutrophils (%) (Auto)


  59.9 %


(45.0-75.0)


 


Lymphocytes (%) (Auto)


  29.4 %


(20.0-45.0)


 


Monocytes (%) (Auto)


  8.2 %


(1.0-10.0)


 


Eosinophils (%) (Auto)


  1.7 %


(0.0-3.0)


 


Basophils (%) (Auto)


  0.7 %


(0.0-2.0)


 


Sodium Level


  136 MMOL/L


(136-145)


 


Potassium Level


  3.6 MMOL/L


(3.5-5.1)


 


Chloride Level


  103 MMOL/L


()


 


Carbon Dioxide Level


  25 MMOL/L


(21-32)


 


Anion Gap


  9 mmol/L


(5-15)


 


Blood Urea Nitrogen


  10 mg/dL


(7-18)


 


Creatinine


  0.9 MG/DL


(0.55-1.30)


 


Estimat Glomerular Filtration


Rate > 60 mL/min


(>60)


 


Glucose Level


  90 MG/DL


()


 


Calcium Level


  9.2 MG/DL


(8.5-10.1)











Current Medications








 Medications


  (Trade)  Dose


 Ordered  Sig/Jed


 Route


 PRN Reason  Start Time


 Stop Time Status Last Admin


Dose Admin


 


 Acetaminophen


  (Tylenol)  650 mg  Q4H  PRN


 ORAL


 fever  4/19/18 15:30


 5/19/18 15:29   


 


 


 Acetaminophen/


 Hydrocodone Bitart


  (Norco 10/325)  1 tab  Q4H  PRN


 ORAL


 Pain Scale (3-5)  4/21/18 12:30


 4/28/18 08:29  4/23/18 09:14


 


 


 Al Hydroxide/Mg


 Hydroxide


  (Mylanta II)  30 ml  Q6H  PRN


 ORAL


 dyspepsia  4/19/18 15:30


 5/19/18 15:29   


 


 


 Atorvastatin


 Calcium


  (Lipitor)  40 mg  BEDTIME


 ORAL


   4/19/18 21:00


 5/19/18 20:59  4/22/18 21:14


 


 


 Bupropion HCl


  (Wellbutrin)  100 mg  DAILY


 ORAL


   4/20/18 09:00


 5/20/18 08:59  4/23/18 08:08


 


 


 Dextrose


  (Dextrose 50%)  50 ml  STAT  PRN


 IV


 Hypoglycemia BS<60mg/dL  4/19/18 15:30


 5/19/18 15:29   


 


 


 Dextrose/Sodium


 Chloride  1,000 ml @ 


 75 mls/hr  [id number]


 IV


   4/19/18 17:00


 5/19/18 16:59  4/22/18 23:39


 


 


 Diphenhydramine


 HCl


  (Benadryl)  25 mg  Q6H  PRN


 ORAL


 Itching/Pruritis  4/19/18 15:30


 5/19/18 15:29   


 


 


 Duloxetine HCl


  (Cymbalta)  60 mg  DAILY


 ORAL


   4/20/18 09:00


 5/20/18 08:59  4/23/18 08:08


 


 


 Gabapentin


  (Neurontin)  300 mg  THREE TIMES A  DAY


 ORAL


   4/20/18 10:00


 5/20/18 09:59  4/23/18 08:08


 


 


 Heparin Sodium


  (Porcine)


  (Heparin 5000


 units/ml)  5,000 units  EVERY 12  HOURS


 SUBQ


   4/19/18 21:00


 5/19/18 20:59  4/23/18 08:09


 


 


 Hydromorphone HCl


  (Dilaudid)  0.5 mg  Q4H  PRN


 IVP


 Mod-Severe Pain (4-10) if unab  4/21/18 21:30


 4/28/18 21:29  4/23/18 10:25


 


 


 Levetiracetam


  (Keppra)  1,000 mg  Q8HR


 ORAL


   4/19/18 22:00


 5/19/18 21:59  4/23/18 06:20


 


 


 Levothyroxine


 Sodium


  (Synthroid)  75 mcg  ACBREAKFAST


 ORAL


   4/20/18 06:30


 5/20/18 06:29  4/23/18 06:20


 


 


 Lorazepam


  (Ativan 2mg/ml


 1ml)  2 mg  Q6H  PRN


 IV


 agitation  4/21/18 15:30


 4/28/18 15:29   


 


 


 Methylnaltrexone


 Bromide


  (Relistor)  12 mg  DAILY


 SUBQ


   4/20/18 10:15


 5/20/18 10:14  4/23/18 09:36


 


 


 Metoclopramide HCl


  (Reglan)  10 mg  Q6H  PRN


 IVP


 Unrelieved Severe Nausea  4/19/18 15:30


 5/19/18 15:29   


 


 


 Nitroglycerin


  (Ntg)  0.4 mg  Q5M X 3 DOSES PRN


 SL


 Prn Chest Pain  4/19/18 15:30


 5/19/18 15:29   


 


 


 Ondansetron HCl


  (Zofran)  4 mg  Q6H  PRN


 IVP


 Nausea & Vomiting  4/19/18 15:30


 5/19/18 15:29  4/20/18 12:37


 


 


 Pantoprazole


  (Protonix)  40 mg  DAILY


 ORAL


   4/23/18 09:00


 5/23/18 08:59  4/23/18 08:08


 


 


 Piperacillin Sod/


 Tazobactam Sod


 3.375 gm/Sodium


 Chloride  110 ml @ 


 27.5 mls/hr  Q8H


 IVPB


   4/20/18 16:00


 4/27/18 15:59  4/23/18 08:08


 


 


 Polyethylene


 Glycol


  (Miralax)  17 gm  HSPRN  PRN


 ORAL


 Constipation  4/19/18 15:30


 5/19/18 15:29   


 


 


 Promethazine HCl


 25 mg/Sodium


 Chloride  56 ml @ 


 110 mls/hr  Q6H  PRN


 IV


 Refractory N/V  4/19/18 15:30


 5/19/18 15:29   


 


 


 Simethicone


  (Mylicon)  80 mg  QIDPRN  PRN


 ORAL


 gas   4/20/18 10:00


 5/20/18 09:59   


 


 


 Temazepam


  (Restoril)  15 mg  HSPRN  PRN


 ORAL


 Insomnia  4/19/18 15:30


 4/26/18 15:29   


 


 


 Theophylline


  (Shiv-Dur)  100 mg  Q12HR


 ORAL


   4/19/18 21:00


 5/19/18 20:59  4/23/18 08:08


 


 


 Topiramate


  (Topamax)  25 mg  EVERY 12  HOURS


 ORAL


   4/19/18 21:00


 5/19/18 20:59  4/23/18 08:08


 


 


 Trazodone HCl


  (Desyrel)  200 mg  BEDTIME


 ORAL


   4/19/18 21:00


 5/19/18 20:59  4/22/18 21:14


 


 


 Zolpidem Tartrate


  (Ambien)  5 mg  QHS


 ORAL


   4/21/18 21:00


 4/28/18 20:59  4/22/18 21:14


 

















Selene Garcia M.D. Apr 23, 2018 13:11

## 2018-04-24 VITALS — DIASTOLIC BLOOD PRESSURE: 88 MMHG | SYSTOLIC BLOOD PRESSURE: 137 MMHG

## 2018-04-24 VITALS — DIASTOLIC BLOOD PRESSURE: 78 MMHG | SYSTOLIC BLOOD PRESSURE: 117 MMHG

## 2018-04-24 VITALS — SYSTOLIC BLOOD PRESSURE: 152 MMHG | DIASTOLIC BLOOD PRESSURE: 98 MMHG

## 2018-04-24 VITALS — SYSTOLIC BLOOD PRESSURE: 118 MMHG | DIASTOLIC BLOOD PRESSURE: 73 MMHG

## 2018-04-24 VITALS — SYSTOLIC BLOOD PRESSURE: 148 MMHG | DIASTOLIC BLOOD PRESSURE: 90 MMHG

## 2018-04-24 VITALS — DIASTOLIC BLOOD PRESSURE: 77 MMHG | SYSTOLIC BLOOD PRESSURE: 114 MMHG

## 2018-04-24 LAB
ADD MANUAL DIFF: NO
ANION GAP SERPL CALC-SCNC: 9 MMOL/L (ref 5–15)
BASOPHILS NFR BLD AUTO: 0.6 % (ref 0–2)
BUN SERPL-MCNC: 9 MG/DL (ref 7–18)
CALCIUM SERPL-MCNC: 9.2 MG/DL (ref 8.5–10.1)
CHLORIDE SERPL-SCNC: 102 MMOL/L (ref 98–107)
CO2 SERPL-SCNC: 24 MMOL/L (ref 21–32)
CREAT SERPL-MCNC: 0.9 MG/DL (ref 0.55–1.3)
EOSINOPHIL NFR BLD AUTO: 1.5 % (ref 0–3)
ERYTHROCYTE [DISTWIDTH] IN BLOOD BY AUTOMATED COUNT: 15 % (ref 11.6–14.8)
HCT VFR BLD CALC: 39.5 % (ref 37–47)
HGB BLD-MCNC: 13 G/DL (ref 12–16)
LYMPHOCYTES NFR BLD AUTO: 17.7 % (ref 20–45)
MCV RBC AUTO: 91 FL (ref 80–99)
MONOCYTES NFR BLD AUTO: 8.3 % (ref 1–10)
NEUTROPHILS NFR BLD AUTO: 72 % (ref 45–75)
PLATELET # BLD: 228 K/UL (ref 150–450)
POTASSIUM SERPL-SCNC: 3.3 MMOL/L (ref 3.5–5.1)
RBC # BLD AUTO: 4.35 M/UL (ref 4.2–5.4)
SODIUM SERPL-SCNC: 135 MMOL/L (ref 136–145)
WBC # BLD AUTO: 10.3 K/UL (ref 4.8–10.8)

## 2018-04-24 RX ADMIN — TOPIRAMATE SCH MG: 25 TABLET, COATED ORAL at 08:55

## 2018-04-24 RX ADMIN — THEOPHYLLINE ANHYDROUS SCH MG: 100 CAPSULE, EXTENDED RELEASE ORAL at 08:54

## 2018-04-24 RX ADMIN — DEXTROSE AND SODIUM CHLORIDE SCH MLS/HR: 5; .45 INJECTION, SOLUTION INTRAVENOUS at 00:39

## 2018-04-24 RX ADMIN — ZOLPIDEM TARTRATE SCH MG: 5 TABLET ORAL at 21:35

## 2018-04-24 RX ADMIN — DULOXETINE HYDROCHLORIDE SCH MG: 30 CAPSULE, DELAYED RELEASE ORAL at 08:54

## 2018-04-24 RX ADMIN — LORAZEPAM PRN MG: 2 INJECTION, SOLUTION INTRAMUSCULAR; INTRAVENOUS at 18:57

## 2018-04-24 RX ADMIN — METHYLNALTREXONE BROMIDE SCH MG: 12 INJECTION, SOLUTION SUBCUTANEOUS at 08:57

## 2018-04-24 RX ADMIN — DEXTROSE MONOHYDRATE SCH MLS/HR: 50 INJECTION, SOLUTION INTRAVENOUS at 23:48

## 2018-04-24 RX ADMIN — TOPIRAMATE SCH MG: 25 TABLET, COATED ORAL at 21:34

## 2018-04-24 RX ADMIN — DEXTROSE MONOHYDRATE SCH MLS/HR: 50 INJECTION, SOLUTION INTRAVENOUS at 00:25

## 2018-04-24 RX ADMIN — HEPARIN SODIUM SCH UNITS: 5000 INJECTION INTRAVENOUS; SUBCUTANEOUS at 08:57

## 2018-04-24 RX ADMIN — DEXTROSE AND SODIUM CHLORIDE SCH MLS/HR: 5; .45 INJECTION, SOLUTION INTRAVENOUS at 17:00

## 2018-04-24 RX ADMIN — HEPARIN SODIUM SCH UNITS: 5000 INJECTION INTRAVENOUS; SUBCUTANEOUS at 21:00

## 2018-04-24 RX ADMIN — DEXTROSE MONOHYDRATE SCH MLS/HR: 50 INJECTION, SOLUTION INTRAVENOUS at 08:54

## 2018-04-24 RX ADMIN — TRAZODONE HYDROCHLORIDE SCH MG: 100 TABLET ORAL at 21:35

## 2018-04-24 RX ADMIN — THEOPHYLLINE ANHYDROUS SCH MG: 100 CAPSULE, EXTENDED RELEASE ORAL at 21:35

## 2018-04-24 RX ADMIN — DEXTROSE MONOHYDRATE SCH MLS/HR: 50 INJECTION, SOLUTION INTRAVENOUS at 15:58

## 2018-04-24 RX ADMIN — ATORVASTATIN CALCIUM SCH MG: 20 TABLET, FILM COATED ORAL at 21:35

## 2018-04-24 NOTE — GENERAL PROGRESS NOTE
Subjective


Allergies:  


Coded Allergies:  


     No Known Allergies (Verified , 10/27/06)





Objective





Last 24 Hour Vital Signs








  Date Time  Temp Pulse Resp B/P (MAP) Pulse Ox O2 Delivery O2 Flow Rate FiO2


 


4/24/18 20:00 98.3 109 19 152/98 100   





 98.3       


 


4/24/18 17:34 98.4       


 


4/24/18 17:04 98.4       


 


4/24/18 15:48 98.4 107 20 148/90 97 Room Air  





 98.4       


 


4/24/18 13:03 98.8       


 


4/24/18 12:00 98.8 97 20 137/88 100 Room Air  





 98.8       


 


4/24/18 09:11 99.0       


 


4/24/18 08:00 99.0 82 20 117/78 99 Room Air  





 99.0       


 


4/24/18 04:16 97.9 86 18 118/73 98   





 97.9       


 


4/24/18 00:30 97.7 88 20 114/77 100   





 97.7       

















Intake and Output  


 


 4/23/18 4/24/18





 19:00 07:00


 


Intake Total 1085.0 ml 795.0 ml


 


Balance 1085.0 ml 795.0 ml


 


  


 


Intake Oral 480 ml 480 ml


 


IV Total 605.0 ml 315.0 ml


 


# Voids 3 4








Laboratory Tests


4/24/18 06:35: 


White Blood Count 10.3, Red Blood Count 4.35, Hemoglobin 13.0, Hematocrit 39.5, 

Mean Corpuscular Volume 91, Mean Corpuscular Hemoglobin 29.8, Mean Corpuscular 

Hemoglobin Concent 32.8, Red Cell Distribution Width 15.0H, Platelet Count 228, 

Mean Platelet Volume 7.7, Neutrophils (%) (Auto) 72.0, Lymphocytes (%) (Auto) 

17.7L, Monocytes (%) (Auto) 8.3, Eosinophils (%) (Auto) 1.5, Basophils (%) (Auto

) 0.6, Sodium Level 135L, Potassium Level 3.3L, Chloride Level 102, Carbon 

Dioxide Level 24, Anion Gap 9, Blood Urea Nitrogen 9, Creatinine 0.9, Estimat 

Glomerular Filtration Rate > 60, Glucose Level 104, Calcium Level 9.2


4/24/18 14:20: 


Alpha Fetoprotein [Pending], CA 15-3 Antigen [Pending],  Antigen [Pending]


Height (Feet):  5


Height (Inches):  6.00


Weight (Pounds):  129


General Appearance:  no apparent distress, confused


Respiratory/Chest:  decreased breath sounds


Edema:  trace edema











MIREILLE QUEEN Apr 24, 2018 23:58

## 2018-04-24 NOTE — PROGRESS NOTE
DATE:  04/22/2018



NOTE:  POOR AUDIO



PSYCHOTHERAPY CONSULTATION PROGRESS NOTE



TREATING ATTENDING PHYSICIAN:  Maxim Hopkins D.O.



SUBJECTIVE:  The patient is a 62-year-old female patient with a history of

depression.  The patient today has been depressed and helpless.   _____

due to her current medical condition _____ physical pain, has been

distressed.  She states _____ feelings of helplessness, hopelessness, and

depression.



MENTAL STATUS EXAMINATION:  The patient is alert and oriented to person and

place.  Mood is dysphoric.  Affect blunted.  Thought process,

disorganized.  Thought content, very focused on pain.  The patient has

poor attention and concentration.  Poor insight, judgment, and impulse

control.



PLAN:  This clinician assessed this patient and assessed this patient's

mental status.  Provide the patient with reality orientation and

supportive psychotherapy.  Provide the patient with coping skills.

Encouraging the patient to participate in treatment milieu.  Recommend

increasing her insight into her mental illness.  Continue with behavioral

management.  Continue to help with her thoughts _____ .  This clinician

has reviewed the patient's chart and discussed the treatment with

treatment team.









  ______________________________________________

  Orestes Ross PsyD.





DR:  GRETEL

D:  04/23/2018 16:57

T:  04/23/2018 23:38

JOB#:  [id number]

CC:

## 2018-04-24 NOTE — CONSULTATION
DATE OF CONSULTATION:  04/24/2018



HEMATOLOGY/ONCOLOGY CONSULT



CONSULTING PHYSICIAN:  Anibal Russell M.D.



ATTENDING PHYSICIAN:  Maxim Hopkins D.O.



REFERRING PHYSICIAN:  Maxim Hopkins D.O.



REASON FOR CONSULTATION:  Hilar adenopathy, rule out lung cancer versus

metastatic disease, possible liver metastasis, pleural base mass in the

left lung.



CURRENT COMPLAINT AND HISTORY OF PRESENT ILLNESS:  Dear Dr. Maxim Hopkins,



Today, I had an opportunity to see one of your patients, Ms. Everette Garcia

who is 62 years old delightful female with past medical history remarkable

for COPD, asthma, inflammatory bowel disease, elevated CEA, pneumonia,

Crohn's disease, therapeutic opioid-induced constipation, perianal

abscess, emphysema, depression, hypertension, CHF, psychosis, seizure

disorder, vertigo, chronic pancreatitis, lumbar spondylosis, chronic pain

syndrome, radiculopathy, abdominal pain, history of GI bleed, history of

colonic polyps, encephalopathy, cellulitis of female genitalia,

bacteremia, history of small bowel obstruction, and weight loss.



The patient ended up in the emergency room at [facility].  During

evaluation it was found the patient developed pleural effusion, left

pleural base mass, left-sided pleural nodularity, retroperitoneal and

paraspinal masses.  There is also new low-attenuation liver lesion.  Taken

all altogether, these findings highly concerning for malignancy/metastatic

disease.



My service was called to handle the issue of possible metastatic

disease.



PAST MEDICAL HISTORY:

1. Crohn disease.

2. History of small bowel obstruction x2 with lysis of adhesion 2005.

3. COPD.

4. History of CVA.

5. History of chronic abdominal pain.  The patient on morphine pump.

6. History of seizure.

7. History of Candida esophagitis.

8. History of gram-negative bacteremia.

9. Opioid dependence.

10. Weight loss.

11. Intractable abdominal pain.

12. Purulent bronchitis.

13. Depression.

14. Diarrhea.

15. Dyspnea.

16. Respiratory distress and shortness of breath.

17. Urinary tract infection.

18. Chronic pain syndrome.

19. Interstitial lung disease.

20. Inflammatory bowel disease.

21. Low GI bleed.

22. Herniated nucleus pulposus.

23. History of small bowel obstruction.

24. Chronic back pain.

25. History of colonic polyps.

26. Cellulitis of female genitalia.

27. Chronic abdominal pain.

28. Seizure disorder.

29. Nausea and vomiting.



PAST SURGICAL HISTORY:  Status post exploratory laparotomy.



SOCIAL HISTORY:  History of smoking.  No history of alcohol abuse.  No

history of illicit drug use.



ALLERGIES:  NKDA.



MEDICATIONS:

1. Cymbalta.

2. Keppra.

3. Protonix.

4. Dilaudid.

5. Ambien.

6. Lorazepam.

7. Norco.

8. Relistor.

9. Neurontin.

10. Synthroid.

11. Lipitor.

12. Topamax.

13. Trazodone.

14. Heparin subcutaneous.

15. Tylenol.

16. MiraLAX.

17. Zofran.

18. Nitroglycerin.

19. Benadryl.

20. Mylanta.

21. Reglan.



REVIEW OF SYSTEMS:  GENERAL DESCRIPTION:  The patient not in any

significant distress, but looks chronically ill.  RESPIRATORY:  Mild

shortness of breath on exertion.  GASTROINTESTINAL:  Abdominal pain,

constipation.  NEUROMUSCULAR:  The patient claims muscle ache and back

pain.



PHYSICAL EXAMINATION:

VITAL SIGNS:  T-max 97, respiratory rate 20, heart rate 80, and blood

pressure 125/75.

HEENT:  Head, normocephalic and atraumatic.

NECK:  Neck is supple.  No thyroid enlargement.  No

lymphadenopathy.

LUNGS:  Decreased breath sounds bilaterally with few rhonchi in the

bases.

HEART:  S1 and S2 regular.

ABDOMEN:  Benign.  No organomegaly present.  Bowel sounds present.

EXTREMITIES:  No cyanosis, clubbing, or edema.



LABORATORY AND DIAGNOSTIC DATA:  WBC 10.6, hemoglobin 13.0, hematocrit

39.5, platelets 228.  INR 1.1.  Creatinine 0.9.  Imaging study, CT scan

abdomen with pelvic from 04/20/2018 was compared to 03/15/2018 (one month

ago).  Findings revealed dense hilar adenopathy, left pleural based mass,

left pleural effusion, retroperitoneal lymphadenopathy, multiple low

attenuation lesions in the liver.



IMPRESSION:

1. Left lower lobe lung mass.

2. Left inferior hilar mass.

3. Left pleural base lung mass.

4. Left pleural effusion.

5. Retroperitoneal lymphadenopathy.

6. Multiple low-attenuation liver lesions.

7. Crohn disease.

8. Opioid dependence.

9. Chronic obstructive pulmonary disease.

10. History of smoking.

11. Emphysema.

12. Perianal abscess.

13. Intractable abdominal pain.

14. Status post morphine pump placement.

15. Depression.

16. Psychosis.

17. Seizure disorder.

18. Radiculopathy of lumbar region.

19. Abdominal pain.

20. Nausea and vomiting.

21. Status post exploratory laparotomy.

22. Pneumoperitoneum.

23. Inflammatory bowel disease.

24. History of elevated CEA.

25. History of lower gastrointestinal bleed.

26. Chronic pain syndrome.

27. History of small bowel obstruction.

28. Status post laparotomy x2.

29. Failure to thrive.



RECOMMENDATIONS:

1. Watch count.

2. Watch coagulopathy.

3. Paracentesis with cytology.

4. Pulmonary evaluation/followup.

5. Mediastinoscopy versus CT-guided lung biopsy.

6. The patient needs tissue diagnosis.

7. ID followup.

8. Pain control.

9. Psychiatry evaluation.

10. Skin care.

11. Nutrition.

12. Continue current treatment.

13. Discussed with staff.

14. Close followup



Dr. Maxim Hopkins, I greatly appreciate the opportunity to participate in

the care of one of your patients.  It is a privilege to me to be on this

interesting and challenging case.









  ______________________________________________

  Anibal Russell MD





DR:  Cecilio

D:  04/24/2018 14:24

T:  04/24/2018 19:01

JOB#:  [id number]

CC:

## 2018-04-24 NOTE — PROGRESS NOTE
DATE:  04/24/2018



SUBJECTIVE:  This is a female patient.  She has abdominal pain, some

confusion, and disorganized thought process worsened by stress of her

medical illness that is why her attending has requested consistent and

daily psychiatric consultation.  She still has lot of anxiety, agitation,

irritability as well, high levels of depression and anxiety.



MENTAL STATUS EXAMINATION:  This is a 62-year-old female.  Appearance is

disheveled.  Attitude irritable and agitated.  Affect guarded and

restricted.  Intellect poor.  Mood depressed, anxious.  Motor activity,

psychomotor agitation.  Attention span is poor.  Orientation x2.  Speech

is pressured.  Thought process, linear.  Thought content, slight paranoia.

Insight and judgment is poor.



DIAGNOSIS:  Major depressive disorder, severe and recurrent, rule out

bipolar 2.



PLAN:  I am going to continue treating this patient with a medical regimen

consisting of trazodone at a dose of 200 mg at bedtime.  I am also going

to treat her with Topamax at a dose of 25 mg q.12 hours, Cymbalta 90 mg

daily to reduce depression and anxiety, Neurontin 300 mg three times a

day, Wellbutrin 100 mg daily, and also Ativan 2 mg IV q. 6 h. p.r.n.

anxiety and agitation.  Provided 18 to 20 minutes supportive therapy.

Seen and assessed at bedside.









  ______________________________________________

  Samantha Rick M.D.





DR:  Ramone

D:  04/24/2018 04:14

T:  04/24/2018 04:54

JOB#:  [id number]

CC:

## 2018-04-24 NOTE — GENERAL PROGRESS NOTE
Assessment/Plan


Assessment/Plan


(1) Chronic abdominal pain


(2) Chronic pancreatitis


(3) Crohn's disease 


(4) Herniated nucleus pulposus, lumbar


(5) Lumbar spondylosis


(6) Radiculopathy of lumbar region


Pt will be continued on Neurontin, Dilaudid and Norco and pt has intrathecal 

pump.


Pt was d/w Dr. Shetty and he concurred.





Subjective


Date patient seen:  Apr 24, 2018


Time patient seen:  07:00 - am


Allergies:  


Coded Allergies:  


     No Known Allergies (Verified , 10/27/06)


Subjective


Constitutional:  Reports: weakness


HEENT:  Reports: no symptoms


Cardiovascular:  Reports: no symptoms


Respiratory:  Reports: no symptoms


Gastrointestinal/Abdominal:  Reports: abdominal pain


Genitourinary:  Reports: no symptoms


Neurologic/Psychiatric:  Reports: weakness


Endocrine:  Reports: no symptoms


Hematologic/Lymphatic:  Reports: no symptoms





Subjective


Patient is in bed showing no signs of distress with continued abdominal pain. 

Pain has been tolerated on the Dilaudid and Norco.





Objective





Last 24 Hour Vital Signs








  Date Time  Temp Pulse Resp B/P (MAP) Pulse Ox O2 Delivery O2 Flow Rate FiO2


 


4/24/18 08:00 99.0 82 20 117/78 99 Room Air  





 99.0       


 


4/24/18 04:16 97.9 86 18 118/73 98   





 97.9       


 


4/24/18 00:30 97.7 88 20 114/77 100   





 97.7       


 


4/23/18 20:00 97.8 94 18 126/81 100   





 97.8       


 


4/23/18 18:43 97.7       


 


4/23/18 18:13 97.7       


 


4/23/18 15:52 97.7 101 19 134/94 96 Room Air  





 97.7       


 


4/23/18 14:32 98.4       


 


4/23/18 12:00 98.4 98 20 111/79 97 Room Air  





 98.4       


 


4/23/18 10:25 97.2       


 


4/23/18 10:13 97.2       


 


4/23/18 09:14 97.2       

















Intake and Output  


 


 4/23/18 4/24/18





 19:00 07:00


 


Intake Total 1085.0 ml 795.0 ml


 


Balance 1085.0 ml 795.0 ml


 


  


 


Intake Oral 480 ml 480 ml


 


IV Total 605.0 ml 315.0 ml


 


# Voids 3 4








Laboratory Tests


4/24/18 06:35: 


White Blood Count 10.3, Red Blood Count 4.35, Hemoglobin 13.0, Hematocrit 39.5, 

Mean Corpuscular Volume 91, Mean Corpuscular Hemoglobin 29.8, Mean Corpuscular 

Hemoglobin Concent 32.8, Red Cell Distribution Width 15.0H, Platelet Count 228, 

Mean Platelet Volume 7.7, Neutrophils (%) (Auto) 72.0, Lymphocytes (%) (Auto) 

17.7L, Monocytes (%) (Auto) 8.3, Eosinophils (%) (Auto) 1.5, Basophils (%) (Auto

) 0.6, Sodium Level 135L, Potassium Level 3.3L, Chloride Level 102, Carbon 

Dioxide Level 24, Anion Gap 9, Blood Urea Nitrogen 9, Creatinine 0.9, Estimat 

Glomerular Filtration Rate > 60, Glucose Level 104, Calcium Level 9.2


Height (Feet):  5


Height (Inches):  6.00


Weight (Pounds):  129


Objective


General Appearance:  no apparent distress, alert


EENT:  normal ENT inspection, TMs normal


Neck:  normal alignment, supple


Cardiovascular:  normal rate, regular rhythm


Respiratory/Chest:  decreased breath sounds


Abdomen:  tender, distended, intrathecal pump palpated


Extremities:  non-tender


Edema:  no edema noted 


Neurologic:  alert, oriented x 3


Skin:  warm/dry











LEA BRAVO Apr 24, 2018 08:49

## 2018-04-24 NOTE — GENERAL PROGRESS NOTE
Assessment/Plan


Problem List:  


(1) Crohn's disease


ICD Codes:  K50.90 - Crohn's disease


SNOMED:  37651716


Qualifiers:  


   Qualified Codes:  K50.919 - Crohn's disease, unspecified, with unspecified 

complications


(2) Opioid dependence


ICD Codes:  F11.20 - Opioid dependence


SNOMED:  78655331


(3) Intractable abdominal pain


ICD Codes:  R10.9 - Unspecified abdominal pain


SNOMED:  62103251, 767811730


(4) COPD (chronic obstructive pulmonary disease)


ICD Codes:  J44.9 - Chronic obstructive pulmonary disease


SNOMED:  65648161


(5) Shortness of breath


(6) SOB (shortness of breath)


ICD Codes:  R06.02 - Shortness of breath


SNOMED:  170580951


(7) UTI (urinary tract infection)


ICD Codes:  N39.0 - Urinary tract infection, site not specified


SNOMED:  99137937


(8) Lung mass


ICD Codes:  R91.8 - Other nonspecific abnormal finding of lung field


SNOMED:  334020583


Status:  unchanged


Assessment/Plan


ot pt diet pain control sx eval cbc bmp am heme f/u





Subjective


Constitutional:  Reports: weakness


Allergies:  


Coded Allergies:  


     No Known Allergies (Verified , 10/27/06)


All Systems:  reviewed and negative except above


Subjective


sleepy calm in bed some general pain





Objective





Last 24 Hour Vital Signs








  Date Time  Temp Pulse Resp B/P (MAP) Pulse Ox O2 Delivery O2 Flow Rate FiO2


 


4/24/18 13:03 98.8       


 


4/24/18 12:00 98.8 97 20 137/88 100 Room Air  





 98.8       


 


4/24/18 09:41 99.0       


 


4/24/18 09:11 99.0       


 


4/24/18 08:00 99.0 82 20 117/78 99 Room Air  





 99.0       


 


4/24/18 04:16 97.9 86 18 118/73 98   





 97.9       


 


4/24/18 00:30 97.7 88 20 114/77 100   





 97.7       


 


4/23/18 20:00 97.8 94 18 126/81 100   





 97.8       


 


4/23/18 18:13 97.7       


 


4/23/18 15:52 97.7 101 19 134/94 96 Room Air  





 97.7       


 


4/23/18 14:32 98.4       

















Intake and Output  


 


 4/23/18 4/24/18





 19:00 07:00


 


Intake Total 1085.0 ml 795.0 ml


 


Balance 1085.0 ml 795.0 ml


 


  


 


Intake Oral 480 ml 480 ml


 


IV Total 605.0 ml 315.0 ml


 


# Voids 3 4








Laboratory Tests


4/24/18 06:35: 


White Blood Count 10.3, Red Blood Count 4.35, Hemoglobin 13.0, Hematocrit 39.5, 

Mean Corpuscular Volume 91, Mean Corpuscular Hemoglobin 29.8, Mean Corpuscular 

Hemoglobin Concent 32.8, Red Cell Distribution Width 15.0H, Platelet Count 228, 

Mean Platelet Volume 7.7, Neutrophils (%) (Auto) 72.0, Lymphocytes (%) (Auto) 

17.7L, Monocytes (%) (Auto) 8.3, Eosinophils (%) (Auto) 1.5, Basophils (%) (Auto

) 0.6, Sodium Level 135L, Potassium Level 3.3L, Chloride Level 102, Carbon 

Dioxide Level 24, Anion Gap 9, Blood Urea Nitrogen 9, Creatinine 0.9, Estimat 

Glomerular Filtration Rate > 60, Glucose Level 104, Calcium Level 9.2


Height (Feet):  5


Height (Inches):  6.00


Weight (Pounds):  129


General Appearance:  lethargic


EENT:  normal ENT inspection


Neck:  normal alignment


Cardiovascular:  normal peripheral pulses, normal rate, regular rhythm


Respiratory/Chest:  chest wall non-tender, lungs clear, normal breath sounds


Abdomen:  normal bowel sounds, non tender, soft


Extremities:  normal inspection


Edema:  no edema noted Arm (L), no edema noted Arm (R), no edema noted Leg (L), 

no edema noted Leg (R), no edema noted Pedal (L), no edema noted Pedal (R), no 

edema noted Generalized


Neurologic:  responsive, motor weakness


Skin:  normal pigmentation, warm/dry











MAICOL LANG Apr 24, 2018 13:46

## 2018-04-24 NOTE — GENERAL SURGERY PROGRESS NOTE
General Surgery-Progress Note


Subjective


Additional Comments


no acute events.





Objective





Last 24 Hour Vital Signs








  Date Time  Temp Pulse Resp B/P (MAP) Pulse Ox O2 Delivery O2 Flow Rate FiO2


 


4/24/18 13:03 98.8       


 


4/24/18 12:00 98.8 97 20 137/88 100 Room Air  





 98.8       


 


4/24/18 09:41 99.0       


 


4/24/18 09:11 99.0       


 


4/24/18 08:00 99.0 82 20 117/78 99 Room Air  





 99.0       


 


4/24/18 04:16 97.9 86 18 118/73 98   





 97.9       


 


4/24/18 00:30 97.7 88 20 114/77 100   





 97.7       


 


4/23/18 20:00 97.8 94 18 126/81 100   





 97.8       


 


4/23/18 18:13 97.7       


 


4/23/18 15:52 97.7 101 19 134/94 96 Room Air  





 97.7       








I&O











Intake and Output  


 


 4/23/18 4/24/18





 19:00 07:00


 


Intake Total 1085.0 ml 795.0 ml


 


Balance 1085.0 ml 795.0 ml


 


  


 


Intake Oral 480 ml 480 ml


 


IV Total 605.0 ml 315.0 ml


 


# Voids 3 4








Drains:  none


Cardiovascular:  RSR


Respiratory:  clear


Abdomen:  soft, distended, present bowel sounds


Extremities:  no edema, no tenderness, no cyanosis





Laboratory Tests








Test


  4/24/18


06:35 4/24/18


14:20


 


White Blood Count


  10.3 K/UL


(4.8-10.8) 


 


 


Red Blood Count


  4.35 M/UL


(4.20-5.40) 


 


 


Hemoglobin


  13.0 G/DL


(12.0-16.0) 


 


 


Hematocrit


  39.5 %


(37.0-47.0) 


 


 


Mean Corpuscular Volume 91 FL (80-99)   


 


Mean Corpuscular Hemoglobin


  29.8 PG


(27.0-31.0) 


 


 


Mean Corpuscular Hemoglobin


Concent 32.8 G/DL


(32.0-36.0) 


 


 


Red Cell Distribution Width


  15.0 %


(11.6-14.8)  H 


 


 


Platelet Count


  228 K/UL


(150-450) 


 


 


Mean Platelet Volume


  7.7 FL


(6.5-10.1) 


 


 


Neutrophils (%) (Auto)


  72.0 %


(45.0-75.0) 


 


 


Lymphocytes (%) (Auto)


  17.7 %


(20.0-45.0)  L 


 


 


Monocytes (%) (Auto)


  8.3 %


(1.0-10.0) 


 


 


Eosinophils (%) (Auto)


  1.5 %


(0.0-3.0) 


 


 


Basophils (%) (Auto)


  0.6 %


(0.0-2.0) 


 


 


Sodium Level


  135 MMOL/L


(136-145)  L 


 


 


Potassium Level


  3.3 MMOL/L


(3.5-5.1)  L 


 


 


Chloride Level


  102 MMOL/L


() 


 


 


Carbon Dioxide Level


  24 MMOL/L


(21-32) 


 


 


Anion Gap


  9 mmol/L


(5-15) 


 


 


Blood Urea Nitrogen


  9 mg/dL (7-18)


  


 


 


Creatinine


  0.9 MG/DL


(0.55-1.30) 


 


 


Estimat Glomerular Filtration


Rate > 60 mL/min


(>60) 


 


 


Glucose Level


  104 MG/DL


() 


 


 


Calcium Level


  9.2 MG/DL


(8.5-10.1) 


 


 


Alpha Fetoprotein  Pending  


 


CA 15-3 Antigen  Pending  


 


 Antigen  Pending  











Plan


Problems:  


(1) Intractable abdominal pain


Assessment & Plan:  62F with abdominal pain.  very complex medical and surgical 

history.  now with complex CT findings of left pleural effusion, left lower 

lung mass, large mediastinal and periaortic lymph nodes, new liver lesions, and 

air around the liver.  


unable to tell if bowel loop (possible blind end from prior surgery) noted in 

right upper quadrant above liver or if abscess or if free air.  exam not 

consistent with perforation and no significant free fluid noted on CT or area 

of perforation.  contrast into distal bowel without leak.  initial leukocytosis 

resolved.  low grade persistent fevers.  recent cough.  





unfortunately no clear explanation as to patients current condition.  lung mass

, effusion, new liver masses, and nodes very concerning for malignant process. 

Exam stable compared to prior exams (patient seen during last admission and 

known to me).  will need much more extensive work up. 





I discussed these findings with patient.  I explained possible need for urgent 

surgery but given history and complex surgical history we will monitor 

clinically first.  if declines will proceed with surgery which is VERY high 

risk in her.  if stable will continue with work up.  patient and partner 

express understand and agree with plan.  





s/p thoracentesis 4/20.  awaiting cytology





left EJ line not functional and removed.  did not have other access so right EJ 

line placed by myself.  if fails will need central venous access.  





CT A/P with oral contrast.  


-regular diet 


-monitor exam clinically


-trend labs


-iv abx


-will follow with recs. thank you for this consultation.  














Darrius Benitez Apr 24, 2018 14:49

## 2018-04-24 NOTE — GI PROGRESS NOTE
Assessment/Plan


Problems:  


(1) Crohn's disease


ICD Codes:  K50.90 - Crohn's disease


SNOMED:  51576855


Qualifiers:  


   Qualified Codes:  K50.919 - Crohn's disease, unspecified, with unspecified 

complications


(2) Opioid dependence


ICD Codes:  F11.20 - Opioid dependence


SNOMED:  43371385


(3) SBO (small bowel obstruction)


ICD Codes:  K56.609 - Unspecified intestinal obstruction, unspecified as to 

partial versus complete obstruction


SNOMED:  129257537


(4) Chronic abdominal pain


Status:  unchanged


Status Narrative


Discussed with Dr. Mcleod.


Assessment/Plan


Assessment


- suspected SBO or PSBO


- Crohn's disease


- possible free air on xrays - surgery following


- Pleural based mass with pleural effusion - r/o malignancy





Recommendations


- follow exam


- abx


- check fluid cytology


- pulmonary opinion


- Diet per surgery


- IVF


- fu oncology recs





Subjective


Gastrointestinal/Abdominal:  Reports: abdominal pain





Objective





Last 24 Hour Vital Signs








  Date Time  Temp Pulse Resp B/P (MAP) Pulse Ox O2 Delivery O2 Flow Rate FiO2


 


4/24/18 13:03 98.8       


 


4/24/18 12:00 98.8 97 20 137/88 100 Room Air  





 98.8       


 


4/24/18 09:41 99.0       


 


4/24/18 09:11 99.0       


 


4/24/18 08:00 99.0 82 20 117/78 99 Room Air  





 99.0       


 


4/24/18 04:16 97.9 86 18 118/73 98   





 97.9       


 


4/24/18 00:30 97.7 88 20 114/77 100   





 97.7       


 


4/23/18 20:00 97.8 94 18 126/81 100   





 97.8       


 


4/23/18 18:13 97.7       


 


4/23/18 15:52 97.7 101 19 134/94 96 Room Air  





 97.7       

















Intake and Output  


 


 4/23/18 4/24/18





 19:00 07:00


 


Intake Total 1085.0 ml 795.0 ml


 


Balance 1085.0 ml 795.0 ml


 


  


 


Intake Oral 480 ml 480 ml


 


IV Total 605.0 ml 315.0 ml


 


# Voids 3 4











Laboratory Tests








Test


  4/24/18


06:35 4/24/18


14:20


 


White Blood Count


  10.3 K/UL


(4.8-10.8) 


 


 


Red Blood Count


  4.35 M/UL


(4.20-5.40) 


 


 


Hemoglobin


  13.0 G/DL


(12.0-16.0) 


 


 


Hematocrit


  39.5 %


(37.0-47.0) 


 


 


Mean Corpuscular Volume 91 FL (80-99)   


 


Mean Corpuscular Hemoglobin


  29.8 PG


(27.0-31.0) 


 


 


Mean Corpuscular Hemoglobin


Concent 32.8 G/DL


(32.0-36.0) 


 


 


Red Cell Distribution Width


  15.0 %


(11.6-14.8)  H 


 


 


Platelet Count


  228 K/UL


(150-450) 


 


 


Mean Platelet Volume


  7.7 FL


(6.5-10.1) 


 


 


Neutrophils (%) (Auto)


  72.0 %


(45.0-75.0) 


 


 


Lymphocytes (%) (Auto)


  17.7 %


(20.0-45.0)  L 


 


 


Monocytes (%) (Auto)


  8.3 %


(1.0-10.0) 


 


 


Eosinophils (%) (Auto)


  1.5 %


(0.0-3.0) 


 


 


Basophils (%) (Auto)


  0.6 %


(0.0-2.0) 


 


 


Sodium Level


  135 MMOL/L


(136-145)  L 


 


 


Potassium Level


  3.3 MMOL/L


(3.5-5.1)  L 


 


 


Chloride Level


  102 MMOL/L


() 


 


 


Carbon Dioxide Level


  24 MMOL/L


(21-32) 


 


 


Anion Gap


  9 mmol/L


(5-15) 


 


 


Blood Urea Nitrogen


  9 mg/dL (7-18)


  


 


 


Creatinine


  0.9 MG/DL


(0.55-1.30) 


 


 


Estimat Glomerular Filtration


Rate > 60 mL/min


(>60) 


 


 


Glucose Level


  104 MG/DL


() 


 


 


Calcium Level


  9.2 MG/DL


(8.5-10.1) 


 


 


Alpha Fetoprotein  Pending  


 


CA 15-3 Antigen  Pending  


 


 Antigen  Pending  








Height (Feet):  5


Height (Inches):  6.00


Weight (Pounds):  129


General Appearance:  WD/WN, no apparent distress, alert, thin


Cardiovascular:  normal rate


Respiratory/Chest:  normal breath sounds, no respiratory distress


Abdominal Exam:  normal bowel sounds, non tender, soft


Extremities:  normal range of motion, non-tender











Elena Seay N.PMatt Apr 24, 2018 14:42

## 2018-04-25 VITALS — SYSTOLIC BLOOD PRESSURE: 155 MMHG | DIASTOLIC BLOOD PRESSURE: 94 MMHG

## 2018-04-25 VITALS — SYSTOLIC BLOOD PRESSURE: 127 MMHG | DIASTOLIC BLOOD PRESSURE: 82 MMHG

## 2018-04-25 VITALS — DIASTOLIC BLOOD PRESSURE: 103 MMHG | SYSTOLIC BLOOD PRESSURE: 152 MMHG

## 2018-04-25 VITALS — SYSTOLIC BLOOD PRESSURE: 133 MMHG | DIASTOLIC BLOOD PRESSURE: 76 MMHG

## 2018-04-25 VITALS — SYSTOLIC BLOOD PRESSURE: 153 MMHG | DIASTOLIC BLOOD PRESSURE: 94 MMHG

## 2018-04-25 VITALS — SYSTOLIC BLOOD PRESSURE: 137 MMHG | DIASTOLIC BLOOD PRESSURE: 85 MMHG

## 2018-04-25 LAB
ADD MANUAL DIFF: NO
ANION GAP SERPL CALC-SCNC: 10 MMOL/L (ref 5–15)
BASOPHILS NFR BLD AUTO: 0.6 % (ref 0–2)
BUN SERPL-MCNC: 11 MG/DL (ref 7–18)
CALCIUM SERPL-MCNC: 9.5 MG/DL (ref 8.5–10.1)
CHLORIDE SERPL-SCNC: 101 MMOL/L (ref 98–107)
CO2 SERPL-SCNC: 23 MMOL/L (ref 21–32)
CREAT SERPL-MCNC: 0.8 MG/DL (ref 0.55–1.3)
EOSINOPHIL NFR BLD AUTO: 0.3 % (ref 0–3)
ERYTHROCYTE [DISTWIDTH] IN BLOOD BY AUTOMATED COUNT: 15.1 % (ref 11.6–14.8)
HCT VFR BLD CALC: 41.7 % (ref 37–47)
HGB BLD-MCNC: 13.6 G/DL (ref 12–16)
LYMPHOCYTES NFR BLD AUTO: 14.5 % (ref 20–45)
MCV RBC AUTO: 90 FL (ref 80–99)
MONOCYTES NFR BLD AUTO: 8.5 % (ref 1–10)
NEUTROPHILS NFR BLD AUTO: 76.1 % (ref 45–75)
PLATELET # BLD: 227 K/UL (ref 150–450)
POTASSIUM SERPL-SCNC: 3.1 MMOL/L (ref 3.5–5.1)
RBC # BLD AUTO: 4.62 M/UL (ref 4.2–5.4)
SODIUM SERPL-SCNC: 134 MMOL/L (ref 136–145)
WBC # BLD AUTO: 14.9 K/UL (ref 4.8–10.8)

## 2018-04-25 PROCEDURE — 02HV33Z INSERTION OF INFUSION DEVICE INTO SUPERIOR VENA CAVA, PERCUTANEOUS APPROACH: ICD-10-PCS

## 2018-04-25 RX ADMIN — ZOLPIDEM TARTRATE SCH MG: 5 TABLET ORAL at 21:33

## 2018-04-25 RX ADMIN — DEXTROSE MONOHYDRATE SCH MLS/HR: 50 INJECTION, SOLUTION INTRAVENOUS at 08:46

## 2018-04-25 RX ADMIN — HEPARIN SODIUM SCH UNITS: 5000 INJECTION INTRAVENOUS; SUBCUTANEOUS at 09:00

## 2018-04-25 RX ADMIN — DULOXETINE HYDROCHLORIDE SCH MG: 30 CAPSULE, DELAYED RELEASE ORAL at 08:46

## 2018-04-25 RX ADMIN — DEXTROSE AND SODIUM CHLORIDE SCH MLS/HR: 5; .45 INJECTION, SOLUTION INTRAVENOUS at 05:29

## 2018-04-25 RX ADMIN — METHYLNALTREXONE BROMIDE SCH MG: 12 INJECTION, SOLUTION SUBCUTANEOUS at 08:47

## 2018-04-25 RX ADMIN — HEPARIN SODIUM SCH UNITS: 5000 INJECTION INTRAVENOUS; SUBCUTANEOUS at 21:00

## 2018-04-25 RX ADMIN — TOPIRAMATE SCH MG: 25 TABLET, COATED ORAL at 08:46

## 2018-04-25 RX ADMIN — THEOPHYLLINE ANHYDROUS SCH MG: 100 CAPSULE, EXTENDED RELEASE ORAL at 21:32

## 2018-04-25 RX ADMIN — THEOPHYLLINE ANHYDROUS SCH MG: 100 CAPSULE, EXTENDED RELEASE ORAL at 08:46

## 2018-04-25 RX ADMIN — CHLORHEXIDINE GLUCONATE SCH APPLIC: 213 SOLUTION TOPICAL at 21:32

## 2018-04-25 RX ADMIN — ATORVASTATIN CALCIUM SCH MG: 20 TABLET, FILM COATED ORAL at 21:33

## 2018-04-25 RX ADMIN — TOPIRAMATE SCH MG: 25 TABLET, COATED ORAL at 21:32

## 2018-04-25 RX ADMIN — TRAZODONE HYDROCHLORIDE SCH MG: 100 TABLET ORAL at 21:33

## 2018-04-25 RX ADMIN — DEXTROSE AND SODIUM CHLORIDE SCH MLS/HR: 5; .45 INJECTION, SOLUTION INTRAVENOUS at 19:40

## 2018-04-25 NOTE — PROGRESS NOTE
DATE:  04/25/2018



SUBJECTIVE:  This is a 62-year-old female patient with abdominal pain,

possible abdominal tenderness.  She is confused, disorganized, high levels

of anxiety.  Diagnosed with bipolar 2.



MENTAL STATUS EXAMINATION:  This is a 62-year-old female with psychomotor

agitation.  Mood irritable and agitated.   Affect, guarded and restricted.

Thought process, disorganized and illogical.  _____.  Insight and

judgment is fair.



DIAGNOSIS:  Bipolar 2.



PLAN:  Treat with Cymbalta 90 mg daily, Neurontin 300 mg three times a day,

Topamax 25 mg twice a day, trazodone _____ mg at bedtime, and Ativan 2 mg

q.6 h. p.r.n. anxiety and agitation.  Provided 18 to 20 minutes of

supportive therapy.  Seen and assessed at bedside.  Chart was reviewed and

discussed with staff.









  ______________________________________________

  Samantha Rick M.D.





DR:  CHERYL

D:  04/25/2018 09:53

T:  04/25/2018 22:28

JOB#:  [id number]

CC:

## 2018-04-25 NOTE — PRE-PROCEDURE NOTE/ATTESTATION
Pre-Procedure Note/Attestation


Complete Prior to Procedure


Planned Procedure:  not applicable


Procedure Narrative:


PICC line





Indications for Procedure


Pre-Operative Diagnosis:


need for IV access





Attestation


Request for PICC line placement made.  Consent obtained by primary team. 

Consent confirmed prior to PICC line placement





I attest that I re-evaluated the patient just prior to the surgery and that 

there has been no change in the patient's H&P, except as documented below:











Luis Cortes M.D. Apr 25, 2018 14:24

## 2018-04-25 NOTE — GENERAL PROGRESS NOTE
Assessment/Plan


Assessment/Plan


(1) Chronic abdominal pain


(2) Chronic pancreatitis


(3) Crohn's disease 


(4) Herniated nucleus pulposus, lumbar


(5) Lumbar spondylosis


(6) Radiculopathy of lumbar region


Pt will be continued on Neurontin, Dilaudid and Norco and pt has intrathecal 

pump.


HOLD OPIOIDS FOR OVERSEDATION OR SBP<90 OR DBP<60 OR O2SAT<92% OR RR<12


Pt was d/w Dr. Shetty and he concurred.





Subjective


Date patient seen:  Apr 25, 2018


Time patient seen:  06:00 - PM


Allergies:  


Coded Allergies:  


     No Known Allergies (Verified , 10/27/06)


Subjective


Constitutional:  Reports: weakness


HEENT:  Reports: no symptoms


Cardiovascular:  Reports: no symptoms


Respiratory:  Reports: no symptoms


Gastrointestinal/Abdominal:  Reports: abdominal pain


Genitourinary:  Reports: no symptoms


Neurologic/Psychiatric:  Reports: weakness


Endocrine:  Reports: no symptoms


Hematologic/Lymphatic:  Reports: no symptoms





Subjective


Her pain has been stable and tolerated on the Dilaudid and norco as needed. 


Nurse feels patient is lethargic. We will start parameters to hold opioids.





Objective





Last 24 Hour Vital Signs








  Date Time  Temp Pulse Resp B/P (MAP) Pulse Ox O2 Delivery O2 Flow Rate FiO2


 


4/25/18 12:00 97.3 103 12 127/82 100 Room Air  





 97.3       


 


4/25/18 08:00 98.2 100 12 152/103 100   





 98.2       


 


4/25/18 04:00 97.8 97 18 153/94 100   





 97.8       


 


4/25/18 00:00 98.2 98 19 155/94 100   





 98.2       


 


4/24/18 20:00 98.3 109 19 152/98 100   





 98.3       

















Intake and Output  


 


 4/24/18 4/25/18





 19:00 07:00


 


Intake Total 1082.5 ml 55.0 ml


 


Balance 1082.5 ml 55.0 ml


 


  


 


Intake Oral 480 ml 


 


IV Total 602.5 ml 55.0 ml


 


# Voids  3








Laboratory Tests


4/25/18 07:00: 


White Blood Count 14.9H, Red Blood Count 4.62, Hemoglobin 13.6, Hematocrit 41.7

, Mean Corpuscular Volume 90, Mean Corpuscular Hemoglobin 29.4, Mean 

Corpuscular Hemoglobin Concent 32.5, Red Cell Distribution Width 15.1H, 

Platelet Count 227, Mean Platelet Volume 7.3, Neutrophils (%) (Auto) 76.1H, 

Lymphocytes (%) (Auto) 14.5L, Monocytes (%) (Auto) 8.5, Eosinophils (%) (Auto) 

0.3, Basophils (%) (Auto) 0.6, Sodium Level 134L, Potassium Level 3.1L, 

Chloride Level 101, Carbon Dioxide Level 23, Anion Gap 10, Blood Urea Nitrogen 

11, Creatinine 0.8, Estimat Glomerular Filtration Rate > 60, Glucose Level 109H

, Calcium Level 9.5


Height (Feet):  5


Height (Inches):  6.00


Weight (Pounds):  129


Objective


General Appearance:  no apparent distress, alert


EENT:  normal ENT inspection, TMs normal


Neck:  normal alignment, supple


Cardiovascular:  normal rate, regular rhythm


Respiratory/Chest:  decreased breath sounds


Abdomen:  tender, distended, intrathecal pump palpated


Extremities:  non-tender


Edema:  no edema noted 


Neurologic:  alert, oriented x 3


Skin:  warm/dry











LEA BRAVO Apr 25, 2018 18:44

## 2018-04-25 NOTE — INFECTIOUS DISEASES PROG NOTE
Assessment/Plan


Assessment/Plan





ASSESSMENT:  The patient is a 62-year-old female with,





 Low-grade fever. probable due to SBO  ,resolved


Leukocytosis, mild- recurrent- ?2ry to possible malignancy


Probable small bowel obstruction


Recent history of polymicrobial gram-negative bacteremia including 

Stenotrophomonas maltophilia and Enterobacter.


History of Candida esophagitis.


History of C. difficile in the past.











Air anterior to the liver , ?  free intraperitoneal air ( Surg doubt 

perforation )


Lung and liver masses Ro Malignancy 


 CT:  Pleural-based mass at the left lung base / Moderate-sized left pleural 

effusion, left-sided pleural nodularity and retroperitoneal and paraspinal 

nodules/masses. 


         Left inferior hilar mass lesion partially visualized. New low-

attenuation liver lesion ( concerning for malignancy/metastatic disease )


Pl effusion SP ultrasound-guided thoracentesis,  960 cc  ( m/l 2/2 mets,  no 

evid of Empyema )


  -exudate fluid:  (n 6), pH 8, lguc 98, prot 4.6 (serum 8.4); cx stain: 


  GRAM STAIN  Final  


        GRAM STAIN RESULT           FEW WHITE BLOOD CELLS


                                    NO ORGANISMS SEEN


cx negative





Crohn's disease.


History of bowel obstruction x2 with lysis of adhesions in 2005.


COPD.


History of chronic pain syndrome, on morphine pump.


CVA in 2005.


Seizure disorder.








PLAN:





cont  the patient on empiric IV Zosyn d# 5/5-7 in the setting of SBO


Monitor cultures. ( pl effusion ) 


Monitor CBC and BMP.


follow GI recommendations


hem, surg f/u


plan for CT guided bx lung mass pending cytology results.





Subjective


Allergies:  


Coded Allergies:  


     No Known Allergies (Verified , 10/27/06)


Subjective


afebrile 


mild leukocytosis


pleural fluid cx neg; cytology penidng





Objective


Vital Signs





Last 24 Hour Vital Signs








  Date Time  Temp Pulse Resp B/P (MAP) Pulse Ox O2 Delivery O2 Flow Rate FiO2


 


4/25/18 12:00 97.3 103 12 127/82 100 Room Air  





 97.3       


 


4/25/18 08:00 98.2 100 12 152/103 100   





 98.2       


 


4/25/18 04:00 97.8 97 18 153/94 100   





 97.8       


 


4/25/18 00:00 98.2 98 19 155/94 100   





 98.2       


 


4/24/18 20:00 98.3 109 19 152/98 100   





 98.3       


 


4/24/18 17:34 98.4       


 


4/24/18 17:04 98.4       








Height (Feet):  5


Height (Inches):  6.00


Weight (Pounds):  129


Objective


HEENT:  anicteric


Respiratory/Chest:  normal breath sounds


Cardiovascular:  regular rhythm


Abdomen:  tender





Laboratory Tests








Test


  4/25/18


07:00


 


White Blood Count


  14.9 K/UL


(4.8-10.8)  H


 


Red Blood Count


  4.62 M/UL


(4.20-5.40)


 


Hemoglobin


  13.6 G/DL


(12.0-16.0)


 


Hematocrit


  41.7 %


(37.0-47.0)


 


Mean Corpuscular Volume 90 FL (80-99)  


 


Mean Corpuscular Hemoglobin


  29.4 PG


(27.0-31.0)


 


Mean Corpuscular Hemoglobin


Concent 32.5 G/DL


(32.0-36.0)


 


Red Cell Distribution Width


  15.1 %


(11.6-14.8)  H


 


Platelet Count


  227 K/UL


(150-450)


 


Mean Platelet Volume


  7.3 FL


(6.5-10.1)


 


Neutrophils (%) (Auto)


  76.1 %


(45.0-75.0)  H


 


Lymphocytes (%) (Auto)


  14.5 %


(20.0-45.0)  L


 


Monocytes (%) (Auto)


  8.5 %


(1.0-10.0)


 


Eosinophils (%) (Auto)


  0.3 %


(0.0-3.0)


 


Basophils (%) (Auto)


  0.6 %


(0.0-2.0)


 


Sodium Level


  134 MMOL/L


(136-145)  L


 


Potassium Level


  3.1 MMOL/L


(3.5-5.1)  L


 


Chloride Level


  101 MMOL/L


()


 


Carbon Dioxide Level


  23 MMOL/L


(21-32)


 


Anion Gap


  10 mmol/L


(5-15)


 


Blood Urea Nitrogen


  11 mg/dL


(7-18)


 


Creatinine


  0.8 MG/DL


(0.55-1.30)


 


Estimat Glomerular Filtration


Rate > 60 mL/min


(>60)


 


Glucose Level


  109 MG/DL


()  H


 


Calcium Level


  9.5 MG/DL


(8.5-10.1)











Current Medications








 Medications


  (Trade)  Dose


 Ordered  Sig/Jed


 Route


 PRN Reason  Start Time


 Stop Time Status Last Admin


Dose Admin


 


 Acetaminophen


  (Tylenol)  650 mg  Q4H  PRN


 ORAL


 fever  4/19/18 15:30


 5/19/18 15:29   


 


 


 Acetaminophen/


 Hydrocodone Bitart


  (Norco 10/325)  1 tab  Q4H  PRN


 ORAL


 Pain Scale (3-5)  4/21/18 12:30


 4/28/18 08:29  4/23/18 09:14


 


 


 Al Hydroxide/Mg


 Hydroxide


  (Mylanta II)  30 ml  Q6H  PRN


 ORAL


 dyspepsia  4/19/18 15:30


 5/19/18 15:29   


 


 


 Atorvastatin


 Calcium


  (Lipitor)  40 mg  BEDTIME


 ORAL


   4/19/18 21:00


 5/19/18 20:59  4/24/18 21:35


 


 


 Bupropion HCl


  (Wellbutrin)  100 mg  DAILY


 ORAL


   4/20/18 09:00


 5/20/18 08:59  4/25/18 08:46


 


 


 Chlorhexidine


 Gluconate


  (Rosy-Hex 2%)  1 applic  DAILY@2000


 TOPIC


   4/25/18 20:00


 5/25/18 19:59   


 


 


 Dextrose


  (Dextrose 50%)  50 ml  STAT  PRN


 IV


 Hypoglycemia BS<60mg/dL  4/19/18 15:30


 5/19/18 15:29   


 


 


 Dextrose/Sodium


 Chloride  1,000 ml @ 


 75 mls/hr  [id number]


 IV


   4/19/18 17:00


 5/19/18 16:59  4/25/18 05:29


 


 


 Diphenhydramine


 HCl


  (Benadryl)  25 mg  Q6H  PRN


 ORAL


 Itching/Pruritis  4/19/18 15:30


 5/19/18 15:29   


 


 


 Duloxetine HCl


  (Cymbalta)  90 mg  DAILY


 ORAL


   4/24/18 09:00


 5/20/18 08:59  4/25/18 08:46


 


 


 Gabapentin


  (Neurontin)  300 mg  THREE TIMES A  DAY


 ORAL


   4/20/18 10:00


 5/20/18 09:59  4/25/18 08:46


 


 


 Heparin Sodium


  (Porcine)


  (Heparin 5000


 units/ml)  5,000 units  EVERY 12  HOURS


 SUBQ


   4/19/18 21:00


 5/19/18 20:59  4/24/18 08:57


 


 


 Heparin Sodium/


 Sodium Chloride


  (Heparin 2000


 units/Ns 1000ml


 premix)  2,000 unit  ONCE  PRN


 INJ


 PICC PLACEMENT  4/25/18 09:15


 4/25/18 23:59   


 


 


 Hydromorphone HCl


  (Dilaudid)  0.5 mg  Q4H  PRN


 IVP


 Mod-Severe Pain (4-10) if unab  4/21/18 21:30


 4/28/18 21:29  4/25/18 02:32


 


 


 Ioversol


  (Isovue)  100 ml  ONCE  PRN


 INJ


 FOR RADIOLOGY USE ONLY  4/25/18 10:00


 4/25/18 23:59   


 


 


 Levetiracetam


  (Keppra)  1,000 mg  Q8H


 ORAL


   4/24/18 08:00


 5/24/18 07:59  4/25/18 08:46


 


 


 Levothyroxine


 Sodium


  (Synthroid)  75 mcg  ACBREAKFAST


 ORAL


   4/20/18 06:30


 5/20/18 06:29  4/24/18 05:36


 


 


 Lidocaine HCl


  (Xylocaine 1%


 30ml)  30 ml  ONCE  PRN


 INJ


 PICC PLACEMENT  4/25/18 09:15


 4/25/18 23:59   


 


 


 Lorazepam


  (Ativan 2mg/ml


 1ml)  2 mg  Q6H  PRN


 IV


 agitation  4/21/18 15:30


 4/28/18 15:29  4/24/18 18:57


 


 


 Methylnaltrexone


 Bromide


  (Relistor)  12 mg  DAILY


 SUBQ


   4/20/18 10:15


 5/20/18 10:14  4/25/18 08:47


 


 


 Metoclopramide HCl


  (Reglan)  10 mg  Q6H  PRN


 IVP


 Unrelieved Severe Nausea  4/19/18 15:30


 5/19/18 15:29   


 


 


 Nitroglycerin


  (Ntg)  0.4 mg  Q5M X 3 DOSES PRN


 SL


 Prn Chest Pain  4/19/18 15:30


 5/19/18 15:29   


 


 


 Ondansetron HCl


  (Zofran)  4 mg  Q6H  PRN


 IVP


 Nausea & Vomiting  4/19/18 15:30


 5/19/18 15:29  4/20/18 12:37


 


 


 Pantoprazole


  (Protonix)  40 mg  DAILY


 ORAL


   4/23/18 09:00


 5/23/18 08:59  4/25/18 08:46


 


 


 Piperacillin Sod/


 Tazobactam Sod


 3.375 gm/Dextrose  110 ml @ 


 27.5 mls/hr  0000,0800,1600


 IVPB


   4/25/18 16:00


 4/25/18 23:59   


 


 


 Polyethylene


 Glycol


  (Miralax)  17 gm  HSPRN  PRN


 ORAL


 Constipation  4/19/18 15:30


 5/19/18 15:29   


 


 


 Potassium


 Chloride 40 meq/


 Sodium Chloride  570 ml @ 


 142.5 mls/


 hr  ONCE  ONCE


 IVPB


   4/25/18 13:00


 4/25/18 16:59  4/25/18 14:51


 


 


 Promethazine HCl


 25 mg/Sodium


 Chloride  56 ml @ 


 110 mls/hr  Q6H  PRN


 IV


 Refractory N/V  4/19/18 15:30


 5/19/18 15:29   


 


 


 Simethicone


  (Mylicon)  80 mg  QIDPRN  PRN


 ORAL


 gas   4/20/18 10:00


 5/20/18 09:59   


 


 


 Theophylline


  (Hsiv-Dur)  100 mg  Q12HR


 ORAL


   4/19/18 21:00


 5/19/18 20:59  4/25/18 08:46


 


 


 Topiramate


  (Topamax)  25 mg  EVERY 12  HOURS


 ORAL


   4/19/18 21:00


 5/19/18 20:59  4/25/18 08:46


 


 


 Trazodone HCl


  (Desyrel)  200 mg  BEDTIME


 ORAL


   4/19/18 21:00


 5/19/18 20:59  4/24/18 21:35


 


 


 Zolpidem Tartrate


  (Ambien)  5 mg  QHS


 ORAL


   4/21/18 21:00


 4/28/18 20:59  4/24/18 21:35


 

















Selene aGrcia M.D. Apr 25, 2018 16:24

## 2018-04-25 NOTE — OPERATIVE NOTE - PDOC
Operative Note


Operative Note


Pre-op Diagnosis:


need for IV access


Procedure:


PICC line placement


Post-op Diagnosis:


same


Post-op Diagnosis:  same as pre-op


Anesthesia:  local


Specimen:  none


Complications:  none


Condition:  stable


Estimated Blood Loss:  minimal


Drains:  none


Implant(s) used?:  Yes


Indications for Procedure


Need IV access


Description of Procedure


successful placement of PICC line via left brachial vein.  Cathter ok for use











Luis Cortes M.D. Apr 25, 2018 14:26

## 2018-04-25 NOTE — GENERAL PROGRESS NOTE
Assessment/Plan


Problem List:  


(1) Crohn's disease


ICD Codes:  K50.90 - Crohn's disease


SNOMED:  50483230


Qualifiers:  


   Qualified Codes:  K50.919 - Crohn's disease, unspecified, with unspecified 

complications


(2) Opioid dependence


ICD Codes:  F11.20 - Opioid dependence


SNOMED:  07794905


(3) Intractable abdominal pain


ICD Codes:  R10.9 - Unspecified abdominal pain


SNOMED:  26522059, 001979397


(4) COPD (chronic obstructive pulmonary disease)


ICD Codes:  J44.9 - Chronic obstructive pulmonary disease


SNOMED:  53377801


(5) Shortness of breath


(6) SOB (shortness of breath)


ICD Codes:  R06.02 - Shortness of breath


SNOMED:  544440174


(7) UTI (urinary tract infection)


ICD Codes:  N39.0 - Urinary tract infection, site not specified


SNOMED:  70305680


(8) Lung mass


ICD Codes:  R91.8 - Other nonspecific abnormal finding of lung field


SNOMED:  263981648


Status:  unchanged


Assessment/Plan


ot pt diet pain control sx eval cbc bmp am heme f/u  ltach eval





Subjective


Constitutional:  Reports: weakness


Allergies:  


Coded Allergies:  


     No Known Allergies (Verified , 10/27/06)


All Systems:  reviewed and negative except above


Subjective


sleepy calm in bed





Objective





Last 24 Hour Vital Signs








  Date Time  Temp Pulse Resp B/P (MAP) Pulse Ox O2 Delivery O2 Flow Rate FiO2


 


4/25/18 08:00 98.2 100 12 152/103 100   





 98.2       


 


4/25/18 04:00 97.8 97 18 153/94 100   





 97.8       


 


4/25/18 00:00 98.2 98 19 155/94 100   





 98.2       


 


4/24/18 20:00 98.3 109 19 152/98 100   





 98.3       


 


4/24/18 17:34 98.4       


 


4/24/18 17:04 98.4       


 


4/24/18 15:48 98.4 107 20 148/90 97 Room Air  





 98.4       


 


4/24/18 13:03 98.8       

















Intake and Output  


 


 4/24/18 4/25/18





 19:00 07:00


 


Intake Total 1082.5 ml 55.0 ml


 


Balance 1082.5 ml 55.0 ml


 


  


 


Intake Oral 480 ml 


 


IV Total 602.5 ml 55.0 ml


 


# Voids  3








Laboratory Tests


4/24/18 14:20: 


Alpha Fetoprotein [Pending], CA 15-3 Antigen [Pending],  Antigen [Pending]


4/25/18 07:00: 


White Blood Count 14.9H, Red Blood Count 4.62, Hemoglobin 13.6, Hematocrit 41.7

, Mean Corpuscular Volume 90, Mean Corpuscular Hemoglobin 29.4, Mean 

Corpuscular Hemoglobin Concent 32.5, Red Cell Distribution Width 15.1H, 

Platelet Count 227, Mean Platelet Volume 7.3, Neutrophils (%) (Auto) 76.1H, 

Lymphocytes (%) (Auto) 14.5L, Monocytes (%) (Auto) 8.5, Eosinophils (%) (Auto) 

0.3, Basophils (%) (Auto) 0.6, Sodium Level 134L, Potassium Level 3.1L, 

Chloride Level 101, Carbon Dioxide Level 23, Anion Gap 10, Blood Urea Nitrogen 

11, Creatinine 0.8, Estimat Glomerular Filtration Rate > 60, Glucose Level 109H

, Calcium Level 9.5


Height (Feet):  5


Height (Inches):  6.00


Weight (Pounds):  129


General Appearance:  lethargic


EENT:  normal ENT inspection


Neck:  normal alignment


Cardiovascular:  normal peripheral pulses, normal rate, regular rhythm


Respiratory/Chest:  chest wall non-tender, decreased breath sounds


Abdomen:  non tender, hypoactive bowel sounds


Extremities:  normal inspection


Edema:  no edema noted Arm (L), no edema noted Arm (R), no edema noted Leg (L), 

no edema noted Leg (R), no edema noted Pedal (L), no edema noted Pedal (R), no 

edema noted Generalized


Neurologic:  motor weakness


Skin:  normal pigmentation, warm/dry











MAICOL LANG Apr 25, 2018 12:57

## 2018-04-25 NOTE — GI PROGRESS NOTE
Assessment/Plan


Problems:  


(1) Crohn's disease


ICD Codes:  K50.90 - Crohn's disease


SNOMED:  92700926


Qualifiers:  


   Qualified Codes:  K50.919 - Crohn's disease, unspecified, with unspecified 

complications


(2) Opioid dependence


ICD Codes:  F11.20 - Opioid dependence


SNOMED:  79670358


(3) SBO (small bowel obstruction)


ICD Codes:  K56.609 - Unspecified intestinal obstruction, unspecified as to 

partial versus complete obstruction


SNOMED:  000446797


(4) Chronic abdominal pain


Status:  unchanged


Status Narrative


Discussed with Dr. Mcleod.


Assessment/Plan


Assessment


- suspected SBO or PSBO


- Crohn's disease


- possible free air on xrays - surgery following


- Pleural based mass with pleural effusion - r/o malignancy





Recommendations


- follow exam


- abx


- check fluid cytology


- pulmonary opinion


- Diet per surgery


- IVF


- fu oncology recs





Subjective


Gastrointestinal/Abdominal:  Reports: abdominal pain





Objective





Last 24 Hour Vital Signs








  Date Time  Temp Pulse Resp B/P (MAP) Pulse Ox O2 Delivery O2 Flow Rate FiO2


 


4/25/18 12:00 97.3 103 12 127/82 100 Room Air  





 97.3       


 


4/25/18 08:00 98.2 100 12 152/103 100   





 98.2       


 


4/25/18 04:00 97.8 97 18 153/94 100   





 97.8       


 


4/25/18 00:00 98.2 98 19 155/94 100   





 98.2       


 


4/24/18 20:00 98.3 109 19 152/98 100   





 98.3       


 


4/24/18 17:34 98.4       


 


4/24/18 17:04 98.4       


 


4/24/18 15:48 98.4 107 20 148/90 97 Room Air  





 98.4       

















Intake and Output  


 


 4/24/18 4/25/18





 19:00 07:00


 


Intake Total 1082.5 ml 55.0 ml


 


Balance 1082.5 ml 55.0 ml


 


  


 


Intake Oral 480 ml 


 


IV Total 602.5 ml 55.0 ml


 


# Voids  3











Laboratory Tests








Test


  4/24/18


14:20 4/25/18


07:00


 


Alpha Fetoprotein Pending   


 


CA 15-3 Antigen Pending   


 


 Antigen Pending   


 


White Blood Count


  


  14.9 K/UL


(4.8-10.8)  H


 


Red Blood Count


  


  4.62 M/UL


(4.20-5.40)


 


Hemoglobin


  


  13.6 G/DL


(12.0-16.0)


 


Hematocrit


  


  41.7 %


(37.0-47.0)


 


Mean Corpuscular Volume  90 FL (80-99)  


 


Mean Corpuscular Hemoglobin


  


  29.4 PG


(27.0-31.0)


 


Mean Corpuscular Hemoglobin


Concent 


  32.5 G/DL


(32.0-36.0)


 


Red Cell Distribution Width


  


  15.1 %


(11.6-14.8)  H


 


Platelet Count


  


  227 K/UL


(150-450)


 


Mean Platelet Volume


  


  7.3 FL


(6.5-10.1)


 


Neutrophils (%) (Auto)


  


  76.1 %


(45.0-75.0)  H


 


Lymphocytes (%) (Auto)


  


  14.5 %


(20.0-45.0)  L


 


Monocytes (%) (Auto)


  


  8.5 %


(1.0-10.0)


 


Eosinophils (%) (Auto)


  


  0.3 %


(0.0-3.0)


 


Basophils (%) (Auto)


  


  0.6 %


(0.0-2.0)


 


Sodium Level


  


  134 MMOL/L


(136-145)  L


 


Potassium Level


  


  3.1 MMOL/L


(3.5-5.1)  L


 


Chloride Level


  


  101 MMOL/L


()


 


Carbon Dioxide Level


  


  23 MMOL/L


(21-32)


 


Anion Gap


  


  10 mmol/L


(5-15)


 


Blood Urea Nitrogen


  


  11 mg/dL


(7-18)


 


Creatinine


  


  0.8 MG/DL


(0.55-1.30)


 


Estimat Glomerular Filtration


Rate 


  > 60 mL/min


(>60)


 


Glucose Level


  


  109 MG/DL


()  H


 


Calcium Level


  


  9.5 MG/DL


(8.5-10.1)








Height (Feet):  5


Height (Inches):  6.00


Weight (Pounds):  129


General Appearance:  WD/WN, no apparent distress, alert


Cardiovascular:  normal rate


Respiratory/Chest:  normal breath sounds, no respiratory distress


Abdominal Exam:  normal bowel sounds, non tender, soft


Extremities:  normal range of motion, non-tender











Elena [email or web address] Apr 25, 2018 14:11

## 2018-04-25 NOTE — GENERAL PROGRESS NOTE
Assessment/Plan


Assessment/Plan


IMPRESSION:


1. Left lower lobe lung mass.


2. Left inferior hilar mass.


3. Left pleural base lung mass.


4. Left pleural effusion.


5. Retroperitoneal lymphadenopathy.


6. Multiple low-attenuation liver lesions.


7. Crohn disease.


8. Opioid dependence.


9. Chronic obstructive pulmonary disease.


10. History of smoking.


11. Emphysema.


12. Perianal abscess.


13. Intractable abdominal pain.


14. Status post morphine pump placement.


15. Depression.


16. Psychosis.


17. Seizure disorder.


18. Radiculopathy of lumbar region.


19. Abdominal pain.


20. Nausea and vomiting.


21. Status post exploratory laparotomy.


22. Pneumoperitoneum.


23. Inflammatory bowel disease.


24. History of elevated CEA.


25. History of lower gastrointestinal bleed.


26. Chronic pain syndrome.


27. History of small bowel obstruction.


28. Status post laparotomy x2.


29. Failure to thrive.


30. Leukocytosis.





RECOMMENDATIONS:


1. Watch count.


2. Watch coagulopathy.


3. Paracentesis with cytology.


4. Pulmonary evaluation/followup.


5. Mediastinoscopy versus CT-guided lung biopsy.


6. The patient needs tissue diagnosis.


7. ID followup.


8. Pain control.


9. Psychiatry evaluation.


10. Skin care.


11. Nutrition.


12. Continue current treatment.


13. Discussed with staff.


14. Close followup





Subjective


Date patient seen:  Apr 25, 2018


Constitutional:  Denies: no symptoms, chills, diaphoresis, fever, malaise, 

weakness, other


HEENT:  Denies: no symptoms, eye pain, blurred vision, tearing, double vision, 

ear pain, ear discharge, nose pain, nose congestion, throat pain, throat 

swelling, mouth pain, mouth swelling, other


Cardiovascular:  Denies: no symptoms, chest pain, edema, irregular heart rate, 

lightheadedness, palpitations, syncope, other


Respiratory:  Denies: no symptoms, cough, orthopnea, shortness of breath, SOB 

with excertion, SOB at rest, sputum, stridor, wheezing, other


Gastrointestinal/Abdominal:  Denies: no symptoms, abdomen distended, abdominal 

pain, black stools, tarry stools, blood in stool, constipated, diarrhea, 

difficulty swallowing, nausea, poor appetite, poor fluid intake, rectal bleeding

, vomiting, other


Genitourinary:  Denies: no symptoms, burning, discharge, frequency, flank pain, 

hematuria, incontinence, pain, urgency, other


Neurologic/Psychiatric:  Denies: no symptoms, anxiety, depressed, emotional 

problems, headache, numbness, paresthesia, pre-existing deficit, seizure, 

tingling, tremors, weakness, other


Hematologic/Lymphatic:  Reports: anemia


Allergies:  


Coded Allergies:  


     No Known Allergies (Verified , 10/27/06)


Subjective


Wbc count worsened today. H/H stable. On pain control





Objective





Last 24 Hour Vital Signs








  Date Time  Temp Pulse Resp B/P (MAP) Pulse Ox O2 Delivery O2 Flow Rate FiO2


 


4/25/18 20:00 97.8 97 11 133/76 100 Room Air  





 97.8       


 


4/25/18 16:00 97.7 102 13 137/85 100 Room Air  





 97.7       


 


4/25/18 12:00 97.3 103 12 127/82 100 Room Air  





 97.3       


 


4/25/18 08:00 98.2 100 12 152/103 100   





 98.2       


 


4/25/18 04:00 97.8 97 18 153/94 100   





 97.8       


 


4/25/18 00:00 98.2 98 19 155/94 100   





 98.2       

















Intake and Output  


 


 4/24/18 4/25/18





 19:00 07:00


 


Intake Total 1082.5 ml 55.0 ml


 


Balance 1082.5 ml 55.0 ml


 


  


 


Intake Oral 480 ml 


 


IV Total 602.5 ml 55.0 ml


 


# Voids  3








Laboratory Tests


4/25/18 07:00: 


White Blood Count 14.9H, Red Blood Count 4.62, Hemoglobin 13.6, Hematocrit 41.7

, Mean Corpuscular Volume 90, Mean Corpuscular Hemoglobin 29.4, Mean 

Corpuscular Hemoglobin Concent 32.5, Red Cell Distribution Width 15.1H, 

Platelet Count 227, Mean Platelet Volume 7.3, Neutrophils (%) (Auto) 76.1H, 

Lymphocytes (%) (Auto) 14.5L, Monocytes (%) (Auto) 8.5, Eosinophils (%) (Auto) 

0.3, Basophils (%) (Auto) 0.6, Sodium Level 134L, Potassium Level 3.1L, 

Chloride Level 101, Carbon Dioxide Level 23, Anion Gap 10, Blood Urea Nitrogen 

11, Creatinine 0.8, Estimat Glomerular Filtration Rate > 60, Glucose Level 109H

, Calcium Level 9.5


Height (Feet):  5


Height (Inches):  6.00


Weight (Pounds):  129


General Appearance:  confused


Respiratory/Chest:  decreased breath sounds











MIREILLE QUEEN Apr 25, 2018 23:50

## 2018-04-25 NOTE — CONSULTATION
History of Present Illness


General


Date patient seen:  Apr 25, 2018


Chief Complaint:  Abdominal Pain


Referring physician:  MAICOL LANG


Reason for Consultation:  lung mass





Present Illness


HPI


62 year old female with hx of Crohn disease presented to ER with cc of 

abdominal pain, was found to have pleural effusion and hilar lymphadenopathy.  

She underwent thoracentesis.  I was asked to evaluate the LLL mass.


Allergies:  


Coded Allergies:  


     No Known Allergies (Verified , 10/27/06)





Medication History


Scheduled


Al Hydroxide/mg Hydroxide (Mag-Al Plus Suspension), 30 ML PO Q6HR, (Reported)


Aspirin* (Aspir 81*), 81 MG ORAL DAILY, (Reported)


Atorvastatin Calcium* (Lipitor*), 40 MG ORAL BEDTIME, (Reported)


Bupropion Hcl* (Bupropion Hcl*), 100 MG ORAL DAILY, (Reported)


Duloxetine Hcl* (Cymbalta*), 60 MG ORAL DAILY, (Reported)


Fluconazole (Fluconazole), 200 MG ORAL DAILY, (Reported)


Levetiracetam* (Levetiracetam*), 1,000 MG ORAL TID, (Reported)


Levothyroxine Sodium* (Levothyroxine Sodium*), 75 MCG ORAL ACBREAKFAST, (

Reported)


Linaclotide (Linzess), 145 MCG PO DAILY, (Reported)


Lorazepam* (Ativan*), 1 MG ORAL THREE TIMES A DAY, (Reported)


Mesalamine (Asacol Hd), 1,600 MG ORAL THREE TIMES A DAY, (Reported)


Mirtazapine (Remeron), 30 MG ORAL BEDTIME, (Reported)


Montelukast Sodium* (Montelukast Sodium*), 10 MG ORAL DAILY, (Reported)


Multivitamins* (Multivitamins*), 1 TAB ORAL DAILY, (Reported)


Pantoprazole* (Protonix*), 40 MG ORAL DAILY, (Reported)


Quetiapine Fumarate (Seroquel), 200 MG ORAL QHS, (Reported)


Theophylline (Theodur*), 100 MG ORAL TWICE A DAY, (Reported)


Topiramate* (Topamax*), 25 MG ORAL TWICE A DAY, (Reported)


Trazodone Hcl* (Desyrel*), 200 MG ORAL BEDTIME, (Reported)





Scheduled PRN


Acetaminophen* (Acetaminophen 325MG Tablet*), 325 MG ORAL Q4H PRN for Fever/

Headache/Mild Pain, (Reported)


Diphenhydramine HCl (Benadryl), 25 MG PO Q6HR PRN for Itching, (Reported)





Discontinued Medications


Ibuprofen* (Motrin*), 800 MG ORAL THREE TIMES A DAY, (Reported)


   Discontinued Reason: Therapy completed


Levofloxacin-D5w 500 Mg/100 Ml* (Levofloxacin-D5w 500 Mg/100 Ml*), 500 MG IVPB 

Q24H, (Reported)


   Discontinued Reason: Therapy completed


Levothyroxine Sodium (Synthroid), 75 MCG ORAL DAILY, (Reported)


   Discontinued Reason: Medication dose changed


Mesalamine (Pentasa), 1,000 MG ORAL TID, (Reported)


   Discontinued Reason: Pt stopped taking med


Metronidazole* (Flagyl*), 200 MG ORAL DAILY, (Reported)


   Discontinued Reason: Therapy completed


Nitroglycerin (Nitroglycerin), 0.4 MG SL [id number] PRN for Prn Chest Pain, (

Reported)


   Discontinued Reason: Therapy completed


Ondansetron* (Zofran*), 4 MG ORAL Q6H PRN for Nausea & Vomiting, (Reported)


   Discontinued Reason: Therapy completed


Risperidone* (Risperdal*), 0.5 MG ORAL BEDTIME, (Reported)


   Discontinued Reason: Pt stopped taking med


Temazepam* (Restoril*), 15 MG ORAL BEDTIME PRN for Insomnia, (Reported)


   Discontinued Reason: Medication dose changed


Zolpidem Tartrate* (Ambien*), 5 MG ORAL BEDTIME PRN for Insomnia, (Reported)


   Discontinued Reason: Pt stopped taking med





Patient History


Healthcare decision maker


Patient


Resuscitation status


Full Code


Advanced Directive on File


No





Past Medical/Surgical History


Past Medical/Surgical History:  


(1) History of colonic polyps


(2) Chronic pain disorder


(3) IBD (inflammatory bowel disease)


(4) Interstitial lung disease


(5) Chronic pain syndrome


(6) Crohns disease


(7) COPD (chronic obstructive pulmonary disease)





Review of Systems


All Other Systems:  negative except mentioned in HPI





Physical Exam


General Appearance:  cachetic


Lines, tubes and drains:  peripheral


HEENT:  normocephalic, atraumatic, PERRL


Neck:  normal alignment


Respiratory/Chest:  chest wall non-tender


Cardiovascular/Chest:  normal peripheral pulses, normal rate, no JVD


Genitourinary/Rectal:  normal genital exam


Extremities:  normal range of motion





Last 24 Hour Vital Signs








  Date Time  Temp Pulse Resp B/P (MAP) Pulse Ox O2 Delivery O2 Flow Rate FiO2


 


4/25/18 12:00 97.3 103 12 127/82 100 Room Air  





 97.3       


 


4/25/18 08:00 98.2 100 12 152/103 100   





 98.2       


 


4/25/18 04:00 97.8 97 18 153/94 100   





 97.8       


 


4/25/18 00:00 98.2 98 19 155/94 100   





 98.2       


 


4/24/18 20:00 98.3 109 19 152/98 100   





 98.3       


 


4/24/18 17:34 98.4       


 


4/24/18 17:04 98.4       


 


4/24/18 15:48 98.4 107 20 148/90 97 Room Air  





 98.4       

















Intake and Output  


 


 4/24/18 4/25/18





 19:00 07:00


 


Intake Total 1082.5 ml 55.0 ml


 


Balance 1082.5 ml 55.0 ml


 


  


 


Intake Oral 480 ml 


 


IV Total 602.5 ml 55.0 ml


 


# Voids  3











Laboratory Tests








Test


  4/24/18


14:20 4/25/18


07:00


 


Alpha Fetoprotein Pending   


 


CA 15-3 Antigen Pending   


 


 Antigen Pending   


 


White Blood Count


  


  14.9 K/UL


(4.8-10.8)  H


 


Red Blood Count


  


  4.62 M/UL


(4.20-5.40)


 


Hemoglobin


  


  13.6 G/DL


(12.0-16.0)


 


Hematocrit


  


  41.7 %


(37.0-47.0)


 


Mean Corpuscular Volume  90 FL (80-99)  


 


Mean Corpuscular Hemoglobin


  


  29.4 PG


(27.0-31.0)


 


Mean Corpuscular Hemoglobin


Concent 


  32.5 G/DL


(32.0-36.0)


 


Red Cell Distribution Width


  


  15.1 %


(11.6-14.8)  H


 


Platelet Count


  


  227 K/UL


(150-450)


 


Mean Platelet Volume


  


  7.3 FL


(6.5-10.1)


 


Neutrophils (%) (Auto)


  


  76.1 %


(45.0-75.0)  H


 


Lymphocytes (%) (Auto)


  


  14.5 %


(20.0-45.0)  L


 


Monocytes (%) (Auto)


  


  8.5 %


(1.0-10.0)


 


Eosinophils (%) (Auto)


  


  0.3 %


(0.0-3.0)


 


Basophils (%) (Auto)


  


  0.6 %


(0.0-2.0)


 


Sodium Level


  


  134 MMOL/L


(136-145)  L


 


Potassium Level


  


  3.1 MMOL/L


(3.5-5.1)  L


 


Chloride Level


  


  101 MMOL/L


()


 


Carbon Dioxide Level


  


  23 MMOL/L


(21-32)


 


Anion Gap


  


  10 mmol/L


(5-15)


 


Blood Urea Nitrogen


  


  11 mg/dL


(7-18)


 


Creatinine


  


  0.8 MG/DL


(0.55-1.30)


 


Estimat Glomerular Filtration


Rate 


  > 60 mL/min


(>60)


 


Glucose Level


  


  109 MG/DL


()  H


 


Calcium Level


  


  9.5 MG/DL


(8.5-10.1)








Height (Feet):  5


Height (Inches):  6.00


Weight (Pounds):  129


Medications





Current Medications








 Medications


  (Trade)  Dose


 Ordered  Sig/Jed


 Route


 PRN Reason  Start Time


 Stop Time Status Last Admin


Dose Admin


 


 Acetaminophen


  (Tylenol)  650 mg  Q4H  PRN


 ORAL


 fever  4/19/18 15:30


 5/19/18 15:29   


 


 


 Acetaminophen/


 Hydrocodone Bitart


  (Norco 10/325)  1 tab  Q4H  PRN


 ORAL


 Pain Scale (3-5)  4/21/18 12:30


 4/28/18 08:29  4/23/18 09:14


 


 


 Al Hydroxide/Mg


 Hydroxide


  (Mylanta II)  30 ml  Q6H  PRN


 ORAL


 dyspepsia  4/19/18 15:30


 5/19/18 15:29   


 


 


 Atorvastatin


 Calcium


  (Lipitor)  40 mg  BEDTIME


 ORAL


   4/19/18 21:00


 5/19/18 20:59  4/24/18 21:35


 


 


 Bupropion HCl


  (Wellbutrin)  100 mg  DAILY


 ORAL


   4/20/18 09:00


 5/20/18 08:59  4/25/18 08:46


 


 


 Chlorhexidine


 Gluconate


  (Rosy-Hex 2%)  1 applic  DAILY@2000


 TOPIC


   4/25/18 20:00


 5/25/18 19:59   


 


 


 Dextrose


  (Dextrose 50%)  50 ml  STAT  PRN


 IV


 Hypoglycemia BS<60mg/dL  4/19/18 15:30


 5/19/18 15:29   


 


 


 Dextrose/Sodium


 Chloride  1,000 ml @ 


 75 mls/hr  [id number]


 IV


   4/19/18 17:00


 5/19/18 16:59  4/25/18 05:29


 


 


 Diphenhydramine


 HCl


  (Benadryl)  25 mg  Q6H  PRN


 ORAL


 Itching/Pruritis  4/19/18 15:30


 5/19/18 15:29   


 


 


 Duloxetine HCl


  (Cymbalta)  90 mg  DAILY


 ORAL


   4/24/18 09:00


 5/20/18 08:59  4/25/18 08:46


 


 


 Gabapentin


  (Neurontin)  300 mg  THREE TIMES A  DAY


 ORAL


   4/20/18 10:00


 5/20/18 09:59  4/25/18 08:46


 


 


 Heparin Sodium


  (Porcine)


  (Heparin 5000


 units/ml)  5,000 units  EVERY 12  HOURS


 SUBQ


   4/19/18 21:00


 5/19/18 20:59  4/24/18 08:57


 


 


 Heparin Sodium/


 Sodium Chloride


  (Heparin 2000


 units/Ns 1000ml


 premix)  2,000 unit  ONCE  PRN


 INJ


 PICC PLACEMENT  4/25/18 09:15


 4/25/18 23:59   


 


 


 Hydromorphone HCl


  (Dilaudid)  0.5 mg  Q4H  PRN


 IVP


 Mod-Severe Pain (4-10) if unab  4/21/18 21:30


 4/28/18 21:29  4/25/18 02:32


 


 


 Ioversol


  (Isovue)  100 ml  ONCE  PRN


 INJ


 FOR RADIOLOGY USE ONLY  4/25/18 10:00


 4/25/18 23:59   


 


 


 Levetiracetam


  (Keppra)  1,000 mg  Q8H


 ORAL


   4/24/18 08:00


 5/24/18 07:59  4/25/18 08:46


 


 


 Levothyroxine


 Sodium


  (Synthroid)  75 mcg  ACBREAKFAST


 ORAL


   4/20/18 06:30


 5/20/18 06:29  4/24/18 05:36


 


 


 Lidocaine HCl


  (Xylocaine 1%


 30ml)  30 ml  ONCE  PRN


 INJ


 PICC PLACEMENT  4/25/18 09:15


 4/25/18 23:59   


 


 


 Lorazepam


  (Ativan 2mg/ml


 1ml)  2 mg  Q6H  PRN


 IV


 agitation  4/21/18 15:30


 4/28/18 15:29  4/24/18 18:57


 


 


 Methylnaltrexone


 Bromide


  (Relistor)  12 mg  DAILY


 SUBQ


   4/20/18 10:15


 5/20/18 10:14  4/25/18 08:47


 


 


 Metoclopramide HCl


  (Reglan)  10 mg  Q6H  PRN


 IVP


 Unrelieved Severe Nausea  4/19/18 15:30


 5/19/18 15:29   


 


 


 Nitroglycerin


  (Ntg)  0.4 mg  Q5M X 3 DOSES PRN


 SL


 Prn Chest Pain  4/19/18 15:30


 5/19/18 15:29   


 


 


 Ondansetron HCl


  (Zofran)  4 mg  Q6H  PRN


 IVP


 Nausea & Vomiting  4/19/18 15:30


 5/19/18 15:29  4/20/18 12:37


 


 


 Pantoprazole


  (Protonix)  40 mg  DAILY


 ORAL


   4/23/18 09:00


 5/23/18 08:59  4/25/18 08:46


 


 


 Piperacillin Sod/


 Tazobactam Sod


 3.375 gm/Dextrose  110 ml @ 


 27.5 mls/hr  0000,0800,1600


 IVPB


   4/25/18 16:00


 5/2/18 15:59   


 


 


 Polyethylene


 Glycol


  (Miralax)  17 gm  HSPRN  PRN


 ORAL


 Constipation  4/19/18 15:30


 5/19/18 15:29   


 


 


 Potassium


 Chloride 40 meq/


 Sodium Chloride  570 ml @ 


 142.5 mls/


 hr  ONCE  ONCE


 IVPB


   4/25/18 13:00


 4/25/18 16:59   


 


 


 Promethazine HCl


 25 mg/Sodium


 Chloride  56 ml @ 


 110 mls/hr  Q6H  PRN


 IV


 Refractory N/V  4/19/18 15:30


 5/19/18 15:29   


 


 


 Simethicone


  (Mylicon)  80 mg  QIDPRN  PRN


 ORAL


 gas   4/20/18 10:00


 5/20/18 09:59   


 


 


 Theophylline


  (Shiv-Dur)  100 mg  Q12HR


 ORAL


   4/19/18 21:00


 5/19/18 20:59  4/25/18 08:46


 


 


 Topiramate


  (Topamax)  25 mg  EVERY 12  HOURS


 ORAL


   4/19/18 21:00


 5/19/18 20:59  4/25/18 08:46


 


 


 Trazodone HCl


  (Desyrel)  200 mg  BEDTIME


 ORAL


   4/19/18 21:00


 5/19/18 20:59  4/24/18 21:35


 


 


 Zolpidem Tartrate


  (Ambien)  5 mg  QHS


 ORAL


   4/21/18 21:00


 4/28/18 20:59  4/24/18 21:35


 











Assessment/Plan


Problem List:  


(1) Lung mass


ICD Codes:  R91.8 - Other nonspecific abnormal finding of lung field


SNOMED:  256280017


(2) Chronic pain disorder


ICD Codes:  G89.4 - Chronic pain syndrome


SNOMED:  990334884


(3) IBD (inflammatory bowel disease)


ICD Codes:  K63.89 - Other specified diseases of intestine


SNOMED:  80467406


(4) Interstitial lung disease


ICD Codes:  J84.9 - Interstitial pulmonary disease, unspecified


SNOMED:  92873585, [id number]


(5) COPD (chronic obstructive pulmonary disease)


ICD Codes:  J44.9 - Chronic obstructive pulmonary disease


SNOMED:  73928506


Assessment/Plan


f/u cytology of pleural fluid, if not conclusive she will have CT guided 

thoracentesis.


symptomatic treatment


pain management


titrate fio2 to sat of 92%











Blaze Fraga MD Apr 25, 2018 14:20

## 2018-04-25 NOTE — DIAGNOSTIC IMAGING REPORT
Indications: Needs long-term IV access

 

Technique: Ultrasound confirms patent compressible left brachial vein. Total sterile

technique, including  sterile probe cover and sterile gel, hat, mask,, sterile gown,

large sterile drape, and preparation with 2% chlorhexidine utilized. Local anesthesia

with 1% lidocaine. Under real-time ultrasound guidance, puncture left brachial vein

using 21-gauge needle, documented and archived, passage 0.018 guidewire under direct

fluoroscopy. Wire was advanced into the IVC, and a second confirmation of placement

within the venous system. Appropriate length was measured. Catheter was cut to

length. Exchange for 5 Jamaican peel-away sheath. 5 Jamaican  dual-lumen power PICC cut

to 45 cm. It was inserted through the peel-away sheath. Peel-away sheath and

guidewire removed. Catheter fixed to the skin. Both catheter ports aspirated and

flushed. Patient tolerated procedure well, without immediate complication. Digital

radiograph documents satisfactory catheter tip position, at the cavoatrial junction. 

 

Total fluoroscopy time 0.4 minutes.

Total dose area product  23 dGycm2

 

 

Impression: Successful placement of 5 Jamaican double-lumen PICC under sonographic and

fluoroscopic guidance, as described above. Catheter cleared for immediate use.

## 2018-04-26 VITALS — SYSTOLIC BLOOD PRESSURE: 126 MMHG | DIASTOLIC BLOOD PRESSURE: 81 MMHG

## 2018-04-26 VITALS — DIASTOLIC BLOOD PRESSURE: 71 MMHG | SYSTOLIC BLOOD PRESSURE: 103 MMHG

## 2018-04-26 VITALS — SYSTOLIC BLOOD PRESSURE: 120 MMHG | DIASTOLIC BLOOD PRESSURE: 79 MMHG

## 2018-04-26 VITALS — DIASTOLIC BLOOD PRESSURE: 72 MMHG | SYSTOLIC BLOOD PRESSURE: 104 MMHG

## 2018-04-26 VITALS — SYSTOLIC BLOOD PRESSURE: 129 MMHG | DIASTOLIC BLOOD PRESSURE: 80 MMHG

## 2018-04-26 VITALS — DIASTOLIC BLOOD PRESSURE: 92 MMHG | SYSTOLIC BLOOD PRESSURE: 146 MMHG

## 2018-04-26 LAB
ADD MANUAL DIFF: NO
ANION GAP SERPL CALC-SCNC: 11 MMOL/L (ref 5–15)
BASOPHILS NFR BLD AUTO: 0.7 % (ref 0–2)
BUN SERPL-MCNC: 10 MG/DL (ref 7–18)
CALCIUM SERPL-MCNC: 9.2 MG/DL (ref 8.5–10.1)
CHLORIDE SERPL-SCNC: 102 MMOL/L (ref 98–107)
CO2 SERPL-SCNC: 22 MMOL/L (ref 21–32)
CREAT SERPL-MCNC: 0.8 MG/DL (ref 0.55–1.3)
EOSINOPHIL NFR BLD AUTO: 0.8 % (ref 0–3)
ERYTHROCYTE [DISTWIDTH] IN BLOOD BY AUTOMATED COUNT: 15.2 % (ref 11.6–14.8)
HCT VFR BLD CALC: 37.7 % (ref 37–47)
HGB BLD-MCNC: 12.3 G/DL (ref 12–16)
INR PPP: 1.2 (ref 0.9–1.1)
LYMPHOCYTES NFR BLD AUTO: 18.5 % (ref 20–45)
MCV RBC AUTO: 91 FL (ref 80–99)
MONOCYTES NFR BLD AUTO: 11.5 % (ref 1–10)
NEUTROPHILS NFR BLD AUTO: 68.5 % (ref 45–75)
PLATELET # BLD: 190 K/UL (ref 150–450)
POTASSIUM SERPL-SCNC: 3.6 MMOL/L (ref 3.5–5.1)
RBC # BLD AUTO: 4.15 M/UL (ref 4.2–5.4)
SODIUM SERPL-SCNC: 135 MMOL/L (ref 136–145)
WBC # BLD AUTO: 13.5 K/UL (ref 4.8–10.8)

## 2018-04-26 RX ADMIN — METHYLNALTREXONE BROMIDE SCH MG: 12 INJECTION, SOLUTION SUBCUTANEOUS at 09:15

## 2018-04-26 RX ADMIN — HYDROCODONE BITARTRATE AND ACETAMINOPHEN PRN TAB: 10; 325 TABLET ORAL at 12:32

## 2018-04-26 RX ADMIN — TOPIRAMATE SCH MG: 25 TABLET, COATED ORAL at 20:49

## 2018-04-26 RX ADMIN — DULOXETINE HYDROCHLORIDE SCH MG: 30 CAPSULE, DELAYED RELEASE ORAL at 09:15

## 2018-04-26 RX ADMIN — THEOPHYLLINE ANHYDROUS SCH MG: 100 CAPSULE, EXTENDED RELEASE ORAL at 20:49

## 2018-04-26 RX ADMIN — HEPARIN SODIUM SCH UNITS: 5000 INJECTION INTRAVENOUS; SUBCUTANEOUS at 09:17

## 2018-04-26 RX ADMIN — ZOLPIDEM TARTRATE SCH MG: 5 TABLET ORAL at 21:00

## 2018-04-26 RX ADMIN — TRAZODONE HYDROCHLORIDE SCH MG: 100 TABLET ORAL at 20:49

## 2018-04-26 RX ADMIN — CHLORHEXIDINE GLUCONATE SCH APPLIC: 213 SOLUTION TOPICAL at 20:30

## 2018-04-26 RX ADMIN — DEXTROSE AND SODIUM CHLORIDE SCH MLS/HR: 5; .45 INJECTION, SOLUTION INTRAVENOUS at 05:52

## 2018-04-26 RX ADMIN — ATORVASTATIN CALCIUM SCH MG: 20 TABLET, FILM COATED ORAL at 20:50

## 2018-04-26 RX ADMIN — THEOPHYLLINE ANHYDROUS SCH MG: 100 CAPSULE, EXTENDED RELEASE ORAL at 09:15

## 2018-04-26 RX ADMIN — TOPIRAMATE SCH MG: 25 TABLET, COATED ORAL at 09:15

## 2018-04-26 RX ADMIN — HEPARIN SODIUM SCH UNITS: 5000 INJECTION INTRAVENOUS; SUBCUTANEOUS at 20:51

## 2018-04-26 RX ADMIN — DEXTROSE AND SODIUM CHLORIDE SCH MLS/HR: 5; .45 INJECTION, SOLUTION INTRAVENOUS at 22:48

## 2018-04-26 NOTE — GENERAL PROGRESS NOTE
Assessment/Plan


Problem List:  


(1) Crohn's disease


ICD Codes:  K50.90 - Crohn's disease


SNOMED:  72486356


Qualifiers:  


   Qualified Codes:  K50.919 - Crohn's disease, unspecified, with unspecified 

complications


(2) Opioid dependence


ICD Codes:  F11.20 - Opioid dependence


SNOMED:  22854866


(3) Intractable abdominal pain


ICD Codes:  R10.9 - Unspecified abdominal pain


SNOMED:  83537514, 132848896


(4) COPD (chronic obstructive pulmonary disease)


ICD Codes:  J44.9 - Chronic obstructive pulmonary disease


SNOMED:  43760156


(5) Shortness of breath


(6) SOB (shortness of breath)


ICD Codes:  R06.02 - Shortness of breath


SNOMED:  643493332


(7) UTI (urinary tract infection)


ICD Codes:  N39.0 - Urinary tract infection, site not specified


SNOMED:  53369843


(8) Lung mass


ICD Codes:  R91.8 - Other nonspecific abnormal finding of lung field


SNOMED:  590750763


Status:  unchanged


Assessment/Plan


ot pt diet pain control sx eval cbc bmp am heme f/u  ltach eval





Subjective


Constitutional:  Reports: weakness


Allergies:  


Coded Allergies:  


     No Known Allergies (Verified , 10/27/06)


All Systems:  reviewed and negative except above


Subjective


sleepy calm in bed





Objective





Last 24 Hour Vital Signs








  Date Time  Temp Pulse Resp B/P (MAP) Pulse Ox O2 Delivery O2 Flow Rate FiO2


 


4/26/18 12:00 97.2 101 15 146/92 98   





 97.2       


 


4/26/18 08:00 97.7 100 11 104/72 98   





 97.7       


 


4/26/18 04:00 97.3 94 10 120/79 94 Room Air  





 97.3       


 


4/26/18 00:00 97.7 99 10 129/80 100 Room Air  





 97.7       


 


4/25/18 20:00 97.8 97 11 133/76 100 Room Air  





 97.8       


 


4/25/18 16:00 97.7 102 13 137/85 100 Room Air  





 97.7       

















Intake and Output  


 


 4/25/18 4/26/18





 19:00 07:00


 


Intake Total 120 ml 935 ml


 


Balance 120 ml 935 ml


 


  


 


Intake Oral 120 ml 


 


IV Total  935 ml


 


# Voids 2 3


 


# Bowel Movements 1 1








Laboratory Tests


4/26/18 06:00: 


White Blood Count 13.5H, Red Blood Count 4.15L, Hemoglobin 12.3, Hematocrit 37.7

, Mean Corpuscular Volume 91, Mean Corpuscular Hemoglobin 29.6, Mean 

Corpuscular Hemoglobin Concent 32.6, Red Cell Distribution Width 15.2H, 

Platelet Count 190, Mean Platelet Volume 7.4, Neutrophils (%) (Auto) 68.5, 

Lymphocytes (%) (Auto) 18.5L, Monocytes (%) (Auto) 11.5H, Eosinophils (%) (Auto

) 0.8, Basophils (%) (Auto) 0.7, Prothrombin Time 12.6H, Prothromb Time 

International Ratio 1.2H, Sodium Level 135L, Potassium Level 3.6, Chloride 

Level 102, Carbon Dioxide Level 22, Anion Gap 11, Blood Urea Nitrogen 10, 

Creatinine 0.8, Estimat Glomerular Filtration Rate > 60, Glucose Level 79, 

Calcium Level 9.2


Height (Feet):  5


Height (Inches):  6.00


Weight (Pounds):  129


General Appearance:  lethargic


EENT:  normal ENT inspection


Neck:  normal alignment


Cardiovascular:  normal peripheral pulses, normal rate, regular rhythm


Respiratory/Chest:  chest wall non-tender, decreased breath sounds


Abdomen:  normal bowel sounds, non tender, soft


Extremities:  normal inspection


Edema:  no edema noted Arm (L), no edema noted Arm (R), no edema noted Leg (L), 

no edema noted Leg (R), no edema noted Pedal (L), no edema noted Pedal (R), no 

edema noted Generalized


Neurologic:  responsive, motor weakness


Skin:  normal pigmentation, warm/dry











MAICOL LANG Apr 26, 2018 13:42

## 2018-04-26 NOTE — PULMONOLOGY PROGRESS NOTE
Assessment/Plan


Problems:  


(1) Metastatic cancer


(2) Malignant pleural effusion


(3) Chronic pain disorder


(4) IBD (inflammatory bowel disease)


(5) Interstitial lung disease


(6) COPD (chronic obstructive pulmonary disease)


Assessment/Plan


d/w pathologist, there are malignant non-small cells in the pleural cavity. 


it seems metastatic,


continue current symptomatic treatment


oncology on the case already


pain management





Subjective


ROS Limited/Unobtainable:  No


Constitutional:  Reports: no symptoms


HEENT:  Repors: no symptoms


Allergies:  


Coded Allergies:  


     No Known Allergies (Verified , 10/27/06)





Objective





Last 24 Hour Vital Signs








  Date Time  Temp Pulse Resp B/P (MAP) Pulse Ox O2 Delivery O2 Flow Rate FiO2


 


4/26/18 12:00 97.2 101 15 146/92 98   





 97.2       


 


4/26/18 08:00 97.7 100 11 104/72 98   





 97.7       


 


4/26/18 04:00 97.3 94 10 120/79 94 Room Air  





 97.3       


 


4/26/18 00:00 97.7 99 10 129/80 100 Room Air  





 97.7       


 


4/25/18 20:00 97.8 97 11 133/76 100 Room Air  





 97.8       


 


4/25/18 16:00 97.7 102 13 137/85 100 Room Air  





 97.7       

















Intake and Output  


 


 4/25/18 4/26/18





 19:00 07:00


 


Intake Total 120 ml 935 ml


 


Balance 120 ml 935 ml


 


  


 


Intake Oral 120 ml 


 


IV Total  935 ml


 


# Voids 2 3


 


# Bowel Movements 1 1








General Appearance:  cachetic


HEENT:  normocephalic


Breasts:  no masses


Abdomen:  normal bowel sounds


Genitourinary:  normal external genitalia


Extremities:  no clubbing


Skin:  no lesions


Laboratory Tests


4/26/18 06:00: 


White Blood Count 13.5H, Red Blood Count 4.15L, Hemoglobin 12.3, Hematocrit 37.7

, Mean Corpuscular Volume 91, Mean Corpuscular Hemoglobin 29.6, Mean 

Corpuscular Hemoglobin Concent 32.6, Red Cell Distribution Width 15.2H, 

Platelet Count 190, Mean Platelet Volume 7.4, Neutrophils (%) (Auto) 68.5, 

Lymphocytes (%) (Auto) 18.5L, Monocytes (%) (Auto) 11.5H, Eosinophils (%) (Auto

) 0.8, Basophils (%) (Auto) 0.7, Prothrombin Time 12.6H, Prothromb Time 

International Ratio 1.2H, Sodium Level 135L, Potassium Level 3.6, Chloride 

Level 102, Carbon Dioxide Level 22, Anion Gap 11, Blood Urea Nitrogen 10, 

Creatinine 0.8, Estimat Glomerular Filtration Rate > 60, Glucose Level 79, 

Calcium Level 9.2





Current Medications








 Medications


  (Trade)  Dose


 Ordered  Sig/Jed


 Route


 PRN Reason  Start Time


 Stop Time Status Last Admin


Dose Admin


 


 Acetaminophen


  (Tylenol)  650 mg  Q4H  PRN


 ORAL


 fever  4/19/18 15:30


 5/19/18 15:29   


 


 


 Acetaminophen/


 Hydrocodone Bitart


  (Norco 10/325)  1 tab  Q4H  PRN


 ORAL


 Pain Scale (3-5)  4/26/18 10:45


 5/3/18 08:29  4/26/18 12:32


 


 


 Al Hydroxide/Mg


 Hydroxide


  (Mylanta II)  30 ml  Q6H  PRN


 ORAL


 dyspepsia  4/19/18 15:30


 5/19/18 15:29   


 


 


 Atorvastatin


 Calcium


  (Lipitor)  40 mg  BEDTIME


 ORAL


   4/19/18 21:00


 5/19/18 20:59  4/25/18 21:33


 


 


 Bupropion HCl


  (Wellbutrin)  100 mg  DAILY


 ORAL


   4/20/18 09:00


 5/20/18 08:59  4/26/18 09:15


 


 


 Chlorhexidine


 Gluconate


  (Rosy-Hex 2%)  1 applic  DAILY@2000


 TOPIC


   4/25/18 20:00


 5/25/18 19:59  4/25/18 21:32


 


 


 Dextrose


  (Dextrose 50%)  50 ml  STAT  PRN


 IV


 Hypoglycemia BS<60mg/dL  4/19/18 15:30


 5/19/18 15:29   


 


 


 Dextrose/Sodium


 Chloride  1,000 ml @ 


 75 mls/hr  [id number]


 IV


   4/19/18 17:00


 5/19/18 16:59  4/26/18 05:52


 


 


 Diphenhydramine


 HCl


  (Benadryl)  25 mg  Q6H  PRN


 ORAL


 Itching/Pruritis  4/19/18 15:30


 5/19/18 15:29   


 


 


 Duloxetine HCl


  (Cymbalta)  90 mg  DAILY


 ORAL


   4/24/18 09:00


 5/20/18 08:59  4/26/18 09:15


 


 


 Gabapentin


  (Neurontin)  300 mg  THREE TIMES A  DAY


 ORAL


   4/20/18 10:00


 5/20/18 09:59  4/26/18 12:32


 


 


 Heparin Sodium


  (Porcine)


  (Heparin 5000


 units/ml)  5,000 units  EVERY 12  HOURS


 SUBQ


   4/19/18 21:00


 5/19/18 20:59  4/26/18 09:17


 


 


 Hydromorphone HCl


  (Dilaudid)  0.5 mg  Q4H  PRN


 IVP


 PAIN SCALE 4-10 if unab  4/26/18 09:30


 5/3/18 09:29   


 


 


 Levetiracetam


  (Keppra)  1,000 mg  Q8H


 ORAL


   4/24/18 08:00


 5/24/18 07:59  4/26/18 08:00


 


 


 Levothyroxine


 Sodium


  (Synthroid)  75 mcg  ACBREAKFAST


 ORAL


   4/20/18 06:30


 5/20/18 06:29  4/26/18 05:53


 


 


 Lorazepam


  (Ativan 2mg/ml


 1ml)  2 mg  Q6H  PRN


 IV


 agitation  4/21/18 15:30


 4/28/18 15:29  4/24/18 18:57


 


 


 Methylnaltrexone


 Bromide


  (Relistor)  12 mg  DAILY


 SUBQ


   4/20/18 10:15


 5/20/18 10:14  4/26/18 09:15


 


 


 Metoclopramide HCl


  (Reglan)  10 mg  Q6H  PRN


 IVP


 Unrelieved Severe Nausea  4/19/18 15:30


 5/19/18 15:29   


 


 


 Nitroglycerin


  (Ntg)  0.4 mg  Q5M X 3 DOSES PRN


 SL


 Prn Chest Pain  4/19/18 15:30


 5/19/18 15:29   


 


 


 Ondansetron HCl


  (Zofran)  4 mg  Q6H  PRN


 IVP


 Nausea & Vomiting  4/19/18 15:30


 5/19/18 15:29  4/20/18 12:37


 


 


 Pantoprazole


  (Protonix)  40 mg  DAILY


 ORAL


   4/23/18 09:00


 5/23/18 08:59  4/26/18 09:16


 


 


 Polyethylene


 Glycol


  (Miralax)  17 gm  HSPRN  PRN


 ORAL


 Constipation  4/19/18 15:30


 5/19/18 15:29   


 


 


 Promethazine HCl


 25 mg/Sodium


 Chloride  56 ml @ 


 110 mls/hr  Q6H  PRN


 IV


 Refractory N/V  4/19/18 15:30


 5/19/18 15:29   


 


 


 Simethicone


  (Mylicon)  80 mg  QIDPRN  PRN


 ORAL


 gas   4/20/18 10:00


 5/20/18 09:59   


 


 


 Theophylline


  (Shiv-Dur)  100 mg  Q12HR


 ORAL


   4/19/18 21:00


 5/19/18 20:59  4/26/18 09:15


 


 


 Topiramate


  (Topamax)  25 mg  EVERY 12  HOURS


 ORAL


   4/19/18 21:00


 5/19/18 20:59  4/26/18 09:15


 


 


 Trazodone HCl


  (Desyrel)  200 mg  BEDTIME


 ORAL


   4/19/18 21:00


 5/19/18 20:59  4/25/18 21:33


 


 


 Zolpidem Tartrate


  (Ambien)  5 mg  QHS


 ORAL


   4/21/18 21:00


 4/28/18 20:59  4/25/18 21:33


 

















Blaze Fraga MD Apr 26, 2018 13:35

## 2018-04-26 NOTE — INFECTIOUS DISEASES PROG NOTE
Assessment/Plan


Assessment/Plan





ASSESSMENT:  The patient is a 62-year-old female with,





 Low-grade fever. probable due to SBO  ,resolved


Leukocytosis, mild- recurrent- ?2ry to possible malignancy


Probable small bowel obstruction


Recent history of polymicrobial gram-negative bacteremia including 

Stenotrophomonas maltophilia and Enterobacter.


History of Candida esophagitis.


History of C. difficile in the past.











Air anterior to the liver , ?  free intraperitoneal air ( Surg doubt 

perforation )


Lung and liver masses Ro Malignancy 


 CT:  Pleural-based mass at the left lung base / Moderate-sized left pleural 

effusion, left-sided pleural nodularity and retroperitoneal and paraspinal 

nodules/masses. 


         Left inferior hilar mass lesion partially visualized. New low-

attenuation liver lesion ( concerning for malignancy/metastatic disease )


Pl effusion SP ultrasound-guided thoracentesis,  960 cc  ( m/l 2/2 mets,  no 

evid of Empyema )


  -exudate fluid:  (n 6), pH 8, lguc 98, prot 4.6 (serum 8.4); cx stain: 


  GRAM STAIN  Final  


        GRAM STAIN RESULT           FEW WHITE BLOOD CELLS


                                    NO ORGANISMS SEEN


cx negative





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 





Crohn's disease.


History of bowel obstruction x2 with lysis of adhesions in 2005.


COPD.


History of chronic pain syndrome, on morphine pump.


CVA in 2005.


Seizure disorder.








PLAN:





cont to monitor off abx


  -4/25 SP Zosyn #5





 


Monitor CBC and BMP.


follow GI recommendations


hem, surg f/u


plan for CT guided bx lung mass





Subjective


Allergies:  


Coded Allergies:  


     No Known Allergies (Verified , 10/27/06)


Subjective


afebrile 


leukocytosis improving


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





Objective


Vital Signs





Last 24 Hour Vital Signs








  Date Time  Temp Pulse Resp B/P (MAP) Pulse Ox O2 Delivery O2 Flow Rate FiO2


 


4/26/18 12:00 97.2 101 15 146/92 98   





 97.2       


 


4/26/18 08:00 97.7 100 11 104/72 98   





 97.7       


 


4/26/18 04:00 97.3 94 10 120/79 94 Room Air  





 97.3       


 


4/26/18 00:00 97.7 99 10 129/80 100 Room Air  





 97.7       


 


4/25/18 20:00 97.8 97 11 133/76 100 Room Air  





 97.8       








Height (Feet):  5


Height (Inches):  6.00


Weight (Pounds):  129


Objective


HEENT:  anicteric


Respiratory/Chest:  normal breath sounds


Cardiovascular:  regular rhythm


Abdomen:  tender





Laboratory Tests








Test


  4/26/18


06:00


 


White Blood Count


  13.5 K/UL


(4.8-10.8)  H


 


Red Blood Count


  4.15 M/UL


(4.20-5.40)  L


 


Hemoglobin


  12.3 G/DL


(12.0-16.0)


 


Hematocrit


  37.7 %


(37.0-47.0)


 


Mean Corpuscular Volume 91 FL (80-99)  


 


Mean Corpuscular Hemoglobin


  29.6 PG


(27.0-31.0)


 


Mean Corpuscular Hemoglobin


Concent 32.6 G/DL


(32.0-36.0)


 


Red Cell Distribution Width


  15.2 %


(11.6-14.8)  H


 


Platelet Count


  190 K/UL


(150-450)


 


Mean Platelet Volume


  7.4 FL


(6.5-10.1)


 


Neutrophils (%) (Auto)


  68.5 %


(45.0-75.0)


 


Lymphocytes (%) (Auto)


  18.5 %


(20.0-45.0)  L


 


Monocytes (%) (Auto)


  11.5 %


(1.0-10.0)  H


 


Eosinophils (%) (Auto)


  0.8 %


(0.0-3.0)


 


Basophils (%) (Auto)


  0.7 %


(0.0-2.0)


 


Prothrombin Time


  12.6 SEC


(9.30-11.50)  H


 


Prothromb Time International


Ratio 1.2 (0.9-1.1)


H


 


Sodium Level


  135 MMOL/L


(136-145)  L


 


Potassium Level


  3.6 MMOL/L


(3.5-5.1)


 


Chloride Level


  102 MMOL/L


()


 


Carbon Dioxide Level


  22 MMOL/L


(21-32)


 


Anion Gap


  11 mmol/L


(5-15)


 


Blood Urea Nitrogen


  10 mg/dL


(7-18)


 


Creatinine


  0.8 MG/DL


(0.55-1.30)


 


Estimat Glomerular Filtration


Rate > 60 mL/min


(>60)


 


Glucose Level


  79 MG/DL


()


 


Calcium Level


  9.2 MG/DL


(8.5-10.1)











Current Medications








 Medications


  (Trade)  Dose


 Ordered  Sig/Jed


 Route


 PRN Reason  Start Time


 Stop Time Status Last Admin


Dose Admin


 


 Acetaminophen


  (Tylenol)  650 mg  Q4H  PRN


 ORAL


 fever  4/19/18 15:30


 5/19/18 15:29   


 


 


 Acetaminophen/


 Hydrocodone Bitart


  (Norco 10/325)  1 tab  Q4H  PRN


 ORAL


 Pain Scale (3-5)  4/26/18 10:45


 5/3/18 08:29  4/26/18 12:32


 


 


 Al Hydroxide/Mg


 Hydroxide


  (Mylanta II)  30 ml  Q6H  PRN


 ORAL


 dyspepsia  4/19/18 15:30


 5/19/18 15:29   


 


 


 Atorvastatin


 Calcium


  (Lipitor)  40 mg  BEDTIME


 ORAL


   4/19/18 21:00


 5/19/18 20:59  4/25/18 21:33


 


 


 Bupropion HCl


  (Wellbutrin)  100 mg  DAILY


 ORAL


   4/20/18 09:00


 5/20/18 08:59  4/26/18 09:15


 


 


 Chlorhexidine


 Gluconate


  (Rosy-Hex 2%)  1 applic  DAILY@2000


 TOPIC


   4/25/18 20:00


 5/25/18 19:59  4/25/18 21:32


 


 


 Dextrose


  (Dextrose 50%)  50 ml  STAT  PRN


 IV


 Hypoglycemia BS<60mg/dL  4/19/18 15:30


 5/19/18 15:29   


 


 


 Dextrose/Sodium


 Chloride  1,000 ml @ 


 75 mls/hr  [id number]


 IV


   4/19/18 17:00


 5/19/18 16:59  4/26/18 05:52


 


 


 Diphenhydramine


 HCl


  (Benadryl)  25 mg  Q6H  PRN


 ORAL


 Itching/Pruritis  4/19/18 15:30


 5/19/18 15:29   


 


 


 Duloxetine HCl


  (Cymbalta)  90 mg  DAILY


 ORAL


   4/24/18 09:00


 5/20/18 08:59  4/26/18 09:15


 


 


 Gabapentin


  (Neurontin)  300 mg  THREE TIMES A  DAY


 ORAL


   4/20/18 10:00


 5/20/18 09:59  4/26/18 12:32


 


 


 Heparin Sodium


  (Porcine)


  (Heparin 5000


 units/ml)  5,000 units  EVERY 12  HOURS


 SUBQ


   4/19/18 21:00


 5/19/18 20:59  4/26/18 09:17


 


 


 Hydromorphone HCl


  (Dilaudid)  0.5 mg  Q4H  PRN


 IVP


 PAIN SCALE 4-10 if unab  4/26/18 09:30


 5/3/18 09:29   


 


 


 Levetiracetam


  (Keppra)  1,000 mg  Q8H


 ORAL


   4/24/18 08:00


 5/24/18 07:59  4/26/18 08:00


 


 


 Levothyroxine


 Sodium


  (Synthroid)  75 mcg  ACBREAKFAST


 ORAL


   4/20/18 06:30


 5/20/18 06:29  4/26/18 05:53


 


 


 Lorazepam


  (Ativan 2mg/ml


 1ml)  2 mg  Q6H  PRN


 IV


 agitation  4/21/18 15:30


 4/28/18 15:29  4/24/18 18:57


 


 


 Methylnaltrexone


 Bromide


  (Relistor)  12 mg  DAILY


 SUBQ


   4/20/18 10:15


 5/20/18 10:14  4/26/18 09:15


 


 


 Metoclopramide HCl


  (Reglan)  10 mg  Q6H  PRN


 IVP


 Unrelieved Severe Nausea  4/19/18 15:30


 5/19/18 15:29   


 


 


 Nitroglycerin


  (Ntg)  0.4 mg  Q5M X 3 DOSES PRN


 SL


 Prn Chest Pain  4/19/18 15:30


 5/19/18 15:29   


 


 


 Ondansetron HCl


  (Zofran)  4 mg  Q6H  PRN


 IVP


 Nausea & Vomiting  4/19/18 15:30


 5/19/18 15:29  4/20/18 12:37


 


 


 Pantoprazole


  (Protonix)  40 mg  DAILY


 ORAL


   4/23/18 09:00


 5/23/18 08:59  4/26/18 09:16


 


 


 Polyethylene


 Glycol


  (Miralax)  17 gm  HSPRN  PRN


 ORAL


 Constipation  4/19/18 15:30


 5/19/18 15:29   


 


 


 Promethazine HCl


 25 mg/Sodium


 Chloride  56 ml @ 


 110 mls/hr  Q6H  PRN


 IV


 Refractory N/V  4/19/18 15:30


 5/19/18 15:29   


 


 


 Simethicone


  (Mylicon)  80 mg  QIDPRN  PRN


 ORAL


 gas   4/20/18 10:00


 5/20/18 09:59   


 


 


 Theophylline


  (Shiv-Dur)  100 mg  Q12HR


 ORAL


   4/19/18 21:00


 5/19/18 20:59  4/26/18 09:15


 


 


 Topiramate


  (Topamax)  25 mg  EVERY 12  HOURS


 ORAL


   4/19/18 21:00


 5/19/18 20:59  4/26/18 09:15


 


 


 Trazodone HCl


  (Desyrel)  200 mg  BEDTIME


 ORAL


   4/19/18 21:00


 5/19/18 20:59  4/25/18 21:33


 


 


 Zolpidem Tartrate


  (Ambien)  5 mg  QHS


 ORAL


   4/21/18 21:00


 4/28/18 20:59  4/25/18 21:33


 

















Selene Garcia M.D. Apr 26, 2018 16:25

## 2018-04-26 NOTE — GENERAL PROGRESS NOTE
Assessment/Plan


Assessment/Plan


(1) Chronic abdominal pain


(2) Chronic pancreatitis


(3) Crohn's disease 


(4) Herniated nucleus pulposus, lumbar


(5) Lumbar spondylosis


(6) Radiculopathy of lumbar region


Pt will be continued on Neurontin, Dilaudid and Norco and pt has intrathecal 

pump.


HOLD OPIOIDS FOR OVERSEDATION OR SBP<90 OR DBP<60 OR O2SAT<92% OR RR<12


Pt was d/w Dr. Shetty and he concurred.





Subjective


Date patient seen:  Apr 26, 2018


Time patient seen:  07:30 - am


Allergies:  


Coded Allergies:  


     No Known Allergies (Verified , 10/27/06)


Subjective


Constitutional:  Reports: weakness


HEENT:  Reports: no symptoms


Cardiovascular:  Reports: no symptoms


Respiratory:  Reports: no symptoms


Gastrointestinal/Abdominal:  Reports: abdominal pain


Genitourinary:  Reports: no symptoms


Neurologic/Psychiatric:  Reports: weakness


Endocrine:  Reports: no symptoms


Hematologic/Lymphatic:  Reports: no symptoms





Subjective


She is in bed and reports that her pain has been better 


and is having less pain today. Patient has no new complaints.





Objective





Last 24 Hour Vital Signs








  Date Time  Temp Pulse Resp B/P (MAP) Pulse Ox O2 Delivery O2 Flow Rate FiO2


 


4/26/18 08:00 97.7 100 11 104/72 98   





 97.7       


 


4/26/18 04:00 97.3 94 10 120/79 94 Room Air  





 97.3       


 


4/26/18 00:00 97.7 99 10 129/80 100 Room Air  





 97.7       


 


4/25/18 20:00 97.8 97 11 133/76 100 Room Air  





 97.8       


 


4/25/18 16:00 97.7 102 13 137/85 100 Room Air  





 97.7       


 


4/25/18 12:00 97.3 103 12 127/82 100 Room Air  





 97.3       

















Intake and Output  


 


 4/25/18 4/26/18





 19:00 07:00


 


Intake Total 120 ml 935 ml


 


Balance 120 ml 935 ml


 


  


 


Intake Oral 120 ml 


 


IV Total  935 ml


 


# Voids 2 3


 


# Bowel Movements 1 1








Laboratory Tests


4/26/18 06:00: 


White Blood Count 13.5H, Red Blood Count 4.15L, Hemoglobin 12.3, Hematocrit 37.7

, Mean Corpuscular Volume 91, Mean Corpuscular Hemoglobin 29.6, Mean 

Corpuscular Hemoglobin Concent 32.6, Red Cell Distribution Width 15.2H, 

Platelet Count 190, Mean Platelet Volume 7.4, Neutrophils (%) (Auto) 68.5, 

Lymphocytes (%) (Auto) 18.5L, Monocytes (%) (Auto) 11.5H, Eosinophils (%) (Auto

) 0.8, Basophils (%) (Auto) 0.7, Prothrombin Time 12.6H, Prothromb Time 

International Ratio 1.2H, Sodium Level 135L, Potassium Level 3.6, Chloride 

Level 102, Carbon Dioxide Level 22, Anion Gap 11, Blood Urea Nitrogen 10, 

Creatinine 0.8, Estimat Glomerular Filtration Rate > 60, Glucose Level 79, 

Calcium Level 9.2


Height (Feet):  5


Height (Inches):  6.00


Weight (Pounds):  129


Objective


General Appearance:  no apparent distress, alert


EENT:  normal ENT inspection, TMs normal


Neck:  normal alignment, supple


Cardiovascular:  normal rate, regular rhythm


Respiratory/Chest:  decreased breath sounds


Abdomen:  tender, distended, intrathecal pump palpated


Extremities:  non-tender


Edema:  no edema noted 


Neurologic:  alert, oriented x 3


Skin:  warm/dry











LEA BRAVO Apr 26, 2018 08:59

## 2018-04-26 NOTE — DIAGNOSTIC IMAGING REPORT
INDICATION: Abdominal pain, shortness of breath, history of COPD, MI,

congestive heart failure, coronary artery disease, pneumonia

 

TECHNIQUE: Patient ingested oral contrast. IV administration nonionic contrast.

 Multiphasic spiral acquisitions obtained through the chest, abdomen, and pelvis  

Multiplanar  reconstructions were generated. Total dose length product 1132.24 mGycm.

 CTDIvol(s) 11.9,11.8 mGy. Radiation dose was minimized using automated exposure

control

 

COMPARISON: Abdomen and pelvis compared to 4/20/2018 noncontrast study, chest

compared to 6/2/2014

 

FINDINGS 

 

Chest: There is left hilar mass or adenopathy which measures 4.2 cm AP by 3.7

cm transverse by 5.6 cm craniocaudad. This compresses the upper lobe pulmonary veins

and also results in some narrowing of the upper lobe pulmonary arteries. This mass is

low in attenuation. There is a large left infrahilar mass or adenopathy which

measures 4.1 x 3.3 cm. There is extensive confluent mediastinal lymphadenopathy.

Enlarged subcarinal node measures 6.8 x 6 x 3.6 cm. Other large nodes are seen in the

prevascular space, aortopulmonary window, and upper mediastinum. There are multiple

peripheral pleural masses on the left as well. Pleural-based masses are also seen

within the major fissure There is a large left pleural effusion.

 

The lung parenchyma demonstrates extensive bullous change, bronchiectasis, and

honeycombing bilaterally, consistent with COPD and chronic fibrosis. No pleural fluid

is demonstrated on the right., Some secretions are seen within the bilateral mainstem

bronchi origins

 

The heart is not enlarged. There is minimal pericardial thickening versus fluid

demonstrated. No axillary or chest wall mass or adenopathy. There is a fracture of

the right posterior 11th rib. There is some callus formation but fracture line

persists. There is an old healed fracture deformity of the left posterolateral 10th

rib. There is a left arm PICC.

 

When compared to the prior exam, the masses, adenopathy, and pleural fluid are new

finding. There has been extensive progression of the bronchiectasis, bullous changes,

and fibrotic changes.

 

Abdomen pelvis: Again demonstrated is evidence of prior right hemicolectomy and

ileocolic anastomosis. Contrast is seen in the colon, presumably from the previous

exam, as the distal small bowel is not opacified. The distal small bowel loops,

probably those in the region of the ileocolic anastomosis, are distended, more so

than previously, and there is an air-fluid level. The more proximal small bowel loops

contain contrast, are less distended than the distal small bowel but somewhat more

distended than previously. The previously noted free intraperitoneal air is no longer

evident. No free intraperitoneal fluid collections are demonstrated.

 

Multiple small noncystic masses are seen within the liver, largest and the right

hepatic lobe measuring 1.2 cm in diameter. The gallbladder is nondistended. The bile

ducts are nondilated. The pancreas, spleen, adrenals are unremarkable. There is

retroperitoneal lymphadenopathy, with multiple enlarged retroperitoneal nodes

present, some appearing low-attenuation and therefore necrotic. The kidneys

demonstrate bilateral subcentimeter low-attenuation lesions which are too small to

characterize, most likely benign simple cortical cysts. No pelvic mass or adenopathy.

The uterus is surgically absent. A pain pump reservoir is seen in the left lower

quadrant subcutaneous fat, and what is presumably intrathecal pain pump tubing is

seen entering the spinal canal at the L3-4 level. The bones are unremarkable. As

mentioned above, bowel distention appears greater in on the previous study. Other

findings are unchanged

 

IMPRESSION: Evidence of disseminated malignancy, with large left pulmonary

hilar mass or adenopathy, extensive left hilar and mediastinal adenopathy, multiple

pleural-based masses on the left, multiple masses within the liver, and

retroperitoneal lymphadenopathy.

 

Large left pleural effusion, probably malignant, also reported on 4/28/2018 abdomen

pelvis CT

 

Extensive chronic pulmonary parenchymal disease, with honeycombing indicative of

chronic fibrosis, bronchiectasis, and bullous changes, all progressive since prior

chest CT of 2014

 

Evidence of extensive prior bowel surgery. Fairly extensive small bowel dilatation,

as described, more severe than on prior study 4/20/2018 although similar in extent to

an earlier study of 3/15/2018. Suspected bowel dilatation is functional related to

the multiple other surgeries. However, given the presence of nondilated small bowel

loops as well as unopacified distal small bowel loops, the possibility of small bowel

obstruction should be considered

 

Previously demonstrated free intraperitoneal air is no longer evident. No evidence of

contrast extravasation

 

Other findings as noted, including pain pump,, PICC prior hysterectomy, probable

renal cysts, incompletely healed right 11th rib fracture, old healed left 10th rib

fracture, minimal pericardial thickening versus fluid

 

The CT scanner at [facility] is accredited by the [employer] College [employer]

[employer]iology and the scans are performed using protocols designed to limit radiation

exposure to as low as reasonably achievable to attain images of sufficient resolution

adequate for diagnostic evaluation.

## 2018-04-26 NOTE — PROGRESS NOTE
DATE:  04/24/2018



PSYCHOTHERAPY CONSULTATION PROGRESS NOTE



SUBJECTIVE:  The patient is a 62-year-old female patient with a history of

bipolar disorder.  Today, the patient has continued to be irritable and

very anxious.  She states that she is in physical pain and distress, has

been unable to cope with the pain _____ experiencing symptoms of anxiety

and depression.  The patient was able to process her thoughts _____.



PLAN:  This clinician assessed this patient and assessed the patient's

mental status.  Provided the patient with reality orientation and

supportive psychotherapy.  Encouraging the patient to participate in

treatment milieu, working on coping skills.  Continue with behavioral

management.  This clinician has reviewed the patient's chart and discussed

the treatment with treatment team.









  ______________________________________________

  Orestes Ross PsyD.





DR:  RADHA

D:  04/25/2018 21:03

T:  04/26/2018 07:30

JOB#:  [id number]

CC:

## 2018-04-26 NOTE — GENERAL PROGRESS NOTE
Assessment/Plan


Assessment/Plan


IMPRESSION:


1. Left lower lobe lung mass.


2. Left inferior hilar mass.


3. Left pleural base lung mass.


4. Left pleural effusion.


5. Retroperitoneal lymphadenopathy.


6. Multiple low-attenuation liver lesions.


7. Crohn disease.


8. Opioid dependence.


9. Chronic obstructive pulmonary disease.


10. History of smoking.


11. Emphysema.


12. Perianal abscess.


13. Intractable abdominal pain.


14. Status post morphine pump placement.


15. Depression.


16. Psychosis.


17. Seizure disorder.


18. Radiculopathy of lumbar region.


19. Abdominal pain.


20. Nausea and vomiting.


21. Status post exploratory laparotomy.


22. Pneumoperitoneum.


23. Inflammatory bowel disease.


24. History of elevated CEA.


25. History of lower gastrointestinal bleed.


26. Chronic pain syndrome.


27. History of small bowel obstruction.


28. Status post laparotomy x2.


29. Failure to thrive.


30. Leukocytosis.





RECOMMENDATIONS:


1. Watch count.


2. Watch coagulopathy.


3. Paracentesis with cytology.


4. Pulmonary evaluation/followup.


5. Mediastinoscopy versus CT-guided lung biopsy.


6. The patient needs tissue diagnosis.


7. ID followup.


8. Pain control.


9. Psychiatry evaluation.


10. Skin care.


11. Nutrition.


12. Continue current treatment.


13. Discussed with staff.


14. Close followup





Subjective


Date patient seen:  Apr 26, 2018


Constitutional:  Denies: no symptoms, chills, diaphoresis, fever, malaise, 

weakness, other


HEENT:  Denies: no symptoms, eye pain, blurred vision, tearing, double vision, 

ear pain, ear discharge, nose pain, nose congestion, throat pain, throat 

swelling, mouth pain, mouth swelling, other


Cardiovascular:  Denies: no symptoms, chest pain, edema, irregular heart rate, 

lightheadedness, palpitations, syncope, other


Respiratory:  Denies: no symptoms, cough, orthopnea, shortness of breath, SOB 

with excertion, SOB at rest, sputum, stridor, wheezing, other


Gastrointestinal/Abdominal:  Denies: no symptoms, abdomen distended, abdominal 

pain, black stools, tarry stools, blood in stool, constipated, diarrhea, 

difficulty swallowing, nausea, poor appetite, poor fluid intake, rectal bleeding

, vomiting, other


Genitourinary:  Denies: no symptoms, burning, discharge, frequency, flank pain, 

hematuria, incontinence, pain, urgency, other


Neurologic/Psychiatric:  Denies: no symptoms, anxiety, depressed, emotional 

problems, headache, numbness, paresthesia, pre-existing deficit, seizure, 

tingling, tremors, weakness, other


Hematologic/Lymphatic:  Reports: anemia


Allergies:  


Coded Allergies:  


     No Known Allergies (Verified , 10/27/06)


Subjective


Leukocytosis. H/H stable. Feeling better with pain management.





Objective





Last 24 Hour Vital Signs








  Date Time  Temp Pulse Resp B/P (MAP) Pulse Ox O2 Delivery O2 Flow Rate FiO2


 


4/26/18 20:00 98.3 96 8 103/71 96 Room Air  





 98.3       


 


4/26/18 16:00 97.5 100 20 126/81 100   





 97.5       


 


4/26/18 12:00 97.2 101 15 146/92 98   





 97.2       


 


4/26/18 08:00 97.7 100 11 104/72 98   





 97.7       


 


4/26/18 04:00 97.3 94 10 120/79 94 Room Air  





 97.3       


 


4/26/18 00:00 97.7 99 10 129/80 100 Room Air  





 97.7       

















Intake and Output  


 


 4/25/18 4/26/18





 19:00 07:00


 


Intake Total 120 ml 935 ml


 


Balance 120 ml 935 ml


 


  


 


Intake Oral 120 ml 


 


IV Total  935 ml


 


# Voids 2 3


 


# Bowel Movements 1 1








Laboratory Tests


4/26/18 06:00: 


White Blood Count 13.5H, Red Blood Count 4.15L, Hemoglobin 12.3, Hematocrit 37.7

, Mean Corpuscular Volume 91, Mean Corpuscular Hemoglobin 29.6, Mean 

Corpuscular Hemoglobin Concent 32.6, Red Cell Distribution Width 15.2H, 

Platelet Count 190, Mean Platelet Volume 7.4, Neutrophils (%) (Auto) 68.5, 

Lymphocytes (%) (Auto) 18.5L, Monocytes (%) (Auto) 11.5H, Eosinophils (%) (Auto

) 0.8, Basophils (%) (Auto) 0.7, Prothrombin Time 12.6H, Prothromb Time 

International Ratio 1.2H, Sodium Level 135L, Potassium Level 3.6, Chloride 

Level 102, Carbon Dioxide Level 22, Anion Gap 11, Blood Urea Nitrogen 10, 

Creatinine 0.8, Estimat Glomerular Filtration Rate > 60, Glucose Level 79, 

Calcium Level 9.2


Height (Feet):  5


Height (Inches):  6.00


Weight (Pounds):  129


General Appearance:  no apparent distress


Cardiovascular:  normal rate


Respiratory/Chest:  decreased breath sounds


Abdomen:  normal bowel sounds, non tender, soft











MIREILLE QUEEN Apr 26, 2018 23:53

## 2018-04-26 NOTE — GI PROGRESS NOTE
Assessment/Plan


Problems:  


(1) Crohn's disease


ICD Codes:  K50.90 - Crohn's disease


SNOMED:  45123713


Qualifiers:  


   Qualified Codes:  K50.919 - Crohn's disease, unspecified, with unspecified 

complications


(2) Opioid dependence


ICD Codes:  F11.20 - Opioid dependence


SNOMED:  07662642


(3) SBO (small bowel obstruction)


ICD Codes:  K56.609 - Unspecified intestinal obstruction, unspecified as to 

partial versus complete obstruction


SNOMED:  957589937


(4) Chronic abdominal pain


Status:  not improved, unchanged


Status Narrative


Discussed with Dr. Mcleod.


Assessment/Plan


CT AP reviewed >>


Evidence of disseminated malignancy, with large left pulmonary hilar mass or 

adenopathy, extensive left hilar and mediastinal adenopathy, multiple pleural-

based masses on the left, multiple masses within the liver, and


retroperitoneal lymphadenopathy.





Assessment


- suspected SBO or PSBO


- Crohn's disease


- possible free air on xrays - surgery following


- Pleural based mass with pleural effusion





Recommendations


fu oncology recs


pain mgmt





Subjective


Subjective


generalized pain





Objective





Last 24 Hour Vital Signs








  Date Time  Temp Pulse Resp B/P (MAP) Pulse Ox O2 Delivery O2 Flow Rate FiO2


 


4/26/18 08:00 97.7 100 11 104/72 98   





 97.7       


 


4/26/18 04:00 97.3 94 10 120/79 94 Room Air  





 97.3       


 


4/26/18 00:00 97.7 99 10 129/80 100 Room Air  





 97.7       


 


4/25/18 20:00 97.8 97 11 133/76 100 Room Air  





 97.8       


 


4/25/18 16:00 97.7 102 13 137/85 100 Room Air  





 97.7       


 


4/25/18 12:00 97.3 103 12 127/82 100 Room Air  





 97.3       

















Intake and Output  


 


 4/25/18 4/26/18





 19:00 07:00


 


Intake Total 120 ml 935 ml


 


Balance 120 ml 935 ml


 


  


 


Intake Oral 120 ml 


 


IV Total  935 ml


 


# Voids 2 3


 


# Bowel Movements 1 1











Laboratory Tests








Test


  4/26/18


06:00


 


White Blood Count


  13.5 K/UL


(4.8-10.8)  H


 


Red Blood Count


  4.15 M/UL


(4.20-5.40)  L


 


Hemoglobin


  12.3 G/DL


(12.0-16.0)


 


Hematocrit


  37.7 %


(37.0-47.0)


 


Mean Corpuscular Volume 91 FL (80-99)  


 


Mean Corpuscular Hemoglobin


  29.6 PG


(27.0-31.0)


 


Mean Corpuscular Hemoglobin


Concent 32.6 G/DL


(32.0-36.0)


 


Red Cell Distribution Width


  15.2 %


(11.6-14.8)  H


 


Platelet Count


  190 K/UL


(150-450)


 


Mean Platelet Volume


  7.4 FL


(6.5-10.1)


 


Neutrophils (%) (Auto)


  68.5 %


(45.0-75.0)


 


Lymphocytes (%) (Auto)


  18.5 %


(20.0-45.0)  L


 


Monocytes (%) (Auto)


  11.5 %


(1.0-10.0)  H


 


Eosinophils (%) (Auto)


  0.8 %


(0.0-3.0)


 


Basophils (%) (Auto)


  0.7 %


(0.0-2.0)


 


Prothrombin Time


  12.6 SEC


(9.30-11.50)  H


 


Prothromb Time International


Ratio 1.2 (0.9-1.1)


H


 


Sodium Level


  135 MMOL/L


(136-145)  L


 


Potassium Level


  3.6 MMOL/L


(3.5-5.1)


 


Chloride Level


  102 MMOL/L


()


 


Carbon Dioxide Level


  22 MMOL/L


(21-32)


 


Anion Gap


  11 mmol/L


(5-15)


 


Blood Urea Nitrogen


  10 mg/dL


(7-18)


 


Creatinine


  0.8 MG/DL


(0.55-1.30)


 


Estimat Glomerular Filtration


Rate > 60 mL/min


(>60)


 


Glucose Level


  79 MG/DL


()


 


Calcium Level


  9.2 MG/DL


(8.5-10.1)








Height (Feet):  5


Height (Inches):  6.00


Weight (Pounds):  129


General Appearance:  WD/WN, no apparent distress, alert, thin


Cardiovascular:  normal rate


Respiratory/Chest:  normal breath sounds, no respiratory distress


Abdominal Exam:  normal bowel sounds, non tender, soft


Extremities:  normal range of motion, non-tender











Elena Seay N.P. Apr 26, 2018 10:43

## 2018-04-27 VITALS — SYSTOLIC BLOOD PRESSURE: 119 MMHG | DIASTOLIC BLOOD PRESSURE: 75 MMHG

## 2018-04-27 VITALS — SYSTOLIC BLOOD PRESSURE: 124 MMHG | DIASTOLIC BLOOD PRESSURE: 77 MMHG

## 2018-04-27 VITALS — DIASTOLIC BLOOD PRESSURE: 77 MMHG | SYSTOLIC BLOOD PRESSURE: 130 MMHG

## 2018-04-27 VITALS — DIASTOLIC BLOOD PRESSURE: 68 MMHG | SYSTOLIC BLOOD PRESSURE: 110 MMHG

## 2018-04-27 VITALS — SYSTOLIC BLOOD PRESSURE: 140 MMHG | DIASTOLIC BLOOD PRESSURE: 71 MMHG

## 2018-04-27 VITALS — DIASTOLIC BLOOD PRESSURE: 77 MMHG | SYSTOLIC BLOOD PRESSURE: 128 MMHG

## 2018-04-27 LAB
ADD MANUAL DIFF: NO
ANION GAP SERPL CALC-SCNC: 11 MMOL/L (ref 5–15)
BASOPHILS NFR BLD AUTO: 0.4 % (ref 0–2)
BUN SERPL-MCNC: 7 MG/DL (ref 7–18)
CALCIUM SERPL-MCNC: 9.2 MG/DL (ref 8.5–10.1)
CHLORIDE SERPL-SCNC: 102 MMOL/L (ref 98–107)
CO2 SERPL-SCNC: 23 MMOL/L (ref 21–32)
CREAT SERPL-MCNC: 0.7 MG/DL (ref 0.55–1.3)
EOSINOPHIL NFR BLD AUTO: 1.5 % (ref 0–3)
ERYTHROCYTE [DISTWIDTH] IN BLOOD BY AUTOMATED COUNT: 15.1 % (ref 11.6–14.8)
HCT VFR BLD CALC: 39.3 % (ref 37–47)
HGB BLD-MCNC: 12.5 G/DL (ref 12–16)
LYMPHOCYTES NFR BLD AUTO: 18 % (ref 20–45)
MCV RBC AUTO: 91 FL (ref 80–99)
MONOCYTES NFR BLD AUTO: 9.1 % (ref 1–10)
NEUTROPHILS NFR BLD AUTO: 70.9 % (ref 45–75)
PLATELET # BLD: 184 K/UL (ref 150–450)
POTASSIUM SERPL-SCNC: 3.1 MMOL/L (ref 3.5–5.1)
RBC # BLD AUTO: 4.31 M/UL (ref 4.2–5.4)
SODIUM SERPL-SCNC: 136 MMOL/L (ref 136–145)
WBC # BLD AUTO: 13.1 K/UL (ref 4.8–10.8)

## 2018-04-27 RX ADMIN — HYDROCODONE BITARTRATE AND ACETAMINOPHEN PRN TAB: 10; 325 TABLET ORAL at 12:17

## 2018-04-27 RX ADMIN — THEOPHYLLINE ANHYDROUS SCH MG: 100 CAPSULE, EXTENDED RELEASE ORAL at 20:45

## 2018-04-27 RX ADMIN — TOPIRAMATE SCH MG: 25 TABLET, COATED ORAL at 09:00

## 2018-04-27 RX ADMIN — TRAZODONE HYDROCHLORIDE SCH MG: 100 TABLET ORAL at 20:46

## 2018-04-27 RX ADMIN — HEPARIN SODIUM SCH UNITS: 5000 INJECTION INTRAVENOUS; SUBCUTANEOUS at 09:28

## 2018-04-27 RX ADMIN — TOPIRAMATE SCH MG: 25 TABLET, COATED ORAL at 20:45

## 2018-04-27 RX ADMIN — ATORVASTATIN CALCIUM SCH MG: 20 TABLET, FILM COATED ORAL at 20:46

## 2018-04-27 RX ADMIN — DULOXETINE HYDROCHLORIDE SCH MG: 30 CAPSULE, DELAYED RELEASE ORAL at 09:32

## 2018-04-27 RX ADMIN — THEOPHYLLINE ANHYDROUS SCH MG: 100 CAPSULE, EXTENDED RELEASE ORAL at 09:32

## 2018-04-27 RX ADMIN — HEPARIN SODIUM SCH UNITS: 5000 INJECTION INTRAVENOUS; SUBCUTANEOUS at 20:53

## 2018-04-27 RX ADMIN — DEXTROSE AND SODIUM CHLORIDE SCH MLS/HR: 5; .45 INJECTION, SOLUTION INTRAVENOUS at 12:11

## 2018-04-27 RX ADMIN — ZOLPIDEM TARTRATE SCH MG: 5 TABLET ORAL at 20:45

## 2018-04-27 RX ADMIN — CHLORHEXIDINE GLUCONATE SCH APPLIC: 213 SOLUTION TOPICAL at 20:45

## 2018-04-27 RX ADMIN — METHYLNALTREXONE BROMIDE SCH MG: 12 INJECTION, SOLUTION SUBCUTANEOUS at 09:31

## 2018-04-27 NOTE — GI PROGRESS NOTE
Assessment/Plan


Problems:  


(1) Crohn's disease


ICD Codes:  K50.90 - Crohn's disease


SNOMED:  93154044


Qualifiers:  


   Qualified Codes:  K50.919 - Crohn's disease, unspecified, with unspecified 

complications


(2) Opioid dependence


ICD Codes:  F11.20 - Opioid dependence


SNOMED:  40950955


(3) SBO (small bowel obstruction)


ICD Codes:  K56.609 - Unspecified intestinal obstruction, unspecified as to 

partial versus complete obstruction


SNOMED:  465025216


(4) Chronic abdominal pain


Status:  stable


Status Narrative


Discussed with Dr. Mcleod.


Assessment/Plan


CT AP reviewed >>


Evidence of disseminated malignancy, with large left pulmonary hilar mass or 

adenopathy, extensive left hilar and mediastinal adenopathy, multiple pleural-

based masses on the left, multiple masses within the liver, and


retroperitoneal lymphadenopathy.





Assessment


- suspected SBO or PSBO


- Crohn's disease


- possible free air on xrays - surgery following


- Pleural based mass with pleural effusion





Recommendations


fu oncology recs


pain mgmt





Subjective


Subjective


generalized pain





Objective





Last 24 Hour Vital Signs








  Date Time  Temp Pulse Resp B/P (MAP) Pulse Ox O2 Delivery O2 Flow Rate FiO2


 


4/27/18 08:00 98.1 98 10 128/77 94 Room Air  





 98.1       


 


4/27/18 04:00 96.4 89 10 110/68 94 Room Air  





 96.4       


 


4/27/18 00:00 98.2 80 12 119/75 91 Room Air  





 98.2       


 


4/26/18 20:00 98.3 96 8 103/71 96 Room Air  





 98.3       


 


4/26/18 16:00 97.5 100 20 126/81 100   





 97.5       

















Intake and Output  


 


 4/26/18 4/27/18





 19:00 07:00


 


Intake Total 260 ml 900 ml


 


Balance 260 ml 900 ml


 


  


 


Intake Oral 260 ml 


 


IV Total  900 ml


 


# Voids 6 2


 


# Bowel Movements 2 











Laboratory Tests








Test


  4/27/18


05:00


 


White Blood Count


  13.1 K/UL


(4.8-10.8)  H


 


Red Blood Count


  4.31 M/UL


(4.20-5.40)


 


Hemoglobin


  12.5 G/DL


(12.0-16.0)


 


Hematocrit


  39.3 %


(37.0-47.0)


 


Mean Corpuscular Volume 91 FL (80-99)  


 


Mean Corpuscular Hemoglobin


  29.0 PG


(27.0-31.0)


 


Mean Corpuscular Hemoglobin


Concent 31.8 G/DL


(32.0-36.0)  L


 


Red Cell Distribution Width


  15.1 %


(11.6-14.8)  H


 


Platelet Count


  184 K/UL


(150-450)


 


Mean Platelet Volume


  7.7 FL


(6.5-10.1)


 


Neutrophils (%) (Auto)


  70.9 %


(45.0-75.0)


 


Lymphocytes (%) (Auto)


  18.0 %


(20.0-45.0)  L


 


Monocytes (%) (Auto)


  9.1 %


(1.0-10.0)


 


Eosinophils (%) (Auto)


  1.5 %


(0.0-3.0)


 


Basophils (%) (Auto)


  0.4 %


(0.0-2.0)


 


Sodium Level


  136 MMOL/L


(136-145)


 


Potassium Level


  3.1 MMOL/L


(3.5-5.1)  L


 


Chloride Level


  102 MMOL/L


()


 


Carbon Dioxide Level


  23 MMOL/L


(21-32)


 


Anion Gap


  11 mmol/L


(5-15)


 


Blood Urea Nitrogen


  7 mg/dL (7-18)


 


 


Creatinine


  0.7 MG/DL


(0.55-1.30)


 


Estimat Glomerular Filtration


Rate > 60 mL/min


(>60)


 


Glucose Level


  82 MG/DL


()


 


Calcium Level


  9.2 MG/DL


(8.5-10.1)








Height (Feet):  5


Height (Inches):  6.00


Weight (Pounds):  129


General Appearance:  WD/WN, no apparent distress, alert, thin


Cardiovascular:  normal rate


Respiratory/Chest:  normal breath sounds, no respiratory distress


Abdominal Exam:  normal bowel sounds, non tender, soft


Extremities:  normal range of motion, non-tender











Elena Seay N.P. Apr 27, 2018 13:21

## 2018-04-27 NOTE — PULMONOLOGY PROGRESS NOTE
Assessment/Plan


Problems:  


(1) Metastatic cancer


(2) Malignant pleural effusion


(3) Chronic pain disorder


(4) IBD (inflammatory bowel disease)


(5) Interstitial lung disease


(6) COPD (chronic obstructive pulmonary disease)


Assessment/Plan


d/w pathologist, there are malignant non-small cells in the pleural cavity. 


it seems metastatic,


continue current symptomatic treatment


oncology on the case already


pain management


dc planning with comfort care.





Subjective


ROS Limited/Unobtainable:  No


Constitutional:  Reports: no symptoms


HEENT:  Repors: no symptoms


Respiratory:  Reports: no symptoms


Allergies:  


Coded Allergies:  


     No Known Allergies (Verified , 10/27/06)





Objective





Last 24 Hour Vital Signs








  Date Time  Temp Pulse Resp B/P (MAP) Pulse Ox O2 Delivery O2 Flow Rate FiO2


 


4/27/18 08:00 98.1 98 10 128/77 94 Room Air  





 98.1       


 


4/27/18 04:00 96.4 89 10 110/68 94 Room Air  





 96.4       


 


4/27/18 00:00 98.2 80 12 119/75 91 Room Air  





 98.2       


 


4/26/18 20:00 98.3 96 8 103/71 96 Room Air  





 98.3       


 


4/26/18 16:00 97.5 100 20 126/81 100   





 97.5       

















Intake and Output  


 


 4/26/18 4/27/18





 19:00 07:00


 


Intake Total 260 ml 900 ml


 


Balance 260 ml 900 ml


 


  


 


Intake Oral 260 ml 


 


IV Total  900 ml


 


# Voids 6 2


 


# Bowel Movements 2 








General Appearance:  WD/WN, cachetic


HEENT:  normocephalic, atraumatic


Respiratory/Chest:  lungs clear, normal breath sounds


Breasts:  no masses


Cardiovascular:  normal peripheral pulses


Abdomen:  soft, non tender


Extremities:  no cyanosis, no clubbing


Skin:  no lesions


Laboratory Tests


4/27/18 05:00: 


White Blood Count 13.1H, Red Blood Count 4.31, Hemoglobin 12.5, Hematocrit 39.3

, Mean Corpuscular Volume 91, Mean Corpuscular Hemoglobin 29.0, Mean 

Corpuscular Hemoglobin Concent 31.8L, Red Cell Distribution Width 15.1H, 

Platelet Count 184, Mean Platelet Volume 7.7, Neutrophils (%) (Auto) 70.9, 

Lymphocytes (%) (Auto) 18.0L, Monocytes (%) (Auto) 9.1, Eosinophils (%) (Auto) 

1.5, Basophils (%) (Auto) 0.4, Sodium Level 136, Potassium Level 3.1L, Chloride 

Level 102, Carbon Dioxide Level 23, Anion Gap 11, Blood Urea Nitrogen 7, 

Creatinine 0.7, Estimat Glomerular Filtration Rate > 60, Glucose Level 82, 

Calcium Level 9.2





Current Medications








 Medications


  (Trade)  Dose


 Ordered  Sig/Jed


 Route


 PRN Reason  Start Time


 Stop Time Status Last Admin


Dose Admin


 


 Acetaminophen


  (Tylenol)  650 mg  Q4H  PRN


 ORAL


 fever  4/19/18 15:30


 5/19/18 15:29   


 


 


 Acetaminophen/


 Hydrocodone Bitart


  (Norco 10/325)  1 tab  Q4H  PRN


 ORAL


 Pain Scale (3-5)  4/26/18 10:45


 5/3/18 08:29  4/27/18 12:17


 


 


 Al Hydroxide/Mg


 Hydroxide


  (Mylanta II)  30 ml  Q6H  PRN


 ORAL


 dyspepsia  4/19/18 15:30


 5/19/18 15:29   


 


 


 Atorvastatin


 Calcium


  (Lipitor)  40 mg  BEDTIME


 ORAL


   4/19/18 21:00


 5/19/18 20:59  4/26/18 20:50


 


 


 Bupropion HCl


  (Wellbutrin)  100 mg  DAILY


 ORAL


   4/20/18 09:00


 5/20/18 08:59  4/27/18 09:32


 


 


 Chlorhexidine


 Gluconate


  (Rosy-Hex 2%)  1 applic  DAILY@2000


 TOPIC


   4/25/18 20:00


 5/25/18 19:59  4/26/18 20:30


 


 


 Dextrose


  (Dextrose 50%)  50 ml  STAT  PRN


 IV


 Hypoglycemia BS<60mg/dL  4/19/18 15:30


 5/19/18 15:29   


 


 


 Dextrose/Sodium


 Chloride  1,000 ml @ 


 75 mls/hr  [id number]


 IV


   4/19/18 17:00


 5/19/18 16:59  4/27/18 12:11


 


 


 Diphenhydramine


 HCl


  (Benadryl)  25 mg  Q6H  PRN


 ORAL


 Itching/Pruritis  4/19/18 15:30


 5/19/18 15:29   


 


 


 Duloxetine HCl


  (Cymbalta)  90 mg  DAILY


 ORAL


   4/24/18 09:00


 5/20/18 08:59  4/27/18 09:32


 


 


 Gabapentin


  (Neurontin)  300 mg  THREE TIMES A  DAY


 ORAL


   4/20/18 10:00


 5/20/18 09:59  4/27/18 12:10


 


 


 Heparin Sodium


  (Porcine)


  (Heparin 5000


 units/ml)  5,000 units  EVERY 12  HOURS


 SUBQ


   4/19/18 21:00


 5/19/18 20:59  4/27/18 09:28


 


 


 Hydromorphone HCl


  (Dilaudid)  0.5 mg  Q4H  PRN


 IVP


 PAIN SCALE 4-10 if unab  4/26/18 09:30


 5/3/18 09:29   


 


 


 Levetiracetam


  (Keppra)  1,000 mg  Q8H


 ORAL


   4/24/18 08:00


 5/24/18 07:59  4/27/18 09:35


 


 


 Levothyroxine


 Sodium


  (Synthroid)  75 mcg  ACBREAKFAST


 ORAL


   4/20/18 06:30


 5/20/18 06:29  4/27/18 05:53


 


 


 Lorazepam


  (Ativan 2mg/ml


 1ml)  2 mg  Q6H  PRN


 IV


 agitation  4/21/18 15:30


 4/28/18 15:29  4/24/18 18:57


 


 


 Methylnaltrexone


 Bromide


  (Relistor)  12 mg  DAILY


 SUBQ


   4/20/18 10:15


 5/20/18 10:14  4/27/18 09:31


 


 


 Metoclopramide HCl


  (Reglan)  10 mg  Q6H  PRN


 IVP


 Unrelieved Severe Nausea  4/19/18 15:30


 5/19/18 15:29   


 


 


 Nitroglycerin


  (Ntg)  0.4 mg  Q5M X 3 DOSES PRN


 SL


 Prn Chest Pain  4/19/18 15:30


 5/19/18 15:29   


 


 


 Ondansetron HCl


  (Zofran)  4 mg  Q6H  PRN


 IVP


 Nausea & Vomiting  4/19/18 15:30


 5/19/18 15:29  4/20/18 12:37


 


 


 Pantoprazole


  (Protonix)  40 mg  DAILY


 ORAL


   4/23/18 09:00


 5/23/18 08:59  4/27/18 09:31


 


 


 Polyethylene


 Glycol


  (Miralax)  17 gm  HSPRN  PRN


 ORAL


 Constipation  4/19/18 15:30


 5/19/18 15:29   


 


 


 Promethazine HCl


 25 mg/Sodium


 Chloride  56 ml @ 


 110 mls/hr  Q6H  PRN


 IV


 Refractory N/V  4/19/18 15:30


 5/19/18 15:29   


 


 


 Simethicone


  (Mylicon)  80 mg  QIDPRN  PRN


 ORAL


 gas   4/20/18 10:00


 5/20/18 09:59   


 


 


 Theophylline


  (Shiv-Dur)  100 mg  Q12HR


 ORAL


   4/19/18 21:00


 5/19/18 20:59  4/27/18 09:32


 


 


 Topiramate


  (Topamax)  25 mg  EVERY 12  HOURS


 ORAL


   4/19/18 21:00


 5/19/18 20:59  4/27/18 09:00


 


 


 Trazodone HCl


  (Desyrel)  200 mg  BEDTIME


 ORAL


   4/19/18 21:00


 5/19/18 20:59  4/26/18 20:49


 


 


 Zolpidem Tartrate


  (Ambien)  5 mg  QHS


 ORAL


   4/21/18 21:00


 4/28/18 20:59  4/25/18 21:33


 

















Blaze Fraga MD Apr 27, 2018 12:50

## 2018-04-27 NOTE — PROGRESS NOTE
DATE:  04/26/2018



SUBJECTIVE:  The patient is a 62-year-old female patient with intractable

abdominal pain.  She does have some confusion, disorganized thought

process, mood lability.  She has got high levels of anxiety as well

worsened by stress  of her medical illness.  That is why her attending has

requested daily psychiatric consultation.



MENTAL STATUS EXAMINATION:  This is a 62-year-old -American female.

Appearance is disheveled.  Attitude irritable and agitated.  Affect

guarded and restricted.  Intellect is fair.  Thought process, linear goal

directed.  Denies any auditory or visual hallucinations or delusions.

Insight and judgment is fair.



DIAGNOSIS:  Bipolar 2.



PLAN:  Treat the patient with Topamax 25 mg twice a day, trazodone 200 mg

at bedtime, Ativan 1 mg p.o. q.6 h. p.r.n., Neurontin _____, Cymbalta 90

mg daily .  Chart reviewed.  Discussed with staff.  _____. An 18 to 20

minutes of supportive psychotherapy provided.









  ______________________________________________

  Samantha Rick M.D.





DR:  PRADEEP

D:  04/26/2018 06:54

T:  04/27/2018 00:04

JOB#:  [id number]

CC:

## 2018-04-27 NOTE — INFECTIOUS DISEASES PROG NOTE
Assessment/Plan


Assessment/Plan





ASSESSMENT:  The patient is a 62-year-old female with,





 Low-grade fever. probable due to SBO  ,resolved


Leukocytosis, mild- recurrent- ?2ry to possible malignancy


Probable small bowel obstruction


Recent history of polymicrobial gram-negative bacteremia including 

Stenotrophomonas maltophilia and Enterobacter.


History of Candida esophagitis.


History of C. difficile in the past.











Air anterior to the liver , ?  free intraperitoneal air ( Surg doubt 

perforation )


Lung and liver masses Ro Malignancy 


 CT:  Pleural-based mass at the left lung base / Moderate-sized left pleural 

effusion, left-sided pleural nodularity and retroperitoneal and paraspinal 

nodules/masses. 


         Left inferior hilar mass lesion partially visualized. New low-

attenuation liver lesion ( concerning for malignancy/metastatic disease )


Pl effusion SP ultrasound-guided thoracentesis,  960 cc  ( m/l 2/2 mets,  no 

evid of Empyema )


  -exudate fluid:  (n 6), pH 8, lguc 98, prot 4.6 (serum 8.4); cx stain: 


  GRAM STAIN  Final  


        GRAM STAIN RESULT           FEW WHITE BLOOD CELLS


                                    NO ORGANISMS SEEN


cx negative





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 





Crohn's disease.


History of bowel obstruction x2 with lysis of adhesions in 2005.


COPD.


History of chronic pain syndrome, on morphine pump.


CVA in 2005.


Seizure disorder.








PLAN:





cont to monitor off abx


  -4/25 SP Zosyn #5





 


Monitor CBC and BMP.


follow GI recommendations


hem, surg f/u


possible plan for CT guided bx lung mass





Subjective


Allergies:  


Coded Allergies:  


     No Known Allergies (Verified , 10/27/06)


Subjective


afebrile 


leukocytosis improving/stable


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





Objective


Vital Signs





Last 24 Hour Vital Signs








  Date Time  Temp Pulse Resp B/P (MAP) Pulse Ox O2 Delivery O2 Flow Rate FiO2


 


4/27/18 16:00 97.2 100 20 140/71 97   





 97.2       


 


4/27/18 12:00 97.9 88 10 124/74 94 Room Air  





 97.9       


 


4/27/18 12:00 97.2 100 20 112/77 100   





 97.2       


 


4/27/18 08:00 98.1 98 10 128/77 94 Room Air  





 98.1       


 


4/27/18 04:00 96.4 89 10 110/68 94 Room Air  





 96.4       


 


4/27/18 00:00 98.2 80 12 119/75 91 Room Air  





 98.2       


 


4/26/18 20:00 98.3 96 8 103/71 96 Room Air  





 98.3       








Height (Feet):  5


Height (Inches):  6.00


Weight (Pounds):  129


Objective


HEENT:  anicteric


Respiratory/Chest:  normal breath sounds


Cardiovascular:  regular rhythm


Abdomen:  tender





Laboratory Tests








Test


  4/27/18


05:00


 


White Blood Count


  13.1 K/UL


(4.8-10.8)  H


 


Red Blood Count


  4.31 M/UL


(4.20-5.40)


 


Hemoglobin


  12.5 G/DL


(12.0-16.0)


 


Hematocrit


  39.3 %


(37.0-47.0)


 


Mean Corpuscular Volume 91 FL (80-99)  


 


Mean Corpuscular Hemoglobin


  29.0 PG


(27.0-31.0)


 


Mean Corpuscular Hemoglobin


Concent 31.8 G/DL


(32.0-36.0)  L


 


Red Cell Distribution Width


  15.1 %


(11.6-14.8)  H


 


Platelet Count


  184 K/UL


(150-450)


 


Mean Platelet Volume


  7.7 FL


(6.5-10.1)


 


Neutrophils (%) (Auto)


  70.9 %


(45.0-75.0)


 


Lymphocytes (%) (Auto)


  18.0 %


(20.0-45.0)  L


 


Monocytes (%) (Auto)


  9.1 %


(1.0-10.0)


 


Eosinophils (%) (Auto)


  1.5 %


(0.0-3.0)


 


Basophils (%) (Auto)


  0.4 %


(0.0-2.0)


 


Sodium Level


  136 MMOL/L


(136-145)


 


Potassium Level


  3.1 MMOL/L


(3.5-5.1)  L


 


Chloride Level


  102 MMOL/L


()


 


Carbon Dioxide Level


  23 MMOL/L


(21-32)


 


Anion Gap


  11 mmol/L


(5-15)


 


Blood Urea Nitrogen


  7 mg/dL (7-18)


 


 


Creatinine


  0.7 MG/DL


(0.55-1.30)


 


Estimat Glomerular Filtration


Rate > 60 mL/min


(>60)


 


Glucose Level


  82 MG/DL


()


 


Calcium Level


  9.2 MG/DL


(8.5-10.1)











Current Medications








 Medications


  (Trade)  Dose


 Ordered  Sig/Jde


 Route


 PRN Reason  Start Time


 Stop Time Status Last Admin


Dose Admin


 


 Acetaminophen


  (Tylenol)  650 mg  Q4H  PRN


 ORAL


 fever  4/19/18 15:30


 5/19/18 15:29   


 


 


 Acetaminophen/


 Hydrocodone Bitart


  (Norco 10/325)  1 tab  Q4H  PRN


 ORAL


 Pain Scale (3-5)  4/26/18 10:45


 5/3/18 08:29  4/27/18 12:17


 


 


 Al Hydroxide/Mg


 Hydroxide


  (Mylanta II)  30 ml  Q6H  PRN


 ORAL


 dyspepsia  4/19/18 15:30


 5/19/18 15:29   


 


 


 Atorvastatin


 Calcium


  (Lipitor)  40 mg  BEDTIME


 ORAL


   4/19/18 21:00


 5/19/18 20:59  4/26/18 20:50


 


 


 Bupropion HCl


  (Wellbutrin)  100 mg  DAILY


 ORAL


   4/20/18 09:00


 5/20/18 08:59  4/27/18 09:32


 


 


 Chlorhexidine


 Gluconate


  (Rosy-Hex 2%)  1 applic  DAILY@2000


 TOPIC


   4/25/18 20:00


 5/25/18 19:59  4/26/18 20:30


 


 


 Dextrose


  (Dextrose 50%)  50 ml  STAT  PRN


 IV


 Hypoglycemia BS<60mg/dL  4/19/18 15:30


 5/19/18 15:29   


 


 


 Dextrose/Sodium


 Chloride  1,000 ml @ 


 75 mls/hr  [id number]


 IV


   4/19/18 17:00


 5/19/18 16:59  4/27/18 12:11


 


 


 Diphenhydramine


 HCl


  (Benadryl)  25 mg  Q6H  PRN


 ORAL


 Itching/Pruritis  4/19/18 15:30


 5/19/18 15:29   


 


 


 Duloxetine HCl


  (Cymbalta)  90 mg  DAILY


 ORAL


   4/24/18 09:00


 5/20/18 08:59  4/27/18 09:32


 


 


 Gabapentin


  (Neurontin)  300 mg  THREE TIMES A  DAY


 ORAL


   4/20/18 10:00


 5/20/18 09:59  4/27/18 16:25


 


 


 Heparin Sodium


  (Porcine)


  (Heparin 5000


 units/ml)  5,000 units  EVERY 12  HOURS


 SUBQ


   4/19/18 21:00


 5/19/18 20:59  4/27/18 09:28


 


 


 Hydromorphone HCl


  (Dilaudid)  0.5 mg  Q4H  PRN


 IVP


 PAIN SCALE 4-10 if unab  4/26/18 09:30


 5/3/18 09:29   


 


 


 Levetiracetam


  (Keppra)  1,000 mg  Q8H


 ORAL


   4/24/18 08:00


 5/24/18 07:59  4/27/18 16:25


 


 


 Levothyroxine


 Sodium


  (Synthroid)  75 mcg  ACBREAKFAST


 ORAL


   4/20/18 06:30


 5/20/18 06:29  4/27/18 05:53


 


 


 Lorazepam


  (Ativan 2mg/ml


 1ml)  2 mg  Q6H  PRN


 IV


 agitation  4/21/18 15:30


 4/28/18 15:29  4/24/18 18:57


 


 


 Methylnaltrexone


 Bromide


  (Relistor)  12 mg  DAILY


 SUBQ


   4/20/18 10:15


 5/20/18 10:14  4/27/18 09:31


 


 


 Metoclopramide HCl


  (Reglan)  10 mg  Q6H  PRN


 IVP


 Unrelieved Severe Nausea  4/19/18 15:30


 5/19/18 15:29   


 


 


 Nitroglycerin


  (Ntg)  0.4 mg  Q5M X 3 DOSES PRN


 SL


 Prn Chest Pain  4/19/18 15:30


 5/19/18 15:29   


 


 


 Ondansetron HCl


  (Zofran)  4 mg  Q6H  PRN


 IVP


 Nausea & Vomiting  4/19/18 15:30


 5/19/18 15:29  4/20/18 12:37


 


 


 Pantoprazole


  (Protonix)  40 mg  DAILY


 ORAL


   4/23/18 09:00


 5/23/18 08:59  4/27/18 09:31


 


 


 Polyethylene


 Glycol


  (Miralax)  17 gm  HSPRN  PRN


 ORAL


 Constipation  4/19/18 15:30


 5/19/18 15:29   


 


 


 Promethazine HCl


 25 mg/Sodium


 Chloride  56 ml @ 


 110 mls/hr  Q6H  PRN


 IV


 Refractory N/V  4/19/18 15:30


 5/19/18 15:29   


 


 


 Simethicone


  (Mylicon)  80 mg  QIDPRN  PRN


 ORAL


 gas   4/20/18 10:00


 5/20/18 09:59   


 


 


 Theophylline


  (Shiv-Dur)  100 mg  Q12HR


 ORAL


   4/19/18 21:00


 5/19/18 20:59  4/27/18 09:32


 


 


 Topiramate


  (Topamax)  25 mg  EVERY 12  HOURS


 ORAL


   4/19/18 21:00


 5/19/18 20:59  4/27/18 09:00


 


 


 Trazodone HCl


  (Desyrel)  200 mg  BEDTIME


 ORAL


   4/19/18 21:00


 5/19/18 20:59  4/26/18 20:49


 


 


 Zolpidem Tartrate


  (Ambien)  5 mg  QHS


 ORAL


   4/21/18 21:00


 4/28/18 20:59  4/25/18 21:33


 

















Selene Garcia M.D. Apr 27, 2018 18:44

## 2018-04-27 NOTE — GENERAL PROGRESS NOTE
Assessment/Plan


Assessment/Plan


(1) Chronic abdominal pain


(2) Chronic pancreatitis


(3) Crohn's disease 


(4) Herniated nucleus pulposus, lumbar


(5) Lumbar spondylosis


(6) Radiculopathy of lumbar region


Pt will be continued on Neurontin, Dilaudid and Norco and pt has intrathecal 

pump.


HOLD OPIOIDS FOR OVERSEDATION OR SBP<90 OR DBP<60 OR O2SAT<92% OR RR<12


Pt was d/w Dr. Shetty and he concurred.





Subjective


Date patient seen:  Apr 27, 2018


Time patient seen:  07:00 - am


Allergies:  


Coded Allergies:  


     No Known Allergies (Verified , 10/27/06)


Subjective


Constitutional:  Reports: weakness


HEENT:  Reports: no symptoms


Cardiovascular:  Reports: no symptoms


Respiratory:  Reports: no symptoms


Gastrointestinal/Abdominal:  Reports: abdominal pain


Genitourinary:  Reports: no symptoms


Neurologic/Psychiatric:  Reports: weakness


Endocrine:  Reports: no symptoms


Hematologic/Lymphatic:  Reports: no symptoms





Subjective


Patient is sitting up in bed and reports that her pain has been tolerated


on the Dilaudid and Norco when given.





Objective





Last 24 Hour Vital Signs








  Date Time  Temp Pulse Resp B/P (MAP) Pulse Ox O2 Delivery O2 Flow Rate FiO2


 


4/27/18 08:00 98.1 98 10 128/77 94 Room Air  





 98.1       


 


4/27/18 04:00 96.4 89 10 110/68 94 Room Air  





 96.4       


 


4/27/18 00:00 98.2 80 12 119/75 91 Room Air  





 98.2       


 


4/26/18 20:00 98.3 96 8 103/71 96 Room Air  





 98.3       


 


4/26/18 16:00 97.5 100 20 126/81 100   





 97.5       


 


4/26/18 12:00 97.2 101 15 146/92 98   





 97.2       

















Intake and Output  


 


 4/26/18 4/27/18





 19:00 07:00


 


Intake Total 260 ml 900 ml


 


Balance 260 ml 900 ml


 


  


 


Intake Oral 260 ml 


 


IV Total  900 ml


 


# Voids 6 2


 


# Bowel Movements 2 








Laboratory Tests


4/27/18 05:00: 


White Blood Count 13.1H, Red Blood Count 4.31, Hemoglobin 12.5, Hematocrit 39.3

, Mean Corpuscular Volume 91, Mean Corpuscular Hemoglobin 29.0, Mean 

Corpuscular Hemoglobin Concent 31.8L, Red Cell Distribution Width 15.1H, 

Platelet Count 184, Mean Platelet Volume 7.7, Neutrophils (%) (Auto) 70.9, 

Lymphocytes (%) (Auto) 18.0L, Monocytes (%) (Auto) 9.1, Eosinophils (%) (Auto) 

1.5, Basophils (%) (Auto) 0.4, Sodium Level 136, Potassium Level 3.1L, Chloride 

Level 102, Carbon Dioxide Level 23, Anion Gap 11, Blood Urea Nitrogen 7, 

Creatinine 0.7, Estimat Glomerular Filtration Rate > 60, Glucose Level 82, 

Calcium Level 9.2


Height (Feet):  5


Height (Inches):  6.00


Weight (Pounds):  129


Objective


General Appearance:  no apparent distress, alert


EENT:  normal ENT inspection, TMs normal


Neck:  normal alignment, supple


Cardiovascular:  normal rate, regular rhythm


Respiratory/Chest:  decreased breath sounds


Abdomen:  tender, distended, intrathecal pump palpated


Extremities:  non-tender


Edema:  no edema noted 


Neurologic:  alert, oriented x 3


Skin:  warm/dry











LEA BRAVO Apr 27, 2018 08:55

## 2018-04-27 NOTE — GENERAL PROGRESS NOTE
Assessment/Plan


Problem List:  


(1) Crohn's disease


ICD Codes:  K50.90 - Crohn's disease


SNOMED:  87279628


Qualifiers:  


   Qualified Codes:  K50.919 - Crohn's disease, unspecified, with unspecified 

complications


(2) Opioid dependence


ICD Codes:  F11.20 - Opioid dependence


SNOMED:  28870536


(3) Intractable abdominal pain


ICD Codes:  R10.9 - Unspecified abdominal pain


SNOMED:  05804400, 552771509


(4) COPD (chronic obstructive pulmonary disease)


ICD Codes:  J44.9 - Chronic obstructive pulmonary disease


SNOMED:  38471345


(5) Shortness of breath


(6) SOB (shortness of breath)


ICD Codes:  R06.02 - Shortness of breath


SNOMED:  046393455


(7) UTI (urinary tract infection)


ICD Codes:  N39.0 - Urinary tract infection, site not specified


SNOMED:  14059275


(8) Lung mass


ICD Codes:  R91.8 - Other nonspecific abnormal finding of lung field


SNOMED:  426530157


Status:  stable, progressing, tolerating diet


Assessment/Plan


ot pt diet pain control sx eval cbc bmp am heme f/u  dc plan if clear





Subjective


Constitutional:  Reports: weakness


Allergies:  


Coded Allergies:  


     No Known Allergies (Verified , 10/27/06)


All Systems:  reviewed and negative except above


Subjective


sleepy calm in bed





Objective





Last 24 Hour Vital Signs








  Date Time  Temp Pulse Resp B/P (MAP) Pulse Ox O2 Delivery O2 Flow Rate FiO2


 


4/27/18 08:00 98.1 98 10 128/77 94 Room Air  





 98.1       


 


4/27/18 04:00 96.4 89 10 110/68 94 Room Air  





 96.4       


 


4/27/18 00:00 98.2 80 12 119/75 91 Room Air  





 98.2       


 


4/26/18 20:00 98.3 96 8 103/71 96 Room Air  





 98.3       


 


4/26/18 16:00 97.5 100 20 126/81 100   





 97.5       

















Intake and Output  


 


 4/26/18 4/27/18





 19:00 07:00


 


Intake Total 260 ml 900 ml


 


Balance 260 ml 900 ml


 


  


 


Intake Oral 260 ml 


 


IV Total  900 ml


 


# Voids 6 2


 


# Bowel Movements 2 








Laboratory Tests


4/27/18 05:00: 


White Blood Count 13.1H, Red Blood Count 4.31, Hemoglobin 12.5, Hematocrit 39.3

, Mean Corpuscular Volume 91, Mean Corpuscular Hemoglobin 29.0, Mean 

Corpuscular Hemoglobin Concent 31.8L, Red Cell Distribution Width 15.1H, 

Platelet Count 184, Mean Platelet Volume 7.7, Neutrophils (%) (Auto) 70.9, 

Lymphocytes (%) (Auto) 18.0L, Monocytes (%) (Auto) 9.1, Eosinophils (%) (Auto) 

1.5, Basophils (%) (Auto) 0.4, Sodium Level 136, Potassium Level 3.1L, Chloride 

Level 102, Carbon Dioxide Level 23, Anion Gap 11, Blood Urea Nitrogen 7, 

Creatinine 0.7, Estimat Glomerular Filtration Rate > 60, Glucose Level 82, 

Calcium Level 9.2


Height (Feet):  5


Height (Inches):  6.00


Weight (Pounds):  129


General Appearance:  lethargic


EENT:  normal ENT inspection


Neck:  normal alignment


Cardiovascular:  normal peripheral pulses, normal rate, regular rhythm


Respiratory/Chest:  chest wall non-tender, lungs clear, normal breath sounds


Abdomen:  normal bowel sounds, non tender, soft


Extremities:  normal inspection


Edema:  no edema noted Arm (L), no edema noted Arm (R), no edema noted Leg (L), 

no edema noted Leg (R), no edema noted Pedal (L), no edema noted Pedal (R), no 

edema noted Generalized


Neurologic:  responsive, motor weakness


Skin:  normal pigmentation, warm/dry











MAICOL LANG Apr 27, 2018 12:50

## 2018-04-27 NOTE — GENERAL SURGERY PROGRESS NOTE
General Surgery-Progress Note


Subjective


Symptoms:  tolerating diet, passing flatus, BM


Additional Comments


doing well.  no acute events.  comfortable.





Objective





Last 24 Hour Vital Signs








  Date Time  Temp Pulse Resp B/P (MAP) Pulse Ox O2 Delivery O2 Flow Rate FiO2


 


4/27/18 12:00 97.9 88 10 124/74 94 Room Air  





 97.9       


 


4/27/18 12:00 97.2 100 20 112/77 100   





 97.2       


 


4/27/18 08:00 98.1 98 10 128/77 94 Room Air  





 98.1       


 


4/27/18 04:00 96.4 89 10 110/68 94 Room Air  





 96.4       


 


4/27/18 00:00 98.2 80 12 119/75 91 Room Air  





 98.2       


 


4/26/18 20:00 98.3 96 8 103/71 96 Room Air  





 98.3       








I&O











Intake and Output  


 


 4/26/18 4/27/18





 19:00 07:00


 


Intake Total 260 ml 900 ml


 


Balance 260 ml 900 ml


 


  


 


Intake Oral 260 ml 


 


IV Total  900 ml


 


# Voids 6 2


 


# Bowel Movements 2 








Cardiovascular:  RSR


Respiratory:  clear


Abdomen:  soft, distended, present bowel sounds


Extremities:  no cyanosis





Laboratory Tests








Test


  4/27/18


05:00


 


White Blood Count


  13.1 K/UL


(4.8-10.8)  H


 


Red Blood Count


  4.31 M/UL


(4.20-5.40)


 


Hemoglobin


  12.5 G/DL


(12.0-16.0)


 


Hematocrit


  39.3 %


(37.0-47.0)


 


Mean Corpuscular Volume 91 FL (80-99)  


 


Mean Corpuscular Hemoglobin


  29.0 PG


(27.0-31.0)


 


Mean Corpuscular Hemoglobin


Concent 31.8 G/DL


(32.0-36.0)  L


 


Red Cell Distribution Width


  15.1 %


(11.6-14.8)  H


 


Platelet Count


  184 K/UL


(150-450)


 


Mean Platelet Volume


  7.7 FL


(6.5-10.1)


 


Neutrophils (%) (Auto)


  70.9 %


(45.0-75.0)


 


Lymphocytes (%) (Auto)


  18.0 %


(20.0-45.0)  L


 


Monocytes (%) (Auto)


  9.1 %


(1.0-10.0)


 


Eosinophils (%) (Auto)


  1.5 %


(0.0-3.0)


 


Basophils (%) (Auto)


  0.4 %


(0.0-2.0)


 


Sodium Level


  136 MMOL/L


(136-145)


 


Potassium Level


  3.1 MMOL/L


(3.5-5.1)  L


 


Chloride Level


  102 MMOL/L


()


 


Carbon Dioxide Level


  23 MMOL/L


(21-32)


 


Anion Gap


  11 mmol/L


(5-15)


 


Blood Urea Nitrogen


  7 mg/dL (7-18)


 


 


Creatinine


  0.7 MG/DL


(0.55-1.30)


 


Estimat Glomerular Filtration


Rate > 60 mL/min


(>60)


 


Glucose Level


  82 MG/DL


()


 


Calcium Level


  9.2 MG/DL


(8.5-10.1)











Plan


Problems:  


(1) Intractable abdominal pain


Assessment & Plan:  62F with abdominal pain.  very complex medical and surgical 

history.  now with complex CT findings of left pleural effusion, left lower 

lung mass, large mediastinal and periaortic lymph nodes, new liver lesions, and 

air around the liver.  


unable to tell if bowel loop (possible blind end from prior surgery) noted in 

right upper quadrant above liver or if abscess or if free air.  exam not 

consistent with perforation and no significant free fluid noted on CT or area 

of perforation.  contrast into distal bowel without leak.  initial leukocytosis 

resolved.  low grade persistent fevers.  recent cough.  





unfortunately no clear explanation as to patients current condition.  lung mass

, effusion, new liver masses, and nodes very concerning for malignant process. 

Exam stable compared to prior exams (patient seen during last admission and 

known to me).  will need much more extensive work up. 





I discussed these findings with patient.  I explained possible need for urgent 

surgery but given history and complex surgical history we will monitor 

clinically first.  if declines will proceed with surgery which is VERY high 

risk in her.  if stable will continue with work up.  patient and partner 

express understand and agree with plan.  





s/p thoracentesis 4/20. malignant non small cell cancer 





left EJ line not functional and removed.  did not have other access so right EJ 

line placed by myself.  if fails will need central venous access.  





CT reviewed.  Path reviewed


Discussed with patient


may have enough sample from thoracentesis to make pathological evaluation from 

cytology.  if not will need CT guided biopsy monday 


-regular diet 


-monitor exam clinically


-trend labs


-iv abx


-will follow with recs. thank you for this consultation.  














Darrius Benitez Apr 27, 2018 16:24

## 2018-04-27 NOTE — PROGRESS NOTE
DATE:  04/27/2018



SUBJECTIVE:  The patient is a 62-year-old female patient with intractable

abdominal pain.  She does have some confusion, disorganized thought

process, and she has high levels of anxiety and irritability and mood

lability.  She has got no logical plan for her own self-care.  She has got

high levels of anxiety and mood lability, worsened by stress of her

medical illness.  That is why, her attending has requested daily

psychiatric consultation.



MENTAL STATUS EXAMINATION:  The patient is a 62-year-old female.

Appearance is disheveled.  Attitude is irritable and agitated.  Affect is

guarded and restricted.  Intellect poor.  Mood depressed and anxious.

Motor activity, psychomotor agitation.  Attention span is poor.

Orientation x2.  Speech is pressured.  Thought process, disorganized and

illogical.  Thought content, auditory hallucinations and paranoid

delusions.  Insight and judgment is poor.



DIAGNOSIS:  Bipolar 2.



PLAN:  Plan is to treat with Cymbalta 90 mg daily, Topamax 25 mg q. 12 h.,

trazodone 200 mg at bedtime, Ativan 2 mg q.6 h. p.r.n., Neurontin 300 mg

_____ a day, and Wellbutrin 100 mg daily.  An 18 to 20 minutes of

supportive psychotherapy provided.  Chart was reviewed and discussed with

staff.  Seen and assessed at bedside.









  ______________________________________________

  Samantha Rick M.D. DR:  CHERYL

D:  04/27/2018 07:29

T:  04/27/2018 22:44

JOB#:  [id number]

CC:

## 2018-04-28 VITALS — SYSTOLIC BLOOD PRESSURE: 124 MMHG | DIASTOLIC BLOOD PRESSURE: 79 MMHG

## 2018-04-28 VITALS — DIASTOLIC BLOOD PRESSURE: 81 MMHG | SYSTOLIC BLOOD PRESSURE: 127 MMHG

## 2018-04-28 VITALS — SYSTOLIC BLOOD PRESSURE: 129 MMHG | DIASTOLIC BLOOD PRESSURE: 90 MMHG

## 2018-04-28 VITALS — SYSTOLIC BLOOD PRESSURE: 124 MMHG | DIASTOLIC BLOOD PRESSURE: 73 MMHG

## 2018-04-28 VITALS — SYSTOLIC BLOOD PRESSURE: 103 MMHG | DIASTOLIC BLOOD PRESSURE: 73 MMHG

## 2018-04-28 VITALS — DIASTOLIC BLOOD PRESSURE: 66 MMHG | SYSTOLIC BLOOD PRESSURE: 115 MMHG

## 2018-04-28 LAB
ADD MANUAL DIFF: NO
ANION GAP SERPL CALC-SCNC: 9 MMOL/L (ref 5–15)
BASOPHILS NFR BLD AUTO: 0.4 % (ref 0–2)
BUN SERPL-MCNC: 7 MG/DL (ref 7–18)
CALCIUM SERPL-MCNC: 9.2 MG/DL (ref 8.5–10.1)
CHLORIDE SERPL-SCNC: 102 MMOL/L (ref 98–107)
CO2 SERPL-SCNC: 25 MMOL/L (ref 21–32)
CREAT SERPL-MCNC: 0.7 MG/DL (ref 0.55–1.3)
EOSINOPHIL NFR BLD AUTO: 1.1 % (ref 0–3)
ERYTHROCYTE [DISTWIDTH] IN BLOOD BY AUTOMATED COUNT: 14.9 % (ref 11.6–14.8)
HCT VFR BLD CALC: 38.2 % (ref 37–47)
HGB BLD-MCNC: 12.5 G/DL (ref 12–16)
LYMPHOCYTES NFR BLD AUTO: 17 % (ref 20–45)
MCV RBC AUTO: 91 FL (ref 80–99)
MONOCYTES NFR BLD AUTO: 6.4 % (ref 1–10)
NEUTROPHILS NFR BLD AUTO: 75.2 % (ref 45–75)
PLATELET # BLD: 192 K/UL (ref 150–450)
POTASSIUM SERPL-SCNC: 3 MMOL/L (ref 3.5–5.1)
RBC # BLD AUTO: 4.19 M/UL (ref 4.2–5.4)
SODIUM SERPL-SCNC: 136 MMOL/L (ref 136–145)
WBC # BLD AUTO: 14.8 K/UL (ref 4.8–10.8)

## 2018-04-28 RX ADMIN — METHYLNALTREXONE BROMIDE SCH MG: 12 INJECTION, SOLUTION SUBCUTANEOUS at 09:16

## 2018-04-28 RX ADMIN — DULOXETINE HYDROCHLORIDE SCH MG: 30 CAPSULE, DELAYED RELEASE ORAL at 08:56

## 2018-04-28 RX ADMIN — DEXTROSE AND SODIUM CHLORIDE SCH MLS/HR: 5; .45 INJECTION, SOLUTION INTRAVENOUS at 15:04

## 2018-04-28 RX ADMIN — THEOPHYLLINE ANHYDROUS SCH MG: 100 CAPSULE, EXTENDED RELEASE ORAL at 21:53

## 2018-04-28 RX ADMIN — HEPARIN SODIUM SCH UNITS: 5000 INJECTION INTRAVENOUS; SUBCUTANEOUS at 09:00

## 2018-04-28 RX ADMIN — TOPIRAMATE SCH MG: 25 TABLET, COATED ORAL at 21:53

## 2018-04-28 RX ADMIN — HYDROCODONE BITARTRATE AND ACETAMINOPHEN PRN TAB: 10; 325 TABLET ORAL at 22:00

## 2018-04-28 RX ADMIN — THEOPHYLLINE ANHYDROUS SCH MG: 100 CAPSULE, EXTENDED RELEASE ORAL at 08:56

## 2018-04-28 RX ADMIN — TRAZODONE HYDROCHLORIDE SCH MG: 100 TABLET ORAL at 21:00

## 2018-04-28 RX ADMIN — DEXTROSE AND SODIUM CHLORIDE SCH MLS/HR: 5; .45 INJECTION, SOLUTION INTRAVENOUS at 03:27

## 2018-04-28 RX ADMIN — HEPARIN SODIUM SCH UNITS: 5000 INJECTION INTRAVENOUS; SUBCUTANEOUS at 21:57

## 2018-04-28 RX ADMIN — ATORVASTATIN CALCIUM SCH MG: 20 TABLET, FILM COATED ORAL at 21:53

## 2018-04-28 RX ADMIN — CHLORHEXIDINE GLUCONATE SCH APPLIC: 213 SOLUTION TOPICAL at 20:00

## 2018-04-28 RX ADMIN — HYDROCODONE BITARTRATE AND ACETAMINOPHEN PRN TAB: 10; 325 TABLET ORAL at 14:53

## 2018-04-28 RX ADMIN — TOPIRAMATE SCH MG: 25 TABLET, COATED ORAL at 08:57

## 2018-04-28 NOTE — GENERAL PROGRESS NOTE
Assessment/Plan


Assessment/Plan


IMPRESSION:


1. Left lower lobe lung mass.


2. Left inferior hilar mass.


3. Left pleural base lung mass.


4. Left pleural effusion.


5. Retroperitoneal lymphadenopathy.


6. Multiple low-attenuation liver lesions.


7. Crohn disease.


8. Opioid dependence.


9. Chronic obstructive pulmonary disease.


10. History of smoking.


11. Emphysema.


12. Perianal abscess.


13. Intractable abdominal pain.


14. Status post morphine pump placement.


15. Depression.


16. Psychosis.


17. Seizure disorder.


18. Radiculopathy of lumbar region.


19. Abdominal pain.


20. Nausea and vomiting.


21. Status post exploratory laparotomy.


22. Pneumoperitoneum.


23. Inflammatory bowel disease.


24. History of elevated CEA.


25. History of lower gastrointestinal bleed.


26. Chronic pain syndrome.


27. History of small bowel obstruction.


28. Status post laparotomy x2.


29. Failure to thrive.


30. Leukocytosis.





RECOMMENDATIONS:


1. Watch count.


2. Watch coagulopathy.


3. Paracentesis with cytology.


4. Pulmonary evaluation/followup.


5. Mediastinoscopy versus CT-guided lung biopsy.


6. The patient needs tissue diagnosis.


7. ID followup.


8. Pain control.


9. Psychiatry evaluation.


10. Skin care.


11. Nutrition.


12. Continue current treatment.


13. Discussed with staff.


14. Close followup





Subjective


Date patient seen:  Apr 27, 2018


Constitutional:  Denies: no symptoms, chills, diaphoresis, fever, malaise, 

weakness, other


HEENT:  Denies: no symptoms, eye pain, blurred vision, tearing, double vision, 

ear pain, ear discharge, nose pain, nose congestion, throat pain, throat 

swelling, mouth pain, mouth swelling, other


Cardiovascular:  Denies: no symptoms, chest pain, edema, irregular heart rate, 

lightheadedness, palpitations, syncope, other


Respiratory:  Denies: no symptoms, cough, orthopnea, shortness of breath, SOB 

with excertion, SOB at rest, sputum, stridor, wheezing, other


Gastrointestinal/Abdominal:  Denies: no symptoms, abdomen distended, abdominal 

pain, black stools, tarry stools, blood in stool, constipated, diarrhea, 

difficulty swallowing, nausea, poor appetite, poor fluid intake, rectal bleeding

, vomiting, other


Genitourinary:  Denies: no symptoms, burning, discharge, frequency, flank pain, 

hematuria, incontinence, pain, urgency, other


Neurologic/Psychiatric:  Denies: no symptoms, anxiety, depressed, emotional 

problems, headache, numbness, paresthesia, pre-existing deficit, seizure, 

tingling, tremors, weakness, other


Allergies:  


Coded Allergies:  


     No Known Allergies (Verified , 10/27/06)


Subjective


Pain management. Tachycardia. No fever.





Objective





Last 24 Hour Vital Signs








  Date Time  Temp Pulse Resp B/P (MAP) Pulse Ox O2 Delivery O2 Flow Rate FiO2


 


4/28/18 20:00 97.7 113 16 103/73 93 Room Air  





 97.7       


 


4/28/18 16:00 98.8 114 19 129/90 100 Room Air  





 98.8       


 


4/28/18 15:52 98.8       


 


4/28/18 14:53 97.8       


 


4/28/18 12:00 97.8 99 19 127/81 99 Room Air  





 97.8       


 


4/28/18 08:00 98.6 109 18 124/79 100 Room Air  





 98.6       


 


4/28/18 04:00 98.0 105 18 124/73 100 Room Air  





 98.0       


 


4/28/18 00:00 97.4 108 17 115/66 93 Room Air  





 97.4       

















Intake and Output  


 


 4/27/18 4/28/18





 19:00 07:00


 


Intake Total 375 ml 900 ml


 


Balance 375 ml 900 ml


 


  


 


Intake Oral 300 ml 


 


IV Total 75 ml 900 ml


 


# Voids 3 3








Laboratory Tests


4/28/18 06:03: 


White Blood Count 14.8H, Red Blood Count 4.19L, Hemoglobin 12.5, Hematocrit 38.2

, Mean Corpuscular Volume 91, Mean Corpuscular Hemoglobin 29.7, Mean 

Corpuscular Hemoglobin Concent 32.7, Red Cell Distribution Width 14.9H, 

Platelet Count 192, Mean Platelet Volume 6.8, Neutrophils (%) (Auto) 75.2H, 

Lymphocytes (%) (Auto) 17.0L, Monocytes (%) (Auto) 6.4, Eosinophils (%) (Auto) 

1.1, Basophils (%) (Auto) 0.4, Sodium Level 136, Potassium Level 3.0L, Chloride 

Level 102, Carbon Dioxide Level 25, Anion Gap 9, Blood Urea Nitrogen 7, 

Creatinine 0.7, Estimat Glomerular Filtration Rate > 60, Glucose Level 95, 

Calcium Level 9.2


Height (Feet):  5


Height (Inches):  6.00


Weight (Pounds):  129


General Appearance:  lethargic


Cardiovascular:  tachycardia











Erick Russell MD Apr 28, 2018 22:10

## 2018-04-28 NOTE — INFECTIOUS DISEASES PROG NOTE
Assessment/Plan


Assessment/Plan





ASSESSMENT:  The patient is a 62-year-old female with,





 Low-grade fever. probable due to SBO  ,resolved


Leukocytosis, mild- recurrent- ?2ry to possible malignancy


Probable small bowel obstruction


Recent history of polymicrobial gram-negative bacteremia including 

Stenotrophomonas maltophilia and Enterobacter.


History of Candida esophagitis.


History of C. difficile in the past.











Air anterior to the liver , ?  free intraperitoneal air ( Surg doubt 

perforation )


Lung and liver masses Ro Malignancy 


 CT:  Pleural-based mass at the left lung base / Moderate-sized left pleural 

effusion, left-sided pleural nodularity and retroperitoneal and paraspinal 

nodules/masses. 


         Left inferior hilar mass lesion partially visualized. New low-

attenuation liver lesion ( concerning for malignancy/metastatic disease )


Pl effusion SP ultrasound-guided thoracentesis,  960 cc  ( m/l 2/2 mets,  no 

evid of Empyema )


  -exudate fluid:  (n 6), pH 8, lguc 98, prot 4.6 (serum 8.4); cx stain: 


  GRAM STAIN  Final  


        GRAM STAIN RESULT           FEW WHITE BLOOD CELLS


                                    NO ORGANISMS SEEN


cx negative





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 





Crohn's disease.


History of bowel obstruction x2 with lysis of adhesions in 2005.


COPD.


History of chronic pain syndrome, on morphine pump.


CVA in 2005.


Seizure disorder.








PLAN:





cont to monitor off abx


  -4/25 SP Zosyn #5





 


Monitor CBC and BMP.


follow GI recommendations


hem, surg f/u


possible plan for CT guided bx lung mass








Subjective


Constitutional:  Denies: no symptoms, fever, chills, fatigue, anorexia, 

drenching sweats, other


Allergies:  


Coded Allergies:  


     No Known Allergies (Verified , 10/27/06)





Objective


Vital Signs





Last 24 Hour Vital Signs








  Date Time  Temp Pulse Resp B/P (MAP) Pulse Ox O2 Delivery O2 Flow Rate FiO2


 


4/28/18 08:00 98.6 109 18 124/79 100 Room Air  





 98.6       


 


4/28/18 04:00 98.0 105 18 124/73 100 Room Air  





 98.0       


 


4/28/18 00:00 97.4 108 17 115/66 93 Room Air  





 97.4       


 


4/27/18 20:00 98.4 110 20 130/77 92 Room Air  





 98.4       


 


4/27/18 16:00 97.2 100 20 140/71 97   





 97.2       


 


4/27/18 12:00 97.9 88 10 124/74 94 Room Air  





 97.9       


 


4/27/18 12:00 97.2 100 20 112/77 100   





 97.2       








Height (Feet):  5


Height (Inches):  6.00


Weight (Pounds):  129


HEENT:  anicteric


Respiratory/Chest:  no accessory muscle use


Cardiovascular:  regularly irregular


Abdomen:  non distended





Laboratory Tests








Test


  4/28/18


06:03


 


White Blood Count


  14.8 K/UL


(4.8-10.8)  H


 


Red Blood Count


  4.19 M/UL


(4.20-5.40)  L


 


Hemoglobin


  12.5 G/DL


(12.0-16.0)


 


Hematocrit


  38.2 %


(37.0-47.0)


 


Mean Corpuscular Volume 91 FL (80-99)  


 


Mean Corpuscular Hemoglobin


  29.7 PG


(27.0-31.0)


 


Mean Corpuscular Hemoglobin


Concent 32.7 G/DL


(32.0-36.0)


 


Red Cell Distribution Width


  14.9 %


(11.6-14.8)  H


 


Platelet Count


  192 K/UL


(150-450)


 


Mean Platelet Volume


  6.8 FL


(6.5-10.1)


 


Neutrophils (%) (Auto)


  75.2 %


(45.0-75.0)  H


 


Lymphocytes (%) (Auto)


  17.0 %


(20.0-45.0)  L


 


Monocytes (%) (Auto)


  6.4 %


(1.0-10.0)


 


Eosinophils (%) (Auto)


  1.1 %


(0.0-3.0)


 


Basophils (%) (Auto)


  0.4 %


(0.0-2.0)


 


Sodium Level


  136 MMOL/L


(136-145)


 


Potassium Level


  3.0 MMOL/L


(3.5-5.1)  L


 


Chloride Level


  102 MMOL/L


()


 


Carbon Dioxide Level


  25 MMOL/L


(21-32)


 


Anion Gap


  9 mmol/L


(5-15)


 


Blood Urea Nitrogen


  7 mg/dL (7-18)


 


 


Creatinine


  0.7 MG/DL


(0.55-1.30)


 


Estimat Glomerular Filtration


Rate > 60 mL/min


(>60)


 


Glucose Level


  95 MG/DL


()


 


Calcium Level


  9.2 MG/DL


(8.5-10.1)











Current Medications








 Medications


  (Trade)  Dose


 Ordered  Sig/Jed


 Route


 PRN Reason  Start Time


 Stop Time Status Last Admin


Dose Admin


 


 Acetaminophen


  (Tylenol)  650 mg  Q4H  PRN


 ORAL


 fever  4/19/18 15:30


 5/19/18 15:29   


 


 


 Acetaminophen/


 Hydrocodone Bitart


  (Norco 10/325)  1 tab  Q4H  PRN


 ORAL


 Pain Scale (3-5)  4/26/18 10:45


 5/3/18 08:29  4/27/18 12:17


 


 


 Al Hydroxide/Mg


 Hydroxide


  (Mylanta II)  30 ml  Q6H  PRN


 ORAL


 dyspepsia  4/19/18 15:30


 5/19/18 15:29   


 


 


 Atorvastatin


 Calcium


  (Lipitor)  40 mg  BEDTIME


 ORAL


   4/19/18 21:00


 5/19/18 20:59  4/27/18 20:46


 


 


 Bupropion HCl


  (Wellbutrin)  100 mg  DAILY


 ORAL


   4/20/18 09:00


 5/20/18 08:59  4/28/18 08:56


 


 


 Chlorhexidine


 Gluconate


  (Rosy-Hex 2%)  1 applic  DAILY@2000


 TOPIC


   4/25/18 20:00


 5/25/18 19:59  4/27/18 20:45


 


 


 Dextrose


  (Dextrose 50%)  50 ml  STAT  PRN


 IV


 Hypoglycemia BS<60mg/dL  4/19/18 15:30


 5/19/18 15:29   


 


 


 Dextrose/Sodium


 Chloride  1,000 ml @ 


 75 mls/hr  [id number]


 IV


   4/19/18 17:00


 5/19/18 16:59  4/28/18 03:27


 


 


 Diphenhydramine


 HCl


  (Benadryl)  25 mg  Q6H  PRN


 ORAL


 Itching/Pruritis  4/19/18 15:30


 5/19/18 15:29   


 


 


 Duloxetine HCl


  (Cymbalta)  90 mg  DAILY


 ORAL


   4/24/18 09:00


 5/20/18 08:59  4/28/18 08:56


 


 


 Gabapentin


  (Neurontin)  300 mg  THREE TIMES A  DAY


 ORAL


   4/20/18 10:00


 5/20/18 09:59  4/28/18 08:57


 


 


 Heparin Sodium


  (Porcine)


  (Heparin 5000


 units/ml)  5,000 units  EVERY 12  HOURS


 SUBQ


   4/19/18 21:00


 5/19/18 20:59  4/28/18 09:00


 


 


 Hydromorphone HCl


  (Dilaudid)  0.5 mg  Q4H  PRN


 IVP


 PAIN SCALE 4-10 if unab  4/26/18 09:30


 5/3/18 09:29   


 


 


 Levetiracetam


  (Keppra)  1,000 mg  Q8H


 ORAL


   4/24/18 08:00


 5/24/18 07:59  4/28/18 08:57


 


 


 Levothyroxine


 Sodium


  (Synthroid)  75 mcg  ACBREAKFAST


 ORAL


   4/20/18 06:30


 5/20/18 06:29  4/28/18 06:00


 


 


 Lorazepam


  (Ativan 2mg/ml


 1ml)  2 mg  Q6H  PRN


 IV


 agitation  4/21/18 15:30


 4/28/18 15:29  4/24/18 18:57


 


 


 Methylnaltrexone


 Bromide


  (Relistor)  12 mg  DAILY


 SUBQ


   4/20/18 10:15


 5/20/18 10:14  4/28/18 09:16


 


 


 Metoclopramide HCl


  (Reglan)  10 mg  Q6H  PRN


 IVP


 Unrelieved Severe Nausea  4/19/18 15:30


 5/19/18 15:29   


 


 


 Nitroglycerin


  (Ntg)  0.4 mg  Q5M X 3 DOSES PRN


 SL


 Prn Chest Pain  4/19/18 15:30


 5/19/18 15:29   


 


 


 Ondansetron HCl


  (Zofran)  4 mg  Q6H  PRN


 IVP


 Nausea & Vomiting  4/19/18 15:30


 5/19/18 15:29  4/20/18 12:37


 


 


 Pantoprazole


  (Protonix)  40 mg  DAILY


 ORAL


   4/23/18 09:00


 5/23/18 08:59  4/28/18 08:57


 


 


 Polyethylene


 Glycol


  (Miralax)  17 gm  HSPRN  PRN


 ORAL


 Constipation  4/19/18 15:30


 5/19/18 15:29   


 


 


 Promethazine HCl


 25 mg/Sodium


 Chloride  56 ml @ 


 110 mls/hr  Q6H  PRN


 IV


 Refractory N/V  4/19/18 15:30


 5/19/18 15:29   


 


 


 Simethicone


  (Mylicon)  80 mg  QIDPRN  PRN


 ORAL


 gas   4/20/18 10:00


 5/20/18 09:59   


 


 


 Theophylline


  (Shiv-Dur)  100 mg  Q12HR


 ORAL


   4/19/18 21:00


 5/19/18 20:59  4/28/18 08:56


 


 


 Topiramate


  (Topamax)  25 mg  EVERY 12  HOURS


 ORAL


   4/19/18 21:00


 5/19/18 20:59  4/28/18 08:57


 


 


 Trazodone HCl


  (Desyrel)  200 mg  BEDTIME


 ORAL


   4/19/18 21:00


 5/19/18 20:59  4/27/18 20:46


 


 


 Zolpidem Tartrate


  (Ambien)  5 mg  QHS


 ORAL


   4/21/18 21:00


 4/28/18 20:59  4/27/18 20:45


 

















Raman Oconnor MD Apr 28, 2018 11:24

## 2018-04-28 NOTE — GENERAL SURGERY PROGRESS NOTE
General Surgery-Progress Note


Subjective


Additional Comments


doing well.  no acute events.  wants to go home soon.  no n/v/f/c.  

leukocytosis 14k today.





Objective





Last 24 Hour Vital Signs








  Date Time  Temp Pulse Resp B/P (MAP) Pulse Ox O2 Delivery O2 Flow Rate FiO2


 


4/28/18 08:00 98.6 109 18 124/79 100 Room Air  





 98.6       


 


4/28/18 04:00 98.0 105 18 124/73 100 Room Air  





 98.0       


 


4/28/18 00:00 97.4 108 17 115/66 93 Room Air  





 97.4       


 


4/27/18 20:00 98.4 110 20 130/77 92 Room Air  





 98.4       


 


4/27/18 16:00 97.2 100 20 140/71 97   





 97.2       








I&O











Intake and Output  


 


 4/27/18 4/28/18





 19:00 07:00


 


Intake Total 375 ml 900 ml


 


Balance 375 ml 900 ml


 


  


 


Intake Oral 300 ml 


 


IV Total 75 ml 900 ml


 


# Voids 3 3








Cardiovascular:  RSR


Respiratory:  clear


Abdomen:  soft, distended, non-tender, present bowel sounds


Extremities:  no cyanosis





Laboratory Tests








Test


  4/28/18


06:03


 


White Blood Count


  14.8 K/UL


(4.8-10.8)  H


 


Red Blood Count


  4.19 M/UL


(4.20-5.40)  L


 


Hemoglobin


  12.5 G/DL


(12.0-16.0)


 


Hematocrit


  38.2 %


(37.0-47.0)


 


Mean Corpuscular Volume 91 FL (80-99)  


 


Mean Corpuscular Hemoglobin


  29.7 PG


(27.0-31.0)


 


Mean Corpuscular Hemoglobin


Concent 32.7 G/DL


(32.0-36.0)


 


Red Cell Distribution Width


  14.9 %


(11.6-14.8)  H


 


Platelet Count


  192 K/UL


(150-450)


 


Mean Platelet Volume


  6.8 FL


(6.5-10.1)


 


Neutrophils (%) (Auto)


  75.2 %


(45.0-75.0)  H


 


Lymphocytes (%) (Auto)


  17.0 %


(20.0-45.0)  L


 


Monocytes (%) (Auto)


  6.4 %


(1.0-10.0)


 


Eosinophils (%) (Auto)


  1.1 %


(0.0-3.0)


 


Basophils (%) (Auto)


  0.4 %


(0.0-2.0)


 


Sodium Level


  136 MMOL/L


(136-145)


 


Potassium Level


  3.0 MMOL/L


(3.5-5.1)  L


 


Chloride Level


  102 MMOL/L


()


 


Carbon Dioxide Level


  25 MMOL/L


(21-32)


 


Anion Gap


  9 mmol/L


(5-15)


 


Blood Urea Nitrogen


  7 mg/dL (7-18)


 


 


Creatinine


  0.7 MG/DL


(0.55-1.30)


 


Estimat Glomerular Filtration


Rate > 60 mL/min


(>60)


 


Glucose Level


  95 MG/DL


()


 


Calcium Level


  9.2 MG/DL


(8.5-10.1)











Plan


Problems:  


(1) Intractable abdominal pain


Assessment & Plan:  62F with abdominal pain.  very complex medical and surgical 

history.  now with complex CT findings of left pleural effusion, left lower 

lung mass, large mediastinal and periaortic lymph nodes, new liver lesions, and 

air around the liver.  


unable to tell if bowel loop (possible blind end from prior surgery) noted in 

right upper quadrant above liver or if abscess or if free air.  exam not 

consistent with perforation and no significant free fluid noted on CT or area 

of perforation.  contrast into distal bowel without leak.  initial leukocytosis 

resolved.  low grade persistent fevers.  recent cough.  





unfortunately no clear explanation as to patients current condition.  lung mass

, effusion, new liver masses, and nodes very concerning for malignant process. 

Exam stable compared to prior exams (patient seen during last admission and 

known to me).  will need much more extensive work up. 





I discussed these findings with patient.  I explained possible need for urgent 

surgery but given history and complex surgical history we will monitor 

clinically first.  if declines will proceed with surgery which is VERY high 

risk in her.  if stable will continue with work up.  patient and partner 

express understand and agree with plan.  





s/p thoracentesis 4/20. malignant non small cell cancer 





CT reviewed.  Path reviewed


Discussed with patient


may have enough sample from thoracentesis to make pathological evaluation from 

cytology.  if not will need CT guided biopsy monday 


-regular diet 


-monitor exam clinically


-trend labs


-iv abx


-will follow with recs. thank you for this consultation.  














Darrius Benitez Apr 28, 2018 12:44

## 2018-04-28 NOTE — PULMONOLOGY PROGRESS NOTE
Assessment/Plan


Problems:  


(1) Metastatic cancer


(2) Malignant pleural effusion


(3) Chronic pain disorder


(4) IBD (inflammatory bowel disease)


(5) Interstitial lung disease


(6) COPD (chronic obstructive pulmonary disease)


Assessment/Plan


symptomatic treatment


d/w pathologist, there are malignant non-small cells in the pleural cavity. 


it seems metastatic,


continue current symptomatic treatment


oncology on the case already


pain management


dc planning with comfort care.





Subjective


ROS Limited/Unobtainable:  No


Allergies:  


Coded Allergies:  


     No Known Allergies (Verified , 10/27/06)





Objective





Last 24 Hour Vital Signs








  Date Time  Temp Pulse Resp B/P (MAP) Pulse Ox O2 Delivery O2 Flow Rate FiO2


 


4/28/18 20:00 97.7 113 16 103/73 93 Room Air  





 97.7       


 


4/28/18 16:00 98.8 114 19 129/90 100 Room Air  





 98.8       


 


4/28/18 15:52 98.8       


 


4/28/18 14:53 97.8       


 


4/28/18 12:00 97.8 99 19 127/81 99 Room Air  





 97.8       


 


4/28/18 08:00 98.6 109 18 124/79 100 Room Air  





 98.6       


 


4/28/18 04:00 98.0 105 18 124/73 100 Room Air  





 98.0       


 


4/28/18 00:00 97.4 108 17 115/66 93 Room Air  





 97.4       

















Intake and Output  


 


 4/27/18 4/28/18





 19:00 07:00


 


Intake Total 375 ml 900 ml


 


Balance 375 ml 900 ml


 


  


 


Intake Oral 300 ml 


 


IV Total 75 ml 900 ml


 


# Voids 3 3








Objective


General Appearance:  WD/WN


HEENT:  normocephalic, atraumatic


Respiratory/Chest:  chest wall non-tender, lungs clear


Cardiovascular:  normal peripheral pulses, normal rate, regular rhythm


Abdomen:  normal bowel sounds, soft, non tender, no organomegaly, non distended


Extremities:  no cyanosis, no clubbing, no edema


Skin:  no rash, no lesions


Neurologic/Psychiatric:  CNs II-XII grossly normal


Laboratory Tests


4/28/18 06:03: 


White Blood Count 14.8H, Red Blood Count 4.19L, Hemoglobin 12.5, Hematocrit 38.2

, Mean Corpuscular Volume 91, Mean Corpuscular Hemoglobin 29.7, Mean 

Corpuscular Hemoglobin Concent 32.7, Red Cell Distribution Width 14.9H, 

Platelet Count 192, Mean Platelet Volume 6.8, Neutrophils (%) (Auto) 75.2H, 

Lymphocytes (%) (Auto) 17.0L, Monocytes (%) (Auto) 6.4, Eosinophils (%) (Auto) 

1.1, Basophils (%) (Auto) 0.4, Sodium Level 136, Potassium Level 3.0L, Chloride 

Level 102, Carbon Dioxide Level 25, Anion Gap 9, Blood Urea Nitrogen 7, 

Creatinine 0.7, Estimat Glomerular Filtration Rate > 60, Glucose Level 95, 

Calcium Level 9.2





Current Medications








 Medications


  (Trade)  Dose


 Ordered  Sig/Jed


 Route


 PRN Reason  Start Time


 Stop Time Status Last Admin


Dose Admin


 


 Acetaminophen


  (Tylenol)  650 mg  Q4H  PRN


 ORAL


 fever  4/19/18 15:30


 5/19/18 15:29   


 


 


 Acetaminophen/


 Hydrocodone Bitart


  (Norco 10/325)  1 tab  Q4H  PRN


 ORAL


 Pain Scale (3-5)  4/26/18 10:45


 5/3/18 08:29  4/28/18 22:00


 


 


 Al Hydroxide/Mg


 Hydroxide


  (Mylanta II)  30 ml  Q6H  PRN


 ORAL


 dyspepsia  4/19/18 15:30


 5/19/18 15:29   


 


 


 Atorvastatin


 Calcium


  (Lipitor)  40 mg  BEDTIME


 ORAL


   4/19/18 21:00


 5/19/18 20:59  4/28/18 21:53


 


 


 Bupropion HCl


  (Wellbutrin)  100 mg  DAILY


 ORAL


   4/20/18 09:00


 5/20/18 08:59  4/28/18 08:56


 


 


 Chlorhexidine


 Gluconate


  (Rosy-Hex 2%)  1 applic  DAILY@2000


 TOPIC


   4/25/18 20:00


 5/25/18 19:59  4/28/18 20:00


 


 


 Dextrose


  (Dextrose 50%)  50 ml  STAT  PRN


 IV


 Hypoglycemia BS<60mg/dL  4/19/18 15:30


 5/19/18 15:29   


 


 


 Dextrose/Sodium


 Chloride  1,000 ml @ 


 75 mls/hr  [id number]


 IV


   4/19/18 17:00


 5/19/18 16:59  4/28/18 15:04


 


 


 Diphenhydramine


 HCl


  (Benadryl)  25 mg  Q6H  PRN


 ORAL


 Itching/Pruritis  4/19/18 15:30


 5/19/18 15:29   


 


 


 Duloxetine HCl


  (Cymbalta)  90 mg  DAILY


 ORAL


   4/24/18 09:00


 5/20/18 08:59  4/28/18 08:56


 


 


 Gabapentin


  (Neurontin)  300 mg  THREE TIMES A  DAY


 ORAL


   4/20/18 10:00


 5/20/18 09:59  4/28/18 17:47


 


 


 Heparin Sodium


  (Porcine)


  (Heparin 5000


 units/ml)  5,000 units  EVERY 12  HOURS


 SUBQ


   4/19/18 21:00


 5/19/18 20:59  4/28/18 21:57


 


 


 Hydromorphone HCl


  (Dilaudid)  0.5 mg  Q4H  PRN


 IVP


 PAIN SCALE 4-10 if unab  4/26/18 09:30


 5/3/18 09:29   


 


 


 Levetiracetam


  (Keppra)  1,000 mg  Q8H


 ORAL


   4/24/18 08:00


 5/24/18 07:59  4/28/18 17:47


 


 


 Levothyroxine


 Sodium


  (Synthroid)  75 mcg  ACBREAKFAST


 ORAL


   4/20/18 06:30


 5/20/18 06:29  4/28/18 06:00


 


 


 Methylnaltrexone


 Bromide


  (Relistor)  12 mg  DAILY


 SUBQ


   4/20/18 10:15


 5/20/18 10:14  4/28/18 09:16


 


 


 Metoclopramide HCl


  (Reglan)  10 mg  Q6H  PRN


 IVP


 Unrelieved Severe Nausea  4/19/18 15:30


 5/19/18 15:29   


 


 


 Nitroglycerin


  (Ntg)  0.4 mg  Q5M X 3 DOSES PRN


 SL


 Prn Chest Pain  4/19/18 15:30


 5/19/18 15:29   


 


 


 Ondansetron HCl


  (Zofran)  4 mg  Q6H  PRN


 IVP


 Nausea & Vomiting  4/19/18 15:30


 5/19/18 15:29  4/20/18 12:37


 


 


 Pantoprazole


  (Protonix)  40 mg  DAILY


 ORAL


   4/23/18 09:00


 5/23/18 08:59  4/28/18 08:57


 


 


 Polyethylene


 Glycol


  (Miralax)  17 gm  HSPRN  PRN


 ORAL


 Constipation  4/19/18 15:30


 5/19/18 15:29   


 


 


 Promethazine HCl


 25 mg/Sodium


 Chloride  56 ml @ 


 110 mls/hr  Q6H  PRN


 IV


 Refractory N/V  4/19/18 15:30


 5/19/18 15:29   


 


 


 Simethicone


  (Mylicon)  80 mg  QIDPRN  PRN


 ORAL


 gas   4/20/18 10:00


 5/20/18 09:59   


 


 


 Theophylline


  (Shiv-Dur)  100 mg  Q12HR


 ORAL


   4/19/18 21:00


 5/19/18 20:59  4/28/18 21:53


 


 


 Topiramate


  (Topamax)  25 mg  EVERY 12  HOURS


 ORAL


   4/19/18 21:00


 5/19/18 20:59  4/28/18 21:53


 


 


 Trazodone HCl


  (Desyrel)  200 mg  BEDTIME


 ORAL


   4/19/18 21:00


 5/19/18 20:59  4/27/18 20:46


 

















Blaze Fraga MD Apr 28, 2018 22:30

## 2018-04-28 NOTE — GENERAL PROGRESS NOTE
Assessment/Plan


Problem List:  


(1) Crohn's disease


ICD Codes:  K50.90 - Crohn's disease


SNOMED:  92453935


Qualifiers:  


   Qualified Codes:  K50.919 - Crohn's disease, unspecified, with unspecified 

complications


(2) Opioid dependence


ICD Codes:  F11.20 - Opioid dependence


SNOMED:  96580265


(3) Intractable abdominal pain


ICD Codes:  R10.9 - Unspecified abdominal pain


SNOMED:  90019062, 602149656


(4) COPD (chronic obstructive pulmonary disease)


ICD Codes:  J44.9 - Chronic obstructive pulmonary disease


SNOMED:  79436013


(5) Shortness of breath


(6) SOB (shortness of breath)


ICD Codes:  R06.02 - Shortness of breath


SNOMED:  120217336


(7) UTI (urinary tract infection)


ICD Codes:  N39.0 - Urinary tract infection, site not specified


SNOMED:  39965180


(8) Lung mass


ICD Codes:  R91.8 - Other nonspecific abnormal finding of lung field


SNOMED:  279832930


Status:  unchanged


Assessment/Plan


ot pt diet pain control sx eval cbc bmp am heme f/u  dc plan if clear





Subjective


Constitutional:  Reports: weakness


Allergies:  


Coded Allergies:  


     No Known Allergies (Verified , 10/27/06)


All Systems:  reviewed and negative except above


Subjective


sleepy calm in bed





Objective





Last 24 Hour Vital Signs








  Date Time  Temp Pulse Resp B/P (MAP) Pulse Ox O2 Delivery O2 Flow Rate FiO2


 


4/28/18 08:00 98.6 109 18 124/79 100 Room Air  





 98.6       


 


4/28/18 04:00 98.0 105 18 124/73 100 Room Air  





 98.0       


 


4/28/18 00:00 97.4 108 17 115/66 93 Room Air  





 97.4       


 


4/27/18 20:00 98.4 110 20 130/77 92 Room Air  





 98.4       


 


4/27/18 16:00 97.2 100 20 140/71 97   





 97.2       


 


4/27/18 12:00 97.9 88 10 124/74 94 Room Air  





 97.9       


 


4/27/18 12:00 97.2 100 20 112/77 100   





 97.2       

















Intake and Output  


 


 4/27/18 4/28/18





 19:00 07:00


 


Intake Total 375 ml 900 ml


 


Balance 375 ml 900 ml


 


  


 


Intake Oral 300 ml 


 


IV Total 75 ml 900 ml


 


# Voids 3 3








Laboratory Tests


4/28/18 06:03: 


White Blood Count 14.8H, Red Blood Count 4.19L, Hemoglobin 12.5, Hematocrit 38.2

, Mean Corpuscular Volume 91, Mean Corpuscular Hemoglobin 29.7, Mean 

Corpuscular Hemoglobin Concent 32.7, Red Cell Distribution Width 14.9H, 

Platelet Count 192, Mean Platelet Volume 6.8, Neutrophils (%) (Auto) 75.2H, 

Lymphocytes (%) (Auto) 17.0L, Monocytes (%) (Auto) 6.4, Eosinophils (%) (Auto) 

1.1, Basophils (%) (Auto) 0.4, Sodium Level 136, Potassium Level 3.0L, Chloride 

Level 102, Carbon Dioxide Level 25, Anion Gap 9, Blood Urea Nitrogen 7, 

Creatinine 0.7, Estimat Glomerular Filtration Rate > 60, Glucose Level 95, 

Calcium Level 9.2


Height (Feet):  5


Height (Inches):  6.00


Weight (Pounds):  129


General Appearance:  lethargic


EENT:  normal ENT inspection


Neck:  normal alignment


Cardiovascular:  normal peripheral pulses, normal rate, regular rhythm


Respiratory/Chest:  chest wall non-tender, decreased breath sounds


Abdomen:  normal bowel sounds, soft


Extremities:  normal inspection


Edema:  no edema noted Arm (L), no edema noted Arm (R), no edema noted Leg (L), 

no edema noted Leg (R), no edema noted Pedal (L), no edema noted Pedal (R), no 

edema noted Generalized


Neurologic:  motor weakness


Skin:  normal pigmentation, warm/dry











MAICOL LANG Apr 28, 2018 09:22

## 2018-04-28 NOTE — GENERAL PROGRESS NOTE
Assessment/Plan


Problem List:  


(1) History of exploratory laparotomy


ICD Codes:  Z98.89 - Other specified postprocedural states


SNOMED:  09482337, 67330980, 146546654


(2) Smoker


ICD Codes:  F17.200 - Nicotine dependence, unspecified, uncomplicated


SNOMED:  07708217


(3) Malignant pleural effusion


ICD Codes:  J91.0 - Malignant pleural effusion


SNOMED:  62652496


(4) Metastatic cancer


ICD Codes:  C79.9 - Secondary malignant neoplasm of unspecified site


SNOMED:  386271529


(5) Lung mass


ICD Codes:  R91.8 - Other nonspecific abnormal finding of lung field


SNOMED:  219125516


(6) Crohns disease


ICD Codes:  K50.90 - Crohns disease


SNOMED:  28927378


Assessment/Plan


fu labs


fu oncology


pain control





Subjective


ROS Limited/Unobtainable:  Yes


Allergies:  


Coded Allergies:  


     No Known Allergies (Verified , 10/27/06)


Subjective


no event





Objective





Last 24 Hour Vital Signs








  Date Time  Temp Pulse Resp B/P (MAP) Pulse Ox O2 Delivery O2 Flow Rate FiO2


 


4/28/18 04:00 98.0 105 18 124/73 100 Room Air  





 98.0       


 


4/28/18 00:00 97.4 108 17 115/66 93 Room Air  





 97.4       


 


4/27/18 20:00 98.4 110 20 130/77 92 Room Air  





 98.4       


 


4/27/18 16:00 97.2 100 20 140/71 97   





 97.2       


 


4/27/18 12:00 97.9 88 10 124/74 94 Room Air  





 97.9       


 


4/27/18 12:00 97.2 100 20 112/77 100   





 97.2       


 


4/27/18 08:00 98.1 98 10 128/77 94 Room Air  





 98.1       

















Intake and Output  


 


 4/27/18 4/28/18





 19:00 07:00


 


Intake Total 375 ml 900 ml


 


Balance 375 ml 900 ml


 


  


 


Intake Oral 300 ml 


 


IV Total 75 ml 900 ml


 


# Voids 3 3








Height (Feet):  5


Height (Inches):  6.00


Weight (Pounds):  129


General Appearance:  no apparent distress


EENT:  normal ENT inspection


Neck:  supple


Cardiovascular:  normal rate


Respiratory/Chest:  decreased breath sounds


Abdomen:  normal bowel sounds, non tender, soft


Extremities:  non-tender











LESLY PINA Apr 28, 2018 07:28

## 2018-04-29 VITALS — SYSTOLIC BLOOD PRESSURE: 110 MMHG | DIASTOLIC BLOOD PRESSURE: 62 MMHG

## 2018-04-29 VITALS — DIASTOLIC BLOOD PRESSURE: 88 MMHG | SYSTOLIC BLOOD PRESSURE: 127 MMHG

## 2018-04-29 VITALS — SYSTOLIC BLOOD PRESSURE: 139 MMHG | DIASTOLIC BLOOD PRESSURE: 77 MMHG

## 2018-04-29 VITALS — DIASTOLIC BLOOD PRESSURE: 72 MMHG | SYSTOLIC BLOOD PRESSURE: 112 MMHG

## 2018-04-29 VITALS — DIASTOLIC BLOOD PRESSURE: 71 MMHG | SYSTOLIC BLOOD PRESSURE: 115 MMHG

## 2018-04-29 VITALS — DIASTOLIC BLOOD PRESSURE: 73 MMHG | SYSTOLIC BLOOD PRESSURE: 113 MMHG

## 2018-04-29 VITALS — DIASTOLIC BLOOD PRESSURE: 74 MMHG | SYSTOLIC BLOOD PRESSURE: 118 MMHG

## 2018-04-29 VITALS — SYSTOLIC BLOOD PRESSURE: 92 MMHG | DIASTOLIC BLOOD PRESSURE: 74 MMHG

## 2018-04-29 LAB
ADD MANUAL DIFF: NO
ANION GAP SERPL CALC-SCNC: 12 MMOL/L (ref 5–15)
BASOPHILS NFR BLD AUTO: 0.5 % (ref 0–2)
BUN SERPL-MCNC: 8 MG/DL (ref 7–18)
CALCIUM SERPL-MCNC: 9.5 MG/DL (ref 8.5–10.1)
CHLORIDE SERPL-SCNC: 103 MMOL/L (ref 98–107)
CO2 SERPL-SCNC: 23 MMOL/L (ref 21–32)
CREAT SERPL-MCNC: 0.7 MG/DL (ref 0.55–1.3)
EOSINOPHIL NFR BLD AUTO: 1.3 % (ref 0–3)
ERYTHROCYTE [DISTWIDTH] IN BLOOD BY AUTOMATED COUNT: 15 % (ref 11.6–14.8)
HCT VFR BLD CALC: 42.9 % (ref 37–47)
HGB BLD-MCNC: 13.8 G/DL (ref 12–16)
LYMPHOCYTES NFR BLD AUTO: 16.1 % (ref 20–45)
MCV RBC AUTO: 91 FL (ref 80–99)
MONOCYTES NFR BLD AUTO: 4.5 % (ref 1–10)
NEUTROPHILS NFR BLD AUTO: 77.6 % (ref 45–75)
PLATELET # BLD: 173 K/UL (ref 150–450)
POTASSIUM SERPL-SCNC: 3.6 MMOL/L (ref 3.5–5.1)
RBC # BLD AUTO: 4.73 M/UL (ref 4.2–5.4)
SODIUM SERPL-SCNC: 138 MMOL/L (ref 136–145)
WBC # BLD AUTO: 15.9 K/UL (ref 4.8–10.8)

## 2018-04-29 RX ADMIN — DULOXETINE HYDROCHLORIDE SCH MG: 30 CAPSULE, DELAYED RELEASE ORAL at 08:34

## 2018-04-29 RX ADMIN — TOPIRAMATE SCH MG: 25 TABLET, COATED ORAL at 20:41

## 2018-04-29 RX ADMIN — DEXTROSE AND SODIUM CHLORIDE SCH MLS/HR: 5; .45 INJECTION, SOLUTION INTRAVENOUS at 11:56

## 2018-04-29 RX ADMIN — THEOPHYLLINE ANHYDROUS SCH MG: 100 CAPSULE, EXTENDED RELEASE ORAL at 20:41

## 2018-04-29 RX ADMIN — TOPIRAMATE SCH MG: 25 TABLET, COATED ORAL at 08:33

## 2018-04-29 RX ADMIN — HEPARIN SODIUM SCH UNITS: 5000 INJECTION INTRAVENOUS; SUBCUTANEOUS at 20:42

## 2018-04-29 RX ADMIN — METHYLNALTREXONE BROMIDE SCH MG: 12 INJECTION, SOLUTION SUBCUTANEOUS at 08:49

## 2018-04-29 RX ADMIN — HEPARIN SODIUM SCH UNITS: 5000 INJECTION INTRAVENOUS; SUBCUTANEOUS at 08:34

## 2018-04-29 RX ADMIN — DEXTROSE AND SODIUM CHLORIDE SCH MLS/HR: 5; .45 INJECTION, SOLUTION INTRAVENOUS at 03:49

## 2018-04-29 RX ADMIN — THEOPHYLLINE ANHYDROUS SCH MG: 100 CAPSULE, EXTENDED RELEASE ORAL at 08:33

## 2018-04-29 NOTE — GENERAL PROGRESS NOTE
Assessment/Plan


Assessment/Plan


IMPRESSION:


1. Left lower lobe lung mass.


2. Left inferior hilar mass.


3. Left pleural base lung mass.


4. Left pleural effusion.


5. Retroperitoneal lymphadenopathy.


6. Multiple low-attenuation liver lesions.


7. Crohn disease.


8. Opioid dependence.


9. Chronic obstructive pulmonary disease.


10. History of smoking.


11. Emphysema.


12. Perianal abscess.


13. Intractable abdominal pain.


14. Status post morphine pump placement.


15. Depression.


16. Psychosis.


17. Seizure disorder.


18. Radiculopathy of lumbar region.


19. Abdominal pain.


20. Nausea and vomiting.


21. Status post exploratory laparotomy.


22. Pneumoperitoneum.


23. Inflammatory bowel disease.


24. History of elevated CEA.


25. History of lower gastrointestinal bleed.


26. Chronic pain syndrome.


27. History of small bowel obstruction.


28. Status post laparotomy x2.


29. Failure to thrive.


30. Leukocytosis.





RECOMMENDATIONS:


1. Watch count.


2. Watch coagulopathy.


3. Paracentesis with cytology.


4. Pulmonary evaluation/followup.


5. Mediastinoscopy versus CT-guided lung biopsy.


6. The patient needs tissue diagnosis.


7. ID followup.


8. Pain control.


9. Psychiatry evaluation.


10. Skin care.


11. Nutrition.


12. Continue current treatment.


13. Discussed with staff.


14. Close followup





Subjective


Date patient seen:  Apr 28, 2018


Constitutional:  Denies: no symptoms, chills, diaphoresis, fever, malaise, 

weakness, other


HEENT:  Denies: no symptoms, eye pain, blurred vision, tearing, double vision, 

ear pain, ear discharge, nose pain, nose congestion, throat pain, throat 

swelling, mouth pain, mouth swelling, other


Cardiovascular:  Denies: no symptoms, chest pain, edema, irregular heart rate, 

lightheadedness, palpitations, syncope, other


Respiratory:  Denies: no symptoms, cough, orthopnea, shortness of breath, SOB 

with excertion, SOB at rest, sputum, stridor, wheezing, other


Gastrointestinal/Abdominal:  Denies: no symptoms, abdomen distended, abdominal 

pain, black stools, tarry stools, blood in stool, constipated, diarrhea, 

difficulty swallowing, nausea, poor appetite, poor fluid intake, rectal bleeding

, vomiting, other


Genitourinary:  Denies: no symptoms, burning, discharge, frequency, flank pain, 

hematuria, incontinence, pain, urgency, other


Neurologic/Psychiatric:  Denies: no symptoms, anxiety, depressed, emotional 

problems, headache, numbness, paresthesia, pre-existing deficit, seizure, 

tingling, tremors, weakness, other


Allergies:  


Coded Allergies:  


     No Known Allergies (Verified , 10/27/06)


Subjective


C/O pain. No fever or chills. Wbc elevated.





Objective





Last 24 Hour Vital Signs








  Date Time  Temp Pulse Resp B/P (MAP) Pulse Ox O2 Delivery O2 Flow Rate FiO2


 


4/29/18 20:00  131      


 


4/29/18 20:00 98.0 72 20 110/62 100 Room Air 4.0 





 98.0       


 


4/29/18 16:00 98.0 131 21 118/74 95 Room Air 4.0 





 98.0       


 


4/29/18 16:00  131      


 


4/29/18 12:27 97.4       


 


4/29/18 12:00  127      


 


4/29/18 12:00 208.4 127 21 139/77 95 Room Air 4.0 





 208.4       


 


4/29/18 11:57 98.0       


 


4/29/18 08:00 98.0 127 21 127/88 95 Room Air  





 98.0       


 


4/29/18 04:00 97.3 114 16 92/74 95 Room Air  





 97.3       


 


4/29/18 02:00  118 14 112/72 92 Nasal Cannula 4.0 


 


4/29/18 01:00 97.1 124 14 115/71 91 Nasal Cannula 3.0 





 97.1       


 


4/29/18 00:00  120      


 


4/29/18 00:00 97.2  16 113/73 100 Room Air  





 97.2       

















Intake and Output  


 


 4/28/18 4/29/18





 19:00 07:00


 


Intake Total 550 ml 120 ml


 


Balance 550 ml 120 ml


 


  


 


Intake Oral  120 ml


 


Other 550 ml 


 


# Voids 3 2








Laboratory Tests


4/29/18 05:33: 


White Blood Count 15.9H, Red Blood Count 4.73, Hemoglobin 13.8, Hematocrit 42.9

, Mean Corpuscular Volume 91, Mean Corpuscular Hemoglobin 29.2, Mean 

Corpuscular Hemoglobin Concent 32.2, Red Cell Distribution Width 15.0H, 

Platelet Count 173, Mean Platelet Volume 7.7, Neutrophils (%) (Auto) 77.6H, 

Lymphocytes (%) (Auto) 16.1L, Monocytes (%) (Auto) 4.5, Eosinophils (%) (Auto) 

1.3, Basophils (%) (Auto) 0.5, Sodium Level 138, Potassium Level 3.6, Chloride 

Level 103, Carbon Dioxide Level 23, Anion Gap 12, Blood Urea Nitrogen 8, 

Creatinine 0.7, Estimat Glomerular Filtration Rate > 60, Glucose Level 113H, 

Calcium Level 9.5


4/29/18 17:05: 


Troponin I 0.093H, Thyroid Stimulating Hormone (TSH) 0.503, Free Thyroxine 1.31


Height (Feet):  5


Height (Inches):  6.00


Weight (Pounds):  129


General Appearance:  no apparent distress


EENT:  normal ENT inspection


Neck:  normal alignment, supple


Cardiovascular:  normal rate, regular rhythm


Respiratory/Chest:  decreased breath sounds


Abdomen:  distended, tender











Erick Russell MD Apr 29, 2018 23:24

## 2018-04-29 NOTE — CONSULTATION
DATE OF CONSULTATION:  04/29/2018



CARDIOLOGY CONSULTATION



CONSULTING PHYSICIAN:  Delon Chawla M.D.



REFERRING PHYSICIAN:  Maxim Hopkins D.O.



REASON FOR CONSULTATION:  Tachycardia.



HISTORY OF PRESENT ILLNESS:  The patient is a 62-year-old 

lady with history of Crohn disease, who also has an abdominal morphine

pump.  She was brought to the emergency room for severe abdominal pain.

The patient also has COPD, inflammatory bowel disease, and also was found

to be tachycardic with heart rate in the 130s.  At my request, the patient

was then transferred to telemetry for further management and closer

observation.  At the time of my evaluation, the patient still complains of

generalized body ache.



REVIEW OF SYSTEMS:  Performed and was negative other than what was

mentioned in history of present illness.



PAST MEDICAL HISTORY:  As mentioned above.



FAMILY HISTORY:  Noncontributory.



SOCIAL HISTORY:  She lives at home.  Does not smoke or drink

alcohol.



PHYSICAL EXAMINATION:

VITAL SIGNS:  Blood pressure is 139/77, pulse is 127, respirations 18, and

temperature is 97.4.



HEAD AND NECK:  Showed no JVD.

LUNGS:  Clear.

CARDIOVASCULAR:  Shows regular, S1 and S2.  Tachycardic.

ABDOMEN:  Tender with a pump under skin.

EXTREMITIES:  No pitting edema.



LABORATORY AND DIAGNOSTIC DATA:  White count 15.9, hemoglobin of 13.8,

hematocrit of 43, and platelet count is 172,000.  Sodium 138, potassium

3.6, BUN of 8, creatinine 0.7, and glucose of 113.  INR is 1.12.

Urinalysis shows 3+ occult blood.



ASSESSMENT AND PLAN:

1. Tachycardia due to sinus tachycardia, but no evidence of atrial

fibrillation or SVT, likely due to the patient's pain.  The patient is

also on Synthroid.  We will check thyroid function test to make sure the

patient is not overdosed and is not hyperthyroid.  An echocardiogram will

also be ordered for further evaluation and management.

2. Malignant pleural effusion and metastatic cancer.  The primary

service _____ at this time.

3. Lung mass.  _____

4. Crohn disease and history of exploratory laparotomy, under management

of Dr. Mcleod.

5. Ascites, routine paracentesis with cytology and also awaiting

mediastinoscopy versus CT-guided lung biopsy as recommended by Dr. Russell.



Thank you very much, Dr. Hopkins, for allowing me to participate in the

care of this patient.  Please do not hesitate to contact me for any

questions regarding my evaluation.









  ______________________________________________

  Delon Chawla M.D.





DR:  Dena

D:  04/29/2018 14:24

T:  04/29/2018 21:30

JOB#:  [id number]

CC:

## 2018-04-29 NOTE — PROGRESS NOTE
DATE:  04/29/2018



SUBJECTIVE:  This is a 62-year-old female patient high levels of anxiety,

agitation, and mood lability worsened by stress of her medical illness

with an intractable abdominal pain, high levels of depression, anxiety,

and mood lability that is why her attending has requested daily

psychiatric consultation.



MENTAL STATUS EXAMINATION:  The patient is a 62-year-old female.

Appearance is disheveled.  Attitude, irritable and agitated.  Affect,

guarded and restricted.  Intellect poor.  Mood, depressed and anxious.

Motor activity, psychomotor agitation.  Attention span is poor.

Orientation x2.  Speech is pressured.  Thought process, disorganized and

logical.  Thought content, auditory hallucinations and paranoid delusions.

Insight and judgment is poor.



IMPRESSION:  Bipolar 2.



PLAN:  Plan is to treat her with psychiatric medication regimen of

Neurontin three times a day, _______, and also continued on Cymbalta as

well as Wellbutrin one to two daily.  Provided 18 to 20 minutes of

supportive psychotherapy.   Chart reviewed and discussed with staff.









  ______________________________________________

  Samantha Rick M.D.





DR:  MADAI

D:  04/29/2018 11:26

T:  04/29/2018 22:11

JOB#:  [id number]

CC:

## 2018-04-29 NOTE — CARDIAC ELECTROPHYSIOLOGY PN
Subjective


Subjective


[id number]





Objective





Last 24 Hour Vital Signs








  Date Time  Temp Pulse Resp B/P (MAP) Pulse Ox O2 Delivery O2 Flow Rate FiO2


 


4/29/18 12:27 97.4       


 


4/29/18 12:00  127      


 


4/29/18 12:00 208.4 127 21 139/77 95 Room Air 4.0 





 208.4       


 


4/29/18 11:57 98.0       


 


4/29/18 08:00 98.0 127 21 127/88 95 Room Air  





 98.0       


 


4/29/18 04:00 97.3 114 16 92/74 95 Room Air  





 97.3       


 


4/29/18 02:00  118 14 112/72 92 Nasal Cannula 4.0 


 


4/29/18 01:00 97.1 124 14 115/71 91 Nasal Cannula 3.0 





 97.1       


 


4/29/18 00:00  120      


 


4/29/18 00:00 97.2  16 113/73 100 Room Air  





 97.2       


 


4/28/18 20:00 97.7 113 16 103/73 93 Room Air  





 97.7       


 


4/28/18 16:00 98.8 114 19 129/90 100 Room Air  





 98.8       


 


4/28/18 15:52 98.8       


 


4/28/18 14:53 97.8       

















Intake and Output  


 


 4/28/18 4/29/18





 19:00 07:00


 


Intake Total 550 ml 120 ml


 


Balance 550 ml 120 ml


 


  


 


Intake Oral  120 ml


 


Other 550 ml 


 


# Voids 3 2











Laboratory Tests








Test


  4/29/18


05:33


 


White Blood Count


  15.9 K/UL


(4.8-10.8)  H


 


Red Blood Count


  4.73 M/UL


(4.20-5.40)


 


Hemoglobin


  13.8 G/DL


(12.0-16.0)


 


Hematocrit


  42.9 %


(37.0-47.0)


 


Mean Corpuscular Volume 91 FL (80-99)  


 


Mean Corpuscular Hemoglobin


  29.2 PG


(27.0-31.0)


 


Mean Corpuscular Hemoglobin


Concent 32.2 G/DL


(32.0-36.0)


 


Red Cell Distribution Width


  15.0 %


(11.6-14.8)  H


 


Platelet Count


  173 K/UL


(150-450)


 


Mean Platelet Volume


  7.7 FL


(6.5-10.1)


 


Neutrophils (%) (Auto)


  77.6 %


(45.0-75.0)  H


 


Lymphocytes (%) (Auto)


  16.1 %


(20.0-45.0)  L


 


Monocytes (%) (Auto)


  4.5 %


(1.0-10.0)


 


Eosinophils (%) (Auto)


  1.3 %


(0.0-3.0)


 


Basophils (%) (Auto)


  0.5 %


(0.0-2.0)


 


Sodium Level


  138 MMOL/L


(136-145)


 


Potassium Level


  3.6 MMOL/L


(3.5-5.1)


 


Chloride Level


  103 MMOL/L


()


 


Carbon Dioxide Level


  23 MMOL/L


(21-32)


 


Anion Gap


  12 mmol/L


(5-15)


 


Blood Urea Nitrogen


  8 mg/dL (7-18)


 


 


Creatinine


  0.7 MG/DL


(0.55-1.30)


 


Estimat Glomerular Filtration


Rate > 60 mL/min


(>60)


 


Glucose Level


  113 MG/DL


()  H


 


Calcium Level


  9.5 MG/DL


(8.5-10.1)

















Delon Chawla MD Apr 29, 2018 14:25

## 2018-04-29 NOTE — GENERAL PROGRESS NOTE
Assessment/Plan


Assessment/Plan


(1) Chronic abdominal pain


(2) Chronic pancreatitis


(3) Crohn's disease 


(4) Herniated nucleus pulposus, lumbar


(5) Lumbar spondylosis


(6) Radiculopathy of lumbar region


(7) Lung mass


Pt will be continued on Neurontin, Dilaudid and Norco and pt has intrathecal 

pump.


HOLD OPIOIDS FOR OVERSEDATION OR SBP<90 OR DBP<60 OR O2SAT<92% OR RR<12


Pt was d/w Dr. Shetty and he concurred.





Subjective


Date patient seen:  Apr 29, 2018


Time patient seen:  12:15 - pm


Allergies:  


Coded Allergies:  


     No Known Allergies (Verified , 10/27/06)


Subjective


Constitutional:  Reports: weakness


HEENT:  Reports: no symptoms


Cardiovascular:  Reports: no symptoms


Respiratory:  Reports: no symptoms


Gastrointestinal/Abdominal:  Reports: abdominal pain


Genitourinary:  Reports: no symptoms


Neurologic/Psychiatric:  Reports: weakness


Endocrine:  Reports: no symptoms


Hematologic/Lymphatic:  Reports: no symptoms





Subjective


Patient reports that she continues to c/o pain and weakness. 


Pain has been reduced on the Dilaudid when given. One does was given today.





Objective





Last 24 Hour Vital Signs








  Date Time  Temp Pulse Resp B/P (MAP) Pulse Ox O2 Delivery O2 Flow Rate FiO2


 


4/29/18 11:57 98.0       


 


4/29/18 08:00 98.0 127 21 127/88 95 Room Air  





 98.0       


 


4/29/18 04:00 97.3 114 16 92/74 95 Room Air  





 97.3       


 


4/29/18 02:00  118 14 112/72 92 Nasal Cannula 4.0 


 


4/29/18 01:00 97.1 124 14 115/71 91 Nasal Cannula 3.0 





 97.1       


 


4/29/18 00:00  120      


 


4/29/18 00:00 97.2  16 113/73 100 Room Air  





 97.2       


 


4/28/18 20:00 97.7 113 16 103/73 93 Room Air  





 97.7       


 


4/28/18 16:00 98.8 114 19 129/90 100 Room Air  





 98.8       


 


4/28/18 15:52 98.8       


 


4/28/18 14:53 97.8       

















Intake and Output  


 


 4/28/18 4/29/18





 19:00 07:00


 


Intake Total 550 ml 120 ml


 


Balance 550 ml 120 ml


 


  


 


Intake Oral  120 ml


 


Other 550 ml 


 


# Voids 3 2








Laboratory Tests


4/29/18 05:33: 


White Blood Count 15.9H, Red Blood Count 4.73, Hemoglobin 13.8, Hematocrit 42.9

, Mean Corpuscular Volume 91, Mean Corpuscular Hemoglobin 29.2, Mean 

Corpuscular Hemoglobin Concent 32.2, Red Cell Distribution Width 15.0H, 

Platelet Count 173, Mean Platelet Volume 7.7, Neutrophils (%) (Auto) 77.6H, 

Lymphocytes (%) (Auto) 16.1L, Monocytes (%) (Auto) 4.5, Eosinophils (%) (Auto) 

1.3, Basophils (%) (Auto) 0.5, Sodium Level 138, Potassium Level 3.6, Chloride 

Level 103, Carbon Dioxide Level 23, Anion Gap 12, Blood Urea Nitrogen 8, 

Creatinine 0.7, Estimat Glomerular Filtration Rate > 60, Glucose Level 113H, 

Calcium Level 9.5


Height (Feet):  5


Height (Inches):  6.00


Weight (Pounds):  129


Objective


General Appearance:  no apparent distress, alert


EENT:  normal ENT inspection, TMs normal


Neck:  normal alignment, supple


Cardiovascular:  normal rate, regular rhythm


Respiratory/Chest:  decreased breath sounds


Abdomen:  tender, distended, intrathecal pump palpated


Extremities:  non-tender


Edema:  no edema noted 


Neurologic:  alert, oriented x 3


Skin:  warm/dry











LEA BRAVO Apr 29, 2018 12:36

## 2018-04-29 NOTE — PULMONOLOGY PROGRESS NOTE
Assessment/Plan


Problems:  


(1) Metastatic cancer


(2) Malignant pleural effusion


(3) Chronic pain disorder


(4) IBD (inflammatory bowel disease)


(5) Interstitial lung disease


(6) COPD (chronic obstructive pulmonary disease)


Assessment/Plan


symptomatic treatment


d/w pathologist, there are malignant non-small cells in the pleural cavity. 


it seems metastatic,


continue current symptomatic treatment


oncology on the case already


pain management


dc planning with comfort care.





social service consult to arrange family meeting for code status.





Subjective


ROS Limited/Unobtainable:  No


Constitutional:  Reports: no symptoms


HEENT:  Repors: no symptoms


Respiratory:  Reports: no symptoms


Allergies:  


Coded Allergies:  


     No Known Allergies (Verified , 10/27/06)





Objective





Last 24 Hour Vital Signs








  Date Time  Temp Pulse Resp B/P (MAP) Pulse Ox O2 Delivery O2 Flow Rate FiO2


 


4/29/18 16:00  131      


 


4/29/18 12:27 97.4       


 


4/29/18 12:00  127      


 


4/29/18 12:00 208.4 127 21 139/77 95 Room Air 4.0 





 208.4       


 


4/29/18 11:57 98.0       


 


4/29/18 08:00 98.0 127 21 127/88 95 Room Air  





 98.0       


 


4/29/18 04:00 97.3 114 16 92/74 95 Room Air  





 97.3       


 


4/29/18 02:00  118 14 112/72 92 Nasal Cannula 4.0 


 


4/29/18 01:00 97.1 124 14 115/71 91 Nasal Cannula 3.0 





 97.1       


 


4/29/18 00:00  120      


 


4/29/18 00:00 97.2  16 113/73 100 Room Air  





 97.2       


 


4/28/18 20:00 97.7 113 16 103/73 93 Room Air  





 97.7       

















Intake and Output  


 


 4/28/18 4/29/18





 19:00 07:00


 


Intake Total 550 ml 120 ml


 


Balance 550 ml 120 ml


 


  


 


Intake Oral  120 ml


 


Other 550 ml 


 


# Voids 3 2








Objective


General Appearance:  WD/WN


HEENT:  normocephalic, atraumatic


Respiratory/Chest:  chest wall non-tender, lungs clear


Cardiovascular:  normal peripheral pulses, normal rate, regular rhythm


Abdomen:  normal bowel sounds, soft, non tender, no organomegaly, non distended


Extremities:  no cyanosis, no clubbing, no edema


Skin:  no rash, no lesions


Neurologic/Psychiatric:  CNs II-XII grossly normal


Laboratory Tests


4/29/18 05:33: 


White Blood Count 15.9H, Red Blood Count 4.73, Hemoglobin 13.8, Hematocrit 42.9

, Mean Corpuscular Volume 91, Mean Corpuscular Hemoglobin 29.2, Mean 

Corpuscular Hemoglobin Concent 32.2, Red Cell Distribution Width 15.0H, 

Platelet Count 173, Mean Platelet Volume 7.7, Neutrophils (%) (Auto) 77.6H, 

Lymphocytes (%) (Auto) 16.1L, Monocytes (%) (Auto) 4.5, Eosinophils (%) (Auto) 

1.3, Basophils (%) (Auto) 0.5, Sodium Level 138, Potassium Level 3.6, Chloride 

Level 103, Carbon Dioxide Level 23, Anion Gap 12, Blood Urea Nitrogen 8, 

Creatinine 0.7, Estimat Glomerular Filtration Rate > 60, Glucose Level 113H, 

Calcium Level 9.5





Current Medications








 Medications


  (Trade)  Dose


 Ordered  Sig/Jed


 Route


 PRN Reason  Start Time


 Stop Time Status Last Admin


Dose Admin


 


 Acetaminophen


  (Tylenol)  650 mg  Q4H  PRN


 ORAL


 fever (temp>100.5F)  4/29/18 11:30


 5/19/18 15:29   


 


 


 Acetaminophen/


 Hydrocodone Bitart


  (Norco 10/325)  1 tab  Q4H  PRN


 ORAL


 Pain Scale (3-5)  4/29/18 10:45


 5/3/18 08:29   


 


 


 Al Hydroxide/Mg


 Hydroxide


  (Mylanta II)  30 ml  Q6H  PRN


 ORAL


 dyspepsia  4/29/18 11:00


 5/19/18 10:59   


 


 


 Atorvastatin


 Calcium


  (Lipitor)  40 mg  BEDTIME


 ORAL


   4/29/18 21:00


 5/19/18 20:59   


 


 


 Bupropion HCl


  (Wellbutrin)  100 mg  DAILY


 ORAL


   4/30/18 09:00


 5/20/18 08:59   


 


 


 Chlorhexidine


 Gluconate


  (Rosy-Hex 2%)  1 applic  DAILY@2000


 TOPIC


   4/29/18 20:00


 5/25/18 19:59   


 


 


 Dextrose


  (Dextrose 50%)  50 ml  STAT  PRN


 IV


 Hypoglycemia BS<60mg/dL  4/29/18 11:00


 5/19/18 10:59   


 


 


 Dextrose/Sodium


 Chloride  1,000 ml @ 


 75 mls/hr  [id number]


 IV


   4/29/18 11:00


 5/19/18 10:59  4/29/18 11:56


 


 


 Diphenhydramine


 HCl


  (Benadryl)  25 mg  Q6H  PRN


 ORAL


 Itching/Pruritis  4/29/18 11:00


 5/19/18 10:59   


 


 


 Duloxetine HCl


  (Cymbalta)  90 mg  DAILY


 ORAL


   4/30/18 09:00


 5/20/18 08:59   


 


 


 Gabapentin


  (Neurontin)  300 mg  THREE TIMES A  DAY


 ORAL


   4/29/18 13:00


 5/20/18 09:59  4/29/18 13:30


 


 


 Heparin Sodium


  (Porcine)


  (Heparin 5000


 units/ml)  5,000 units  EVERY 12  HOURS


 SUBQ


   4/29/18 21:00


 5/19/18 20:59   


 


 


 Hydromorphone HCl


  (Dilaudid)  0.5 mg  Q4H  PRN


 IVP


 PAIN SCALE 4-10  4/29/18 11:00


 5/3/18 10:59  4/29/18 11:57


 


 


 Levetiracetam


  (Keppra)  1,000 mg  Q8H


 ORAL


   4/29/18 16:00


 5/24/18 07:59   


 


 


 Levothyroxine


 Sodium


  (Synthroid)  75 mcg  ACBREAKFAST


 ORAL


   4/30/18 06:30


 5/20/18 06:29   


 


 


 Methylnaltrexone


 Bromide


  (Relistor)  12 mg  DAILY


 SUBQ


   4/30/18 09:00


 5/20/18 10:14   


 


 


 Metoclopramide HCl


  (Reglan)  10 mg  Q6H  PRN


 IVP


 Unrelieved Severe Nausea  4/29/18 11:00


 5/19/18 10:59   


 


 


 Nitroglycerin


  (Ntg)  0.4 mg  Q5M X 3 DOSES PRN


 SL


 Prn Chest Pain  4/29/18 11:00


 5/19/18 10:59   


 


 


 Ondansetron HCl


  (Zofran)  4 mg  Q6H  PRN


 IVP


 Nausea & Vomiting  4/29/18 11:00


 5/19/18 10:59   


 


 


 Pantoprazole


  (Protonix)  40 mg  DAILY


 ORAL


   4/30/18 09:00


 5/23/18 08:59   


 


 


 Polyethylene


 Glycol


  (Miralax)  17 gm  HSPRN  PRN


 ORAL


 Constipation  4/29/18 11:00


 5/19/18 10:59   


 


 


 Promethazine HCl


 25 mg/Dextrose  56 ml @ 


 110 mls/hr  Q6H  PRN


 IV


 Refractory N/V  4/29/18 11:00


 5/19/18 10:59   


 


 


 Simethicone


  (Mylicon)  80 mg  QIDPRN  PRN


 ORAL


 gas   4/29/18 11:00


 5/20/18 10:59   


 


 


 Theophylline


  (Shiv-Dur)  100 mg  Q12HR


 ORAL


   4/29/18 21:00


 5/19/18 20:59   


 


 


 Topiramate


  (Topamax)  25 mg  EVERY 12  HOURS


 ORAL


   4/29/18 21:00


 5/19/18 20:59   


 


 


 Trazodone HCl


  (Desyrel)  200 mg  BEDTIME


 ORAL


   4/29/18 21:00


 5/19/18 20:59   


 

















Blaze Fraga MD Apr 29, 2018 17:09

## 2018-04-29 NOTE — GENERAL PROGRESS NOTE
Assessment/Plan


Problem List:  


(1) Crohn's disease


ICD Codes:  K50.90 - Crohn's disease


SNOMED:  80967642


Qualifiers:  


   Qualified Codes:  K50.919 - Crohn's disease, unspecified, with unspecified 

complications


(2) Opioid dependence


ICD Codes:  F11.20 - Opioid dependence


SNOMED:  41224553


(3) Intractable abdominal pain


ICD Codes:  R10.9 - Unspecified abdominal pain


SNOMED:  58537534, 812255118


(4) COPD (chronic obstructive pulmonary disease)


ICD Codes:  J44.9 - Chronic obstructive pulmonary disease


SNOMED:  16067656


(5) Shortness of breath


(6) SOB (shortness of breath)


ICD Codes:  R06.02 - Shortness of breath


SNOMED:  090230860


(7) UTI (urinary tract infection)


ICD Codes:  N39.0 - Urinary tract infection, site not specified


SNOMED:  97174616


(8) Lung mass


ICD Codes:  R91.8 - Other nonspecific abnormal finding of lung field


SNOMED:  959257657


(9) Tachycardia


ICD Codes:  R00.0 - Tachycardia, unspecified


SNOMED:  2338019


Status:  unchanged


Assessment/Plan


ot pt diet pain control sx eval cbc bmp am heme f/u cardio eval, tele transfer





Subjective


Constitutional:  Reports: weakness


Allergies:  


Coded Allergies:  


     No Known Allergies (Verified , 10/27/06)


All Systems:  reviewed and negative except above


Subjective


sl anxious in bed





Objective





Last 24 Hour Vital Signs








  Date Time  Temp Pulse Resp B/P (MAP) Pulse Ox O2 Delivery O2 Flow Rate FiO2


 


4/29/18 04:00 97.3 114 16 92/74 95 Room Air  





 97.3       


 


4/29/18 02:00  118 14 112/72 92 Nasal Cannula 4.0 


 


4/29/18 01:00 97.1 124 14 115/71 91 Nasal Cannula 3.0 





 97.1       


 


4/29/18 00:00  120      


 


4/29/18 00:00 97.2  16 113/73 100 Room Air  





 97.2       


 


4/28/18 20:00 97.7 113 16 103/73 93 Room Air  





 97.7       


 


4/28/18 16:00 98.8 114 19 129/90 100 Room Air  





 98.8       


 


4/28/18 15:52 98.8       


 


4/28/18 14:53 97.8       


 


4/28/18 12:00 97.8 99 19 127/81 99 Room Air  





 97.8       

















Intake and Output  


 


 4/28/18 4/29/18





 19:00 07:00


 


Intake Total 550 ml 120 ml


 


Balance 550 ml 120 ml


 


  


 


Intake Oral  120 ml


 


Other 550 ml 


 


# Voids 3 2








Laboratory Tests


4/29/18 05:33: 


White Blood Count 15.9H, Red Blood Count 4.73, Hemoglobin 13.8, Hematocrit 42.9

, Mean Corpuscular Volume 91, Mean Corpuscular Hemoglobin 29.2, Mean 

Corpuscular Hemoglobin Concent 32.2, Red Cell Distribution Width 15.0H, 

Platelet Count 173, Mean Platelet Volume 7.7, Neutrophils (%) (Auto) 77.6H, 

Lymphocytes (%) (Auto) 16.1L, Monocytes (%) (Auto) 4.5, Eosinophils (%) (Auto) 

1.3, Basophils (%) (Auto) 0.5, Sodium Level 138, Potassium Level 3.6, Chloride 

Level 103, Carbon Dioxide Level 23, Anion Gap 12, Blood Urea Nitrogen 8, 

Creatinine 0.7, Estimat Glomerular Filtration Rate > 60, Glucose Level 113H, 

Calcium Level 9.5


Height (Feet):  5


Height (Inches):  6.00


Weight (Pounds):  129


General Appearance:  lethargic


EENT:  normal ENT inspection


Neck:  normal alignment


Cardiovascular:  normal peripheral pulses, normal rate, regular rhythm


Respiratory/Chest:  chest wall non-tender, decreased breath sounds


Abdomen:  normal bowel sounds, non tender, soft


Extremities:  normal inspection


Edema:  no edema noted Arm (L), no edema noted Arm (R), no edema noted Leg (L), 

no edema noted Leg (R), no edema noted Pedal (L), no edema noted Pedal (R), no 

edema noted Generalized


Neurologic:  responsive, motor weakness


Skin:  normal pigmentation, warm/dry











MAICOL LANG Apr 29, 2018 08:14

## 2018-04-29 NOTE — CONSULTATION
DATE OF CONSULTATION:  04/28/2018



HISTORY OF PRESENT ILLNESS:  The patient is a 62-year-old female patient

with intractable abdominal pain.  This patient is confused and

disorganized.  Mood labile.  She has got no logical plan for own

self-care.  Feelings of helplessness, hopelessness, low energy, poor

appetite, and lost of interest in activity.  She has high levels of

anxiety and she also has some mood lability as well.  That is why, daily

psychiatric consultation is requested for this patient by her attending

physician.



MENTAL STATUS EXAMINATION:  This is a 62-year-old female.  Appearance

disheveled.  Attitude, irritable and agitated.  Affect guarded and

restricted.  Intellect poor.  Mood depressed, anxious.  Motor activity,

psychomotor agitation.  Attention span is poor.  Orientation x2.  Speech

is low volume and slurred.  Thought process, disorganized.  Thought

content, auditory hallucinations and paranoid delusions.  Insight and

judgment is poor.



DIAGNOSIS:  Bipolar 2.



PLAN:  Treat her with Wellbutrin 100 mg daily, Cymbalta 90 mg daily,

Neurontin 300 mg three times a day, Topamax 25 mg twice a day as a mood

stabilizer, and trazodone 200 mg nightly, and 18 to 20 minutes of

supportive psychotherapy provided.  Chart reviewed and discussed with

staff.  Seen and assessed at bedside _____.









  ______________________________________________

  Samantha Rick M.D.





DR:  Oj

D:  04/28/2018 19:18

T:  04/29/2018 01:34

JOB#:  [id number]

CC:

## 2018-04-29 NOTE — GENERAL PROGRESS NOTE
Assessment/Plan


Problem List:  


(1) History of exploratory laparotomy


ICD Codes:  Z98.89 - Other specified postprocedural states


SNOMED:  25411370, 38477155, 286617034


(2) Smoker


ICD Codes:  F17.200 - Nicotine dependence, unspecified, uncomplicated


SNOMED:  15098154


(3) Malignant pleural effusion


ICD Codes:  J91.0 - Malignant pleural effusion


SNOMED:  67006819


(4) Metastatic cancer


ICD Codes:  C79.9 - Secondary malignant neoplasm of unspecified site


SNOMED:  554881222


(5) Lung mass


ICD Codes:  R91.8 - Other nonspecific abnormal finding of lung field


SNOMED:  346012130


(6) Crohns disease


ICD Codes:  K50.90 - Crohns disease


SNOMED:  99926259


Assessment/Plan


fu labs


fu oncology


pain control





Subjective


ROS Limited/Unobtainable:  Yes


Allergies:  


Coded Allergies:  


     No Known Allergies (Verified , 10/27/06)


Subjective


no event





Objective





Last 24 Hour Vital Signs








  Date Time  Temp Pulse Resp B/P (MAP) Pulse Ox O2 Delivery O2 Flow Rate FiO2


 


4/29/18 04:00 97.3 114 16 92/74 95 Room Air  





 97.3       


 


4/29/18 00:00 97.2  16 113/73 100 Room Air  





 97.2       


 


4/28/18 20:00 97.7 113 16 103/73 93 Room Air  





 97.7       


 


4/28/18 16:00 98.8 114 19 129/90 100 Room Air  





 98.8       


 


4/28/18 15:52 98.8       


 


4/28/18 14:53 97.8       


 


4/28/18 12:00 97.8 99 19 127/81 99 Room Air  





 97.8       


 


4/28/18 08:00 98.6 109 18 124/79 100 Room Air  





 98.6       

















Intake and Output  


 


 4/28/18 4/29/18





 19:00 07:00


 


Intake Total 550 ml 120 ml


 


Balance 550 ml 120 ml


 


  


 


Intake Oral  120 ml


 


Other 550 ml 


 


# Voids 3 2








Laboratory Tests


4/29/18 05:33: 


White Blood Count 15.9H, Red Blood Count 4.73, Hemoglobin 13.8, Hematocrit 42.9

, Mean Corpuscular Volume 91, Mean Corpuscular Hemoglobin 29.2, Mean 

Corpuscular Hemoglobin Concent 32.2, Red Cell Distribution Width 15.0H, 

Platelet Count 173, Mean Platelet Volume 7.7, Neutrophils (%) (Auto) 77.6H, 

Lymphocytes (%) (Auto) 16.1L, Monocytes (%) (Auto) 4.5, Eosinophils (%) (Auto) 

1.3, Basophils (%) (Auto) 0.5, Sodium Level 138, Potassium Level 3.6, Chloride 

Level 103, Carbon Dioxide Level 23, Anion Gap 12, Blood Urea Nitrogen 8, 

Creatinine 0.7, Estimat Glomerular Filtration Rate > 60, Glucose Level 113H, 

Calcium Level 9.5


Height (Feet):  5


Height (Inches):  6.00


Weight (Pounds):  129


General Appearance:  alert


EENT:  normal ENT inspection


Neck:  supple


Cardiovascular:  normal rate


Respiratory/Chest:  decreased breath sounds


Abdomen:  normal bowel sounds, non tender, soft


Extremities:  non-tender











LESLY PINA Apr 29, 2018 07:15

## 2018-04-29 NOTE — CARDIOLOGY REPORT
--------------- APPROVED REPORT --------------





EKG Measurement

Heart [id number]

OH 156P36

[id number]

[id number]

[id number]





Sinus tachycardia

Septal infarct, age undetermined

Abnormal ECG

## 2018-04-30 VITALS — SYSTOLIC BLOOD PRESSURE: 131 MMHG | DIASTOLIC BLOOD PRESSURE: 86 MMHG

## 2018-04-30 VITALS — DIASTOLIC BLOOD PRESSURE: 80 MMHG | SYSTOLIC BLOOD PRESSURE: 108 MMHG

## 2018-04-30 VITALS — DIASTOLIC BLOOD PRESSURE: 70 MMHG | SYSTOLIC BLOOD PRESSURE: 99 MMHG

## 2018-04-30 VITALS — SYSTOLIC BLOOD PRESSURE: 129 MMHG | DIASTOLIC BLOOD PRESSURE: 74 MMHG

## 2018-04-30 VITALS — DIASTOLIC BLOOD PRESSURE: 77 MMHG | SYSTOLIC BLOOD PRESSURE: 116 MMHG

## 2018-04-30 VITALS — DIASTOLIC BLOOD PRESSURE: 99 MMHG | SYSTOLIC BLOOD PRESSURE: 141 MMHG

## 2018-04-30 VITALS — DIASTOLIC BLOOD PRESSURE: 82 MMHG | SYSTOLIC BLOOD PRESSURE: 144 MMHG

## 2018-04-30 LAB
ADD MANUAL DIFF: NO
ANION GAP SERPL CALC-SCNC: 10 MMOL/L (ref 5–15)
BASOPHILS NFR BLD AUTO: 0.5 % (ref 0–2)
BUN SERPL-MCNC: 15 MG/DL (ref 7–18)
CALCIUM SERPL-MCNC: 10 MG/DL (ref 8.5–10.1)
CHLORIDE SERPL-SCNC: 104 MMOL/L (ref 98–107)
CO2 SERPL-SCNC: 25 MMOL/L (ref 21–32)
CREAT SERPL-MCNC: 0.8 MG/DL (ref 0.55–1.3)
EOSINOPHIL NFR BLD AUTO: 0.9 % (ref 0–3)
ERYTHROCYTE [DISTWIDTH] IN BLOOD BY AUTOMATED COUNT: 15.1 % (ref 11.6–14.8)
HCT VFR BLD CALC: 38.1 % (ref 37–47)
HGB BLD-MCNC: 13 G/DL (ref 12–16)
LYMPHOCYTES NFR BLD AUTO: 16.5 % (ref 20–45)
MCV RBC AUTO: 90 FL (ref 80–99)
MONOCYTES NFR BLD AUTO: 5.3 % (ref 1–10)
NEUTROPHILS NFR BLD AUTO: 76.8 % (ref 45–75)
PLATELET # BLD: 193 K/UL (ref 150–450)
POTASSIUM SERPL-SCNC: 3.5 MMOL/L (ref 3.5–5.1)
RBC # BLD AUTO: 4.23 M/UL (ref 4.2–5.4)
SODIUM SERPL-SCNC: 139 MMOL/L (ref 136–145)
WBC # BLD AUTO: 15.7 K/UL (ref 4.8–10.8)

## 2018-04-30 PROCEDURE — 5A09457 ASSISTANCE WITH RESPIRATORY VENTILATION, 24-96 CONSECUTIVE HOURS, CONTINUOUS POSITIVE AIRWAY PRESSURE: ICD-10-PCS

## 2018-04-30 RX ADMIN — THEOPHYLLINE ANHYDROUS SCH MG: 100 CAPSULE, EXTENDED RELEASE ORAL at 09:29

## 2018-04-30 RX ADMIN — DEXTROSE AND SODIUM CHLORIDE SCH MLS/HR: 5; .45 INJECTION, SOLUTION INTRAVENOUS at 13:40

## 2018-04-30 RX ADMIN — LEVALBUTEROL HYDROCHLORIDE SCH MG: 1.25 SOLUTION, CONCENTRATE RESPIRATORY (INHALATION) at 23:51

## 2018-04-30 RX ADMIN — HEPARIN SODIUM SCH UNITS: 5000 INJECTION INTRAVENOUS; SUBCUTANEOUS at 09:00

## 2018-04-30 RX ADMIN — TOPIRAMATE SCH MG: 25 TABLET, COATED ORAL at 09:30

## 2018-04-30 RX ADMIN — LEVALBUTEROL HYDROCHLORIDE SCH MG: 1.25 SOLUTION, CONCENTRATE RESPIRATORY (INHALATION) at 19:51

## 2018-04-30 RX ADMIN — LEVALBUTEROL HYDROCHLORIDE SCH MG: 1.25 SOLUTION, CONCENTRATE RESPIRATORY (INHALATION) at 15:44

## 2018-04-30 RX ADMIN — DEXTROSE AND SODIUM CHLORIDE SCH MLS/HR: 5; .45 INJECTION, SOLUTION INTRAVENOUS at 00:04

## 2018-04-30 NOTE — GENERAL SURGERY PROGRESS NOTE
General Surgery-Progress Note


Subjective


Additional Comments


no acute events.  comfortable





Objective





Last 24 Hour Vital Signs








  Date Time  Temp Pulse Resp B/P (MAP) Pulse Ox O2 Delivery O2 Flow Rate FiO2


 


4/30/18 12:00  124      


 


4/30/18 12:00 98.2 119 12 131/86 100 Nasal Cannula 2.0 





 98.2       


 


4/30/18 11:23  126 24  99 Nasal Cannula 2.0 28


 


4/30/18 11:22      Nasal Cannula 2.0 28


 


4/30/18 11:22     95 Nasal Cannula 2.0 28


 


4/30/18 11:14  126 20   Nasal Cannula 2.0 28


 


4/30/18 11:14  126 20  95 Nasal Cannula 2.0 28


 


4/30/18 08:00  125      


 


4/30/18 08:00 98.2 124 14 116/77 100 Nasal Cannula 2.0 





 98.2       


 


4/30/18 04:00 98.1 122 20 129/74 99 Room Air 4.0 





 98.1       


 


4/30/18 04:00  124      


 


4/30/18 00:00  120      


 


4/30/18 00:00 97.5 119 20 108/80 99 Room Air 4.0 





 97.5       


 


4/29/18 20:00  131      


 


4/29/18 20:00 98.0 72 20 110/62 100 Room Air 4.0 





 98.0       


 


4/29/18 16:00 98.0 131 21 118/74 95 Room Air 4.0 





 98.0       


 


4/29/18 16:00  131      








I&O











Intake and Output  


 


 4/29/18 4/30/18





 19:00 07:00


 


Intake Total 1400 ml 895 ml


 


Output Total  0 ml


 


Balance 1400 ml 895 ml


 


  


 


IV Total 600 ml 895 ml


 


Other 800 ml 


 


Output Urine Total  0 ml


 


# Voids 3 








Drains:  none


Cardiovascular:  RSR


Respiratory:  clear


Abdomen:  soft, distended, non-tender, present bowel sounds


Extremities:  no edema, no tenderness, no cyanosis





Laboratory Tests








Test


  4/29/18


17:05 4/30/18


03:50


 


Troponin I


  0.093 ng/mL


(0.000-0.056) 0.298 ng/mL


(0.000-0.056)


 


Thyroid Stimulating Hormone


(TSH) 0.503 uiU/mL


(0.358-3.740) 


 


 


Free Thyroxine


  1.31 NG/DL


(0.76-1.46) 


 


 


White Blood Count


  


  15.7 K/UL


(4.8-10.8)  H


 


Red Blood Count


  


  4.23 M/UL


(4.20-5.40)


 


Hemoglobin


  


  13.0 G/DL


(12.0-16.0)


 


Hematocrit


  


  38.1 %


(37.0-47.0)


 


Mean Corpuscular Volume  90 FL (80-99)  


 


Mean Corpuscular Hemoglobin


  


  30.8 PG


(27.0-31.0)


 


Mean Corpuscular Hemoglobin


Concent 


  34.2 G/DL


(32.0-36.0)


 


Red Cell Distribution Width


  


  15.1 %


(11.6-14.8)  H


 


Platelet Count


  


  193 K/UL


(150-450)


 


Mean Platelet Volume


  


  7.4 FL


(6.5-10.1)


 


Neutrophils (%) (Auto)


  


  76.8 %


(45.0-75.0)  H


 


Lymphocytes (%) (Auto)


  


  16.5 %


(20.0-45.0)  L


 


Monocytes (%) (Auto)


  


  5.3 %


(1.0-10.0)


 


Eosinophils (%) (Auto)


  


  0.9 %


(0.0-3.0)


 


Basophils (%) (Auto)


  


  0.5 %


(0.0-2.0)


 


Sodium Level


  


  139 MMOL/L


(136-145)


 


Potassium Level


  


  3.5 MMOL/L


(3.5-5.1)


 


Chloride Level


  


  104 MMOL/L


()


 


Carbon Dioxide Level


  


  25 MMOL/L


(21-32)


 


Anion Gap


  


  10 mmol/L


(5-15)


 


Blood Urea Nitrogen


  


  15 mg/dL


(7-18)


 


Creatinine


  


  0.8 MG/DL


(0.55-1.30)


 


Estimat Glomerular Filtration


Rate 


  > 60 mL/min


(>60)


 


Glucose Level


  


  118 MG/DL


()  H


 


Calcium Level


  


  10.0 MG/DL


(8.5-10.1)











Plan


Problems:  


(1) Intractable abdominal pain


Assessment & Plan:  62F with abdominal pain.  very complex medical and surgical 

history.  now with complex CT findings of left pleural effusion, left lower 

lung mass, large mediastinal and periaortic lymph nodes, new liver lesions, and 

air around the liver.  


unable to tell if bowel loop (possible blind end from prior surgery) noted in 

right upper quadrant above liver or if abscess or if free air.  exam not 

consistent with perforation and no significant free fluid noted on CT or area 

of perforation.  contrast into distal bowel without leak.  initial leukocytosis 

resolved.  low grade persistent fevers.  recent cough.  





unfortunately no clear explanation as to patients current condition.  lung mass

, effusion, new liver masses, and nodes very concerning for malignant process. 

Exam stable compared to prior exams (patient seen during last admission and 

known to me).  will need much more extensive work up. 





I discussed these findings with patient.  I explained possible need for urgent 

surgery but given history and complex surgical history we will monitor 

clinically first.  if declines will proceed with surgery which is VERY high 

risk in her.  if stable will continue with work up.  patient and partner 

express understand and agree with plan.  





s/p thoracentesis 4/20. malignant non small cell cancer 





CT reviewed.  Path reviewed


Discussed with patient


d/c planning.  comfort care 


-regular diet 


-monitor exam clinically


-trend labs


-iv abx


-will follow with recs. thank you for this consultation.  














Darrius Benitez Apr 30, 2018 14:46

## 2018-04-30 NOTE — PROGRESS NOTE 2 SIG
DATE:  04/30/2018



The patient was seen by Dr. Shetty.  The intrathecal pump was

interrogated.  Pump was reduced from 12.5 to 11 mg per day of morphine, 

interrogation  date is 10 days from now.  Residual volume was 4.7.







  ______________________________________________

  Behnoush Zarrini, M.D.





  ______________________________________________

  TRIP Ramirez





DR:  MARITZA

D:  04/30/2018 17:25

T:  04/30/2018 21:59

JOB#:  [id number]

CC:



MEL

## 2018-04-30 NOTE — GENERAL PROGRESS NOTE
Assessment/Plan


Assessment/Plan


IMPRESSION:


1. Left lower lobe lung mass.


2. Left inferior hilar mass.


3. Left pleural base lung mass.


4. Left pleural effusion.


5. Retroperitoneal lymphadenopathy.


6. Multiple low-attenuation liver lesions.


7. Crohn disease.


8. Opioid dependence.


9. Chronic obstructive pulmonary disease.


10. History of smoking.


11. Emphysema.


12. Perianal abscess.


13. Intractable abdominal pain.


14. Status post morphine pump placement.


15. Depression.


16. Psychosis.


17. Seizure disorder.


18. Radiculopathy of lumbar region.


19. Abdominal pain.


20. Nausea and vomiting.


21. Status post exploratory laparotomy.


22. Pneumoperitoneum.


23. Inflammatory bowel disease.


24. History of elevated CEA.


25. History of lower gastrointestinal bleed.


26. Chronic pain syndrome.


27. History of small bowel obstruction.


28. Status post laparotomy x2.


29. Failure to thrive.


30. Leukocytosis.


31. Tachycardia. 





RECOMMENDATIONS:


1. Watch count.


2. Watch coagulopathy.


3. Paracentesis with cytology.


4. Pulmonary evaluation/followup.


5. Mediastinoscopy versus CT-guided lung biopsy.


6. The patient needs tissue diagnosis.


7. ID followup.


8. Pain control.


9. Psychiatry evaluation.


10. Skin care.


11. Nutrition.


12. Continue current treatment.


13. Discussed with staff.


14. Close followup





Subjective


Date patient seen:  Apr 29, 2018


Constitutional:  Denies: no symptoms, chills, diaphoresis, fever, malaise, 

weakness, other


HEENT:  Denies: no symptoms, eye pain, blurred vision, tearing, double vision, 

ear pain, ear discharge, nose pain, nose congestion, throat pain, throat 

swelling, mouth pain, mouth swelling, other


Cardiovascular:  Denies: no symptoms, chest pain, edema, irregular heart rate, 

lightheadedness, palpitations, syncope, other


Respiratory:  Denies: no symptoms, cough, orthopnea, shortness of breath, SOB 

with excertion, SOB at rest, sputum, stridor, wheezing, other


Gastrointestinal/Abdominal:  Denies: no symptoms, abdomen distended, abdominal 

pain, black stools, tarry stools, blood in stool, constipated, diarrhea, 

difficulty swallowing, nausea, poor appetite, poor fluid intake, rectal bleeding

, vomiting, other


Genitourinary:  Denies: no symptoms, burning, discharge, frequency, flank pain, 

hematuria, incontinence, pain, urgency, other


Neurologic/Psychiatric:  Denies: no symptoms, anxiety, depressed, emotional 

problems, headache, numbness, paresthesia, pre-existing deficit, seizure, 

tingling, tremors, weakness, other


Hematologic/Lymphatic:  Reports: anemia


Allergies:  


Coded Allergies:  


     No Known Allergies (Verified , 10/27/06)


Subjective


On pain management. Metastatic lung mass. Tachycardia





Objective





Last 24 Hour Vital Signs








  Date Time  Temp Pulse Resp B/P (MAP) Pulse Ox O2 Delivery O2 Flow Rate FiO2


 


4/30/18 12:00  124      


 


4/30/18 12:00 98.2 119 12 131/86 100 Nasal Cannula 2.0 





 98.2       


 


4/30/18 11:23  126 24  99 Nasal Cannula 2.0 28


 


4/30/18 11:22      Nasal Cannula 2.0 28


 


4/30/18 11:22     95 Nasal Cannula 2.0 28


 


4/30/18 11:14  126 20   Nasal Cannula 2.0 28


 


4/30/18 11:14  126 20  95 Nasal Cannula 2.0 28


 


4/30/18 08:00  125      


 


4/30/18 08:00 98.2 124 14 116/77 100 Nasal Cannula 2.0 





 98.2       


 


4/30/18 04:00 98.1 122 20 129/74 99 Room Air 4.0 





 98.1       


 


4/30/18 04:00  124      


 


4/30/18 00:00  120      


 


4/30/18 00:00 97.5 119 20 108/80 99 Room Air 4.0 





 97.5       


 


4/29/18 20:00  131      


 


4/29/18 20:00 98.0 72 20 110/62 100 Room Air 4.0 





 98.0       


 


4/29/18 16:00 98.0 131 21 118/74 95 Room Air 4.0 





 98.0       


 


4/29/18 16:00  131      

















Intake and Output  


 


 4/29/18 4/30/18





 19:00 07:00


 


Intake Total 1400 ml 895 ml


 


Output Total  0 ml


 


Balance 1400 ml 895 ml


 


  


 


IV Total 600 ml 895 ml


 


Other 800 ml 


 


Output Urine Total  0 ml


 


# Voids 3 








Laboratory Tests


4/29/18 17:05: 


Troponin I 0.093H, Thyroid Stimulating Hormone (TSH) 0.503, Free Thyroxine 1.31


4/30/18 03:50: 


Troponin I 0.298H, White Blood Count 15.7H, Red Blood Count 4.23, Hemoglobin 

13.0, Hematocrit 38.1, Mean Corpuscular Volume 90, Mean Corpuscular Hemoglobin 

30.8, Mean Corpuscular Hemoglobin Concent 34.2, Red Cell Distribution Width 

15.1H, Platelet Count 193, Mean Platelet Volume 7.4, Neutrophils (%) (Auto) 

76.8H, Lymphocytes (%) (Auto) 16.5L, Monocytes (%) (Auto) 5.3, Eosinophils (%) (

Auto) 0.9, Basophils (%) (Auto) 0.5, Sodium Level 139, Potassium Level 3.5, 

Chloride Level 104, Carbon Dioxide Level 25, Anion Gap 10, Blood Urea Nitrogen 

15, Creatinine 0.8, Estimat Glomerular Filtration Rate > 60, Glucose Level 118H

, Calcium Level 10.0


Height (Feet):  5


Height (Inches):  6.00


Weight (Pounds):  129


General Appearance:  lethargic


Neck:  supple


Cardiovascular:  tachycardia


Respiratory/Chest:  decreased breath sounds











Erick Russell MD Apr 30, 2018 13:30

## 2018-04-30 NOTE — GENERAL PROGRESS NOTE
Assessment/Plan


Assessment/Plan


(1) Chronic abdominal pain


(2) Chronic pancreatitis


(3) Crohn's disease 


(4) Herniated nucleus pulposus, lumbar


(5) Lumbar spondylosis


(6) Radiculopathy of lumbar region


(7) Lung mass


Pt will be continued on Neurontin, Dilaudid and Norco and pt has intrathecal 

pump.


HOLD OPIOIDS FOR OVERSEDATION OR SBP<90 OR DBP<60 OR O2SAT<92% OR RR<12


Pt was d/w Dr. Shetty and he concurred.





Subjective


Date patient seen:  Apr 30, 2018


Time patient seen:  08:15 - am


Allergies:  


Coded Allergies:  


     No Known Allergies (Verified , 10/27/06)


Subjective


Constitutional:  Reports: weakness


HEENT:  Reports: no symptoms


Cardiovascular:  Reports: no symptoms


Respiratory:  Reports: no symptoms


Gastrointestinal/Abdominal:  Reports: abdominal pain


Genitourinary:  Reports: no symptoms


Neurologic/Psychiatric:  Reports: weakness


Endocrine:  Reports: no symptoms


Hematologic/Lymphatic:  Reports: no symptoms





Subjective


Patient has been tolerating pain. Still c/o weakness eating poorly. One 

Dilaudid dose given in the last 24hrs.





Objective





Last 24 Hour Vital Signs








  Date Time  Temp Pulse Resp B/P (MAP) Pulse Ox O2 Delivery O2 Flow Rate FiO2


 


4/30/18 08:00 98.2 124 14 116/77 100 Nasal Cannula 2.0 





 98.2       


 


4/30/18 04:00 98.1 122 20 129/74 99 Room Air 4.0 





 98.1       


 


4/30/18 04:00  124      


 


4/30/18 00:00  120      


 


4/30/18 00:00 97.5 119 20 108/80 99 Room Air 4.0 





 97.5       


 


4/29/18 20:00  131      


 


4/29/18 20:00 98.0 72 20 110/62 100 Room Air 4.0 





 98.0       


 


4/29/18 16:00 98.0 131 21 118/74 95 Room Air 4.0 





 98.0       


 


4/29/18 16:00  131      


 


4/29/18 12:27 97.4       


 


4/29/18 12:00  127      


 


4/29/18 12:00 208.4 127 21 139/77 95 Room Air 4.0 





 208.4       


 


4/29/18 11:57 98.0       

















Intake and Output  


 


 4/29/18 4/30/18





 19:00 07:00


 


Intake Total 1400 ml 895 ml


 


Output Total  0 ml


 


Balance 1400 ml 895 ml


 


  


 


IV Total 600 ml 895 ml


 


Other 800 ml 


 


Output Urine Total  0 ml


 


# Voids 3 








Laboratory Tests


4/29/18 17:05: 


Troponin I 0.093H, Thyroid Stimulating Hormone (TSH) 0.503, Free Thyroxine 1.31


4/30/18 03:50: 


Troponin I 0.298H, White Blood Count 15.7H, Red Blood Count 4.23, Hemoglobin 

13.0, Hematocrit 38.1, Mean Corpuscular Volume 90, Mean Corpuscular Hemoglobin 

30.8, Mean Corpuscular Hemoglobin Concent 34.2, Red Cell Distribution Width 

15.1H, Platelet Count 193, Mean Platelet Volume 7.4, Neutrophils (%) (Auto) 

76.8H, Lymphocytes (%) (Auto) 16.5L, Monocytes (%) (Auto) 5.3, Eosinophils (%) (

Auto) 0.9, Basophils (%) (Auto) 0.5, Sodium Level 139, Potassium Level 3.5, 

Chloride Level 104, Carbon Dioxide Level 25, Anion Gap 10, Blood Urea Nitrogen 

15, Creatinine 0.8, Estimat Glomerular Filtration Rate > 60, Glucose Level 118H

, Calcium Level 10.0


Height (Feet):  5


Height (Inches):  6.00


Weight (Pounds):  129


Objective


General Appearance:  no apparent distress, alert


EENT:  normal ENT inspection, TMs normal


Neck:  normal alignment, supple


Cardiovascular:  normal rate, regular rhythm


Respiratory/Chest:  decreased breath sounds


Abdomen:  tender, distended, intrathecal pump palpated


Extremities:  non-tender


Edema:  no edema noted 


Neurologic:  alert, oriented x 3


Skin:  warm/dry











LEA BRAVO PALFONSO Apr 30, 2018 09:09

## 2018-04-30 NOTE — CARDIAC ELECTROPHYSIOLOGY PN
Assessment/Plan


Assessment/Plan


1. Sinus tachycardia with no evidence of atrial fibrillation or SVT, likely due 

to the patient's pain and respiratory failure. 


  Echocardiogram showed EF 65%. ECG today also sinus tach


2. Malignant pleural effusion and metastatic cancer.   ? Source. Follow up Dr. Fraga


3. Lung mass.   


4. Crohn disease and history of exploratory laparotomy, under managementof Dr. Mcleod.


5. Ascites,   paracentesis with cytology and also awaiting


mediastinoscopy versus CT-guided lung biopsy as recommended by Dr. Russell.


6. Still full Code per RN





DW RN





Subjective


Subjective


Altered in SOB and in sinus tach 120-130s.





Objective





Last 24 Hour Vital Signs








  Date Time  Temp Pulse Resp B/P (MAP) Pulse Ox O2 Delivery O2 Flow Rate FiO2


 


4/30/18 08:00  125      


 


4/30/18 08:00 98.2 124 14 116/77 100 Nasal Cannula 2.0 





 98.2       


 


4/30/18 04:00 98.1 122 20 129/74 99 Room Air 4.0 





 98.1       


 


4/30/18 04:00  124      


 


4/30/18 00:00  120      


 


4/30/18 00:00 97.5 119 20 108/80 99 Room Air 4.0 





 97.5       


 


4/29/18 20:00  131      


 


4/29/18 20:00 98.0 72 20 110/62 100 Room Air 4.0 





 98.0       


 


4/29/18 16:00 98.0 131 21 118/74 95 Room Air 4.0 





 98.0       


 


4/29/18 16:00  131      


 


4/29/18 12:27 97.4       


 


4/29/18 12:00  127      


 


4/29/18 12:00 208.4 127 21 139/77 95 Room Air 4.0 





 208.4       


 


4/29/18 11:57 98.0       

















Intake and Output  


 


 4/29/18 4/30/18





 19:00 07:00


 


Intake Total 1400 ml 895 ml


 


Output Total  0 ml


 


Balance 1400 ml 895 ml


 


  


 


IV Total 600 ml 895 ml


 


Other 800 ml 


 


Output Urine Total  0 ml


 


# Voids 3 











Laboratory Tests








Test


  4/29/18


17:05 4/30/18


03:50


 


Troponin I


  0.093 ng/mL


(0.000-0.056) 0.298 ng/mL


(0.000-0.056)


 


Thyroid Stimulating Hormone


(TSH) 0.503 uiU/mL


(0.358-3.740) 


 


 


Free Thyroxine


  1.31 NG/DL


(0.76-1.46) 


 


 


White Blood Count


  


  15.7 K/UL


(4.8-10.8)  H


 


Red Blood Count


  


  4.23 M/UL


(4.20-5.40)


 


Hemoglobin


  


  13.0 G/DL


(12.0-16.0)


 


Hematocrit


  


  38.1 %


(37.0-47.0)


 


Mean Corpuscular Volume  90 FL (80-99)  


 


Mean Corpuscular Hemoglobin


  


  30.8 PG


(27.0-31.0)


 


Mean Corpuscular Hemoglobin


Concent 


  34.2 G/DL


(32.0-36.0)


 


Red Cell Distribution Width


  


  15.1 %


(11.6-14.8)  H


 


Platelet Count


  


  193 K/UL


(150-450)


 


Mean Platelet Volume


  


  7.4 FL


(6.5-10.1)


 


Neutrophils (%) (Auto)


  


  76.8 %


(45.0-75.0)  H


 


Lymphocytes (%) (Auto)


  


  16.5 %


(20.0-45.0)  L


 


Monocytes (%) (Auto)


  


  5.3 %


(1.0-10.0)


 


Eosinophils (%) (Auto)


  


  0.9 %


(0.0-3.0)


 


Basophils (%) (Auto)


  


  0.5 %


(0.0-2.0)


 


Sodium Level


  


  139 MMOL/L


(136-145)


 


Potassium Level


  


  3.5 MMOL/L


(3.5-5.1)


 


Chloride Level


  


  104 MMOL/L


()


 


Carbon Dioxide Level


  


  25 MMOL/L


(21-32)


 


Anion Gap


  


  10 mmol/L


(5-15)


 


Blood Urea Nitrogen


  


  15 mg/dL


(7-18)


 


Creatinine


  


  0.8 MG/DL


(0.55-1.30)


 


Estimat Glomerular Filtration


Rate 


  > 60 mL/min


(>60)


 


Glucose Level


  


  118 MG/DL


()  H


 


Calcium Level


  


  10.0 MG/DL


(8.5-10.1)








Objective





HEAD AND NECK:  No JVD.


LUNGS:  Clear.


CARDIOVASCULAR:  Regular, S1 and S2.  Tachycardic.


ABDOMEN:  Tender with a pump under skin.


EXTREMITIES:  No pitting edema.











Delon Chawla MD Apr 30, 2018 10:50

## 2018-04-30 NOTE — GENERAL PROGRESS NOTE
Assessment/Plan


Problem List:  


(1) Crohn's disease


ICD Codes:  K50.90 - Crohn's disease


SNOMED:  40013611


Qualifiers:  


   Qualified Codes:  K50.919 - Crohn's disease, unspecified, with unspecified 

complications


(2) Opioid dependence


ICD Codes:  F11.20 - Opioid dependence


SNOMED:  31579053


(3) Intractable abdominal pain


ICD Codes:  R10.9 - Unspecified abdominal pain


SNOMED:  41801454, 090175664


(4) COPD (chronic obstructive pulmonary disease)


ICD Codes:  J44.9 - Chronic obstructive pulmonary disease


SNOMED:  22794506


(5) Shortness of breath


(6) SOB (shortness of breath)


ICD Codes:  R06.02 - Shortness of breath


SNOMED:  654447352


(7) UTI (urinary tract infection)


ICD Codes:  N39.0 - Urinary tract infection, site not specified


SNOMED:  11228613


(8) Lung mass


ICD Codes:  R91.8 - Other nonspecific abnormal finding of lung field


SNOMED:  416727786


(9) Tachycardia


ICD Codes:  R00.0 - Tachycardia, unspecified


SNOMED:  4662790


Status:  unchanged


Assessment/Plan


ot pt diet pain control sx eval cbc bmp am heme f/u cardio eval,





Subjective


Constitutional:  Reports: weakness


Respiratory:  Reports: SOB with excertion


Allergies:  


Coded Allergies:  


     No Known Allergies (Verified , 10/27/06)


All Systems:  reviewed and negative except above


Subjective


sl anxious in bed





Objective





Last 24 Hour Vital Signs








  Date Time  Temp Pulse Resp B/P (MAP) Pulse Ox O2 Delivery O2 Flow Rate FiO2


 


4/30/18 12:00  124      


 


4/30/18 12:00 98.2 119 12 131/86 100 Nasal Cannula 2.0 





 98.2       


 


4/30/18 11:23  126 24  99 Nasal Cannula 2.0 28


 


4/30/18 11:22      Nasal Cannula 2.0 28


 


4/30/18 11:22     95 Nasal Cannula 2.0 28


 


4/30/18 11:14  126 20   Nasal Cannula 2.0 28


 


4/30/18 11:14  126 20  95 Nasal Cannula 2.0 28


 


4/30/18 08:00  125      


 


4/30/18 08:00 98.2 124 14 116/77 100 Nasal Cannula 2.0 





 98.2       


 


4/30/18 04:00 98.1 122 20 129/74 99 Room Air 4.0 





 98.1       


 


4/30/18 04:00  124      


 


4/30/18 00:00  120      


 


4/30/18 00:00 97.5 119 20 108/80 99 Room Air 4.0 





 97.5       


 


4/29/18 20:00  131      


 


4/29/18 20:00 98.0 72 20 110/62 100 Room Air 4.0 





 98.0       


 


4/29/18 16:00 98.0 131 21 118/74 95 Room Air 4.0 





 98.0       


 


4/29/18 16:00  131      

















Intake and Output  


 


 4/29/18 4/30/18





 19:00 07:00


 


Intake Total 1400 ml 895 ml


 


Output Total  0 ml


 


Balance 1400 ml 895 ml


 


  


 


IV Total 600 ml 895 ml


 


Other 800 ml 


 


Output Urine Total  0 ml


 


# Voids 3 








Laboratory Tests


4/29/18 17:05: 


Troponin I 0.093H, Thyroid Stimulating Hormone (TSH) 0.503, Free Thyroxine 1.31


4/30/18 03:50: 


Troponin I 0.298H, White Blood Count 15.7H, Red Blood Count 4.23, Hemoglobin 

13.0, Hematocrit 38.1, Mean Corpuscular Volume 90, Mean Corpuscular Hemoglobin 

30.8, Mean Corpuscular Hemoglobin Concent 34.2, Red Cell Distribution Width 

15.1H, Platelet Count 193, Mean Platelet Volume 7.4, Neutrophils (%) (Auto) 

76.8H, Lymphocytes (%) (Auto) 16.5L, Monocytes (%) (Auto) 5.3, Eosinophils (%) (

Auto) 0.9, Basophils (%) (Auto) 0.5, Sodium Level 139, Potassium Level 3.5, 

Chloride Level 104, Carbon Dioxide Level 25, Anion Gap 10, Blood Urea Nitrogen 

15, Creatinine 0.8, Estimat Glomerular Filtration Rate > 60, Glucose Level 118H

, Calcium Level 10.0


Height (Feet):  5


Height (Inches):  6.00


Weight (Pounds):  129


General Appearance:  lethargic


EENT:  normal ENT inspection


Neck:  normal alignment


Cardiovascular:  normal peripheral pulses, normal rate, regular rhythm


Respiratory/Chest:  chest wall non-tender, decreased breath sounds


Abdomen:  normal bowel sounds, non tender, soft


Extremities:  normal inspection


Edema:  no edema noted Arm (L), no edema noted Arm (R), no edema noted Leg (L), 

no edema noted Leg (R), no edema noted Pedal (L), no edema noted Pedal (R), no 

edema noted Generalized


Neurologic:  motor weakness


Skin:  normal pigmentation, warm/dry











MAICOL LANG Apr 30, 2018 13:19

## 2018-04-30 NOTE — CARDIOLOGY REPORT
--------------- APPROVED REPORT --------------





EXAM: Two-dimensional and M-mode echocardiogram with Doppler and color 

Doppler.



INDICATION

Tachycardia



M-Mode DIMENSIONS 

IVSd0.9 (0.7-1.1cm)Left Atrium (MM)3.4 (1.6-4.0cm)

LVDd2.0 (3.5-5.6cm)Aortic Root2.7 (2.0-3.7cm)

PWd1.1 (0.7-1.1cm)Aortic Cusp Exc.1.9 (1.5-2.0cm)



LVDs1.5 (2.5-4.0cm)

PWs1.4 cm





Technically difficult and limited study due to poor apical windows.

Study quality precludes accurate  assessment of regional wall motion.

Normal left ventricular chamber size, systolic function and wall motion.

Left ventricular ejection fraction estimated to be 65 %.

No evidence of left ventricular hypertrophy.

Anterior Echo-free space, may be due to pericardial fat or effusion.

All other cardiac chamber sizes are within normal limits. 

Focal aortic valve sclerosis with adequate cusp excursion.

Thickened mitral valve leaflets with normal excursion.

Mild mitral annulus and aortic root calcification.

Pulmonic valve not well visualized.

Normal tricuspid valve structure. 

IVC measures at 1.9 cm with physiological collapse. 



A  color flow and spectral Doppler study was performed and revealed:

Trace aortic insufficiency.

Mild mitral regurgitation.

Mitral diastolic velocities suggest mild left ventricular diastolic dysfunction (Grade 

I). 

Mild tricuspid regurgitation.

Tricuspid  systolic velocities suggests peak right ventricular systolic pressure of 92 

mmHg, consistent with severe pulmonary hypertension.

No pulmonic regurgitation present.

## 2018-04-30 NOTE — GENERAL PROGRESS NOTE
Assessment/Plan


Assessment/Plan


IMPRESSION:


1. Left lower lobe lung mass.


2. Left inferior hilar mass.


3. Left pleural base lung mass.


4. Left pleural effusion.


5. Retroperitoneal lymphadenopathy.


6. Multiple low-attenuation liver lesions.


7. Crohn disease.


8. Opioid dependence.


9. Chronic obstructive pulmonary disease.


10. History of smoking.


11. Emphysema.


12. Perianal abscess.


13. Intractable abdominal pain.


14. Status post morphine pump placement.


15. Depression.


16. Psychosis.


17. Seizure disorder.


18. Radiculopathy of lumbar region.


19. Abdominal pain.


20. Nausea and vomiting.


21. Status post exploratory laparotomy.


22. Pneumoperitoneum.


23. Inflammatory bowel disease.


24. History of elevated CEA.


25. History of lower gastrointestinal bleed.


26. Chronic pain syndrome.


27. History of small bowel obstruction.


28. Status post laparotomy x2.


29. Failure to thrive.


30. Leukocytosis.





RECOMMENDATIONS:


1. Watch count.


2. Watch coagulopathy.


3. Paracentesis with cytology.


4. Pulmonary evaluation/followup.


5. Mediastinoscopy versus CT-guided lung biopsy.


6. The patient needs tissue diagnosis.


7. ID followup.


8. Pain control.


9. Psychiatry evaluation.


10. Skin care.


11. Nutrition.


12. Continue current treatment.


13. Discussed with staff.


14. Close followup





Subjective


Date patient seen:  Apr 29, 2018


Constitutional:  Denies: no symptoms, chills, diaphoresis, fever, malaise, 

weakness, other


HEENT:  Denies: no symptoms, eye pain, blurred vision, tearing, double vision, 

ear pain, ear discharge, nose pain, nose congestion, throat pain, throat 

swelling, mouth pain, mouth swelling, other


Cardiovascular:  Denies: no symptoms, chest pain, edema, irregular heart rate, 

lightheadedness, palpitations, syncope, other


Respiratory:  Denies: no symptoms, cough, orthopnea, shortness of breath, SOB 

with excertion, SOB at rest, sputum, stridor, wheezing, other


Gastrointestinal/Abdominal:  Denies: no symptoms, abdomen distended, abdominal 

pain, black stools, tarry stools, blood in stool, constipated, diarrhea, 

difficulty swallowing, nausea, poor appetite, poor fluid intake, rectal bleeding

, vomiting, other


Genitourinary:  Denies: no symptoms, burning, discharge, frequency, flank pain, 

hematuria, incontinence, pain, urgency, other


Neurologic/Psychiatric:  Denies: no symptoms, anxiety, depressed, emotional 

problems, headache, numbness, paresthesia, pre-existing deficit, seizure, 

tingling, tremors, weakness, other


Hematologic/Lymphatic:  Reports: anemia


Allergies:  


Coded Allergies:  


     No Known Allergies (Verified , 10/27/06)


Subjective


On pain management. Leukocytosis. No acute events.





Objective





Last 24 Hour Vital Signs








  Date Time  Temp Pulse Resp B/P (MAP) Pulse Ox O2 Delivery O2 Flow Rate FiO2


 


4/30/18 12:00  124      


 


4/30/18 12:00 98.2 119 12 131/86 100 Nasal Cannula 2.0 





 98.2       


 


4/30/18 11:23  126 24  99 Nasal Cannula 2.0 28


 


4/30/18 11:22      Nasal Cannula 2.0 28


 


4/30/18 11:22     95 Nasal Cannula 2.0 28


 


4/30/18 11:14  126 20   Nasal Cannula 2.0 28


 


4/30/18 11:14  126 20  95 Nasal Cannula 2.0 28


 


4/30/18 08:00  125      


 


4/30/18 08:00 98.2 124 14 116/77 100 Nasal Cannula 2.0 





 98.2       


 


4/30/18 04:00 98.1 122 20 129/74 99 Room Air 4.0 





 98.1       


 


4/30/18 04:00  124      


 


4/30/18 00:00  120      


 


4/30/18 00:00 97.5 119 20 108/80 99 Room Air 4.0 





 97.5       


 


4/29/18 20:00  131      


 


4/29/18 20:00 98.0 72 20 110/62 100 Room Air 4.0 





 98.0       


 


4/29/18 16:00 98.0 131 21 118/74 95 Room Air 4.0 





 98.0       


 


4/29/18 16:00  131      

















Intake and Output  


 


 4/29/18 4/30/18





 19:00 07:00


 


Intake Total 1400 ml 895 ml


 


Output Total  0 ml


 


Balance 1400 ml 895 ml


 


  


 


IV Total 600 ml 895 ml


 


Other 800 ml 


 


Output Urine Total  0 ml


 


# Voids 3 








Laboratory Tests


4/29/18 17:05: 


Troponin I 0.093H, Thyroid Stimulating Hormone (TSH) 0.503, Free Thyroxine 1.31


4/30/18 03:50: 


Troponin I 0.298H, White Blood Count 15.7H, Red Blood Count 4.23, Hemoglobin 

13.0, Hematocrit 38.1, Mean Corpuscular Volume 90, Mean Corpuscular Hemoglobin 

30.8, Mean Corpuscular Hemoglobin Concent 34.2, Red Cell Distribution Width 

15.1H, Platelet Count 193, Mean Platelet Volume 7.4, Neutrophils (%) (Auto) 

76.8H, Lymphocytes (%) (Auto) 16.5L, Monocytes (%) (Auto) 5.3, Eosinophils (%) (

Auto) 0.9, Basophils (%) (Auto) 0.5, Sodium Level 139, Potassium Level 3.5, 

Chloride Level 104, Carbon Dioxide Level 25, Anion Gap 10, Blood Urea Nitrogen 

15, Creatinine 0.8, Estimat Glomerular Filtration Rate > 60, Glucose Level 118H

, Calcium Level 10.0


Height (Feet):  5


Height (Inches):  6.00


Weight (Pounds):  129


General Appearance:  no apparent distress


Cardiovascular:  tachycardia


Respiratory/Chest:  lungs clear, normal breath sounds


Abdomen:  normal bowel sounds, non tender











Erick Russell MD Apr 30, 2018 13:19

## 2018-04-30 NOTE — PULMONOLOGY PROGRESS NOTE
Assessment/Plan


Problems:  


(1) Metastatic cancer


(2) Malignant pleural effusion


(3) Chronic pain disorder


(4) IBD (inflammatory bowel disease)


(5) Interstitial lung disease


(6) COPD (chronic obstructive pulmonary disease)


Assessment/Plan


respiratory treatment


check cxr, 





d/w pathologist, there are malignant non-small cells in the pleural cavity. 


it seems metastatic,


continue current symptomatic treatment


oncology on the case already


pain management


dc planning with comfort care.





social service consult to arrange family meeting for code status.





Subjective


ROS Limited/Unobtainable:  No


Interval Events:  was short of breath earlier, getting respiratory treatment now

, feels jose


Constitutional:  Reports: no symptoms


HEENT:  Repors: no symptoms


Allergies:  


Coded Allergies:  


     No Known Allergies (Verified , 10/27/06)





Objective





Last 24 Hour Vital Signs








  Date Time  Temp Pulse Resp B/P (MAP) Pulse Ox O2 Delivery O2 Flow Rate FiO2


 


4/30/18 11:23  126 24  99 Nasal Cannula 2.0 28


 


4/30/18 11:22      Nasal Cannula 2.0 28


 


4/30/18 11:22     95 Nasal Cannula 2.0 28


 


4/30/18 11:14  126 20   Nasal Cannula 2.0 28


 


4/30/18 11:14  126 20  95 Nasal Cannula 2.0 28


 


4/30/18 08:00  125      


 


4/30/18 08:00 98.2 124 14 116/77 100 Nasal Cannula 2.0 





 98.2       


 


4/30/18 04:00 98.1 122 20 129/74 99 Room Air 4.0 





 98.1       


 


4/30/18 04:00  124      


 


4/30/18 00:00  120      


 


4/30/18 00:00 97.5 119 20 108/80 99 Room Air 4.0 





 97.5       


 


4/29/18 20:00  131      


 


4/29/18 20:00 98.0 72 20 110/62 100 Room Air 4.0 





 98.0       


 


4/29/18 16:00 98.0 131 21 118/74 95 Room Air 4.0 





 98.0       


 


4/29/18 16:00  131      


 


4/29/18 12:27 97.4       

















Intake and Output  


 


 4/29/18 4/30/18





 19:00 07:00


 


Intake Total 1400 ml 895 ml


 


Output Total  0 ml


 


Balance 1400 ml 895 ml


 


  


 


IV Total 600 ml 895 ml


 


Other 800 ml 


 


Output Urine Total  0 ml


 


# Voids 3 








Objective


General Appearance:  WD/WN


HEENT:  normocephalic, atraumatic


Respiratory/Chest:  chest wall non-tender, lungs clear


Cardiovascular:  normal peripheral pulses, normal rate, regular rhythm


Abdomen:  normal bowel sounds, soft, non tender, no organomegaly, non distended


Extremities:  no cyanosis, no clubbing, no edema


Skin:  no rash, no lesions


Neurologic/Psychiatric:  CNs II-XII grossly normal


Laboratory Tests


4/29/18 17:05: 


Troponin I 0.093H, Thyroid Stimulating Hormone (TSH) 0.503, Free Thyroxine 1.31


4/30/18 03:50: 


Troponin I 0.298H, White Blood Count 15.7H, Red Blood Count 4.23, Hemoglobin 

13.0, Hematocrit 38.1, Mean Corpuscular Volume 90, Mean Corpuscular Hemoglobin 

30.8, Mean Corpuscular Hemoglobin Concent 34.2, Red Cell Distribution Width 

15.1H, Platelet Count 193, Mean Platelet Volume 7.4, Neutrophils (%) (Auto) 

76.8H, Lymphocytes (%) (Auto) 16.5L, Monocytes (%) (Auto) 5.3, Eosinophils (%) (

Auto) 0.9, Basophils (%) (Auto) 0.5, Sodium Level 139, Potassium Level 3.5, 

Chloride Level 104, Carbon Dioxide Level 25, Anion Gap 10, Blood Urea Nitrogen 

15, Creatinine 0.8, Estimat Glomerular Filtration Rate > 60, Glucose Level 118H

, Calcium Level 10.0





Current Medications








 Medications


  (Trade)  Dose


 Ordered  Sig/Jed


 Route


 PRN Reason  Start Time


 Stop Time Status Last Admin


Dose Admin


 


 Acetaminophen


  (Tylenol)  650 mg  Q4H  PRN


 ORAL


 fever (temp>100.5F)  4/29/18 11:30


 5/19/18 15:29   


 


 


 Acetaminophen/


 Hydrocodone Bitart


  (Norco 10/325)  1 tab  Q4H  PRN


 ORAL


 Pain Scale (3-5)  4/29/18 10:45


 5/3/18 08:29   


 


 


 Al Hydroxide/Mg


 Hydroxide


  (Mylanta II)  30 ml  Q6H  PRN


 ORAL


 dyspepsia  4/29/18 11:00


 5/19/18 10:59   


 


 


 Albuterol/


 Ipratropium


  (Albuterol/


 Ipratropium)  3 ml  Q4HR  PRN


 HHN


 Shortness of Breath  4/30/18 11:00


 5/5/18 10:59  4/30/18 11:13


 


 


 Atorvastatin


 Calcium


  (Lipitor)  40 mg  BEDTIME


 ORAL


   4/29/18 21:00


 5/19/18 20:59  4/29/18 20:41


 


 


 Bupropion HCl


  (Wellbutrin)  100 mg  DAILY


 ORAL


   4/30/18 09:00


 5/20/18 08:59  4/30/18 09:30


 


 


 Chlorhexidine


 Gluconate


  (Rosy-Hex 2%)  1 applic  DAILY@2000


 TOPIC


   4/29/18 20:00


 5/25/18 19:59  4/29/18 20:40


 


 


 Dextrose


  (Dextrose 50%)  50 ml  STAT  PRN


 IV


 Hypoglycemia BS<60mg/dL  4/29/18 11:00


 5/19/18 10:59   


 


 


 Dextrose/Sodium


 Chloride  1,000 ml @ 


 75 mls/hr  [id number]


 IV


   4/29/18 11:00


 5/19/18 10:59  4/30/18 00:04


 


 


 Diphenhydramine


 HCl


  (Benadryl)  25 mg  Q6H  PRN


 ORAL


 Itching/Pruritis  4/29/18 11:00


 5/19/18 10:59   


 


 


 Duloxetine HCl


  (Cymbalta)  90 mg  DAILY


 ORAL


   4/30/18 09:00


 5/20/18 08:59  4/30/18 09:29


 


 


 Gabapentin


  (Neurontin)  300 mg  THREE TIMES A  DAY


 ORAL


   4/29/18 13:00


 5/20/18 09:59  4/30/18 09:29


 


 


 Heparin Sodium


  (Porcine)


  (Heparin 5000


 units/ml)  5,000 units  EVERY 12  HOURS


 SUBQ


   4/29/18 21:00


 5/19/18 20:59  4/29/18 20:42


 


 


 Hydromorphone HCl


  (Dilaudid)  0.5 mg  Q4H  PRN


 IVP


 PAIN SCALE 4-10  4/29/18 11:00


 5/3/18 10:59  4/29/18 11:57


 


 


 Levetiracetam


  (Keppra)  1,000 mg  Q8H


 ORAL


   4/29/18 16:00


 5/24/18 07:59  4/30/18 08:33


 


 


 Levothyroxine


 Sodium


  (Synthroid)  75 mcg  ACBREAKFAST


 ORAL


   4/30/18 06:30


 5/20/18 06:29  4/30/18 05:38


 


 


 Methylnaltrexone


 Bromide


  (Relistor)  12 mg  DAILY


 SUBQ


   4/30/18 09:00


 5/20/18 10:14  4/30/18 09:37


 


 


 Metoclopramide HCl


  (Reglan)  10 mg  Q6H  PRN


 IVP


 Unrelieved Severe Nausea  4/29/18 11:00


 5/19/18 10:59   


 


 


 Nitroglycerin


  (Ntg)  0.4 mg  Q5M X 3 DOSES PRN


 SL


 Prn Chest Pain  4/29/18 11:00


 5/19/18 10:59   


 


 


 Ondansetron HCl


  (Zofran)  4 mg  Q6H  PRN


 IVP


 Nausea & Vomiting  4/29/18 11:00


 5/19/18 10:59   


 


 


 Pantoprazole


  (Protonix)  40 mg  DAILY


 ORAL


   4/30/18 09:00


 5/23/18 08:59  4/30/18 09:29


 


 


 Polyethylene


 Glycol


  (Miralax)  17 gm  HSPRN  PRN


 ORAL


 Constipation  4/29/18 11:00


 5/19/18 10:59   


 


 


 Promethazine HCl


 25 mg/Dextrose  56 ml @ 


 110 mls/hr  Q6H  PRN


 IV


 Refractory N/V  4/29/18 11:00


 5/19/18 10:59   


 


 


 Simethicone


  (Mylicon)  80 mg  QIDPRN  PRN


 ORAL


 gas   4/29/18 11:00


 5/20/18 10:59   


 


 


 Theophylline


  (Shiv-Dur)  100 mg  Q12HR


 ORAL


   4/29/18 21:00


 5/19/18 20:59  4/30/18 09:29


 


 


 Topiramate


  (Topamax)  25 mg  EVERY 12  HOURS


 ORAL


   4/29/18 21:00


 5/19/18 20:59  4/30/18 09:30


 


 


 Trazodone HCl


  (Desyrel)  200 mg  BEDTIME


 ORAL


   4/29/18 21:00


 5/19/18 20:59  4/29/18 20:41


 

















Blaze Fraga MD Apr 30, 2018 12:14

## 2018-04-30 NOTE — INFECTIOUS DISEASES PROG NOTE
Assessment/Plan


Assessment/Plan





ASSESSMENT:  The patient is a 62-year-old female with,





 Low-grade fever. probable due to SBO  ,resolved


Leukocytosis, mild- recurrent- ?2ry to possible malignancy


Probable small bowel obstruction


Recent history of polymicrobial gram-negative bacteremia including 

Stenotrophomonas maltophilia and Enterobacter.


History of Candida esophagitis.


History of C. difficile in the past.











Air anterior to the liver , ?  free intraperitoneal air ( Surg doubt 

perforation )


Lung and liver masses Ro Malignancy 


 CT:  Pleural-based mass at the left lung base / Moderate-sized left pleural 

effusion, left-sided pleural nodularity and retroperitoneal and paraspinal 

nodules/masses. 


         Left inferior hilar mass lesion partially visualized. New low-

attenuation liver lesion ( concerning for malignancy/metastatic disease )


Pl effusion SP ultrasound-guided thoracentesis,  960 cc  ( m/l 2/2 mets,  no 

evid of Empyema )


  -exudate fluid:  (n 6), pH 8, lguc 98, prot 4.6 (serum 8.4); cx stain: 


  GRAM STAIN  Final  


        GRAM STAIN RESULT           FEW WHITE BLOOD CELLS


                                    NO ORGANISMS SEEN


cx negative





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 





Crohn's disease.


History of bowel obstruction x2 with lysis of adhesions in 2005.


COPD.


History of chronic pain syndrome, on morphine pump.


CVA in 2005.


Seizure disorder.








PLAN:





cont to monitor off abx


  -4/25 SP Zosyn #5





 


Monitor CBC and BMP.


follow GI recommendations


hem, surg f/u


planf for comfort care





Subjective


Allergies:  


Coded Allergies:  


     No Known Allergies (Verified , 10/27/06)


Subjective


afebrile 


leukocytosis





Objective


Vital Signs





Last 24 Hour Vital Signs








  Date Time  Temp Pulse Resp B/P (MAP) Pulse Ox O2 Delivery O2 Flow Rate FiO2


 


4/30/18 15:56  125 20  96 Nasal Cannula 2.0 28


 


4/30/18 15:44  124 16  94 Nasal Cannula 2.0 28


 


4/30/18 12:00  124      


 


4/30/18 12:00 98.2 119 12 131/86 100 Nasal Cannula 2.0 





 98.2       


 


4/30/18 11:23  126 24  99 Nasal Cannula 2.0 28


 


4/30/18 11:22      Nasal Cannula 2.0 28


 


4/30/18 11:22     95 Nasal Cannula 2.0 28


 


4/30/18 11:14  126 20   Nasal Cannula 2.0 28


 


4/30/18 11:14  126 20  95 Nasal Cannula 2.0 28


 


4/30/18 08:00  125      


 


4/30/18 08:00 98.2 124 14 116/77 100 Nasal Cannula 2.0 





 98.2       


 


4/30/18 04:00 98.1 122 20 129/74 99 Room Air 4.0 





 98.1       


 


4/30/18 04:00  124      


 


4/30/18 00:00  120      


 


4/30/18 00:00 97.5 119 20 108/80 99 Room Air 4.0 





 97.5       


 


4/29/18 20:00  131      


 


4/29/18 20:00 98.0 72 20 110/62 100 Room Air 4.0 





 98.0       








Height (Feet):  5


Height (Inches):  6.00


Weight (Pounds):  129


Objective


HEENT:  anicteric


Respiratory/Chest:  normal breath sounds


Cardiovascular:  regular rhythm


Abdomen:  tender





Laboratory Tests








Test


  4/29/18


17:05 4/30/18


03:50


 


Troponin I


  0.093 ng/mL


(0.000-0.056) 0.298 ng/mL


(0.000-0.056)


 


Thyroid Stimulating Hormone


(TSH) 0.503 uiU/mL


(0.358-3.740) 


 


 


Free Thyroxine


  1.31 NG/DL


(0.76-1.46) 


 


 


White Blood Count


  


  15.7 K/UL


(4.8-10.8)  H


 


Red Blood Count


  


  4.23 M/UL


(4.20-5.40)


 


Hemoglobin


  


  13.0 G/DL


(12.0-16.0)


 


Hematocrit


  


  38.1 %


(37.0-47.0)


 


Mean Corpuscular Volume  90 FL (80-99)  


 


Mean Corpuscular Hemoglobin


  


  30.8 PG


(27.0-31.0)


 


Mean Corpuscular Hemoglobin


Concent 


  34.2 G/DL


(32.0-36.0)


 


Red Cell Distribution Width


  


  15.1 %


(11.6-14.8)  H


 


Platelet Count


  


  193 K/UL


(150-450)


 


Mean Platelet Volume


  


  7.4 FL


(6.5-10.1)


 


Neutrophils (%) (Auto)


  


  76.8 %


(45.0-75.0)  H


 


Lymphocytes (%) (Auto)


  


  16.5 %


(20.0-45.0)  L


 


Monocytes (%) (Auto)


  


  5.3 %


(1.0-10.0)


 


Eosinophils (%) (Auto)


  


  0.9 %


(0.0-3.0)


 


Basophils (%) (Auto)


  


  0.5 %


(0.0-2.0)


 


Sodium Level


  


  139 MMOL/L


(136-145)


 


Potassium Level


  


  3.5 MMOL/L


(3.5-5.1)


 


Chloride Level


  


  104 MMOL/L


()


 


Carbon Dioxide Level


  


  25 MMOL/L


(21-32)


 


Anion Gap


  


  10 mmol/L


(5-15)


 


Blood Urea Nitrogen


  


  15 mg/dL


(7-18)


 


Creatinine


  


  0.8 MG/DL


(0.55-1.30)


 


Estimat Glomerular Filtration


Rate 


  > 60 mL/min


(>60)


 


Glucose Level


  


  118 MG/DL


()  H


 


Calcium Level


  


  10.0 MG/DL


(8.5-10.1)











Current Medications








 Medications


  (Trade)  Dose


 Ordered  Sig/Jed


 Route


 PRN Reason  Start Time


 Stop Time Status Last Admin


Dose Admin


 


 Acetaminophen


  (Tylenol)  650 mg  Q4H  PRN


 ORAL


 fever (temp>100.5F)  4/29/18 11:30


 5/19/18 15:29   


 


 


 Acetaminophen/


 Hydrocodone Bitart


  (Norco 10/325)  1 tab  Q4H  PRN


 ORAL


 Pain Scale (3-5)  4/29/18 10:45


 5/3/18 08:29   


 


 


 Al Hydroxide/Mg


 Hydroxide


  (Mylanta II)  30 ml  Q6H  PRN


 ORAL


 dyspepsia  4/29/18 11:00


 5/19/18 10:59   


 


 


 Atorvastatin


 Calcium


  (Lipitor)  40 mg  BEDTIME


 ORAL


   4/29/18 21:00


 5/19/18 20:59  4/29/18 20:41


 


 


 Bupropion HCl


  (Wellbutrin)  100 mg  DAILY


 ORAL


   4/30/18 09:00


 5/20/18 08:59  4/30/18 09:30


 


 


 Chlorhexidine


 Gluconate


  (Rosy-Hex 2%)  1 applic  DAILY@2000


 TOPIC


   4/29/18 20:00


 5/25/18 19:59  4/29/18 20:40


 


 


 Dextrose


  (Dextrose 50%)  50 ml  STAT  PRN


 IV


 Hypoglycemia BS<60mg/dL  4/29/18 11:00


 5/19/18 10:59   


 


 


 Dextrose/Sodium


 Chloride  1,000 ml @ 


 75 mls/hr  [id number]


 IV


   4/29/18 11:00


 5/19/18 10:59  4/30/18 13:40


 


 


 Diphenhydramine


 HCl


  (Benadryl)  25 mg  Q6H  PRN


 ORAL


 Itching/Pruritis  4/29/18 11:00


 5/19/18 10:59   


 


 


 Duloxetine HCl


  (Cymbalta)  90 mg  DAILY


 ORAL


   4/30/18 09:00


 5/20/18 08:59  4/30/18 09:29


 


 


 Furosemide


  (Lasix)  40 mg  EVERY 8  HOURS


 IV


   4/30/18 22:00


 5/30/18 21:59   


 


 


 Gabapentin


  (Neurontin)  300 mg  THREE TIMES A  DAY


 ORAL


   4/29/18 13:00


 5/20/18 09:59  4/30/18 13:40


 


 


 Heparin Sodium


  (Porcine)


  (Heparin 5000


 units/ml)  5,000 units  EVERY 12  HOURS


 SUBQ


   4/29/18 21:00


 5/19/18 20:59  4/29/18 20:42


 


 


 Hydromorphone HCl


  (Dilaudid)  0.5 mg  Q4H  PRN


 IVP


 PAIN SCALE 4-10  4/29/18 11:00


 5/3/18 10:59  4/29/18 11:57


 


 


 Levalbuterol HCl


  (Xopenex)  0.625 mg  Q4HRT


 HHN


   4/30/18 15:00


 5/5/18 14:59  4/30/18 15:44


 


 


 Levetiracetam


  (Keppra)  1,000 mg  Q8H


 ORAL


   4/29/18 16:00


 5/24/18 07:59  4/30/18 08:33


 


 


 Levothyroxine


 Sodium


  (Synthroid)  75 mcg  ACBREAKFAST


 ORAL


   4/30/18 06:30


 5/20/18 06:29  4/30/18 05:38


 


 


 Methylnaltrexone


 Bromide


  (Relistor)  12 mg  DAILY


 SUBQ


   4/30/18 09:00


 5/20/18 10:14  4/30/18 09:37


 


 


 Metoclopramide HCl


  (Reglan)  10 mg  Q6H  PRN


 IVP


 Unrelieved Severe Nausea  4/29/18 11:00


 5/19/18 10:59   


 


 


 Nitroglycerin


  (Ntg)  0.4 mg  Q5M X 3 DOSES PRN


 SL


 Prn Chest Pain  4/29/18 11:00


 5/19/18 10:59   


 


 


 Ondansetron HCl


  (Zofran)  4 mg  Q6H  PRN


 IVP


 Nausea & Vomiting  4/29/18 11:00


 5/19/18 10:59   


 


 


 Pantoprazole


  (Protonix)  40 mg  DAILY


 ORAL


   4/30/18 09:00


 5/23/18 08:59  4/30/18 09:29


 


 


 Polyethylene


 Glycol


  (Miralax)  17 gm  HSPRN  PRN


 ORAL


 Constipation  4/29/18 11:00


 5/19/18 10:59   


 


 


 Promethazine HCl


 25 mg/Dextrose  56 ml @ 


 110 mls/hr  Q6H  PRN


 IV


 Refractory N/V  4/29/18 11:00


 5/19/18 10:59   


 


 


 Simethicone


  (Mylicon)  80 mg  QIDPRN  PRN


 ORAL


 gas   4/29/18 11:00


 5/20/18 10:59   


 


 


 Theophylline


  (Shiv-Dur)  100 mg  Q12HR


 ORAL


   4/29/18 21:00


 5/19/18 20:59  4/30/18 09:29


 


 


 Topiramate


  (Topamax)  25 mg  EVERY 12  HOURS


 ORAL


   4/29/18 21:00


 5/19/18 20:59  4/30/18 09:30


 


 


 Trazodone HCl


  (Desyrel)  200 mg  BEDTIME


 ORAL


   4/29/18 21:00


 5/19/18 20:59  4/29/18 20:41


 

















Selene Garcia M.D. Apr 30, 2018 16:19

## 2018-04-30 NOTE — DIAGNOSTIC IMAGING REPORT
Indication: Shortness of breath

 

Technique: XRAY Chest 1v

 

Comparison: 4/20/2018;CT chest 4/25/2018

 

Findings: There is significant interval worsening of aeration compared to the prior

exam of 4/20/2018 with bilateral interstitial opacification/edema and patchy

bilateral airspace opacities. There are bilateral masslike opacities in the hilar

regions consistent with those seen on CT. There is volume loss of the left ureter

there is no pneumothorax. A PICC line is in place with its catheter tip the region of

the cavoatrial junction. No acute osseous abnormality is seen. There is surgical mesh

material partially visualized in the upper abdomen. 

 

IMPRESSION: Significant worsening of aeration compared to the prior exam with

increased interstitial and patchy bilateral airspace opacities, also related to

development of fluid overload/pulmonary edema. Additional alveolar filling processes

such as multifocal pneumonia not excluded. Clinical correlation and follow-up exam

recommended

## 2018-04-30 NOTE — GI PROGRESS NOTE
Assessment/Plan


Problems:  


(1) Crohn's disease


ICD Codes:  K50.90 - Crohn's disease


SNOMED:  63263039


Qualifiers:  


   Qualified Codes:  K50.919 - Crohn's disease, unspecified, with unspecified 

complications


(2) Opioid dependence


ICD Codes:  F11.20 - Opioid dependence


SNOMED:  09244607


(3) SBO (small bowel obstruction)


ICD Codes:  K56.609 - Unspecified intestinal obstruction, unspecified as to 

partial versus complete obstruction


SNOMED:  155878054


(4) Chronic abdominal pain


Status:  stable


Status Narrative


Discussed with Dr. Mcleod.


Assessment/Plan


CT AP reviewed >>


Evidence of disseminated malignancy, with large left pulmonary hilar mass or 

adenopathy, extensive left hilar and mediastinal adenopathy, multiple pleural-

based masses on the left, multiple masses within the liver, and


retroperitoneal lymphadenopathy.





Recommendations


fu oncology recs


pain mgmt





Subjective


Subjective


generalized pain/weakness





Objective





Last 24 Hour Vital Signs








  Date Time  Temp Pulse Resp B/P (MAP) Pulse Ox O2 Delivery O2 Flow Rate FiO2


 


4/30/18 08:00 98.2 124 14 116/77 100 Nasal Cannula 2.0 





 98.2       


 


4/30/18 04:00 98.1 122 20 129/74 99 Room Air 4.0 





 98.1       


 


4/30/18 04:00  124      


 


4/30/18 00:00  120      


 


4/30/18 00:00 97.5 119 20 108/80 99 Room Air 4.0 





 97.5       


 


4/29/18 20:00  131      


 


4/29/18 20:00 98.0 72 20 110/62 100 Room Air 4.0 





 98.0       


 


4/29/18 16:00 98.0 131 21 118/74 95 Room Air 4.0 





 98.0       


 


4/29/18 16:00  131      


 


4/29/18 12:27 97.4       


 


4/29/18 12:00  127      


 


4/29/18 12:00 208.4 127 21 139/77 95 Room Air 4.0 





 208.4       


 


4/29/18 11:57 98.0       

















Intake and Output  


 


 4/29/18 4/30/18





 19:00 07:00


 


Intake Total 1400 ml 895 ml


 


Output Total  0 ml


 


Balance 1400 ml 895 ml


 


  


 


IV Total 600 ml 895 ml


 


Other 800 ml 


 


Output Urine Total  0 ml


 


# Voids 3 











Laboratory Tests








Test


  4/29/18


17:05 4/30/18


03:50


 


Troponin I


  0.093 ng/mL


(0.000-0.056) 0.298 ng/mL


(0.000-0.056)


 


Thyroid Stimulating Hormone


(TSH) 0.503 uiU/mL


(0.358-3.740) 


 


 


Free Thyroxine


  1.31 NG/DL


(0.76-1.46) 


 


 


White Blood Count


  


  15.7 K/UL


(4.8-10.8)  H


 


Red Blood Count


  


  4.23 M/UL


(4.20-5.40)


 


Hemoglobin


  


  13.0 G/DL


(12.0-16.0)


 


Hematocrit


  


  38.1 %


(37.0-47.0)


 


Mean Corpuscular Volume  90 FL (80-99)  


 


Mean Corpuscular Hemoglobin


  


  30.8 PG


(27.0-31.0)


 


Mean Corpuscular Hemoglobin


Concent 


  34.2 G/DL


(32.0-36.0)


 


Red Cell Distribution Width


  


  15.1 %


(11.6-14.8)  H


 


Platelet Count


  


  193 K/UL


(150-450)


 


Mean Platelet Volume


  


  7.4 FL


(6.5-10.1)


 


Neutrophils (%) (Auto)


  


  76.8 %


(45.0-75.0)  H


 


Lymphocytes (%) (Auto)


  


  16.5 %


(20.0-45.0)  L


 


Monocytes (%) (Auto)


  


  5.3 %


(1.0-10.0)


 


Eosinophils (%) (Auto)


  


  0.9 %


(0.0-3.0)


 


Basophils (%) (Auto)


  


  0.5 %


(0.0-2.0)


 


Sodium Level


  


  139 MMOL/L


(136-145)


 


Potassium Level


  


  3.5 MMOL/L


(3.5-5.1)


 


Chloride Level


  


  104 MMOL/L


()


 


Carbon Dioxide Level


  


  25 MMOL/L


(21-32)


 


Anion Gap


  


  10 mmol/L


(5-15)


 


Blood Urea Nitrogen


  


  15 mg/dL


(7-18)


 


Creatinine


  


  0.8 MG/DL


(0.55-1.30)


 


Estimat Glomerular Filtration


Rate 


  > 60 mL/min


(>60)


 


Glucose Level


  


  118 MG/DL


()  H


 


Calcium Level


  


  10.0 MG/DL


(8.5-10.1)








Height (Feet):  5


Height (Inches):  6.00


Weight (Pounds):  129


General Appearance:  WD/WN, no apparent distress, alert, thin


Cardiovascular:  normal rate


Respiratory/Chest:  normal breath sounds, no respiratory distress


Abdominal Exam:  normal bowel sounds, non tender, soft


Extremities:  non-tender











Elena Seay N.P. Apr 30, 2018 10:10

## 2018-05-01 VITALS — DIASTOLIC BLOOD PRESSURE: 77 MMHG | SYSTOLIC BLOOD PRESSURE: 117 MMHG

## 2018-05-01 VITALS — DIASTOLIC BLOOD PRESSURE: 75 MMHG | SYSTOLIC BLOOD PRESSURE: 96 MMHG

## 2018-05-01 VITALS — DIASTOLIC BLOOD PRESSURE: 72 MMHG | SYSTOLIC BLOOD PRESSURE: 121 MMHG

## 2018-05-01 VITALS — SYSTOLIC BLOOD PRESSURE: 127 MMHG | DIASTOLIC BLOOD PRESSURE: 74 MMHG

## 2018-05-01 VITALS — DIASTOLIC BLOOD PRESSURE: 71 MMHG | SYSTOLIC BLOOD PRESSURE: 100 MMHG

## 2018-05-01 VITALS — DIASTOLIC BLOOD PRESSURE: 72 MMHG | SYSTOLIC BLOOD PRESSURE: 118 MMHG

## 2018-05-01 VITALS — SYSTOLIC BLOOD PRESSURE: 94 MMHG | DIASTOLIC BLOOD PRESSURE: 75 MMHG

## 2018-05-01 VITALS — SYSTOLIC BLOOD PRESSURE: 117 MMHG | DIASTOLIC BLOOD PRESSURE: 72 MMHG

## 2018-05-01 VITALS — SYSTOLIC BLOOD PRESSURE: 120 MMHG | DIASTOLIC BLOOD PRESSURE: 87 MMHG

## 2018-05-01 VITALS — SYSTOLIC BLOOD PRESSURE: 119 MMHG | DIASTOLIC BLOOD PRESSURE: 88 MMHG

## 2018-05-01 VITALS — DIASTOLIC BLOOD PRESSURE: 70 MMHG | SYSTOLIC BLOOD PRESSURE: 105 MMHG

## 2018-05-01 VITALS — DIASTOLIC BLOOD PRESSURE: 72 MMHG | SYSTOLIC BLOOD PRESSURE: 101 MMHG

## 2018-05-01 VITALS — DIASTOLIC BLOOD PRESSURE: 75 MMHG | SYSTOLIC BLOOD PRESSURE: 102 MMHG

## 2018-05-01 VITALS — SYSTOLIC BLOOD PRESSURE: 116 MMHG | DIASTOLIC BLOOD PRESSURE: 75 MMHG

## 2018-05-01 VITALS — SYSTOLIC BLOOD PRESSURE: 99 MMHG | DIASTOLIC BLOOD PRESSURE: 72 MMHG

## 2018-05-01 VITALS — SYSTOLIC BLOOD PRESSURE: 112 MMHG | DIASTOLIC BLOOD PRESSURE: 70 MMHG

## 2018-05-01 VITALS — SYSTOLIC BLOOD PRESSURE: 112 MMHG | DIASTOLIC BLOOD PRESSURE: 89 MMHG

## 2018-05-01 VITALS — SYSTOLIC BLOOD PRESSURE: 106 MMHG | DIASTOLIC BLOOD PRESSURE: 69 MMHG

## 2018-05-01 VITALS — SYSTOLIC BLOOD PRESSURE: 96 MMHG | DIASTOLIC BLOOD PRESSURE: 71 MMHG

## 2018-05-01 VITALS — DIASTOLIC BLOOD PRESSURE: 69 MMHG | SYSTOLIC BLOOD PRESSURE: 102 MMHG

## 2018-05-01 VITALS — DIASTOLIC BLOOD PRESSURE: 71 MMHG | SYSTOLIC BLOOD PRESSURE: 110 MMHG

## 2018-05-01 VITALS — DIASTOLIC BLOOD PRESSURE: 75 MMHG | SYSTOLIC BLOOD PRESSURE: 112 MMHG

## 2018-05-01 VITALS — DIASTOLIC BLOOD PRESSURE: 75 MMHG | SYSTOLIC BLOOD PRESSURE: 100 MMHG

## 2018-05-01 VITALS — SYSTOLIC BLOOD PRESSURE: 98 MMHG | DIASTOLIC BLOOD PRESSURE: 71 MMHG

## 2018-05-01 LAB
ADD MANUAL DIFF: NO
ALBUMIN SERPL-MCNC: 1.7 G/DL (ref 3.4–5)
ALBUMIN/GLOB SERPL: 0.3 {RATIO} (ref 1–2.7)
ALP SERPL-CCNC: 151 U/L (ref 46–116)
ALT SERPL-CCNC: 30 U/L (ref 12–78)
ANION GAP SERPL CALC-SCNC: 13 MMOL/L (ref 5–15)
AST SERPL-CCNC: 84 U/L (ref 15–37)
BASOPHILS NFR BLD AUTO: 0.4 % (ref 0–2)
BILIRUB SERPL-MCNC: 0.3 MG/DL (ref 0.2–1)
BUN SERPL-MCNC: 18 MG/DL (ref 7–18)
CALCIUM SERPL-MCNC: 10 MG/DL (ref 8.5–10.1)
CHLORIDE SERPL-SCNC: 102 MMOL/L (ref 98–107)
CO2 SERPL-SCNC: 26 MMOL/L (ref 21–32)
CREAT SERPL-MCNC: 0.8 MG/DL (ref 0.55–1.3)
EOSINOPHIL NFR BLD AUTO: 1.7 % (ref 0–3)
ERYTHROCYTE [DISTWIDTH] IN BLOOD BY AUTOMATED COUNT: 15 % (ref 11.6–14.8)
GLOBULIN SER-MCNC: 6.7 G/DL
HCT VFR BLD CALC: 42 % (ref 37–47)
HGB BLD-MCNC: 13.4 G/DL (ref 12–16)
LYMPHOCYTES NFR BLD AUTO: 14.6 % (ref 20–45)
MCV RBC AUTO: 90 FL (ref 80–99)
MONOCYTES NFR BLD AUTO: 3.3 % (ref 1–10)
NEUTROPHILS NFR BLD AUTO: 80 % (ref 45–75)
PHOSPHATE SERPL-MCNC: 4.6 MG/DL (ref 2.5–4.9)
PLATELET # BLD: 198 K/UL (ref 150–450)
POTASSIUM SERPL-SCNC: 3.7 MMOL/L (ref 3.5–5.1)
RBC # BLD AUTO: 4.69 M/UL (ref 4.2–5.4)
SODIUM SERPL-SCNC: 141 MMOL/L (ref 136–145)
WBC # BLD AUTO: 14.1 K/UL (ref 4.8–10.8)

## 2018-05-01 RX ADMIN — LEVALBUTEROL HYDROCHLORIDE SCH MG: 1.25 SOLUTION, CONCENTRATE RESPIRATORY (INHALATION) at 19:00

## 2018-05-01 RX ADMIN — TOPIRAMATE SCH MG: 25 TABLET, COATED ORAL at 09:06

## 2018-05-01 RX ADMIN — CHLORHEXIDINE GLUCONATE SCH APPLIC: 213 SOLUTION TOPICAL at 20:07

## 2018-05-01 RX ADMIN — HEPARIN SODIUM SCH UNITS: 5000 INJECTION INTRAVENOUS; SUBCUTANEOUS at 21:02

## 2018-05-01 RX ADMIN — LEVALBUTEROL HYDROCHLORIDE SCH MG: 1.25 SOLUTION, CONCENTRATE RESPIRATORY (INHALATION) at 06:59

## 2018-05-01 RX ADMIN — LEVALBUTEROL HYDROCHLORIDE SCH MG: 1.25 SOLUTION, CONCENTRATE RESPIRATORY (INHALATION) at 23:00

## 2018-05-01 RX ADMIN — METHYLNALTREXONE BROMIDE SCH MG: 12 INJECTION, SOLUTION SUBCUTANEOUS at 09:07

## 2018-05-01 RX ADMIN — ATORVASTATIN CALCIUM SCH MG: 20 TABLET, FILM COATED ORAL at 20:57

## 2018-05-01 RX ADMIN — TRAZODONE HYDROCHLORIDE SCH MG: 100 TABLET ORAL at 20:56

## 2018-05-01 RX ADMIN — TOPIRAMATE SCH MG: 25 TABLET, COATED ORAL at 20:58

## 2018-05-01 RX ADMIN — LEVALBUTEROL HYDROCHLORIDE SCH MG: 1.25 SOLUTION, CONCENTRATE RESPIRATORY (INHALATION) at 11:30

## 2018-05-01 RX ADMIN — LEVALBUTEROL HYDROCHLORIDE SCH MG: 1.25 SOLUTION, CONCENTRATE RESPIRATORY (INHALATION) at 14:45

## 2018-05-01 RX ADMIN — THEOPHYLLINE ANHYDROUS SCH MG: 100 CAPSULE, EXTENDED RELEASE ORAL at 09:06

## 2018-05-01 RX ADMIN — DULOXETINE HYDROCHLORIDE SCH MG: 30 CAPSULE, DELAYED RELEASE ORAL at 09:06

## 2018-05-01 RX ADMIN — HEPARIN SODIUM SCH UNITS: 5000 INJECTION INTRAVENOUS; SUBCUTANEOUS at 09:08

## 2018-05-01 RX ADMIN — THEOPHYLLINE ANHYDROUS SCH MG: 100 CAPSULE, EXTENDED RELEASE ORAL at 20:57

## 2018-05-01 RX ADMIN — LEVALBUTEROL HYDROCHLORIDE SCH MG: 1.25 SOLUTION, CONCENTRATE RESPIRATORY (INHALATION) at 03:12

## 2018-05-01 NOTE — PULMONOLGY CRITICAL CARE NOTE
Critical Care - Asmt/Plan


Problems:  


(1) Acute respiratory failure


(2) Malignant pleural effusion


(3) Metastatic cancer


(4) Lung mass


(5) COPD (chronic obstructive pulmonary disease)


Respiratory:  monitor respiratory rate, adjust FIO2, CXR, ABG, other - change 

to high flow NC


Cardiac:  continue to monitor HR/BP


Renal:  F/U I&O, keep IV fluid, other - continue diuresis


Infectious Disease:  check cultures


Endocrine:  continue sliding scale insulin


Hematologic:  monitor H/H


Neurologic:  PRN Ativan, PRN Morphine, keep patient comfortable


Prophylaxis:  Protonix, Heparin


Notes Reviewed:  cardio


Discussed with:  nurses, consultants





Critical Care - Objective





Last 24 Hour Vital Signs








  Date Time  Temp Pulse Resp B/P (MAP) Pulse Ox O2 Delivery O2 Flow Rate FiO2


 


5/1/18 09:29  133 23  99 Facial  40


 


5/1/18 07:09  131 20  100 Bi-pap  40


 


5/1/18 06:59      Bi-pap  40


 


5/1/18 06:59  129 20  100 Bi-pap  40


 


5/1/18 06:59     100 Bi-pap  40


 


5/1/18 06:59  129 22  100 Facial  40


 


5/1/18 06:00  130 16 101/72 100 Bi-pap  40


 


5/1/18 05:19  133 20  100 Facial  40


 


5/1/18 05:00  135 16 100/75 100 Bi-pap  40


 


5/1/18 04:00 97.8 135 16 94/75 100 Bi-pap  40





 97.8       


 


5/1/18 04:00        40


 


5/1/18 04:00  130      


 


5/1/18 03:22  133 17  100 Bi-pap  40


 


5/1/18 03:12  131 18  98 Bi-pap  40


 


5/1/18 03:11  131 18  98 Facial  40


 


5/1/18 03:00  135 18 120/87 99 Bi-pap  40


 


5/1/18 02:00  133 18 112/75 99 Bi-pap  40


 


5/1/18 01:22  127 19  99 Facial  40


 


5/1/18 01:00  128 18 112/89 99 Bi-pap  40


 


5/1/18 00:00  124 18 106/69 99 Bi-pap  40


 


5/1/18 00:00  127      


 


4/30/18 23:59  123 19  100 Bi-pap  40


 


4/30/18 23:48  121 19  98 Bi-pap  40


 


4/30/18 23:39 98.2 120 24 99/70 96 Bi-pap  40





 98.2       


 


4/30/18 23:18  119 20  97 Facial  40


 


4/30/18 22:00       95.0 40


 


4/30/18 21:23  125 21  99 Facial  30


 


4/30/18 20:00        40


 


4/30/18 20:00  126 20  98 Nasal Cannula 2.0 28


 


4/30/18 20:00 98.5 127 24 141/99 96 Bi-pap  40





 98.5       


 


4/30/18 19:50     96 Nasal Cannula 2.0 28


 


4/30/18 19:50      Nasal Cannula 2.0 28


 


4/30/18 19:50  127 20  96 Nasal Cannula 2.0 28


 


4/30/18 19:34  128      


 


4/30/18 16:00 98.7 122 20 144/82 100 Nasal Cannula 2.0 





 98.7       


 


4/30/18 16:00  122      


 


4/30/18 15:56  125 20  96 Nasal Cannula 2.0 28


 


4/30/18 15:44  124 16  94 Nasal Cannula 2.0 28


 


4/30/18 12:00  124      


 


4/30/18 12:00 98.2 119 12 131/86 100 Nasal Cannula 2.0 





 98.2       


 


4/30/18 11:23  126 24  99 Nasal Cannula 2.0 28


 


4/30/18 11:22      Nasal Cannula 2.0 28


 


4/30/18 11:22     95 Nasal Cannula 2.0 28


 


4/30/18 11:14  126 20   Nasal Cannula 2.0 28


 


4/30/18 11:14  126 20  95 Nasal Cannula 2.0 28








Status:  awake


Condition:  critical


HEENT:  atraumatic


Neck:  full ROM


Lungs:  clear


Heart:  HR/BP stable


Abdomen:  soft, non-tender


Extremities:  no C/C/E





Critical Care - Subjective


ROS Limited/Unobtainable:  Yes


ICU Day:  2


Intubation Day:  on BIPAP


FI02:  40


Vent Support Mode:  CPAP


Sputum Amount:  None


Fluids:  KVO


I&O:











Intake and Output  


 


 4/30/18 5/1/18





 19:00 07:00


 


Output Total  260 ml


 


Balance  -260 ml


 


  


 


Output Urine Total  260 ml


 


Stool Total  0 ml


 


# Voids 3 4








CXR:


less pulmonary edema


Labs:





Laboratory Tests








Test


  4/30/18


21:05 5/1/18


04:00 5/1/18


08:05


 


Arterial Blood pH


  7.364


(7.350-7.450) 


  7.423


(7.350-7.450)


 


Arterial Blood Partial


Pressure CO2 47.4 mmHg


(35.0-45.0)  H 


  40.8 mmHg


(35.0-45.0)


 


Arterial Blood Partial


Pressure O2 50.4 mmHg


(75.0-100.0)  L 


  59.9 mmHg


(75.0-100.0)  L


 


Arterial Blood HCO3


  26.4 mmol/L


(22.0-26.0)  H 


  26.0 mmol/L


(22.0-26.0)


 


Arterial Blood Oxygen


Saturation 82.5 %


(92.0-98.0)  L 


  89.7 %


(92.0-98.0)  L


 


Arterial Blood Base Excess 0.5    1.5  


 


Jonn Test Positive    Positive  


 


White Blood Count


  


  14.1 K/UL


(4.8-10.8)  H 


 


 


Red Blood Count


  


  4.69 M/UL


(4.20-5.40) 


 


 


Hemoglobin


  


  13.4 G/DL


(12.0-16.0) 


 


 


Hematocrit


  


  42.0 %


(37.0-47.0) 


 


 


Mean Corpuscular Volume  90 FL (80-99)   


 


Mean Corpuscular Hemoglobin


  


  28.6 PG


(27.0-31.0) 


 


 


Mean Corpuscular Hemoglobin


Concent 


  31.9 G/DL


(32.0-36.0)  L 


 


 


Red Cell Distribution Width


  


  15.0 %


(11.6-14.8)  H 


 


 


Platelet Count


  


  198 K/UL


(150-450) 


 


 


Mean Platelet Volume


  


  7.6 FL


(6.5-10.1) 


 


 


Neutrophils (%) (Auto)


  


  80.0 %


(45.0-75.0)  H 


 


 


Lymphocytes (%) (Auto)


  


  14.6 %


(20.0-45.0)  L 


 


 


Monocytes (%) (Auto)


  


  3.3 %


(1.0-10.0) 


 


 


Eosinophils (%) (Auto)


  


  1.7 %


(0.0-3.0) 


 


 


Basophils (%) (Auto)


  


  0.4 %


(0.0-2.0) 


 


 


Sodium Level


  


  141 MMOL/L


(136-145) 


 


 


Potassium Level


  


  3.7 MMOL/L


(3.5-5.1) 


 


 


Chloride Level


  


  102 MMOL/L


() 


 


 


Carbon Dioxide Level


  


  26 MMOL/L


(21-32) 


 


 


Anion Gap


  


  13 mmol/L


(5-15) 


 


 


Blood Urea Nitrogen


  


  18 mg/dL


(7-18) 


 


 


Creatinine


  


  0.8 MG/DL


(0.55-1.30) 


 


 


Estimat Glomerular Filtration


Rate 


  > 60 mL/min


(>60) 


 


 


Glucose Level


  


  96 MG/DL


() 


 


 


Calcium Level


  


  10.0 MG/DL


(8.5-10.1) 


 


 


Phosphorus Level


  


  4.6 MG/DL


(2.5-4.9) 


 


 


Magnesium Level


  


  1.3 MG/DL


(1.8-2.4)  L 


 


 


Total Bilirubin


  


  0.3 MG/DL


(0.2-1.0) 


 


 


Aspartate Amino Transf


(AST/SGOT) 


  84 U/L (15-37)


H 


 


 


Alanine Aminotransferase


(ALT/SGPT) 


  30 U/L (12-78)


  


 


 


Alkaline Phosphatase


  


  151 U/L


()  H 


 


 


Total Protein


  


  8.4 G/DL


(6.4-8.2)  H 


 


 


Albumin


  


  1.7 G/DL


(3.4-5.0)  L 


 


 


Globulin  6.7 g/dL   


 


Albumin/Globulin Ratio


  


  0.3 (1.0-2.7)


L 


 

















Blaze Fraga MD May 1, 2018 10:03

## 2018-05-01 NOTE — GENERAL PROGRESS NOTE
Assessment/Plan


Assessment/Plan


IMPRESSION:


1. Left lower lobe lung mass.


2. Left inferior hilar mass.


3. Left pleural base lung mass.


4. Left pleural effusion.


5. Retroperitoneal lymphadenopathy.


6. Multiple low-attenuation liver lesions.


7. Crohn disease.


8. Opioid dependence.


9. Chronic obstructive pulmonary disease.


10. History of smoking.


11. Emphysema.


12. Perianal abscess.


13. Intractable abdominal pain.


14. Status post morphine pump placement.


15. Depression.


16. Psychosis.


17. Seizure disorder.


18. Radiculopathy of lumbar region.


19. Abdominal pain.


20. Nausea and vomiting.


21. Status post exploratory laparotomy.


22. Pneumoperitoneum.


23. Inflammatory bowel disease.


24. History of elevated CEA.


25. History of lower gastrointestinal bleed.


26. Chronic pain syndrome.


27. History of small bowel obstruction.


28. Status post laparotomy x2.


29. Failure to thrive.


30. Leukocytosis.


31. Tachycardia. 





RECOMMENDATIONS:


1. Watch count.


2. Watch coagulopathy.


3. Paracentesis with cytology.


4. Pulmonary evaluation/followup.


5. Mediastinoscopy versus CT-guided lung biopsy.


6. The patient needs tissue diagnosis.


7. ID followup.


8. Pain control.


9. Psychiatry evaluation.


10. Skin care.


11. Nutrition.


12. Continue current treatment.


13. Discussed with staff.


14. Close followup





Subjective


Date patient seen:  Apr 30, 2018


Constitutional:  Denies: no symptoms, chills, diaphoresis, fever, malaise, 

weakness, other


HEENT:  Denies: no symptoms, eye pain, blurred vision, tearing, double vision, 

ear pain, ear discharge, nose pain, nose congestion, throat pain, throat 

swelling, mouth pain, mouth swelling, other


Cardiovascular:  Denies: no symptoms, chest pain, edema, irregular heart rate, 

lightheadedness, palpitations, syncope, other


Respiratory:  Denies: no symptoms, cough, orthopnea, shortness of breath, SOB 

with excertion, SOB at rest, sputum, stridor, wheezing, other


Gastrointestinal/Abdominal:  Denies: no symptoms, abdomen distended, abdominal 

pain, black stools, tarry stools, blood in stool, constipated, diarrhea, 

difficulty swallowing, nausea, poor appetite, poor fluid intake, rectal bleeding

, vomiting, other


Genitourinary:  Denies: no symptoms, burning, discharge, frequency, flank pain, 

hematuria, incontinence, pain, urgency, other


Neurologic/Psychiatric:  Denies: no symptoms, anxiety, depressed, emotional 

problems, headache, numbness, paresthesia, pre-existing deficit, seizure, 

tingling, tremors, weakness, other


Endocrine:  Denies: no symptoms, excessive sweating, flushing, intolerance to 

cold, intolerance to heat, increased hunger, increased thirst, increased urine, 

unexplained weight gain, unexplained weight loss, other


Hematologic/Lymphatic:  Reports: anemia


Allergies:  


Coded Allergies:  


     No Known Allergies (Verified , 10/27/06)


Subjective


Lethargic. Tachycardic, hypertensive. Cont. pain management.





Objective





Last 24 Hour Vital Signs








  Date Time  Temp Pulse Resp B/P (MAP) Pulse Ox O2 Delivery O2 Flow Rate FiO2


 


4/30/18 23:59  123 19  100 Bi-pap  40


 


4/30/18 23:48  121 19  98 Bi-pap  40


 


4/30/18 23:39 98.2 120 24 99/70 96 Bi-pap  40





 98.2       


 


4/30/18 23:18  119 20  97 Facial  40


 


4/30/18 22:00       95.0 40


 


4/30/18 21:23  125 21  99 Facial  30


 


4/30/18 20:00        40


 


4/30/18 20:00  126 20  98 Nasal Cannula 2.0 28


 


4/30/18 20:00 98.5 127 24 141/99 96 Bi-pap  40





 98.5       


 


4/30/18 19:50     96 Nasal Cannula 2.0 28


 


4/30/18 19:50      Nasal Cannula 2.0 28


 


4/30/18 19:50  127 20  96 Nasal Cannula 2.0 28


 


4/30/18 19:34  128      


 


4/30/18 16:00 98.7 122 20 144/82 100 Nasal Cannula 2.0 





 98.7       


 


4/30/18 16:00  122      


 


4/30/18 15:56  125 20  96 Nasal Cannula 2.0 28


 


4/30/18 15:44  124 16  94 Nasal Cannula 2.0 28


 


4/30/18 12:00  124      


 


4/30/18 12:00 98.2 119 12 131/86 100 Nasal Cannula 2.0 





 98.2       


 


4/30/18 11:23  126 24  99 Nasal Cannula 2.0 28


 


4/30/18 11:22      Nasal Cannula 2.0 28


 


4/30/18 11:22     95 Nasal Cannula 2.0 28


 


4/30/18 11:14  126 20   Nasal Cannula 2.0 28


 


4/30/18 11:14  126 20  95 Nasal Cannula 2.0 28


 


4/30/18 08:00  125      


 


4/30/18 08:00 98.2 124 14 116/77 100 Nasal Cannula 2.0 





 98.2       


 


4/30/18 04:00 98.1 122 20 129/74 99 Room Air 4.0 





 98.1       


 


4/30/18 04:00  124      

















Intake and Output  


 


 4/30/18 5/1/18





 19:00 07:00


 


  


 


# Voids 3 








Laboratory Tests


4/30/18 03:50: 


White Blood Count 15.7H, Red Blood Count 4.23, Hemoglobin 13.0, Hematocrit 38.1

, Mean Corpuscular Volume 90, Mean Corpuscular Hemoglobin 30.8, Mean 

Corpuscular Hemoglobin Concent 34.2, Red Cell Distribution Width 15.1H, 

Platelet Count 193, Mean Platelet Volume 7.4, Neutrophils (%) (Auto) 76.8H, 

Lymphocytes (%) (Auto) 16.5L, Monocytes (%) (Auto) 5.3, Eosinophils (%) (Auto) 

0.9, Basophils (%) (Auto) 0.5, Sodium Level 139, Potassium Level 3.5, Chloride 

Level 104, Carbon Dioxide Level 25, Anion Gap 10, Blood Urea Nitrogen 15, 

Creatinine 0.8, Estimat Glomerular Filtration Rate > 60, Glucose Level 118H, 

Calcium Level 10.0, Troponin I 0.298H


4/30/18 21:05: 


Arterial Blood pH 7.364, Arterial Blood Partial Pressure CO2 47.4H, Arterial 

Blood Partial Pressure O2 50.4L, Arterial Blood HCO3 26.4H, Arterial Blood 

Oxygen Saturation 82.5L, Arterial Blood Base Excess 0.5, Jonn Test Positive


Height (Feet):  5


Height (Inches):  6.00


Weight (Pounds):  129


General Appearance:  lethargic


Cardiovascular:  tachycardia


Respiratory/Chest:  decreased breath sounds


Edema:  mild edema











Erick Russell MD May 1, 2018 01:09

## 2018-05-01 NOTE — DIAGNOSTIC IMAGING REPORT
Indication: Dyspnea

 

Comparison:  4/30/2018

 

A single view chest radiograph was obtained.

 

Findings:

 

Interstitial and alveolar airspace disease demonstrated and fairly extensive within

both lung fields unchanged accounting for slightly better and more inflated appearing

lungs currently. PICC line is stable. Heart size is stable.

 

IMPRESSION:

 

Extensive mixed interstitial alveolar airspace disease bilaterally unchanged

## 2018-05-01 NOTE — INFECTIOUS DISEASES PROG NOTE
Assessment/Plan


Assessment/Plan





ASSESSMENT:  The patient is a 62-year-old female with,





Lethargy, on Bipap- improvnig- ?oversedation


  -CXR;Extensive mixed interstitial alveolar airspace disease bilaterally 

unchanged





 Low-grade fever. probable due to SBO  ,resolved


Leukocytosis, mild- recurrent- ?2ry to possible malignancy


Probable small bowel obstruction


Recent history of polymicrobial gram-negative bacteremia including 

Stenotrophomonas maltophilia and Enterobacter.


History of Candida esophagitis.


History of C. difficile in the past.











Air anterior to the liver , ?  free intraperitoneal air ( Surg doubt 

perforation )


Lung and liver masses Ro Malignancy 


 CT:  Pleural-based mass at the left lung base / Moderate-sized left pleural 

effusion, left-sided pleural nodularity and retroperitoneal and paraspinal 

nodules/masses. 


         Left inferior hilar mass lesion partially visualized. New low-

attenuation liver lesion ( concerning for malignancy/metastatic disease )


Pl effusion SP ultrasound-guided thoracentesis,  960 cc  ( m/l 2/2 mets,  no 

evid of Empyema )


  -exudate fluid:  (n 6), pH 8, lguc 98, prot 4.6 (serum 8.4); cx stain: 


  GRAM STAIN  Final  


        GRAM STAIN RESULT           FEW WHITE BLOOD CELLS


                                    NO ORGANISMS SEEN


cx negative





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 





Crohn's disease.


History of bowel obstruction x2 with lysis of adhesions in 2005.


COPD.


History of chronic pain syndrome, on morphine pump.


CVA in 2005.


Seizure disorder.








PLAN:





cont to monitor off abx, unless febrile, worsening WBC.


  -4/25 SP Zosyn #5





 


Monitor CBC and BMP.


follow GI recommendations


hem, surg f/u


planf for comfort care


on ICU- bipap management





Subjective


Allergies:  


Coded Allergies:  


     No Known Allergies (Verified , 10/27/06)


Subjective


afebrile 


leukocytosis stable


transferred to ICU given lethargy- placed on Bipap and improving





Objective


Vital Signs





Last 24 Hour Vital Signs








  Date Time  Temp Pulse Resp B/P (MAP) Pulse Ox O2 Delivery O2 Flow Rate FiO2


 


5/1/18 11:40  132 18  100 Bi-pap  40


 


5/1/18 11:30  131 20  99 Facial  40


 


5/1/18 11:30  133 20  100 Bi-pap  40


 


5/1/18 10:00  129 17 96/71 96 Bi-pap  40


 


5/1/18 09:29  133 23  99 Facial  40


 


5/1/18 09:00  131 18 105/70 97 Bi-pap  40


 


5/1/18 08:00        40


 


5/1/18 08:00 98.0 135 16 102/75 96 Bi-pap  40





 98.0       


 


5/1/18 08:00  125      


 


5/1/18 07:09  131 20  100 Bi-pap  40


 


5/1/18 07:00  122 18 99/72 98 Bi-pap  40


 


5/1/18 06:59      Bi-pap  40


 


5/1/18 06:59  129 20  100 Bi-pap  40


 


5/1/18 06:59     100 Bi-pap  40


 


5/1/18 06:59  129 22  100 Facial  40


 


5/1/18 06:00  130 16 101/72 100 Bi-pap  40


 


5/1/18 05:19  133 20  100 Facial  40


 


5/1/18 05:00  135 16 100/75 100 Bi-pap  40


 


5/1/18 04:00 97.8 135 16 94/75 100 Bi-pap  40





 97.8       


 


5/1/18 04:00        40


 


5/1/18 04:00  130      


 


5/1/18 03:22  133 17  100 Bi-pap  40


 


5/1/18 03:12  131 18  98 Bi-pap  40


 


5/1/18 03:11  131 18  98 Facial  40


 


5/1/18 03:00  135 18 120/87 99 Bi-pap  40


 


5/1/18 02:00  133 18 112/75 99 Bi-pap  40


 


5/1/18 01:22  127 19  99 Facial  40


 


5/1/18 01:00  128 18 112/89 99 Bi-pap  40


 


5/1/18 00:00  124 18 106/69 99 Bi-pap  40


 


5/1/18 00:00  127      


 


4/30/18 23:59  123 19  100 Bi-pap  40


 


4/30/18 23:48  121 19  98 Bi-pap  40


 


4/30/18 23:39 98.2 120 24 99/70 96 Bi-pap  40





 98.2       


 


4/30/18 23:18  119 20  97 Facial  40


 


4/30/18 22:00       95.0 40


 


4/30/18 21:23  125 21  99 Facial  30


 


4/30/18 20:00        40


 


4/30/18 20:00  126 20  98 Nasal Cannula 2.0 28


 


4/30/18 20:00 98.5 127 24 141/99 96 Bi-pap  40





 98.5       


 


4/30/18 19:50     96 Nasal Cannula 2.0 28


 


4/30/18 19:50      Nasal Cannula 2.0 28


 


4/30/18 19:50  127 20  96 Nasal Cannula 2.0 28


 


4/30/18 19:34  128      


 


4/30/18 16:00 98.7 122 20 144/82 100 Nasal Cannula 2.0 





 98.7       


 


4/30/18 16:00  122      


 


4/30/18 15:56  125 20  96 Nasal Cannula 2.0 28


 


4/30/18 15:44  124 16  94 Nasal Cannula 2.0 28


 


4/30/18 12:00  124      


 


4/30/18 12:00 98.2 119 12 131/86 100 Nasal Cannula 2.0 





 98.2       








Height (Feet):  5


Height (Inches):  6.00


Weight (Pounds):  129


Objective


HEENT:  anicteric


Respiratory/Chest:  normal breath sounds


Cardiovascular:  regular rhythm


Abdomen:  tender





Laboratory Tests








Test


  4/30/18


21:05 5/1/18


04:00 5/1/18


08:05


 


Arterial Blood pH


  7.364


(7.350-7.450) 


  7.423


(7.350-7.450)


 


Arterial Blood Partial


Pressure CO2 47.4 mmHg


(35.0-45.0)  H 


  40.8 mmHg


(35.0-45.0)


 


Arterial Blood Partial


Pressure O2 50.4 mmHg


(75.0-100.0)  L 


  59.9 mmHg


(75.0-100.0)  L


 


Arterial Blood HCO3


  26.4 mmol/L


(22.0-26.0)  H 


  26.0 mmol/L


(22.0-26.0)


 


Arterial Blood Oxygen


Saturation 82.5 %


(92.0-98.0)  L 


  89.7 %


(92.0-98.0)  L


 


Arterial Blood Base Excess 0.5    1.5  


 


Jonn Test Positive    Positive  


 


White Blood Count


  


  14.1 K/UL


(4.8-10.8)  H 


 


 


Red Blood Count


  


  4.69 M/UL


(4.20-5.40) 


 


 


Hemoglobin


  


  13.4 G/DL


(12.0-16.0) 


 


 


Hematocrit


  


  42.0 %


(37.0-47.0) 


 


 


Mean Corpuscular Volume  90 FL (80-99)   


 


Mean Corpuscular Hemoglobin


  


  28.6 PG


(27.0-31.0) 


 


 


Mean Corpuscular Hemoglobin


Concent 


  31.9 G/DL


(32.0-36.0)  L 


 


 


Red Cell Distribution Width


  


  15.0 %


(11.6-14.8)  H 


 


 


Platelet Count


  


  198 K/UL


(150-450) 


 


 


Mean Platelet Volume


  


  7.6 FL


(6.5-10.1) 


 


 


Neutrophils (%) (Auto)


  


  80.0 %


(45.0-75.0)  H 


 


 


Lymphocytes (%) (Auto)


  


  14.6 %


(20.0-45.0)  L 


 


 


Monocytes (%) (Auto)


  


  3.3 %


(1.0-10.0) 


 


 


Eosinophils (%) (Auto)


  


  1.7 %


(0.0-3.0) 


 


 


Basophils (%) (Auto)


  


  0.4 %


(0.0-2.0) 


 


 


Sodium Level


  


  141 MMOL/L


(136-145) 


 


 


Potassium Level


  


  3.7 MMOL/L


(3.5-5.1) 


 


 


Chloride Level


  


  102 MMOL/L


() 


 


 


Carbon Dioxide Level


  


  26 MMOL/L


(21-32) 


 


 


Anion Gap


  


  13 mmol/L


(5-15) 


 


 


Blood Urea Nitrogen


  


  18 mg/dL


(7-18) 


 


 


Creatinine


  


  0.8 MG/DL


(0.55-1.30) 


 


 


Estimat Glomerular Filtration


Rate 


  > 60 mL/min


(>60) 


 


 


Glucose Level


  


  96 MG/DL


() 


 


 


Calcium Level


  


  10.0 MG/DL


(8.5-10.1) 


 


 


Phosphorus Level


  


  4.6 MG/DL


(2.5-4.9) 


 


 


Magnesium Level


  


  1.3 MG/DL


(1.8-2.4)  L 


 


 


Total Bilirubin


  


  0.3 MG/DL


(0.2-1.0) 


 


 


Aspartate Amino Transf


(AST/SGOT) 


  84 U/L (15-37)


H 


 


 


Alanine Aminotransferase


(ALT/SGPT) 


  30 U/L (12-78)


  


 


 


Alkaline Phosphatase


  


  151 U/L


()  H 


 


 


Total Protein


  


  8.4 G/DL


(6.4-8.2)  H 


 


 


Albumin


  


  1.7 G/DL


(3.4-5.0)  L 


 


 


Globulin  6.7 g/dL   


 


Albumin/Globulin Ratio


  


  0.3 (1.0-2.7)


L 


 











Current Medications








 Medications


  (Trade)  Dose


 Ordered  Sig/Jed


 Route


 PRN Reason  Start Time


 Stop Time Status Last Admin


Dose Admin


 


 Acetaminophen


  (Tylenol)  650 mg  Q4H  PRN


 ORAL


 fever (temp>100.5F)  5/1/18 00:30


 5/19/18 20:29   


 


 


 Acetaminophen/


 Hydrocodone Bitart


  (Norco 10/325)  1 tab  Q4H  PRN


 ORAL


 Pain Scale (3-5)  5/1/18 01:00


 5/3/18 20:59   


 


 


 Al Hydroxide/Mg


 Hydroxide


  (Mylanta II)  30 ml  Q6H  PRN


 ORAL


 dyspepsia  5/1/18 02:30


 5/19/18 20:29   


 


 


 Atorvastatin


 Calcium


  (Lipitor)  40 mg  BEDTIME


 ORAL


   5/1/18 21:00


 5/19/18 20:59   


 


 


 Bupropion HCl


  (Wellbutrin)  100 mg  DAILY


 ORAL


   5/1/18 09:00


 5/20/18 08:59  5/1/18 09:06


 


 


 Chlorhexidine


 Gluconate


  (Rosy-Hex 2%)  1 applic  DAILY@2000


 TOPIC


   5/1/18 20:00


 5/25/18 19:59   


 


 


 Dextrose


  (Dextrose 50%)  50 ml  STAT  PRN


 IV


 Hypoglycemia BS<60mg/dL  5/1/18 20:30


 5/30/18 20:29   


 


 


 Diphenhydramine


 HCl


  (Benadryl)  25 mg  Q6H  PRN


 ORAL


 Itching/Pruritis  5/1/18 02:30


 5/19/18 20:29   


 


 


 Duloxetine HCl


  (Cymbalta)  90 mg  DAILY


 ORAL


   5/1/18 09:00


 5/20/18 08:59  5/1/18 09:06


 


 


 Furosemide


  (Lasix)  40 mg  EVERY 8  HOURS


 IV


   5/1/18 06:00


 5/30/18 21:59  5/1/18 06:52


 


 


 Gabapentin


  (Neurontin)  300 mg  THREE TIMES A  DAY


 ORAL


   5/1/18 09:00


 5/20/18 09:59  5/1/18 09:07


 


 


 Heparin Sodium


  (Porcine)


  (Heparin 5000


 units/ml)  5,000 units  EVERY 12  HOURS


 SUBQ


   5/1/18 09:00


 5/19/18 20:59  5/1/18 09:08


 


 


 Hydromorphone HCl


  (Dilaudid)  0.5 mg  Q4H  PRN


 IVP


 PAIN SCALE 4-10  5/1/18 01:00


 5/3/18 20:59   


 


 


 Levalbuterol HCl


  (Xopenex)  0.625 mg  Q4HRT


 HHN


   4/30/18 23:00


 5/5/18 14:59  5/1/18 11:30


 


 


 Levetiracetam


  (Keppra)  1,000 mg  Q8H


 ORAL


   5/1/18 00:00


 5/24/18 07:59  5/1/18 09:05


 


 


 Levothyroxine


 Sodium


  (Synthroid)  75 mcg  ACBREAKFAST


 ORAL


   5/1/18 06:30


 5/20/18 06:29   


 


 


 Methylnaltrexone


 Bromide


  (Relistor)  12 mg  DAILY


 SUBQ


   5/1/18 09:00


 5/20/18 10:14  5/1/18 09:07


 


 


 Metoclopramide HCl


  (Reglan)  10 mg  Q6H  PRN


 IVP


 Unrelieved Severe Nausea  5/1/18 02:30


 5/19/18 20:29   


 


 


 Nitroglycerin


  (Ntg)  0.4 mg  Q5M X 3 DOSES PRN


 SL


 Prn Chest Pain  4/30/18 23:00


 5/19/18 10:59   


 


 


 Ondansetron HCl


  (Zofran)  4 mg  Q6H  PRN


 IVP


 Nausea & Vomiting  5/1/18 02:30


 5/19/18 20:29   


 


 


 Pantoprazole


  (Protonix)  40 mg  0600


 ORAL


   5/1/18 09:00


 5/31/18 08:59  5/1/18 09:06


 


 


 Polyethylene


 Glycol


  (Miralax)  17 gm  HSPRN  PRN


 ORAL


 Constipation  5/1/18 20:30


 5/30/18 20:29   


 


 


 Promethazine HCl


 25 mg/Dextrose  56 ml @ 


 110 mls/hr  Q6H  PRN


 IV


 Refractory N/V  5/1/18 03:00


 5/19/18 20:59   


 


 


 Simethicone


  (Mylicon)  80 mg  QIDPRN  PRN


 ORAL


 gas   5/1/18 20:30


 5/30/18 20:29   


 


 


 Theophylline


  (Shiv-Dur)  100 mg  Q12HR


 ORAL


   5/1/18 09:00


 5/19/18 20:59  5/1/18 09:06


 


 


 Topiramate


  (Topamax)  25 mg  EVERY 12  HOURS


 ORAL


   5/1/18 09:00


 5/19/18 20:59  5/1/18 09:06


 


 


 Trazodone HCl


  (Desyrel)  200 mg  BEDTIME


 ORAL


   5/1/18 21:00


 5/19/18 20:59   


 

















Selene Garcia M.D. May 1, 2018 12:05

## 2018-05-01 NOTE — CARDIOLOGY REPORT
--------------- APPROVED REPORT --------------





EKG Measurement

Heart [id number]

SC [id number]

[id number]

[id number]

[id number]





Sinus tachycardia

Low voltage QRS

Septal infarct, age undetermined

Abnormal ECG

## 2018-05-01 NOTE — GENERAL PROGRESS NOTE
Assessment/Plan


Problem List:  


(1) Crohn's disease


ICD Codes:  K50.90 - Crohn's disease


SNOMED:  96497292


Qualifiers:  


   Qualified Codes:  K50.919 - Crohn's disease, unspecified, with unspecified 

complications


(2) Opioid dependence


ICD Codes:  F11.20 - Opioid dependence


SNOMED:  01176619


(3) Intractable abdominal pain


ICD Codes:  R10.9 - Unspecified abdominal pain


SNOMED:  61145361, 922633377


(4) COPD (chronic obstructive pulmonary disease)


ICD Codes:  J44.9 - Chronic obstructive pulmonary disease


SNOMED:  72883279


(5) Shortness of breath


(6) SOB (shortness of breath)


ICD Codes:  R06.02 - Shortness of breath


SNOMED:  145550929


(7) UTI (urinary tract infection)


ICD Codes:  N39.0 - Urinary tract infection, site not specified


SNOMED:  60203532


(8) Lung mass


ICD Codes:  R91.8 - Other nonspecific abnormal finding of lung field


SNOMED:  029409088


(9) Tachycardia


ICD Codes:  R00.0 - Tachycardia, unspecified


SNOMED:  6333367


Status:  unchanged


Assessment/Plan


ot pt diet pain control sx eval cbc bmp am heme f/u cardio eval,





Subjective


Constitutional:  Reports: weakness


Allergies:  


Coded Allergies:  


     No Known Allergies (Verified , 10/27/06)


All Systems:  reviewed and negative except above


Subjective


bipapa sleeping in icu





Objective





Last 24 Hour Vital Signs








  Date Time  Temp Pulse Resp B/P (MAP) Pulse Ox O2 Delivery O2 Flow Rate FiO2


 


5/1/18 12:31  135 19  100 Facial  100


 


5/1/18 11:40  132 18  100 Bi-pap  40


 


5/1/18 11:30  131 20  99 Facial  40


 


5/1/18 11:30  133 20  100 Bi-pap  40


 


5/1/18 10:00  129 17 96/71 96 Bi-pap  40


 


5/1/18 09:29  133 23  99 Facial  40


 


5/1/18 09:00  131 18 105/70 97 Bi-pap  40


 


5/1/18 08:00        40


 


5/1/18 08:00 98.0 135 16 102/75 96 Bi-pap  40





 98.0       


 


5/1/18 08:00  125      


 


5/1/18 07:09  131 20  100 Bi-pap  40


 


5/1/18 07:00  122 18 99/72 98 Bi-pap  40


 


5/1/18 06:59      Bi-pap  40


 


5/1/18 06:59  129 20  100 Bi-pap  40


 


5/1/18 06:59     100 Bi-pap  40


 


5/1/18 06:59  129 22  100 Facial  40


 


5/1/18 06:00  130 16 101/72 100 Bi-pap  40


 


5/1/18 05:19  133 20  100 Facial  40


 


5/1/18 05:00  135 16 100/75 100 Bi-pap  40


 


5/1/18 04:00 97.8 135 16 94/75 100 Bi-pap  40





 97.8       


 


5/1/18 04:00        40


 


5/1/18 04:00  130      


 


5/1/18 03:22  133 17  100 Bi-pap  40


 


5/1/18 03:12  131 18  98 Bi-pap  40


 


5/1/18 03:11  131 18  98 Facial  40


 


5/1/18 03:00  135 18 120/87 99 Bi-pap  40


 


5/1/18 02:00  133 18 112/75 99 Bi-pap  40


 


5/1/18 01:22  127 19  99 Facial  40


 


5/1/18 01:00  128 18 112/89 99 Bi-pap  40


 


5/1/18 00:00  124 18 106/69 99 Bi-pap  40


 


5/1/18 00:00  127      


 


4/30/18 23:59  123 19  100 Bi-pap  40


 


4/30/18 23:48  121 19  98 Bi-pap  40


 


4/30/18 23:39 98.2 120 24 99/70 96 Bi-pap  40





 98.2       


 


4/30/18 23:18  119 20  97 Facial  40


 


4/30/18 22:00       95.0 40


 


4/30/18 21:23  125 21  99 Facial  30


 


4/30/18 20:00        40


 


4/30/18 20:00  126 20  98 Nasal Cannula 2.0 28


 


4/30/18 20:00 98.5 127 24 141/99 96 Bi-pap  40





 98.5       


 


4/30/18 19:50     96 Nasal Cannula 2.0 28


 


4/30/18 19:50      Nasal Cannula 2.0 28


 


4/30/18 19:50  127 20  96 Nasal Cannula 2.0 28


 


4/30/18 19:34  128      


 


4/30/18 16:00 98.7 122 20 144/82 100 Nasal Cannula 2.0 





 98.7       


 


4/30/18 16:00  122      


 


4/30/18 15:56  125 20  96 Nasal Cannula 2.0 28


 


4/30/18 15:44  124 16  94 Nasal Cannula 2.0 28

















Intake and Output  


 


 4/30/18 5/1/18





 19:00 07:00


 


Output Total  260 ml


 


Balance  -260 ml


 


  


 


Output Urine Total  260 ml


 


Stool Total  0 ml


 


# Voids 3 4








Laboratory Tests


4/30/18 21:05: 


Arterial Blood pH 7.364, Arterial Blood Partial Pressure CO2 47.4H, Arterial 

Blood Partial Pressure O2 50.4L, Arterial Blood HCO3 26.4H, Arterial Blood 

Oxygen Saturation 82.5L, Arterial Blood Base Excess 0.5, Jonn Test Positive


5/1/18 04:00: 


White Blood Count 14.1H, Red Blood Count 4.69, Hemoglobin 13.4, Hematocrit 42.0

, Mean Corpuscular Volume 90, Mean Corpuscular Hemoglobin 28.6, Mean 

Corpuscular Hemoglobin Concent 31.9L, Red Cell Distribution Width 15.0H, 

Platelet Count 198, Mean Platelet Volume 7.6, Neutrophils (%) (Auto) 80.0H, 

Lymphocytes (%) (Auto) 14.6L, Monocytes (%) (Auto) 3.3, Eosinophils (%) (Auto) 

1.7, Basophils (%) (Auto) 0.4, Sodium Level 141, Potassium Level 3.7, Chloride 

Level 102, Carbon Dioxide Level 26, Anion Gap 13, Blood Urea Nitrogen 18, 

Creatinine 0.8, Estimat Glomerular Filtration Rate > 60, Glucose Level 96, 

Calcium Level 10.0, Phosphorus Level 4.6, Magnesium Level 1.3L, Total Bilirubin 

0.3, Aspartate Amino Transf (AST/SGOT) 84H, Alanine Aminotransferase (ALT/SGPT) 

30, Alkaline Phosphatase 151H, Total Protein 8.4H, Albumin 1.7L, Globulin 6.7, 

Albumin/Globulin Ratio 0.3L


5/1/18 08:05: 


Arterial Blood pH 7.423, Arterial Blood Partial Pressure CO2 40.8, Arterial 

Blood Partial Pressure O2 59.9L, Arterial Blood HCO3 26.0, Arterial Blood 

Oxygen Saturation 89.7L, Arterial Blood Base Excess 1.5, Jonn Test Positive


Height (Feet):  5


Height (Inches):  6.00


Weight (Pounds):  129


General Appearance:  lethargic


EENT:  normal ENT inspection


Neck:  normal alignment


Cardiovascular:  normal peripheral pulses, normal rate, regular rhythm


Respiratory/Chest:  chest wall non-tender, decreased breath sounds


Abdomen:  normal bowel sounds, non tender, soft


Extremities:  normal inspection


Edema:  no edema noted Arm (L), no edema noted Arm (R), no edema noted Leg (L), 

no edema noted Leg (R), no edema noted Pedal (L), no edema noted Pedal (R), no 

edema noted Generalized


Neurologic:  motor weakness


Skin:  normal pigmentation, warm/dry











MAICOL LANG May 1, 2018 14:46

## 2018-05-01 NOTE — GENERAL PROGRESS NOTE
Assessment/Plan


Assessment/Plan


(1) Chronic abdominal pain


(2) Chronic pancreatitis


(3) Crohn's disease 


(4) Herniated nucleus pulposus, lumbar


(5) Lumbar spondylosis


(6) Radiculopathy of lumbar region


(7) Lung mass


Pt will be continued on Neurontin, Dilaudid and Norco and pt has intrathecal 

pump.


HOLD OPIOIDS FOR OVERSEDATION OR SBP<90 OR DBP<60 OR O2SAT<92% OR RR<12


Pt was d/w Dr. Shetty and he concurred.





Subjective


Date patient seen:  May 1, 2018


Time patient seen:  07:00 - am


ROS Limited/Unobtainable:  No


Allergies:  


Coded Allergies:  


     No Known Allergies (Verified , 10/27/06)


Subjective


Patient was transferred to ICU while she was being seen by Dr. Shetty. Patient 

is severely weak, tachycardic and having trouble with breathing now on Bipap. 

No signs of pain.





Objective





Last 24 Hour Vital Signs








  Date Time  Temp Pulse Resp B/P (MAP) Pulse Ox O2 Delivery O2 Flow Rate FiO2


 


5/1/18 07:09  131 20  100 Bi-pap  40


 


5/1/18 06:59      Bi-pap  40


 


5/1/18 06:59  129 20  100 Bi-pap  40


 


5/1/18 06:59     100 Bi-pap  40


 


5/1/18 06:59  129 22  100 Facial  40


 


5/1/18 06:00  130 16 101/72 100 Bi-pap  40


 


5/1/18 05:19  133 20  100 Facial  40


 


5/1/18 05:00  135 16 100/75 100 Bi-pap  40


 


5/1/18 04:00 97.8 135 16 94/75 100 Bi-pap  40





 97.8       


 


5/1/18 04:00        40


 


5/1/18 04:00  130      


 


5/1/18 03:22  133 17  100 Bi-pap  40


 


5/1/18 03:12  131 18  98 Bi-pap  40


 


5/1/18 03:11  131 18  98 Facial  40


 


5/1/18 03:00  135 18 120/87 99 Bi-pap  40


 


5/1/18 02:00  133 18 112/75 99 Bi-pap  40


 


5/1/18 01:22  127 19  99 Facial  40


 


5/1/18 01:00  128 18 112/89 99 Bi-pap  40


 


5/1/18 00:00  124 18 106/69 99 Bi-pap  40


 


5/1/18 00:00  127      


 


4/30/18 23:59  123 19  100 Bi-pap  40


 


4/30/18 23:48  121 19  98 Bi-pap  40


 


4/30/18 23:39 98.2 120 24 99/70 96 Bi-pap  40





 98.2       


 


4/30/18 23:18  119 20  97 Facial  40


 


4/30/18 22:00       95.0 40


 


4/30/18 21:23  125 21  99 Facial  30


 


4/30/18 20:00        40


 


4/30/18 20:00  126 20  98 Nasal Cannula 2.0 28


 


4/30/18 20:00 98.5 127 24 141/99 96 Bi-pap  40





 98.5       


 


4/30/18 19:50     96 Nasal Cannula 2.0 28


 


4/30/18 19:50      Nasal Cannula 2.0 28


 


4/30/18 19:50  127 20  96 Nasal Cannula 2.0 28


 


4/30/18 19:34  128      


 


4/30/18 16:00 98.7 122 20 144/82 100 Nasal Cannula 2.0 





 98.7       


 


4/30/18 16:00  122      


 


4/30/18 15:56  125 20  96 Nasal Cannula 2.0 28


 


4/30/18 15:44  124 16  94 Nasal Cannula 2.0 28


 


4/30/18 12:00  124      


 


4/30/18 12:00 98.2 119 12 131/86 100 Nasal Cannula 2.0 





 98.2       


 


4/30/18 11:23  126 24  99 Nasal Cannula 2.0 28


 


4/30/18 11:22      Nasal Cannula 2.0 28


 


4/30/18 11:22     95 Nasal Cannula 2.0 28


 


4/30/18 11:14  126 20   Nasal Cannula 2.0 28


 


4/30/18 11:14  126 20  95 Nasal Cannula 2.0 28

















Intake and Output  


 


 4/30/18 5/1/18





 19:00 07:00


 


Output Total  260 ml


 


Balance  -260 ml


 


  


 


Output Urine Total  260 ml


 


Stool Total  0 ml


 


# Voids 3 4








Laboratory Tests


4/30/18 21:05: 


Arterial Blood pH 7.364, Arterial Blood Partial Pressure CO2 47.4H, Arterial 

Blood Partial Pressure O2 50.4L, Arterial Blood HCO3 26.4H, Arterial Blood 

Oxygen Saturation 82.5L, Arterial Blood Base Excess 0.5, Jonn Test Positive


5/1/18 04:00: 


White Blood Count 14.1H, Red Blood Count 4.69, Hemoglobin 13.4, Hematocrit 42.0

, Mean Corpuscular Volume 90, Mean Corpuscular Hemoglobin 28.6, Mean 

Corpuscular Hemoglobin Concent 31.9L, Red Cell Distribution Width 15.0H, 

Platelet Count 198, Mean Platelet Volume 7.6, Neutrophils (%) (Auto) 80.0H, 

Lymphocytes (%) (Auto) 14.6L, Monocytes (%) (Auto) 3.3, Eosinophils (%) (Auto) 

1.7, Basophils (%) (Auto) 0.4, Sodium Level 141, Potassium Level 3.7, Chloride 

Level 102, Carbon Dioxide Level 26, Anion Gap 13, Blood Urea Nitrogen 18, 

Creatinine 0.8, Estimat Glomerular Filtration Rate > 60, Glucose Level 96, 

Calcium Level 10.0, Phosphorus Level 4.6, Magnesium Level 1.3L, Total Bilirubin 

0.3, Aspartate Amino Transf (AST/SGOT) 84H, Alanine Aminotransferase (ALT/SGPT) 

30, Alkaline Phosphatase 151H, Total Protein 8.4H, Albumin 1.7L, Globulin 6.7, 

Albumin/Globulin Ratio 0.3L


5/1/18 08:05: 


Arterial Blood pH 7.423, Arterial Blood Partial Pressure CO2 40.8, Arterial 

Blood Partial Pressure O2 59.9L, Arterial Blood HCO3 26.0, Arterial Blood 

Oxygen Saturation 89.7L, Arterial Blood Base Excess 1.5, Jonn Test Positive


Height (Feet):  5


Height (Inches):  6.00


Weight (Pounds):  129


Objective


General Appearance:  on Bipap


Neck:  normal alignment, supple


Cardiovascular:  tachycardic


Respiratory/Chest:  decreased breath sounds


Abdomen:  tender, distended, intrathecal pump palpated


Extremities:  non-tender


Edema:  no edema noted











LEA BRAVO May 1, 2018 09:01

## 2018-05-01 NOTE — GENERAL SURGERY PROGRESS NOTE
General Surgery-Progress Note


Subjective


Additional Comments


decompensated with respiratory status requiring transfer to ICU and BIPAP





Objective





Last 24 Hour Vital Signs








  Date Time  Temp Pulse Resp B/P (MAP) Pulse Ox O2 Delivery O2 Flow Rate FiO2


 


5/1/18 12:31  135 19  100 Facial  100


 


5/1/18 11:40  132 18  100 Bi-pap  40


 


5/1/18 11:30  131 20  99 Facial  40


 


5/1/18 11:30  133 20  100 Bi-pap  40


 


5/1/18 10:00  129 17 96/71 96 Bi-pap  40


 


5/1/18 09:29  133 23  99 Facial  40


 


5/1/18 09:00  131 18 105/70 97 Bi-pap  40


 


5/1/18 08:00        40


 


5/1/18 08:00 98.0 135 16 102/75 96 Bi-pap  40





 98.0       


 


5/1/18 08:00  125      


 


5/1/18 07:09  131 20  100 Bi-pap  40


 


5/1/18 07:00  122 18 99/72 98 Bi-pap  40


 


5/1/18 06:59      Bi-pap  40


 


5/1/18 06:59  129 20  100 Bi-pap  40


 


5/1/18 06:59     100 Bi-pap  40


 


5/1/18 06:59  129 22  100 Facial  40


 


5/1/18 06:00  130 16 101/72 100 Bi-pap  40


 


5/1/18 05:19  133 20  100 Facial  40


 


5/1/18 05:00  135 16 100/75 100 Bi-pap  40


 


5/1/18 04:00 97.8 135 16 94/75 100 Bi-pap  40





 97.8       


 


5/1/18 04:00        40


 


5/1/18 04:00  130      


 


5/1/18 03:22  133 17  100 Bi-pap  40


 


5/1/18 03:12  131 18  98 Bi-pap  40


 


5/1/18 03:11  131 18  98 Facial  40


 


5/1/18 03:00  135 18 120/87 99 Bi-pap  40


 


5/1/18 02:00  133 18 112/75 99 Bi-pap  40


 


5/1/18 01:22  127 19  99 Facial  40


 


5/1/18 01:00  128 18 112/89 99 Bi-pap  40


 


5/1/18 00:00  124 18 106/69 99 Bi-pap  40


 


5/1/18 00:00  127      


 


4/30/18 23:59  123 19  100 Bi-pap  40


 


4/30/18 23:48  121 19  98 Bi-pap  40


 


4/30/18 23:39 98.2 120 24 99/70 96 Bi-pap  40





 98.2       


 


4/30/18 23:18  119 20  97 Facial  40


 


4/30/18 22:00       95.0 40


 


4/30/18 21:23  125 21  99 Facial  30


 


4/30/18 20:00        40


 


4/30/18 20:00  126 20  98 Nasal Cannula 2.0 28


 


4/30/18 20:00 98.5 127 24 141/99 96 Bi-pap  40





 98.5       


 


4/30/18 19:50     96 Nasal Cannula 2.0 28


 


4/30/18 19:50      Nasal Cannula 2.0 28


 


4/30/18 19:50  127 20  96 Nasal Cannula 2.0 28


 


4/30/18 19:34  128      


 


4/30/18 16:00 98.7 122 20 144/82 100 Nasal Cannula 2.0 





 98.7       


 


4/30/18 16:00  122      


 


4/30/18 15:56  125 20  96 Nasal Cannula 2.0 28


 


4/30/18 15:44  124 16  94 Nasal Cannula 2.0 28








I&O











Intake and Output  


 


 4/30/18 5/1/18





 19:00 07:00


 


Output Total  260 ml


 


Balance  -260 ml


 


  


 


Output Urine Total  260 ml


 


Stool Total  0 ml


 


# Voids 3 4








Cardiovascular:  RSR


Respiratory:  decreased breath sounds


Abdomen:  soft, distended, non-tender, present bowel sounds


Extremities:  no cyanosis





Laboratory Tests








Test


  4/30/18


21:05 5/1/18


04:00 5/1/18


08:05


 


Arterial Blood pH


  7.364


(7.350-7.450) 


  7.423


(7.350-7.450)


 


Arterial Blood Partial


Pressure CO2 47.4 mmHg


(35.0-45.0)  H 


  40.8 mmHg


(35.0-45.0)


 


Arterial Blood Partial


Pressure O2 50.4 mmHg


(75.0-100.0)  L 


  59.9 mmHg


(75.0-100.0)  L


 


Arterial Blood HCO3


  26.4 mmol/L


(22.0-26.0)  H 


  26.0 mmol/L


(22.0-26.0)


 


Arterial Blood Oxygen


Saturation 82.5 %


(92.0-98.0)  L 


  89.7 %


(92.0-98.0)  L


 


Arterial Blood Base Excess 0.5    1.5  


 


Jonn Test Positive    Positive  


 


White Blood Count


  


  14.1 K/UL


(4.8-10.8)  H 


 


 


Red Blood Count


  


  4.69 M/UL


(4.20-5.40) 


 


 


Hemoglobin


  


  13.4 G/DL


(12.0-16.0) 


 


 


Hematocrit


  


  42.0 %


(37.0-47.0) 


 


 


Mean Corpuscular Volume  90 FL (80-99)   


 


Mean Corpuscular Hemoglobin


  


  28.6 PG


(27.0-31.0) 


 


 


Mean Corpuscular Hemoglobin


Concent 


  31.9 G/DL


(32.0-36.0)  L 


 


 


Red Cell Distribution Width


  


  15.0 %


(11.6-14.8)  H 


 


 


Platelet Count


  


  198 K/UL


(150-450) 


 


 


Mean Platelet Volume


  


  7.6 FL


(6.5-10.1) 


 


 


Neutrophils (%) (Auto)


  


  80.0 %


(45.0-75.0)  H 


 


 


Lymphocytes (%) (Auto)


  


  14.6 %


(20.0-45.0)  L 


 


 


Monocytes (%) (Auto)


  


  3.3 %


(1.0-10.0) 


 


 


Eosinophils (%) (Auto)


  


  1.7 %


(0.0-3.0) 


 


 


Basophils (%) (Auto)


  


  0.4 %


(0.0-2.0) 


 


 


Sodium Level


  


  141 MMOL/L


(136-145) 


 


 


Potassium Level


  


  3.7 MMOL/L


(3.5-5.1) 


 


 


Chloride Level


  


  102 MMOL/L


() 


 


 


Carbon Dioxide Level


  


  26 MMOL/L


(21-32) 


 


 


Anion Gap


  


  13 mmol/L


(5-15) 


 


 


Blood Urea Nitrogen


  


  18 mg/dL


(7-18) 


 


 


Creatinine


  


  0.8 MG/DL


(0.55-1.30) 


 


 


Estimat Glomerular Filtration


Rate 


  > 60 mL/min


(>60) 


 


 


Glucose Level


  


  96 MG/DL


() 


 


 


Calcium Level


  


  10.0 MG/DL


(8.5-10.1) 


 


 


Phosphorus Level


  


  4.6 MG/DL


(2.5-4.9) 


 


 


Magnesium Level


  


  1.3 MG/DL


(1.8-2.4)  L 


 


 


Total Bilirubin


  


  0.3 MG/DL


(0.2-1.0) 


 


 


Aspartate Amino Transf


(AST/SGOT) 


  84 U/L (15-37)


H 


 


 


Alanine Aminotransferase


(ALT/SGPT) 


  30 U/L (12-78)


  


 


 


Alkaline Phosphatase


  


  151 U/L


()  H 


 


 


Total Protein


  


  8.4 G/DL


(6.4-8.2)  H 


 


 


Albumin


  


  1.7 G/DL


(3.4-5.0)  L 


 


 


Globulin  6.7 g/dL   


 


Albumin/Globulin Ratio


  


  0.3 (1.0-2.7)


L 


 











Plan


Problems:  


(1) Intractable abdominal pain


Assessment & Plan:  62F with abdominal pain.  very complex medical and surgical 

history.  now with complex CT findings of left pleural effusion, left lower 

lung mass, large mediastinal and periaortic lymph nodes, new liver lesions, and 

air around the liver.  


unable to tell if bowel loop (possible blind end from prior surgery) noted in 

right upper quadrant above liver or if abscess or if free air.  exam not 

consistent with perforation and no significant free fluid noted on CT or area 

of perforation.  contrast into distal bowel without leak.  initial leukocytosis 

resolved.  low grade persistent fevers.  recent cough.  





unfortunately no clear explanation as to patients current condition.  lung mass

, effusion, new liver masses, and nodes very concerning for malignant process. 

Exam stable compared to prior exams (patient seen during last admission and 

known to me).  will need much more extensive work up. 





I discussed these findings with patient.  I explained possible need for urgent 

surgery but given history and complex surgical history we will monitor 

clinically first.  if declines will proceed with surgery which is VERY high 

risk in her.  if stable will continue with work up.  patient and partner 

express understand and agree with plan.  





s/p thoracentesis 4/20. malignant non small cell cancer 





CT reviewed.  Path reviewed


Discussed with patient


Need tissue diagnosis.  unfortunately path unable to determine etiology based 

on cytology


currently in icu and needs to stabilize prior to considerations for CT guided 

biopsy for tissue diagnosis. 


-regular diet 


-monitor exam clinically


-trend labs


-iv abx


-will follow with recs. thank you for this consultation.  














Darrius Benitez May 1, 2018 14:07

## 2018-05-01 NOTE — CARDIAC ELECTROPHYSIOLOGY PN
Assessment/Plan


Assessment/Plan


1. Sinus tachycardia with no evidence of atrial fibrillation or SVT, likely due 

to the patient's pain and respiratory failure. 


    Echocardiogram showed EF 65%. ECG  also sinus tach


2. Malignant pleural effusion and metastatic cancer. Follow up Dr. Fraga. S/p 

Left Thoracentesis on 4/20/18


    Morphine pump in abdomen. On BIPAP


3. Lung mass.   


4. Crohn disease and history of exploratory laparotomy, under management of Dr. Mcleod.


5. Ascites  paracentesis with cytology and also awaiting mediastinoscopy versus 

CT-guided lung biopsy , per Dr. Russell.





6. Still full Code 





DW RN





Subjective


Subjective


Altered, in ICU on BIPAP  in sinus tach 130s.





Objective





Last 24 Hour Vital Signs








  Date Time  Temp Pulse Resp B/P (MAP) Pulse Ox O2 Delivery O2 Flow Rate FiO2


 


5/1/18 14:52  131 16  99 Bi-pap  50


 


5/1/18 14:45  131 20  99 Facial  50


 


5/1/18 14:45  131 15  99 Bi-pap  50


 


5/1/18 12:31  135 19  100 Facial  100


 


5/1/18 11:40  132 18  100 Bi-pap  40


 


5/1/18 11:30  131 20  99 Facial  40


 


5/1/18 11:30  133 20  100 Bi-pap  40


 


5/1/18 10:00  129 17 96/71 96 Bi-pap  40


 


5/1/18 09:29  133 23  99 Facial  40


 


5/1/18 09:00  131 18 105/70 97 Bi-pap  40


 


5/1/18 08:00        40


 


5/1/18 08:00 98.0 135 16 102/75 96 Bi-pap  40





 98.0       


 


5/1/18 08:00  125      


 


5/1/18 07:09  131 20  100 Bi-pap  40


 


5/1/18 07:00  122 18 99/72 98 Bi-pap  40


 


5/1/18 06:59      Bi-pap  40


 


5/1/18 06:59  129 20  100 Bi-pap  40


 


5/1/18 06:59     100 Bi-pap  40


 


5/1/18 06:59  129 22  100 Facial  40


 


5/1/18 06:00  130 16 101/72 100 Bi-pap  40


 


5/1/18 05:19  133 20  100 Facial  40


 


5/1/18 05:00  135 16 100/75 100 Bi-pap  40


 


5/1/18 04:00 97.8 135 16 94/75 100 Bi-pap  40





 97.8       


 


5/1/18 04:00        40


 


5/1/18 04:00  130      


 


5/1/18 03:22  133 17  100 Bi-pap  40


 


5/1/18 03:12  131 18  98 Bi-pap  40


 


5/1/18 03:11  131 18  98 Facial  40


 


5/1/18 03:00  135 18 120/87 99 Bi-pap  40


 


5/1/18 02:00  133 18 112/75 99 Bi-pap  40


 


5/1/18 01:22  127 19  99 Facial  40


 


5/1/18 01:00  128 18 112/89 99 Bi-pap  40


 


5/1/18 00:00  124 18 106/69 99 Bi-pap  40


 


5/1/18 00:00  127      


 


4/30/18 23:59  123 19  100 Bi-pap  40


 


4/30/18 23:48  121 19  98 Bi-pap  40


 


4/30/18 23:39 98.2 120 24 99/70 96 Bi-pap  40





 98.2       


 


4/30/18 23:18  119 20  97 Facial  40


 


4/30/18 22:00       95.0 40


 


4/30/18 21:23  125 21  99 Facial  30


 


4/30/18 20:00        40


 


4/30/18 20:00  126 20  98 Nasal Cannula 2.0 28


 


4/30/18 20:00 98.5 127 24 141/99 96 Bi-pap  40





 98.5       


 


4/30/18 19:50     96 Nasal Cannula 2.0 28


 


4/30/18 19:50      Nasal Cannula 2.0 28


 


4/30/18 19:50  127 20  96 Nasal Cannula 2.0 28


 


4/30/18 19:34  128      


 


4/30/18 16:00 98.7 122 20 144/82 100 Nasal Cannula 2.0 





 98.7       


 


4/30/18 16:00  122      


 


4/30/18 15:56  125 20  96 Nasal Cannula 2.0 28


 


4/30/18 15:44  124 16  94 Nasal Cannula 2.0 28

















Intake and Output  


 


 4/30/18 5/1/18





 19:00 07:00


 


Output Total  260 ml


 


Balance  -260 ml


 


  


 


Output Urine Total  260 ml


 


Stool Total  0 ml


 


# Voids 3 4











Laboratory Tests








Test


  4/30/18


21:05 5/1/18


04:00 5/1/18


08:05


 


Arterial Blood pH


  7.364


(7.350-7.450) 


  7.423


(7.350-7.450)


 


Arterial Blood Partial


Pressure CO2 47.4 mmHg


(35.0-45.0)  H 


  40.8 mmHg


(35.0-45.0)


 


Arterial Blood Partial


Pressure O2 50.4 mmHg


(75.0-100.0)  L 


  59.9 mmHg


(75.0-100.0)  L


 


Arterial Blood HCO3


  26.4 mmol/L


(22.0-26.0)  H 


  26.0 mmol/L


(22.0-26.0)


 


Arterial Blood Oxygen


Saturation 82.5 %


(92.0-98.0)  L 


  89.7 %


(92.0-98.0)  L


 


Arterial Blood Base Excess 0.5    1.5  


 


Jonn Test Positive    Positive  


 


White Blood Count


  


  14.1 K/UL


(4.8-10.8)  H 


 


 


Red Blood Count


  


  4.69 M/UL


(4.20-5.40) 


 


 


Hemoglobin


  


  13.4 G/DL


(12.0-16.0) 


 


 


Hematocrit


  


  42.0 %


(37.0-47.0) 


 


 


Mean Corpuscular Volume  90 FL (80-99)   


 


Mean Corpuscular Hemoglobin


  


  28.6 PG


(27.0-31.0) 


 


 


Mean Corpuscular Hemoglobin


Concent 


  31.9 G/DL


(32.0-36.0)  L 


 


 


Red Cell Distribution Width


  


  15.0 %


(11.6-14.8)  H 


 


 


Platelet Count


  


  198 K/UL


(150-450) 


 


 


Mean Platelet Volume


  


  7.6 FL


(6.5-10.1) 


 


 


Neutrophils (%) (Auto)


  


  80.0 %


(45.0-75.0)  H 


 


 


Lymphocytes (%) (Auto)


  


  14.6 %


(20.0-45.0)  L 


 


 


Monocytes (%) (Auto)


  


  3.3 %


(1.0-10.0) 


 


 


Eosinophils (%) (Auto)


  


  1.7 %


(0.0-3.0) 


 


 


Basophils (%) (Auto)


  


  0.4 %


(0.0-2.0) 


 


 


Sodium Level


  


  141 MMOL/L


(136-145) 


 


 


Potassium Level


  


  3.7 MMOL/L


(3.5-5.1) 


 


 


Chloride Level


  


  102 MMOL/L


() 


 


 


Carbon Dioxide Level


  


  26 MMOL/L


(21-32) 


 


 


Anion Gap


  


  13 mmol/L


(5-15) 


 


 


Blood Urea Nitrogen


  


  18 mg/dL


(7-18) 


 


 


Creatinine


  


  0.8 MG/DL


(0.55-1.30) 


 


 


Estimat Glomerular Filtration


Rate 


  > 60 mL/min


(>60) 


 


 


Glucose Level


  


  96 MG/DL


() 


 


 


Calcium Level


  


  10.0 MG/DL


(8.5-10.1) 


 


 


Phosphorus Level


  


  4.6 MG/DL


(2.5-4.9) 


 


 


Magnesium Level


  


  1.3 MG/DL


(1.8-2.4)  L 


 


 


Total Bilirubin


  


  0.3 MG/DL


(0.2-1.0) 


 


 


Aspartate Amino Transf


(AST/SGOT) 


  84 U/L (15-37)


H 


 


 


Alanine Aminotransferase


(ALT/SGPT) 


  30 U/L (12-78)


  


 


 


Alkaline Phosphatase


  


  151 U/L


()  H 


 


 


Total Protein


  


  8.4 G/DL


(6.4-8.2)  H 


 


 


Albumin


  


  1.7 G/DL


(3.4-5.0)  L 


 


 


Globulin  6.7 g/dL   


 


Albumin/Globulin Ratio


  


  0.3 (1.0-2.7)


L 


 








Objective





HEAD AND NECK:  No JVD.BIPAP on


LUNGS:  Coaarse rhonchi.


CARDIOVASCULAR:  Tachy S1 and S2


ABDOMEN:  Tender with a pump under skin.


EXTREMITIES:  No pitting edema.











Delon Chawla MD May 1, 2018 15:22

## 2018-05-01 NOTE — GI PROGRESS NOTE
Assessment/Plan


Problems:  


(1) Crohn's disease


ICD Codes:  K50.90 - Crohn's disease


SNOMED:  74938768


Qualifiers:  


   Qualified Codes:  K50.919 - Crohn's disease, unspecified, with unspecified 

complications


(2) Opioid dependence


ICD Codes:  F11.20 - Opioid dependence


SNOMED:  83779751


(3) SBO (small bowel obstruction)


ICD Codes:  K56.609 - Unspecified intestinal obstruction, unspecified as to 

partial versus complete obstruction


SNOMED:  849037743


(4) Chronic abdominal pain


Status:  stable


Status Narrative


Discussed with Dr. Mcleod.


Assessment/Plan


CT AP reviewed >>


Evidence of disseminated malignancy, with large left pulmonary hilar mass or 

adenopathy, extensive left hilar and mediastinal adenopathy, multiple pleural-

based masses on the left, multiple masses within the liver, and retroperitoneal 

lymphadenopathy.





Recommendations


fu oncology recs


pain mgmt





Subjective


Subjective


generalized pain/weakness





Objective





Last 24 Hour Vital Signs








  Date Time  Temp Pulse Resp B/P (MAP) Pulse Ox O2 Delivery O2 Flow Rate FiO2


 


5/1/18 15:00  127 18 117/72 100 Bi-pap  50


 


5/1/18 14:52  131 16  99 Bi-pap  50


 


5/1/18 14:45  131 20  99 Facial  50


 


5/1/18 14:45  131 15  99 Bi-pap  50


 


5/1/18 14:00  132 17 96/75 96 Bi-pap  40


 


5/1/18 13:00  130 17 98/71 96 Bi-pap  40


 


5/1/18 12:31  135 19  100 Facial  100


 


5/1/18 12:00        50


 


5/1/18 12:00 98.2 135 16 102/69 96 Bi-pap  40





 98.2       


 


5/1/18 12:00  127      


 


5/1/18 11:40  132 18  100 Bi-pap  40


 


5/1/18 11:30  131 20  99 Facial  40


 


5/1/18 11:30  133 20  100 Bi-pap  40


 


5/1/18 11:00  125 17 100/71 96 Bi-pap  40


 


5/1/18 10:00  129 17 96/71 96 Bi-pap  40


 


5/1/18 09:29  133 23  99 Facial  40


 


5/1/18 09:00  131 18 105/70 97 Bi-pap  40


 


5/1/18 08:00        40


 


5/1/18 08:00 98.0 135 16 102/75 96 Bi-pap  40





 98.0       


 


5/1/18 08:00  125      


 


5/1/18 07:09  131 20  100 Bi-pap  40


 


5/1/18 07:00  122 18 99/72 98 Bi-pap  40


 


5/1/18 06:59      Bi-pap  40


 


5/1/18 06:59  129 20  100 Bi-pap  40


 


5/1/18 06:59     100 Bi-pap  40


 


5/1/18 06:59  129 22  100 Facial  40


 


5/1/18 06:00  130 16 101/72 100 Bi-pap  40


 


5/1/18 05:19  133 20  100 Facial  40


 


5/1/18 05:00  135 16 100/75 100 Bi-pap  40


 


5/1/18 04:00 97.8 135 16 94/75 100 Bi-pap  40





 97.8       


 


5/1/18 04:00        40


 


5/1/18 04:00  130      


 


5/1/18 03:22  133 17  100 Bi-pap  40


 


5/1/18 03:12  131 18  98 Bi-pap  40


 


5/1/18 03:11  131 18  98 Facial  40


 


5/1/18 03:00  135 18 120/87 99 Bi-pap  40


 


5/1/18 02:00  133 18 112/75 99 Bi-pap  40


 


5/1/18 01:22  127 19  99 Facial  40


 


5/1/18 01:00  128 18 112/89 99 Bi-pap  40


 


5/1/18 00:00  124 18 106/69 99 Bi-pap  40


 


5/1/18 00:00  127      


 


4/30/18 23:59  123 19  100 Bi-pap  40


 


4/30/18 23:48  121 19  98 Bi-pap  40


 


4/30/18 23:39 98.2 120 24 99/70 96 Bi-pap  40





 98.2       


 


4/30/18 23:18  119 20  97 Facial  40


 


4/30/18 22:00       95.0 40


 


4/30/18 21:23  125 21  99 Facial  30


 


4/30/18 20:00        40


 


4/30/18 20:00  126 20  98 Nasal Cannula 2.0 28


 


4/30/18 20:00 98.5 127 24 141/99 96 Bi-pap  40





 98.5       


 


4/30/18 19:50     96 Nasal Cannula 2.0 28


 


4/30/18 19:50      Nasal Cannula 2.0 28


 


4/30/18 19:50  127 20  96 Nasal Cannula 2.0 28


 


4/30/18 19:34  128      


 


4/30/18 16:00 98.7 122 20 144/82 100 Nasal Cannula 2.0 





 98.7       


 


4/30/18 16:00  122      


 


4/30/18 15:56  125 20  96 Nasal Cannula 2.0 28


 


4/30/18 15:44  124 16  94 Nasal Cannula 2.0 28

















Intake and Output  


 


 4/30/18 5/1/18





 19:00 07:00


 


Output Total  260 ml


 


Balance  -260 ml


 


  


 


Output Urine Total  260 ml


 


Stool Total  0 ml


 


# Voids 3 4











Laboratory Tests








Test


  4/30/18


21:05 5/1/18


04:00 5/1/18


08:05


 


Arterial Blood pH


  7.364


(7.350-7.450) 


  7.423


(7.350-7.450)


 


Arterial Blood Partial


Pressure CO2 47.4 mmHg


(35.0-45.0)  H 


  40.8 mmHg


(35.0-45.0)


 


Arterial Blood Partial


Pressure O2 50.4 mmHg


(75.0-100.0)  L 


  59.9 mmHg


(75.0-100.0)  L


 


Arterial Blood HCO3


  26.4 mmol/L


(22.0-26.0)  H 


  26.0 mmol/L


(22.0-26.0)


 


Arterial Blood Oxygen


Saturation 82.5 %


(92.0-98.0)  L 


  89.7 %


(92.0-98.0)  L


 


Arterial Blood Base Excess 0.5    1.5  


 


Jonn Test Positive    Positive  


 


White Blood Count


  


  14.1 K/UL


(4.8-10.8)  H 


 


 


Red Blood Count


  


  4.69 M/UL


(4.20-5.40) 


 


 


Hemoglobin


  


  13.4 G/DL


(12.0-16.0) 


 


 


Hematocrit


  


  42.0 %


(37.0-47.0) 


 


 


Mean Corpuscular Volume  90 FL (80-99)   


 


Mean Corpuscular Hemoglobin


  


  28.6 PG


(27.0-31.0) 


 


 


Mean Corpuscular Hemoglobin


Concent 


  31.9 G/DL


(32.0-36.0)  L 


 


 


Red Cell Distribution Width


  


  15.0 %


(11.6-14.8)  H 


 


 


Platelet Count


  


  198 K/UL


(150-450) 


 


 


Mean Platelet Volume


  


  7.6 FL


(6.5-10.1) 


 


 


Neutrophils (%) (Auto)


  


  80.0 %


(45.0-75.0)  H 


 


 


Lymphocytes (%) (Auto)


  


  14.6 %


(20.0-45.0)  L 


 


 


Monocytes (%) (Auto)


  


  3.3 %


(1.0-10.0) 


 


 


Eosinophils (%) (Auto)


  


  1.7 %


(0.0-3.0) 


 


 


Basophils (%) (Auto)


  


  0.4 %


(0.0-2.0) 


 


 


Sodium Level


  


  141 MMOL/L


(136-145) 


 


 


Potassium Level


  


  3.7 MMOL/L


(3.5-5.1) 


 


 


Chloride Level


  


  102 MMOL/L


() 


 


 


Carbon Dioxide Level


  


  26 MMOL/L


(21-32) 


 


 


Anion Gap


  


  13 mmol/L


(5-15) 


 


 


Blood Urea Nitrogen


  


  18 mg/dL


(7-18) 


 


 


Creatinine


  


  0.8 MG/DL


(0.55-1.30) 


 


 


Estimat Glomerular Filtration


Rate 


  > 60 mL/min


(>60) 


 


 


Glucose Level


  


  96 MG/DL


() 


 


 


Calcium Level


  


  10.0 MG/DL


(8.5-10.1) 


 


 


Phosphorus Level


  


  4.6 MG/DL


(2.5-4.9) 


 


 


Magnesium Level


  


  1.3 MG/DL


(1.8-2.4)  L 


 


 


Total Bilirubin


  


  0.3 MG/DL


(0.2-1.0) 


 


 


Aspartate Amino Transf


(AST/SGOT) 


  84 U/L (15-37)


H 


 


 


Alanine Aminotransferase


(ALT/SGPT) 


  30 U/L (12-78)


  


 


 


Alkaline Phosphatase


  


  151 U/L


()  H 


 


 


Total Protein


  


  8.4 G/DL


(6.4-8.2)  H 


 


 


Albumin


  


  1.7 G/DL


(3.4-5.0)  L 


 


 


Globulin  6.7 g/dL   


 


Albumin/Globulin Ratio


  


  0.3 (1.0-2.7)


L 


 








Height (Feet):  5


Height (Inches):  6.00


Weight (Pounds):  129


General Appearance:  no apparent distress, thin


Cardiovascular:  normal rate


Respiratory/Chest:  normal breath sounds, no respiratory distress


Abdominal Exam:  normal bowel sounds, non tender, soft


Extremities:  non-tender











Elena Seay N.PMatt May 1, 2018 15:36

## 2018-05-02 VITALS — SYSTOLIC BLOOD PRESSURE: 108 MMHG | DIASTOLIC BLOOD PRESSURE: 77 MMHG

## 2018-05-02 VITALS — SYSTOLIC BLOOD PRESSURE: 126 MMHG | DIASTOLIC BLOOD PRESSURE: 84 MMHG

## 2018-05-02 VITALS — SYSTOLIC BLOOD PRESSURE: 106 MMHG | DIASTOLIC BLOOD PRESSURE: 64 MMHG

## 2018-05-02 VITALS — SYSTOLIC BLOOD PRESSURE: 140 MMHG | DIASTOLIC BLOOD PRESSURE: 87 MMHG

## 2018-05-02 VITALS — SYSTOLIC BLOOD PRESSURE: 106 MMHG | DIASTOLIC BLOOD PRESSURE: 69 MMHG

## 2018-05-02 VITALS — SYSTOLIC BLOOD PRESSURE: 119 MMHG | DIASTOLIC BLOOD PRESSURE: 82 MMHG

## 2018-05-02 VITALS — SYSTOLIC BLOOD PRESSURE: 116 MMHG | DIASTOLIC BLOOD PRESSURE: 76 MMHG

## 2018-05-02 VITALS — SYSTOLIC BLOOD PRESSURE: 133 MMHG | DIASTOLIC BLOOD PRESSURE: 76 MMHG

## 2018-05-02 VITALS — DIASTOLIC BLOOD PRESSURE: 66 MMHG | SYSTOLIC BLOOD PRESSURE: 100 MMHG

## 2018-05-02 VITALS — DIASTOLIC BLOOD PRESSURE: 94 MMHG | SYSTOLIC BLOOD PRESSURE: 132 MMHG

## 2018-05-02 VITALS — DIASTOLIC BLOOD PRESSURE: 84 MMHG | SYSTOLIC BLOOD PRESSURE: 123 MMHG

## 2018-05-02 VITALS — DIASTOLIC BLOOD PRESSURE: 76 MMHG | SYSTOLIC BLOOD PRESSURE: 117 MMHG

## 2018-05-02 VITALS — DIASTOLIC BLOOD PRESSURE: 90 MMHG | SYSTOLIC BLOOD PRESSURE: 136 MMHG

## 2018-05-02 VITALS — DIASTOLIC BLOOD PRESSURE: 80 MMHG | SYSTOLIC BLOOD PRESSURE: 126 MMHG

## 2018-05-02 VITALS — SYSTOLIC BLOOD PRESSURE: 111 MMHG | DIASTOLIC BLOOD PRESSURE: 77 MMHG

## 2018-05-02 VITALS — SYSTOLIC BLOOD PRESSURE: 127 MMHG | DIASTOLIC BLOOD PRESSURE: 76 MMHG

## 2018-05-02 VITALS — SYSTOLIC BLOOD PRESSURE: 119 MMHG | DIASTOLIC BLOOD PRESSURE: 77 MMHG

## 2018-05-02 VITALS — SYSTOLIC BLOOD PRESSURE: 121 MMHG | DIASTOLIC BLOOD PRESSURE: 77 MMHG

## 2018-05-02 VITALS — DIASTOLIC BLOOD PRESSURE: 91 MMHG | SYSTOLIC BLOOD PRESSURE: 125 MMHG

## 2018-05-02 VITALS — DIASTOLIC BLOOD PRESSURE: 85 MMHG | SYSTOLIC BLOOD PRESSURE: 127 MMHG

## 2018-05-02 VITALS — SYSTOLIC BLOOD PRESSURE: 135 MMHG | DIASTOLIC BLOOD PRESSURE: 86 MMHG

## 2018-05-02 VITALS — DIASTOLIC BLOOD PRESSURE: 90 MMHG | SYSTOLIC BLOOD PRESSURE: 128 MMHG

## 2018-05-02 VITALS — DIASTOLIC BLOOD PRESSURE: 67 MMHG | SYSTOLIC BLOOD PRESSURE: 110 MMHG

## 2018-05-02 LAB
ADD MANUAL DIFF: NO
ALBUMIN SERPL-MCNC: 1.7 G/DL (ref 3.4–5)
ALP SERPL-CCNC: 147 U/L (ref 46–116)
ALT SERPL-CCNC: 31 U/L (ref 12–78)
ANION GAP SERPL CALC-SCNC: 12 MMOL/L (ref 5–15)
AST SERPL-CCNC: 95 U/L (ref 15–37)
BASOPHILS NFR BLD AUTO: 0.5 % (ref 0–2)
BILIRUB SERPL-MCNC: 0.5 MG/DL (ref 0.2–1)
BUN SERPL-MCNC: 26 MG/DL (ref 7–18)
CALCIUM SERPL-MCNC: 10 MG/DL (ref 8.5–10.1)
CHLORIDE SERPL-SCNC: 101 MMOL/L (ref 98–107)
CO2 SERPL-SCNC: 29 MMOL/L (ref 21–32)
CREAT SERPL-MCNC: 1 MG/DL (ref 0.55–1.3)
EOSINOPHIL NFR BLD AUTO: 0.8 % (ref 0–3)
ERYTHROCYTE [DISTWIDTH] IN BLOOD BY AUTOMATED COUNT: 14.9 % (ref 11.6–14.8)
GLOBULIN SER-MCNC: 6.3 G/DL
HCT VFR BLD CALC: 41 % (ref 37–47)
HGB BLD-MCNC: 14 G/DL (ref 12–16)
LYMPHOCYTES NFR BLD AUTO: 16.7 % (ref 20–45)
MCV RBC AUTO: 88 FL (ref 80–99)
MONOCYTES NFR BLD AUTO: 3.3 % (ref 1–10)
NEUTROPHILS NFR BLD AUTO: 78.8 % (ref 45–75)
PLATELET # BLD: 185 K/UL (ref 150–450)
POTASSIUM SERPL-SCNC: 3.5 MMOL/L (ref 3.5–5.1)
RBC # BLD AUTO: 4.66 M/UL (ref 4.2–5.4)
SODIUM SERPL-SCNC: 142 MMOL/L (ref 136–145)
WBC # BLD AUTO: 12.2 K/UL (ref 4.8–10.8)

## 2018-05-02 RX ADMIN — HEPARIN SODIUM SCH UNITS: 5000 INJECTION INTRAVENOUS; SUBCUTANEOUS at 21:21

## 2018-05-02 RX ADMIN — CHLORHEXIDINE GLUCONATE SCH APPLIC: 213 SOLUTION TOPICAL at 21:45

## 2018-05-02 RX ADMIN — TOPIRAMATE SCH MG: 25 TABLET, COATED ORAL at 21:20

## 2018-05-02 RX ADMIN — LEVALBUTEROL HYDROCHLORIDE SCH MG: 1.25 SOLUTION, CONCENTRATE RESPIRATORY (INHALATION) at 22:45

## 2018-05-02 RX ADMIN — LEVALBUTEROL HYDROCHLORIDE SCH MG: 1.25 SOLUTION, CONCENTRATE RESPIRATORY (INHALATION) at 19:00

## 2018-05-02 RX ADMIN — TOPIRAMATE SCH MG: 25 TABLET, COATED ORAL at 08:51

## 2018-05-02 RX ADMIN — LEVALBUTEROL HYDROCHLORIDE SCH MG: 1.25 SOLUTION, CONCENTRATE RESPIRATORY (INHALATION) at 11:00

## 2018-05-02 RX ADMIN — METHYLNALTREXONE BROMIDE SCH MG: 12 INJECTION, SOLUTION SUBCUTANEOUS at 08:51

## 2018-05-02 RX ADMIN — LEVALBUTEROL HYDROCHLORIDE SCH MG: 1.25 SOLUTION, CONCENTRATE RESPIRATORY (INHALATION) at 04:44

## 2018-05-02 RX ADMIN — LEVALBUTEROL HYDROCHLORIDE SCH MG: 1.25 SOLUTION, CONCENTRATE RESPIRATORY (INHALATION) at 15:08

## 2018-05-02 RX ADMIN — TRAZODONE HYDROCHLORIDE SCH MG: 100 TABLET ORAL at 21:20

## 2018-05-02 RX ADMIN — ATORVASTATIN CALCIUM SCH MG: 20 TABLET, FILM COATED ORAL at 21:20

## 2018-05-02 RX ADMIN — HEPARIN SODIUM SCH UNITS: 5000 INJECTION INTRAVENOUS; SUBCUTANEOUS at 09:10

## 2018-05-02 RX ADMIN — THEOPHYLLINE ANHYDROUS SCH MG: 100 CAPSULE, EXTENDED RELEASE ORAL at 21:20

## 2018-05-02 RX ADMIN — THEOPHYLLINE ANHYDROUS SCH MG: 100 CAPSULE, EXTENDED RELEASE ORAL at 08:52

## 2018-05-02 RX ADMIN — LEVALBUTEROL HYDROCHLORIDE SCH MG: 1.25 SOLUTION, CONCENTRATE RESPIRATORY (INHALATION) at 06:58

## 2018-05-02 RX ADMIN — DULOXETINE HYDROCHLORIDE SCH MG: 30 CAPSULE, DELAYED RELEASE ORAL at 08:52

## 2018-05-02 NOTE — INFECTIOUS DISEASES PROG NOTE
Assessment/Plan


Assessment/Plan





ASSESSMENT:  The patient is a 62-year-old female with,





Lethargy, on Bipap- improvnig- ?oversedation


  -CXR;Extensive mixed interstitial alveolar airspace disease bilaterally 

unchanged





 Low-grade fever. probable due to SBO  ,resolved


Leukocytosis, mild- recurrent; improving- ?2ry to possible malignancy


Probable small bowel obstruction


Recent history of polymicrobial gram-negative bacteremia including 

Stenotrophomonas maltophilia and Enterobacter.


History of Candida esophagitis.


History of C. difficile in the past.











Air anterior to the liver , ?  free intraperitoneal air ( Surg doubt 

perforation )


Lung and liver masses Ro Malignancy 


 CT:  Pleural-based mass at the left lung base / Moderate-sized left pleural 

effusion, left-sided pleural nodularity and retroperitoneal and paraspinal 

nodules/masses. 


         Left inferior hilar mass lesion partially visualized. New low-

attenuation liver lesion ( concerning for malignancy/metastatic disease )


Pl effusion SP ultrasound-guided thoracentesis,  960 cc  ( m/l 2/2 mets,  no 

evid of Empyema )


  -exudate fluid:  (n 6), pH 8, lguc 98, prot 4.6 (serum 8.4); cx stain: 


  GRAM STAIN  Final  


        GRAM STAIN RESULT           FEW WHITE BLOOD CELLS


                                    NO ORGANISMS SEEN


cx negative





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 





Crohn's disease.


History of bowel obstruction x2 with lysis of adhesions in 2005.


COPD.


History of chronic pain syndrome, on morphine pump.


CVA in 2005.


Seizure disorder.








PLAN:





cont to monitor off abx, unless febrile, worsening WBC.


  -4/25 SP Zosyn #5





 


Monitor CBC and BMP.


follow GI recommendations


hem, surg f/u


plan for comfort care; ongoing discussion


on ICU- bipap management





Subjective


Allergies:  


Coded Allergies:  


     No Known Allergies (Verified , 10/27/06)


Subjective


afebrile 


leukocytosis improving


remains on Bipap





Objective


Vital Signs





Last 24 Hour Vital Signs








  Date Time  Temp Pulse Resp B/P (MAP) Pulse Ox O2 Delivery O2 Flow Rate FiO2


 


5/2/18 15:12  127 22  100 Bi-pap  50


 


5/2/18 15:10  123 20  100 Facial  50


 


5/2/18 15:00  123 21 126/84 100 Bi-pap  50


 


5/2/18 15:00  122 14  93 Bi-pap  50


 


5/2/18 14:00  129 21 126/80 100 Bi-pap  50


 


5/2/18 13:00  126 25  98 Facial  50


 


5/2/18 13:00  126 25 136/90 100 Bi-pap  50


 


5/2/18 12:00        50


 


5/2/18 12:00  129      


 


5/2/18 12:00 98.6 132 13 135/86 100 Bi-pap  70





 98.6       


 


5/2/18 11:09  130 20  98 Facial  50


 


5/2/18 11:09      Bi-pap  


 


5/2/18 11:09      Bi-pap  


 


5/2/18 11:00  132 13 127/85 100 Bi-pap  50


 


5/2/18 10:00  132 17 123/84 100 Bi-pap  50


 


5/2/18 09:08  130 15  100 Facial  50


 


5/2/18 09:00  132 17 119/82 97 Bi-pap  70


 


5/2/18 09:00        50


 


5/2/18 08:00 98.6 132 16 128/90 100 Bi-pap  70





 98.6       


 


5/2/18 08:00  131      


 


5/2/18 07:02      Bi-pap  


 


5/2/18 07:01      Bi-pap  


 


5/2/18 07:01      Bi-pap  


 


5/2/18 07:01     100 Bi-pap  50


 


5/2/18 07:00  132 16 132/94 100 Bi-pap  70


 


5/2/18 06:58  130 15  100 Facial  50


 


5/2/18 06:00  123 16 106/64 100 Bi-pap  70


 


5/2/18 05:00  130 18 127/76 100 Bi-pap  70


 


5/2/18 04:48  130 17  100 Facial  70


 


5/2/18 04:00  130      


 


5/2/18 04:00        70


 


5/2/18 04:00 98.5 130 18 108/77 100 Bi-pap  70





 98.5       


 


5/2/18 03:40  124 16  100 Bi-pap  


 


5/2/18 03:30  122 16  96 Bi-pap  


 


5/2/18 03:30  124 18  99 Facial  70


 


5/2/18 03:00  125 18 110/67 98 Bi-pap  70


 


5/2/18 02:00  130 18 106/69 98 Bi-pap  70


 


5/2/18 01:30  132 16  100 Facial  70


 


5/2/18 01:00 98.5 126 18 100/66 98 Bi-pap  70





 98.5       


 


5/2/18 00:00 98.5 130 18 119/77 98 Bi-pap  70





 98.5       


 


5/2/18 00:00        70


 


5/1/18 23:26      Bi-pap  


 


5/1/18 23:26  130 25  95 Bi-pap  


 


5/1/18 23:25  131 14  96 Facial  70


 


5/1/18 23:00  130 19 117/77 98 Bi-pap  70


 


5/1/18 22:00  121 16 119/88 98 Bi-pap  70


 


5/1/18 21:30  130 17  95 Facial  70


 


5/1/18 21:00  121 18 127/74 98 Bi-pap  70


 


5/1/18 20:00  131      


 


5/1/18 20:00        70


 


5/1/18 20:00 98.5 131 18 121/72 98 Bi-pap  70





 98.5       


 


5/1/18 19:51      Bi-pap  


 


5/1/18 19:50      Bi-pap  


 


5/1/18 19:50      Bi-pap  


 


5/1/18 19:45  133 20  94 Bi-pap  50


 


5/1/18 19:30  133 20  94 Facial  50


 


5/1/18 19:00  128 18 118/72 100 Bi-pap  50


 


5/1/18 18:00  125 18 112/70 99 Bi-pap  50


 


5/1/18 17:00  122 16 116/75 100 Bi-pap  50


 


5/1/18 16:44  130 22  99 Facial  40


 


5/1/18 16:00 98.6 135 17 110/71 96 Bi-pap  50





 98.6       


 


5/1/18 16:00        50


 


5/1/18 16:00  125      








Height (Feet):  5


Height (Inches):  6.00


Weight (Pounds):  129


Objective


HEENT:  anicteric


Respiratory/Chest:  normal breath sounds


Cardiovascular:  regular rhythm


Abdomen:  tender





Laboratory Tests








Test


  5/2/18


04:10 5/2/18


11:15


 


White Blood Count


  12.2 K/UL


(4.8-10.8)  H 


 


 


Red Blood Count


  4.66 M/UL


(4.20-5.40) 


 


 


Hemoglobin


  14.0 G/DL


(12.0-16.0) 


 


 


Hematocrit


  41.0 %


(37.0-47.0) 


 


 


Mean Corpuscular Volume 88 FL (80-99)   


 


Mean Corpuscular Hemoglobin


  30.1 PG


(27.0-31.0) 


 


 


Mean Corpuscular Hemoglobin


Concent 34.2 G/DL


(32.0-36.0) 


 


 


Red Cell Distribution Width


  14.9 %


(11.6-14.8)  H 


 


 


Platelet Count


  185 K/UL


(150-450) 


 


 


Mean Platelet Volume


  7.8 FL


(6.5-10.1) 


 


 


Neutrophils (%) (Auto)


  78.8 %


(45.0-75.0)  H 


 


 


Lymphocytes (%) (Auto)


  16.7 %


(20.0-45.0)  L 


 


 


Monocytes (%) (Auto)


  3.3 %


(1.0-10.0) 


 


 


Eosinophils (%) (Auto)


  0.8 %


(0.0-3.0) 


 


 


Basophils (%) (Auto)


  0.5 %


(0.0-2.0) 


 


 


Sodium Level


  142 MMOL/L


(136-145) 


 


 


Potassium Level


  3.5 MMOL/L


(3.5-5.1) 


 


 


Chloride Level


  101 MMOL/L


() 


 


 


Carbon Dioxide Level


  29 MMOL/L


(21-32) 


 


 


Anion Gap


  12 mmol/L


(5-15) 


 


 


Blood Urea Nitrogen


  26 mg/dL


(7-18)  H 


 


 


Creatinine


  1.0 MG/DL


(0.55-1.30) 


 


 


Estimat Glomerular Filtration


Rate > 60 mL/min


(>60) 


 


 


Glucose Level


  118 MG/DL


()  H 


 


 


Calcium Level


  10.0 MG/DL


(8.5-10.1) 


 


 


Total Bilirubin


  0.5 MG/DL


(0.2-1.0) 


 


 


Aspartate Amino Transf


(AST/SGOT) 95 U/L (15-37)


H 


 


 


Alanine Aminotransferase


(ALT/SGPT) 31 U/L (12-78)


  


 


 


Alkaline Phosphatase


  147 U/L


()  H 


 


 


Pro-B-Type Natriuretic Peptide


  3659 pg/mL


(0-125)  H 


 


 


Total Protein


  9.0 G/DL


(6.4-8.2)  H 


 


 


Albumin


  1.7 G/DL


(3.4-5.0)  L 


 


 


Globulin 6.3 g/dL   


 


Arterial Blood pH


  


  7.416


(7.350-7.450)


 


Arterial Blood Partial


Pressure CO2 


  48.5 mmHg


(35.0-45.0)  H


 


Arterial Blood Partial


Pressure O2 


  76.4 mmHg


(75.0-100.0)


 


Arterial Blood HCO3


  


  30.5 mmol/L


(22.0-26.0)  H


 


Arterial Blood Oxygen


Saturation 


  93.3 %


(92.0-98.0)


 


Arterial Blood Base Excess  5.0  


 


Jonn Test  Positive  











Current Medications








 Medications


  (Trade)  Dose


 Ordered  Sig/Jed


 Route


 PRN Reason  Start Time


 Stop Time Status Last Admin


Dose Admin


 


 Acetaminophen


  (Tylenol)  650 mg  Q4H  PRN


 ORAL


 fever (temp>100.5F)  5/1/18 00:30


 5/19/18 20:29   


 


 


 Acetaminophen/


 Hydrocodone Bitart


  (Norco 10/325)  1 tab  Q4H  PRN


 ORAL


 Pain Scale (3-5)  5/1/18 01:00


 5/3/18 20:59   


 


 


 Al Hydroxide/Mg


 Hydroxide


  (Mylanta II)  30 ml  Q6H  PRN


 ORAL


 dyspepsia  5/1/18 02:30


 5/19/18 20:29   


 


 


 Atorvastatin


 Calcium


  (Lipitor)  40 mg  BEDTIME


 ORAL


   5/1/18 21:00


 5/19/18 20:59  5/1/18 20:57


 


 


 Bupropion HCl


  (Wellbutrin)  100 mg  DAILY


 ORAL


   5/1/18 09:00


 5/20/18 08:59  5/2/18 08:53


 


 


 Chlorhexidine


 Gluconate


  (Rosy-Hex 2%)  1 applic  DAILY@2000


 TOPIC


   5/1/18 20:00


 5/25/18 19:59  5/1/18 20:07


 


 


 Dextrose


  (Dextrose 50%)  50 ml  STAT  PRN


 IV


 Hypoglycemia BS<60mg/dL  5/1/18 20:30


 5/30/18 20:29   


 


 


 Diphenhydramine


 HCl


  (Benadryl)  25 mg  Q6H  PRN


 ORAL


 Itching/Pruritis  5/1/18 02:30


 5/19/18 20:29   


 


 


 Duloxetine HCl


  (Cymbalta)  90 mg  DAILY


 ORAL


   5/1/18 09:00


 5/20/18 08:59  5/2/18 08:52


 


 


 Furosemide


  (Lasix)  40 mg  EVERY 8  HOURS


 IV


   5/1/18 06:00


 5/30/18 21:59  5/2/18 14:42


 


 


 Gabapentin


  (Neurontin)  300 mg  THREE TIMES A  DAY


 ORAL


   5/1/18 09:00


 5/20/18 09:59  5/2/18 12:16


 


 


 Heparin Sodium


  (Porcine)


  (Heparin 5000


 units/ml)  5,000 units  EVERY 12  HOURS


 SUBQ


   5/1/18 09:00


 5/19/18 20:59  5/2/18 09:10


 


 


 Hydromorphone HCl


  (Dilaudid)  0.5 mg  Q4H  PRN


 IVP


 PAIN SCALE 4-10  5/1/18 01:00


 5/3/18 20:59   


 


 


 Levalbuterol HCl


  (Xopenex)  0.625 mg  Q4HRT


 HHN


   4/30/18 23:00


 5/5/18 14:59  5/2/18 15:08


 


 


 Levetiracetam


  (Keppra)  1,000 mg  Q8H


 ORAL


   5/1/18 00:00


 5/24/18 07:59  5/2/18 08:52


 


 


 Levothyroxine


 Sodium


  (Synthroid)  75 mcg  ACBREAKFAST


 ORAL


   5/1/18 06:30


 5/20/18 06:29  5/2/18 06:44


 


 


 Methylnaltrexone


 Bromide


  (Relistor)  12 mg  DAILY


 SUBQ


   5/1/18 09:00


 5/20/18 10:14  5/2/18 08:51


 


 


 Metoclopramide HCl


  (Reglan)  10 mg  Q6H  PRN


 IVP


 Unrelieved Severe Nausea  5/1/18 02:30


 5/19/18 20:29   


 


 


 Nitroglycerin


  (Ntg)  0.4 mg  Q5M X 3 DOSES PRN


 SL


 Prn Chest Pain  4/30/18 23:00


 5/19/18 10:59   


 


 


 Ondansetron HCl


  (Zofran)  4 mg  Q6H  PRN


 IVP


 Nausea & Vomiting  5/1/18 02:30


 5/19/18 20:29   


 


 


 Pantoprazole


  (Protonix)  40 mg  0600


 ORAL


   5/1/18 09:00


 5/31/18 08:59  5/2/18 06:00


 


 


 Polyethylene


 Glycol


  (Miralax)  17 gm  HSPRN  PRN


 ORAL


 Constipation  5/1/18 20:30


 5/30/18 20:29   


 


 


 Promethazine HCl


 25 mg/Dextrose  56 ml @ 


 110 mls/hr  Q6H  PRN


 IV


 Refractory N/V  5/1/18 03:00


 5/19/18 20:59   


 


 


 Simethicone


  (Mylicon)  80 mg  QIDPRN  PRN


 ORAL


 gas   5/1/18 20:30


 5/30/18 20:29   


 


 


 Theophylline


  (Shiv-Dur)  100 mg  Q12HR


 ORAL


   5/1/18 09:00


 5/19/18 20:59  5/2/18 08:52


 


 


 Topiramate


  (Topamax)  25 mg  EVERY 12  HOURS


 ORAL


   5/1/18 09:00


 5/19/18 20:59  5/2/18 08:51


 


 


 Trazodone HCl


  (Desyrel)  200 mg  BEDTIME


 ORAL


   5/1/18 21:00


 5/19/18 20:59  5/1/18 20:56


 

















Selene Garcia M.D. May 2, 2018 15:21

## 2018-05-02 NOTE — GENERAL PROGRESS NOTE
Assessment/Plan


Assessment/Plan


(1) Chronic abdominal pain


(2) Chronic pancreatitis


(3) Crohn's disease 


(4) Herniated nucleus pulposus, lumbar


(5) Lumbar spondylosis


(6) Radiculopathy of lumbar region


(7) Lung mass


Pt will be continued on Neurontin, Dilaudid and Norco and pt has intrathecal 

pump.


HOLD OPIOIDS FOR OVERSEDATION OR SBP<90 OR DBP<60 OR O2SAT<92% OR RR<12


Pt was d/w Dr. Shetty and he concurred.





Subjective


Date patient seen:  May 2, 2018


Time patient seen:  07:45 - am


ROS Limited/Unobtainable:  Yes


Allergies:  


Coded Allergies:  


     No Known Allergies (Verified , 10/27/06)


Subjective


The patient is in bed and bipap applied nurse at bed side. No signs of pain or 

distress.





Objective





Last 24 Hour Vital Signs








  Date Time  Temp Pulse Resp B/P (MAP) Pulse Ox O2 Delivery O2 Flow Rate FiO2


 


5/2/18 07:02      Bi-pap  


 


5/2/18 07:01      Bi-pap  


 


5/2/18 07:01      Bi-pap  


 


5/2/18 07:01     100 Bi-pap  50


 


5/2/18 07:00  132 16 132/94 100 Bi-pap  70


 


5/2/18 06:58  130 15  100 Facial  50


 


5/2/18 06:00  123 16 106/64 100 Bi-pap  70


 


5/2/18 05:00  130 18 127/76 100 Bi-pap  70


 


5/2/18 04:48  130 17  100 Facial  70


 


5/2/18 04:00  130      


 


5/2/18 04:00        70


 


5/2/18 04:00 98.5 130 18 108/77 100 Bi-pap  70





 98.5       


 


5/2/18 03:40  124 16  100 Bi-pap  


 


5/2/18 03:30  122 16  96 Bi-pap  


 


5/2/18 03:30  124 18  99 Facial  70


 


5/2/18 03:00  125 18 110/67 98 Bi-pap  70


 


5/2/18 02:00  130 18 106/69 98 Bi-pap  70


 


5/2/18 01:30  132 16  100 Facial  70


 


5/2/18 01:00 98.5 126 18 100/66 98 Bi-pap  70





 98.5       


 


5/2/18 00:00 98.5 130 18 119/77 98 Bi-pap  70





 98.5       


 


5/2/18 00:00        70


 


5/1/18 23:26      Bi-pap  


 


5/1/18 23:26  130 25  95 Bi-pap  


 


5/1/18 23:25  131 14  96 Facial  70


 


5/1/18 23:00  130 19 117/77 98 Bi-pap  70


 


5/1/18 22:00  121 16 119/88 98 Bi-pap  70


 


5/1/18 21:30  130 17  95 Facial  70


 


5/1/18 21:00  121 18 127/74 98 Bi-pap  70


 


5/1/18 20:00  131      


 


5/1/18 20:00        70


 


5/1/18 20:00 98.5 131 18 121/72 98 Bi-pap  70





 98.5       


 


5/1/18 19:51      Bi-pap  


 


5/1/18 19:50      Bi-pap  


 


5/1/18 19:50      Bi-pap  


 


5/1/18 19:45  133 20  94 Bi-pap  50


 


5/1/18 19:30  133 20  94 Facial  50


 


5/1/18 19:00  128 18 118/72 100 Bi-pap  50


 


5/1/18 18:00  125 18 112/70 99 Bi-pap  50


 


5/1/18 17:00  122 16 116/75 100 Bi-pap  50


 


5/1/18 16:44  130 22  99 Facial  40


 


5/1/18 16:00 98.6 135 17 110/71 96 Bi-pap  50





 98.6       


 


5/1/18 16:00        50


 


5/1/18 16:00  125      


 


5/1/18 15:00  127 18 117/72 100 Bi-pap  50


 


5/1/18 14:52  131 16  99 Bi-pap  50


 


5/1/18 14:45  131 20  99 Facial  50


 


5/1/18 14:45  131 15  99 Bi-pap  50


 


5/1/18 14:00  132 17 96/75 96 Bi-pap  40


 


5/1/18 13:00  130 17 98/71 96 Bi-pap  40


 


5/1/18 12:31  135 19  100 Facial  100


 


5/1/18 12:00        50


 


5/1/18 12:00 98.2 135 16 102/69 96 Bi-pap  40





 98.2       


 


5/1/18 12:00  127      


 


5/1/18 11:40  132 18  100 Bi-pap  40


 


5/1/18 11:30  131 20  99 Facial  40


 


5/1/18 11:30  133 20  100 Bi-pap  40


 


5/1/18 11:00  125 17 100/71 96 Bi-pap  40


 


5/1/18 10:00  129 17 96/71 96 Bi-pap  40


 


5/1/18 09:29  133 23  99 Facial  40


 


5/1/18 09:00  131 18 105/70 97 Bi-pap  40

















Intake and Output  


 


 5/1/18 5/2/18





 19:00 07:00


 


Output Total 650 ml 600 ml


 


Balance -650 ml -600 ml


 


  


 


Output Urine Total 650 ml 600 ml








Laboratory Tests


5/2/18 04:10: 


White Blood Count 12.2H, Red Blood Count 4.66, Hemoglobin 14.0, Hematocrit 41.0

, Mean Corpuscular Volume 88, Mean Corpuscular Hemoglobin 30.1, Mean 

Corpuscular Hemoglobin Concent 34.2, Red Cell Distribution Width 14.9H, 

Platelet Count 185, Mean Platelet Volume 7.8, Neutrophils (%) (Auto) 78.8H, 

Lymphocytes (%) (Auto) 16.7L, Monocytes (%) (Auto) 3.3, Eosinophils (%) (Auto) 

0.8, Basophils (%) (Auto) 0.5, Sodium Level 142, Potassium Level 3.5, Chloride 

Level 101, Carbon Dioxide Level 29, Anion Gap 12, Blood Urea Nitrogen 26H, 

Creatinine 1.0, Estimat Glomerular Filtration Rate > 60, Glucose Level 118H, 

Calcium Level 10.0, Total Bilirubin 0.5, Aspartate Amino Transf (AST/SGOT) 95H, 

Alanine Aminotransferase (ALT/SGPT) 31, Alkaline Phosphatase 147H, Pro-B-Type 

Natriuretic Peptide 3659H, Total Protein 9.0H, Albumin 1.7L, Globulin 6.3


Height (Feet):  5


Height (Inches):  6.00


Weight (Pounds):  129


Objective


General Appearance:  on Bipap


Neck:  normal alignment, supple


Cardiovascular:  tachycardic


Respiratory/Chest:  decreased breath sounds


Abdomen:  tender, distended, intrathecal pump palpated


Extremities:  non-tender


Edema:  no edema noted











LEA BRAVO N. P.TUNG May 2, 2018 08:58

## 2018-05-02 NOTE — GI PROGRESS NOTE
Assessment/Plan


Problems:  


(1) Crohn's disease


ICD Codes:  K50.90 - Crohn's disease


SNOMED:  28405931


Qualifiers:  


   Qualified Codes:  K50.919 - Crohn's disease, unspecified, with unspecified 

complications


(2) Opioid dependence


ICD Codes:  F11.20 - Opioid dependence


SNOMED:  40983366


(3) SBO (small bowel obstruction)


ICD Codes:  K56.609 - Unspecified intestinal obstruction, unspecified as to 

partial versus complete obstruction


SNOMED:  626033214


(4) Chronic abdominal pain


Status:  not improved


Status Narrative


Discussed with Dr. Mcleod.


Assessment/Plan


CT AP reviewed >>


Evidence of disseminated malignancy, with large left pulmonary hilar mass or 

adenopathy, extensive left hilar and mediastinal adenopathy, multiple pleural-

based masses on the left, multiple masses within the liver, and retroperitoneal 

lymphadenopathy.





Recommendations


fu oncology recs


pain mgmt


poor prognosis





Subjective


Subjective


limited





Objective





Last 24 Hour Vital Signs








  Date Time  Temp Pulse Resp B/P (MAP) Pulse Ox O2 Delivery O2 Flow Rate FiO2


 


5/2/18 12:00        50


 


5/2/18 12:00 98.6 132 13 135/86 100 Bi-pap  70





 98.6       


 


5/2/18 11:09  130 20  98 Facial  50


 


5/2/18 11:09      Bi-pap  


 


5/2/18 11:09      Bi-pap  


 


5/2/18 11:00  132 13 127/85 100 Bi-pap  50


 


5/2/18 10:00  132 17 123/84 100 Bi-pap  50


 


5/2/18 09:08  130 15  100 Facial  50


 


5/2/18 09:00  132 17 119/82 97 Bi-pap  70


 


5/2/18 09:00        50


 


5/2/18 08:00 98.6 132 16 128/90 100 Bi-pap  70





 98.6       


 


5/2/18 08:00  131      


 


5/2/18 07:02      Bi-pap  


 


5/2/18 07:01      Bi-pap  


 


5/2/18 07:01      Bi-pap  


 


5/2/18 07:01     100 Bi-pap  50


 


5/2/18 07:00  132 16 132/94 100 Bi-pap  70


 


5/2/18 06:58  130 15  100 Facial  50


 


5/2/18 06:00  123 16 106/64 100 Bi-pap  70


 


5/2/18 05:00  130 18 127/76 100 Bi-pap  70


 


5/2/18 04:48  130 17  100 Facial  70


 


5/2/18 04:00  130      


 


5/2/18 04:00        70


 


5/2/18 04:00 98.5 130 18 108/77 100 Bi-pap  70





 98.5       


 


5/2/18 03:40  124 16  100 Bi-pap  


 


5/2/18 03:30  122 16  96 Bi-pap  


 


5/2/18 03:30  124 18  99 Facial  70


 


5/2/18 03:00  125 18 110/67 98 Bi-pap  70


 


5/2/18 02:00  130 18 106/69 98 Bi-pap  70


 


5/2/18 01:30  132 16  100 Facial  70


 


5/2/18 01:00 98.5 126 18 100/66 98 Bi-pap  70





 98.5       


 


5/2/18 00:00 98.5 130 18 119/77 98 Bi-pap  70





 98.5       


 


5/2/18 00:00        70


 


5/1/18 23:26      Bi-pap  


 


5/1/18 23:26  130 25  95 Bi-pap  


 


5/1/18 23:25  131 14  96 Facial  70


 


5/1/18 23:00  130 19 117/77 98 Bi-pap  70


 


5/1/18 22:00  121 16 119/88 98 Bi-pap  70


 


5/1/18 21:30  130 17  95 Facial  70


 


5/1/18 21:00  121 18 127/74 98 Bi-pap  70


 


5/1/18 20:00  131      


 


5/1/18 20:00        70


 


5/1/18 20:00 98.5 131 18 121/72 98 Bi-pap  70





 98.5       


 


5/1/18 19:51      Bi-pap  


 


5/1/18 19:50      Bi-pap  


 


5/1/18 19:50      Bi-pap  


 


5/1/18 19:45  133 20  94 Bi-pap  50


 


5/1/18 19:30  133 20  94 Facial  50


 


5/1/18 19:00  128 18 118/72 100 Bi-pap  50


 


5/1/18 18:00  125 18 112/70 99 Bi-pap  50


 


5/1/18 17:00  122 16 116/75 100 Bi-pap  50


 


5/1/18 16:44  130 22  99 Facial  40


 


5/1/18 16:00 98.6 135 17 110/71 96 Bi-pap  50





 98.6       


 


5/1/18 16:00        50


 


5/1/18 16:00  125      


 


5/1/18 15:00  127 18 117/72 100 Bi-pap  50


 


5/1/18 14:52  131 16  99 Bi-pap  50


 


5/1/18 14:45  131 20  99 Facial  50


 


5/1/18 14:45  131 15  99 Bi-pap  50


 


5/1/18 14:00  132 17 96/75 96 Bi-pap  40


 


5/1/18 13:00  130 17 98/71 96 Bi-pap  40

















Intake and Output  


 


 5/1/18 5/2/18





 19:00 07:00


 


Output Total 650 ml 600 ml


 


Balance -650 ml -600 ml


 


  


 


Output Urine Total 650 ml 600 ml











Laboratory Tests








Test


  5/2/18


04:10 5/2/18


11:15


 


White Blood Count


  12.2 K/UL


(4.8-10.8)  H 


 


 


Red Blood Count


  4.66 M/UL


(4.20-5.40) 


 


 


Hemoglobin


  14.0 G/DL


(12.0-16.0) 


 


 


Hematocrit


  41.0 %


(37.0-47.0) 


 


 


Mean Corpuscular Volume 88 FL (80-99)   


 


Mean Corpuscular Hemoglobin


  30.1 PG


(27.0-31.0) 


 


 


Mean Corpuscular Hemoglobin


Concent 34.2 G/DL


(32.0-36.0) 


 


 


Red Cell Distribution Width


  14.9 %


(11.6-14.8)  H 


 


 


Platelet Count


  185 K/UL


(150-450) 


 


 


Mean Platelet Volume


  7.8 FL


(6.5-10.1) 


 


 


Neutrophils (%) (Auto)


  78.8 %


(45.0-75.0)  H 


 


 


Lymphocytes (%) (Auto)


  16.7 %


(20.0-45.0)  L 


 


 


Monocytes (%) (Auto)


  3.3 %


(1.0-10.0) 


 


 


Eosinophils (%) (Auto)


  0.8 %


(0.0-3.0) 


 


 


Basophils (%) (Auto)


  0.5 %


(0.0-2.0) 


 


 


Sodium Level


  142 MMOL/L


(136-145) 


 


 


Potassium Level


  3.5 MMOL/L


(3.5-5.1) 


 


 


Chloride Level


  101 MMOL/L


() 


 


 


Carbon Dioxide Level


  29 MMOL/L


(21-32) 


 


 


Anion Gap


  12 mmol/L


(5-15) 


 


 


Blood Urea Nitrogen


  26 mg/dL


(7-18)  H 


 


 


Creatinine


  1.0 MG/DL


(0.55-1.30) 


 


 


Estimat Glomerular Filtration


Rate > 60 mL/min


(>60) 


 


 


Glucose Level


  118 MG/DL


()  H 


 


 


Calcium Level


  10.0 MG/DL


(8.5-10.1) 


 


 


Total Bilirubin


  0.5 MG/DL


(0.2-1.0) 


 


 


Aspartate Amino Transf


(AST/SGOT) 95 U/L (15-37)


H 


 


 


Alanine Aminotransferase


(ALT/SGPT) 31 U/L (12-78)


  


 


 


Alkaline Phosphatase


  147 U/L


()  H 


 


 


Pro-B-Type Natriuretic Peptide


  3659 pg/mL


(0-125)  H 


 


 


Total Protein


  9.0 G/DL


(6.4-8.2)  H 


 


 


Albumin


  1.7 G/DL


(3.4-5.0)  L 


 


 


Globulin 6.3 g/dL   


 


Arterial Blood pH


  


  7.416


(7.350-7.450)


 


Arterial Blood Partial


Pressure CO2 


  48.5 mmHg


(35.0-45.0)  H


 


Arterial Blood Partial


Pressure O2 


  76.4 mmHg


(75.0-100.0)


 


Arterial Blood HCO3


  


  30.5 mmol/L


(22.0-26.0)  H


 


Arterial Blood Oxygen


Saturation 


  93.3 %


(92.0-98.0)


 


Arterial Blood Base Excess  5.0  


 


Jonn Test  Positive  








Height (Feet):  5


Height (Inches):  6.00


Weight (Pounds):  129


Cardiovascular:  normal rate


Abdominal Exam:  soft











Elena Seay N.PMatt May 2, 2018 12:49

## 2018-05-02 NOTE — GENERAL PROGRESS NOTE
Assessment/Plan


Assessment/Plan


IMPRESSION/RECS:


#. Stage IV malignancy, potentially lung cancer given pattern of spread, proven 

malignancy on pleural fluid. Has a left lower lobe lung mass. Left inferior 

hilar mass. Left pleural base lung mass. Left pleural effusion. Retroperitoneal 

lymphadenopathy. Multiple low-attenuation liver lesions.


--> Discussed with son, RN, Primary MD, given poor performance status recommend 

palliative/hospice care


--> Do not recommend any further diagnosis/tissue given poor state, is not a 

chemotherapy candidate


#. Anemia due to underlying malignancy - continue to closely monitor


--> hgb goal is >7. Transfuse if needed. 


#. Crohn disease.


#. Opioid dependence.


#. Chronic obstructive pulmonary disease.


#. History of smoking.


#. Emphysema.


#. Perianal abscess.


#. Intractable abdominal pain.


#. Status post morphine pump placement.


#. Status post exploratory laparotomy.


#. Pneumoperitoneum.





Subjective


Date patient seen:  May 1, 2018


Constitutional:  Denies: no symptoms, chills, diaphoresis, fever, malaise, 

weakness, other


HEENT:  Denies: no symptoms, eye pain, blurred vision, tearing, double vision, 

ear pain, ear discharge, nose pain, nose congestion, throat pain, throat 

swelling, mouth pain, mouth swelling, other


Cardiovascular:  Denies: no symptoms, chest pain, edema, irregular heart rate, 

lightheadedness, palpitations, syncope, other


Respiratory:  Denies: no symptoms, cough, orthopnea, shortness of breath, SOB 

with excertion, SOB at rest, sputum, stridor, wheezing, other


Gastrointestinal/Abdominal:  Denies: no symptoms, abdomen distended, abdominal 

pain, black stools, tarry stools, blood in stool, constipated, diarrhea, 

difficulty swallowing, nausea, poor appetite, poor fluid intake, rectal bleeding

, vomiting, other


Genitourinary:  Denies: no symptoms, burning, discharge, frequency, flank pain, 

hematuria, incontinence, pain, urgency, other


Neurologic/Psychiatric:  Denies: no symptoms, anxiety, depressed, emotional 

problems, headache, numbness, paresthesia, pre-existing deficit, seizure, 

tingling, tremors, weakness, other


Hematologic/Lymphatic:  Reports: anemia


Allergies:  


Coded Allergies:  


     No Known Allergies (Verified , 10/27/06)


Subjective


Lethargic. No acute events. ICU status.





Objective





Last 24 Hour Vital Signs








  Date Time  Temp Pulse Resp B/P (MAP) Pulse Ox O2 Delivery O2 Flow Rate FiO2


 


5/2/18 22:45      Bi-pap  


 


5/2/18 22:44  134 18  99   


 


5/2/18 22:43  134 17  99 Facial  60


 


5/2/18 20:35  133 32  97 Facial  70


 


5/2/18 20:00        70


 


5/2/18 19:05      Bi-pap  


 


5/2/18 19:04  131      


 


5/2/18 19:03  131 17  94 Facial  70


 


5/2/18 19:00  134 17 116/76 100 Bi-pap  70


 


5/2/18 18:00  135 17 140/87 100 Bi-pap  70


 


5/2/18 17:00  135 17 133/76 100 Bi-pap  70


 


5/2/18 16:58  128 30  98 Facial  70


 


5/2/18 16:00  136      


 


5/2/18 16:00 98.2 136 13 125/91 94 Bi-pap  70





 98.2       


 


5/2/18 16:00        50


 


5/2/18 15:12  127 22  100 Bi-pap  50


 


5/2/18 15:10  123 20  100 Facial  50


 


5/2/18 15:00  123 21 126/84 100 Bi-pap  50


 


5/2/18 15:00  122 14  93 Bi-pap  50


 


5/2/18 14:00  129 21 126/80 100 Bi-pap  50


 


5/2/18 13:00  126 25  98 Facial  50


 


5/2/18 13:00  126 25 136/90 100 Bi-pap  50


 


5/2/18 12:00        50


 


5/2/18 12:00  129      


 


5/2/18 12:00 98.6 132 13 135/86 100 Bi-pap  70





 98.6       


 


5/2/18 11:09  130 20  98 Facial  50


 


5/2/18 11:09      Bi-pap  


 


5/2/18 11:09      Bi-pap  


 


5/2/18 11:00  132 13 127/85 100 Bi-pap  50


 


5/2/18 10:00  132 17 123/84 100 Bi-pap  50


 


5/2/18 09:08  130 15  100 Facial  50


 


5/2/18 09:00  132 17 119/82 97 Bi-pap  70


 


5/2/18 09:00        50


 


5/2/18 08:00 98.6 132 16 128/90 100 Bi-pap  70





 98.6       


 


5/2/18 08:00  131      


 


5/2/18 07:02      Bi-pap  


 


5/2/18 07:01      Bi-pap  


 


5/2/18 07:01      Bi-pap  


 


5/2/18 07:01     100 Bi-pap  50


 


5/2/18 07:00  132 16 132/94 100 Bi-pap  70


 


5/2/18 06:58  130 15  100 Facial  50


 


5/2/18 06:00  123 16 106/64 100 Bi-pap  70


 


5/2/18 05:00  130 18 127/76 100 Bi-pap  70


 


5/2/18 04:48  130 17  100 Facial  70


 


5/2/18 04:00  130      


 


5/2/18 04:00        70


 


5/2/18 04:00 98.5 130 18 108/77 100 Bi-pap  70





 98.5       


 


5/2/18 03:40  124 16  100 Bi-pap  


 


5/2/18 03:30  122 16  96 Bi-pap  


 


5/2/18 03:30  124 18  99 Facial  70


 


5/2/18 03:00  125 18 110/67 98 Bi-pap  70


 


5/2/18 02:00  130 18 106/69 98 Bi-pap  70


 


5/2/18 01:30  132 16  100 Facial  70


 


5/2/18 01:00 98.5 126 18 100/66 98 Bi-pap  70





 98.5       


 


5/2/18 00:00 98.5 130 18 119/77 98 Bi-pap  70





 98.5       


 


5/2/18 00:00        70


 


5/1/18 23:26      Bi-pap  


 


5/1/18 23:26  130 25  95 Bi-pap  


 


5/1/18 23:25  131 14  96 Facial  70


 


5/1/18 23:00  130 19 117/77 98 Bi-pap  70

















Intake and Output  


 


 5/1/18 5/2/18





 19:00 07:00


 


Output Total 650 ml 600 ml


 


Balance -650 ml -600 ml


 


  


 


Output Urine Total 650 ml 600 ml








Laboratory Tests


5/2/18 04:10: 


White Blood Count 12.2H, Red Blood Count 4.66, Hemoglobin 14.0, Hematocrit 41.0

, Mean Corpuscular Volume 88, Mean Corpuscular Hemoglobin 30.1, Mean 

Corpuscular Hemoglobin Concent 34.2, Red Cell Distribution Width 14.9H, 

Platelet Count 185, Mean Platelet Volume 7.8, Neutrophils (%) (Auto) 78.8H, 

Lymphocytes (%) (Auto) 16.7L, Monocytes (%) (Auto) 3.3, Eosinophils (%) (Auto) 

0.8, Basophils (%) (Auto) 0.5, Sodium Level 142, Potassium Level 3.5, Chloride 

Level 101, Carbon Dioxide Level 29, Anion Gap 12, Blood Urea Nitrogen 26H, 

Creatinine 1.0, Estimat Glomerular Filtration Rate > 60, Glucose Level 118H, 

Calcium Level 10.0, Total Bilirubin 0.5, Aspartate Amino Transf (AST/SGOT) 95H, 

Alanine Aminotransferase (ALT/SGPT) 31, Alkaline Phosphatase 147H, Pro-B-Type 

Natriuretic Peptide 3659H, Total Protein 9.0H, Albumin 1.7L, Globulin 6.3


5/2/18 11:15: 


Arterial Blood pH 7.416, Arterial Blood Partial Pressure CO2 48.5H, Arterial 

Blood Partial Pressure O2 76.4, Arterial Blood HCO3 30.5H, Arterial Blood 

Oxygen Saturation 93.3, Arterial Blood Base Excess 5.0, Jonn Test Positive


Height (Feet):  5


Height (Inches):  6.00


Weight (Pounds):  129


General Appearance:  moderate distress


Cardiovascular:  tachycardia


Respiratory/Chest:  decreased breath sounds, crackles/rales


Abdomen:  normal bowel sounds, non tender











Erick Rsusell MD May 2, 2018 22:55

## 2018-05-02 NOTE — GENERAL PROGRESS NOTE
Assessment/Plan


Problem List:  


(1) Crohn's disease


ICD Codes:  K50.90 - Crohn's disease


SNOMED:  55731110


Qualifiers:  


   Qualified Codes:  K50.919 - Crohn's disease, unspecified, with unspecified 

complications


(2) Opioid dependence


ICD Codes:  F11.20 - Opioid dependence


SNOMED:  41229842


(3) Intractable abdominal pain


ICD Codes:  R10.9 - Unspecified abdominal pain


SNOMED:  81035884, 060844872


(4) COPD (chronic obstructive pulmonary disease)


ICD Codes:  J44.9 - Chronic obstructive pulmonary disease


SNOMED:  31317384


(5) Shortness of breath


(6) SOB (shortness of breath)


ICD Codes:  R06.02 - Shortness of breath


SNOMED:  111488918


(7) UTI (urinary tract infection)


ICD Codes:  N39.0 - Urinary tract infection, site not specified


SNOMED:  56332231


(8) Lung mass


ICD Codes:  R91.8 - Other nonspecific abnormal finding of lung field


SNOMED:  666527910


(9) Tachycardia


ICD Codes:  R00.0 - Tachycardia, unspecified


SNOMED:  2917258


Status:  unchanged


Assessment/Plan


ot pt diet pain control sx eval cbc bmp am heme f/u cardio eval,





Subjective


Constitutional:  Reports: weakness


Respiratory:  Reports: shortness of breath


Allergies:  


Coded Allergies:  


     No Known Allergies (Verified , 10/27/06)


All Systems:  reviewed and negative except above


Subjective


bipap sleeping in icu





Objective





Last 24 Hour Vital Signs








  Date Time  Temp Pulse Resp B/P (MAP) Pulse Ox O2 Delivery O2 Flow Rate FiO2


 


5/2/18 13:00  126 25  98 Facial  50


 


5/2/18 12:00        50


 


5/2/18 12:00 98.6 132 13 135/86 100 Bi-pap  70





 98.6       


 


5/2/18 11:09  130 20  98 Facial  50


 


5/2/18 11:09      Bi-pap  


 


5/2/18 11:09      Bi-pap  


 


5/2/18 11:00  132 13 127/85 100 Bi-pap  50


 


5/2/18 10:00  132 17 123/84 100 Bi-pap  50


 


5/2/18 09:08  130 15  100 Facial  50


 


5/2/18 09:00  132 17 119/82 97 Bi-pap  70


 


5/2/18 09:00        50


 


5/2/18 08:00 98.6 132 16 128/90 100 Bi-pap  70





 98.6       


 


5/2/18 08:00  131      


 


5/2/18 07:02      Bi-pap  


 


5/2/18 07:01      Bi-pap  


 


5/2/18 07:01      Bi-pap  


 


5/2/18 07:01     100 Bi-pap  50


 


5/2/18 07:00  132 16 132/94 100 Bi-pap  70


 


5/2/18 06:58  130 15  100 Facial  50


 


5/2/18 06:00  123 16 106/64 100 Bi-pap  70


 


5/2/18 05:00  130 18 127/76 100 Bi-pap  70


 


5/2/18 04:48  130 17  100 Facial  70


 


5/2/18 04:00  130      


 


5/2/18 04:00        70


 


5/2/18 04:00 98.5 130 18 108/77 100 Bi-pap  70





 98.5       


 


5/2/18 03:40  124 16  100 Bi-pap  


 


5/2/18 03:30  122 16  96 Bi-pap  


 


5/2/18 03:30  124 18  99 Facial  70


 


5/2/18 03:00  125 18 110/67 98 Bi-pap  70


 


5/2/18 02:00  130 18 106/69 98 Bi-pap  70


 


5/2/18 01:30  132 16  100 Facial  70


 


5/2/18 01:00 98.5 126 18 100/66 98 Bi-pap  70





 98.5       


 


5/2/18 00:00 98.5 130 18 119/77 98 Bi-pap  70





 98.5       


 


5/2/18 00:00        70


 


5/1/18 23:26      Bi-pap  


 


5/1/18 23:26  130 25  95 Bi-pap  


 


5/1/18 23:25  131 14  96 Facial  70


 


5/1/18 23:00  130 19 117/77 98 Bi-pap  70


 


5/1/18 22:00  121 16 119/88 98 Bi-pap  70


 


5/1/18 21:30  130 17  95 Facial  70


 


5/1/18 21:00  121 18 127/74 98 Bi-pap  70


 


5/1/18 20:00  131      


 


5/1/18 20:00        70


 


5/1/18 20:00 98.5 131 18 121/72 98 Bi-pap  70





 98.5       


 


5/1/18 19:51      Bi-pap  


 


5/1/18 19:50      Bi-pap  


 


5/1/18 19:50      Bi-pap  


 


5/1/18 19:45  133 20  94 Bi-pap  50


 


5/1/18 19:30  133 20  94 Facial  50


 


5/1/18 19:00  128 18 118/72 100 Bi-pap  50


 


5/1/18 18:00  125 18 112/70 99 Bi-pap  50


 


5/1/18 17:00  122 16 116/75 100 Bi-pap  50


 


5/1/18 16:44  130 22  99 Facial  40


 


5/1/18 16:00 98.6 135 17 110/71 96 Bi-pap  50





 98.6       


 


5/1/18 16:00        50


 


5/1/18 16:00  125      


 


5/1/18 15:00  127 18 117/72 100 Bi-pap  50


 


5/1/18 14:52  131 16  99 Bi-pap  50


 


5/1/18 14:45  131 20  99 Facial  50


 


5/1/18 14:45  131 15  99 Bi-pap  50


 


5/1/18 14:00  132 17 96/75 96 Bi-pap  40

















Intake and Output  


 


 5/1/18 5/2/18





 19:00 07:00


 


Output Total 650 ml 600 ml


 


Balance -650 ml -600 ml


 


  


 


Output Urine Total 650 ml 600 ml








Laboratory Tests


5/2/18 04:10: 


White Blood Count 12.2H, Red Blood Count 4.66, Hemoglobin 14.0, Hematocrit 41.0

, Mean Corpuscular Volume 88, Mean Corpuscular Hemoglobin 30.1, Mean 

Corpuscular Hemoglobin Concent 34.2, Red Cell Distribution Width 14.9H, 

Platelet Count 185, Mean Platelet Volume 7.8, Neutrophils (%) (Auto) 78.8H, 

Lymphocytes (%) (Auto) 16.7L, Monocytes (%) (Auto) 3.3, Eosinophils (%) (Auto) 

0.8, Basophils (%) (Auto) 0.5, Sodium Level 142, Potassium Level 3.5, Chloride 

Level 101, Carbon Dioxide Level 29, Anion Gap 12, Blood Urea Nitrogen 26H, 

Creatinine 1.0, Estimat Glomerular Filtration Rate > 60, Glucose Level 118H, 

Calcium Level 10.0, Total Bilirubin 0.5, Aspartate Amino Transf (AST/SGOT) 95H, 

Alanine Aminotransferase (ALT/SGPT) 31, Alkaline Phosphatase 147H, Pro-B-Type 

Natriuretic Peptide 3659H, Total Protein 9.0H, Albumin 1.7L, Globulin 6.3


5/2/18 11:15: 


Arterial Blood pH 7.416, Arterial Blood Partial Pressure CO2 48.5H, Arterial 

Blood Partial Pressure O2 76.4, Arterial Blood HCO3 30.5H, Arterial Blood 

Oxygen Saturation 93.3, Arterial Blood Base Excess 5.0, Jonn Test Positive


Height (Feet):  5


Height (Inches):  6.00


Weight (Pounds):  129


General Appearance:  lethargic


EENT:  normal ENT inspection


Neck:  normal alignment


Cardiovascular:  normal peripheral pulses, normal rate, regular rhythm


Respiratory/Chest:  chest wall non-tender, decreased breath sounds


Abdomen:  normal bowel sounds, non tender, soft


Extremities:  normal inspection


Edema:  no edema noted Arm (L), no edema noted Arm (R), no edema noted Leg (L), 

no edema noted Leg (R), no edema noted Pedal (L), no edema noted Pedal (R), no 

edema noted Generalized


Neurologic:  motor weakness


Skin:  normal pigmentation, warm/dry











MAICOL LANG May 2, 2018 13:48

## 2018-05-02 NOTE — GENERAL PROGRESS NOTE
Assessment/Plan


Assessment/Plan


IMPRESSION/RECS:


#. Stage IV malignancy, potentially lung cancer given pattern of spread, proven 

malignancy on pleural fluid. Has a left lower lobe lung mass. Left inferior 

hilar mass. Left pleural base lung mass. Left pleural effusion. Retroperitoneal 

lymphadenopathy. Multiple low-attenuation liver lesions.


--> Discussed with tre RN, Primary MD, given poor performance status recommend 

palliative/hospice care


--> Do not recommend any further diagnosis/tissue given poor state, is not a 

chemotherapy candidate


#. Anemia due to underlying malignancy - continue to closely monitor


--> hgb goal is >7


#. Crohn disease.


#. Opioid dependence.


#. Chronic obstructive pulmonary disease.


#. History of smoking.


#. Emphysema.


#. Perianal abscess.


#. Intractable abdominal pain.


#. Status post morphine pump placement.


#. Status post exploratory laparotomy.


#. Pneumoperitoneum.





Subjective


Constitutional:  Reports: no symptoms


HEENT:  Reports: no symptoms


Cardiovascular:  Reports: no symptoms


Respiratory:  Reports: no symptoms


Gastrointestinal/Abdominal:  Reports: no symptoms


Genitourinary:  Reports: no symptoms


Neurologic/Psychiatric:  Reports: no symptoms


Endocrine:  Reports: no symptoms


Hematologic/Lymphatic:  Reports: anemia


Allergies:  


Coded Allergies:  


     No Known Allergies (Verified , 10/27/06)


Subjective


lethargic, no fevers, no chills, remains in icu





Objective





Last 24 Hour Vital Signs








  Date Time  Temp Pulse Resp B/P (MAP) Pulse Ox O2 Delivery O2 Flow Rate FiO2


 


5/2/18 12:00        50


 


5/2/18 12:00 98.6 132 13 135/86 100 Bi-pap  70





 98.6       


 


5/2/18 11:09  130 20  98 Facial  50


 


5/2/18 11:09      Bi-pap  


 


5/2/18 11:09      Bi-pap  


 


5/2/18 11:00  132 13 127/85 100 Bi-pap  50


 


5/2/18 10:00  132 17 123/84 100 Bi-pap  50


 


5/2/18 09:08  130 15  100 Facial  50


 


5/2/18 09:00  132 17 119/82 97 Bi-pap  70


 


5/2/18 09:00        50


 


5/2/18 08:00 98.6 132 16 128/90 100 Bi-pap  70





 98.6       


 


5/2/18 08:00  131      


 


5/2/18 07:02      Bi-pap  


 


5/2/18 07:01      Bi-pap  


 


5/2/18 07:01      Bi-pap  


 


5/2/18 07:01     100 Bi-pap  50


 


5/2/18 07:00  132 16 132/94 100 Bi-pap  70


 


5/2/18 06:58  130 15  100 Facial  50


 


5/2/18 06:00  123 16 106/64 100 Bi-pap  70


 


5/2/18 05:00  130 18 127/76 100 Bi-pap  70


 


5/2/18 04:48  130 17  100 Facial  70


 


5/2/18 04:00  130      


 


5/2/18 04:00        70


 


5/2/18 04:00 98.5 130 18 108/77 100 Bi-pap  70





 98.5       


 


5/2/18 03:40  124 16  100 Bi-pap  


 


5/2/18 03:30  122 16  96 Bi-pap  


 


5/2/18 03:30  124 18  99 Facial  70


 


5/2/18 03:00  125 18 110/67 98 Bi-pap  70


 


5/2/18 02:00  130 18 106/69 98 Bi-pap  70


 


5/2/18 01:30  132 16  100 Facial  70


 


5/2/18 01:00 98.5 126 18 100/66 98 Bi-pap  70





 98.5       


 


5/2/18 00:00 98.5 130 18 119/77 98 Bi-pap  70





 98.5       


 


5/2/18 00:00        70


 


5/1/18 23:26      Bi-pap  


 


5/1/18 23:26  130 25  95 Bi-pap  


 


5/1/18 23:25  131 14  96 Facial  70


 


5/1/18 23:00  130 19 117/77 98 Bi-pap  70


 


5/1/18 22:00  121 16 119/88 98 Bi-pap  70


 


5/1/18 21:30  130 17  95 Facial  70


 


5/1/18 21:00  121 18 127/74 98 Bi-pap  70


 


5/1/18 20:00  131      


 


5/1/18 20:00        70


 


5/1/18 20:00 98.5 131 18 121/72 98 Bi-pap  70





 98.5       


 


5/1/18 19:51      Bi-pap  


 


5/1/18 19:50      Bi-pap  


 


5/1/18 19:50      Bi-pap  


 


5/1/18 19:45  133 20  94 Bi-pap  50


 


5/1/18 19:30  133 20  94 Facial  50


 


5/1/18 19:00  128 18 118/72 100 Bi-pap  50


 


5/1/18 18:00  125 18 112/70 99 Bi-pap  50


 


5/1/18 17:00  122 16 116/75 100 Bi-pap  50


 


5/1/18 16:44  130 22  99 Facial  40


 


5/1/18 16:00 98.6 135 17 110/71 96 Bi-pap  50





 98.6       


 


5/1/18 16:00        50


 


5/1/18 16:00  125      


 


5/1/18 15:00  127 18 117/72 100 Bi-pap  50


 


5/1/18 14:52  131 16  99 Bi-pap  50


 


5/1/18 14:45  131 20  99 Facial  50


 


5/1/18 14:45  131 15  99 Bi-pap  50


 


5/1/18 14:00  132 17 96/75 96 Bi-pap  40


 


5/1/18 13:00  130 17 98/71 96 Bi-pap  40


 


5/1/18 12:31  135 19  100 Facial  100

















Intake and Output  


 


 5/1/18 5/2/18





 19:00 07:00


 


Output Total 650 ml 600 ml


 


Balance -650 ml -600 ml


 


  


 


Output Urine Total 650 ml 600 ml








Laboratory Tests


5/2/18 04:10: 


White Blood Count 12.2H, Red Blood Count 4.66, Hemoglobin 14.0, Hematocrit 41.0

, Mean Corpuscular Volume 88, Mean Corpuscular Hemoglobin 30.1, Mean 

Corpuscular Hemoglobin Concent 34.2, Red Cell Distribution Width 14.9H, 

Platelet Count 185, Mean Platelet Volume 7.8, Neutrophils (%) (Auto) 78.8H, 

Lymphocytes (%) (Auto) 16.7L, Monocytes (%) (Auto) 3.3, Eosinophils (%) (Auto) 

0.8, Basophils (%) (Auto) 0.5, Sodium Level 142, Potassium Level 3.5, Chloride 

Level 101, Carbon Dioxide Level 29, Anion Gap 12, Blood Urea Nitrogen 26H, 

Creatinine 1.0, Estimat Glomerular Filtration Rate > 60, Glucose Level 118H, 

Calcium Level 10.0, Total Bilirubin 0.5, Aspartate Amino Transf (AST/SGOT) 95H, 

Alanine Aminotransferase (ALT/SGPT) 31, Alkaline Phosphatase 147H, Pro-B-Type 

Natriuretic Peptide 3659H, Total Protein 9.0H, Albumin 1.7L, Globulin 6.3


5/2/18 11:15: 


Arterial Blood pH 7.416, Arterial Blood Partial Pressure CO2 48.5H, Arterial 

Blood Partial Pressure O2 76.4, Arterial Blood HCO3 30.5H, Arterial Blood 

Oxygen Saturation 93.3, Arterial Blood Base Excess 5.0, Jonn Test Positive


Height (Feet):  5


Height (Inches):  6.00


Weight (Pounds):  129


General Appearance:  no apparent distress


EENT:  TMs normal


Neck:  supple


Cardiovascular:  regular rhythm


Respiratory/Chest:  lungs clear


Abdomen:  non tender


Extremities:  non-tender


Edema:  1+ Leg (L), 1+ Leg (R)


Edema:  mild edema


Neurologic:  alert


Skin:  warm/dry











Erick Russell MD May 2, 2018 12:27

## 2018-05-02 NOTE — GENERAL SURGERY PROGRESS NOTE
General Surgery-Progress Note


Subjective


Symptoms:  worse


Additional Comments


does not look so well.  deteriorating





Objective





Last 24 Hour Vital Signs








  Date Time  Temp Pulse Resp B/P (MAP) Pulse Ox O2 Delivery O2 Flow Rate FiO2


 


5/2/18 16:00 98.2 136 13 125/91 94 Bi-pap  70





 98.2       


 


5/2/18 16:00        50


 


5/2/18 15:12  127 22  100 Bi-pap  50


 


5/2/18 15:10  123 20  100 Facial  50


 


5/2/18 15:00  123 21 126/84 100 Bi-pap  50


 


5/2/18 15:00  122 14  93 Bi-pap  50


 


5/2/18 14:00  129 21 126/80 100 Bi-pap  50


 


5/2/18 13:00  126 25  98 Facial  50


 


5/2/18 13:00  126 25 136/90 100 Bi-pap  50


 


5/2/18 12:00        50


 


5/2/18 12:00  129      


 


5/2/18 12:00 98.6 132 13 135/86 100 Bi-pap  70





 98.6       


 


5/2/18 11:09  130 20  98 Facial  50


 


5/2/18 11:09      Bi-pap  


 


5/2/18 11:09      Bi-pap  


 


5/2/18 11:00  132 13 127/85 100 Bi-pap  50


 


5/2/18 10:00  132 17 123/84 100 Bi-pap  50


 


5/2/18 09:08  130 15  100 Facial  50


 


5/2/18 09:00  132 17 119/82 97 Bi-pap  70


 


5/2/18 09:00        50


 


5/2/18 08:00 98.6 132 16 128/90 100 Bi-pap  70





 98.6       


 


5/2/18 08:00  131      


 


5/2/18 07:02      Bi-pap  


 


5/2/18 07:01      Bi-pap  


 


5/2/18 07:01      Bi-pap  


 


5/2/18 07:01     100 Bi-pap  50


 


5/2/18 07:00  132 16 132/94 100 Bi-pap  70


 


5/2/18 06:58  130 15  100 Facial  50


 


5/2/18 06:00  123 16 106/64 100 Bi-pap  70


 


5/2/18 05:00  130 18 127/76 100 Bi-pap  70


 


5/2/18 04:48  130 17  100 Facial  70


 


5/2/18 04:00  130      


 


5/2/18 04:00        70


 


5/2/18 04:00 98.5 130 18 108/77 100 Bi-pap  70





 98.5       


 


5/2/18 03:40  124 16  100 Bi-pap  


 


5/2/18 03:30  122 16  96 Bi-pap  


 


5/2/18 03:30  124 18  99 Facial  70


 


5/2/18 03:00  125 18 110/67 98 Bi-pap  70


 


5/2/18 02:00  130 18 106/69 98 Bi-pap  70


 


5/2/18 01:30  132 16  100 Facial  70


 


5/2/18 01:00 98.5 126 18 100/66 98 Bi-pap  70





 98.5       


 


5/2/18 00:00 98.5 130 18 119/77 98 Bi-pap  70





 98.5       


 


5/2/18 00:00        70


 


5/1/18 23:26      Bi-pap  


 


5/1/18 23:26  130 25  95 Bi-pap  


 


5/1/18 23:25  131 14  96 Facial  70


 


5/1/18 23:00  130 19 117/77 98 Bi-pap  70


 


5/1/18 22:00  121 16 119/88 98 Bi-pap  70


 


5/1/18 21:30  130 17  95 Facial  70


 


5/1/18 21:00  121 18 127/74 98 Bi-pap  70


 


5/1/18 20:00  131      


 


5/1/18 20:00        70


 


5/1/18 20:00 98.5 131 18 121/72 98 Bi-pap  70





 98.5       


 


5/1/18 19:51      Bi-pap  


 


5/1/18 19:50      Bi-pap  


 


5/1/18 19:50      Bi-pap  


 


5/1/18 19:45  133 20  94 Bi-pap  50


 


5/1/18 19:30  133 20  94 Facial  50


 


5/1/18 19:00  128 18 118/72 100 Bi-pap  50


 


5/1/18 18:00  125 18 112/70 99 Bi-pap  50


 


5/1/18 17:00  122 16 116/75 100 Bi-pap  50


 


5/1/18 16:44  130 22  99 Facial  40








I&O











Intake and Output  


 


 5/1/18 5/2/18





 19:00 07:00


 


Output Total 650 ml 600 ml


 


Balance -650 ml -600 ml


 


  


 


Output Urine Total 650 ml 600 ml








Drains:  none


Cardiovascular:  RSR


Respiratory:  decreased breath sounds


Abdomen:  soft, distended, present bowel sounds


Extremities:  no cyanosis





Laboratory Tests








Test


  5/2/18


04:10 5/2/18


11:15


 


White Blood Count


  12.2 K/UL


(4.8-10.8)  H 


 


 


Red Blood Count


  4.66 M/UL


(4.20-5.40) 


 


 


Hemoglobin


  14.0 G/DL


(12.0-16.0) 


 


 


Hematocrit


  41.0 %


(37.0-47.0) 


 


 


Mean Corpuscular Volume 88 FL (80-99)   


 


Mean Corpuscular Hemoglobin


  30.1 PG


(27.0-31.0) 


 


 


Mean Corpuscular Hemoglobin


Concent 34.2 G/DL


(32.0-36.0) 


 


 


Red Cell Distribution Width


  14.9 %


(11.6-14.8)  H 


 


 


Platelet Count


  185 K/UL


(150-450) 


 


 


Mean Platelet Volume


  7.8 FL


(6.5-10.1) 


 


 


Neutrophils (%) (Auto)


  78.8 %


(45.0-75.0)  H 


 


 


Lymphocytes (%) (Auto)


  16.7 %


(20.0-45.0)  L 


 


 


Monocytes (%) (Auto)


  3.3 %


(1.0-10.0) 


 


 


Eosinophils (%) (Auto)


  0.8 %


(0.0-3.0) 


 


 


Basophils (%) (Auto)


  0.5 %


(0.0-2.0) 


 


 


Sodium Level


  142 MMOL/L


(136-145) 


 


 


Potassium Level


  3.5 MMOL/L


(3.5-5.1) 


 


 


Chloride Level


  101 MMOL/L


() 


 


 


Carbon Dioxide Level


  29 MMOL/L


(21-32) 


 


 


Anion Gap


  12 mmol/L


(5-15) 


 


 


Blood Urea Nitrogen


  26 mg/dL


(7-18)  H 


 


 


Creatinine


  1.0 MG/DL


(0.55-1.30) 


 


 


Estimat Glomerular Filtration


Rate > 60 mL/min


(>60) 


 


 


Glucose Level


  118 MG/DL


()  H 


 


 


Calcium Level


  10.0 MG/DL


(8.5-10.1) 


 


 


Total Bilirubin


  0.5 MG/DL


(0.2-1.0) 


 


 


Aspartate Amino Transf


(AST/SGOT) 95 U/L (15-37)


H 


 


 


Alanine Aminotransferase


(ALT/SGPT) 31 U/L (12-78)


  


 


 


Alkaline Phosphatase


  147 U/L


()  H 


 


 


Pro-B-Type Natriuretic Peptide


  3659 pg/mL


(0-125)  H 


 


 


Total Protein


  9.0 G/DL


(6.4-8.2)  H 


 


 


Albumin


  1.7 G/DL


(3.4-5.0)  L 


 


 


Globulin 6.3 g/dL   


 


Arterial Blood pH


  


  7.416


(7.350-7.450)


 


Arterial Blood Partial


Pressure CO2 


  48.5 mmHg


(35.0-45.0)  H


 


Arterial Blood Partial


Pressure O2 


  76.4 mmHg


(75.0-100.0)


 


Arterial Blood HCO3


  


  30.5 mmol/L


(22.0-26.0)  H


 


Arterial Blood Oxygen


Saturation 


  93.3 %


(92.0-98.0)


 


Arterial Blood Base Excess  5.0  


 


Jonn Test  Positive  











Plan


Problems:  


(1) Intractable abdominal pain


Assessment & Plan:  62F with abdominal pain.  very complex medical and surgical 

history.  now with complex CT findings of left pleural effusion, left lower 

lung mass, large mediastinal and periaortic lymph nodes, new liver lesions, and 

air around the liver.  


unable to tell if bowel loop (possible blind end from prior surgery) noted in 

right upper quadrant above liver or if abscess or if free air.  exam not 

consistent with perforation and no significant free fluid noted on CT or area 

of perforation.  contrast into distal bowel without leak.  initial leukocytosis 

resolved.  low grade persistent fevers.  recent cough.  





unfortunately no clear explanation as to patients current condition.  lung mass

, effusion, new liver masses, and nodes very concerning for malignant process. 

Exam stable compared to prior exams (patient seen during last admission and 

known to me).  will need much more extensive work up. 





I discussed these findings with patient.  I explained possible need for urgent 

surgery but given history and complex surgical history we will monitor 

clinically first.  if declines will proceed with surgery which is VERY high 

risk in her.  if stable will continue with work up.  patient and partner 

express understand and agree with plan.  





s/p thoracentesis 4/20. malignant non small cell cancer 





CT reviewed.  Path reviewed


Discussed with patient


Need tissue diagnosis.  unfortunately path unable to determine etiology based 

on cytology


currently in icu and needs to stabilize prior to considerations for CT guided 

biopsy for tissue diagnosis. 


from report family wants comfort care.  


-regular diet 


-monitor exam clinically


-trend labs


-iv abx


-will follow with recs. thank you for this consultation.  














Darrius Benitez May 2, 2018 16:25

## 2018-05-02 NOTE — PULMONOLGY CRITICAL CARE NOTE
Critical Care - Asmt/Plan


Problems:  


(1) Acute respiratory failure


(2) Malignant pleural effusion


(3) Metastatic cancer


(4) Lung mass


(5) COPD (chronic obstructive pulmonary disease)


Respiratory:  monitor respiratory rate, adjust FIO2, CXR


Cardiac:  continue to monitor HR/BP


Renal:  F/U I&O, keep IV fluid


Infectious Disease:  check cultures


Gastrointestinal:  continue feedings/current rate


Endocrine:  monitor blood sugar, check TSH, continue sliding scale insulin


Hematologic:  monitor H/H, transfuse if hgb<8.5


Neurologic:  PRN Ativan, PRN Morphine, keep patient comfortable


Prophylaxis:  Protonix


Disposition:  keep in ICU


Notes Reviewed:  internist, renal


Discussed with:  nurses, 





Critical Care - Objective





Last 24 Hour Vital Signs








  Date Time  Temp Pulse Resp B/P (MAP) Pulse Ox O2 Delivery O2 Flow Rate FiO2


 


5/2/18 11:09  130 20  98 Facial  50


 


5/2/18 11:09      Bi-pap  


 


5/2/18 11:09      Bi-pap  


 


5/2/18 10:00  132 17 123/84 100 Bi-pap  50


 


5/2/18 09:08  130 15  100 Facial  50


 


5/2/18 09:00  132 17 119/82 97 Bi-pap  70


 


5/2/18 09:00        50


 


5/2/18 08:00 98.6 132 16 128/90 100 Bi-pap  70





 98.6       


 


5/2/18 08:00  131      


 


5/2/18 07:02      Bi-pap  


 


5/2/18 07:01      Bi-pap  


 


5/2/18 07:01      Bi-pap  


 


5/2/18 07:01     100 Bi-pap  50


 


5/2/18 07:00  132 16 132/94 100 Bi-pap  70


 


5/2/18 06:58  130 15  100 Facial  50


 


5/2/18 06:00  123 16 106/64 100 Bi-pap  70


 


5/2/18 05:00  130 18 127/76 100 Bi-pap  70


 


5/2/18 04:48  130 17  100 Facial  70


 


5/2/18 04:00  130      


 


5/2/18 04:00        70


 


5/2/18 04:00 98.5 130 18 108/77 100 Bi-pap  70





 98.5       


 


5/2/18 03:40  124 16  100 Bi-pap  


 


5/2/18 03:30  122 16  96 Bi-pap  


 


5/2/18 03:30  124 18  99 Facial  70


 


5/2/18 03:00  125 18 110/67 98 Bi-pap  70


 


5/2/18 02:00  130 18 106/69 98 Bi-pap  70


 


5/2/18 01:30  132 16  100 Facial  70


 


5/2/18 01:00 98.5 126 18 100/66 98 Bi-pap  70





 98.5       


 


5/2/18 00:00 98.5 130 18 119/77 98 Bi-pap  70





 98.5       


 


5/2/18 00:00        70


 


5/1/18 23:26      Bi-pap  


 


5/1/18 23:26  130 25  95 Bi-pap  


 


5/1/18 23:25  131 14  96 Facial  70


 


5/1/18 23:00  130 19 117/77 98 Bi-pap  70


 


5/1/18 22:00  121 16 119/88 98 Bi-pap  70


 


5/1/18 21:30  130 17  95 Facial  70


 


5/1/18 21:00  121 18 127/74 98 Bi-pap  70


 


5/1/18 20:00  131      


 


5/1/18 20:00        70


 


5/1/18 20:00 98.5 131 18 121/72 98 Bi-pap  70





 98.5       


 


5/1/18 19:51      Bi-pap  


 


5/1/18 19:50      Bi-pap  


 


5/1/18 19:50      Bi-pap  


 


5/1/18 19:45  133 20  94 Bi-pap  50


 


5/1/18 19:30  133 20  94 Facial  50


 


5/1/18 19:00  128 18 118/72 100 Bi-pap  50


 


5/1/18 18:00  125 18 112/70 99 Bi-pap  50


 


5/1/18 17:00  122 16 116/75 100 Bi-pap  50


 


5/1/18 16:44  130 22  99 Facial  40


 


5/1/18 16:00 98.6 135 17 110/71 96 Bi-pap  50





 98.6       


 


5/1/18 16:00        50


 


5/1/18 16:00  125      


 


5/1/18 15:00  127 18 117/72 100 Bi-pap  50


 


5/1/18 14:52  131 16  99 Bi-pap  50


 


5/1/18 14:45  131 20  99 Facial  50


 


5/1/18 14:45  131 15  99 Bi-pap  50


 


5/1/18 14:00  132 17 96/75 96 Bi-pap  40


 


5/1/18 13:00  130 17 98/71 96 Bi-pap  40


 


5/1/18 12:31  135 19  100 Facial  100


 


5/1/18 12:00        50


 


5/1/18 12:00 98.2 135 16 102/69 96 Bi-pap  40





 98.2       


 


5/1/18 12:00  127      


 


5/1/18 11:40  132 18  100 Bi-pap  40


 


5/1/18 11:30  131 20  99 Facial  40


 


5/1/18 11:30  133 20  100 Bi-pap  40








Status:  awake


Condition:  critical


Neck:  full ROM


Heart:  HR/BP stable


Abdomen:  soft, active bowel sounds


Extremities:  edema


Decubiti:  location





Critical Care - Subjective


ROS Limited/Unobtainable:  No


ICU Day:  2


Condition:  critical


EKG Rhythm:  Sinus Rhythm


FI02:  50


Vent Support Mode:  CPAP


Sputum Amount:  None


I&O:











Intake and Output  


 


 5/1/18 5/2/18





 19:00 07:00


 


Output Total 650 ml 600 ml


 


Balance -650 ml -600 ml


 


  


 


Output Urine Total 650 ml 600 ml








CXR:


bilateral infiltrate, alveolar edema


Labs:





Laboratory Tests








Test


  5/2/18


04:10


 


White Blood Count


  12.2 K/UL


(4.8-10.8)  H


 


Red Blood Count


  4.66 M/UL


(4.20-5.40)


 


Hemoglobin


  14.0 G/DL


(12.0-16.0)


 


Hematocrit


  41.0 %


(37.0-47.0)


 


Mean Corpuscular Volume 88 FL (80-99)  


 


Mean Corpuscular Hemoglobin


  30.1 PG


(27.0-31.0)


 


Mean Corpuscular Hemoglobin


Concent 34.2 G/DL


(32.0-36.0)


 


Red Cell Distribution Width


  14.9 %


(11.6-14.8)  H


 


Platelet Count


  185 K/UL


(150-450)


 


Mean Platelet Volume


  7.8 FL


(6.5-10.1)


 


Neutrophils (%) (Auto)


  78.8 %


(45.0-75.0)  H


 


Lymphocytes (%) (Auto)


  16.7 %


(20.0-45.0)  L


 


Monocytes (%) (Auto)


  3.3 %


(1.0-10.0)


 


Eosinophils (%) (Auto)


  0.8 %


(0.0-3.0)


 


Basophils (%) (Auto)


  0.5 %


(0.0-2.0)


 


Sodium Level


  142 MMOL/L


(136-145)


 


Potassium Level


  3.5 MMOL/L


(3.5-5.1)


 


Chloride Level


  101 MMOL/L


()


 


Carbon Dioxide Level


  29 MMOL/L


(21-32)


 


Anion Gap


  12 mmol/L


(5-15)


 


Blood Urea Nitrogen


  26 mg/dL


(7-18)  H


 


Creatinine


  1.0 MG/DL


(0.55-1.30)


 


Estimat Glomerular Filtration


Rate > 60 mL/min


(>60)


 


Glucose Level


  118 MG/DL


()  H


 


Calcium Level


  10.0 MG/DL


(8.5-10.1)


 


Total Bilirubin


  0.5 MG/DL


(0.2-1.0)


 


Aspartate Amino Transf


(AST/SGOT) 95 U/L (15-37)


H


 


Alanine Aminotransferase


(ALT/SGPT) 31 U/L (12-78)


 


 


Alkaline Phosphatase


  147 U/L


()  H


 


Pro-B-Type Natriuretic Peptide


  3659 pg/mL


(0-125)  H


 


Total Protein


  9.0 G/DL


(6.4-8.2)  H


 


Albumin


  1.7 G/DL


(3.4-5.0)  L


 


Globulin 6.3 g/dL  

















Blaze Farga MD May 2, 2018 11:19

## 2018-05-02 NOTE — DIAGNOSTIC IMAGING REPORT
Indication: Dyspnea

 

Comparison:  5/1/2018

 

A single view chest radiograph was obtained.

 

Findings:

 

Patchy airspace disease demonstrated bilaterally. The heart size is normal and

unchanged. PICC line is stable.

 

IMPRESSION:

 

No change from the previous day

## 2018-05-02 NOTE — PROCEDURE NOTE
DATE OF PROCEDURE:  _____



SURGEON:  Dimitry Mcleod M.D.



PROCEDURE:  Capsule endoscopy.



INDICATION:  Weight loss and Crohn's disease.



The procedure, risks, benefits, and possible consequences, including

hemorrhage, aspiration, perforation and infection, and alternative

treatments, were explained to the patient/legal guardian by Dr. Dimitry Mcelod and the patient/legal guardian understood and accepted these

risks.



DESCRIPTION OF PROCEDURE:  The patient swallowed the camera.

Unfortunately, the camera did spend whole time in the stomach.

Nevertheless, the patient had some residual food material sitting in the

stomach.  The patient is on chronic pain medication, most probably

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  So, this procedure was

inconclusive and not helpful.



PLAN:  Plan will be to bring the patient back again in the future.

Hopefully, give her some Reglan before the capsule endoscopy and repeat

that after receives the Reglan.









  ______________________________________________

  Dimitry Mcleod M.D.





DR:  ANA MARIA

D:  05/01/2018 09:50

T:  05/02/2018 00:53

JOB#:  [id number]

CC:

## 2018-05-02 NOTE — GENERAL PROGRESS NOTE
Progress Note


Progress Note


Met with pts son, Chadwick, who is the speaker of the rest of the family, (two 

other brothers).  He agreed with DNR.  He will call the rest of the family 

members to come and see her for the last time and then he wants the BIPAP to be 

removed and pt to receive comfort and end of life care.











Blaze Fraga MD May 2, 2018 15:23

## 2018-05-03 VITALS — DIASTOLIC BLOOD PRESSURE: 71 MMHG | SYSTOLIC BLOOD PRESSURE: 116 MMHG

## 2018-05-03 VITALS — SYSTOLIC BLOOD PRESSURE: 140 MMHG | DIASTOLIC BLOOD PRESSURE: 87 MMHG

## 2018-05-03 VITALS — DIASTOLIC BLOOD PRESSURE: 80 MMHG | SYSTOLIC BLOOD PRESSURE: 115 MMHG

## 2018-05-03 VITALS — SYSTOLIC BLOOD PRESSURE: 133 MMHG | DIASTOLIC BLOOD PRESSURE: 86 MMHG

## 2018-05-03 VITALS — DIASTOLIC BLOOD PRESSURE: 84 MMHG | SYSTOLIC BLOOD PRESSURE: 134 MMHG

## 2018-05-03 VITALS — SYSTOLIC BLOOD PRESSURE: 119 MMHG | DIASTOLIC BLOOD PRESSURE: 77 MMHG

## 2018-05-03 VITALS — DIASTOLIC BLOOD PRESSURE: 91 MMHG | SYSTOLIC BLOOD PRESSURE: 121 MMHG

## 2018-05-03 VITALS — SYSTOLIC BLOOD PRESSURE: 96 MMHG | DIASTOLIC BLOOD PRESSURE: 57 MMHG

## 2018-05-03 VITALS — DIASTOLIC BLOOD PRESSURE: 79 MMHG | SYSTOLIC BLOOD PRESSURE: 134 MMHG

## 2018-05-03 VITALS — DIASTOLIC BLOOD PRESSURE: 84 MMHG | SYSTOLIC BLOOD PRESSURE: 121 MMHG

## 2018-05-03 VITALS — SYSTOLIC BLOOD PRESSURE: 111 MMHG | DIASTOLIC BLOOD PRESSURE: 77 MMHG

## 2018-05-03 VITALS — DIASTOLIC BLOOD PRESSURE: 77 MMHG | SYSTOLIC BLOOD PRESSURE: 119 MMHG

## 2018-05-03 VITALS — DIASTOLIC BLOOD PRESSURE: 85 MMHG | SYSTOLIC BLOOD PRESSURE: 120 MMHG

## 2018-05-03 VITALS — DIASTOLIC BLOOD PRESSURE: 87 MMHG | SYSTOLIC BLOOD PRESSURE: 128 MMHG

## 2018-05-03 VITALS — SYSTOLIC BLOOD PRESSURE: 121 MMHG | DIASTOLIC BLOOD PRESSURE: 80 MMHG

## 2018-05-03 VITALS — SYSTOLIC BLOOD PRESSURE: 126 MMHG | DIASTOLIC BLOOD PRESSURE: 80 MMHG

## 2018-05-03 LAB
ADD MANUAL DIFF: NO
ALBUMIN SERPL-MCNC: 1.8 G/DL (ref 3.4–5)
ALP SERPL-CCNC: 156 U/L (ref 46–116)
ALT SERPL-CCNC: 37 U/L (ref 12–78)
ANION GAP SERPL CALC-SCNC: 12 MMOL/L (ref 5–15)
AST SERPL-CCNC: 119 U/L (ref 15–37)
BASOPHILS NFR BLD AUTO: 0.7 % (ref 0–2)
BILIRUB SERPL-MCNC: 0.5 MG/DL (ref 0.2–1)
BUN SERPL-MCNC: 48 MG/DL (ref 7–18)
CALCIUM SERPL-MCNC: 10.3 MG/DL (ref 8.5–10.1)
CHLORIDE SERPL-SCNC: 104 MMOL/L (ref 98–107)
CO2 SERPL-SCNC: 31 MMOL/L (ref 21–32)
CREAT SERPL-MCNC: 1.2 MG/DL (ref 0.55–1.3)
EOSINOPHIL NFR BLD AUTO: 0.7 % (ref 0–3)
ERYTHROCYTE [DISTWIDTH] IN BLOOD BY AUTOMATED COUNT: 14.7 % (ref 11.6–14.8)
GLOBULIN SER-MCNC: 7.6 G/DL
HCT VFR BLD CALC: 43.6 % (ref 37–47)
HGB BLD-MCNC: 14.5 G/DL (ref 12–16)
LYMPHOCYTES NFR BLD AUTO: 16.8 % (ref 20–45)
MCV RBC AUTO: 89 FL (ref 80–99)
MONOCYTES NFR BLD AUTO: 4 % (ref 1–10)
NEUTROPHILS NFR BLD AUTO: 77.8 % (ref 45–75)
PHOSPHATE SERPL-MCNC: 4.9 MG/DL (ref 2.5–4.9)
PLATELET # BLD: 170 K/UL (ref 150–450)
POTASSIUM SERPL-SCNC: 4.5 MMOL/L (ref 3.5–5.1)
RBC # BLD AUTO: 4.91 M/UL (ref 4.2–5.4)
SODIUM SERPL-SCNC: 147 MMOL/L (ref 136–145)
WBC # BLD AUTO: 12.8 K/UL (ref 4.8–10.8)

## 2018-05-03 RX ADMIN — METHYLNALTREXONE BROMIDE SCH MG: 12 INJECTION, SOLUTION SUBCUTANEOUS at 09:00

## 2018-05-03 RX ADMIN — TOPIRAMATE SCH MG: 25 TABLET, COATED ORAL at 09:49

## 2018-05-03 RX ADMIN — LEVALBUTEROL HYDROCHLORIDE SCH MG: 1.25 SOLUTION, CONCENTRATE RESPIRATORY (INHALATION) at 06:44

## 2018-05-03 RX ADMIN — DULOXETINE HYDROCHLORIDE SCH MG: 30 CAPSULE, DELAYED RELEASE ORAL at 09:49

## 2018-05-03 RX ADMIN — HEPARIN SODIUM SCH UNITS: 5000 INJECTION INTRAVENOUS; SUBCUTANEOUS at 21:13

## 2018-05-03 RX ADMIN — LEVALBUTEROL HYDROCHLORIDE SCH MG: 1.25 SOLUTION, CONCENTRATE RESPIRATORY (INHALATION) at 03:00

## 2018-05-03 RX ADMIN — HEPARIN SODIUM SCH UNITS: 5000 INJECTION INTRAVENOUS; SUBCUTANEOUS at 09:51

## 2018-05-03 RX ADMIN — LEVALBUTEROL HYDROCHLORIDE SCH MG: 1.25 SOLUTION, CONCENTRATE RESPIRATORY (INHALATION) at 11:42

## 2018-05-03 RX ADMIN — LEVALBUTEROL HYDROCHLORIDE SCH MG: 1.25 SOLUTION, CONCENTRATE RESPIRATORY (INHALATION) at 15:47

## 2018-05-03 RX ADMIN — LEVALBUTEROL HYDROCHLORIDE SCH MG: 1.25 SOLUTION, CONCENTRATE RESPIRATORY (INHALATION) at 19:00

## 2018-05-03 RX ADMIN — NITROGLYCERIN PRN MG: 0.4 TABLET SUBLINGUAL at 23:13

## 2018-05-03 RX ADMIN — LEVALBUTEROL HYDROCHLORIDE SCH MG: 1.25 SOLUTION, CONCENTRATE RESPIRATORY (INHALATION) at 23:00

## 2018-05-03 RX ADMIN — THEOPHYLLINE ANHYDROUS SCH MG: 100 CAPSULE, EXTENDED RELEASE ORAL at 09:49

## 2018-05-03 NOTE — CARDIAC ELECTROPHYSIOLOGY PN
Assessment/Plan


Assessment/Plan


1. Sinus tachycardia with no evidence of atrial fibrillation or SVT due to pain 

and respiratory failure. 


     Echocardiogram showed EF 65%.  


2. Malignant pleural effusion and metastatic cancer. 


     Follow up Dr. Fraga. S/p Left Thoracentesis on 4/20/18


     Morphine pump in abdomen. On BIPAP


3. Lung mass.   


4. Crohn disease and history of exploratory laparotomy, under management of Dr. Mcleod.


5. Ascites  paracentesis with cytology and per Dr. Russell.


6. DNR and DNI





DW RN





Subjective


Subjective


Out of  ICU on BIPAP  in sinus tach 120-130s.DNR now and lethargic





Objective





Last 24 Hour Vital Signs








  Date Time  Temp Pulse Resp B/P (MAP) Pulse Ox O2 Delivery O2 Flow Rate FiO2


 


5/3/18 16:00 97.5 139 20 133/86 100 Bi-pap  40





 97.5       


 


5/3/18 16:00        40


 


5/3/18 15:54  131 22  100 Bi-pap  40


 


5/3/18 15:47  131 18  100 Facial  40


 


5/3/18 15:47  131 18  100 Bi-pap  40


 


5/3/18 13:19  133 18  99 Facial  40


 


5/3/18 13:00 98.5 135 18 126/80 95 Bi-pap  40





 98.5       


 


5/3/18 12:00        40


 


5/3/18 12:00  132 18 134/84 95 Bi-pap  40


 


5/3/18 12:00  135      


 


5/3/18 11:52  133 24  100 Bi-pap  40


 


5/3/18 11:42  131 20  98 Facial  40


 


5/3/18 11:42  129 17  99 Bi-pap  50


 


5/3/18 11:00  135 18 120/85 95 Bi-pap  40


 


5/3/18 10:00  137 21 121/91 98 Bi-pap  40


 


5/3/18 09:07  138 23  99 Facial  40


 


5/3/18 09:00  136 18 128/87 98 Bi-pap  40


 


5/3/18 08:00  127      


 


5/3/18 08:00 98.2 107 18 96/57 100 Bi-pap  40





 98.2       


 


5/3/18 07:00  131 18 121/80 98 Bi-pap  40


 


5/3/18 06:44  139 22  99 Facial  40


 


5/3/18 06:44  139 22  98 Bi-pap  40


 


5/3/18 06:44      Bi-pap  


 


5/3/18 06:00  133 18 134/79 98 Bi-pap  40


 


5/3/18 05:00  135 18 121/84 98 Bi-pap  40


 


5/3/18 05:00        40


 


5/3/18 04:52  131 15  96 Facial  40


 


5/3/18 04:00        50


 


5/3/18 04:00 98.0 133 17 119/77 98 Bi-pap  40





 98.0       


 


5/3/18 04:00  135      


 


5/3/18 03:03      Bi-pap  


 


5/3/18 03:03  139 39  99 Bi-pap  40


 


5/3/18 03:02  139 33  99 Facial  40


 


5/3/18 03:00  133 15 119/77 98 Bi-pap  50


 


5/3/18 02:00  133 15 116/71 100 Bi-pap  50


 


5/3/18 01:00 97.9 135 15 115/80 99 Bi-pap  50





 97.9       


 


5/3/18 01:00        50


 


5/3/18 00:49  133 16  100 Facial  50


 


5/3/18 00:00  134      


 


5/3/18 00:00 97.9 137 17 111/77 99 Bi-pap  60





 97.9       


 


5/3/18 00:00        60


 


5/2/18 23:00  132 15 111/77 99 Bi-pap  60


 


5/2/18 23:00        60


 


5/2/18 22:45      Bi-pap  


 


5/2/18 22:44  134 18  99   


 


5/2/18 22:43  134 17  99 Facial  60


 


5/2/18 22:00  129 15 121/77 100 Bi-pap  70


 


5/2/18 21:00  133 15 117/76 97 Bi-pap  70


 


5/2/18 20:35  133 32  97 Facial  70


 


5/2/18 20:00        70


 


5/2/18 20:00 98.7 133 17 126/80 98 Bi-pap  70





 98.7       


 


5/2/18 20:00  136      


 


5/2/18 19:05      Bi-pap  


 


5/2/18 19:04  131      


 


5/2/18 19:03  131 17  94 Facial  70


 


5/2/18 19:00  134 17 116/76 100 Bi-pap  70


 


5/2/18 18:00  135 17 140/87 100 Bi-pap  70


 


5/2/18 17:00  135 17 133/76 100 Bi-pap  70


 


5/2/18 16:58  128 30  98 Facial  70

















Intake and Output  


 


 5/2/18 5/3/18





 19:00 07:00


 


  


 


# Voids 3 4











Laboratory Tests








Test


  5/3/18


04:15


 


White Blood Count


  12.8 K/UL


(4.8-10.8)  H


 


Red Blood Count


  4.91 M/UL


(4.20-5.40)


 


Hemoglobin


  14.5 G/DL


(12.0-16.0)


 


Hematocrit


  43.6 %


(37.0-47.0)


 


Mean Corpuscular Volume 89 FL (80-99)  


 


Mean Corpuscular Hemoglobin


  29.5 PG


(27.0-31.0)


 


Mean Corpuscular Hemoglobin


Concent 33.3 G/DL


(32.0-36.0)


 


Red Cell Distribution Width


  14.7 %


(11.6-14.8)


 


Platelet Count


  170 K/UL


(150-450)


 


Mean Platelet Volume


  7.6 FL


(6.5-10.1)


 


Neutrophils (%) (Auto)


  77.8 %


(45.0-75.0)  H


 


Lymphocytes (%) (Auto)


  16.8 %


(20.0-45.0)  L


 


Monocytes (%) (Auto)


  4.0 %


(1.0-10.0)


 


Eosinophils (%) (Auto)


  0.7 %


(0.0-3.0)


 


Basophils (%) (Auto)


  0.7 %


(0.0-2.0)


 


Sodium Level


  147 MMOL/L


(136-145)  H


 


Potassium Level


  4.5 MMOL/L


(3.5-5.1)


 


Chloride Level


  104 MMOL/L


()


 


Carbon Dioxide Level


  31 MMOL/L


(21-32)


 


Anion Gap


  12 mmol/L


(5-15)


 


Blood Urea Nitrogen


  48 mg/dL


(7-18)  H


 


Creatinine


  1.2 MG/DL


(0.55-1.30)


 


Estimat Glomerular Filtration


Rate 54.9 mL/min


(>60)


 


Glucose Level


  128 MG/DL


()  H


 


Calcium Level


  10.3 MG/DL


(8.5-10.1)  H


 


Phosphorus Level


  4.9 MG/DL


(2.5-4.9)


 


Magnesium Level


  2.1 MG/DL


(1.8-2.4)


 


Total Bilirubin


  0.5 MG/DL


(0.2-1.0)


 


Aspartate Amino Transf


(AST/SGOT) 119 U/L


(15-37)  H


 


Alanine Aminotransferase


(ALT/SGPT) 37 U/L (12-78)


 


 


Alkaline Phosphatase


  156 U/L


()  H


 


Total Protein


  9.7 G/DL


(6.4-8.2)  H


 


Albumin


  1.8 G/DL


(3.4-5.0)  L


 


Globulin 7.6 g/dL  








Objective





HEAD AND NECK:  No JVD. BIPAP on


LUNGS:  Coarse rhonchi.


CARDIOVASCULAR:  Tachy S1 and S2


ABDOMEN:  Tender with a pump under skin.


EXTREMITIES:  No pitting edema.











Delon Chawla MD May 3, 2018 16:36

## 2018-05-03 NOTE — GI PROGRESS NOTE
Assessment/Plan


Problems:  


(1) Crohn's disease


ICD Codes:  K50.90 - Crohn's disease


SNOMED:  48819612


Qualifiers:  


   Qualified Codes:  K50.919 - Crohn's disease, unspecified, with unspecified 

complications


(2) Opioid dependence


ICD Codes:  F11.20 - Opioid dependence


SNOMED:  05650488


(3) SBO (small bowel obstruction)


ICD Codes:  K56.609 - Unspecified intestinal obstruction, unspecified as to 

partial versus complete obstruction


SNOMED:  388064488


(4) Chronic abdominal pain


Status:  unchanged


Status Narrative


Discussed with Dr. Mcleod.


Assessment/Plan


CT AP reviewed >>


Evidence of disseminated malignancy, with large left pulmonary hilar mass or 

adenopathy, extensive left hilar and mediastinal adenopathy, multiple pleural-

based masses on the left, multiple masses within the liver, and retroperitoneal 

lymphadenopathy.





Recommendations


now DNI/DNR


fu oncology recs


pain mgmt


poor prognosis





Subjective


Subjective


limited





Objective





Last 24 Hour Vital Signs








  Date Time  Temp Pulse Resp B/P (MAP) Pulse Ox O2 Delivery O2 Flow Rate FiO2


 


5/3/18 12:00        40


 


5/3/18 12:00  132 18 134/84 95 Bi-pap  40


 


5/3/18 12:00  135      


 


5/3/18 11:42  131 20  98 Facial  40


 


5/3/18 11:42  129 17  99 Bi-pap  50


 


5/3/18 11:00  135 18 120/85 95 Bi-pap  40


 


5/3/18 10:00  137 21 121/91 98 Bi-pap  40


 


5/3/18 09:07  138 23  99 Facial  40


 


5/3/18 09:00  136 18 128/87 98 Bi-pap  40


 


5/3/18 08:00  127      


 


5/3/18 08:00 98.2 107 18 96/57 100 Bi-pap  40





 98.2       


 


5/3/18 07:00  131 18 121/80 98 Bi-pap  40


 


5/3/18 06:44  139 22  99 Facial  40


 


5/3/18 06:44  139 22  98 Bi-pap  40


 


5/3/18 06:44      Bi-pap  


 


5/3/18 06:00  133 18 134/79 98 Bi-pap  40


 


5/3/18 05:00  135 18 121/84 98 Bi-pap  40


 


5/3/18 05:00        40


 


5/3/18 04:52  131 15  96 Facial  40


 


5/3/18 04:00        50


 


5/3/18 04:00 98.0 133 17 119/77 98 Bi-pap  40





 98.0       


 


5/3/18 04:00  135      


 


5/3/18 03:03      Bi-pap  


 


5/3/18 03:03  139 39  99 Bi-pap  40


 


5/3/18 03:02  139 33  99 Facial  40


 


5/3/18 03:00  133 15 119/77 98 Bi-pap  50


 


5/3/18 02:00  133 15 116/71 100 Bi-pap  50


 


5/3/18 01:00 97.9 135 15 115/80 99 Bi-pap  50





 97.9       


 


5/3/18 01:00        50


 


5/3/18 00:49  133 16  100 Facial  50


 


5/3/18 00:00  134      


 


5/3/18 00:00 97.9 137 17 111/77 99 Bi-pap  60





 97.9       


 


5/3/18 00:00        60


 


5/2/18 23:00  132 15 111/77 99 Bi-pap  60


 


5/2/18 23:00        60


 


5/2/18 22:45      Bi-pap  


 


5/2/18 22:44  134 18  99   


 


5/2/18 22:43  134 17  99 Facial  60


 


5/2/18 22:00  129 15 121/77 100 Bi-pap  70


 


5/2/18 21:00  133 15 117/76 97 Bi-pap  70


 


5/2/18 20:35  133 32  97 Facial  70


 


5/2/18 20:00        70


 


5/2/18 20:00 98.7 133 17 126/80 98 Bi-pap  70





 98.7       


 


5/2/18 20:00  136      


 


5/2/18 19:05      Bi-pap  


 


5/2/18 19:04  131      


 


5/2/18 19:03  131 17  94 Facial  70


 


5/2/18 19:00  134 17 116/76 100 Bi-pap  70


 


5/2/18 18:00  135 17 140/87 100 Bi-pap  70


 


5/2/18 17:00  135 17 133/76 100 Bi-pap  70


 


5/2/18 16:58  128 30  98 Facial  70


 


5/2/18 16:00  136      


 


5/2/18 16:00 98.2 136 13 125/91 94 Bi-pap  70





 98.2       


 


5/2/18 16:00        50


 


5/2/18 15:12  127 22  100 Bi-pap  50


 


5/2/18 15:10  123 20  100 Facial  50


 


5/2/18 15:00  123 21 126/84 100 Bi-pap  50


 


5/2/18 15:00  122 14  93 Bi-pap  50


 


5/2/18 14:00  129 21 126/80 100 Bi-pap  50


 


5/2/18 13:00  126 25  98 Facial  50


 


5/2/18 13:00  126 25 136/90 100 Bi-pap  50

















Intake and Output  


 


 5/2/18 5/3/18





 19:00 07:00


 


  


 


# Voids 3 4











Laboratory Tests








Test


  5/3/18


04:15


 


White Blood Count


  12.8 K/UL


(4.8-10.8)  H


 


Red Blood Count


  4.91 M/UL


(4.20-5.40)


 


Hemoglobin


  14.5 G/DL


(12.0-16.0)


 


Hematocrit


  43.6 %


(37.0-47.0)


 


Mean Corpuscular Volume 89 FL (80-99)  


 


Mean Corpuscular Hemoglobin


  29.5 PG


(27.0-31.0)


 


Mean Corpuscular Hemoglobin


Concent 33.3 G/DL


(32.0-36.0)


 


Red Cell Distribution Width


  14.7 %


(11.6-14.8)


 


Platelet Count


  170 K/UL


(150-450)


 


Mean Platelet Volume


  7.6 FL


(6.5-10.1)


 


Neutrophils (%) (Auto)


  77.8 %


(45.0-75.0)  H


 


Lymphocytes (%) (Auto)


  16.8 %


(20.0-45.0)  L


 


Monocytes (%) (Auto)


  4.0 %


(1.0-10.0)


 


Eosinophils (%) (Auto)


  0.7 %


(0.0-3.0)


 


Basophils (%) (Auto)


  0.7 %


(0.0-2.0)


 


Sodium Level


  147 MMOL/L


(136-145)  H


 


Potassium Level


  4.5 MMOL/L


(3.5-5.1)


 


Chloride Level


  104 MMOL/L


()


 


Carbon Dioxide Level


  31 MMOL/L


(21-32)


 


Anion Gap


  12 mmol/L


(5-15)


 


Blood Urea Nitrogen


  48 mg/dL


(7-18)  H


 


Creatinine


  1.2 MG/DL


(0.55-1.30)


 


Estimat Glomerular Filtration


Rate 54.9 mL/min


(>60)


 


Glucose Level


  128 MG/DL


()  H


 


Calcium Level


  10.3 MG/DL


(8.5-10.1)  H


 


Phosphorus Level


  4.9 MG/DL


(2.5-4.9)


 


Magnesium Level


  2.1 MG/DL


(1.8-2.4)


 


Total Bilirubin


  0.5 MG/DL


(0.2-1.0)


 


Aspartate Amino Transf


(AST/SGOT) 119 U/L


(15-37)  H


 


Alanine Aminotransferase


(ALT/SGPT) 37 U/L (12-78)


 


 


Alkaline Phosphatase


  156 U/L


()  H


 


Total Protein


  9.7 G/DL


(6.4-8.2)  H


 


Albumin


  1.8 G/DL


(3.4-5.0)  L


 


Globulin 7.6 g/dL  








Height (Feet):  5


Height (Inches):  6.00


Weight (Pounds):  129


General Appearance:  alert


Cardiovascular:  normal rate


Respiratory/Chest:  other - bipap











Elena Seay N.P. May 3, 2018 12:33

## 2018-05-03 NOTE — GENERAL PROGRESS NOTE
Assessment/Plan


Assessment/Plan


IMPRESSION/RECS:


#. Stage IV malignancy, potentially lung cancer given pattern of spread, proven 

malignancy on pleural fluid. Has a left lower lobe lung mass. Left inferior 

hilar mass. Left pleural base lung mass. Left pleural effusion. Retroperitoneal 

lymphadenopathy. Multiple low-attenuation liver lesions.


--> Discussed with son, RN, Primary MD, given poor performance status recommend 

palliative/hospice care


--> Do not recommend any further diagnosis/tissue given poor state, is not a 

chemotherapy candidate


#. Anemia due to underlying malignancy - continue to closely monitor


--> hgb goal is >7. Transfuse if needed. 


#. Crohn disease.


#. Opioid dependence.


#. Chronic obstructive pulmonary disease.


#. History of smoking.


#. Emphysema.


#. Perianal abscess.


#. Intractable abdominal pain.


#. Status post morphine pump placement.


#. Status post exploratory laparotomy.


#. Pneumoperitoneum.


#. Tachycardia.





Subjective


Date patient seen:  May 2, 2018


Constitutional:  Denies: no symptoms, chills, diaphoresis, fever, malaise, 

weakness, other


HEENT:  Denies: no symptoms, eye pain, blurred vision, tearing, double vision, 

ear pain, ear discharge, nose pain, nose congestion, throat pain, throat 

swelling, mouth pain, mouth swelling, other


Cardiovascular:  Denies: no symptoms, chest pain, edema, irregular heart rate, 

lightheadedness, palpitations, syncope, other


Respiratory:  Denies: no symptoms, cough, orthopnea, shortness of breath, SOB 

with excertion, SOB at rest, sputum, stridor, wheezing, other


Gastrointestinal/Abdominal:  Denies: no symptoms, abdomen distended, abdominal 

pain, black stools, tarry stools, blood in stool, constipated, diarrhea, 

difficulty swallowing, nausea, poor appetite, poor fluid intake, rectal bleeding

, vomiting, other


Genitourinary:  Denies: no symptoms, burning, discharge, frequency, flank pain, 

hematuria, incontinence, pain, urgency, other


Neurologic/Psychiatric:  Denies: no symptoms, anxiety, depressed, emotional 

problems, headache, numbness, paresthesia, pre-existing deficit, seizure, 

tingling, tremors, weakness, other


Allergies:  


Coded Allergies:  


     No Known Allergies (Verified , 10/27/06)


Subjective


Condition critical. In ICU. No new events.





Objective





Last 24 Hour Vital Signs








  Date Time  Temp Pulse Resp B/P (MAP) Pulse Ox O2 Delivery O2 Flow Rate FiO2


 


5/3/18 23:20  136 22  97 Facial  40


 


5/3/18 23:13    140/87    


 


5/3/18 23:05  142 26  98 Bi-pap  40


 


5/3/18 23:05      Bi-pap  40


 


5/3/18 21:04  138 25  96 Facial  40


 


5/3/18 20:00        40


 


5/3/18 20:00 98.6 133 17 140/87 97 Bi-pap  40





 98.6       


 


5/3/18 19:04  138 20  97 Bi-pap  40


 


5/3/18 19:04      Bi-pap  40


 


5/3/18 19:03  133 20  97 Facial  40


 


5/3/18 16:52  132 18  100 Facial  40


 


5/3/18 16:00 97.5 139 20 133/86 100 Bi-pap  40





 97.5       


 


5/3/18 16:00        40


 


5/3/18 15:54  131 22  100 Bi-pap  40


 


5/3/18 15:47  131 18  100 Facial  40


 


5/3/18 15:47  131 18  100 Bi-pap  40


 


5/3/18 15:26  133      


 


5/3/18 13:19  133 18  99 Facial  40


 


5/3/18 13:00 98.5 135 18 126/80 95 Bi-pap  40





 98.5       


 


5/3/18 12:00        40


 


5/3/18 12:00  132 18 134/84 95 Bi-pap  40


 


5/3/18 12:00  135      


 


5/3/18 11:52  133 24  100 Bi-pap  40


 


5/3/18 11:42  131 20  98 Facial  40


 


5/3/18 11:42  129 17  99 Bi-pap  50


 


5/3/18 11:00  135 18 120/85 95 Bi-pap  40


 


5/3/18 10:00  137 21 121/91 98 Bi-pap  40


 


5/3/18 09:07  138 23  99 Facial  40


 


5/3/18 09:00  136 18 128/87 98 Bi-pap  40


 


5/3/18 08:00  127      


 


5/3/18 08:00 98.2 107 18 96/57 100 Bi-pap  40





 98.2       


 


5/3/18 07:00  131 18 121/80 98 Bi-pap  40


 


5/3/18 06:44  139 22  99 Facial  40


 


5/3/18 06:44  139 22  98 Bi-pap  40


 


5/3/18 06:44      Bi-pap  


 


5/3/18 06:00  133 18 134/79 98 Bi-pap  40


 


5/3/18 05:00  135 18 121/84 98 Bi-pap  40


 


5/3/18 05:00        40


 


5/3/18 04:52  131 15  96 Facial  40


 


5/3/18 04:00        50


 


5/3/18 04:00 98.0 133 17 119/77 98 Bi-pap  40





 98.0       


 


5/3/18 04:00  135      


 


5/3/18 03:03      Bi-pap  


 


5/3/18 03:03  139 39  99 Bi-pap  40


 


5/3/18 03:02  139 33  99 Facial  40


 


5/3/18 03:00  133 15 119/77 98 Bi-pap  50


 


5/3/18 02:00  133 15 116/71 100 Bi-pap  50


 


5/3/18 01:00 97.9 135 15 115/80 99 Bi-pap  50





 97.9       


 


5/3/18 01:00        50


 


5/3/18 00:49  133 16  100 Facial  50


 


5/3/18 00:00  134      


 


5/3/18 00:00 97.9 137 17 111/77 99 Bi-pap  60





 97.9       


 


5/3/18 00:00        60

















Intake and Output  


 


 5/2/18 5/3/18





 19:00 07:00


 


  


 


# Voids 3 4








Laboratory Tests


5/3/18 04:15: 


White Blood Count 12.8H, Red Blood Count 4.91, Hemoglobin 14.5, Hematocrit 43.6

, Mean Corpuscular Volume 89, Mean Corpuscular Hemoglobin 29.5, Mean 

Corpuscular Hemoglobin Concent 33.3, Red Cell Distribution Width 14.7, Platelet 

Count 170, Mean Platelet Volume 7.6, Neutrophils (%) (Auto) 77.8H, Lymphocytes (

%) (Auto) 16.8L, Monocytes (%) (Auto) 4.0, Eosinophils (%) (Auto) 0.7, 

Basophils (%) (Auto) 0.7, Sodium Level 147H, Potassium Level 4.5, Chloride 

Level 104, Carbon Dioxide Level 31, Anion Gap 12, Blood Urea Nitrogen 48H, 

Creatinine 1.2, Estimat Glomerular Filtration Rate 54.9, Glucose Level 128H, 

Calcium Level 10.3H, Phosphorus Level 4.9, Magnesium Level 2.1, Total Bilirubin 

0.5, Aspartate Amino Transf (AST/SGOT) 119H, Alanine Aminotransferase (ALT/SGPT

) 37, Alkaline Phosphatase 156H, Total Protein 9.7H, Albumin 1.8L, Globulin 7.6


Height (Feet):  5


Height (Inches):  6.00


Weight (Pounds):  129


General Appearance:  lethargic


Cardiovascular:  tachycardia


Respiratory/Chest:  decreased breath sounds, crackles/rales











Erick Russell MD May 3, 2018 23:27

## 2018-05-03 NOTE — DIAGNOSTIC IMAGING REPORT
Indication: Dyspnea

 

Comparison:  5/2/2018

 

A single view chest radiograph was obtained.

 

Findings:

 

Interstitial edema appears slightly improved although there is mild to moderate

residual currently. PICC line is stable. Heart size is normal.

 

IMPRESSION:

 

Parenchymal opacities, primarily interstitial laterally demonstrated throughout the

lungs with some interval improvement over the last 24 hours

## 2018-05-03 NOTE — GENERAL PROGRESS NOTE
Assessment/Plan


Assessment/Plan


(1) Chronic abdominal pain


(2) Chronic pancreatitis


(3) Crohn's disease 


(4) Herniated nucleus pulposus, lumbar


(5) Lumbar spondylosis


(6) Radiculopathy of lumbar region


(7) Lung mass


Pt will be continued on Neurontin, Dilaudid and Norco and pt has intrathecal 

pump.


HOLD OPIOIDS FOR OVERSEDATION OR SBP<90 OR DBP<60 OR O2SAT<92% OR RR<12


Pt was d/w Dr. Shetty and he concurred.





Subjective


Date patient seen:  May 3, 2018


Time patient seen:  08:45 - am


ROS Limited/Unobtainable:  Yes


Allergies:  


Coded Allergies:  


     No Known Allergies (Verified , 10/27/06)


Subjective


The patient is in bed no signs of pain at this time slightly confused follows 

minimal commands. 


Bipap applied however son and family will be making the patient DNR/DNI.





Objective





Last 24 Hour Vital Signs








  Date Time  Temp Pulse Resp B/P (MAP) Pulse Ox O2 Delivery O2 Flow Rate FiO2


 


5/3/18 09:07  138 23  99 Facial  40


 


5/3/18 08:00  127      


 


5/3/18 08:00 98.2 107 18 96/57 100 Bi-pap  40





 98.2       


 


5/3/18 07:00  131 18 121/80 98 Bi-pap  40


 


5/3/18 06:44  139 22  99 Facial  40


 


5/3/18 06:44  139 22  98 Bi-pap  40


 


5/3/18 06:44      Bi-pap  


 


5/3/18 06:00  133 18 134/79 98 Bi-pap  40


 


5/3/18 05:00  135 18 121/84 98 Bi-pap  40


 


5/3/18 05:00        40


 


5/3/18 04:52  131 15  96 Facial  40


 


5/3/18 04:00        50


 


5/3/18 04:00 98.0 133 17 119/77 98 Bi-pap  40





 98.0       


 


5/3/18 04:00  135      


 


5/3/18 03:03      Bi-pap  


 


5/3/18 03:03  139 39  99 Bi-pap  40


 


5/3/18 03:02  139 33  99 Facial  40


 


5/3/18 03:00  133 15 119/77 98 Bi-pap  50


 


5/3/18 02:00  133 15 116/71 100 Bi-pap  50


 


5/3/18 01:00 97.9 135 15 115/80 99 Bi-pap  50





 97.9       


 


5/3/18 01:00        50


 


5/3/18 00:49  133 16  100 Facial  50


 


5/3/18 00:00  134      


 


5/3/18 00:00 97.9 137 17 111/77 99 Bi-pap  60





 97.9       


 


5/3/18 00:00        60


 


5/2/18 23:00  132 15 111/77 99 Bi-pap  60


 


5/2/18 23:00        60


 


5/2/18 22:45      Bi-pap  


 


5/2/18 22:44  134 18  99   


 


5/2/18 22:43  134 17  99 Facial  60


 


5/2/18 22:00  129 15 121/77 100 Bi-pap  70


 


5/2/18 21:00  133 15 117/76 97 Bi-pap  70


 


5/2/18 20:35  133 32  97 Facial  70


 


5/2/18 20:00        70


 


5/2/18 20:00 98.7 133 17 126/80 98 Bi-pap  70





 98.7       


 


5/2/18 20:00  136      


 


5/2/18 19:05      Bi-pap  


 


5/2/18 19:04  131      


 


5/2/18 19:03  131 17  94 Facial  70


 


5/2/18 19:00  134 17 116/76 100 Bi-pap  70


 


5/2/18 18:00  135 17 140/87 100 Bi-pap  70


 


5/2/18 17:00  135 17 133/76 100 Bi-pap  70


 


5/2/18 16:58  128 30  98 Facial  70


 


5/2/18 16:00  136      


 


5/2/18 16:00 98.2 136 13 125/91 94 Bi-pap  70





 98.2       


 


5/2/18 16:00        50


 


5/2/18 15:12  127 22  100 Bi-pap  50


 


5/2/18 15:10  123 20  100 Facial  50


 


5/2/18 15:00  123 21 126/84 100 Bi-pap  50


 


5/2/18 15:00  122 14  93 Bi-pap  50


 


5/2/18 14:00  129 21 126/80 100 Bi-pap  50


 


5/2/18 13:00  126 25  98 Facial  50


 


5/2/18 13:00  126 25 136/90 100 Bi-pap  50


 


5/2/18 12:00        50


 


5/2/18 12:00  129      


 


5/2/18 12:00 98.6 132 13 135/86 100 Bi-pap  70





 98.6       


 


5/2/18 11:09  130 20  98 Facial  50


 


5/2/18 11:09      Bi-pap  


 


5/2/18 11:09      Bi-pap  


 


5/2/18 11:00  132 13 127/85 100 Bi-pap  50


 


5/2/18 10:00  132 17 123/84 100 Bi-pap  50

















Intake and Output  


 


 5/2/18 5/3/18





 19:00 07:00


 


  


 


# Voids 3 4








Laboratory Tests


5/2/18 11:15: 


Arterial Blood pH 7.416, Arterial Blood Partial Pressure CO2 48.5H, Arterial 

Blood Partial Pressure O2 76.4, Arterial Blood HCO3 30.5H, Arterial Blood 

Oxygen Saturation 93.3, Arterial Blood Base Excess 5.0, Jonn Test Positive


5/3/18 04:15: 


White Blood Count 12.8H, Red Blood Count 4.91, Hemoglobin 14.5, Hematocrit 43.6

, Mean Corpuscular Volume 89, Mean Corpuscular Hemoglobin 29.5, Mean 

Corpuscular Hemoglobin Concent 33.3, Red Cell Distribution Width 14.7, Platelet 

Count 170, Mean Platelet Volume 7.6, Neutrophils (%) (Auto) 77.8H, Lymphocytes (

%) (Auto) 16.8L, Monocytes (%) (Auto) 4.0, Eosinophils (%) (Auto) 0.7, 

Basophils (%) (Auto) 0.7, Sodium Level 147H, Potassium Level 4.5, Chloride 

Level 104, Carbon Dioxide Level 31, Anion Gap 12, Blood Urea Nitrogen 48H, 

Creatinine 1.2, Estimat Glomerular Filtration Rate 54.9, Glucose Level 128H, 

Calcium Level 10.3H, Phosphorus Level 4.9, Magnesium Level 2.1, Total Bilirubin 

0.5, Aspartate Amino Transf (AST/SGOT) 119H, Alanine Aminotransferase (ALT/SGPT

) 37, Alkaline Phosphatase 156H, Total Protein 9.7H, Albumin 1.8L, Globulin 7.6


Height (Feet):  5


Height (Inches):  6.00


Weight (Pounds):  129


Objective


General Appearance:  on Bipap


Neck:  normal alignment, supple


Cardiovascular:  tachycardic


Respiratory/Chest:  decreased breath sounds


Abdomen:  tender, distended, intrathecal pump palpated


Extremities:  non-tender


Edema:  no edema noted











LEA BRAVO May 3, 2018 09:39

## 2018-05-03 NOTE — PULMONOLGY CRITICAL CARE NOTE
Critical Care - Asmt/Plan


Problems:  


(1) Acute respiratory failure


(2) Malignant pleural effusion


(3) Metastatic cancer


(4) Lung mass


(5) COPD (chronic obstructive pulmonary disease)


Respiratory:  monitor respiratory rate, adjust FIO2, CXR


Cardiac:  continue to monitor HR/BP


Renal:  F/U I&O, keep IV fluid, check electrolytes


Infectious Disease:  check cultures


Gastrointestinal:  hold feedings


Endocrine:  monitor blood sugar


Hematologic:  monitor H/H


Neurologic:  PRN Ativan, PRN Morphine


Affect:  PRN ativan


Prophylaxis:  Protonix, Heparin


Disposition:  keep in ICU


Time Spent (Minutes):  40


Notes Reviewed:  internist, renal, ID, GI


Discussed with:  nurses, consultants, , other - awaiting for the 

family members to come and see her before we remove the BIPAP





Critical Care - Objective





Last 24 Hour Vital Signs








  Date Time  Temp Pulse Resp B/P (MAP) Pulse Ox O2 Delivery O2 Flow Rate FiO2


 


5/3/18 09:07  138 23  99 Facial  40


 


5/3/18 08:00  127      


 


5/3/18 08:00 98.2 107 18 96/57 100 Bi-pap  40





 98.2       


 


5/3/18 07:00  131 18 121/80 98 Bi-pap  40


 


5/3/18 06:44  139 22  99 Facial  40


 


5/3/18 06:44  139 22  98 Bi-pap  40


 


5/3/18 06:44      Bi-pap  


 


5/3/18 06:00  133 18 134/79 98 Bi-pap  40


 


5/3/18 05:00  135 18 121/84 98 Bi-pap  40


 


5/3/18 05:00        40


 


5/3/18 04:52  131 15  96 Facial  40


 


5/3/18 04:00        50


 


5/3/18 04:00 98.0 133 17 119/77 98 Bi-pap  40





 98.0       


 


5/3/18 04:00  135      


 


5/3/18 03:03      Bi-pap  


 


5/3/18 03:03  139 39  99 Bi-pap  40


 


5/3/18 03:02  139 33  99 Facial  40


 


5/3/18 03:00  133 15 119/77 98 Bi-pap  50


 


5/3/18 02:00  133 15 116/71 100 Bi-pap  50


 


5/3/18 01:00 97.9 135 15 115/80 99 Bi-pap  50





 97.9       


 


5/3/18 01:00        50


 


5/3/18 00:49  133 16  100 Facial  50


 


5/3/18 00:00  134      


 


5/3/18 00:00 97.9 137 17 111/77 99 Bi-pap  60





 97.9       


 


5/3/18 00:00        60


 


5/2/18 23:00  132 15 111/77 99 Bi-pap  60


 


5/2/18 23:00        60


 


5/2/18 22:45      Bi-pap  


 


5/2/18 22:44  134 18  99   


 


5/2/18 22:43  134 17  99 Facial  60


 


5/2/18 22:00  129 15 121/77 100 Bi-pap  70


 


5/2/18 21:00  133 15 117/76 97 Bi-pap  70


 


5/2/18 20:35  133 32  97 Facial  70


 


5/2/18 20:00        70


 


5/2/18 20:00 98.7 133 17 126/80 98 Bi-pap  70





 98.7       


 


5/2/18 20:00  136      


 


5/2/18 19:05      Bi-pap  


 


5/2/18 19:04  131      


 


5/2/18 19:03  131 17  94 Facial  70


 


5/2/18 19:00  134 17 116/76 100 Bi-pap  70


 


5/2/18 18:00  135 17 140/87 100 Bi-pap  70


 


5/2/18 17:00  135 17 133/76 100 Bi-pap  70


 


5/2/18 16:58  128 30  98 Facial  70


 


5/2/18 16:00  136      


 


5/2/18 16:00 98.2 136 13 125/91 94 Bi-pap  70





 98.2       


 


5/2/18 16:00        50


 


5/2/18 15:12  127 22  100 Bi-pap  50


 


5/2/18 15:10  123 20  100 Facial  50


 


5/2/18 15:00  123 21 126/84 100 Bi-pap  50


 


5/2/18 15:00  122 14  93 Bi-pap  50


 


5/2/18 14:00  129 21 126/80 100 Bi-pap  50


 


5/2/18 13:00  126 25  98 Facial  50


 


5/2/18 13:00  126 25 136/90 100 Bi-pap  50


 


5/2/18 12:00        50


 


5/2/18 12:00  129      


 


5/2/18 12:00 98.6 132 13 135/86 100 Bi-pap  70





 98.6       


 


5/2/18 11:09  130 20  98 Facial  50


 


5/2/18 11:09      Bi-pap  


 


5/2/18 11:09      Bi-pap  


 


5/2/18 11:00  132 13 127/85 100 Bi-pap  50








Status:  awake


Condition:  critical, grave


HEENT:  atraumatic


Neck:  full ROM


Lungs:  rales, rhonchi


Heart:  HR/BP stable


Abdomen:  soft, non-tender, feeding tube


Extremities:  edema


Decubiti:  location





Critical Care - Subjective


ROS Limited/Unobtainable:  Yes


ICU Day:  3


Intubation Day:  on BIPAP


Condition:  critical, grave


IV Access:  PICC


EKG Rhythm:  Sinus Tachycardia


FI02:  40


Vent Support Mode:  CPAP


Sputum Amount:  None


Fluids:  kvo


I&O:











Intake and Output  


 


 5/2/18 5/3/18





 19:00 07:00


 


  


 


# Voids 3 4








CXR:


bilateral infiltrate


Labs:





Laboratory Tests








Test


  5/2/18


11:15 5/3/18


04:15


 


Arterial Blood pH


  7.416


(7.350-7.450) 


 


 


Arterial Blood Partial


Pressure CO2 48.5 mmHg


(35.0-45.0)  H 


 


 


Arterial Blood Partial


Pressure O2 76.4 mmHg


(75.0-100.0) 


 


 


Arterial Blood HCO3


  30.5 mmol/L


(22.0-26.0)  H 


 


 


Arterial Blood Oxygen


Saturation 93.3 %


(92.0-98.0) 


 


 


Arterial Blood Base Excess 5.0   


 


Jonn Test Positive   


 


White Blood Count


  


  12.8 K/UL


(4.8-10.8)  H


 


Red Blood Count


  


  4.91 M/UL


(4.20-5.40)


 


Hemoglobin


  


  14.5 G/DL


(12.0-16.0)


 


Hematocrit


  


  43.6 %


(37.0-47.0)


 


Mean Corpuscular Volume  89 FL (80-99)  


 


Mean Corpuscular Hemoglobin


  


  29.5 PG


(27.0-31.0)


 


Mean Corpuscular Hemoglobin


Concent 


  33.3 G/DL


(32.0-36.0)


 


Red Cell Distribution Width


  


  14.7 %


(11.6-14.8)


 


Platelet Count


  


  170 K/UL


(150-450)


 


Mean Platelet Volume


  


  7.6 FL


(6.5-10.1)


 


Neutrophils (%) (Auto)


  


  77.8 %


(45.0-75.0)  H


 


Lymphocytes (%) (Auto)


  


  16.8 %


(20.0-45.0)  L


 


Monocytes (%) (Auto)


  


  4.0 %


(1.0-10.0)


 


Eosinophils (%) (Auto)


  


  0.7 %


(0.0-3.0)


 


Basophils (%) (Auto)


  


  0.7 %


(0.0-2.0)


 


Sodium Level


  


  147 MMOL/L


(136-145)  H


 


Potassium Level


  


  4.5 MMOL/L


(3.5-5.1)


 


Chloride Level


  


  104 MMOL/L


()


 


Carbon Dioxide Level


  


  31 MMOL/L


(21-32)


 


Anion Gap


  


  12 mmol/L


(5-15)


 


Blood Urea Nitrogen


  


  48 mg/dL


(7-18)  H


 


Creatinine


  


  1.2 MG/DL


(0.55-1.30)


 


Estimat Glomerular Filtration


Rate 


  54.9 mL/min


(>60)


 


Glucose Level


  


  128 MG/DL


()  H


 


Calcium Level


  


  10.3 MG/DL


(8.5-10.1)  H


 


Phosphorus Level


  


  4.9 MG/DL


(2.5-4.9)


 


Magnesium Level


  


  2.1 MG/DL


(1.8-2.4)


 


Total Bilirubin


  


  0.5 MG/DL


(0.2-1.0)


 


Aspartate Amino Transf


(AST/SGOT) 


  119 U/L


(15-37)  H


 


Alanine Aminotransferase


(ALT/SGPT) 


  37 U/L (12-78)


 


 


Alkaline Phosphatase


  


  156 U/L


()  H


 


Total Protein


  


  9.7 G/DL


(6.4-8.2)  H


 


Albumin


  


  1.8 G/DL


(3.4-5.0)  L


 


Globulin  7.6 g/dL  

















Blaze Fraga MD May 3, 2018 10:27

## 2018-05-03 NOTE — GENERAL PROGRESS NOTE
Assessment/Plan


Problem List:  


(1) Crohn's disease


ICD Codes:  K50.90 - Crohn's disease


SNOMED:  41183802


Qualifiers:  


   Qualified Codes:  K50.919 - Crohn's disease, unspecified, with unspecified 

complications


(2) Opioid dependence


ICD Codes:  F11.20 - Opioid dependence


SNOMED:  61732357


(3) Intractable abdominal pain


ICD Codes:  R10.9 - Unspecified abdominal pain


SNOMED:  04097039, 224571243


(4) COPD (chronic obstructive pulmonary disease)


ICD Codes:  J44.9 - Chronic obstructive pulmonary disease


SNOMED:  40830678


(5) Shortness of breath


(6) SOB (shortness of breath)


ICD Codes:  R06.02 - Shortness of breath


SNOMED:  268510072


(7) UTI (urinary tract infection)


ICD Codes:  N39.0 - Urinary tract infection, site not specified


SNOMED:  07323146


(8) Lung mass


ICD Codes:  R91.8 - Other nonspecific abnormal finding of lung field


SNOMED:  068678571


(9) Tachycardia


ICD Codes:  R00.0 - Tachycardia, unspecified


SNOMED:  8575042


Status:  unchanged


Assessment/Plan


ot pt diet pain control sx eval cbc bmp am heme f/u cardio eval, ltach eval prn





Subjective


Constitutional:  Reports: weakness


Allergies:  


Coded Allergies:  


     No Known Allergies (Verified , 10/27/06)


All Systems:  reviewed and negative except above


Subjective


bipap sleeping





Objective





Last 24 Hour Vital Signs








  Date Time  Temp Pulse Resp B/P (MAP) Pulse Ox O2 Delivery O2 Flow Rate FiO2


 


5/3/18 13:19  133 18  99 Facial  40


 


5/3/18 13:00 98.5 135 18 126/80 95 Bi-pap  40





 98.5       


 


5/3/18 12:00        40


 


5/3/18 12:00  132 18 134/84 95 Bi-pap  40


 


5/3/18 12:00  135      


 


5/3/18 11:52  133 24  100 Bi-pap  40


 


5/3/18 11:42  131 20  98 Facial  40


 


5/3/18 11:42  129 17  99 Bi-pap  50


 


5/3/18 11:00  135 18 120/85 95 Bi-pap  40


 


5/3/18 10:00  137 21 121/91 98 Bi-pap  40


 


5/3/18 09:07  138 23  99 Facial  40


 


5/3/18 09:00  136 18 128/87 98 Bi-pap  40


 


5/3/18 08:00  127      


 


5/3/18 08:00 98.2 107 18 96/57 100 Bi-pap  40





 98.2       


 


5/3/18 07:00  131 18 121/80 98 Bi-pap  40


 


5/3/18 06:44  139 22  99 Facial  40


 


5/3/18 06:44  139 22  98 Bi-pap  40


 


5/3/18 06:44      Bi-pap  


 


5/3/18 06:00  133 18 134/79 98 Bi-pap  40


 


5/3/18 05:00  135 18 121/84 98 Bi-pap  40


 


5/3/18 05:00        40


 


5/3/18 04:52  131 15  96 Facial  40


 


5/3/18 04:00        50


 


5/3/18 04:00 98.0 133 17 119/77 98 Bi-pap  40





 98.0       


 


5/3/18 04:00  135      


 


5/3/18 03:03      Bi-pap  


 


5/3/18 03:03  139 39  99 Bi-pap  40


 


5/3/18 03:02  139 33  99 Facial  40


 


5/3/18 03:00  133 15 119/77 98 Bi-pap  50


 


5/3/18 02:00  133 15 116/71 100 Bi-pap  50


 


5/3/18 01:00 97.9 135 15 115/80 99 Bi-pap  50





 97.9       


 


5/3/18 01:00        50


 


5/3/18 00:49  133 16  100 Facial  50


 


5/3/18 00:00  134      


 


5/3/18 00:00 97.9 137 17 111/77 99 Bi-pap  60





 97.9       


 


5/3/18 00:00        60


 


5/2/18 23:00  132 15 111/77 99 Bi-pap  60


 


5/2/18 23:00        60


 


5/2/18 22:45      Bi-pap  


 


5/2/18 22:44  134 18  99   


 


5/2/18 22:43  134 17  99 Facial  60


 


5/2/18 22:00  129 15 121/77 100 Bi-pap  70


 


5/2/18 21:00  133 15 117/76 97 Bi-pap  70


 


5/2/18 20:35  133 32  97 Facial  70


 


5/2/18 20:00        70


 


5/2/18 20:00 98.7 133 17 126/80 98 Bi-pap  70





 98.7       


 


5/2/18 20:00  136      


 


5/2/18 19:05      Bi-pap  


 


5/2/18 19:04  131      


 


5/2/18 19:03  131 17  94 Facial  70


 


5/2/18 19:00  134 17 116/76 100 Bi-pap  70


 


5/2/18 18:00  135 17 140/87 100 Bi-pap  70


 


5/2/18 17:00  135 17 133/76 100 Bi-pap  70


 


5/2/18 16:58  128 30  98 Facial  70


 


5/2/18 16:00  136      


 


5/2/18 16:00 98.2 136 13 125/91 94 Bi-pap  70





 98.2       


 


5/2/18 16:00        50


 


5/2/18 15:12  127 22  100 Bi-pap  50


 


5/2/18 15:10  123 20  100 Facial  50


 


5/2/18 15:00  123 21 126/84 100 Bi-pap  50


 


5/2/18 15:00  122 14  93 Bi-pap  50

















Intake and Output  


 


 5/2/18 5/3/18





 19:00 07:00


 


  


 


# Voids 3 4








Laboratory Tests


5/3/18 04:15: 


White Blood Count 12.8H, Red Blood Count 4.91, Hemoglobin 14.5, Hematocrit 43.6

, Mean Corpuscular Volume 89, Mean Corpuscular Hemoglobin 29.5, Mean 

Corpuscular Hemoglobin Concent 33.3, Red Cell Distribution Width 14.7, Platelet 

Count 170, Mean Platelet Volume 7.6, Neutrophils (%) (Auto) 77.8H, Lymphocytes (

%) (Auto) 16.8L, Monocytes (%) (Auto) 4.0, Eosinophils (%) (Auto) 0.7, 

Basophils (%) (Auto) 0.7, Sodium Level 147H, Potassium Level 4.5, Chloride 

Level 104, Carbon Dioxide Level 31, Anion Gap 12, Blood Urea Nitrogen 48H, 

Creatinine 1.2, Estimat Glomerular Filtration Rate 54.9, Glucose Level 128H, 

Calcium Level 10.3H, Phosphorus Level 4.9, Magnesium Level 2.1, Total Bilirubin 

0.5, Aspartate Amino Transf (AST/SGOT) 119H, Alanine Aminotransferase (ALT/SGPT

) 37, Alkaline Phosphatase 156H, Total Protein 9.7H, Albumin 1.8L, Globulin 7.6


Height (Feet):  5


Height (Inches):  6.00


Weight (Pounds):  129


General Appearance:  lethargic


EENT:  normal ENT inspection


Neck:  normal alignment


Cardiovascular:  normal peripheral pulses, normal rate, regular rhythm


Respiratory/Chest:  chest wall non-tender, decreased breath sounds


Abdomen:  normal bowel sounds, non tender, soft


Extremities:  normal inspection


Edema:  no edema noted Arm (L), no edema noted Arm (R), no edema noted Leg (L), 

no edema noted Leg (R), no edema noted Pedal (L), no edema noted Pedal (R), no 

edema noted Generalized


Neurologic:  motor weakness


Skin:  normal pigmentation, warm/dry











MAICOL LANG May 3, 2018 14:21

## 2018-05-03 NOTE — INFECTIOUS DISEASES PROG NOTE
Assessment/Plan


Assessment/Plan





ASSESSMENT:  The patient is a 62-year-old female with,





Lethargy, on Bipap- improvnig- ?oversedation


   -CXR 5/3: Parenchymal opacities, primarily interstitial laterally 

demonstrated throughout the lungs with some interval improvement over the last 

24 hours


  -CXR;Extensive mixed interstitial alveolar airspace disease bilaterally 

unchanged





 Low-grade fever. probable due to SBO  ,resolved


Leukocytosis, mild- recurrent; improving- ?2ry to possible malignancy


Probable small bowel obstruction


Recent history of polymicrobial gram-negative bacteremia including 

Stenotrophomonas maltophilia and Enterobacter.


History of Candida esophagitis.


History of C. difficile in the past.











Air anterior to the liver , ?  free intraperitoneal air ( Surg doubt 

perforation )


Lung and liver masses Ro Malignancy 


 CT:  Pleural-based mass at the left lung base / Moderate-sized left pleural 

effusion, left-sided pleural nodularity and retroperitoneal and paraspinal 

nodules/masses. 


         Left inferior hilar mass lesion partially visualized. New low-

attenuation liver lesion ( concerning for malignancy/metastatic disease )


Pl effusion SP ultrasound-guided thoracentesis,  960 cc  ( m/l 2/2 mets,  no 

evid of Empyema )


  -exudate fluid:  (n 6), pH 8, lguc 98, prot 4.6 (serum 8.4); cx stain: 


  GRAM STAIN  Final  


        GRAM STAIN RESULT           FEW WHITE BLOOD CELLS


                                    NO ORGANISMS SEEN


cx negative





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 





Crohn's disease.


History of bowel obstruction x2 with lysis of adhesions in 2005.


COPD.


History of chronic pain syndrome, on morphine pump.


CVA in 2005.


Seizure disorder.








PLAN:





cont to monitor off abx, unless febrile, worsening WBC.


  -4/25 SP Zosyn #5





 


Monitor CBC and BMP.


follow GI recommendations


hem, surg f/u


plan for comfort care once all family members arrived


on CHASITY- bipap management


poor px





Subjective


Allergies:  


Coded Allergies:  


     No Known Allergies (Verified , 10/27/06)


Subjective


afebrile 


leukocytosis stable


remains on Bipap


on CHASITY now





Objective


Vital Signs





Last 24 Hour Vital Signs








  Date Time  Temp Pulse Resp B/P (MAP) Pulse Ox O2 Delivery O2 Flow Rate FiO2


 


5/3/18 13:00 98.5 135 18 126/80 95 Bi-pap  40





 98.5       


 


5/3/18 12:00        40


 


5/3/18 12:00  132 18 134/84 95 Bi-pap  40


 


5/3/18 12:00  135      


 


5/3/18 11:42  131 20  98 Facial  40


 


5/3/18 11:42  129 17  99 Bi-pap  50


 


5/3/18 11:00  135 18 120/85 95 Bi-pap  40


 


5/3/18 10:00  137 21 121/91 98 Bi-pap  40


 


5/3/18 09:07  138 23  99 Facial  40


 


5/3/18 09:00  136 18 128/87 98 Bi-pap  40


 


5/3/18 08:00  127      


 


5/3/18 08:00 98.2 107 18 96/57 100 Bi-pap  40





 98.2       


 


5/3/18 07:00  131 18 121/80 98 Bi-pap  40


 


5/3/18 06:44  139 22  99 Facial  40


 


5/3/18 06:44  139 22  98 Bi-pap  40


 


5/3/18 06:44      Bi-pap  


 


5/3/18 06:00  133 18 134/79 98 Bi-pap  40


 


5/3/18 05:00  135 18 121/84 98 Bi-pap  40


 


5/3/18 05:00        40


 


5/3/18 04:52  131 15  96 Facial  40


 


5/3/18 04:00        50


 


5/3/18 04:00 98.0 133 17 119/77 98 Bi-pap  40





 98.0       


 


5/3/18 04:00  135      


 


5/3/18 03:03      Bi-pap  


 


5/3/18 03:03  139 39  99 Bi-pap  40


 


5/3/18 03:02  139 33  99 Facial  40


 


5/3/18 03:00  133 15 119/77 98 Bi-pap  50


 


5/3/18 02:00  133 15 116/71 100 Bi-pap  50


 


5/3/18 01:00 97.9 135 15 115/80 99 Bi-pap  50





 97.9       


 


5/3/18 01:00        50


 


5/3/18 00:49  133 16  100 Facial  50


 


5/3/18 00:00  134      


 


5/3/18 00:00 97.9 137 17 111/77 99 Bi-pap  60





 97.9       


 


5/3/18 00:00        60


 


5/2/18 23:00  132 15 111/77 99 Bi-pap  60


 


5/2/18 23:00        60


 


5/2/18 22:45      Bi-pap  


 


5/2/18 22:44  134 18  99   


 


5/2/18 22:43  134 17  99 Facial  60


 


5/2/18 22:00  129 15 121/77 100 Bi-pap  70


 


5/2/18 21:00  133 15 117/76 97 Bi-pap  70


 


5/2/18 20:35  133 32  97 Facial  70


 


5/2/18 20:00        70


 


5/2/18 20:00 98.7 133 17 126/80 98 Bi-pap  70





 98.7       


 


5/2/18 20:00  136      


 


5/2/18 19:05      Bi-pap  


 


5/2/18 19:04  131      


 


5/2/18 19:03  131 17  94 Facial  70


 


5/2/18 19:00  134 17 116/76 100 Bi-pap  70


 


5/2/18 18:00  135 17 140/87 100 Bi-pap  70


 


5/2/18 17:00  135 17 133/76 100 Bi-pap  70


 


5/2/18 16:58  128 30  98 Facial  70


 


5/2/18 16:00  136      


 


5/2/18 16:00 98.2 136 13 125/91 94 Bi-pap  70





 98.2       


 


5/2/18 16:00        50


 


5/2/18 15:12  127 22  100 Bi-pap  50


 


5/2/18 15:10  123 20  100 Facial  50


 


5/2/18 15:00  123 21 126/84 100 Bi-pap  50


 


5/2/18 15:00  122 14  93 Bi-pap  50


 


5/2/18 14:00  129 21 126/80 100 Bi-pap  50








Height (Feet):  5


Height (Inches):  6.00


Weight (Pounds):  129


Objective


HEENT:  anicteric


Respiratory/Chest:  normal breath sounds


Cardiovascular:  regular rhythm


Abdomen:  tender





Laboratory Tests








Test


  5/3/18


04:15


 


White Blood Count


  12.8 K/UL


(4.8-10.8)  H


 


Red Blood Count


  4.91 M/UL


(4.20-5.40)


 


Hemoglobin


  14.5 G/DL


(12.0-16.0)


 


Hematocrit


  43.6 %


(37.0-47.0)


 


Mean Corpuscular Volume 89 FL (80-99)  


 


Mean Corpuscular Hemoglobin


  29.5 PG


(27.0-31.0)


 


Mean Corpuscular Hemoglobin


Concent 33.3 G/DL


(32.0-36.0)


 


Red Cell Distribution Width


  14.7 %


(11.6-14.8)


 


Platelet Count


  170 K/UL


(150-450)


 


Mean Platelet Volume


  7.6 FL


(6.5-10.1)


 


Neutrophils (%) (Auto)


  77.8 %


(45.0-75.0)  H


 


Lymphocytes (%) (Auto)


  16.8 %


(20.0-45.0)  L


 


Monocytes (%) (Auto)


  4.0 %


(1.0-10.0)


 


Eosinophils (%) (Auto)


  0.7 %


(0.0-3.0)


 


Basophils (%) (Auto)


  0.7 %


(0.0-2.0)


 


Sodium Level


  147 MMOL/L


(136-145)  H


 


Potassium Level


  4.5 MMOL/L


(3.5-5.1)


 


Chloride Level


  104 MMOL/L


()


 


Carbon Dioxide Level


  31 MMOL/L


(21-32)


 


Anion Gap


  12 mmol/L


(5-15)


 


Blood Urea Nitrogen


  48 mg/dL


(7-18)  H


 


Creatinine


  1.2 MG/DL


(0.55-1.30)


 


Estimat Glomerular Filtration


Rate 54.9 mL/min


(>60)


 


Glucose Level


  128 MG/DL


()  H


 


Calcium Level


  10.3 MG/DL


(8.5-10.1)  H


 


Phosphorus Level


  4.9 MG/DL


(2.5-4.9)


 


Magnesium Level


  2.1 MG/DL


(1.8-2.4)


 


Total Bilirubin


  0.5 MG/DL


(0.2-1.0)


 


Aspartate Amino Transf


(AST/SGOT) 119 U/L


(15-37)  H


 


Alanine Aminotransferase


(ALT/SGPT) 37 U/L (12-78)


 


 


Alkaline Phosphatase


  156 U/L


()  H


 


Total Protein


  9.7 G/DL


(6.4-8.2)  H


 


Albumin


  1.8 G/DL


(3.4-5.0)  L


 


Globulin 7.6 g/dL  











Current Medications








 Medications


  (Trade)  Dose


 Ordered  Sig/Jed


 Route


 PRN Reason  Start Time


 Stop Time Status Last Admin


Dose Admin


 


 Acetaminophen


  (Tylenol)  650 mg  Q4H  PRN


 ORAL


 fever (temp>100.5F)  5/3/18 13:30


 5/19/18 13:29   


 


 


 Dextrose


  (Dextrose 50%)  50 ml  STAT  PRN


 IV


 Hypoglycemia BS<60mg/dL  5/3/18 13:30


 6/2/18 13:29   


 


 


 Dextrose/Sodium


 Chloride  1,000 ml @ 


 30 mls/hr  Q24H


 IV


   5/3/18 13:00


 6/2/18 12:59  5/3/18 12:34


 


 


 Heparin Sodium


  (Porcine)


  (Heparin 5000


 units/ml)  5,000 units  EVERY 12  HOURS


 SUBQ


   5/3/18 21:00


 5/19/18 20:59   


 


 


 Levalbuterol HCl


  (Xopenex)  0.625 mg  Q4HRT


 HHN


   5/3/18 15:00


 5/5/18 14:59   


 


 


 Levetiracetam


  (Keppra)  1,000 mg  Q8H


 ORAL


   5/3/18 16:00


 5/24/18 07:59   


 


 


 Levothyroxine


 Sodium


  (Synthroid)  75 mcg  ACBREAKFAST


 ORAL


   5/4/18 06:30


 5/20/18 06:29   


 


 


 Nitroglycerin


  (Ntg)  0.4 mg  Q5M X 3 DOSES PRN


 SL


 Prn Chest Pain  5/3/18 12:15


 5/19/18 10:59   


 


 


 Ondansetron HCl


  (Zofran)  4 mg  Q6H  PRN


 IVP


 Nausea & Vomiting  5/3/18 14:30


 5/19/18 20:29   


 

















Selene Garcia M.D. May 3, 2018 13:20

## 2018-05-04 VITALS — SYSTOLIC BLOOD PRESSURE: 133 MMHG | DIASTOLIC BLOOD PRESSURE: 65 MMHG

## 2018-05-04 VITALS — DIASTOLIC BLOOD PRESSURE: 77 MMHG | SYSTOLIC BLOOD PRESSURE: 121 MMHG

## 2018-05-04 VITALS — SYSTOLIC BLOOD PRESSURE: 125 MMHG | DIASTOLIC BLOOD PRESSURE: 84 MMHG

## 2018-05-04 VITALS — SYSTOLIC BLOOD PRESSURE: 124 MMHG | DIASTOLIC BLOOD PRESSURE: 78 MMHG

## 2018-05-04 VITALS — SYSTOLIC BLOOD PRESSURE: 136 MMHG | DIASTOLIC BLOOD PRESSURE: 66 MMHG

## 2018-05-04 VITALS — DIASTOLIC BLOOD PRESSURE: 80 MMHG | SYSTOLIC BLOOD PRESSURE: 124 MMHG

## 2018-05-04 LAB
ADD MANUAL DIFF: NO
ANION GAP SERPL CALC-SCNC: 11 MMOL/L (ref 5–15)
APPEARANCE UR: (no result)
APTT PPP: (no result) S
BASOPHILS NFR BLD AUTO: 1.1 % (ref 0–2)
BUN SERPL-MCNC: 68 MG/DL (ref 7–18)
CALCIUM SERPL-MCNC: 9.3 MG/DL (ref 8.5–10.1)
CHLORIDE SERPL-SCNC: 112 MMOL/L (ref 98–107)
CO2 SERPL-SCNC: 32 MMOL/L (ref 21–32)
CREAT SERPL-MCNC: 1.4 MG/DL (ref 0.55–1.3)
EOSINOPHIL NFR BLD AUTO: 0.6 % (ref 0–3)
ERYTHROCYTE [DISTWIDTH] IN BLOOD BY AUTOMATED COUNT: 15.1 % (ref 11.6–14.8)
GLUCOSE UR STRIP-MCNC: NEGATIVE MG/DL
HCT VFR BLD CALC: 41.8 % (ref 37–47)
HGB BLD-MCNC: 13.6 G/DL (ref 12–16)
KETONES UR QL STRIP: NEGATIVE
LEUKOCYTE ESTERASE UR QL STRIP: (no result)
LYMPHOCYTES NFR BLD AUTO: 17.4 % (ref 20–45)
MCV RBC AUTO: 90 FL (ref 80–99)
MONOCYTES NFR BLD AUTO: 4.7 % (ref 1–10)
NEUTROPHILS NFR BLD AUTO: 76.1 % (ref 45–75)
NITRITE UR QL STRIP: NEGATIVE
PH UR STRIP: 6 [PH] (ref 4.5–8)
PLATELET # BLD: 134 K/UL (ref 150–450)
POTASSIUM SERPL-SCNC: 3.1 MMOL/L (ref 3.5–5.1)
PROT UR QL STRIP: (no result)
RBC # BLD AUTO: 4.64 M/UL (ref 4.2–5.4)
SODIUM SERPL-SCNC: 155 MMOL/L (ref 136–145)
SP GR UR STRIP: 1.02 (ref 1–1.03)
UROBILINOGEN UR-MCNC: NORMAL MG/DL (ref 0–1)
WBC # BLD AUTO: 15.1 K/UL (ref 4.8–10.8)

## 2018-05-04 RX ADMIN — DEXTROSE MONOHYDRATE SCH MLS/HR: 50 INJECTION, SOLUTION INTRAVENOUS at 23:34

## 2018-05-04 RX ADMIN — LEVALBUTEROL HYDROCHLORIDE SCH MG: 1.25 SOLUTION, CONCENTRATE RESPIRATORY (INHALATION) at 08:45

## 2018-05-04 RX ADMIN — HEPARIN SODIUM SCH UNITS: 5000 INJECTION INTRAVENOUS; SUBCUTANEOUS at 10:18

## 2018-05-04 RX ADMIN — DEXTROSE MONOHYDRATE SCH MLS/HR: 50 INJECTION, SOLUTION INTRAVENOUS at 16:47

## 2018-05-04 RX ADMIN — LEVALBUTEROL HYDROCHLORIDE SCH MG: 1.25 SOLUTION, CONCENTRATE RESPIRATORY (INHALATION) at 19:40

## 2018-05-04 RX ADMIN — LEVETIRACETAM SCH MG: 100 SOLUTION ORAL at 21:22

## 2018-05-04 RX ADMIN — LEVALBUTEROL HYDROCHLORIDE SCH MG: 1.25 SOLUTION, CONCENTRATE RESPIRATORY (INHALATION) at 03:00

## 2018-05-04 RX ADMIN — LEVETIRACETAM SCH MG: 100 SOLUTION ORAL at 14:00

## 2018-05-04 RX ADMIN — LEVALBUTEROL HYDROCHLORIDE SCH MG: 1.25 SOLUTION, CONCENTRATE RESPIRATORY (INHALATION) at 10:57

## 2018-05-04 RX ADMIN — LEVETIRACETAM SCH MG: 100 SOLUTION ORAL at 10:18

## 2018-05-04 RX ADMIN — HEPARIN SODIUM SCH UNITS: 5000 INJECTION INTRAVENOUS; SUBCUTANEOUS at 21:10

## 2018-05-04 RX ADMIN — LEVALBUTEROL HYDROCHLORIDE SCH MG: 1.25 SOLUTION, CONCENTRATE RESPIRATORY (INHALATION) at 14:45

## 2018-05-04 RX ADMIN — DEXTROSE AND SODIUM CHLORIDE SCH MLS/HR: 5; .45 INJECTION, SOLUTION INTRAVENOUS at 03:50

## 2018-05-04 RX ADMIN — LEVALBUTEROL HYDROCHLORIDE SCH MG: 1.25 SOLUTION, CONCENTRATE RESPIRATORY (INHALATION) at 23:00

## 2018-05-04 RX ADMIN — NITROGLYCERIN PRN MG: 0.4 TABLET SUBLINGUAL at 01:53

## 2018-05-04 RX ADMIN — LEVETIRACETAM SCH MG: 100 SOLUTION ORAL at 09:00

## 2018-05-04 NOTE — GENERAL PROGRESS NOTE
Assessment/Plan


Status:  stable, unchanged


Assessment/Plan


IMPRESSION/RECS:


#. Stage IV malignancy, potentially lung cancer given pattern of spread, proven 

malignancy on pleural fluid. Has a left lower lobe lung mass. Left inferior 

hilar mass. Left pleural base lung mass. Left pleural effusion. Retroperitoneal 

lymphadenopathy. Multiple low-attenuation liver lesions.


--> Discussed with tre RN, Primary MD, given poor performance status recommend 

palliative/hospice care


--> Do not recommend any further diagnosis/tissue given poor state, is not a 

chemotherapy candidate


#. Anemia due to underlying malignancy - continue to closely monitor


--> hgb goal is >7. Transfuse if needed. 


#. Crohn disease.


#. Opioid dependence.


#. Chronic obstructive pulmonary disease.


#. History of smoking.


#. Emphysema.


#. Perianal abscess.


#. Intractable abdominal pain.


#. Status post morphine pump placement.


#. Status post exploratory laparotomy.


#. Pneumoperitoneum.


#. Tachycardia.





Subjective


Date patient seen:  May 3, 2018


Constitutional:  Reports: no symptoms


HEENT:  Reports: no symptoms


Cardiovascular:  Reports: no symptoms


Gastrointestinal/Abdominal:  Reports: no symptoms


Neurologic/Psychiatric:  Reports: no symptoms


Allergies:  


Coded Allergies:  


     No Known Allergies (Verified , 10/27/06)


Subjective


Patient is awake and stable. In no acute medical distress





Objective





Last 24 Hour Vital Signs








  Date Time  Temp Pulse Resp B/P (MAP) Pulse Ox O2 Delivery O2 Flow Rate FiO2


 


5/4/18 16:00        55


 


5/4/18 16:00 99.6 131 23 124/78 96 Venturi Mask  55





 99.6       


 


5/4/18 16:00  136      


 


5/4/18 12:50  125 22  95   


 


5/4/18 12:00  136      


 


5/4/18 12:00        55


 


5/4/18 12:00 98.4 133 22 121/77 95 Venturi Mask  55





 98.4       


 


5/4/18 11:50  131 23  94   


 


5/4/18 10:50  136    Bi-pap  


 


5/4/18 10:50  136    Bi-pap  


 


5/4/18 10:50  136 26  98 Facial  40


 


5/4/18 08:47  131 22  100 Bi-pap  40


 


5/4/18 08:45  129 18  100 Bi-pap  40


 


5/4/18 08:43  130 25  100 Facial  40


 


5/4/18 08:00        40


 


5/4/18 08:00  126      


 


5/4/18 08:00 100.2 133 22 133/65 99 Bi-pap  40





 100.2       


 


5/4/18 07:21 99.6       


 


5/4/18 06:42  128 22  98 Facial  40


 


5/4/18 06:22 101.0       


 


5/4/18 05:12  140 24  100 Facial  40


 


5/4/18 04:00 101.1 138 19 124/80 96 Bi-pap  40





 101.1       


 


5/4/18 04:00        40


 


5/4/18 04:00  138      


 


5/4/18 03:05  139 18  100 Bi-pap  40


 


5/4/18 03:05      Bi-pap  40


 


5/4/18 03:04  138 18  100 Facial  40


 


5/4/18 01:53    125/84    


 


5/4/18 01:04  141 24  95 Facial  40


 


5/4/18 00:00 98.5 138 20 125/84 96 Bi-pap  40





 98.5       


 


5/4/18 00:00        40


 


5/4/18 00:00  139      


 


5/3/18 23:20  136 22  97 Facial  40


 


5/3/18 23:13    140/87    


 


5/3/18 23:05  142 26  98 Bi-pap  40


 


5/3/18 23:05      Bi-pap  40


 


5/3/18 21:04  138 25  96 Facial  40


 


5/3/18 20:00        40


 


5/3/18 20:00 98.6 133 17 140/87 97 Bi-pap  40





 98.6       


 


5/3/18 20:00  134      


 


5/3/18 19:04  138 20  97 Bi-pap  40


 


5/3/18 19:04      Bi-pap  40


 


5/3/18 19:03  133 20  97 Facial  40


 


5/3/18 16:52  132 18  100 Facial  40

















Intake and Output  


 


 5/3/18 5/4/18





 19:00 07:00


 


Intake Total 240 ml 710 ml


 


Balance 240 ml 710 ml


 


  


 


IV Total 240 ml 660 ml


 


Other  50 ml


 


# Voids 2 3








Laboratory Tests


5/4/18 05:00: 


White Blood Count 15.1H, Red Blood Count 4.64, Hemoglobin 13.6, Hematocrit 41.8

, Mean Corpuscular Volume 90, Mean Corpuscular Hemoglobin 29.2, Mean 

Corpuscular Hemoglobin Concent 32.5, Red Cell Distribution Width 15.1H, 

Platelet Count 134L, Mean Platelet Volume 7.7, Neutrophils (%) (Auto) 76.1H, 

Lymphocytes (%) (Auto) 17.4L, Monocytes (%) (Auto) 4.7, Eosinophils (%) (Auto) 

0.6, Basophils (%) (Auto) 1.1, Sodium Level 155H, Potassium Level 3.1L, 

Chloride Level 112H, Carbon Dioxide Level 32, Anion Gap 11, Blood Urea Nitrogen 

68H, Creatinine 1.4H, Estimat Glomerular Filtration Rate 45.8, Glucose Level 

137H, Calcium Level 9.3


Height (Feet):  5


Height (Inches):  6.00


Weight (Pounds):  129


General Appearance:  no apparent distress, alert


EENT:  PERRL/EOMI


Neck:  non-tender


Cardiovascular:  normal peripheral pulses


Respiratory/Chest:  lungs clear, stridor


Abdomen:  normal bowel sounds











Erick Russell MD May 4, 2018 16:44

## 2018-05-04 NOTE — INFECTIOUS DISEASES PROG NOTE
Assessment/Plan


Assessment/Plan





ASSESSMENT:  The patient is a 62-year-old female with,





Lethargy, on Bipap- improvnig- ?oversedation


   -CXR 5/3: Parenchymal opacities, primarily interstitial laterally 

demonstrated throughout the lungs with some interval improvement over the last 

24 hours


  -CXR;Extensive mixed interstitial alveolar airspace disease bilaterally 

unchanged





fever, recurrent- prorably due to malignancy-, possible concomitant PNA


Leukocytosis, mild- recurrent; improving- ?2ry to possible malignancy


Probable small bowel obstruction


Recent history of polymicrobial gram-negative bacteremia including 

Stenotrophomonas maltophilia and Enterobacter.


History of Candida esophagitis.


History of C. difficile in the past.











Air anterior to the liver , ?  free intraperitoneal air ( Surg doubt 

perforation )


Lung and liver masses Ro Malignancy 


 CT:  Pleural-based mass at the left lung base / Moderate-sized left pleural 

effusion, left-sided pleural nodularity and retroperitoneal and paraspinal 

nodules/masses. 


         Left inferior hilar mass lesion partially visualized. New low-

attenuation liver lesion ( concerning for malignancy/metastatic disease )


Pl effusion SP ultrasound-guided thoracentesis,  960 cc  ( m/l 2/2 mets,  no 

evid of Empyema )


  -exudate fluid:  (n 6), pH 8, lguc 98, prot 4.6 (serum 8.4); cx stain: 


  GRAM STAIN  Final  


        GRAM STAIN RESULT           FEW WHITE BLOOD CELLS


                                    NO ORGANISMS SEEN


cx negative





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 





Crohn's disease.


History of bowel obstruction x2 with lysis of adhesions in 2005.


COPD.


History of chronic pain syndrome, on morphine pump.


CVA in 2005.


Seizure disorder.








PLAN:





Restart empiric Zosyn for possible PNA in view of fevers and increased WBC


  -4/25 SP Zosyn #5





 -repeat cultuers


Monitor CBC and BMP.


follow GI recommendations


hem, surg f/u


discussions of goals of care ongoing


on CHASITY- bipap management


poor px





Subjective


Allergies:  


Coded Allergies:  


     No Known Allergies (Verified , 10/27/06)


Subjective


tm 101.1


WBC increased


on venturi mask


family unsure of comfort care measures at this point





Objective


Vital Signs





Last 24 Hour Vital Signs








  Date Time  Temp Pulse Resp B/P (MAP) Pulse Ox O2 Delivery O2 Flow Rate FiO2


 


5/4/18 12:50  125 22  95   


 


5/4/18 12:00  136      


 


5/4/18 12:00        55


 


5/4/18 12:00 98.4 133 22 121/77 95 Venturi Mask  55





 98.4       


 


5/4/18 11:50  131 23  94   


 


5/4/18 10:50  136    Bi-pap  


 


5/4/18 10:50  136    Bi-pap  


 


5/4/18 10:50  136 26  98 Facial  40


 


5/4/18 08:47  131 22  100 Bi-pap  40


 


5/4/18 08:45  129 18  100 Bi-pap  40


 


5/4/18 08:43  130 25  100 Facial  40


 


5/4/18 08:00        40


 


5/4/18 08:00  126      


 


5/4/18 08:00 100.2 133 22 133/65 99 Bi-pap  40





 100.2       


 


5/4/18 07:21 99.6       


 


5/4/18 06:42  128 22  98 Facial  40


 


5/4/18 06:22 101.0       


 


5/4/18 05:12  140 24  100 Facial  40


 


5/4/18 04:00 101.1 138 19 124/80 96 Bi-pap  40





 101.1       


 


5/4/18 04:00        40


 


5/4/18 04:00  138      


 


5/4/18 03:05  139 18  100 Bi-pap  40


 


5/4/18 03:05      Bi-pap  40


 


5/4/18 03:04  138 18  100 Facial  40


 


5/4/18 01:53    125/84    


 


5/4/18 01:04  141 24  95 Facial  40


 


5/4/18 00:00 98.5 138 20 125/84 96 Bi-pap  40





 98.5       


 


5/4/18 00:00        40


 


5/4/18 00:00  139      


 


5/3/18 23:20  136 22  97 Facial  40


 


5/3/18 23:13    140/87    


 


5/3/18 23:05  142 26  98 Bi-pap  40


 


5/3/18 23:05      Bi-pap  40


 


5/3/18 21:04  138 25  96 Facial  40


 


5/3/18 20:00        40


 


5/3/18 20:00 98.6 133 17 140/87 97 Bi-pap  40





 98.6       


 


5/3/18 20:00  134      


 


5/3/18 19:04  138 20  97 Bi-pap  40


 


5/3/18 19:04      Bi-pap  40


 


5/3/18 19:03  133 20  97 Facial  40


 


5/3/18 16:52  132 18  100 Facial  40


 


5/3/18 16:00 97.5 139 20 133/86 100 Bi-pap  40





 97.5       


 


5/3/18 16:00        40


 


5/3/18 15:54  131 22  100 Bi-pap  40


 


5/3/18 15:47  131 18  100 Facial  40


 


5/3/18 15:47  131 18  100 Bi-pap  40


 


5/3/18 15:26  133      








Height (Feet):  5


Height (Inches):  6.00


Weight (Pounds):  129


Objective


HEENT:  anicteric


Respiratory/Chest:  normal breath sounds


Cardiovascular:  regular rhythm


Abdomen:  tender





Laboratory Tests








Test


  5/4/18


05:00


 


White Blood Count


  15.1 K/UL


(4.8-10.8)  H


 


Red Blood Count


  4.64 M/UL


(4.20-5.40)


 


Hemoglobin


  13.6 G/DL


(12.0-16.0)


 


Hematocrit


  41.8 %


(37.0-47.0)


 


Mean Corpuscular Volume 90 FL (80-99)  


 


Mean Corpuscular Hemoglobin


  29.2 PG


(27.0-31.0)


 


Mean Corpuscular Hemoglobin


Concent 32.5 G/DL


(32.0-36.0)


 


Red Cell Distribution Width


  15.1 %


(11.6-14.8)  H


 


Platelet Count


  134 K/UL


(150-450)  L


 


Mean Platelet Volume


  7.7 FL


(6.5-10.1)


 


Neutrophils (%) (Auto)


  76.1 %


(45.0-75.0)  H


 


Lymphocytes (%) (Auto)


  17.4 %


(20.0-45.0)  L


 


Monocytes (%) (Auto)


  4.7 %


(1.0-10.0)


 


Eosinophils (%) (Auto)


  0.6 %


(0.0-3.0)


 


Basophils (%) (Auto)


  1.1 %


(0.0-2.0)


 


Sodium Level


  155 MMOL/L


(136-145)  H


 


Potassium Level


  3.1 MMOL/L


(3.5-5.1)  L


 


Chloride Level


  112 MMOL/L


()  H


 


Carbon Dioxide Level


  32 MMOL/L


(21-32)


 


Anion Gap


  11 mmol/L


(5-15)


 


Blood Urea Nitrogen


  68 mg/dL


(7-18)  H


 


Creatinine


  1.4 MG/DL


(0.55-1.30)  H


 


Estimat Glomerular Filtration


Rate 45.8 mL/min


(>60)


 


Glucose Level


  137 MG/DL


()  H


 


Calcium Level


  9.3 MG/DL


(8.5-10.1)











Current Medications








 Medications


  (Trade)  Dose


 Ordered  Sig/Jed


 Route


 PRN Reason  Start Time


 Stop Time Status Last Admin


Dose Admin


 


 Acetaminophen


  (Tylenol)  650 mg  Q4H  PRN


 ORAL


 fever (temp>100.5F)  5/3/18 13:30


 5/19/18 13:29  5/4/18 06:22


 


 


 Acetaminophen/


 Hydrocodone Bitart


  (Norco 10/325)  1 tab  Q4H  PRN


 ORAL


 Moderate Pain (Pain Scale 4-6)  5/4/18 09:30


 5/11/18 09:29   


 


 


 Dextrose


  (Dextrose 50%)  50 ml  STAT  PRN


 IV


 Hypoglycemia BS<60mg/dL  5/3/18 13:30


 6/2/18 13:29   


 


 


 Dextrose/Sodium


 Chloride  1,000 ml @ 


 60 mls/hr  [id number]


 IV


   5/4/18 13:00


 6/3/18 12:59  5/4/18 03:50


 


 


 Heparin Sodium


  (Porcine)


  (Heparin 5000


 units/ml)  5,000 units  EVERY 12  HOURS


 SUBQ


   5/3/18 21:00


 5/19/18 20:59  5/4/18 10:18


 


 


 Levalbuterol HCl


  (Xopenex)  0.625 mg  Q4HRT


 HHN


   5/3/18 15:00


 5/5/18 14:59  5/4/18 08:45


 


 


 Levetiracetam


  (Keppra)  1,000 mg  EVERY 8  HOURS


 ORAL


   5/4/18 09:00


 6/3/18 08:59   


 


 


 Levothyroxine


 Sodium


  (Synthroid)  75 mcg  ACBREAKFAST


 ORAL


   5/4/18 06:30


 5/20/18 06:29  5/4/18 06:21


 


 


 Nitroglycerin


  (Ntg)  0.4 mg  Q5M X 3 DOSES PRN


 SL


 Prn Chest Pain  5/3/18 12:15


 5/19/18 10:59  5/4/18 01:53


 


 


 Ondansetron HCl


  (Zofran)  4 mg  Q6H  PRN


 IVP


 Nausea & Vomiting  5/3/18 14:30


 5/19/18 20:29  5/4/18 00:40


 


 


 Potassium


 Chloride 20 meq/


 Sodium Chloride  285 ml @ 


 142.5 mls/


 hr  ONCE  ONCE


 IVPB


   5/4/18 13:30


 5/4/18 15:29  5/4/18 14:03


 

















Selene Garcia M.D. May 4, 2018 14:41

## 2018-05-04 NOTE — CARDIAC ELECTROPHYSIOLOGY PN
Assessment/Plan


Assessment/Plan


1. Sinus tachycardia with no evidence of fib or SVT due to pain and respiratory 

failure. 


     Echocardiogram showed EF 65%.  


2. Malignant pleural effusion and metastatic cancer. 


     Follow up Dr. Fraga. S/p Left Thoracentesis on 4/20/18


     Morphine pump in abdomen. On BIPAP


3. Lung mass.   


4. Crohn disease and history of exploratory laparotomy, under management of Dr. Mcleod.


5. Ascites  paracentesis with cytology and per Dr. Russell.


6. DNR and DNI





DW RN





Subjective


Subjective


On BIPAP on CHASITY in sinus tach 120-130s. DNR  and lethargic





Objective





Last 24 Hour Vital Signs








  Date Time  Temp Pulse Resp B/P (MAP) Pulse Ox O2 Delivery O2 Flow Rate FiO2


 


5/4/18 12:50  125 22  95   


 


5/4/18 12:00  136      


 


5/4/18 12:00        55


 


5/4/18 12:00 98.4 133 22 121/77 95 Venturi Mask  55





 98.4       


 


5/4/18 11:50  131 23  94   


 


5/4/18 10:50  136    Bi-pap  


 


5/4/18 10:50  136    Bi-pap  


 


5/4/18 10:50  136 26  98 Facial  40


 


5/4/18 08:47  131 22  100 Bi-pap  40


 


5/4/18 08:45  129 18  100 Bi-pap  40


 


5/4/18 08:43  130 25  100 Facial  40


 


5/4/18 08:00        40


 


5/4/18 08:00  126      


 


5/4/18 08:00 100.2 133 22 133/65 99 Bi-pap  40





 100.2       


 


5/4/18 07:21 99.6       


 


5/4/18 06:42  128 22  98 Facial  40


 


5/4/18 06:22 101.0       


 


5/4/18 05:12  140 24  100 Facial  40


 


5/4/18 04:00 101.1 138 19 124/80 96 Bi-pap  40





 101.1       


 


5/4/18 04:00        40


 


5/4/18 04:00  138      


 


5/4/18 03:05  139 18  100 Bi-pap  40


 


5/4/18 03:05      Bi-pap  40


 


5/4/18 03:04  138 18  100 Facial  40


 


5/4/18 01:53    125/84    


 


5/4/18 01:04  141 24  95 Facial  40


 


5/4/18 00:00 98.5 138 20 125/84 96 Bi-pap  40





 98.5       


 


5/4/18 00:00        40


 


5/4/18 00:00  139      


 


5/3/18 23:20  136 22  97 Facial  40


 


5/3/18 23:13    140/87    


 


5/3/18 23:05  142 26  98 Bi-pap  40


 


5/3/18 23:05      Bi-pap  40


 


5/3/18 21:04  138 25  96 Facial  40


 


5/3/18 20:00        40


 


5/3/18 20:00 98.6 133 17 140/87 97 Bi-pap  40





 98.6       


 


5/3/18 20:00  134      


 


5/3/18 19:04  138 20  97 Bi-pap  40


 


5/3/18 19:04      Bi-pap  40


 


5/3/18 19:03  133 20  97 Facial  40


 


5/3/18 16:52  132 18  100 Facial  40


 


5/3/18 16:00 97.5 139 20 133/86 100 Bi-pap  40





 97.5       


 


5/3/18 16:00        40

















Intake and Output  


 


 5/3/18 5/4/18





 19:00 07:00


 


Intake Total 240 ml 710 ml


 


Balance 240 ml 710 ml


 


  


 


IV Total 240 ml 660 ml


 


Other  50 ml


 


# Voids 2 3











Laboratory Tests








Test


  5/4/18


05:00


 


White Blood Count


  15.1 K/UL


(4.8-10.8)  H


 


Red Blood Count


  4.64 M/UL


(4.20-5.40)


 


Hemoglobin


  13.6 G/DL


(12.0-16.0)


 


Hematocrit


  41.8 %


(37.0-47.0)


 


Mean Corpuscular Volume 90 FL (80-99)  


 


Mean Corpuscular Hemoglobin


  29.2 PG


(27.0-31.0)


 


Mean Corpuscular Hemoglobin


Concent 32.5 G/DL


(32.0-36.0)


 


Red Cell Distribution Width


  15.1 %


(11.6-14.8)  H


 


Platelet Count


  134 K/UL


(150-450)  L


 


Mean Platelet Volume


  7.7 FL


(6.5-10.1)


 


Neutrophils (%) (Auto)


  76.1 %


(45.0-75.0)  H


 


Lymphocytes (%) (Auto)


  17.4 %


(20.0-45.0)  L


 


Monocytes (%) (Auto)


  4.7 %


(1.0-10.0)


 


Eosinophils (%) (Auto)


  0.6 %


(0.0-3.0)


 


Basophils (%) (Auto)


  1.1 %


(0.0-2.0)


 


Sodium Level


  155 MMOL/L


(136-145)  H


 


Potassium Level


  3.1 MMOL/L


(3.5-5.1)  L


 


Chloride Level


  112 MMOL/L


()  H


 


Carbon Dioxide Level


  32 MMOL/L


(21-32)


 


Anion Gap


  11 mmol/L


(5-15)


 


Blood Urea Nitrogen


  68 mg/dL


(7-18)  H


 


Creatinine


  1.4 MG/DL


(0.55-1.30)  H


 


Estimat Glomerular Filtration


Rate 45.8 mL/min


(>60)


 


Glucose Level


  137 MG/DL


()  H


 


Calcium Level


  9.3 MG/DL


(8.5-10.1)








Objective


HEAD AND NECK:  No JVD. BIPAP on


LUNGS:  Coarse rhonchi.


CARDIOVASCULAR:  Tachy S1 and S2


ABDOMEN:  Tender with a pump under skin.


EXTREMITIES:  No pitting edema.











Delon Chawla MD May 4, 2018 16:02

## 2018-05-04 NOTE — PULMONOLGY CRITICAL CARE NOTE
Critical Care - Asmt/Plan


Problems:  


(1) Acute respiratory failure


(2) Malignant pleural effusion


(3) Metastatic cancer


(4) Lung mass


(5) COPD (chronic obstructive pulmonary disease)


Respiratory:  monitor respiratory rate, adjust FIO2, CXR, other - wean bipap if 

possible


Cardiac:  continue to monitor HR/BP


Renal:  F/U I&O


Infectious Disease:  check cultures, continue antibiotics


Hematologic:  monitor H/H, transfuse if hgb<8.5


Neurologic:  keep patient comfortable


Affect:  PRN ativan


Prophylaxis:  Protonix


Notes Reviewed:  cardio, renal


Discussed with:  nurses, consultants, , family member - d/w pts son

, he is hoping that pt gets stabel enough to get discharged on hospice





Critical Care - Objective





Last 24 Hour Vital Signs








  Date Time  Temp Pulse Resp B/P (MAP) Pulse Ox O2 Delivery O2 Flow Rate FiO2


 


5/4/18 10:50  136    Bi-pap  


 


5/4/18 10:50  136    Bi-pap  


 


5/4/18 10:50  136 26  98 Facial  40


 


5/4/18 08:47  131 22  100 Bi-pap  40


 


5/4/18 08:45  129 18  100 Bi-pap  40


 


5/4/18 08:43  130 25  100 Facial  40


 


5/4/18 08:00        40


 


5/4/18 08:00  126      


 


5/4/18 08:00 100.2 133 22 133/65 99 Bi-pap  40





 100.2       


 


5/4/18 07:21 99.6       


 


5/4/18 06:42  128 22  98 Facial  40


 


5/4/18 06:22 101.0       


 


5/4/18 05:12  140 24  100 Facial  40


 


5/4/18 04:00 101.1 138 19 124/80 96 Bi-pap  40





 101.1       


 


5/4/18 04:00        40


 


5/4/18 04:00  138      


 


5/4/18 03:05  139 18  100 Bi-pap  40


 


5/4/18 03:05      Bi-pap  40


 


5/4/18 03:04  138 18  100 Facial  40


 


5/4/18 01:53    125/84    


 


5/4/18 01:04  141 24  95 Facial  40


 


5/4/18 00:00 98.5 138 20 125/84 96 Bi-pap  40





 98.5       


 


5/4/18 00:00        40


 


5/4/18 00:00  139      


 


5/3/18 23:20  136 22  97 Facial  40


 


5/3/18 23:13    140/87    


 


5/3/18 23:05  142 26  98 Bi-pap  40


 


5/3/18 23:05      Bi-pap  40


 


5/3/18 21:04  138 25  96 Facial  40


 


5/3/18 20:00        40


 


5/3/18 20:00 98.6 133 17 140/87 97 Bi-pap  40





 98.6       


 


5/3/18 20:00  134      


 


5/3/18 19:04  138 20  97 Bi-pap  40


 


5/3/18 19:04      Bi-pap  40


 


5/3/18 19:03  133 20  97 Facial  40


 


5/3/18 16:52  132 18  100 Facial  40


 


5/3/18 16:00 97.5 139 20 133/86 100 Bi-pap  40





 97.5       


 


5/3/18 16:00        40


 


5/3/18 15:54  131 22  100 Bi-pap  40


 


5/3/18 15:47  131 18  100 Facial  40


 


5/3/18 15:47  131 18  100 Bi-pap  40


 


5/3/18 15:26  133      


 


5/3/18 13:19  133 18  99 Facial  40


 


5/3/18 13:00 98.5 135 18 126/80 95 Bi-pap  40





 98.5       


 


5/3/18 12:00        40


 


5/3/18 12:00  132 18 134/84 95 Bi-pap  40


 


5/3/18 12:00  135      








Status:  awake


Condition:  critical


HEENT:  atraumatic


Neck:  full ROM


Lungs:  clear


Heart:  HR/BP stable


Abdomen:  soft


Extremities:  no C/C/E





Critical Care - Subjective


ROS Limited/Unobtainable:  Yes


Condition:  critical


EKG Rhythm:  Sinus Rhythm


FI02:  40


Vent Support Mode:  CPAP


Sputum Amount:  None


Fluids:  1/2 NS 60 cc/hour


I&O:











Intake and Output  


 


 5/3/18 5/4/18





 19:00 07:00


 


Intake Total 240 ml 710 ml


 


Balance 240 ml 710 ml


 


  


 


IV Total 240 ml 660 ml


 


Other  50 ml


 


# Voids 2 3








CXR:


pulmonary edema


Labs:





Laboratory Tests








Test


  5/4/18


05:00


 


White Blood Count


  15.1 K/UL


(4.8-10.8)  H


 


Red Blood Count


  4.64 M/UL


(4.20-5.40)


 


Hemoglobin


  13.6 G/DL


(12.0-16.0)


 


Hematocrit


  41.8 %


(37.0-47.0)


 


Mean Corpuscular Volume 90 FL (80-99)  


 


Mean Corpuscular Hemoglobin


  29.2 PG


(27.0-31.0)


 


Mean Corpuscular Hemoglobin


Concent 32.5 G/DL


(32.0-36.0)


 


Red Cell Distribution Width


  15.1 %


(11.6-14.8)  H


 


Platelet Count


  134 K/UL


(150-450)  L


 


Mean Platelet Volume


  7.7 FL


(6.5-10.1)


 


Neutrophils (%) (Auto)


  76.1 %


(45.0-75.0)  H


 


Lymphocytes (%) (Auto)


  17.4 %


(20.0-45.0)  L


 


Monocytes (%) (Auto)


  4.7 %


(1.0-10.0)


 


Eosinophils (%) (Auto)


  0.6 %


(0.0-3.0)


 


Basophils (%) (Auto)


  1.1 %


(0.0-2.0)


 


Sodium Level


  155 MMOL/L


(136-145)  H


 


Potassium Level


  3.1 MMOL/L


(3.5-5.1)  L


 


Chloride Level


  112 MMOL/L


()  H


 


Carbon Dioxide Level


  32 MMOL/L


(21-32)


 


Anion Gap


  11 mmol/L


(5-15)


 


Blood Urea Nitrogen


  68 mg/dL


(7-18)  H


 


Creatinine


  1.4 MG/DL


(0.55-1.30)  H


 


Estimat Glomerular Filtration


Rate 45.8 mL/min


(>60)


 


Glucose Level


  137 MG/DL


()  H


 


Calcium Level


  9.3 MG/DL


(8.5-10.1)

















Blaze Fraga MD May 4, 2018 11:56

## 2018-05-04 NOTE — GENERAL PROGRESS NOTE
Assessment/Plan


Problem List:  


(1) Crohn's disease


ICD Codes:  K50.90 - Crohn's disease


SNOMED:  49877412


Qualifiers:  


   Qualified Codes:  K50.919 - Crohn's disease, unspecified, with unspecified 

complications


(2) Opioid dependence


ICD Codes:  F11.20 - Opioid dependence


SNOMED:  88319355


(3) Intractable abdominal pain


ICD Codes:  R10.9 - Unspecified abdominal pain


SNOMED:  25485184, 827762205


(4) COPD (chronic obstructive pulmonary disease)


ICD Codes:  J44.9 - Chronic obstructive pulmonary disease


SNOMED:  70171062


(5) Shortness of breath


(6) SOB (shortness of breath)


ICD Codes:  R06.02 - Shortness of breath


SNOMED:  673538015


(7) UTI (urinary tract infection)


ICD Codes:  N39.0 - Urinary tract infection, site not specified


SNOMED:  18812000


(8) Lung mass


ICD Codes:  R91.8 - Other nonspecific abnormal finding of lung field


SNOMED:  187102473


(9) Tachycardia


ICD Codes:  R00.0 - Tachycardia, unspecified


SNOMED:  7067820


Status:  unchanged


Assessment/Plan


ot pt diet pain control sx eval cbc bmp am heme f/u cardio eval, ltach eval prn





Subjective


Constitutional:  Reports: weakness


Allergies:  


Coded Allergies:  


     No Known Allergies (Verified , 10/27/06)


All Systems:  reviewed and negative except above


Subjective


bipap sleeping





Objective





Last 24 Hour Vital Signs








  Date Time  Temp Pulse Resp B/P (MAP) Pulse Ox O2 Delivery O2 Flow Rate FiO2


 


5/4/18 12:00        55


 


5/4/18 12:00 98.4 133 22 121/77 95 Venturi Mask  55





 98.4       


 


5/4/18 11:50  131 23  94   


 


5/4/18 10:50  136    Bi-pap  


 


5/4/18 10:50  136    Bi-pap  


 


5/4/18 10:50  136 26  98 Facial  40


 


5/4/18 08:47  131 22  100 Bi-pap  40


 


5/4/18 08:45  129 18  100 Bi-pap  40


 


5/4/18 08:43  130 25  100 Facial  40


 


5/4/18 08:00        40


 


5/4/18 08:00  126      


 


5/4/18 08:00 100.2 133 22 133/65 99 Bi-pap  40





 100.2       


 


5/4/18 07:21 99.6       


 


5/4/18 06:42  128 22  98 Facial  40


 


5/4/18 06:22 101.0       


 


5/4/18 05:12  140 24  100 Facial  40


 


5/4/18 04:00 101.1 138 19 124/80 96 Bi-pap  40





 101.1       


 


5/4/18 04:00        40


 


5/4/18 04:00  138      


 


5/4/18 03:05  139 18  100 Bi-pap  40


 


5/4/18 03:05      Bi-pap  40


 


5/4/18 03:04  138 18  100 Facial  40


 


5/4/18 01:53    125/84    


 


5/4/18 01:04  141 24  95 Facial  40


 


5/4/18 00:00 98.5 138 20 125/84 96 Bi-pap  40





 98.5       


 


5/4/18 00:00        40


 


5/4/18 00:00  139      


 


5/3/18 23:20  136 22  97 Facial  40


 


5/3/18 23:13    140/87    


 


5/3/18 23:05  142 26  98 Bi-pap  40


 


5/3/18 23:05      Bi-pap  40


 


5/3/18 21:04  138 25  96 Facial  40


 


5/3/18 20:00        40


 


5/3/18 20:00 98.6 133 17 140/87 97 Bi-pap  40





 98.6       


 


5/3/18 20:00  134      


 


5/3/18 19:04  138 20  97 Bi-pap  40


 


5/3/18 19:04      Bi-pap  40


 


5/3/18 19:03  133 20  97 Facial  40


 


5/3/18 16:52  132 18  100 Facial  40


 


5/3/18 16:00 97.5 139 20 133/86 100 Bi-pap  40





 97.5       


 


5/3/18 16:00        40


 


5/3/18 15:54  131 22  100 Bi-pap  40


 


5/3/18 15:47  131 18  100 Facial  40


 


5/3/18 15:47  131 18  100 Bi-pap  40


 


5/3/18 15:26  133      

















Intake and Output  


 


 5/3/18 5/4/18





 19:00 07:00


 


Intake Total 240 ml 710 ml


 


Balance 240 ml 710 ml


 


  


 


IV Total 240 ml 660 ml


 


Other  50 ml


 


# Voids 2 3








Laboratory Tests


5/4/18 05:00: 


White Blood Count 15.1H, Red Blood Count 4.64, Hemoglobin 13.6, Hematocrit 41.8

, Mean Corpuscular Volume 90, Mean Corpuscular Hemoglobin 29.2, Mean 

Corpuscular Hemoglobin Concent 32.5, Red Cell Distribution Width 15.1H, 

Platelet Count 134L, Mean Platelet Volume 7.7, Neutrophils (%) (Auto) 76.1H, 

Lymphocytes (%) (Auto) 17.4L, Monocytes (%) (Auto) 4.7, Eosinophils (%) (Auto) 

0.6, Basophils (%) (Auto) 1.1, Sodium Level 155H, Potassium Level 3.1L, 

Chloride Level 112H, Carbon Dioxide Level 32, Anion Gap 11, Blood Urea Nitrogen 

68H, Creatinine 1.4H, Estimat Glomerular Filtration Rate 45.8, Glucose Level 

137H, Calcium Level 9.3


Height (Feet):  5


Height (Inches):  6.00


Weight (Pounds):  129


General Appearance:  lethargic


EENT:  normal ENT inspection


Neck:  normal alignment


Cardiovascular:  normal peripheral pulses, normal rate, regular rhythm


Respiratory/Chest:  chest wall non-tender, decreased breath sounds


Abdomen:  normal bowel sounds, non tender, soft


Extremities:  normal inspection


Edema:  no edema noted Arm (L), no edema noted Arm (R), no edema noted Leg (L), 

no edema noted Leg (R), no edema noted Pedal (L), no edema noted Pedal (R), no 

edema noted Generalized


Neurologic:  motor weakness


Skin:  normal pigmentation, warm/dry











MAICOL LANG May 4, 2018 13:41

## 2018-05-04 NOTE — GENERAL PROGRESS NOTE
Assessment/Plan


Assessment/Plan


(1) Chronic abdominal pain


(2) Chronic pancreatitis


(3) Crohn's disease 


(4) Herniated nucleus pulposus, lumbar


(5) Lumbar spondylosis


(6) Radiculopathy of lumbar region


(7) Lung mass


Pt will be continued on Norco and pt has intrathecal pump.


HOLD OPIOIDS FOR OVERSEDATION OR SBP<90 OR DBP<60 OR O2SAT<92% OR RR<12


Pt was d/w Dr. Shetty and he concurred.





Subjective


Date patient seen:  May 4, 2018


Time patient seen:  08:15 - am


ROS Limited/Unobtainable:  Yes


Allergies:  


Coded Allergies:  


     No Known Allergies (Verified , 10/27/06)


Subjective


On Bipap no signs of pain or distress. 


Hospital is out of Dilaudid and morphine IV.





Objective





Last 24 Hour Vital Signs








  Date Time  Temp Pulse Resp B/P (MAP) Pulse Ox O2 Delivery O2 Flow Rate FiO2


 


5/4/18 08:47  131 22  100 Bi-pap  40


 


5/4/18 08:45  129 18  100 Bi-pap  40


 


5/4/18 08:43  130 25  100 Facial  40


 


5/4/18 08:00        40


 


5/4/18 08:00 100.2 133 22 133/65 99 Bi-pap  40





 100.2       


 


5/4/18 07:21 99.6       


 


5/4/18 06:42  128 22  98 Facial  40


 


5/4/18 06:22 101.0       


 


5/4/18 05:12  140 24  100 Facial  40


 


5/4/18 04:00 101.1 138 19 124/80 96 Bi-pap  40





 101.1       


 


5/4/18 04:00        40


 


5/4/18 04:00  138      


 


5/4/18 03:05  139 18  100 Bi-pap  40


 


5/4/18 03:05      Bi-pap  40


 


5/4/18 03:04  138 18  100 Facial  40


 


5/4/18 01:53    125/84    


 


5/4/18 01:04  141 24  95 Facial  40


 


5/4/18 00:00 98.5 138 20 125/84 96 Bi-pap  40





 98.5       


 


5/4/18 00:00        40


 


5/4/18 00:00  139      


 


5/3/18 23:20  136 22  97 Facial  40


 


5/3/18 23:13    140/87    


 


5/3/18 23:05  142 26  98 Bi-pap  40


 


5/3/18 23:05      Bi-pap  40


 


5/3/18 21:04  138 25  96 Facial  40


 


5/3/18 20:00        40


 


5/3/18 20:00 98.6 133 17 140/87 97 Bi-pap  40





 98.6       


 


5/3/18 20:00  134      


 


5/3/18 19:04  138 20  97 Bi-pap  40


 


5/3/18 19:04      Bi-pap  40


 


5/3/18 19:03  133 20  97 Facial  40


 


5/3/18 16:52  132 18  100 Facial  40


 


5/3/18 16:00 97.5 139 20 133/86 100 Bi-pap  40





 97.5       


 


5/3/18 16:00        40


 


5/3/18 15:54  131 22  100 Bi-pap  40


 


5/3/18 15:47  131 18  100 Facial  40


 


5/3/18 15:47  131 18  100 Bi-pap  40


 


5/3/18 15:26  133      


 


5/3/18 13:19  133 18  99 Facial  40


 


5/3/18 13:00 98.5 135 18 126/80 95 Bi-pap  40





 98.5       


 


5/3/18 12:00        40


 


5/3/18 12:00  132 18 134/84 95 Bi-pap  40


 


5/3/18 12:00  135      


 


5/3/18 11:52  133 24  100 Bi-pap  40


 


5/3/18 11:42  131 20  98 Facial  40


 


5/3/18 11:42  129 17  99 Bi-pap  50


 


5/3/18 11:00  135 18 120/85 95 Bi-pap  40


 


5/3/18 10:00  137 21 121/91 98 Bi-pap  40

















Intake and Output  


 


 5/3/18 5/4/18





 19:00 07:00


 


Intake Total 240 ml 650 ml


 


Balance 240 ml 650 ml


 


  


 


IV Total 240 ml 600 ml


 


Other  50 ml


 


# Voids 2 3








Laboratory Tests


5/4/18 05:00: 


White Blood Count 15.1H, Red Blood Count 4.64, Hemoglobin 13.6, Hematocrit 41.8

, Mean Corpuscular Volume 90, Mean Corpuscular Hemoglobin 29.2, Mean 

Corpuscular Hemoglobin Concent 32.5, Red Cell Distribution Width 15.1H, 

Platelet Count 134L, Mean Platelet Volume 7.7, Neutrophils (%) (Auto) 76.1H, 

Lymphocytes (%) (Auto) 17.4L, Monocytes (%) (Auto) 4.7, Eosinophils (%) (Auto) 

0.6, Basophils (%) (Auto) 1.1, Sodium Level 155H, Potassium Level 3.1L, 

Chloride Level 112H, Carbon Dioxide Level 32, Anion Gap 11, Blood Urea Nitrogen 

68H, Creatinine 1.4H, Estimat Glomerular Filtration Rate 45.8, Glucose Level 

137H, Calcium Level 9.3


Height (Feet):  5


Height (Inches):  6.00


Weight (Pounds):  129


Objective


General Appearance:  on Bipap


Neck:  normal alignment, supple


Cardiovascular:  tachycardic


Respiratory/Chest:  decreased breath sounds


Abdomen:  tender, distended, intrathecal pump palpated


Extremities:  non-tender


Edema:  no edema noted











LEA BRAVO May 4, 2018 09:23

## 2018-05-05 VITALS — SYSTOLIC BLOOD PRESSURE: 105 MMHG | DIASTOLIC BLOOD PRESSURE: 67 MMHG

## 2018-05-05 VITALS — SYSTOLIC BLOOD PRESSURE: 106 MMHG | DIASTOLIC BLOOD PRESSURE: 64 MMHG

## 2018-05-05 VITALS — DIASTOLIC BLOOD PRESSURE: 71 MMHG | SYSTOLIC BLOOD PRESSURE: 126 MMHG

## 2018-05-05 VITALS — SYSTOLIC BLOOD PRESSURE: 108 MMHG | DIASTOLIC BLOOD PRESSURE: 65 MMHG

## 2018-05-05 VITALS — SYSTOLIC BLOOD PRESSURE: 121 MMHG | DIASTOLIC BLOOD PRESSURE: 69 MMHG

## 2018-05-05 VITALS — SYSTOLIC BLOOD PRESSURE: 108 MMHG | DIASTOLIC BLOOD PRESSURE: 68 MMHG

## 2018-05-05 LAB
ADD MANUAL DIFF: YES
ANION GAP SERPL CALC-SCNC: 13 MMOL/L (ref 5–15)
BUN SERPL-MCNC: 74 MG/DL (ref 7–18)
CALCIUM SERPL-MCNC: 9.7 MG/DL (ref 8.5–10.1)
CHLORIDE SERPL-SCNC: 118 MMOL/L (ref 98–107)
CO2 SERPL-SCNC: 30 MMOL/L (ref 21–32)
CREAT SERPL-MCNC: 1.8 MG/DL (ref 0.55–1.3)
ERYTHROCYTE [DISTWIDTH] IN BLOOD BY AUTOMATED COUNT: 15.3 % (ref 11.6–14.8)
HCT VFR BLD CALC: 41.3 % (ref 37–47)
HGB BLD-MCNC: 13.5 G/DL (ref 12–16)
MCV RBC AUTO: 91 FL (ref 80–99)
PLATELET # BLD: 97 K/UL (ref 150–450)
POTASSIUM SERPL-SCNC: 3.8 MMOL/L (ref 3.5–5.1)
RBC # BLD AUTO: 4.52 M/UL (ref 4.2–5.4)
SODIUM SERPL-SCNC: 162 MMOL/L (ref 136–145)
WBC # BLD AUTO: 17.1 K/UL (ref 4.8–10.8)

## 2018-05-05 RX ADMIN — LEVALBUTEROL HYDROCHLORIDE SCH MG: 1.25 SOLUTION, CONCENTRATE RESPIRATORY (INHALATION) at 19:26

## 2018-05-05 RX ADMIN — LEVETIRACETAM SCH MG: 100 SOLUTION ORAL at 21:42

## 2018-05-05 RX ADMIN — LEVALBUTEROL HYDROCHLORIDE SCH MG: 1.25 SOLUTION, CONCENTRATE RESPIRATORY (INHALATION) at 03:00

## 2018-05-05 RX ADMIN — MEROPENEM SCH MLS/HR: 1 INJECTION INTRAVENOUS at 21:41

## 2018-05-05 RX ADMIN — LEVALBUTEROL HYDROCHLORIDE SCH MG: 1.25 SOLUTION, CONCENTRATE RESPIRATORY (INHALATION) at 23:22

## 2018-05-05 RX ADMIN — LEVETIRACETAM SCH MG: 100 SOLUTION ORAL at 14:00

## 2018-05-05 RX ADMIN — DEXTROSE MONOHYDRATE SCH MLS/HR: 50 INJECTION, SOLUTION INTRAVENOUS at 05:40

## 2018-05-05 RX ADMIN — LEVALBUTEROL HYDROCHLORIDE SCH MG: 1.25 SOLUTION, CONCENTRATE RESPIRATORY (INHALATION) at 11:40

## 2018-05-05 RX ADMIN — LEVETIRACETAM SCH MG: 100 SOLUTION ORAL at 05:40

## 2018-05-05 RX ADMIN — LEVALBUTEROL HYDROCHLORIDE SCH MG: 1.25 SOLUTION, CONCENTRATE RESPIRATORY (INHALATION) at 15:55

## 2018-05-05 RX ADMIN — DEXTROSE AND SODIUM CHLORIDE SCH MLS/HR: 5; .45 INJECTION, SOLUTION INTRAVENOUS at 05:39

## 2018-05-05 RX ADMIN — HEPARIN SODIUM SCH UNITS: 5000 INJECTION INTRAVENOUS; SUBCUTANEOUS at 09:00

## 2018-05-05 RX ADMIN — LEVALBUTEROL HYDROCHLORIDE SCH MG: 1.25 SOLUTION, CONCENTRATE RESPIRATORY (INHALATION) at 07:59

## 2018-05-05 NOTE — PROGRESS NOTE
DATE:  05/05/2018



SUBJECTIVE:  The patient is a 62-year-old female with intractable abdominal

pain.  This patient still complaining of anxiety, mood lability, and

slight decline in cognition worsened by stress of her medical illness that

is why her attending has requested daily psychiatric consultation.



MENTAL STATUS EXAMINATION:  This is a 62-year-old female.  Appearance

disheveled.  Attitude irritable and agitated.  Affect guarded and

restricted.  Intellect poor.  Mood depressed, anxious.  Motor activity,

psychomotor agitation.  Attention span is poor.  Orientation x2.  Speech

is low volume and slurred.  Thought process slightly, disorganized,

anxious.  Insight and judgment is fair.  Denies suicidal or homicidal

thoughts.



DIAGNOSIS:  Bipolar 2.



PLAN:  Continue treatment with Neurontin to stabilize her mood, reduce

anxiety and prevent any further mood lability.  A 18 to 20 minutes of

supportive psychotherapy provided. Chart reviewed and discussed with

staff.  Seen and assessed at bedside.  Continued to be followed by

Psychiatry daily throughout hospital course to prevent any further decline

in her cognition at the request of her attending physician.









  ______________________________________________

  Samantha Rick M.D.





DR:  Ramone

D:  05/05/2018 15:51

T:  05/05/2018 22:10

JOB#:  [id number]

CC:

## 2018-05-05 NOTE — INFECTIOUS DISEASES PROG NOTE
Assessment/Plan


Assessment/Plan





ASSESSMENT:  The patient is a 62-year-old female with,





Sepsis- r/o bacteremia, probably superimposed PNA


  -u/a wbc 10-15,  niet neg, leuk +2; ucx p


  -sp cx p


  -Bcx p


  





Lethargy, on Bipap- improvnig- ?oversedation


   -CXR 5/3: Parenchymal opacities, primarily interstitial laterally 

demonstrated throughout the lungs with some interval improvement over the last 

24 hours


  -CXR;Extensive mixed interstitial alveolar airspace disease bilaterally 

unchanged





fever, recurrent- prorably due to malignancy-, possible concomitant PNA


Leukocytosis, mild- recurrent; improving- ?2ry to possible malignancy


Probable small bowel obstruction


Recent history of polymicrobial gram-negative bacteremia including 

Stenotrophomonas maltophilia and Enterobacter.


History of Candida esophagitis.


History of C. difficile in the past.











Air anterior to the liver , ?  free intraperitoneal air ( Surg doubt 

perforation )


Lung and liver masses Ro Malignancy 


 CT:  Pleural-based mass at the left lung base / Moderate-sized left pleural 

effusion, left-sided pleural nodularity and retroperitoneal and paraspinal 

nodules/masses. 


         Left inferior hilar mass lesion partially visualized. New low-

attenuation liver lesion ( concerning for malignancy/metastatic disease )


Pl effusion SP ultrasound-guided thoracentesis,  960 cc  ( m/l 2/2 mets,  no 

evid of Empyema )


  -exudate fluid:  (n 6), pH 8, lguc 98, prot 4.6 (serum 8.4); cx stain: 


  GRAM STAIN  Final  


        GRAM STAIN RESULT           FEW WHITE BLOOD CELLS


                                    NO ORGANISMS SEEN


cx negative





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 





Crohn's disease.


History of bowel obstruction x2 with lysis of adhesions in 2005.


COPD.


History of chronic pain syndrome, on morphine pump.


CVA in 2005.


Seizure disorder.








PLAN:





Switch Zosyn #2 to Meropenem and add IV Vancomycin given worsening sepsis; 

continue if consistent with goals of care


  -4/25 SP Zosyn #5





 -f/u repeat cultures





Monitor CBC and BMP.


follow GI recommendations


hem, surg f/u


discussions of goals of care ongoing


on CHASITY- bipap management


poor px





Subjective


Allergies:  


Coded Allergies:  


     No Known Allergies (Verified , 10/27/06)


Subjective


Tm 102.6


leukocytosis increasing


repeat cx p





Objective


Vital Signs





Last 24 Hour Vital Signs








  Date Time  Temp Pulse Resp B/P (MAP) Pulse Ox O2 Delivery O2 Flow Rate FiO2


 


5/5/18 10:02 102.6       





 102.6       


 


5/5/18 09:44 102.9       


 


5/5/18 08:09  49 22  96 Venturi Mask 14.0 55


 


5/5/18 08:00 100.0 125 26 108/65 92 Venturi Mask  55





 100.0       


 


5/5/18 08:00        55


 


5/5/18 08:00  127      


 


5/5/18 07:59  48 22  93 Venturi Mask 15.0 55


 


5/5/18 04:00        55


 


5/5/18 04:00 97.5 131 22 121/69 85 Venturi Mask  55





 97.5       


 


5/5/18 03:57  129      


 


5/5/18 03:16      Venturi Mask  


 


5/5/18 03:15      Venturi Mask  


 


5/5/18 00:34 100.5       


 


5/5/18 00:04 102.1       


 


5/5/18 00:00 102.1 134 22 126/71 85 Venturi Mask  55





 102.1       


 


5/4/18 23:48  133      


 


5/4/18 23:15      Venturi Mask  


 


5/4/18 23:14      Venturi Mask  


 


5/4/18 20:00 99.6 135 22 136/66 91 Venturi Mask  55





 99.6       


 


5/4/18 20:00        55


 


5/4/18 19:51  136      


 


5/4/18 19:39       15.0 55


 


5/4/18 19:37  137 24  97 Venturi Mask 15.0 55


 


5/4/18 19:35        55


 


5/4/18 16:50  133 24  97   


 


5/4/18 16:00        55


 


5/4/18 16:00 99.6 131 23 124/78 96 Venturi Mask  55





 99.6       


 


5/4/18 16:00  136      


 


5/4/18 14:45      Venturi Mask  


 


5/4/18 14:45  129    Venturi Mask 14.0 55


 


5/4/18 14:45  129 22  98   


 


5/4/18 12:50  125 22  95   


 


5/4/18 12:00  136      


 


5/4/18 12:00        55


 


5/4/18 12:00 98.4 133 22 121/77 95 Venturi Mask  55





 98.4       


 


5/4/18 11:50  131 23  94   








Height (Feet):  5


Height (Inches):  6.00


Weight (Pounds):  129


Objective


HEENT:  anicteric


Respiratory/Chest:  normal breath sounds


Cardiovascular:  regular rhythm


Abdomen:  tender





Laboratory Tests








Test


  5/4/18


17:50 5/5/18


04:00


 


Urine Color Pale yellow   


 


Urine Appearance Cloudy   


 


Urine pH 6 (4.5-8.0)   


 


Urine Specific Gravity


  1.020


(1.005-1.035) 


 


 


Urine Protein


  3+ (NEGATIVE)


H 


 


 


Urine Glucose (UA)


  Negative


(NEGATIVE) 


 


 


Urine Ketones


  Negative


(NEGATIVE) 


 


 


Urine Occult Blood


  5+ (NEGATIVE)


H 


 


 


Urine Nitrite


  Negative


(NEGATIVE) 


 


 


Urine Bilirubin


  Negative


(NEGATIVE) 


 


 


Urine Urobilinogen


  Normal MG/DL


(0.0-1.0) 


 


 


Urine Leukocyte Esterase


  2+ (NEGATIVE)


H 


 


 


Urine RBC


  15-20 /HPF (0


- 2)  H 


 


 


Urine WBC


  10-15 /HPF (0


- 2)  H 


 


 


Urine Squamous Epithelial


Cells Few /LPF


(NONE/OCC) 


 


 


Urine Bacteria


  Many /HPF


(NONE)  H 


 


 


White Blood Count


  


  17.1 K/UL


(4.8-10.8)  H


 


Red Blood Count


  


  4.52 M/UL


(4.20-5.40)


 


Hemoglobin


  


  13.5 G/DL


(12.0-16.0)


 


Hematocrit


  


  41.3 %


(37.0-47.0)


 


Mean Corpuscular Volume  91 FL (80-99)  


 


Mean Corpuscular Hemoglobin


  


  29.9 PG


(27.0-31.0)


 


Mean Corpuscular Hemoglobin


Concent 


  32.7 G/DL


(32.0-36.0)


 


Red Cell Distribution Width


  


  15.3 %


(11.6-14.8)  H


 


Platelet Count


  


  97 K/UL


(150-450)  L


 


Mean Platelet Volume


  


  8.2 FL


(6.5-10.1)


 


Neutrophils (%) (Auto)


  


  % (45.0-75.0)


 


 


Lymphocytes (%) (Auto)


  


  % (20.0-45.0)


 


 


Monocytes (%) (Auto)   % (1.0-10.0)  


 


Eosinophils (%) (Auto)   % (0.0-3.0)  


 


Basophils (%) (Auto)   % (0.0-2.0)  


 


Differential Total Cells


Counted 


  100  


 


 


Neutrophils % (Manual)  73 % (45-75)  


 


Lymphocytes % (Manual)  23 % (20-45)  


 


Monocytes % (Manual)  4 % (1-10)  


 


Eosinophils % (Manual)  0 % (0-3)  


 


Basophils % (Manual)  0 % (0-2)  


 


Band Neutrophils  0 % (0-8)  


 


Platelet Estimate  Decreased  L


 


Platelet Morphology  Normal  


 


Anisocytosis  1+  


 


Sodium Level


  


  162 MMOL/L


(136-145)  *H


 


Potassium Level


  


  3.8 MMOL/L


(3.5-5.1)


 


Chloride Level


  


  118 MMOL/L


()  H


 


Carbon Dioxide Level


  


  30 MMOL/L


(21-32)


 


Anion Gap


  


  13 mmol/L


(5-15)


 


Blood Urea Nitrogen


  


  74 mg/dL


(7-18)  H


 


Creatinine


  


  1.8 MG/DL


(0.55-1.30)  H


 


Estimat Glomerular Filtration


Rate 


  34.3 mL/min


(>60)


 


Glucose Level


  


  150 MG/DL


()  H


 


Calcium Level


  


  9.7 MG/DL


(8.5-10.1)











Current Medications








 Medications


  (Trade)  Dose


 Ordered  Sig/Jed


 Route


 PRN Reason  Start Time


 Stop Time Status Last Admin


Dose Admin


 


 Acetaminophen


  (Tylenol)  650 mg  Q4H  PRN


 ORAL


 fever (temp>100.5F)  5/3/18 13:30


 5/19/18 13:29  5/4/18 06:22


 


 


 Acetaminophen


  (Tylenol)  650 mg  Q4H  PRN


 RECTAL


 Mild Pain (Pain Scale 1-3)  5/5/18 00:00


 6/4/18 00:00  5/5/18 09:44


 


 


 Acetaminophen/


 Hydrocodone Bitart


  (Norco 10/325)  1 tab  Q4H  PRN


 ORAL


 Moderate Pain (Pain Scale 4-6)  5/4/18 09:30


 5/11/18 09:29   


 


 


 Dextrose  1,000 ml @ 


 100 mls/hr  Q10H


 IV


   5/5/18 10:00


 6/4/18 09:59  5/5/18 09:51


 


 


 Dextrose


  (Dextrose 50%)  50 ml  STAT  PRN


 IV


 Hypoglycemia BS<60mg/dL  5/3/18 13:30


 6/2/18 13:29   


 


 


 Levalbuterol HCl


  (Xopenex)  0.625 mg  Q4HRT


 HHN


   5/5/18 11:00


 5/7/18 10:59   


 


 


 Levetiracetam


  (Keppra)  1,000 mg  EVERY 8  HOURS


 ORAL


   5/4/18 09:00


 6/3/18 08:59   


 


 


 Levothyroxine


 Sodium


  (Synthroid)  75 mcg  ACBREAKFAST


 ORAL


   5/4/18 06:30


 5/20/18 06:29  5/4/18 06:21


 


 


 Nitroglycerin


  (Ntg)  0.4 mg  Q5M X 3 DOSES PRN


 SL


 Prn Chest Pain  5/3/18 12:15


 5/19/18 10:59  5/4/18 01:53


 


 


 Ondansetron HCl


  (Zofran)  4 mg  Q6H  PRN


 IVP


 Nausea & Vomiting  5/3/18 14:30


 5/19/18 20:29  5/4/18 00:40


 


 


 Piperacillin Sod/


 Tazobactam Sod


 3.375 gm/Sodium


 Chloride  110 ml @ 


 27.5 mls/hr  EVERY 8  HOURS


 IVPB


   5/4/18 16:00


 5/9/18 15:59  5/5/18 05:40


 

















Selene Garcia M.D. May 5, 2018 11:45

## 2018-05-05 NOTE — GENERAL PROGRESS NOTE
Progress Note


Progress Note


patient seen and examined full note dictated











NICOLE PAINTER May 5, 2018 14:13

## 2018-05-05 NOTE — PULMONOLOGY PROGRESS NOTE
Assessment/Plan


Assessment/Plan





ASSESSMENT


Stage IV metastatic CA ( with left lung mass, liver masses, lymphadenopathy) 


Intractable abdominal pain ( due to above) 


Malignant pleural effusion, status post thoracentesis


Acute hypoxemic respiratory failure , requiring BiPAP


Acute renal failure


Hypernatremia


Thrombocytopenia


Anemia of underlying malignancy


COPD/asthma 


Sinus tachycardia 


Chronic pain syndrome ( has pain pump)


History of smoking


History of CVA


Seizure disorder


Crohn's disease





PLAN OF CARE


CHASITY status 


IV fluids,  changed to D5W   due to worsening hypernatremia 


closely monitor renal parameters and electrolytes,


correct electrolytes   as needed. 


avoided nephrotoxics





on Ventimask,  titrate as needed to keep pulse oximetry above 92% 


pulmonary toilet 


DNR/DNI status since 5/2/18





ID follows,  leukocytosis probably secondary to malignancy 


on antibiotic - Zosyn  





cardio follows;  echo with preserved EF,  no evidence of A. fib on tele 


dc heparin secondary to thrombocytopenia , SCD (venous duplex bilateral lower 

extremity negative)





seizure precautions, continue Keppra 


pain management 


supportive care 


overall prognosis poor   


family desires comfort measures





case discussed and evaluated by supervising physician





Subjective


Allergies:  


Coded Allergies:  


     No Known Allergies (Verified , 10/27/06)


All Systems:  reviewed and negative except above


Subjective


poorly but still responsive to verbal stimuli


WBC trending  up, low grade fever


Platelets trending down


Sodium 162


Worsening renal parameters





Objective





Last 24 Hour Vital Signs








  Date Time  Temp Pulse Resp B/P (MAP) Pulse Ox O2 Delivery O2 Flow Rate FiO2


 


5/5/18 08:09  49 22  96 Venturi Mask 14.0 55


 


5/5/18 08:00 100.0 125 26 108/65 92 Venturi Mask  55





 100.0       


 


5/5/18 07:59  48 22  93 Venturi Mask 15.0 55


 


5/5/18 04:00        55


 


5/5/18 04:00 97.5 131 22 121/69 85 Venturi Mask  55





 97.5       


 


5/5/18 03:57  129      


 


5/5/18 03:16      Venturi Mask  


 


5/5/18 03:15      Venturi Mask  


 


5/5/18 00:34 100.5       


 


5/5/18 00:04 102.1       


 


5/5/18 00:00 102.1 134 22 126/71 85 Venturi Mask  55





 102.1       


 


5/4/18 23:48  133      


 


5/4/18 23:15      Venturi Mask  


 


5/4/18 23:14      Venturi Mask  


 


5/4/18 20:00 99.6 135 22 136/66 91 Venturi Mask  55





 99.6       


 


5/4/18 20:00        55


 


5/4/18 19:51  136      


 


5/4/18 19:39       15.0 55


 


5/4/18 19:37  137 24  97 Venturi Mask 15.0 55


 


5/4/18 19:35        55


 


5/4/18 16:50  133 24  97   


 


5/4/18 16:00        55


 


5/4/18 16:00 99.6 131 23 124/78 96 Venturi Mask  55





 99.6       


 


5/4/18 16:00  136      


 


5/4/18 14:45      Venturi Mask  


 


5/4/18 14:45  129    Venturi Mask 14.0 55


 


5/4/18 14:45  129 22  98   


 


5/4/18 12:50  125 22  95   


 


5/4/18 12:00  136      


 


5/4/18 12:00        55


 


5/4/18 12:00 98.4 133 22 121/77 95 Venturi Mask  55





 98.4       


 


5/4/18 11:50  131 23  94   


 


5/4/18 10:50  136    Bi-pap  


 


5/4/18 10:50  136    Bi-pap  


 


5/4/18 10:50  136 26  98 Facial  40

















Intake and Output  


 


 5/4/18 5/5/18





 19:00 07:00


 


Intake Total 775.0 ml 447.16 ml


 


Balance 775.0 ml 447.16 ml


 


  


 


IV Total 775.0 ml 447.16 ml


 


# Voids  1








General Appearance:  other - mild distress


HEENT:  normocephalic, atraumatic, other - VM 55%


Respiratory/Chest:  decreased breath sounds


Cardiovascular:  regular rhythm, tachycardia - 120 th, other - PICC


Abdomen:  soft, non tender, non distended, other


Neurologic/Psychiatric:  other - bedridden, poorly but still responsive to 

verbal stimuli


Laboratory Tests


5/4/18 17:50: 


Urine Color Pale yellow, Urine Appearance Cloudy, Urine pH 6, Urine Specific 

Gravity 1.020, Urine Protein 3+H, Urine Glucose (UA) Negative, Urine Ketones 

Negative, Urine Occult Blood 5+H, Urine Nitrite Negative, Urine Bilirubin 

Negative, Urine Urobilinogen Normal, Urine Leukocyte Esterase 2+H, Urine RBC 15-

20H, Urine WBC 10-15H, Urine Squamous Epithelial Cells Few, Urine Bacteria ManyH


5/5/18 04:00: 


White Blood Count 17.1H, Red Blood Count 4.52, Hemoglobin 13.5, Hematocrit 41.3

, Mean Corpuscular Volume 91, Mean Corpuscular Hemoglobin 29.9, Mean 

Corpuscular Hemoglobin Concent 32.7, Red Cell Distribution Width 15.3H, 

Platelet Count 97L, Mean Platelet Volume 8.2, Neutrophils (%) (Auto) , 

Lymphocytes (%) (Auto) , Monocytes (%) (Auto) , Eosinophils (%) (Auto) , 

Basophils (%) (Auto) , Differential Total Cells Counted 100, Neutrophils % (

Manual) 73, Lymphocytes % (Manual) 23, Monocytes % (Manual) 4, Eosinophils % (

Manual) 0, Basophils % (Manual) 0, Band Neutrophils 0, Platelet Estimate 

DecreasedL, Platelet Morphology Normal, Anisocytosis 1+, Sodium Level 162*H, 

Potassium Level 3.8, Chloride Level 118H, Carbon Dioxide Level 30, Anion Gap 13

, Blood Urea Nitrogen 74H, Creatinine 1.8H, Estimat Glomerular Filtration Rate 

34.3, Glucose Level 150H, Calcium Level 9.7





Current Medications








 Medications


  (Trade)  Dose


 Ordered  Sig/Jed


 Route


 PRN Reason  Start Time


 Stop Time Status Last Admin


Dose Admin


 


 Acetaminophen


  (Tylenol)  650 mg  Q4H  PRN


 ORAL


 fever (temp>100.5F)  5/3/18 13:30


 5/19/18 13:29  5/4/18 06:22


 


 


 Acetaminophen


  (Tylenol)  650 mg  Q4H  PRN


 RECTAL


 Mild Pain (Pain Scale 1-3)  5/5/18 00:00


 6/4/18 00:00  5/5/18 00:04


 


 


 Acetaminophen/


 Hydrocodone Bitart


  (Norco 10/325)  1 tab  Q4H  PRN


 ORAL


 Moderate Pain (Pain Scale 4-6)  5/4/18 09:30


 5/11/18 09:29   


 


 


 Dextrose


  (Dextrose 50%)  50 ml  STAT  PRN


 IV


 Hypoglycemia BS<60mg/dL  5/3/18 13:30


 6/2/18 13:29   


 


 


 Dextrose/Sodium


 Chloride  1,000 ml @ 


 100 mls/hr  Q10H


 IV


   5/5/18 10:00


 6/3/18 09:59   


 


 


 Heparin Sodium


  (Porcine)


  (Heparin 5000


 units/ml)  5,000 units  EVERY 12  HOURS


 SUBQ


   5/3/18 21:00


 5/19/18 20:59  5/4/18 21:10


 


 


 Levalbuterol HCl


  (Xopenex)  0.625 mg  Q4HRT


 HHN


   5/3/18 15:00


 5/5/18 14:59  5/5/18 07:59


 


 


 Levetiracetam


  (Keppra)  1,000 mg  EVERY 8  HOURS


 ORAL


   5/4/18 09:00


 6/3/18 08:59   


 


 


 Levothyroxine


 Sodium


  (Synthroid)  75 mcg  ACBREAKFAST


 ORAL


   5/4/18 06:30


 5/20/18 06:29  5/4/18 06:21


 


 


 Nitroglycerin


  (Ntg)  0.4 mg  Q5M X 3 DOSES PRN


 SL


 Prn Chest Pain  5/3/18 12:15


 5/19/18 10:59  5/4/18 01:53


 


 


 Ondansetron HCl


  (Zofran)  4 mg  Q6H  PRN


 IVP


 Nausea & Vomiting  5/3/18 14:30


 5/19/18 20:29  5/4/18 00:40


 


 


 Piperacillin Sod/


 Tazobactam Sod


 3.375 gm/Sodium


 Chloride  110 ml @ 


 27.5 mls/hr  EVERY 8  HOURS


 IVPB


   5/4/18 16:00


 5/9/18 15:59  5/5/18 05:40


 

















Carl MeltonNYU Langone Tisch HospitalRozina Curran NP May 5, 2018 09:50

## 2018-05-05 NOTE — GENERAL SURGERY PROGRESS NOTE
General Surgery-Progress Note


Subjective


Additional Comments


still with oxygen requirement.  prognosis poor.  able to communicate today.





Objective





Last 24 Hour Vital Signs








  Date Time  Temp Pulse Resp B/P (MAP) Pulse Ox O2 Delivery O2 Flow Rate FiO2


 


5/5/18 13:17  56 22   Non-Rebreather 15.0 100


 


5/5/18 12:05 98.0       


 


5/5/18 12:00 97.7 59 26 105/67 90 Venturi Mask  55





 97.7       


 


5/5/18 12:00        40


 


5/5/18 11:56  121      


 


5/5/18 11:50  53 22  97 Non-Rebreather 15.0 100


 


5/5/18 11:40  53 24  90 Venturi Mask 15.0 55


 


5/5/18 10:02 102.6       





 102.6       


 


5/5/18 09:44 102.9       


 


5/5/18 08:09  49 22  96 Venturi Mask 14.0 55


 


5/5/18 08:00 100.0 125 26 108/65 92 Venturi Mask  55





 100.0       


 


5/5/18 08:00        55


 


5/5/18 08:00  127      


 


5/5/18 07:59  48 22  93 Venturi Mask 15.0 55


 


5/5/18 04:00        55


 


5/5/18 04:00 97.5 131 22 121/69 85 Venturi Mask  55





 97.5       


 


5/5/18 03:57  129      


 


5/5/18 03:16      Venturi Mask  


 


5/5/18 03:15      Venturi Mask  


 


5/5/18 00:04 102.1       


 


5/5/18 00:00 102.1 134 22 126/71 85 Venturi Mask  55





 102.1       


 


5/4/18 23:48  133      


 


5/4/18 23:15      Venturi Mask  


 


5/4/18 23:14      Venturi Mask  


 


5/4/18 20:00 99.6 135 22 136/66 91 Venturi Mask  55





 99.6       


 


5/4/18 20:00        55


 


5/4/18 19:51  136      


 


5/4/18 19:39       15.0 55


 


5/4/18 19:37  137 24  97 Venturi Mask 15.0 55


 


5/4/18 19:35        55


 


5/4/18 16:50  133 24  97   


 


5/4/18 16:00        55


 


5/4/18 16:00 99.6 131 23 124/78 96 Venturi Mask  55





 99.6       


 


5/4/18 16:00  136      








I&O











Intake and Output  


 


 5/4/18 5/5/18





 19:00 07:00


 


Intake Total 775.0 ml 447.16 ml


 


Balance 775.0 ml 447.16 ml


 


  


 


IV Total 775.0 ml 447.16 ml


 


# Voids  1








Drains:  none


Cardiovascular:  RSR


Respiratory:  decreased breath sounds


Abdomen:  soft, non-tender, present bowel sounds


Extremities:  no cyanosis





Laboratory Tests








Test


  5/4/18


17:50 5/5/18


04:00


 


Urine Color Pale yellow   


 


Urine Appearance Cloudy   


 


Urine pH 6 (4.5-8.0)   


 


Urine Specific Gravity


  1.020


(1.005-1.035) 


 


 


Urine Protein


  3+ (NEGATIVE)


H 


 


 


Urine Glucose (UA)


  Negative


(NEGATIVE) 


 


 


Urine Ketones


  Negative


(NEGATIVE) 


 


 


Urine Occult Blood


  5+ (NEGATIVE)


H 


 


 


Urine Nitrite


  Negative


(NEGATIVE) 


 


 


Urine Bilirubin


  Negative


(NEGATIVE) 


 


 


Urine Urobilinogen


  Normal MG/DL


(0.0-1.0) 


 


 


Urine Leukocyte Esterase


  2+ (NEGATIVE)


H 


 


 


Urine RBC


  15-20 /HPF (0


- 2)  H 


 


 


Urine WBC


  10-15 /HPF (0


- 2)  H 


 


 


Urine Squamous Epithelial


Cells Few /LPF


(NONE/OCC) 


 


 


Urine Bacteria


  Many /HPF


(NONE)  H 


 


 


White Blood Count


  


  17.1 K/UL


(4.8-10.8)  H


 


Red Blood Count


  


  4.52 M/UL


(4.20-5.40)


 


Hemoglobin


  


  13.5 G/DL


(12.0-16.0)


 


Hematocrit


  


  41.3 %


(37.0-47.0)


 


Mean Corpuscular Volume  91 FL (80-99)  


 


Mean Corpuscular Hemoglobin


  


  29.9 PG


(27.0-31.0)


 


Mean Corpuscular Hemoglobin


Concent 


  32.7 G/DL


(32.0-36.0)


 


Red Cell Distribution Width


  


  15.3 %


(11.6-14.8)  H


 


Platelet Count


  


  97 K/UL


(150-450)  L


 


Mean Platelet Volume


  


  8.2 FL


(6.5-10.1)


 


Neutrophils (%) (Auto)


  


  % (45.0-75.0)


 


 


Lymphocytes (%) (Auto)


  


  % (20.0-45.0)


 


 


Monocytes (%) (Auto)   % (1.0-10.0)  


 


Eosinophils (%) (Auto)   % (0.0-3.0)  


 


Basophils (%) (Auto)   % (0.0-2.0)  


 


Differential Total Cells


Counted 


  100  


 


 


Neutrophils % (Manual)  73 % (45-75)  


 


Lymphocytes % (Manual)  23 % (20-45)  


 


Monocytes % (Manual)  4 % (1-10)  


 


Eosinophils % (Manual)  0 % (0-3)  


 


Basophils % (Manual)  0 % (0-2)  


 


Band Neutrophils  0 % (0-8)  


 


Platelet Estimate  Decreased  L


 


Platelet Morphology  Normal  


 


Anisocytosis  1+  


 


Sodium Level


  


  162 MMOL/L


(136-145)  *H


 


Potassium Level


  


  3.8 MMOL/L


(3.5-5.1)


 


Chloride Level


  


  118 MMOL/L


()  H


 


Carbon Dioxide Level


  


  30 MMOL/L


(21-32)


 


Anion Gap


  


  13 mmol/L


(5-15)


 


Blood Urea Nitrogen


  


  74 mg/dL


(7-18)  H


 


Creatinine


  


  1.8 MG/DL


(0.55-1.30)  H


 


Estimat Glomerular Filtration


Rate 


  34.3 mL/min


(>60)


 


Glucose Level


  


  150 MG/DL


()  H


 


Calcium Level


  


  9.7 MG/DL


(8.5-10.1)











Plan


Problems:  


(1) Intractable abdominal pain


Assessment & Plan:  62F with abdominal pain.  very complex medical and surgical 

history.  now with complex CT findings of left pleural effusion, left lower 

lung mass, large mediastinal and periaortic lymph nodes, new liver lesions, and 

air around the liver.  


unable to tell if bowel loop (possible blind end from prior surgery) noted in 

right upper quadrant above liver or if abscess or if free air.  exam not 

consistent with perforation and no significant free fluid noted on CT or area 

of perforation.  contrast into distal bowel without leak.  initial leukocytosis 

resolved.  low grade persistent fevers.  recent cough.  





unfortunately no clear explanation as to patients current condition.  lung mass

, effusion, new liver masses, and nodes very concerning for malignant process. 

Exam stable compared to prior exams (patient seen during last admission and 

known to me).  will need much more extensive work up. 





I discussed these findings with patient.  I explained possible need for urgent 

surgery but given history and complex surgical history we will monitor 

clinically first.  if declines will proceed with surgery which is VERY high 

risk in her.  if stable will continue with work up.  patient and partner 

express understand and agree with plan.  





s/p thoracentesis 4/20. malignant non small cell cancer 





CT reviewed.  Path reviewed


Discussed with patient


Need tissue diagnosis.  unfortunately path unable to determine etiology based 

on cytology





Discussed history, care and plans with family member (Son).  given her condition

, how aggressive and advanced condition, they currently have decided to 

continue with comfort care.  will hold on biopsy.  help with keeping her 

comfortable.  





thank you 














Darrius Benitez May 5, 2018 15:01

## 2018-05-05 NOTE — CARDIAC ELECTROPHYSIOLOGY PN
Assessment/Plan


Assessment/Plan


1. Sinus tachycardia with no evidence of fib or SVT due to pain and respiratory 

failure. 


     Echocardiogram showed EF 65%.  On iv Abx


2. Malignant pleural effusion and metastatic cancer. 


     Follow up Dr. Fraga. S/p Left Thoracentesis on 4/20/18


     Morphine pump in abdomen. On BIPAP


3. Lung mass.   


4. Crohn disease and history of exploratory laparotomy, under management of Dr. Mcleod.


5. Ascites  paracentesis with cytology and per Dr. Russell.


6. DNR and DNI





DW RN and family at bedside





Subjective


Subjective


On BIPAP on CHASITY in sinus tach 120-130s.Had T 102.9. DNR  and lethargic. Family 

at bedside.





Objective





Last 24 Hour Vital Signs








  Date Time  Temp Pulse Resp B/P (MAP) Pulse Ox O2 Delivery O2 Flow Rate FiO2


 


5/5/18 16:00  116      


 


5/5/18 15:55  111 22  96 Non-Rebreather 15.0 100


 


5/5/18 13:17  56 22   Non-Rebreather 15.0 100


 


5/5/18 12:05 98.0       


 


5/5/18 12:00 97.7 59 26 105/67 90 Venturi Mask  55





 97.7       


 


5/5/18 12:00        40


 


5/5/18 11:56  121      


 


5/5/18 11:50  53 22  97 Non-Rebreather 15.0 100


 


5/5/18 11:40  53 24  90 Venturi Mask 15.0 55


 


5/5/18 10:02 102.6       





 102.6       


 


5/5/18 09:44 102.9       


 


5/5/18 08:09  49 22  96 Venturi Mask 14.0 55


 


5/5/18 08:00 100.0 125 26 108/65 92 Venturi Mask  55





 100.0       


 


5/5/18 08:00        55


 


5/5/18 08:00  127      


 


5/5/18 07:59  48 22  93 Venturi Mask 15.0 55


 


5/5/18 04:00        55


 


5/5/18 04:00 97.5 131 22 121/69 85 Venturi Mask  55





 97.5       


 


5/5/18 03:57  129      


 


5/5/18 03:16      Venturi Mask  


 


5/5/18 03:15      Venturi Mask  


 


5/5/18 00:04 102.1       


 


5/5/18 00:00 102.1 134 22 126/71 85 Venturi Mask  55





 102.1       


 


5/4/18 23:48  133      


 


5/4/18 23:15      Venturi Mask  


 


5/4/18 23:14      Venturi Mask  


 


5/4/18 20:00 99.6 135 22 136/66 91 Venturi Mask  55





 99.6       


 


5/4/18 20:00        55


 


5/4/18 19:51  136      


 


5/4/18 19:39       15.0 55


 


5/4/18 19:37  137 24  97 Venturi Mask 15.0 55


 


5/4/18 19:35        55


 


5/4/18 16:50  133 24  97   

















Intake and Output  


 


 5/4/18 5/5/18





 19:00 07:00


 


Intake Total 775.0 ml 447.16 ml


 


Balance 775.0 ml 447.16 ml


 


  


 


IV Total 775.0 ml 447.16 ml


 


# Voids  1











Laboratory Tests








Test


  5/4/18


17:50 5/5/18


04:00


 


Urine Color Pale yellow   


 


Urine Appearance Cloudy   


 


Urine pH 6 (4.5-8.0)   


 


Urine Specific Gravity


  1.020


(1.005-1.035) 


 


 


Urine Protein


  3+ (NEGATIVE)


H 


 


 


Urine Glucose (UA)


  Negative


(NEGATIVE) 


 


 


Urine Ketones


  Negative


(NEGATIVE) 


 


 


Urine Occult Blood


  5+ (NEGATIVE)


H 


 


 


Urine Nitrite


  Negative


(NEGATIVE) 


 


 


Urine Bilirubin


  Negative


(NEGATIVE) 


 


 


Urine Urobilinogen


  Normal MG/DL


(0.0-1.0) 


 


 


Urine Leukocyte Esterase


  2+ (NEGATIVE)


H 


 


 


Urine RBC


  15-20 /HPF (0


- 2)  H 


 


 


Urine WBC


  10-15 /HPF (0


- 2)  H 


 


 


Urine Squamous Epithelial


Cells Few /LPF


(NONE/OCC) 


 


 


Urine Bacteria


  Many /HPF


(NONE)  H 


 


 


White Blood Count


  


  17.1 K/UL


(4.8-10.8)  H


 


Red Blood Count


  


  4.52 M/UL


(4.20-5.40)


 


Hemoglobin


  


  13.5 G/DL


(12.0-16.0)


 


Hematocrit


  


  41.3 %


(37.0-47.0)


 


Mean Corpuscular Volume  91 FL (80-99)  


 


Mean Corpuscular Hemoglobin


  


  29.9 PG


(27.0-31.0)


 


Mean Corpuscular Hemoglobin


Concent 


  32.7 G/DL


(32.0-36.0)


 


Red Cell Distribution Width


  


  15.3 %


(11.6-14.8)  H


 


Platelet Count


  


  97 K/UL


(150-450)  L


 


Mean Platelet Volume


  


  8.2 FL


(6.5-10.1)


 


Neutrophils (%) (Auto)


  


  % (45.0-75.0)


 


 


Lymphocytes (%) (Auto)


  


  % (20.0-45.0)


 


 


Monocytes (%) (Auto)   % (1.0-10.0)  


 


Eosinophils (%) (Auto)   % (0.0-3.0)  


 


Basophils (%) (Auto)   % (0.0-2.0)  


 


Differential Total Cells


Counted 


  100  


 


 


Neutrophils % (Manual)  73 % (45-75)  


 


Lymphocytes % (Manual)  23 % (20-45)  


 


Monocytes % (Manual)  4 % (1-10)  


 


Eosinophils % (Manual)  0 % (0-3)  


 


Basophils % (Manual)  0 % (0-2)  


 


Band Neutrophils  0 % (0-8)  


 


Platelet Estimate  Decreased  L


 


Platelet Morphology  Normal  


 


Anisocytosis  1+  


 


Sodium Level


  


  162 MMOL/L


(136-145)  *H


 


Potassium Level


  


  3.8 MMOL/L


(3.5-5.1)


 


Chloride Level


  


  118 MMOL/L


()  H


 


Carbon Dioxide Level


  


  30 MMOL/L


(21-32)


 


Anion Gap


  


  13 mmol/L


(5-15)


 


Blood Urea Nitrogen


  


  74 mg/dL


(7-18)  H


 


Creatinine


  


  1.8 MG/DL


(0.55-1.30)  H


 


Estimat Glomerular Filtration


Rate 


  34.3 mL/min


(>60)


 


Glucose Level


  


  150 MG/DL


()  H


 


Calcium Level


  


  9.7 MG/DL


(8.5-10.1)








Objective


HEAD AND NECK:   BIPAP on


LUNGS:  Coarse rhonchi.


CARDIOVASCULAR:  Tachy S1 and S2


ABDOMEN:  Tender with a pump under skin.


EXTREMITIES:  No pitting edema.











Delon Chawla MD May 5, 2018 16:41

## 2018-05-05 NOTE — GENERAL PROGRESS NOTE
Assessment/Plan


Problem List:  


(1) Crohn's disease


ICD Codes:  K50.90 - Crohn's disease


SNOMED:  11500148


Qualifiers:  


   Qualified Codes:  K50.919 - Crohn's disease, unspecified, with unspecified 

complications


(2) Opioid dependence


ICD Codes:  F11.20 - Opioid dependence


SNOMED:  71099916


(3) Intractable abdominal pain


ICD Codes:  R10.9 - Unspecified abdominal pain


SNOMED:  80491751, 511171260


(4) COPD (chronic obstructive pulmonary disease)


ICD Codes:  J44.9 - Chronic obstructive pulmonary disease


SNOMED:  75470570


(5) Shortness of breath


(6) SOB (shortness of breath)


ICD Codes:  R06.02 - Shortness of breath


SNOMED:  550857108


(7) UTI (urinary tract infection)


ICD Codes:  N39.0 - Urinary tract infection, site not specified


SNOMED:  10935255


(8) Lung mass


ICD Codes:  R91.8 - Other nonspecific abnormal finding of lung field


SNOMED:  161018254


(9) Tachycardia


ICD Codes:  R00.0 - Tachycardia, unspecified


SNOMED:  6674754


Status:  unchanged


Assessment/Plan


ot pt diet pain control sx eval cbc bmp am heme f/u cardio eval, ltach eval prn





Subjective


Constitutional:  Reports: weakness


Allergies:  


Coded Allergies:  


     No Known Allergies (Verified , 10/27/06)


All Systems:  reviewed and negative except above


Subjective


02 mask sleeping





Objective





Last 24 Hour Vital Signs








  Date Time  Temp Pulse Resp B/P (MAP) Pulse Ox O2 Delivery O2 Flow Rate FiO2


 


5/5/18 08:09  49 22  96 Venturi Mask 14.0 55


 


5/5/18 07:59  48 22  93 Venturi Mask 15.0 55


 


5/5/18 04:00        55


 


5/5/18 04:00 97.5 131 22 121/69 85 Venturi Mask  55





 97.5       


 


5/5/18 03:57  129      


 


5/5/18 03:16      Venturi Mask  


 


5/5/18 03:15      Venturi Mask  


 


5/5/18 00:34 100.5       


 


5/5/18 00:04 102.1       


 


5/5/18 00:00 102.1 134 22 126/71 85 Venturi Mask  55





 102.1       


 


5/4/18 23:48  133      


 


5/4/18 23:15      Venturi Mask  


 


5/4/18 23:14      Venturi Mask  


 


5/4/18 20:00 99.6 135 22 136/66 91 Venturi Mask  55





 99.6       


 


5/4/18 20:00        55


 


5/4/18 19:51  136      


 


5/4/18 19:39       15.0 55


 


5/4/18 19:37  137 24  97 Venturi Mask 15.0 55


 


5/4/18 19:35        55


 


5/4/18 16:50  133 24  97   


 


5/4/18 16:00        55


 


5/4/18 16:00 99.6 131 23 124/78 96 Venturi Mask  55





 99.6       


 


5/4/18 16:00  136      


 


5/4/18 14:45      Venturi Mask  


 


5/4/18 14:45  129    Venturi Mask 14.0 55


 


5/4/18 14:45  129 22  98   


 


5/4/18 12:50  125 22  95   


 


5/4/18 12:00  136      


 


5/4/18 12:00        55


 


5/4/18 12:00 98.4 133 22 121/77 95 Venturi Mask  55





 98.4       


 


5/4/18 11:50  131 23  94   


 


5/4/18 10:50  136    Bi-pap  


 


5/4/18 10:50  136    Bi-pap  


 


5/4/18 10:50  136 26  98 Facial  40

















Intake and Output  


 


 5/4/18 5/5/18





 19:00 07:00


 


Intake Total 775.0 ml 447.16 ml


 


Balance 775.0 ml 447.16 ml


 


  


 


IV Total 775.0 ml 447.16 ml


 


# Voids  1








Laboratory Tests


5/4/18 17:50: 


Urine Color Pale yellow, Urine Appearance Cloudy, Urine pH 6, Urine Specific 

Gravity 1.020, Urine Protein 3+H, Urine Glucose (UA) Negative, Urine Ketones 

Negative, Urine Occult Blood 5+H, Urine Nitrite Negative, Urine Bilirubin 

Negative, Urine Urobilinogen Normal, Urine Leukocyte Esterase 2+H, Urine RBC 15-

20H, Urine WBC 10-15H, Urine Squamous Epithelial Cells Few, Urine Bacteria ManyH


5/5/18 04:00: 


White Blood Count 17.1H, Red Blood Count 4.52, Hemoglobin 13.5, Hematocrit 41.3

, Mean Corpuscular Volume 91, Mean Corpuscular Hemoglobin 29.9, Mean 

Corpuscular Hemoglobin Concent 32.7, Red Cell Distribution Width 15.3H, 

Platelet Count 97L, Mean Platelet Volume 8.2, Neutrophils (%) (Auto) , 

Lymphocytes (%) (Auto) , Monocytes (%) (Auto) , Eosinophils (%) (Auto) , 

Basophils (%) (Auto) , Differential Total Cells Counted 100, Neutrophils % (

Manual) 73, Lymphocytes % (Manual) 23, Monocytes % (Manual) 4, Eosinophils % (

Manual) 0, Basophils % (Manual) 0, Band Neutrophils 0, Platelet Estimate 

DecreasedL, Platelet Morphology Normal, Anisocytosis 1+, Sodium Level 162*H, 

Potassium Level 3.8, Chloride Level 118H, Carbon Dioxide Level 30, Anion Gap 13

, Blood Urea Nitrogen 74H, Creatinine 1.8H, Estimat Glomerular Filtration Rate 

34.3, Glucose Level 150H, Calcium Level 9.7


Height (Feet):  5


Height (Inches):  6.00


Weight (Pounds):  129


General Appearance:  lethargic


EENT:  normal ENT inspection


Neck:  normal alignment


Cardiovascular:  normal peripheral pulses, normal rate, regular rhythm


Respiratory/Chest:  chest wall non-tender, decreased breath sounds


Abdomen:  normal bowel sounds, non tender, soft


Extremities:  normal inspection


Edema:  no edema noted Arm (L), no edema noted Arm (R), no edema noted Leg (L), 

no edema noted Leg (R), no edema noted Pedal (L), no edema noted Pedal (R), no 

edema noted Generalized


Neurologic:  motor weakness


Skin:  normal pigmentation, warm/dry











MAICOL LANG May 5, 2018 09:08

## 2018-05-06 VITALS — SYSTOLIC BLOOD PRESSURE: 106 MMHG | DIASTOLIC BLOOD PRESSURE: 64 MMHG

## 2018-05-06 VITALS — SYSTOLIC BLOOD PRESSURE: 103 MMHG | DIASTOLIC BLOOD PRESSURE: 67 MMHG

## 2018-05-06 VITALS — DIASTOLIC BLOOD PRESSURE: 68 MMHG | SYSTOLIC BLOOD PRESSURE: 103 MMHG

## 2018-05-06 VITALS — DIASTOLIC BLOOD PRESSURE: 73 MMHG | SYSTOLIC BLOOD PRESSURE: 116 MMHG

## 2018-05-06 VITALS — DIASTOLIC BLOOD PRESSURE: 64 MMHG | SYSTOLIC BLOOD PRESSURE: 98 MMHG

## 2018-05-06 VITALS — SYSTOLIC BLOOD PRESSURE: 95 MMHG | DIASTOLIC BLOOD PRESSURE: 61 MMHG

## 2018-05-06 LAB
ADD MANUAL DIFF: YES
ANION GAP SERPL CALC-SCNC: 10 MMOL/L (ref 5–15)
BUN SERPL-MCNC: 73 MG/DL (ref 7–18)
CALCIUM SERPL-MCNC: 9.2 MG/DL (ref 8.5–10.1)
CHLORIDE SERPL-SCNC: 120 MMOL/L (ref 98–107)
CO2 SERPL-SCNC: 30 MMOL/L (ref 21–32)
CREAT SERPL-MCNC: 2 MG/DL (ref 0.55–1.3)
ERYTHROCYTE [DISTWIDTH] IN BLOOD BY AUTOMATED COUNT: 15.2 % (ref 11.6–14.8)
HCT VFR BLD CALC: 36.9 % (ref 37–47)
HGB BLD-MCNC: 11.3 G/DL (ref 12–16)
MCV RBC AUTO: 91 FL (ref 80–99)
PLATELET # BLD: 70 K/UL (ref 150–450)
POTASSIUM SERPL-SCNC: 3.6 MMOL/L (ref 3.5–5.1)
RBC # BLD AUTO: 4.03 M/UL (ref 4.2–5.4)
SODIUM SERPL-SCNC: 160 MMOL/L (ref 136–145)
WBC # BLD AUTO: 16.3 K/UL (ref 4.8–10.8)

## 2018-05-06 RX ADMIN — LEVALBUTEROL HYDROCHLORIDE SCH MG: 1.25 SOLUTION, CONCENTRATE RESPIRATORY (INHALATION) at 19:23

## 2018-05-06 RX ADMIN — LEVETIRACETAM SCH MG: 100 SOLUTION ORAL at 21:47

## 2018-05-06 RX ADMIN — MEROPENEM SCH MLS/HR: 1 INJECTION INTRAVENOUS at 09:54

## 2018-05-06 RX ADMIN — LEVETIRACETAM SCH MG: 100 SOLUTION ORAL at 06:00

## 2018-05-06 RX ADMIN — LEVALBUTEROL HYDROCHLORIDE SCH MG: 1.25 SOLUTION, CONCENTRATE RESPIRATORY (INHALATION) at 06:48

## 2018-05-06 RX ADMIN — Medication SCH MLS/HR: at 13:18

## 2018-05-06 RX ADMIN — LEVALBUTEROL HYDROCHLORIDE SCH MG: 1.25 SOLUTION, CONCENTRATE RESPIRATORY (INHALATION) at 11:00

## 2018-05-06 RX ADMIN — LEVALBUTEROL HYDROCHLORIDE SCH MG: 1.25 SOLUTION, CONCENTRATE RESPIRATORY (INHALATION) at 02:48

## 2018-05-06 RX ADMIN — LEVETIRACETAM SCH MG: 100 SOLUTION ORAL at 14:00

## 2018-05-06 RX ADMIN — LEVALBUTEROL HYDROCHLORIDE SCH MG: 1.25 SOLUTION, CONCENTRATE RESPIRATORY (INHALATION) at 23:14

## 2018-05-06 RX ADMIN — LEVALBUTEROL HYDROCHLORIDE SCH MG: 1.25 SOLUTION, CONCENTRATE RESPIRATORY (INHALATION) at 10:07

## 2018-05-06 RX ADMIN — MEROPENEM SCH MLS/HR: 1 INJECTION INTRAVENOUS at 21:27

## 2018-05-06 RX ADMIN — CHLORHEXIDINE GLUCONATE SCH APPLIC: 213 SOLUTION TOPICAL at 21:26

## 2018-05-06 RX ADMIN — LEVALBUTEROL HYDROCHLORIDE SCH MG: 1.25 SOLUTION, CONCENTRATE RESPIRATORY (INHALATION) at 14:51

## 2018-05-06 NOTE — GENERAL PROGRESS NOTE
Assessment/Plan


Assessment/Plan


IMPRESSION/RECS:


#. Stage IV malignancy, potentially lung cancer given pattern of spread, proven 

malignancy on pleural fluid. Has a left lower lobe lung mass. Left inferior 

hilar mass. Left pleural base lung mass. Left pleural effusion. Retroperitoneal 

lymphadenopathy. Multiple low-attenuation liver lesions.


--> Discussed with tre RN, Primary MD, given poor performance status recommend 

palliative/hospice care


--> Do not recommend any further diagnosis/tissue given poor state, is not a 

chemotherapy candidate


#. Anemia due to underlying malignancy - continue to closely monitor


--> hgb goal is >7. Transfuse if needed. 


#. Crohn disease.


#. Opioid dependence.


#. Chronic obstructive pulmonary disease.


#. History of smoking.


#. Emphysema.


#. Perianal abscess.


#. Intractable abdominal pain.


#. Status post morphine pump placement.


#. Status post exploratory laparotomy.


#. Pneumoperitoneum.


#. Tachycardia.





Subjective


Date patient seen:  May 5, 2018


Constitutional:  Reports: fever


HEENT:  Reports: no symptoms


Respiratory:  Reports: shortness of breath


Allergies:  


Coded Allergies:  


     No Known Allergies (Verified , 10/27/06)


Subjective


Patient is lethargic and tachycardic





Objective





Last 24 Hour Vital Signs








  Date Time  Temp Pulse Resp B/P (MAP) Pulse Ox O2 Delivery O2 Flow Rate FiO2


 


5/6/18 10:16  97 18  98 Non-Rebreather 15.0 100


 


5/6/18 10:07  98 20  99 Non-Rebreather 15.0 100


 


5/6/18 08:00       15.0 


 


5/6/18 08:00 97.3 92 16 103/68 98 Non-Rebreather 15.0 





 97.3       


 


5/6/18 07:45  94      


 


5/6/18 06:59  102 22  98 Non-Rebreather 15.0 100


 


5/6/18 06:48     98 Non-Rebreather 15.0 100


 


5/6/18 06:48  100 22  98 Non-Rebreather 15.0 100


 


5/6/18 06:48      Non-Rebreather 15.0 100


 


5/6/18 04:00 97.7 105 20 116/73 97 Non-Rebreather 15.0 





 97.7       


 


5/6/18 04:00       15.0 100


 


5/6/18 04:00  103      


 


5/6/18 03:01  108 20  95 Non-Rebreather 15.0 100


 


5/6/18 02:47        100


 


5/6/18 02:47  108 20  95 Non-Rebreather 15.0 100


 


5/6/18 00:00       15.0 100


 


5/6/18 00:00  106      


 


5/6/18 00:00 98.1 104 20 106/64 100 Non-Rebreather 15.0 100





 98.1       


 


5/5/18 23:27  108 24  100 Non-Rebreather 15.0 100


 


5/5/18 23:21        100


 


5/5/18 23:20  108 24  100 Non-Rebreather 15.0 100


 


5/5/18 20:00  103      


 


5/5/18 20:00 97.5 109 24 106/64 98 Non-Rebreather 15.0 100





 97.5       


 


5/5/18 20:00       15.0 100


 


5/5/18 19:32  103 24  96 Non-Rebreather 15.0 100


 


5/5/18 19:25        100


 


5/5/18 19:24  103 24  96 Non-Rebreather 15.0 100


 


5/5/18 16:05  112 24  99 Non-Rebreather 15.0 100


 


5/5/18 16:00  116      


 


5/5/18 16:00 98.4 116 22 108/68 100 Venturi Mask  55





 98.4       


 


5/5/18 16:00       15.0 


 


5/5/18 15:55  111 22  96 Non-Rebreather 15.0 100


 


5/5/18 13:17  56 22   Non-Rebreather 15.0 100


 


5/5/18 12:05 98.0       


 


5/5/18 12:00 97.7 59 26 105/67 90 Venturi Mask  55





 97.7       


 


5/5/18 12:00       15.0 


 


5/5/18 11:56  121      


 


5/5/18 11:50  53 22  97 Non-Rebreather 15.0 100


 


5/5/18 11:40  53 24  90 Venturi Mask 15.0 55

















Intake and Output  


 


 5/5/18 5/6/18





 19:00 07:00


 


Intake Total 1438.300 ml 700 ml


 


Balance 1438.300 ml 700 ml


 


  


 


IV Total 1438.300 ml 700 ml


 


# Voids 1 2








Laboratory Tests


5/6/18 03:40: 


White Blood Count 16.3H, Red Blood Count 4.03L, Hemoglobin 11.3L, Hematocrit 

36.9L, Mean Corpuscular Volume 91, Mean Corpuscular Hemoglobin 28.0, Mean 

Corpuscular Hemoglobin Concent 30.6L, Red Cell Distribution Width 15.2H, 

Platelet Count 70L, Mean Platelet Volume 9.4, Neutrophils (%) (Auto) , 

Lymphocytes (%) (Auto) , Monocytes (%) (Auto) , Eosinophils (%) (Auto) , 

Basophils (%) (Auto) , Differential Total Cells Counted 100, Neutrophils % (

Manual) 83H, Lymphocytes % (Manual) 12L, Monocytes % (Manual) 1, Eosinophils % (

Manual) 1, Basophils % (Manual) 0, Band Neutrophils 3, Platelet Estimate 

DecreasedL, Platelet Morphology Normal, Hypochromasia 1+, Anisocytosis 1+, 

Sodium Level 160H, Potassium Level 3.6, Chloride Level 120H, Carbon Dioxide 

Level 30, Anion Gap 10, Blood Urea Nitrogen 73H, Creatinine 2.0H, Estimat 

Glomerular Filtration Rate 30.4, Glucose Level 130H, Calcium Level 9.2


Height (Feet):  5


Height (Inches):  6.00


Weight (Pounds):  129


General Appearance:  lethargic


EENT:  PERRL/EOMI


Neck:  supple


Cardiovascular:  tachycardia











Erick Russell MD May 6, 2018 10:35

## 2018-05-06 NOTE — GENERAL PROGRESS NOTE
Assessment/Plan


Status:  stable, not improved


Assessment/Plan


IMPRESSION/RECS:


#. Stage IV malignancy, potentially lung cancer given pattern of spread, proven 

malignancy on pleural fluid. Has a left lower lobe lung mass. Left inferior 

hilar mass. Left pleural base lung mass. Left pleural effusion. Retroperitoneal 

lymphadenopathy. Multiple low-attenuation liver lesions.


--> Discussed with KYLEE toledo, Primary MD, given poor performance status recommend 

palliative/hospice care


--> Do not recommend any further diagnosis/tissue given poor state, is not a 

chemotherapy candidate


#. Anemia due to underlying malignancy - continue to closely monitor


--> hgb goal is >7. Transfuse if needed. 


#. Crohn disease.


#. Opioid dependence.


#. Chronic obstructive pulmonary disease.


#. History of smoking.


#. Emphysema.


#. Perianal abscess.


#. Intractable abdominal pain.


#. Status post morphine pump placement.


#. Status post exploratory laparotomy.


#. Pneumoperitoneum.


#. Tachycardia.





Subjective


Date patient seen:  May 4, 2018


Allergies:  


Coded Allergies:  


     No Known Allergies (Verified , 10/27/06)


All Systems:  reviewed and negative except above


Subjective


Patient is awake and stable. In no acute medical distress





Objective





Last 24 Hour Vital Signs








  Date Time  Temp Pulse Resp B/P (MAP) Pulse Ox O2 Delivery O2 Flow Rate FiO2


 


5/6/18 10:16  97 18  98 Non-Rebreather 15.0 100


 


5/6/18 10:07  98 20  99 Non-Rebreather 15.0 100


 


5/6/18 08:00       15.0 


 


5/6/18 08:00 97.3 92 16 103/68 98 Non-Rebreather 15.0 





 97.3       


 


5/6/18 07:45  94      


 


5/6/18 06:59  102 22  98 Non-Rebreather 15.0 100


 


5/6/18 06:48     98 Non-Rebreather 15.0 100


 


5/6/18 06:48  100 22  98 Non-Rebreather 15.0 100


 


5/6/18 06:48      Non-Rebreather 15.0 100


 


5/6/18 04:00 97.7 105 20 116/73 97 Non-Rebreather 15.0 





 97.7       


 


5/6/18 04:00       15.0 100


 


5/6/18 04:00  103      


 


5/6/18 03:01  108 20  95 Non-Rebreather 15.0 100


 


5/6/18 02:47        100


 


5/6/18 02:47  108 20  95 Non-Rebreather 15.0 100


 


5/6/18 00:00       15.0 100


 


5/6/18 00:00  106      


 


5/6/18 00:00 98.1 104 20 106/64 100 Non-Rebreather 15.0 100





 98.1       


 


5/5/18 23:27  108 24  100 Non-Rebreather 15.0 100


 


5/5/18 23:21        100


 


5/5/18 23:20  108 24  100 Non-Rebreather 15.0 100


 


5/5/18 20:00  103      


 


5/5/18 20:00 97.5 109 24 106/64 98 Non-Rebreather 15.0 100





 97.5       


 


5/5/18 20:00       15.0 100


 


5/5/18 19:32  103 24  96 Non-Rebreather 15.0 100


 


5/5/18 19:25        100


 


5/5/18 19:24  103 24  96 Non-Rebreather 15.0 100


 


5/5/18 16:05  112 24  99 Non-Rebreather 15.0 100


 


5/5/18 16:00  116      


 


5/5/18 16:00 98.4 116 22 108/68 100 Venturi Mask  55





 98.4       


 


5/5/18 16:00       15.0 


 


5/5/18 15:55  111 22  96 Non-Rebreather 15.0 100


 


5/5/18 13:17  56 22   Non-Rebreather 15.0 100


 


5/5/18 12:05 98.0       


 


5/5/18 12:00 97.7 59 26 105/67 90 Venturi Mask  55





 97.7       


 


5/5/18 12:00       15.0 


 


5/5/18 11:56  121      


 


5/5/18 11:50  53 22  97 Non-Rebreather 15.0 100


 


5/5/18 11:40  53 24  90 Venturi Mask 15.0 55








Last 24 Hour Vital Signs








  Date Time  Temp Pulse Resp B/P (MAP) Pulse Ox O2 Delivery O2 Flow Rate FiO2


 


5/6/18 10:16  97 18  98 Non-Rebreather 15.0 100


 


5/6/18 10:07  98 20  99 Non-Rebreather 15.0 100


 


5/6/18 08:00       15.0 


 


5/6/18 08:00 97.3 92 16 103/68 98 Non-Rebreather 15.0 





 97.3       


 


5/6/18 07:45  94      


 


5/6/18 06:59  102 22  98 Non-Rebreather 15.0 100


 


5/6/18 06:48     98 Non-Rebreather 15.0 100


 


5/6/18 06:48  100 22  98 Non-Rebreather 15.0 100


 


5/6/18 06:48      Non-Rebreather 15.0 100


 


5/6/18 04:00 97.7 105 20 116/73 97 Non-Rebreather 15.0 





 97.7       


 


5/6/18 04:00       15.0 100


 


5/6/18 04:00  103      


 


5/6/18 03:01  108 20  95 Non-Rebreather 15.0 100


 


5/6/18 02:47        100


 


5/6/18 02:47  108 20  95 Non-Rebreather 15.0 100


 


5/6/18 00:00       15.0 100


 


5/6/18 00:00  106      


 


5/6/18 00:00 98.1 104 20 106/64 100 Non-Rebreather 15.0 100





 98.1       


 


5/5/18 23:27  108 24  100 Non-Rebreather 15.0 100


 


5/5/18 23:21        100


 


5/5/18 23:20  108 24  100 Non-Rebreather 15.0 100


 


5/5/18 20:00  103      


 


5/5/18 20:00 97.5 109 24 106/64 98 Non-Rebreather 15.0 100





 97.5       


 


5/5/18 20:00       15.0 100


 


5/5/18 19:32  103 24  96 Non-Rebreather 15.0 100


 


5/5/18 19:25        100


 


5/5/18 19:24  103 24  96 Non-Rebreather 15.0 100


 


5/5/18 16:05  112 24  99 Non-Rebreather 15.0 100


 


5/5/18 16:00  116      


 


5/5/18 16:00 98.4 116 22 108/68 100 Venturi Mask  55





 98.4       


 


5/5/18 16:00       15.0 


 


5/5/18 15:55  111 22  96 Non-Rebreather 15.0 100


 


5/5/18 13:17  56 22   Non-Rebreather 15.0 100


 


5/5/18 12:05 98.0       


 


5/5/18 12:00 97.7 59 26 105/67 90 Venturi Mask  55





 97.7       


 


5/5/18 12:00       15.0 


 


5/5/18 11:56  121      


 


5/5/18 11:50  53 22  97 Non-Rebreather 15.0 100


 


5/5/18 11:40  53 24  90 Venturi Mask 15.0 55

















Intake and Output  


 


 5/5/18 5/6/18





 19:00 07:00


 


Intake Total 1438.300 ml 700 ml


 


Balance 1438.300 ml 700 ml


 


  


 


IV Total 1438.300 ml 700 ml


 


# Voids 1 2








Laboratory Tests


5/6/18 03:40: 


White Blood Count 16.3H, Red Blood Count 4.03L, Hemoglobin 11.3L, Hematocrit 

36.9L, Mean Corpuscular Volume 91, Mean Corpuscular Hemoglobin 28.0, Mean 

Corpuscular Hemoglobin Concent 30.6L, Red Cell Distribution Width 15.2H, 

Platelet Count 70L, Mean Platelet Volume 9.4, Neutrophils (%) (Auto) , 

Lymphocytes (%) (Auto) , Monocytes (%) (Auto) , Eosinophils (%) (Auto) , 

Basophils (%) (Auto) , Differential Total Cells Counted 100, Neutrophils % (

Manual) 83H, Lymphocytes % (Manual) 12L, Monocytes % (Manual) 1, Eosinophils % (

Manual) 1, Basophils % (Manual) 0, Band Neutrophils 3, Platelet Estimate 

DecreasedL, Platelet Morphology Normal, Hypochromasia 1+, Anisocytosis 1+, 

Sodium Level 160H, Potassium Level 3.6, Chloride Level 120H, Carbon Dioxide 

Level 30, Anion Gap 10, Blood Urea Nitrogen 73H, Creatinine 2.0H, Estimat 

Glomerular Filtration Rate 30.4, Glucose Level 130H, Calcium Level 9.2


Height (Feet):  5


Height (Inches):  6.00


Weight (Pounds):  129


General Appearance:  no apparent distress











Erick Russell MD May 6, 2018 10:32

## 2018-05-06 NOTE — CARDIAC ELECTROPHYSIOLOGY PN
Assessment/Plan


Assessment/Plan


1. Sinus tachycardia with no evidence of fib or SVT due to pain and respiratory 

failure. 


     Echocardiogram showed EF 65%.  On iv Abx


2. Malignant pleural effusion and metastatic cancer. 


     Follow up Dr. Fraga. S/p Left Thoracentesis on 4/20/18


     Morphine pump in abdomen. On BIPAP


3. Lung mass.   


4. Crohn disease and history of exploratory laparotomy, under management of Dr. Mcleod.


5. Ascites  paracentesis with cytology and per Dr. Russell.


6. DNR and DNI





DW RN





Subjective


Subjective


On BIPAP on CHASITY in sinus tach 120s. Family at bedside. DNR  and lethargic





Objective





Last 24 Hour Vital Signs








  Date Time  Temp Pulse Resp B/P (MAP) Pulse Ox O2 Delivery O2 Flow Rate FiO2


 


5/6/18 12:00 98.7 92 18 103/67 100 Non-Rebreather 15.0 





 98.7       


 


5/6/18 12:00       15.0 


 


5/6/18 11:47  102      


 


5/6/18 10:16  97 18  98 Non-Rebreather 15.0 100


 


5/6/18 10:07  98 20  99 Non-Rebreather 15.0 100


 


5/6/18 08:00       15.0 


 


5/6/18 08:00 97.3 92 16 103/68 98 Non-Rebreather 15.0 





 97.3       


 


5/6/18 07:45  94      


 


5/6/18 06:59  102 22  98 Non-Rebreather 15.0 100


 


5/6/18 06:48     98 Non-Rebreather 15.0 100


 


5/6/18 06:48  100 22  98 Non-Rebreather 15.0 100


 


5/6/18 06:48      Non-Rebreather 15.0 100


 


5/6/18 04:00 97.7 105 20 116/73 97 Non-Rebreather 15.0 





 97.7       


 


5/6/18 04:00       15.0 100


 


5/6/18 04:00  103      


 


5/6/18 03:01  108 20  95 Non-Rebreather 15.0 100


 


5/6/18 02:47        100


 


5/6/18 02:47  108 20  95 Non-Rebreather 15.0 100


 


5/6/18 00:00       15.0 100


 


5/6/18 00:00  106      


 


5/6/18 00:00 98.1 104 20 106/64 100 Non-Rebreather 15.0 100





 98.1       


 


5/5/18 23:27  108 24  100 Non-Rebreather 15.0 100


 


5/5/18 23:21        100


 


5/5/18 23:20  108 24  100 Non-Rebreather 15.0 100


 


5/5/18 20:00  103      


 


5/5/18 20:00 97.5 109 24 106/64 98 Non-Rebreather 15.0 100





 97.5       


 


5/5/18 20:00       15.0 100


 


5/5/18 19:32  103 24  96 Non-Rebreather 15.0 100


 


5/5/18 19:25        100


 


5/5/18 19:24  103 24  96 Non-Rebreather 15.0 100


 


5/5/18 16:05  112 24  99 Non-Rebreather 15.0 100


 


5/5/18 16:00  116      


 


5/5/18 16:00 98.4 116 22 108/68 100 Venturi Mask  55





 98.4       


 


5/5/18 16:00       15.0 


 


5/5/18 15:55  111 22  96 Non-Rebreather 15.0 100

















Intake and Output  


 


 5/5/18 5/6/18





 19:00 07:00


 


Intake Total 1438.300 ml 700 ml


 


Balance 1438.300 ml 700 ml


 


  


 


IV Total 1438.300 ml 700 ml


 


# Voids 1 2











Laboratory Tests








Test


  5/6/18


03:40


 


White Blood Count


  16.3 K/UL


(4.8-10.8)  H


 


Red Blood Count


  4.03 M/UL


(4.20-5.40)  L


 


Hemoglobin


  11.3 G/DL


(12.0-16.0)  L


 


Hematocrit


  36.9 %


(37.0-47.0)  L


 


Mean Corpuscular Volume 91 FL (80-99)  


 


Mean Corpuscular Hemoglobin


  28.0 PG


(27.0-31.0)


 


Mean Corpuscular Hemoglobin


Concent 30.6 G/DL


(32.0-36.0)  L


 


Red Cell Distribution Width


  15.2 %


(11.6-14.8)  H


 


Platelet Count


  70 K/UL


(150-450)  L


 


Mean Platelet Volume


  9.4 FL


(6.5-10.1)


 


Neutrophils (%) (Auto)


  % (45.0-75.0)


 


 


Lymphocytes (%) (Auto)


  % (20.0-45.0)


 


 


Monocytes (%) (Auto)  % (1.0-10.0)  


 


Eosinophils (%) (Auto)  % (0.0-3.0)  


 


Basophils (%) (Auto)  % (0.0-2.0)  


 


Differential Total Cells


Counted 100  


 


 


Neutrophils % (Manual) 83 % (45-75)  H


 


Lymphocytes % (Manual) 12 % (20-45)  L


 


Monocytes % (Manual) 1 % (1-10)  


 


Eosinophils % (Manual) 1 % (0-3)  


 


Basophils % (Manual) 0 % (0-2)  


 


Band Neutrophils 3 % (0-8)  


 


Platelet Estimate Decreased  L


 


Platelet Morphology Normal  


 


Hypochromasia 1+  


 


Anisocytosis 1+  


 


Sodium Level


  160 MMOL/L


(136-145)  H


 


Potassium Level


  3.6 MMOL/L


(3.5-5.1)


 


Chloride Level


  120 MMOL/L


()  H


 


Carbon Dioxide Level


  30 MMOL/L


(21-32)


 


Anion Gap


  10 mmol/L


(5-15)


 


Blood Urea Nitrogen


  73 mg/dL


(7-18)  H


 


Creatinine


  2.0 MG/DL


(0.55-1.30)  H


 


Estimat Glomerular Filtration


Rate 30.4 mL/min


(>60)


 


Glucose Level


  130 MG/DL


()  H


 


Calcium Level


  9.2 MG/DL


(8.5-10.1)











Microbiology








 Date/Time


Source Procedure


Growth Status


 


 


 5/5/18 04:00


Blood Blood Culture - Preliminary Resulted


 


 5/4/18 15:30


Blood Blood Culture - Preliminary Resulted








Objective


HEAD AND NECK:   BIPAP on


LUNGS:  Coarse rhonchi.


CARDIOVASCULAR:  Tachy S1 and S2


ABDOMEN:  Tender with a pump under skin.


EXTREMITIES:  No pitting edema.











Delon Chawla MD May 6, 2018 14:18

## 2018-05-06 NOTE — CONSULTATION
DATE OF CONSULTATION:  2018



CONSULTING PHYSICIAN:  Peyton French M.D.



REFERRING PHYSICIAN:  Maxim Hopkins D.O.



REASON FOR CONSULTATION:  Hypernatremia, acute renal failure.



HISTORY OF PRESENT ILLNESS:  The patient is an unfortunate 62-year-old,

 female with past medical history significant for Crohn

disease with hypertension, CVA, seizure disorder, history of chronic pain

syndrome, history of COPD, who was originally admitted with diagnosis of a

diagnosis of small bowel obstruction, found to be tachycardic, hypoxemic.

The patient found to have metastatic dysphagia or CA with metastasis to

the lung, liver, and lymphadenopathy.  She is found to have right

recurrent pleural effusion.  She also found to have respiratory failure.

She was on oral feeding up to this morning when she found to not able to

swallow and the patient found to have an elevation in BUN and creatinine

and also found to be severely hypernatremic.  I was called for management

of renal disease and electrolyte imbalance.



PAST MEDICAL HISTORY:  Includin. History of CVA.

2. History of seizure disorder.

3. History of Crohn disease.

4. History of small bowel obstruction.

5. History of chronic pain syndrome.

6. History of COPD.

7. History of asthma.

8. History of anemia.

9. History of thrombocytopenia.

10. History of failure to thrive and history of respiratory failure.

11. History of multiple abdominal surgery.



MEDICATIONS:  Reviewed.



FAMILY HISTORY:  Noncontributory.



SOCIAL HISTORY:  The patient lives at home.  There is a history of previous

tobacco use but no alcohol or drug use.



REVIEW OF SYSTEMS:  Limited.  The patient is currently on non-rebreather

mask and seems to be confused and not able to provide meaningful answers

to my question.



PHYSICAL EXAMINATION:

VITAL SIGNS:  The patient had temperature of 98 degrees, blood pressure of

105/67, pulse rate of 59, and respiratory rate of 18.

HEAD AND NECK:  Bitemporal wasting.  Extraocular movements intact.  Pupils

are reactive to light and accommodation.  Dry mucous membranes.  Currently

on non-rebreather mask.

LUNGS:  Decreased breathing sounds on the both sides.

CARDIAC:  Regular rate and rhythm.  S1 and S2.  No murmur.  No rub.

 

ABDOMEN:  Tender.  No guarding or rebound.  Bowel sounds decreased.

 

EXTREMITIES:  No edema.  No clubbing.  No cyanosis.



LABORATORY AND DIAGNOSTIC DATA:  Her laboratory value revealed WBC count of

17,000, hemoglobin of 13.5, hematocrit of 41, platelet count of 97.

Chemistry revealed sodium of 162, potassium 3.8, chloride 118, bicarb 30,

BUN 74, creatinine of 1.8, and glucose of 150.  Calcium of 9.5.  AST of

119, ALT of 37, alkaline phosphatase of 156.  UA revealed specific gravity

of 1.020, pH of 6, protein 3+, ______ rbc's 15 to 20, wbc's 10 to 15.



ASSESSMENT:

1. Hypernatremia

2. Acute renal failure.

3. Malnutrition.

4. Failure to thrive.

5. Metastatic CA.

6. Recurrent pleural effusion.

7. Stage 4 Metastatic CA.



PLAN:  Change IV fluids to D5W.  At this time, the patient is DNR/DNI.  I

would continue with comfort care.  I would continue with D5W.  Family and

the son on bedside and refuse NG tube feeding.  I would monitor

electrolytes closely.  Replace electrolytes as needed.



Again, I would like to thank, Dr. Maxim Hopkins, for allowing me to

participate in the care of this patient.









  ______________________________________________

  Peyton French M.D. DR:  Alfred

D:  2018 17:16

T:  2018 23:53

JOB#:  [id number]

CC:

## 2018-05-06 NOTE — PULMONOLOGY PROGRESS NOTE
Assessment/Plan


Assessment/Plan





ASSESSMENT


Stage IV metastatic CA ( with left lung mass, liver masses, lymphadenopathy) 


Intractable abdominal pain ( due to above) 


Malignant pleural effusion, status post thoracentesis


Acute hypoxemic respiratory failure , requiring BiPAP


Acute renal failure-worse 


Hypernatremia


Thrombocytopenia


Anemia of underlying malignancy


Multiorgan failure 


COPD/asthma 


Sinus tachycardia 


Chronic pain syndrome ( has pain pump)


History of smoking


History of CVA


Seizure disorder


Crohn's disease





PLAN OF CARE


CHASITY status 


IV fluids,  changed to D5W   due to hypernatremia 


closely monitor renal parameters and electrolytes,


ARF worse


correct electrolytes   as needed. 


avoided nephrotoxics


deteriorating 


on 100% NRM,  titrate as needed to keep pulse oximetry above 92% 


pulmonary toilet 


DNR/DNI status since 5/2/18





ID follows,  leukocytosis probably secondary to malignancy 


on antibiotic - Zosyn  





cardio follows;  Echo with preserved EF,  no evidence of A. fib on tele 


off  heparin secondary to thrombocytopenia , SCD (venous duplex bilateral lower 

extremity negative)





seizure precautions, continue Keppra 


pain management 


supportive care 


overall prognosis poor    


rapidly deteriorating 


family desires comfort measures





case discussed and evaluated by supervising physician





Subjective


Allergies:  


Coded Allergies:  


     No Known Allergies (Verified , 10/27/06)


Subjective


condition worsening 


currently on 100% NRM


worsening renal parameters, 


persistent leukocytosis


poorly but still responsive to verbal stimuli





Objective





Last 24 Hour Vital Signs








  Date Time  Temp Pulse Resp B/P (MAP) Pulse Ox O2 Delivery O2 Flow Rate FiO2


 


5/6/18 07:45  94      


 


5/6/18 06:59  102 22  98 Non-Rebreather 15.0 100


 


5/6/18 06:48     98 Non-Rebreather 15.0 100


 


5/6/18 06:48  100 22  98 Non-Rebreather 15.0 100


 


5/6/18 06:48      Non-Rebreather 15.0 100


 


5/6/18 04:00 97.7 105 20 116/73 97 Non-Rebreather 15.0 





 97.7       


 


5/6/18 04:00       15.0 100


 


5/6/18 04:00  103      


 


5/6/18 03:01  108 20  95 Non-Rebreather 15.0 100


 


5/6/18 02:47        100


 


5/6/18 02:47  108 20  95 Non-Rebreather 15.0 100


 


5/6/18 00:00       15.0 100


 


5/6/18 00:00  106      


 


5/6/18 00:00 98.1 104 20 106/64 100 Non-Rebreather 15.0 100





 98.1       


 


5/5/18 23:27  108 24  100 Non-Rebreather 15.0 100


 


5/5/18 23:21        100


 


5/5/18 23:20  108 24  100 Non-Rebreather 15.0 100


 


5/5/18 20:00  103      


 


5/5/18 20:00 97.5 109 24 106/64 98 Non-Rebreather 15.0 100





 97.5       


 


5/5/18 20:00       15.0 100


 


5/5/18 19:32  103 24  96 Non-Rebreather 15.0 100


 


5/5/18 19:25        100


 


5/5/18 19:24  103 24  96 Non-Rebreather 15.0 100


 


5/5/18 16:05  112 24  99 Non-Rebreather 15.0 100


 


5/5/18 16:00  116      


 


5/5/18 16:00 98.4 116 22 108/68 100 Venturi Mask  55





 98.4       


 


5/5/18 16:00       15.0 


 


5/5/18 15:55  111 22  96 Non-Rebreather 15.0 100


 


5/5/18 13:17  56 22   Non-Rebreather 15.0 100


 


5/5/18 12:05 98.0       


 


5/5/18 12:00 97.7 59 26 105/67 90 Venturi Mask  55





 97.7       


 


5/5/18 12:00       15.0 


 


5/5/18 11:56  121      


 


5/5/18 11:50  53 22  97 Non-Rebreather 15.0 100


 


5/5/18 11:40  53 24  90 Venturi Mask 15.0 55

















Intake and Output  


 


 5/5/18 5/6/18





 19:00 07:00


 


Intake Total 1438.300 ml 700 ml


 


Balance 1438.300 ml 700 ml


 


  


 


IV Total 1438.300 ml 700 ml


 


# Voids 1 2








Objective


General Appearance:  mild distress


HEENT:  normocephalic, atraumatic, 100% NRM


Respiratory/Chest:  decreased breath sounds


Cardiovascular:  regular rhythm, tachycardia - 120 th,  PICC


Abdomen:  soft, non tender, non distended,  


Neurologic/Psychiatric:  bedridden, poorly but still responsive to verbal 

stimuli





Microbiology








 Date/Time


Source Procedure


Growth Status


 


 


 5/5/18 04:00


Blood Blood Culture - Preliminary


NO GROWTH AFTER 24 HOURS Resulted


 


 5/4/18 15:30


Blood Blood Culture - Preliminary


NO GROWTH AFTER 24 HOURS Resulted








Laboratory Tests


5/6/18 03:40: 


White Blood Count 16.3H, Red Blood Count 4.03L, Hemoglobin 11.3L, Hematocrit 

36.9L, Mean Corpuscular Volume 91, Mean Corpuscular Hemoglobin 28.0, Mean 

Corpuscular Hemoglobin Concent 30.6L, Red Cell Distribution Width 15.2H, 

Platelet Count 70L, Mean Platelet Volume 9.4, Neutrophils (%) (Auto) , 

Lymphocytes (%) (Auto) , Monocytes (%) (Auto) , Eosinophils (%) (Auto) , 

Basophils (%) (Auto) , Differential Total Cells Counted 100, Neutrophils % (

Manual) 83H, Lymphocytes % (Manual) 12L, Monocytes % (Manual) 1, Eosinophils % (

Manual) 1, Basophils % (Manual) 0, Band Neutrophils 3, Platelet Estimate 

DecreasedL, Platelet Morphology Normal, Hypochromasia 1+, Anisocytosis 1+, 

Sodium Level 160H, Potassium Level 3.6, Chloride Level 120H, Carbon Dioxide 

Level 30, Anion Gap 10, Blood Urea Nitrogen 73H, Creatinine 2.0H, Estimat 

Glomerular Filtration Rate 30.4, Glucose Level 130H, Calcium Level 9.2





Current Medications








 Medications


  (Trade)  Dose


 Ordered  Sig/Jed


 Route


 PRN Reason  Start Time


 Stop Time Status Last Admin


Dose Admin


 


 Acetaminophen


  (Tylenol)  650 mg  Q4H  PRN


 ORAL


 fever (temp>100.5F)  5/3/18 13:30


 5/19/18 13:29  5/4/18 06:22


 


 


 Acetaminophen


  (Tylenol)  650 mg  Q4H  PRN


 RECTAL


 Mild Pain (Pain Scale 1-3)  5/5/18 00:00


 6/4/18 00:00  5/5/18 09:44


 


 


 Acetaminophen/


 Hydrocodone Bitart


  (Norco 10/325)  1 tab  Q4H  PRN


 ORAL


 Moderate Pain (Pain Scale 4-6)  5/4/18 09:30


 5/11/18 09:29   


 


 


 Chlorhexidine


 Gluconate


  (Rosy-Hex 2%)  1 applic  DAILY@2000


 TOPIC


   5/6/18 20:00


 6/5/18 19:59   


 


 


 Dextrose  1,000 ml @ 


 100 mls/hr  Q10H


 IV


   5/5/18 10:00


 6/4/18 09:59  5/6/18 06:02


 


 


 Dextrose


  (Dextrose 50%)  50 ml  STAT  PRN


 IV


 Hypoglycemia BS<60mg/dL  5/3/18 13:30


 6/2/18 13:29   


 


 


 Levalbuterol HCl


  (Xopenex)  0.625 mg  Q4HRT


 HHN


   5/5/18 11:00


 5/7/18 10:59  5/6/18 06:48


 


 


 Levetiracetam


  (Keppra)  1,000 mg  EVERY 8  HOURS


 ORAL


   5/4/18 09:00


 6/3/18 08:59   


 


 


 Levothyroxine


 Sodium


  (Synthroid)  75 mcg  ACBREAKFAST


 ORAL


   5/4/18 06:30


 5/20/18 06:29  5/4/18 06:21


 


 


 Meropenem 1 gm/


 Sodium Chloride  55 ml @ 


 110 mls/hr  Q12HR


 IVPB


   5/5/18 21:00


 5/10/18 20:59  5/6/18 09:54


 


 


 Nitroglycerin


  (Ntg)  0.4 mg  Q5M X 3 DOSES PRN


 SL


 Prn Chest Pain  5/3/18 12:15


 5/19/18 10:59  5/4/18 01:53


 


 


 Ondansetron HCl


  (Zofran)  4 mg  Q6H  PRN


 IVP


 Nausea & Vomiting  5/3/18 14:30


 5/19/18 20:29  5/4/18 00:40


 


 


 Vancomycin HCl


  (Vanco rx to


 dose)  1 ea  DAILY  PRN


 MISC


 Per rx protocol  5/5/18 11:45


 6/4/18 11:44   


 


 


 Vancomycin/Sodium


 Chloride  250 ml @ 


 166.667


 mls/hr  Q24H


 IVPB


   5/6/18 13:00


 5/11/18 12:59   


 

















Carl (Herman)Rozina NP May 6, 2018 10:06

## 2018-05-06 NOTE — PROGRESS NOTE
DATE:  05/06/2018



SUBJECTIVE:  This is a 62-year-old female patient with abdominal pain.  She

continues to have altered mental status, high levels of anxiety, and mood

lability and is causing her to have decline in cognition.  That is why,

her attending has requested daily psychiatric consultation to reduce

anxiety and mood lability.



MENTAL STATUS EXAMINATION:  This is a 62-year-old female.  Disheveled.

Attitude, irritable and agitated.  Affect, guarded and restricted.

Intellect poor.  Mood depressed, anxious.  Motor activity, psychomotor

agitation.  Attention span is poor.  Orientation x2.  Speech is pressured.

Thought process, disorganized and illogical.  Thought content, no

auditory hallucinations or paranoid delusions.  Insight and judgment is

poor.



DIAGNOSIS:  Bipolar 2 disorder.



PLAN:  Continue her on Neurontin.  Continue managing her anxiety.

Encouraged her to interact appropriately with staff and other patients.

18 to 20 minutes of supportive psychotherapy provided.  Chart reviewed.

Discussed with staff.  The patient was seen and assessed at bedside.









  ______________________________________________

  Samantha Rick M.D.





DR:  CHERYL

D:  05/06/2018 10:01

T:  05/06/2018 15:44

JOB#:  [id number]

CC:

## 2018-05-06 NOTE — GENERAL PROGRESS NOTE
Assessment/Plan


Assessment/Plan


IMPRESSION/RECS:


#. Stage IV malignancy, potentially lung cancer given pattern of spread, proven 

malignancy on pleural fluid. 


--> Has a left lower lobe lung mass. Left inferior hilar mass. Left pleural 

base lung mass. Left pleural effusion. 


--> Retroperitoneal lymphadenopathy. Multiple low-attenuation liver lesions.


--> Discussed with son, RN, Primary MD, given poor performance status recommend 

palliative/hospice care


--> Do not recommend any further diagnosis/tissue given poor state, is not a 

chemotherapy candidate


#. Anemia due to underlying malignancy - continue to closely monitor


--> hgb goal is >7. Transfuse if needed. 


--> Hemoglobin stable at this time.


#. Crohn disease.


#. Opioid dependence.


#. Chronic obstructive pulmonary disease.


#. History of smoking.


#. Emphysema.


#. Perianal abscess.


#. Intractable abdominal pain.


#. Status post morphine pump placement.


#. Status post exploratory laparotomy.


#. Pneumoperitoneum.


#. Tachycardia.





Subjective


Date patient seen:  May 6, 2018


Constitutional:  Denies: no symptoms, chills, diaphoresis, fever, malaise, 

weakness, other


HEENT:  Denies: no symptoms, eye pain, blurred vision, tearing, double vision, 

ear pain, ear discharge, nose pain, nose congestion, throat pain, throat 

swelling, mouth pain, mouth swelling, other


Cardiovascular:  Denies: no symptoms, chest pain, edema, irregular heart rate, 

lightheadedness, palpitations, syncope, other


Respiratory:  Denies: no symptoms, cough, orthopnea, shortness of breath, SOB 

with excertion, SOB at rest, sputum, stridor, wheezing, other


Gastrointestinal/Abdominal:  Denies: no symptoms, abdomen distended, abdominal 

pain, black stools, tarry stools, blood in stool, constipated, diarrhea, 

difficulty swallowing, nausea, poor appetite, poor fluid intake, rectal bleeding

, vomiting, other


Genitourinary:  Denies: no symptoms, burning, discharge, frequency, flank pain, 

hematuria, incontinence, pain, urgency, other


Neurologic/Psychiatric:  Denies: no symptoms, anxiety, depressed, emotional 

problems, headache, numbness, paresthesia, pre-existing deficit, seizure, 

tingling, tremors, weakness, other


Allergies:  


Coded Allergies:  


     No Known Allergies (Verified , 10/27/06)


Subjective


Patient is lethargic and tachycardic. With face mask. On pain control.





Objective





Last 24 Hour Vital Signs








  Date Time  Temp Pulse Resp B/P (MAP) Pulse Ox O2 Delivery O2 Flow Rate FiO2


 


5/6/18 16:00       15.0 


 


5/6/18 16:00 97.9 90 14 98/64 98 Non-Rebreather 15.0 





 97.9       


 


5/6/18 16:00  91      


 


5/6/18 15:02  99 16  97 Non-Rebreather 15.0 100


 


5/6/18 14:52  98 16  94 Non-Rebreather 15.0 100


 


5/6/18 12:00 98.7 92 18 103/67 100 Non-Rebreather 15.0 





 98.7       


 


5/6/18 12:00       15.0 


 


5/6/18 11:47  102      


 


5/6/18 10:16  97 18  98 Non-Rebreather 15.0 100


 


5/6/18 10:07  98 20  99 Non-Rebreather 15.0 100


 


5/6/18 08:00       15.0 


 


5/6/18 08:00 97.3 92 16 103/68 98 Non-Rebreather 15.0 





 97.3       


 


5/6/18 07:45  94      


 


5/6/18 06:59  102 22  98 Non-Rebreather 15.0 100


 


5/6/18 06:48     98 Non-Rebreather 15.0 100


 


5/6/18 06:48  100 22  98 Non-Rebreather 15.0 100


 


5/6/18 06:48      Non-Rebreather 15.0 100


 


5/6/18 04:00 97.7 105 20 116/73 97 Non-Rebreather 15.0 





 97.7       


 


5/6/18 04:00       15.0 100


 


5/6/18 04:00  103      


 


5/6/18 03:01  108 20  95 Non-Rebreather 15.0 100


 


5/6/18 02:47        100


 


5/6/18 02:47  108 20  95 Non-Rebreather 15.0 100


 


5/6/18 00:00       15.0 100


 


5/6/18 00:00  106      


 


5/6/18 00:00 98.1 104 20 106/64 100 Non-Rebreather 15.0 100





 98.1       


 


5/5/18 23:27  108 24  100 Non-Rebreather 15.0 100


 


5/5/18 23:21        100


 


5/5/18 23:20  108 24  100 Non-Rebreather 15.0 100


 


5/5/18 20:00  103      


 


5/5/18 20:00 97.5 109 24 106/64 98 Non-Rebreather 15.0 100





 97.5       


 


5/5/18 20:00       15.0 100


 


5/5/18 19:32  103 24  96 Non-Rebreather 15.0 100


 


5/5/18 19:25        100


 


5/5/18 19:24  103 24  96 Non-Rebreather 15.0 100

















Intake and Output  


 


 5/5/18 5/6/18





 19:00 07:00


 


Intake Total 1438.300 ml 700 ml


 


Balance 1438.300 ml 700 ml


 


  


 


IV Total 1438.300 ml 700 ml


 


# Voids 1 2








Laboratory Tests


5/6/18 03:40: 


White Blood Count 16.3H, Red Blood Count 4.03L, Hemoglobin 11.3L, Hematocrit 

36.9L, Mean Corpuscular Volume 91, Mean Corpuscular Hemoglobin 28.0, Mean 

Corpuscular Hemoglobin Concent 30.6L, Red Cell Distribution Width 15.2H, 

Platelet Count 70L, Mean Platelet Volume 9.4, Neutrophils (%) (Auto) , 

Lymphocytes (%) (Auto) , Monocytes (%) (Auto) , Eosinophils (%) (Auto) , 

Basophils (%) (Auto) , Differential Total Cells Counted 100, Neutrophils % (

Manual) 83H, Lymphocytes % (Manual) 12L, Monocytes % (Manual) 1, Eosinophils % (

Manual) 1, Basophils % (Manual) 0, Band Neutrophils 3, Platelet Estimate 

DecreasedL, Platelet Morphology Normal, Hypochromasia 1+, Anisocytosis 1+, 

Sodium Level 160H, Potassium Level 3.6, Chloride Level 120H, Carbon Dioxide 

Level 30, Anion Gap 10, Blood Urea Nitrogen 73H, Creatinine 2.0H, Estimat 

Glomerular Filtration Rate 30.4, Glucose Level 130H, Calcium Level 9.2


Height (Feet):  5


Height (Inches):  6.00


Weight (Pounds):  129


Neck:  supple


Respiratory/Chest:  decreased breath sounds


Abdomen:  distended, tender











Erick Russell MD May 6, 2018 18:28

## 2018-05-06 NOTE — GENERAL PROGRESS NOTE
Assessment/Plan


Problem List:  


(1) Crohn's disease


ICD Codes:  K50.90 - Crohn's disease


SNOMED:  12536832


Qualifiers:  


   Qualified Codes:  K50.919 - Crohn's disease, unspecified, with unspecified 

complications


(2) Opioid dependence


ICD Codes:  F11.20 - Opioid dependence


SNOMED:  30197786


(3) Intractable abdominal pain


ICD Codes:  R10.9 - Unspecified abdominal pain


SNOMED:  62247754, 695941812


(4) COPD (chronic obstructive pulmonary disease)


ICD Codes:  J44.9 - Chronic obstructive pulmonary disease


SNOMED:  14478319


(5) Shortness of breath


(6) SOB (shortness of breath)


ICD Codes:  R06.02 - Shortness of breath


SNOMED:  054969936


(7) UTI (urinary tract infection)


ICD Codes:  N39.0 - Urinary tract infection, site not specified


SNOMED:  14406894


(8) Lung mass


ICD Codes:  R91.8 - Other nonspecific abnormal finding of lung field


SNOMED:  542064109


(9) Tachycardia


ICD Codes:  R00.0 - Tachycardia, unspecified


SNOMED:  0010216


Status:  unchanged


Assessment/Plan


ot pt diet pain control sx eval cbc bmp am heme f/u cardio eval, ltach eval prn





Subjective


Constitutional:  Reports: weakness


Allergies:  


Coded Allergies:  


     No Known Allergies (Verified , 10/27/06)


All Systems:  reviewed and negative except above


Subjective


02 mask sleeping





Objective





Last 24 Hour Vital Signs








  Date Time  Temp Pulse Resp B/P (MAP) Pulse Ox O2 Delivery O2 Flow Rate FiO2


 


5/6/18 06:48     98 Non-Rebreather 15.0 100


 


5/6/18 06:48  100 22  98 Non-Rebreather 15.0 100


 


5/6/18 06:48      Non-Rebreather 15.0 100


 


5/6/18 04:00       15.0 100


 


5/6/18 04:00  103      


 


5/6/18 03:01  108 20  95 Non-Rebreather 15.0 100


 


5/6/18 02:47        100


 


5/6/18 02:47  108 20  95 Non-Rebreather 15.0 100


 


5/6/18 00:00       15.0 100


 


5/6/18 00:00  106      


 


5/6/18 00:00 98.1 104 20 106/64 100 Non-Rebreather 15.0 100





 98.1       


 


5/5/18 23:27  108 24  100 Non-Rebreather 15.0 100


 


5/5/18 23:21        100


 


5/5/18 23:20  108 24  100 Non-Rebreather 15.0 100


 


5/5/18 20:00  103      


 


5/5/18 20:00 97.5 109 24 106/64 98 Non-Rebreather 15.0 100





 97.5       


 


5/5/18 20:00       15.0 100


 


5/5/18 19:32  103 24  96 Non-Rebreather 15.0 100


 


5/5/18 19:25        100


 


5/5/18 19:24  103 24  96 Non-Rebreather 15.0 100


 


5/5/18 16:05  112 24  99 Non-Rebreather 15.0 100


 


5/5/18 16:00  116      


 


5/5/18 16:00 98.4 116 22 108/68 100 Venturi Mask  55





 98.4       


 


5/5/18 16:00        40


 


5/5/18 15:55  111 22  96 Non-Rebreather 15.0 100


 


5/5/18 13:17  56 22   Non-Rebreather 15.0 100


 


5/5/18 12:05 98.0       


 


5/5/18 12:00 97.7 59 26 105/67 90 Venturi Mask  55





 97.7       


 


5/5/18 12:00        40


 


5/5/18 11:56  121      


 


5/5/18 11:50  53 22  97 Non-Rebreather 15.0 100


 


5/5/18 11:40  53 24  90 Venturi Mask 15.0 55


 


5/5/18 10:02 102.6       





 102.6       


 


5/5/18 09:44 102.9       


 


5/5/18 08:09  49 22  96 Venturi Mask 14.0 55


 


5/5/18 08:00 100.0 125 26 108/65 92 Venturi Mask  55





 100.0       


 


5/5/18 08:00        55


 


5/5/18 08:00  127      


 


5/5/18 07:59  48 22  93 Venturi Mask 15.0 55

















Intake and Output  


 


 5/5/18 5/6/18





 19:00 07:00


 


Intake Total 1438.300 ml 700 ml


 


Balance 1438.300 ml 700 ml


 


  


 


IV Total 1438.300 ml 700 ml


 


# Voids 1 








Laboratory Tests


5/6/18 03:40: 


White Blood Count 16.3H, Red Blood Count 4.03L, Hemoglobin 11.3L, Hematocrit 

36.9L, Mean Corpuscular Volume 91, Mean Corpuscular Hemoglobin 28.0, Mean 

Corpuscular Hemoglobin Concent 30.6L, Red Cell Distribution Width 15.2H, 

Platelet Count 70L, Mean Platelet Volume 9.4, Neutrophils (%) (Auto) , 

Lymphocytes (%) (Auto) , Monocytes (%) (Auto) , Eosinophils (%) (Auto) , 

Basophils (%) (Auto) , Neutrophils % (Manual) [Pending], Lymphocytes % (Manual) 

[Pending], Platelet Estimate [Pending], Platelet Morphology [Pending], Sodium 

Level 160H, Potassium Level 3.6, Chloride Level 120H, Carbon Dioxide Level 30, 

Anion Gap 10, Blood Urea Nitrogen 73H, Creatinine 2.0H, Estimat Glomerular 

Filtration Rate 30.4, Glucose Level 130H, Calcium Level 9.2


Height (Feet):  5


Height (Inches):  6.00


Weight (Pounds):  129


General Appearance:  lethargic


EENT:  normal ENT inspection


Neck:  supple


Cardiovascular:  normal peripheral pulses, normal rate, regular rhythm


Respiratory/Chest:  chest wall non-tender, decreased breath sounds


Abdomen:  normal bowel sounds, non tender, soft


Extremities:  normal inspection


Edema:  no edema noted Arm (L), no edema noted Arm (R), no edema noted Leg (L), 

no edema noted Leg (R), no edema noted Pedal (L), no edema noted Pedal (R), no 

edema noted Generalized


Neurologic:  motor weakness


Skin:  normal pigmentation, warm/dry











MAICOL LANG May 6, 2018 07:00

## 2018-05-07 VITALS — SYSTOLIC BLOOD PRESSURE: 104 MMHG | DIASTOLIC BLOOD PRESSURE: 65 MMHG

## 2018-05-07 VITALS — DIASTOLIC BLOOD PRESSURE: 79 MMHG | SYSTOLIC BLOOD PRESSURE: 96 MMHG

## 2018-05-07 VITALS — SYSTOLIC BLOOD PRESSURE: 90 MMHG | DIASTOLIC BLOOD PRESSURE: 50 MMHG

## 2018-05-07 VITALS — SYSTOLIC BLOOD PRESSURE: 98 MMHG | DIASTOLIC BLOOD PRESSURE: 56 MMHG

## 2018-05-07 VITALS — DIASTOLIC BLOOD PRESSURE: 56 MMHG | SYSTOLIC BLOOD PRESSURE: 98 MMHG

## 2018-05-07 VITALS — SYSTOLIC BLOOD PRESSURE: 101 MMHG | DIASTOLIC BLOOD PRESSURE: 61 MMHG

## 2018-05-07 LAB
ADD MANUAL DIFF: YES
ALBUMIN SERPL-MCNC: 1.9 G/DL (ref 3.4–5)
ALBUMIN/GLOB SERPL: 0.3 {RATIO} (ref 1–2.7)
ALP SERPL-CCNC: 80 U/L (ref 46–116)
ALT SERPL-CCNC: 30 U/L (ref 12–78)
AMMONIA PLAS-SCNC: 79 UMOL/L (ref 11–32)
ANION GAP SERPL CALC-SCNC: 8 MMOL/L (ref 5–15)
AST SERPL-CCNC: 64 U/L (ref 15–37)
BILIRUB SERPL-MCNC: 0.2 MG/DL (ref 0.2–1)
BUN SERPL-MCNC: 49 MG/DL (ref 7–18)
CALCIUM SERPL-MCNC: 9.1 MG/DL (ref 8.5–10.1)
CHLORIDE SERPL-SCNC: 117 MMOL/L (ref 98–107)
CO2 SERPL-SCNC: 30 MMOL/L (ref 21–32)
CREAT SERPL-MCNC: 1.4 MG/DL (ref 0.55–1.3)
ERYTHROCYTE [DISTWIDTH] IN BLOOD BY AUTOMATED COUNT: 15.1 % (ref 11.6–14.8)
GLOBULIN SER-MCNC: 5.8 G/DL
HCT VFR BLD CALC: 35.4 % (ref 37–47)
HGB BLD-MCNC: 11.3 G/DL (ref 12–16)
MCV RBC AUTO: 92 FL (ref 80–99)
PLATELET # BLD: 53 K/UL (ref 150–450)
POTASSIUM SERPL-SCNC: 3.7 MMOL/L (ref 3.5–5.1)
RBC # BLD AUTO: 3.83 M/UL (ref 4.2–5.4)
SODIUM SERPL-SCNC: 154 MMOL/L (ref 136–145)
WBC # BLD AUTO: 12.5 K/UL (ref 4.8–10.8)

## 2018-05-07 RX ADMIN — LEVALBUTEROL HYDROCHLORIDE SCH MG: 1.25 SOLUTION, CONCENTRATE RESPIRATORY (INHALATION) at 23:19

## 2018-05-07 RX ADMIN — LEVETIRACETAM SCH MG: 100 SOLUTION ORAL at 14:00

## 2018-05-07 RX ADMIN — LEVALBUTEROL HYDROCHLORIDE SCH MG: 1.25 SOLUTION, CONCENTRATE RESPIRATORY (INHALATION) at 07:37

## 2018-05-07 RX ADMIN — MEROPENEM SCH MLS/HR: 1 INJECTION INTRAVENOUS at 09:00

## 2018-05-07 RX ADMIN — LEVALBUTEROL HYDROCHLORIDE SCH MG: 1.25 SOLUTION, CONCENTRATE RESPIRATORY (INHALATION) at 19:13

## 2018-05-07 RX ADMIN — LEVETIRACETAM SCH MG: 100 SOLUTION ORAL at 06:00

## 2018-05-07 RX ADMIN — LEVALBUTEROL HYDROCHLORIDE SCH MG: 1.25 SOLUTION, CONCENTRATE RESPIRATORY (INHALATION) at 11:30

## 2018-05-07 RX ADMIN — Medication SCH MLS/HR: at 12:49

## 2018-05-07 RX ADMIN — LEVETIRACETAM SCH MG: 100 SOLUTION ORAL at 22:00

## 2018-05-07 RX ADMIN — LEVALBUTEROL HYDROCHLORIDE SCH MG: 1.25 SOLUTION, CONCENTRATE RESPIRATORY (INHALATION) at 02:36

## 2018-05-07 RX ADMIN — CHLORHEXIDINE GLUCONATE SCH APPLIC: 213 SOLUTION TOPICAL at 21:01

## 2018-05-07 RX ADMIN — LEVALBUTEROL HYDROCHLORIDE SCH MG: 1.25 SOLUTION, CONCENTRATE RESPIRATORY (INHALATION) at 15:05

## 2018-05-07 NOTE — DIAGNOSTIC IMAGING REPORT
--------------- APPROVED REPORT --------------





CPT Code: 16392



Present Symptoms

Comments: BILATERAL LEGS PAIN.





BILATERAL: Imaging reveals a patent deep venous system bilaterally. There is no evidence 

of thrombus within the femoral, popliteal or tibial segments. The greater saphenous veins 

are also within normal limits. Doppler indicates normal spontaneous flow within these 

segments.

## 2018-05-07 NOTE — GI PROGRESS NOTE
Assessment/Plan


Problems:  


(1) Crohn's disease


ICD Codes:  K50.90 - Crohn's disease


SNOMED:  54256841


Qualifiers:  


   Qualified Codes:  K50.919 - Crohn's disease, unspecified, with unspecified 

complications


(2) Opioid dependence


ICD Codes:  F11.20 - Opioid dependence


SNOMED:  77568186


(3) SBO (small bowel obstruction)


ICD Codes:  K56.609 - Unspecified intestinal obstruction, unspecified as to 

partial versus complete obstruction


SNOMED:  116854837


(4) Chronic abdominal pain


Status:  stable


Status Narrative


Discussed with Dr. Mcleod.


Assessment/Plan


CT AP reviewed >>


Evidence of disseminated malignancy, with large left pulmonary hilar mass or 

adenopathy, extensive left hilar and mediastinal adenopathy, multiple pleural-

based masses on the left, multiple masses within the liver, and retroperitoneal 

lymphadenopathy.





Recommendations


now DNI/DNR


fu oncology recs >>Do not recommend any further diagnosis/tissue given poor 

state, is not a chemotherapy candidate


pain mgmt


poor prognosis





Subjective


Subjective


generalized pain





Objective





Last 24 Hour Vital Signs








  Date Time  Temp Pulse Resp B/P (MAP) Pulse Ox O2 Delivery O2 Flow Rate FiO2


 


5/7/18 08:00 97.4 99 19 90/50 100 Non-Rebreather 15.0 





 97.4       


 


5/7/18 08:00  97      


 


5/7/18 07:55  100 16  97 Non-Rebreather 15.0 100


 


5/7/18 07:44     96 Non-Rebreather 15.0 100


 


5/7/18 07:44      Non-Rebreather 15.0 100


 


5/7/18 07:43  98 16  96 Non-Rebreather 15.0 100


 


5/7/18 04:00 97.5 90 14 98/56 100 Non-Rebreather 15.0 





 97.5       


 


5/7/18 04:00  89      


 


5/7/18 03:00  92 16  95 Non-Rebreather 15.0 100


 


5/7/18 02:36  97 16  99 Non-Rebreather 15.0 100


 


5/7/18 00:00 97.7 101 14 101/61 98 Non-Rebreather 15.0 





 97.7       


 


5/7/18 00:00  99      


 


5/7/18 00:00       15.0 


 


5/6/18 23:22  98 16  95 Non-Rebreather 15.0 100


 


5/6/18 23:14  87 16  99 Non-Rebreather 15.0 100


 


5/6/18 20:00  99      


 


5/6/18 20:00       15.0 


 


5/6/18 20:00 97.2 90 18 95/61 98 Non-Rebreather 15.0 





 97.2       


 


5/6/18 19:58  90 16  93 Non-Rebreather 15.0 100


 


5/6/18 19:24  89 16  93 Non-Rebreather 15.0 100


 


5/6/18 19:23     93 Non-Rebreather 15.0 100


 


5/6/18 19:23      Non-Rebreather 15.0 100


 


5/6/18 16:00       15.0 


 


5/6/18 16:00 97.9 90 14 98/64 98 Non-Rebreather 15.0 





 97.9       


 


5/6/18 16:00  91      


 


5/6/18 15:02  99 16  97 Non-Rebreather 15.0 100


 


5/6/18 14:52  98 16  94 Non-Rebreather 15.0 100


 


5/6/18 12:00 98.7 92 18 103/67 100 Non-Rebreather 15.0 





 98.7       


 


5/6/18 12:00       15.0 


 


5/6/18 11:47  102      

















Intake and Output  


 


 5/6/18 5/7/18





 19:00 07:00


 


Intake Total 455 ml 1100 ml


 


Balance 455 ml 1100 ml


 


  


 


IV Total 455 ml 1100 ml


 


# Voids 2 1











Laboratory Tests








Test


  5/7/18


04:00


 


White Blood Count


  12.5 K/UL


(4.8-10.8)  H


 


Red Blood Count


  3.83 M/UL


(4.20-5.40)  L


 


Hemoglobin


  11.3 G/DL


(12.0-16.0)  L


 


Hematocrit


  35.4 %


(37.0-47.0)  L


 


Mean Corpuscular Volume 92 FL (80-99)  


 


Mean Corpuscular Hemoglobin


  29.4 PG


(27.0-31.0)


 


Mean Corpuscular Hemoglobin


Concent 31.8 G/DL


(32.0-36.0)  L


 


Red Cell Distribution Width


  15.1 %


(11.6-14.8)  H


 


Platelet Count


  53 K/UL


(150-450)  L


 


Mean Platelet Volume


  9.7 FL


(6.5-10.1)


 


Neutrophils (%) (Auto)


  % (45.0-75.0)


 


 


Lymphocytes (%) (Auto)


  % (20.0-45.0)


 


 


Monocytes (%) (Auto)  % (1.0-10.0)  


 


Eosinophils (%) (Auto)  % (0.0-3.0)  


 


Basophils (%) (Auto)  % (0.0-2.0)  


 


Differential Total Cells


Counted 100  


 


 


Neutrophils % (Manual) 74 % (45-75)  


 


Lymphocytes % (Manual) 14 % (20-45)  L


 


Monocytes % (Manual) 6 % (1-10)  


 


Eosinophils % (Manual) 3 % (0-3)  


 


Basophils % (Manual) 0 % (0-2)  


 


Band Neutrophils 3 % (0-8)  


 


Nucleated Red Blood Cells 3 /100 WBC  


 


Platelet Estimate Decreased  L


 


Platelet Morphology Normal  


 


Sodium Level


  154 MMOL/L


(136-145)  H


 


Potassium Level


  3.7 MMOL/L


(3.5-5.1)


 


Chloride Level


  117 MMOL/L


()  H


 


Carbon Dioxide Level


  30 MMOL/L


(21-32)


 


Anion Gap


  8 mmol/L


(5-15)


 


Blood Urea Nitrogen


  49 mg/dL


(7-18)  H


 


Creatinine


  1.4 MG/DL


(0.55-1.30)  H


 


Estimat Glomerular Filtration


Rate 45.8 mL/min


(>60)


 


Glucose Level


  106 MG/DL


()


 


Calcium Level


  9.1 MG/DL


(8.5-10.1)


 


Total Bilirubin


  0.2 MG/DL


(0.2-1.0)


 


Aspartate Amino Transf


(AST/SGOT) 64 U/L (15-37)


H


 


Alanine Aminotransferase


(ALT/SGPT) 30 U/L (12-78)


 


 


Alkaline Phosphatase


  80 U/L


()


 


Ammonia


  79 umol/L


(11-32)  H


 


Total Protein


  7.7 G/DL


(6.4-8.2)


 


Albumin


  1.9 G/DL


(3.4-5.0)  L


 


Globulin 5.8 g/dL  


 


Albumin/Globulin Ratio


  0.3 (1.0-2.7)


L








Height (Feet):  5


Height (Inches):  6.00


Weight (Pounds):  129


General Appearance:  WD/WN, no apparent distress, alert


Cardiovascular:  normal rate


Respiratory/Chest:  no respiratory distress, other - non rebreather


Abdominal Exam:  normal bowel sounds, non tender, soft


Extremities:  non-tender











Elena Seay N.P. May 7, 2018 11:20

## 2018-05-07 NOTE — GENERAL PROGRESS NOTE
Assessment/Plan


Problem List:  


(1) Crohn's disease


ICD Codes:  K50.90 - Crohn's disease


SNOMED:  39905211


Qualifiers:  


   Qualified Codes:  K50.919 - Crohn's disease, unspecified, with unspecified 

complications


(2) Opioid dependence


ICD Codes:  F11.20 - Opioid dependence


SNOMED:  32983536


(3) Intractable abdominal pain


ICD Codes:  R10.9 - Unspecified abdominal pain


SNOMED:  28030189, 525866889


(4) COPD (chronic obstructive pulmonary disease)


ICD Codes:  J44.9 - Chronic obstructive pulmonary disease


SNOMED:  45544275


(5) Shortness of breath


(6) SOB (shortness of breath)


ICD Codes:  R06.02 - Shortness of breath


SNOMED:  174389369


(7) UTI (urinary tract infection)


ICD Codes:  N39.0 - Urinary tract infection, site not specified


SNOMED:  43702495


(8) Lung mass


ICD Codes:  R91.8 - Other nonspecific abnormal finding of lung field


SNOMED:  274501281


(9) Tachycardia


ICD Codes:  R00.0 - Tachycardia, unspecified


SNOMED:  7768493


Status:  unchanged


Assessment/Plan


ot pt diet pain control sx eval cbc bmp am heme f/u cardio eval, ltach eval





Subjective


Constitutional:  Reports: weakness


Respiratory:  Reports: shortness of breath


Allergies:  


Coded Allergies:  


     No Known Allergies (Verified , 10/27/06)


All Systems:  reviewed and negative except above


Subjective


02 mask sleepy





Objective





Last 24 Hour Vital Signs








  Date Time  Temp Pulse Resp B/P (MAP) Pulse Ox O2 Delivery O2 Flow Rate FiO2


 


5/7/18 12:00 98.4 97 19 98/56 100 Non-Rebreather 15.0 





 98.4       


 


5/7/18 11:43  91 16  97 Non-Rebreather 15.0 100


 


5/7/18 11:30  95 16  93 Non-Rebreather 15.0 100


 


5/7/18 08:00 97.4 99 19 90/50 100 Non-Rebreather 15.0 





 97.4       


 


5/7/18 08:00  97      


 


5/7/18 07:55  100 16  97 Non-Rebreather 15.0 100


 


5/7/18 07:44     96 Non-Rebreather 15.0 100


 


5/7/18 07:44      Non-Rebreather 15.0 100


 


5/7/18 07:43  98 16  96 Non-Rebreather 15.0 100


 


5/7/18 04:00 97.5 90 14 98/56 100 Non-Rebreather 15.0 





 97.5       


 


5/7/18 04:00  89      


 


5/7/18 03:00  92 16  95 Non-Rebreather 15.0 100


 


5/7/18 02:36  97 16  99 Non-Rebreather 15.0 100


 


5/7/18 00:00 97.7 101 14 101/61 98 Non-Rebreather 15.0 





 97.7       


 


5/7/18 00:00  99      


 


5/7/18 00:00       15.0 


 


5/6/18 23:22  98 16  95 Non-Rebreather 15.0 100


 


5/6/18 23:14  87 16  99 Non-Rebreather 15.0 100


 


5/6/18 20:00  99      


 


5/6/18 20:00       15.0 


 


5/6/18 20:00 97.2 90 18 95/61 98 Non-Rebreather 15.0 





 97.2       


 


5/6/18 19:58  90 16  93 Non-Rebreather 15.0 100


 


5/6/18 19:24  89 16  93 Non-Rebreather 15.0 100


 


5/6/18 19:23     93 Non-Rebreather 15.0 100


 


5/6/18 19:23      Non-Rebreather 15.0 100


 


5/6/18 16:00       15.0 


 


5/6/18 16:00 97.9 90 14 98/64 98 Non-Rebreather 15.0 





 97.9       


 


5/6/18 16:00  91      


 


5/6/18 15:02  99 16  97 Non-Rebreather 15.0 100


 


5/6/18 14:52  98 16  94 Non-Rebreather 15.0 100

















Intake and Output  


 


 5/6/18 5/7/18





 19:00 07:00


 


Intake Total 455 ml 1100 ml


 


Balance 455 ml 1100 ml


 


  


 


IV Total 455 ml 1100 ml


 


# Voids 2 1








Laboratory Tests


5/7/18 04:00: 


White Blood Count 12.5H, Red Blood Count 3.83L, Hemoglobin 11.3L, Hematocrit 

35.4L, Mean Corpuscular Volume 92, Mean Corpuscular Hemoglobin 29.4, Mean 

Corpuscular Hemoglobin Concent 31.8L, Red Cell Distribution Width 15.1H, 

Platelet Count 53L, Mean Platelet Volume 9.7, Neutrophils (%) (Auto) , 

Lymphocytes (%) (Auto) , Monocytes (%) (Auto) , Eosinophils (%) (Auto) , 

Basophils (%) (Auto) , Differential Total Cells Counted 100, Neutrophils % (

Manual) 74, Lymphocytes % (Manual) 14L, Monocytes % (Manual) 6, Eosinophils % (

Manual) 3, Basophils % (Manual) 0, Band Neutrophils 3, Nucleated Red Blood 

Cells 3, Platelet Estimate DecreasedL, Platelet Morphology Normal, Sodium Level 

154H, Potassium Level 3.7, Chloride Level 117H, Carbon Dioxide Level 30, Anion 

Gap 8, Blood Urea Nitrogen 49H, Creatinine 1.4H, Estimat Glomerular Filtration 

Rate 45.8, Glucose Level 106, Calcium Level 9.1, Total Bilirubin 0.2, Aspartate 

Amino Transf (AST/SGOT) 64H, Alanine Aminotransferase (ALT/SGPT) 30, Alkaline 

Phosphatase 80, Ammonia 79H, Total Protein 7.7, Albumin 1.9L, Globulin 5.8, 

Albumin/Globulin Ratio 0.3L


Height (Feet):  5


Height (Inches):  6.00


Weight (Pounds):  129


General Appearance:  lethargic


EENT:  normal ENT inspection


Neck:  non-tender


Cardiovascular:  normal peripheral pulses, normal rate, regular rhythm


Respiratory/Chest:  chest wall non-tender, decreased breath sounds


Abdomen:  normal bowel sounds, non tender, soft


Extremities:  normal inspection


Edema:  no edema noted Arm (L), no edema noted Arm (R), no edema noted Leg (L), 

no edema noted Leg (R), no edema noted Pedal (L), no edema noted Pedal (R), no 

edema noted Generalized


Neurologic:  responsive, motor weakness


Skin:  normal pigmentation, warm/dry











MAICOL LANG May 7, 2018 14:29

## 2018-05-07 NOTE — NEPHROLOGY PROGRESS NOTE
Assessment/Plan


Assessment


1. Hypernatremia


2. Acute renal failure.


3. Malnutrition.


4. Failure to thrive.


5. Metastatic CA.


6. Recurrent pleural effusion.


7. Stage 4 Metastatic CA.


Plan


plan 'to continue ivf 


monitoring renal function 


replace electrolyte as need it 


avoid NSIAD





Subjective


Constitutional:  Reports: no symptoms


HEENT:  Reports: no symptoms


Genitourinary:  Reports: no symptoms


Subjective


no acute events overnight





Objective


Objective





Last 24 Hour Vital Signs








  Date Time  Temp Pulse Resp B/P (MAP) Pulse Ox O2 Delivery O2 Flow Rate FiO2


 


5/7/18 11:43  91 16  97 Non-Rebreather 15.0 100


 


5/7/18 11:30  95 16  93 Non-Rebreather 15.0 100


 


5/7/18 08:00 97.4 99 19 90/50 100 Non-Rebreather 15.0 





 97.4       


 


5/7/18 08:00  97      


 


5/7/18 07:55  100 16  97 Non-Rebreather 15.0 100


 


5/7/18 07:44     96 Non-Rebreather 15.0 100


 


5/7/18 07:44      Non-Rebreather 15.0 100


 


5/7/18 07:43  98 16  96 Non-Rebreather 15.0 100


 


5/7/18 04:00 97.5 90 14 98/56 100 Non-Rebreather 15.0 





 97.5       


 


5/7/18 04:00  89      


 


5/7/18 03:00  92 16  95 Non-Rebreather 15.0 100


 


5/7/18 02:36  97 16  99 Non-Rebreather 15.0 100


 


5/7/18 00:00 97.7 101 14 101/61 98 Non-Rebreather 15.0 





 97.7       


 


5/7/18 00:00  99      


 


5/7/18 00:00       15.0 


 


5/6/18 23:22  98 16  95 Non-Rebreather 15.0 100


 


5/6/18 23:14  87 16  99 Non-Rebreather 15.0 100


 


5/6/18 20:00  99      


 


5/6/18 20:00       15.0 


 


5/6/18 20:00 97.2 90 18 95/61 98 Non-Rebreather 15.0 





 97.2       


 


5/6/18 19:58  90 16  93 Non-Rebreather 15.0 100


 


5/6/18 19:24  89 16  93 Non-Rebreather 15.0 100


 


5/6/18 19:23     93 Non-Rebreather 15.0 100


 


5/6/18 19:23      Non-Rebreather 15.0 100


 


5/6/18 16:00       15.0 


 


5/6/18 16:00 97.9 90 14 98/64 98 Non-Rebreather 15.0 





 97.9       


 


5/6/18 16:00  91      


 


5/6/18 15:02  99 16  97 Non-Rebreather 15.0 100


 


5/6/18 14:52  98 16  94 Non-Rebreather 15.0 100

















Intake and Output  


 


 5/6/18 5/7/18





 19:00 07:00


 


Intake Total 455 ml 1100 ml


 


Balance 455 ml 1100 ml


 


  


 


IV Total 455 ml 1100 ml


 


# Voids 2 1








Laboratory Tests


5/7/18 04:00: 


White Blood Count 12.5H, Red Blood Count 3.83L, Hemoglobin 11.3L, Hematocrit 

35.4L, Mean Corpuscular Volume 92, Mean Corpuscular Hemoglobin 29.4, Mean 

Corpuscular Hemoglobin Concent 31.8L, Red Cell Distribution Width 15.1H, 

Platelet Count 53L, Mean Platelet Volume 9.7, Neutrophils (%) (Auto) , 

Lymphocytes (%) (Auto) , Monocytes (%) (Auto) , Eosinophils (%) (Auto) , 

Basophils (%) (Auto) , Differential Total Cells Counted 100, Neutrophils % (

Manual) 74, Lymphocytes % (Manual) 14L, Monocytes % (Manual) 6, Eosinophils % (

Manual) 3, Basophils % (Manual) 0, Band Neutrophils 3, Nucleated Red Blood 

Cells 3, Platelet Estimate DecreasedL, Platelet Morphology Normal, Sodium Level 

154H, Potassium Level 3.7, Chloride Level 117H, Carbon Dioxide Level 30, Anion 

Gap 8, Blood Urea Nitrogen 49H, Creatinine 1.4H, Estimat Glomerular Filtration 

Rate 45.8, Glucose Level 106, Calcium Level 9.1, Total Bilirubin 0.2, Aspartate 

Amino Transf (AST/SGOT) 64H, Alanine Aminotransferase (ALT/SGPT) 30, Alkaline 

Phosphatase 80, Ammonia 79H, Total Protein 7.7, Albumin 1.9L, Globulin 5.8, 

Albumin/Globulin Ratio 0.3L


Height (Feet):  5


Height (Inches):  6.00


Weight (Pounds):  129


Objective


HEAD AND NECK:  Bitemporal wasting.  Extraocular movements intact.  Pupils


are reactive to light and accommodation.  Dry mucous membranes.  Currently


on non-rebreather mask.


LUNGS:  Decreased breathing sounds on the both sides.


CARDIAC:  Regular rate and rhythm.  S1 and S2.  No murmur.  No rub.


 


ABDOMEN:  Tender.  No guarding or rebound.  Bowel sounds decreased.


 


EXTREMITIES:  No edema.  No clubbing.  No cyanosis











NICOLE PAINTER May 7, 2018 12:20

## 2018-05-07 NOTE — INFECTIOUS DISEASES PROG NOTE
Assessment/Plan


Assessment/Plan





ASSESSMENT:  The patient is a 62-year-old female with,





Sepsis, improving- 2ry to ESBL UTI- ?bacteremia (real vs contaminant)


  -u/a wbc 10-15,  niet neg, leuk +2; ucx >100K ESBL E.coli 


  -sp cx p


  -Bcx 2/4 Staph sp





Fever/leukocytosis, imrovng


  





Lethargy, on Bipap- improvnig- ?oversedation


   -CXR 5/3: Parenchymal opacities, primarily interstitial laterally 

demonstrated throughout the lungs with some interval improvement over the last 

24 hours


  -CXR;Extensive mixed interstitial alveolar airspace disease bilaterally 

unchanged





Probable small bowel obstruction


Recent history of polymicrobial gram-negative bacteremia including 

Stenotrophomonas maltophilia and Enterobacter.


History of Candida esophagitis.


History of C. difficile in the past.











Air anterior to the liver , ?  free intraperitoneal air ( Surg doubt 

perforation )


Lung and liver masses Ro Malignancy 


 CT:  Pleural-based mass at the left lung base / Moderate-sized left pleural 

effusion, left-sided pleural nodularity and retroperitoneal and paraspinal 

nodules/masses. 


         Left inferior hilar mass lesion partially visualized. New low-

attenuation liver lesion ( concerning for malignancy/metastatic disease )


Pl effusion SP ultrasound-guided thoracentesis,  960 cc  ( m/l 2/2 mets,  no 

evid of Empyema )


  -exudate fluid:  (n 6), pH 8, lguc 98, prot 4.6 (serum 8.4); cx stain: 


  GRAM STAIN  Final  


        GRAM STAIN RESULT           FEW WHITE BLOOD CELLS


                                    NO ORGANISMS SEEN


cx negative





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 





Crohn's disease.


History of bowel obstruction x2 with lysis of adhesions in 2005.


COPD.


History of chronic pain syndrome, on morphine pump.


CVA in 2005.


Seizure disorder.








PLAN:





Switch Meropenem #3 (abx d#4/7-10) To ertapenem for ESBL E.coli UTI


-Continue IV Vancomycin #3 for Staph bacteremia pending ID and repeat cultures


   -continue antibiotics if consistent with goals of care





  -5/6 SP Zosyn #2


  -4/25 SP Zosyn #5





-repeat 2 sets of Bcx





Monitor CBC and BMP.


follow GI recommendations


hem, surg f/u


discussions of goals of care ongoing


poor px





Subjective


Allergies:  


Coded Allergies:  


     No Known Allergies (Verified , 10/27/06)


Subjective


afebrile in ~48hrs


leukocytosis imrpoving


bacteremic


ESBL Ecoli UTI


on NRB





Objective


Vital Signs





Last 24 Hour Vital Signs








  Date Time  Temp Pulse Resp B/P (MAP) Pulse Ox O2 Delivery O2 Flow Rate FiO2


 


5/7/18 08:00 97.4 99 19 90/50 100 Non-Rebreather 15.0 





 97.4       


 


5/7/18 08:00  97      


 


5/7/18 07:55  100 16  97 Non-Rebreather 15.0 100


 


5/7/18 07:44     96 Non-Rebreather 15.0 100


 


5/7/18 07:44      Non-Rebreather 15.0 100


 


5/7/18 07:43  98 16  96 Non-Rebreather 15.0 100


 


5/7/18 04:00 97.5 90 14 98/56 100 Non-Rebreather 15.0 





 97.5       


 


5/7/18 04:00  89      


 


5/7/18 03:00  92 16  95 Non-Rebreather 15.0 100


 


5/7/18 02:36  97 16  99 Non-Rebreather 15.0 100


 


5/7/18 00:00 97.7 101 14 101/61 98 Non-Rebreather 15.0 





 97.7       


 


5/7/18 00:00  99      


 


5/7/18 00:00       15.0 


 


5/6/18 23:22  98 16  95 Non-Rebreather 15.0 100


 


5/6/18 23:14  87 16  99 Non-Rebreather 15.0 100


 


5/6/18 20:00  99      


 


5/6/18 20:00       15.0 


 


5/6/18 20:00 97.2 90 18 95/61 98 Non-Rebreather 15.0 





 97.2       


 


5/6/18 19:58  90 16  93 Non-Rebreather 15.0 100


 


5/6/18 19:24  89 16  93 Non-Rebreather 15.0 100


 


5/6/18 19:23     93 Non-Rebreather 15.0 100


 


5/6/18 19:23      Non-Rebreather 15.0 100


 


5/6/18 16:00       15.0 


 


5/6/18 16:00 97.9 90 14 98/64 98 Non-Rebreather 15.0 





 97.9       


 


5/6/18 16:00  91      


 


5/6/18 15:02  99 16  97 Non-Rebreather 15.0 100


 


5/6/18 14:52  98 16  94 Non-Rebreather 15.0 100


 


5/6/18 12:00 98.7 92 18 103/67 100 Non-Rebreather 15.0 





 98.7       


 


5/6/18 12:00       15.0 


 


5/6/18 11:47  102      








Height (Feet):  5


Height (Inches):  6.00


Weight (Pounds):  129


Objective


HEENT:  anicteric


Respiratory/Chest:  normal breath sounds


Cardiovascular:  regular rhythm


Abdomen:  tender





Microbiology








 Date/Time


Source Procedure


Growth Status


 


 


 5/5/18 04:00


Blood Blood Culture - Preliminary


Staphylococcus Species Resulted


 


 5/4/18 15:30


Blood Blood Culture - Preliminary


Staphylococcus Species Resulted


 


 5/4/18 17:50


Urine,Clean Catch Urine Culture - Final


Escherichia Coli - Esbl Complete











Laboratory Tests








Test


  5/7/18


04:00


 


White Blood Count


  12.5 K/UL


(4.8-10.8)  H


 


Red Blood Count


  3.83 M/UL


(4.20-5.40)  L


 


Hemoglobin


  11.3 G/DL


(12.0-16.0)  L


 


Hematocrit


  35.4 %


(37.0-47.0)  L


 


Mean Corpuscular Volume 92 FL (80-99)  


 


Mean Corpuscular Hemoglobin


  29.4 PG


(27.0-31.0)


 


Mean Corpuscular Hemoglobin


Concent 31.8 G/DL


(32.0-36.0)  L


 


Red Cell Distribution Width


  15.1 %


(11.6-14.8)  H


 


Platelet Count


  53 K/UL


(150-450)  L


 


Mean Platelet Volume


  9.7 FL


(6.5-10.1)


 


Neutrophils (%) (Auto)


  % (45.0-75.0)


 


 


Lymphocytes (%) (Auto)


  % (20.0-45.0)


 


 


Monocytes (%) (Auto)  % (1.0-10.0)  


 


Eosinophils (%) (Auto)  % (0.0-3.0)  


 


Basophils (%) (Auto)  % (0.0-2.0)  


 


Differential Total Cells


Counted 100  


 


 


Neutrophils % (Manual) 74 % (45-75)  


 


Lymphocytes % (Manual) 14 % (20-45)  L


 


Monocytes % (Manual) 6 % (1-10)  


 


Eosinophils % (Manual) 3 % (0-3)  


 


Basophils % (Manual) 0 % (0-2)  


 


Band Neutrophils 3 % (0-8)  


 


Nucleated Red Blood Cells 3 /100 WBC  


 


Platelet Estimate Decreased  L


 


Platelet Morphology Normal  


 


Sodium Level


  154 MMOL/L


(136-145)  H


 


Potassium Level


  3.7 MMOL/L


(3.5-5.1)


 


Chloride Level


  117 MMOL/L


()  H


 


Carbon Dioxide Level


  30 MMOL/L


(21-32)


 


Anion Gap


  8 mmol/L


(5-15)


 


Blood Urea Nitrogen


  49 mg/dL


(7-18)  H


 


Creatinine


  1.4 MG/DL


(0.55-1.30)  H


 


Estimat Glomerular Filtration


Rate 45.8 mL/min


(>60)


 


Glucose Level


  106 MG/DL


()


 


Calcium Level


  9.1 MG/DL


(8.5-10.1)


 


Total Bilirubin


  0.2 MG/DL


(0.2-1.0)


 


Aspartate Amino Transf


(AST/SGOT) 64 U/L (15-37)


H


 


Alanine Aminotransferase


(ALT/SGPT) 30 U/L (12-78)


 


 


Alkaline Phosphatase


  80 U/L


()


 


Ammonia


  79 umol/L


(11-32)  H


 


Total Protein


  7.7 G/DL


(6.4-8.2)


 


Albumin


  1.9 G/DL


(3.4-5.0)  L


 


Globulin 5.8 g/dL  


 


Albumin/Globulin Ratio


  0.3 (1.0-2.7)


L











Current Medications








 Medications


  (Trade)  Dose


 Ordered  Sig/Jed


 Route


 PRN Reason  Start Time


 Stop Time Status Last Admin


Dose Admin


 


 Acetaminophen


  (Tylenol)  650 mg  Q4H  PRN


 ORAL


 fever (temp>100.5F)  5/3/18 13:30


 5/19/18 13:29  5/4/18 06:22


 


 


 Acetaminophen


  (Tylenol)  650 mg  Q4H  PRN


 RECTAL


 Mild Pain (Pain Scale 1-3)  5/5/18 00:00


 6/4/18 00:00  5/5/18 09:44


 


 


 Acetaminophen/


 Hydrocodone Bitart


  (Norco 10/325)  1 tab  Q4H  PRN


 ORAL


 Moderate Pain (Pain Scale 4-6)  5/4/18 09:30


 5/11/18 09:29   


 


 


 Chlorhexidine


 Gluconate


  (Rosy-Hex 2%)  1 applic  DAILY@2000


 TOPIC


   5/6/18 20:00


 6/5/18 19:59  5/6/18 21:26


 


 


 Dextrose  1,000 ml @ 


 100 mls/hr  Q10H


 IV


   5/5/18 10:00


 6/4/18 09:59  5/7/18 03:44


 


 


 Dextrose


  (Dextrose 50%)  50 ml  STAT  PRN


 IV


 Hypoglycemia BS<60mg/dL  5/3/18 13:30


 6/2/18 13:29   


 


 


 Levalbuterol HCl


  (Xopenex)  0.625 mg  Q4HRT


 HHN


   5/6/18 11:00


 5/8/18 10:59  5/7/18 07:37


 


 


 Levetiracetam


  (Keppra)  1,000 mg  EVERY 8  HOURS


 ORAL


   5/4/18 09:00


 6/3/18 08:59   


 


 


 Levothyroxine


 Sodium


  (Synthroid)  75 mcg  ACBREAKFAST


 ORAL


   5/4/18 06:30


 5/20/18 06:29  5/4/18 06:21


 


 


 Meropenem 1 gm/


 Sodium Chloride  55 ml @ 


 110 mls/hr  Q12HR


 IVPB


   5/5/18 21:00


 5/10/18 20:59  5/7/18 09:00


 


 


 Nitroglycerin


  (Ntg)  0.4 mg  Q5M X 3 DOSES PRN


 SL


 Prn Chest Pain  5/3/18 12:15


 5/19/18 10:59  5/4/18 01:53


 


 


 Ondansetron HCl


  (Zofran)  4 mg  Q6H  PRN


 IVP


 Nausea & Vomiting  5/3/18 14:30


 5/19/18 20:29  5/4/18 00:40


 


 


 Vancomycin HCl


  (Vanco rx to


 dose)  1 ea  DAILY  PRN


 MISC


 Per rx protocol  5/5/18 11:45


 6/4/18 11:44   


 


 


 Vancomycin/Sodium


 Chloride  250 ml @ 


 166.667


 mls/hr  Q24H


 IVPB


   5/6/18 13:00


 5/11/18 12:59  5/6/18 13:18


 

















Selene Garcia M.D. May 7, 2018 11:20

## 2018-05-07 NOTE — GENERAL PROGRESS NOTE
Assessment/Plan


Assessment/Plan


(1) Chronic abdominal pain


(2) Chronic pancreatitis


(3) Crohn's disease 


(4) Herniated nucleus pulposus, lumbar


(5) Lumbar spondylosis


(6) Radiculopathy of lumbar region


(7) Lung mass


Pt will be continued on Norco and pt has intrathecal pump.


HOLD OPIOIDS FOR OVERSEDATION OR SBP<90 OR DBP<60 OR O2SAT<92% OR RR<12


Pt was d/w Dr. Shetty and he concurred.





Subjective


Date patient seen:  May 7, 2018


Time patient seen:  08:00 - am


ROS Limited/Unobtainable:  Yes


Allergies:  


Coded Allergies:  


     No Known Allergies (Verified , 10/27/06)


Subjective


Family at bed side. Face mask applied. No signs of distress or pain.





Objective





Last 24 Hour Vital Signs








  Date Time  Temp Pulse Resp B/P (MAP) Pulse Ox O2 Delivery O2 Flow Rate FiO2


 


5/7/18 07:55  100 16  97 Non-Rebreather 15.0 100


 


5/7/18 07:44     96 Non-Rebreather 15.0 100


 


5/7/18 07:44      Non-Rebreather 15.0 100


 


5/7/18 07:43  98 16  96 Non-Rebreather 15.0 100


 


5/7/18 04:00 97.5 90 14 98/56 100 Non-Rebreather 15.0 





 97.5       


 


5/7/18 04:00  89      


 


5/7/18 03:00  92 16  95 Non-Rebreather 15.0 100


 


5/7/18 02:36  97 16  99 Non-Rebreather 15.0 100


 


5/7/18 00:00 97.7 101 14 101/61 98 Non-Rebreather 15.0 





 97.7       


 


5/7/18 00:00  99      


 


5/7/18 00:00       15.0 


 


5/6/18 23:22  98 16  95 Non-Rebreather 15.0 100


 


5/6/18 23:14  87 16  99 Non-Rebreather 15.0 100


 


5/6/18 20:00  99      


 


5/6/18 20:00       15.0 


 


5/6/18 20:00 97.2 90 18 95/61 98 Non-Rebreather 15.0 





 97.2       


 


5/6/18 19:58  90 16  93 Non-Rebreather 15.0 100


 


5/6/18 19:24  89 16  93 Non-Rebreather 15.0 100


 


5/6/18 19:23     93 Non-Rebreather 15.0 100


 


5/6/18 19:23      Non-Rebreather 15.0 100


 


5/6/18 16:00       15.0 


 


5/6/18 16:00 97.9 90 14 98/64 98 Non-Rebreather 15.0 





 97.9       


 


5/6/18 16:00  91      


 


5/6/18 15:02  99 16  97 Non-Rebreather 15.0 100


 


5/6/18 14:52  98 16  94 Non-Rebreather 15.0 100


 


5/6/18 12:00 98.7 92 18 103/67 100 Non-Rebreather 15.0 





 98.7       


 


5/6/18 12:00       15.0 


 


5/6/18 11:47  102      


 


5/6/18 10:16  97 18  98 Non-Rebreather 15.0 100


 


5/6/18 10:07  98 20  99 Non-Rebreather 15.0 100

















Intake and Output  


 


 5/6/18 5/7/18





 19:00 07:00


 


Intake Total 455 ml 1100 ml


 


Balance 455 ml 1100 ml


 


  


 


IV Total 455 ml 1100 ml


 


# Voids 2 1








Laboratory Tests


5/7/18 04:00: 


White Blood Count 12.5H, Red Blood Count 3.83L, Hemoglobin 11.3L, Hematocrit 

35.4L, Mean Corpuscular Volume 92, Mean Corpuscular Hemoglobin 29.4, Mean 

Corpuscular Hemoglobin Concent 31.8L, Red Cell Distribution Width 15.1H, 

Platelet Count 53L, Mean Platelet Volume 9.7, Neutrophils (%) (Auto) , 

Lymphocytes (%) (Auto) , Monocytes (%) (Auto) , Eosinophils (%) (Auto) , 

Basophils (%) (Auto) , Differential Total Cells Counted 100, Neutrophils % (

Manual) 74, Lymphocytes % (Manual) 14L, Monocytes % (Manual) 6, Eosinophils % (

Manual) 3, Basophils % (Manual) 0, Band Neutrophils 3, Nucleated Red Blood 

Cells 3, Platelet Estimate DecreasedL, Platelet Morphology Normal, Sodium Level 

154H, Potassium Level 3.7, Chloride Level 117H, Carbon Dioxide Level 30, Anion 

Gap 8, Blood Urea Nitrogen 49H, Creatinine 1.4H, Estimat Glomerular Filtration 

Rate 45.8, Glucose Level 106, Calcium Level 9.1, Total Bilirubin 0.2, Aspartate 

Amino Transf (AST/SGOT) 64H, Alanine Aminotransferase (ALT/SGPT) 30, Alkaline 

Phosphatase 80, Ammonia 79H, Total Protein 7.7, Albumin 1.9L, Globulin 5.8, 

Albumin/Globulin Ratio 0.3L


Height (Feet):  5


Height (Inches):  6.00


Weight (Pounds):  129


Objective


General Appearance:  on Bipap


Neck:  normal alignment, supple


Cardiovascular:  tachycardic


Respiratory/Chest:  decreased breath sounds


Abdomen:  tender, distended, intrathecal pump palpated


Extremities:  non-tender


Edema:  no edema noted











LEA BRAVO May 7, 2018 08:56

## 2018-05-07 NOTE — CARDIAC ELECTROPHYSIOLOGY PN
Assessment/Plan


Assessment/Plan


1. Sinus tachycardia with no evidence of fib or SVT due to pain and respiratory 

failure. 


     Echocardiogram showed EF 65%.  On iv Abx


2. Malignant pleural effusion and metastatic cancer. 


     Follow up Dr. Fraga. S/p Left Thoracentesis on 4/20/18


     Morphine pump in abdomen. On BIPAP


3. Lung mass.   


4. Crohn disease and history of exploratory laparotomy, under management of Dr. Mcleod.


5. Ascites  paracentesis with cytology and per Dr. Russell.


6. DNR and DNI





DW RN





Subjective


Subjective


On BIPAP on facemask unresponsive in sinus tach 100. DNR





Objective





Last 24 Hour Vital Signs








  Date Time  Temp Pulse Resp B/P (MAP) Pulse Ox O2 Delivery O2 Flow Rate FiO2


 


5/7/18 15:15  102 16  96 Non-Rebreather 15.0 100


 


5/7/18 15:08  98 16  88 Non-Rebreather 15.0 100


 


5/7/18 12:00 98.4 97 19 98/56 100 Non-Rebreather 15.0 





 98.4       


 


5/7/18 12:00  94      


 


5/7/18 11:43  91 16  97 Non-Rebreather 15.0 100


 


5/7/18 11:30  95 16  93 Non-Rebreather 15.0 100


 


5/7/18 08:00 97.4 99 19 90/50 100 Non-Rebreather 15.0 





 97.4       


 


5/7/18 08:00  97      


 


5/7/18 07:55  100 16  97 Non-Rebreather 15.0 100


 


5/7/18 07:44     96 Non-Rebreather 15.0 100


 


5/7/18 07:44      Non-Rebreather 15.0 100


 


5/7/18 07:43  98 16  96 Non-Rebreather 15.0 100


 


5/7/18 04:00 97.5 90 14 98/56 100 Non-Rebreather 15.0 





 97.5       


 


5/7/18 04:00  89      


 


5/7/18 03:00  92 16  95 Non-Rebreather 15.0 100


 


5/7/18 02:36  97 16  99 Non-Rebreather 15.0 100


 


5/7/18 00:00 97.7 101 14 101/61 98 Non-Rebreather 15.0 





 97.7       


 


5/7/18 00:00  99      


 


5/7/18 00:00       15.0 


 


5/6/18 23:22  98 16  95 Non-Rebreather 15.0 100


 


5/6/18 23:14  87 16  99 Non-Rebreather 15.0 100


 


5/6/18 20:00  99      


 


5/6/18 20:00       15.0 


 


5/6/18 20:00 97.2 90 18 95/61 98 Non-Rebreather 15.0 





 97.2       


 


5/6/18 19:58  90 16  93 Non-Rebreather 15.0 100


 


5/6/18 19:24  89 16  93 Non-Rebreather 15.0 100


 


5/6/18 19:23     93 Non-Rebreather 15.0 100


 


5/6/18 19:23      Non-Rebreather 15.0 100

















Intake and Output  


 


 5/6/18 5/7/18





 19:00 07:00


 


Intake Total 455 ml 1100 ml


 


Balance 455 ml 1100 ml


 


  


 


IV Total 455 ml 1100 ml


 


# Voids 2 1











Laboratory Tests








Test


  5/7/18


04:00


 


White Blood Count


  12.5 K/UL


(4.8-10.8)  H


 


Red Blood Count


  3.83 M/UL


(4.20-5.40)  L


 


Hemoglobin


  11.3 G/DL


(12.0-16.0)  L


 


Hematocrit


  35.4 %


(37.0-47.0)  L


 


Mean Corpuscular Volume 92 FL (80-99)  


 


Mean Corpuscular Hemoglobin


  29.4 PG


(27.0-31.0)


 


Mean Corpuscular Hemoglobin


Concent 31.8 G/DL


(32.0-36.0)  L


 


Red Cell Distribution Width


  15.1 %


(11.6-14.8)  H


 


Platelet Count


  53 K/UL


(150-450)  L


 


Mean Platelet Volume


  9.7 FL


(6.5-10.1)


 


Neutrophils (%) (Auto)


  % (45.0-75.0)


 


 


Lymphocytes (%) (Auto)


  % (20.0-45.0)


 


 


Monocytes (%) (Auto)  % (1.0-10.0)  


 


Eosinophils (%) (Auto)  % (0.0-3.0)  


 


Basophils (%) (Auto)  % (0.0-2.0)  


 


Differential Total Cells


Counted 100  


 


 


Neutrophils % (Manual) 74 % (45-75)  


 


Lymphocytes % (Manual) 14 % (20-45)  L


 


Monocytes % (Manual) 6 % (1-10)  


 


Eosinophils % (Manual) 3 % (0-3)  


 


Basophils % (Manual) 0 % (0-2)  


 


Band Neutrophils 3 % (0-8)  


 


Nucleated Red Blood Cells 3 /100 WBC  


 


Platelet Estimate Decreased  L


 


Platelet Morphology Normal  


 


Sodium Level


  154 MMOL/L


(136-145)  H


 


Potassium Level


  3.7 MMOL/L


(3.5-5.1)


 


Chloride Level


  117 MMOL/L


()  H


 


Carbon Dioxide Level


  30 MMOL/L


(21-32)


 


Anion Gap


  8 mmol/L


(5-15)


 


Blood Urea Nitrogen


  49 mg/dL


(7-18)  H


 


Creatinine


  1.4 MG/DL


(0.55-1.30)  H


 


Estimat Glomerular Filtration


Rate 45.8 mL/min


(>60)


 


Glucose Level


  106 MG/DL


()


 


Calcium Level


  9.1 MG/DL


(8.5-10.1)


 


Total Bilirubin


  0.2 MG/DL


(0.2-1.0)


 


Aspartate Amino Transf


(AST/SGOT) 64 U/L (15-37)


H


 


Alanine Aminotransferase


(ALT/SGPT) 30 U/L (12-78)


 


 


Alkaline Phosphatase


  80 U/L


()


 


Ammonia


  79 umol/L


(11-32)  H


 


Total Protein


  7.7 G/DL


(6.4-8.2)


 


Albumin


  1.9 G/DL


(3.4-5.0)  L


 


Globulin 5.8 g/dL  


 


Albumin/Globulin Ratio


  0.3 (1.0-2.7)


L











Microbiology








 Date/Time


Source Procedure


Growth Status


 


 


 5/5/18 04:00


Blood Blood Culture - Preliminary


Staphylococcus Species Resulted


 


 5/4/18 17:50


Urine,Clean Catch Urine Culture - Final


Escherichia Coli - Esbl Complete








Objective


HEAD AND NECK:   Face mask on


LUNGS:  Coarse rhonchi.


CARDIOVASCULAR:  Tachy S1 and S2


ABDOMEN:  Tender with a pump under skin.


EXTREMITIES:  No pitting edema.











Delon Chawla MD May 7, 2018 17:04

## 2018-05-07 NOTE — PULMONOLOGY PROGRESS NOTE
Assessment/Plan


Problems:  


(1) Metastatic cancer


(2) Malignant pleural effusion


(3) Chronic pain disorder


(4) IBD (inflammatory bowel disease)


(5) Interstitial lung disease


(6) COPD (chronic obstructive pulmonary disease)


Assessment/Plan


respiratory treatment


titrate fio3 to sat of 92


family doesn't want NG tube feeding


continue current symptomatic treatment


oncology on the case already and recommending comfort care, not a candidate for 

chemotherapy.


pain management





private room 


dc planning preferably with hospice.





Subjective


ROS Limited/Unobtainable:  Yes


Interval Events:  comfortable, coma


Constitutional:  Reports: no symptoms


HEENT:  Repors: no symptoms


Respiratory:  Reports: no symptoms


Allergies:  


Coded Allergies:  


     No Known Allergies (Verified , 10/27/06)





Objective





Last 24 Hour Vital Signs








  Date Time  Temp Pulse Resp B/P (MAP) Pulse Ox O2 Delivery O2 Flow Rate FiO2


 


5/7/18 11:30  95 16  93 Non-Rebreather 15.0 100


 


5/7/18 08:00 97.4 99 19 90/50 100 Non-Rebreather 15.0 





 97.4       


 


5/7/18 08:00  97      


 


5/7/18 07:55  100 16  97 Non-Rebreather 15.0 100


 


5/7/18 07:44     96 Non-Rebreather 15.0 100


 


5/7/18 07:44      Non-Rebreather 15.0 100


 


5/7/18 07:43  98 16  96 Non-Rebreather 15.0 100


 


5/7/18 04:00 97.5 90 14 98/56 100 Non-Rebreather 15.0 





 97.5       


 


5/7/18 04:00  89      


 


5/7/18 03:00  92 16  95 Non-Rebreather 15.0 100


 


5/7/18 02:36  97 16  99 Non-Rebreather 15.0 100


 


5/7/18 00:00 97.7 101 14 101/61 98 Non-Rebreather 15.0 





 97.7       


 


5/7/18 00:00  99      


 


5/7/18 00:00       15.0 


 


5/6/18 23:22  98 16  95 Non-Rebreather 15.0 100


 


5/6/18 23:14  87 16  99 Non-Rebreather 15.0 100


 


5/6/18 20:00  99      


 


5/6/18 20:00       15.0 


 


5/6/18 20:00 97.2 90 18 95/61 98 Non-Rebreather 15.0 





 97.2       


 


5/6/18 19:58  90 16  93 Non-Rebreather 15.0 100


 


5/6/18 19:24  89 16  93 Non-Rebreather 15.0 100


 


5/6/18 19:23     93 Non-Rebreather 15.0 100


 


5/6/18 19:23      Non-Rebreather 15.0 100


 


5/6/18 16:00       15.0 


 


5/6/18 16:00 97.9 90 14 98/64 98 Non-Rebreather 15.0 





 97.9       


 


5/6/18 16:00  91      


 


5/6/18 15:02  99 16  97 Non-Rebreather 15.0 100


 


5/6/18 14:52  98 16  94 Non-Rebreather 15.0 100


 


5/6/18 12:00 98.7 92 18 103/67 100 Non-Rebreather 15.0 





 98.7       


 


5/6/18 12:00       15.0 


 


5/6/18 11:47  102      

















Intake and Output  


 


 5/6/18 5/7/18





 19:00 07:00


 


Intake Total 455 ml 1100 ml


 


Balance 455 ml 1100 ml


 


  


 


IV Total 455 ml 1100 ml


 


# Voids 2 1








Objective


General Appearance:  cachectic


HEENT:  normocephalic, atraumatic


Respiratory/Chest:  chest wall non-tender, lungs clear


Cardiovascular:  normal peripheral pulses, normal rate, regular rhythm


Abdomen:  normal bowel sounds, soft, non tender, no organomegaly, non distended


Extremities:  no cyanosis, no clubbing, no edema


Skin:  no rash, no lesions


Neurologic/Psychiatric:  CNs II-XII grossly normal





Microbiology








 Date/Time


Source Procedure


Growth Status


 


 


 5/5/18 04:00


Blood Blood Culture - Preliminary


Staphylococcus Species Resulted


 


 5/4/18 15:30


Blood Blood Culture - Preliminary


Staphylococcus Species Resulted


 


 5/4/18 17:50


Urine,Clean Catch Urine Culture - Final


Escherichia Coli - Esbl Complete








Laboratory Tests


5/7/18 04:00: 


White Blood Count 12.5H, Red Blood Count 3.83L, Hemoglobin 11.3L, Hematocrit 

35.4L, Mean Corpuscular Volume 92, Mean Corpuscular Hemoglobin 29.4, Mean 

Corpuscular Hemoglobin Concent 31.8L, Red Cell Distribution Width 15.1H, 

Platelet Count 53L, Mean Platelet Volume 9.7, Neutrophils (%) (Auto) , 

Lymphocytes (%) (Auto) , Monocytes (%) (Auto) , Eosinophils (%) (Auto) , 

Basophils (%) (Auto) , Differential Total Cells Counted 100, Neutrophils % (

Manual) 74, Lymphocytes % (Manual) 14L, Monocytes % (Manual) 6, Eosinophils % (

Manual) 3, Basophils % (Manual) 0, Band Neutrophils 3, Nucleated Red Blood 

Cells 3, Platelet Estimate DecreasedL, Platelet Morphology Normal, Sodium Level 

154H, Potassium Level 3.7, Chloride Level 117H, Carbon Dioxide Level 30, Anion 

Gap 8, Blood Urea Nitrogen 49H, Creatinine 1.4H, Estimat Glomerular Filtration 

Rate 45.8, Glucose Level 106, Calcium Level 9.1, Total Bilirubin 0.2, Aspartate 

Amino Transf (AST/SGOT) 64H, Alanine Aminotransferase (ALT/SGPT) 30, Alkaline 

Phosphatase 80, Ammonia 79H, Total Protein 7.7, Albumin 1.9L, Globulin 5.8, 

Albumin/Globulin Ratio 0.3L





Current Medications








 Medications


  (Trade)  Dose


 Ordered  Sig/Jed


 Route


 PRN Reason  Start Time


 Stop Time Status Last Admin


Dose Admin


 


 Acetaminophen


  (Tylenol)  650 mg  Q4H  PRN


 ORAL


 fever (temp>100.5F)  5/3/18 13:30


 5/19/18 13:29  5/4/18 06:22


 


 


 Acetaminophen


  (Tylenol)  650 mg  Q4H  PRN


 RECTAL


 Mild Pain (Pain Scale 1-3)  5/5/18 00:00


 6/4/18 00:00  5/5/18 09:44


 


 


 Acetaminophen/


 Hydrocodone Bitart


  (Norco 10/325)  1 tab  Q4H  PRN


 ORAL


 Moderate Pain (Pain Scale 4-6)  5/4/18 09:30


 5/11/18 09:29   


 


 


 Chlorhexidine


 Gluconate


  (Rosy-Hex 2%)  1 applic  DAILY@2000


 TOPIC


   5/6/18 20:00


 6/5/18 19:59  5/6/18 21:26


 


 


 Dextrose  1,000 ml @ 


 100 mls/hr  Q10H


 IV


   5/5/18 10:00


 6/4/18 09:59  5/7/18 03:44


 


 


 Dextrose


  (Dextrose 50%)  50 ml  STAT  PRN


 IV


 Hypoglycemia BS<60mg/dL  5/3/18 13:30


 6/2/18 13:29   


 


 


 Ertapenem 1 gm/


 Sodium Chloride  55 ml @ 


 110 mls/hr  Q24H


 IVPB


   5/7/18 21:00


 5/12/18 20:59   


 


 


 Levalbuterol HCl


  (Xopenex)  0.625 mg  Q4HRT


 HHN


   5/6/18 11:00


 5/8/18 10:59  5/7/18 11:30


 


 


 Levetiracetam


  (Keppra)  1,000 mg  EVERY 8  HOURS


 ORAL


   5/4/18 09:00


 6/3/18 08:59   


 


 


 Levothyroxine


 Sodium


  (Synthroid)  75 mcg  ACBREAKFAST


 ORAL


   5/4/18 06:30


 5/20/18 06:29  5/4/18 06:21


 


 


 Nitroglycerin


  (Ntg)  0.4 mg  Q5M X 3 DOSES PRN


 SL


 Prn Chest Pain  5/3/18 12:15


 5/19/18 10:59  5/4/18 01:53


 


 


 Ondansetron HCl


  (Zofran)  4 mg  Q6H  PRN


 IVP


 Nausea & Vomiting  5/3/18 14:30


 5/19/18 20:29  5/4/18 00:40


 


 


 Vancomycin HCl


  (Vanco rx to


 dose)  1 ea  DAILY  PRN


 MISC


 Per rx protocol  5/5/18 11:45


 6/4/18 11:44   


 


 


 Vancomycin/Sodium


 Chloride  250 ml @ 


 166.667


 mls/hr  Q24H


 IVPB


   5/6/18 13:00


 5/11/18 12:59  5/6/18 13:18


 

















Blaze Fraga MD May 7, 2018 11:52

## 2018-05-07 NOTE — CONSULTATION
DATE OF CONSULTATION:  05/07/2018



HISTORY OF PRESENT ILLNESS:  The patient is a 62-year-old female patient

with intractable abdominal pain.  She has high levels of anxiety and

agitation secondary to stress of her medical illness, that is why her

attending has requested daily psychiatric consultation.



MENTAL STATUS EXAMINATION:  This is a 62-year-old female.  Appearance

disheveled.  Attitude, irritable and agitated.  Affect guarded and

restricted.  Intellect poor.  Mood depressed, anxious.  Motor activity,

psychomotor agitation.  Attention span is poor.  Orientation x2.  Speech

is pressured.  Thought process, disorganized and illogical.  Thought

content, auditory hallucinations, and paranoid delusions.  Insight and

judgement is poor.



DISCHARGE DIAGNOSIS:  Bipolar 2, rule out _____.



PLAN:  Continue her on Neurontin _____ reduce anxiety.  An 18 to 20 minutes

supportive psychotherapy provided.  Chart reviewed and discussed with

staff.  The patient seen and assessed at bedside.



I would like to thank, Dr. Maxim Hopkins, for this interesting

consultation.









  ______________________________________________

  Samantha Rick M.D.





DR:  PRADEEP

D:  05/07/2018 08:05

T:  05/07/2018 15:21

JOB#:  [id number]

CC:

## 2018-05-08 VITALS — DIASTOLIC BLOOD PRESSURE: 56 MMHG | SYSTOLIC BLOOD PRESSURE: 98 MMHG

## 2018-05-08 VITALS — SYSTOLIC BLOOD PRESSURE: 95 MMHG | DIASTOLIC BLOOD PRESSURE: 56 MMHG

## 2018-05-08 VITALS — SYSTOLIC BLOOD PRESSURE: 104 MMHG | DIASTOLIC BLOOD PRESSURE: 63 MMHG

## 2018-05-08 VITALS — SYSTOLIC BLOOD PRESSURE: 102 MMHG | DIASTOLIC BLOOD PRESSURE: 61 MMHG

## 2018-05-08 VITALS — SYSTOLIC BLOOD PRESSURE: 99 MMHG | DIASTOLIC BLOOD PRESSURE: 54 MMHG

## 2018-05-08 VITALS — SYSTOLIC BLOOD PRESSURE: 96 MMHG | DIASTOLIC BLOOD PRESSURE: 62 MMHG

## 2018-05-08 LAB
ADD MANUAL DIFF: YES
ANION GAP SERPL CALC-SCNC: 7 MMOL/L (ref 5–15)
BUN SERPL-MCNC: 31 MG/DL (ref 7–18)
CALCIUM SERPL-MCNC: 9.4 MG/DL (ref 8.5–10.1)
CHLORIDE SERPL-SCNC: 114 MMOL/L (ref 98–107)
CO2 SERPL-SCNC: 31 MMOL/L (ref 21–32)
CREAT SERPL-MCNC: 1.1 MG/DL (ref 0.55–1.3)
ERYTHROCYTE [DISTWIDTH] IN BLOOD BY AUTOMATED COUNT: 15.2 % (ref 11.6–14.8)
HCT VFR BLD CALC: 35.4 % (ref 37–47)
HGB BLD-MCNC: 11.3 G/DL (ref 12–16)
MCV RBC AUTO: 92 FL (ref 80–99)
PLATELET # BLD: 65 K/UL (ref 150–450)
POTASSIUM SERPL-SCNC: 3.5 MMOL/L (ref 3.5–5.1)
RBC # BLD AUTO: 3.84 M/UL (ref 4.2–5.4)
SODIUM SERPL-SCNC: 152 MMOL/L (ref 136–145)
WBC # BLD AUTO: 12.1 K/UL (ref 4.8–10.8)

## 2018-05-08 RX ADMIN — LEVETIRACETAM SCH MG: 100 SOLUTION ORAL at 05:56

## 2018-05-08 RX ADMIN — LEVETIRACETAM SCH MG: 100 SOLUTION ORAL at 14:19

## 2018-05-08 RX ADMIN — LEVALBUTEROL HYDROCHLORIDE SCH MG: 1.25 SOLUTION, CONCENTRATE RESPIRATORY (INHALATION) at 02:46

## 2018-05-08 RX ADMIN — LEVALBUTEROL HYDROCHLORIDE SCH MG: 1.25 SOLUTION, CONCENTRATE RESPIRATORY (INHALATION) at 23:00

## 2018-05-08 RX ADMIN — Medication SCH MLS/HR: at 13:00

## 2018-05-08 RX ADMIN — LEVALBUTEROL HYDROCHLORIDE SCH MG: 1.25 SOLUTION, CONCENTRATE RESPIRATORY (INHALATION) at 07:09

## 2018-05-08 RX ADMIN — LEVALBUTEROL HYDROCHLORIDE SCH MG: 1.25 SOLUTION, CONCENTRATE RESPIRATORY (INHALATION) at 19:00

## 2018-05-08 RX ADMIN — LEVETIRACETAM SCH MG: 100 SOLUTION ORAL at 21:59

## 2018-05-08 NOTE — INFECTIOUS DISEASES PROG NOTE
Assessment/Plan


Assessment/Plan





ASSESSMENT:  The patient is a 62-year-old female with,





Sepsis, improving- 2ry to ESBL UTI- ?bacteremia (real vs contaminant)- r/p PICC 

line infection


  -u/a wbc 10-15,  niet neg, leuk +2; ucx >100K ESBL E.coli 


  -sp cx p


  -5/4 Bcx 2/4 S. epi (MRSE); 5/7 p





Fever/leukocytosis, improving


  





Lethargy, on Bipap- improvnig- ?oversedation


   -CXR 5/3: Parenchymal opacities, primarily interstitial laterally 

demonstrated throughout the lungs with some interval improvement over the last 

24 hours


  -CXR;Extensive mixed interstitial alveolar airspace disease bilaterally 

unchanged





Probable small bowel obstruction


Recent history of polymicrobial gram-negative bacteremia including 

Stenotrophomonas maltophilia and Enterobacter.


History of Candida esophagitis.


History of C. difficile in the past.











Air anterior to the liver , ?  free intraperitoneal air ( Surg doubt 

perforation )


Lung and liver masses Ro Malignancy 


 CT:  Pleural-based mass at the left lung base / Moderate-sized left pleural 

effusion, left-sided pleural nodularity and retroperitoneal and paraspinal 

nodules/masses. 


         Left inferior hilar mass lesion partially visualized. New low-

attenuation liver lesion ( concerning for malignancy/metastatic disease )


Pl effusion SP ultrasound-guided thoracentesis,  960 cc  ( m/l 2/2 mets,  no 

evid of Empyema )


  -exudate fluid:  (n 6), pH 8, lguc 98, prot 4.6 (serum 8.4); cx stain: 


  GRAM STAIN  Final  


        GRAM STAIN RESULT           FEW WHITE BLOOD CELLS


                                    NO ORGANISMS SEEN


cx negative





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 





Crohn's disease.


History of bowel obstruction x2 with lysis of adhesions in 2005.


COPD.


History of chronic pain syndrome, on morphine pump.


CVA in 2005.


Seizure disorder.








PLAN:





Continue ertapenem abx d#5/7-10 for ESBL E.coli UTI


-Continue IV Vancomycin #4 for Staph epi bacteremia pending repeat cultures


   -continue antibiotics if consistent with goals of care





  -5/7 SP Meropenem #3


  -5/6 SP Zosyn #2


  -4/25 SP Zosyn #5





-f/u repeat 2 sets of Bcx and obtain 1 set from PICC line


   may need removal of PICC line





Monitor CBC and BMP.


follow GI recommendations


hem, surg f/u


discussions of goals of care ongoing


poor px





Subjective


Allergies:  


Coded Allergies:  


     No Known Allergies (Verified , 10/27/06)


Subjective


afebrile in ~72 hrs


leukocytosis imrpoved, now stable at 12


bacteremic; rpeat bcx pending


on NRB


more awake and interavtive





Objective


Vital Signs





Last 24 Hour Vital Signs








  Date Time  Temp Pulse Resp B/P (MAP) Pulse Ox O2 Delivery O2 Flow Rate FiO2


 


5/8/18 08:00 97.8 97 20 99/54 99 Non-Rebreather 15.0 97





 97.8       


 


5/8/18 07:20  97 18  95 Non-Rebreather 15.0 100


 


5/8/18 07:09      Non-Rebreather 15.0 100


 


5/8/18 07:09  95 18  93 Non-Rebreather 15.0 100


 


5/8/18 07:09     93 Non-Rebreather 15.0 100


 


5/8/18 04:00  101      


 


5/8/18 04:00 98.0 99 21 95/56 98 Non-Rebreather 15.0 100





 98.0       


 


5/8/18 02:57  96 18  95 Non-Rebreather 15.0 100


 


5/8/18 02:47  98 18  95 Non-Rebreather 15.0 100


 


5/8/18 00:00 98.1 95 22 104/63 95 Non-Rebreather 15.0 100





 98.1       


 


5/7/18 23:26  93 18  93 Non-Rebreather 15.0 100


 


5/7/18 23:19  96 18  96 Non-Rebreather 15.0 100


 


5/7/18 20:00  99      


 


5/7/18 20:00 97.4 99 21 104/65 93 Non-Rebreather 15.0 100





 97.4       


 


5/7/18 19:21  99 16  91 Non-Rebreather 15.0 100


 


5/7/18 19:16      Non-Rebreather 15.0 100


 


5/7/18 19:16     93 Non-Rebreather 15.0 100


 


5/7/18 19:13  93 18  92 Non-Rebreather 15.0 100


 


5/7/18 16:00  94      


 


5/7/18 16:00 97.8 97 19 96/79 100 Non-Rebreather 15.0 





 97.8       


 


5/7/18 15:15  102 16  96 Non-Rebreather 15.0 100


 


5/7/18 15:08  98 16  88 Non-Rebreather 15.0 100


 


5/7/18 12:00 98.4 97 19 98/56 100 Non-Rebreather 15.0 





 98.4       


 


5/7/18 12:00  94      


 


5/7/18 11:43  91 16  97 Non-Rebreather 15.0 100


 


5/7/18 11:30  95 16  93 Non-Rebreather 15.0 100








Height (Feet):  5


Height (Inches):  6.00


Weight (Pounds):  129


Objective


HEENT:  anicteric


Respiratory/Chest:  normal breath sounds


Cardiovascular:  regular rhythm


Abdomen:  tender





Laboratory Tests








Test


  5/8/18


04:00


 


White Blood Count


  12.1 K/UL


(4.8-10.8)  H


 


Red Blood Count


  3.84 M/UL


(4.20-5.40)  L


 


Hemoglobin


  11.3 G/DL


(12.0-16.0)  L


 


Hematocrit


  35.4 %


(37.0-47.0)  L


 


Mean Corpuscular Volume 92 FL (80-99)  


 


Mean Corpuscular Hemoglobin


  29.4 PG


(27.0-31.0)


 


Mean Corpuscular Hemoglobin


Concent 31.9 G/DL


(32.0-36.0)  L


 


Red Cell Distribution Width


  15.2 %


(11.6-14.8)  H


 


Platelet Count


  65 K/UL


(150-450)  L


 


Mean Platelet Volume


  8.8 FL


(6.5-10.1)


 


Neutrophils (%) (Auto)


  % (45.0-75.0)


 


 


Lymphocytes (%) (Auto)


  % (20.0-45.0)


 


 


Monocytes (%) (Auto)  % (1.0-10.0)  


 


Eosinophils (%) (Auto)  % (0.0-3.0)  


 


Basophils (%) (Auto)  % (0.0-2.0)  


 


Differential Total Cells


Counted 100  


 


 


Neutrophils % (Manual) 68 % (45-75)  


 


Lymphocytes % (Manual) 10 % (20-45)  L


 


Monocytes % (Manual) 5 % (1-10)  


 


Eosinophils % (Manual) 4 % (0-3)  H


 


Basophils % (Manual) 0 % (0-2)  


 


Band Neutrophils 13 % (0-8)  H


 


Nucleated Red Blood Cells 2 /100 WBC  


 


Platelet Estimate Decreased  L


 


Platelet Morphology Normal  


 


Hypochromasia 1+  


 


Anisocytosis 1+  


 


Sodium Level


  152 MMOL/L


(136-145)  H


 


Potassium Level


  3.5 MMOL/L


(3.5-5.1)


 


Chloride Level


  114 MMOL/L


()  H


 


Carbon Dioxide Level


  31 MMOL/L


(21-32)


 


Anion Gap


  7 mmol/L


(5-15)


 


Blood Urea Nitrogen


  31 mg/dL


(7-18)  H


 


Creatinine


  1.1 MG/DL


(0.55-1.30)


 


Estimat Glomerular Filtration


Rate > 60 mL/min


(>60)


 


Glucose Level


  119 MG/DL


()  H


 


Calcium Level


  9.4 MG/DL


(8.5-10.1)











Current Medications








 Medications


  (Trade)  Dose


 Ordered  Sig/Jed


 Route


 PRN Reason  Start Time


 Stop Time Status Last Admin


Dose Admin


 


 Acetaminophen


  (Tylenol)  650 mg  Q4H  PRN


 ORAL


 fever (temp>100.5F)  5/3/18 13:30


 5/19/18 13:29  5/4/18 06:22


 


 


 Acetaminophen


  (Tylenol)  650 mg  Q4H  PRN


 RECTAL


 Mild Pain (Pain Scale 1-3)  5/5/18 00:00


 6/4/18 00:00  5/5/18 09:44


 


 


 Acetaminophen/


 Hydrocodone Bitart


  (Norco 10/325)  1 tab  Q4H  PRN


 ORAL


 Moderate Pain (Pain Scale 4-6)  5/4/18 09:30


 5/11/18 09:29   


 


 


 Chlorhexidine


 Gluconate


  (Rosy-Hex 2%)  1 applic  DAILY@2000


 TOPIC


   5/6/18 20:00


 6/5/18 19:59  5/7/18 21:01


 


 


 Dextrose  1,000 ml @ 


 100 mls/hr  Q10H


 IV


   5/5/18 10:00


 6/4/18 09:59  5/8/18 09:29


 


 


 Dextrose


  (Dextrose 50%)  50 ml  STAT  PRN


 IV


 Hypoglycemia BS<60mg/dL  5/3/18 13:30


 6/2/18 13:29   


 


 


 Ertapenem 1 gm/


 Sodium Chloride  55 ml @ 


 110 mls/hr  Q24H


 IVPB


   5/7/18 21:00


 5/12/18 20:59  5/7/18 21:02


 


 


 Levalbuterol HCl


  (Xopenex)  0.625 mg  Q4HRT


 HHN


   5/6/18 11:00


 5/8/18 10:59  5/8/18 07:09


 


 


 Levetiracetam


  (Keppra)  1,000 mg  EVERY 8  HOURS


 ORAL


   5/4/18 09:00


 6/3/18 08:59   


 


 


 Levothyroxine


 Sodium


  (Synthroid)  75 mcg  ACBREAKFAST


 ORAL


   5/4/18 06:30


 5/20/18 06:29  5/4/18 06:21


 


 


 Nitroglycerin


  (Ntg)  0.4 mg  Q5M X 3 DOSES PRN


 SL


 Prn Chest Pain  5/3/18 12:15


 5/19/18 10:59  5/4/18 01:53


 


 


 Ondansetron HCl


  (Zofran)  4 mg  Q6H  PRN


 IVP


 Nausea & Vomiting  5/3/18 14:30


 5/19/18 20:29  5/4/18 00:40


 


 


 Vancomycin HCl


  (Vanco rx to


 dose)  1 ea  DAILY  PRN


 MISC


 Per rx protocol  5/5/18 11:45


 6/4/18 11:44   


 


 


 Vancomycin/Sodium


 Chloride  250 ml @ 


 166.667


 mls/hr  Q24H


 IVPB


   5/6/18 13:00


 5/11/18 12:59  5/7/18 12:49


 

















Selene Garcia M.D. May 8, 2018 10:47

## 2018-05-08 NOTE — CARDIOLOGY PROGRESS NOTE
Objective





Last 24 Hour Vital Signs








  Date Time  Temp Pulse Resp B/P (MAP) Pulse Ox O2 Delivery O2 Flow Rate FiO2


 


5/8/18 23:09  99 14  88 Non-Rebreather 15.0 100


 


5/8/18 23:09      Non-Rebreather 15.0 100


 


5/8/18 20:00 97.3 101 22 102/61 94   





 97.3       


 


5/8/18 19:49 98.9       


 


5/8/18 19:09  102 22  90 Non-Rebreather 15.0 100


 


5/8/18 19:09      Non-Rebreather 15.0 100


 


5/8/18 19:00      Non-Rebreather 15.0 100


 


5/8/18 19:00     90 Non-Rebreather 15.0 100


 


5/8/18 18:37 98.9       


 


5/8/18 16:18  97 18  94 Non-Rebreather 15.0 100


 


5/8/18 16:08  94 18  91 Non-Rebreather 15.0 100


 


5/8/18 16:00 98.9 101 21 96/62 98 Non-Rebreather 15.0 100





 98.9       


 


5/8/18 16:00  96      


 


5/8/18 12:00 98.6 96 20 98/56 99 Non-Rebreather 15.0 100





 98.6       


 


5/8/18 11:53  96      


 


5/8/18 11:20      Non-Rebreather  


 


5/8/18 11:20      Non-Rebreather  


 


5/8/18 08:00 97.8 97 20 99/54 99 Non-Rebreather 15.0 100





 97.8       


 


5/8/18 08:00  94      


 


5/8/18 07:20  97 18  95 Non-Rebreather 15.0 100


 


5/8/18 07:09      Non-Rebreather 15.0 100


 


5/8/18 07:09  95 18  93 Non-Rebreather 15.0 100


 


5/8/18 07:09     93 Non-Rebreather 15.0 100


 


5/8/18 04:00  101      


 


5/8/18 04:00 98.0 99 21 95/56 98 Non-Rebreather 15.0 100





 98.0       


 


5/8/18 02:57  96 18  95 Non-Rebreather 15.0 100


 


5/8/18 02:47  98 18  95 Non-Rebreather 15.0 100


 


5/8/18 00:00 98.1 95 22 104/63 95 Non-Rebreather 15.0 100





 98.1       


 


5/7/18 23:26  93 18  93 Non-Rebreather 15.0 100

















Intake and Output  


 


 5/7/18 5/8/18





 19:00 07:00


 


Intake Total 1233.334 ml 1235 ml


 


Balance 1233.334 ml 1235 ml


 


  


 


IV Total 1233.334 ml 1235 ml


 


# Voids 3 2








2D Echo:  EF 65%, RVSP 92 mmHg





Laboratory Tests








Test


  5/8/18


04:00 5/8/18


13:05


 


White Blood Count


  12.1 K/UL


(4.8-10.8)  H 


 


 


Red Blood Count


  3.84 M/UL


(4.20-5.40)  L 


 


 


Hemoglobin


  11.3 G/DL


(12.0-16.0)  L 


 


 


Hematocrit


  35.4 %


(37.0-47.0)  L 


 


 


Mean Corpuscular Volume 92 FL (80-99)   


 


Mean Corpuscular Hemoglobin


  29.4 PG


(27.0-31.0) 


 


 


Mean Corpuscular Hemoglobin


Concent 31.9 G/DL


(32.0-36.0)  L 


 


 


Red Cell Distribution Width


  15.2 %


(11.6-14.8)  H 


 


 


Platelet Count


  65 K/UL


(150-450)  L 


 


 


Mean Platelet Volume


  8.8 FL


(6.5-10.1) 


 


 


Neutrophils (%) (Auto)


  % (45.0-75.0)


  


 


 


Lymphocytes (%) (Auto)


  % (20.0-45.0)


  


 


 


Monocytes (%) (Auto)  % (1.0-10.0)   


 


Eosinophils (%) (Auto)  % (0.0-3.0)   


 


Basophils (%) (Auto)  % (0.0-2.0)   


 


Differential Total Cells


Counted 100  


  


 


 


Neutrophils % (Manual) 68 % (45-75)   


 


Lymphocytes % (Manual) 10 % (20-45)  L 


 


Monocytes % (Manual) 5 % (1-10)   


 


Eosinophils % (Manual) 4 % (0-3)  H 


 


Basophils % (Manual) 0 % (0-2)   


 


Band Neutrophils 13 % (0-8)  H 


 


Nucleated Red Blood Cells 2 /100 WBC   


 


Platelet Estimate Decreased  L 


 


Platelet Morphology Normal   


 


Hypochromasia 1+   


 


Anisocytosis 1+   


 


Sodium Level


  152 MMOL/L


(136-145)  H 


 


 


Potassium Level


  3.5 MMOL/L


(3.5-5.1) 


 


 


Chloride Level


  114 MMOL/L


()  H 


 


 


Carbon Dioxide Level


  31 MMOL/L


(21-32) 


 


 


Anion Gap


  7 mmol/L


(5-15) 


 


 


Blood Urea Nitrogen


  31 mg/dL


(7-18)  H 


 


 


Creatinine


  1.1 MG/DL


(0.55-1.30) 


 


 


Estimat Glomerular Filtration


Rate > 60 mL/min


(>60) 


 


 


Glucose Level


  119 MG/DL


()  H 


 


 


Calcium Level


  9.4 MG/DL


(8.5-10.1) 


 


 


Vancomycin Level Trough


  


  10.2 ug/mL


(5.0-12.0)








Objective


1. Sinus tachycardia due to hypoxemia and malignancy. Echocardiogram showed EF 

65%. 


2. Malignant pleural effusion and metastatic cancer,  S/p Left Thoracentesis on 

4/20/18   


3. Lung mass, DNR/DNI, currently severe respiratory distress.  


4. Severe pulmonary HTN


5. Subcoma











MARLENE GUTIERREZ May 8, 2018 23:26

## 2018-05-08 NOTE — GENERAL PROGRESS NOTE
Assessment/Plan


Assessment/Plan


IMPRESSION/RECS:


#. Stage IV malignancy, potentially lung cancer given pattern of spread, proven 

malignancy on pleural fluid. 


--> Has a left lower lobe lung mass. Left inferior hilar mass. Left pleural 

base lung mass. Left pleural effusion. 


--> Retroperitoneal lymphadenopathy. Multiple low-attenuation liver lesions.


--> Discussed with son, RN, Primary MD, given poor performance status recommend 

palliative/hospice care


--> Do not recommend any further diagnosis/tissue given poor state, is not a 

chemotherapy candidate


#. Anemia due to underlying malignancy - continue to closely monitor


--> hgb goal is >7. Transfuse if needed. 


--> Hemoglobin stable at this time.


#. Leukocytosis.


--> Monitor and trend cbc. 


#. Crohn disease.


#. Opioid dependence.


#. Chronic obstructive pulmonary disease.


#. History of smoking.


#. Emphysema.


#. Perianal abscess.


#. Intractable abdominal pain.


#. Status post morphine pump placement.


#. Status post exploratory laparotomy.


#. Pneumoperitoneum.


#. Tachycardia.





Subjective


Date patient seen:  May 7, 2018


Constitutional:  Denies: no symptoms, chills, diaphoresis, fever, malaise, 

weakness, other


HEENT:  Denies: no symptoms, eye pain, blurred vision, tearing, double vision, 

ear pain, ear discharge, nose pain, nose congestion, throat pain, throat 

swelling, mouth pain, mouth swelling, other


Cardiovascular:  Denies: no symptoms, chest pain, edema, irregular heart rate, 

lightheadedness, palpitations, syncope, other


Respiratory:  Denies: no symptoms, cough, orthopnea, shortness of breath, SOB 

with excertion, SOB at rest, sputum, stridor, wheezing, other


Gastrointestinal/Abdominal:  Denies: no symptoms, abdomen distended, abdominal 

pain, black stools, tarry stools, blood in stool, constipated, diarrhea, 

difficulty swallowing, nausea, poor appetite, poor fluid intake, rectal bleeding

, vomiting, other


Genitourinary:  Denies: no symptoms, burning, discharge, frequency, flank pain, 

hematuria, incontinence, pain, urgency, other


Neurologic/Psychiatric:  Denies: no symptoms, anxiety, depressed, emotional 

problems, headache, numbness, paresthesia, pre-existing deficit, seizure, 

tingling, tremors, weakness, other


Hematologic/Lymphatic:  Reports: anemia


Allergies:  


Coded Allergies:  


     No Known Allergies (Verified , 10/27/06)


Subjective


Lethargic. Pain control. Comfort care. Leukocytosis.





Objective





Last 24 Hour Vital Signs








  Date Time  Temp Pulse Resp B/P (MAP) Pulse Ox O2 Delivery O2 Flow Rate FiO2


 


5/8/18 16:08  94 18  91 Non-Rebreather 15.0 100


 


5/8/18 12:00 98.6 96 20 98/56 99 Non-Rebreather 15.0 99





 98.6       


 


5/8/18 11:53  96      


 


5/8/18 11:20      Non-Rebreather  


 


5/8/18 11:20      Non-Rebreather  


 


5/8/18 08:00 97.8 97 20 99/54 99 Non-Rebreather 15.0 97





 97.8       


 


5/8/18 08:00  94      


 


5/8/18 07:20  97 18  95 Non-Rebreather 15.0 100


 


5/8/18 07:09      Non-Rebreather 15.0 100


 


5/8/18 07:09  95 18  93 Non-Rebreather 15.0 100


 


5/8/18 07:09     93 Non-Rebreather 15.0 100


 


5/8/18 04:00  101      


 


5/8/18 04:00 98.0 99 21 95/56 98 Non-Rebreather 15.0 100





 98.0       


 


5/8/18 02:57  96 18  95 Non-Rebreather 15.0 100


 


5/8/18 02:47  98 18  95 Non-Rebreather 15.0 100


 


5/8/18 00:00 98.1 95 22 104/63 95 Non-Rebreather 15.0 100





 98.1       


 


5/7/18 23:26  93 18  93 Non-Rebreather 15.0 100


 


5/7/18 23:19  96 18  96 Non-Rebreather 15.0 100


 


5/7/18 20:00  99      


 


5/7/18 20:00 97.4 99 21 104/65 93 Non-Rebreather 15.0 100





 97.4       


 


5/7/18 19:21  99 16  91 Non-Rebreather 15.0 100


 


5/7/18 19:16      Non-Rebreather 15.0 100


 


5/7/18 19:16     93 Non-Rebreather 15.0 100


 


5/7/18 19:13  93 18  92 Non-Rebreather 15.0 100

















Intake and Output  


 


 5/7/18 5/8/18





 19:00 07:00


 


Intake Total 1233.334 ml 1235 ml


 


Balance 1233.334 ml 1235 ml


 


  


 


IV Total 1233.334 ml 1235 ml


 


# Voids 3 2








Laboratory Tests


5/8/18 04:00: 


White Blood Count 12.1H, Red Blood Count 3.84L, Hemoglobin 11.3L, Hematocrit 

35.4L, Mean Corpuscular Volume 92, Mean Corpuscular Hemoglobin 29.4, Mean 

Corpuscular Hemoglobin Concent 31.9L, Red Cell Distribution Width 15.2H, 

Platelet Count 65L, Mean Platelet Volume 8.8, Neutrophils (%) (Auto) , 

Lymphocytes (%) (Auto) , Monocytes (%) (Auto) , Eosinophils (%) (Auto) , 

Basophils (%) (Auto) , Differential Total Cells Counted 100, Neutrophils % (

Manual) 68, Lymphocytes % (Manual) 10L, Monocytes % (Manual) 5, Eosinophils % (

Manual) 4H, Basophils % (Manual) 0, Band Neutrophils 13H, Nucleated Red Blood 

Cells 2, Platelet Estimate DecreasedL, Platelet Morphology Normal, 

Hypochromasia 1+, Anisocytosis 1+, Sodium Level 152H, Potassium Level 3.5, 

Chloride Level 114H, Carbon Dioxide Level 31, Anion Gap 7, Blood Urea Nitrogen 

31H, Creatinine 1.1, Estimat Glomerular Filtration Rate > 60, Glucose Level 119H

, Calcium Level 9.4


5/8/18 13:05: Vancomycin Level Trough 10.2


Height (Feet):  5


Height (Inches):  6.00


Weight (Pounds):  129


General Appearance:  lethargic


Respiratory/Chest:  decreased breath sounds


Abdomen:  normal bowel sounds, non tender











Erick Russell MD May 8, 2018 16:29

## 2018-05-08 NOTE — GI PROGRESS NOTE
Assessment/Plan


Problems:  


(1) Crohn's disease


ICD Codes:  K50.90 - Crohn's disease


SNOMED:  07893806


Qualifiers:  


   Qualified Codes:  K50.919 - Crohn's disease, unspecified, with unspecified 

complications


(2) Opioid dependence


ICD Codes:  F11.20 - Opioid dependence


SNOMED:  80205832


(3) SBO (small bowel obstruction)


ICD Codes:  K56.609 - Unspecified intestinal obstruction, unspecified as to 

partial versus complete obstruction


SNOMED:  466716633


(4) Chronic abdominal pain


Status:  stable


Status Narrative


Discussed with Dr. Mcleod.


Assessment/Plan


CT AP reviewed >>


Evidence of disseminated malignancy, with large left pulmonary hilar mass or 

adenopathy, extensive left hilar and mediastinal adenopathy, multiple pleural-

based masses on the left, multiple masses within the liver, and retroperitoneal 

lymphadenopathy.





Recommendations


now DNI/DNR


fu oncology recs >>Do not recommend any further diagnosis/tissue given poor 

state, is not a chemotherapy candidate


pain mgmt


poor prognosis





The patient was seen and examined at bedside and all new and available data was 

reviewed in the patients chart. I agree with the above findings, impression 

and plan.  (Patient seen earlier today. Signature stamp does not reflect 

patient encounter time.). - Dimitry Mcleod MD





Subjective


Subjective


generalized pain





Objective





Last 24 Hour Vital Signs








  Date Time  Temp Pulse Resp B/P (MAP) Pulse Ox O2 Delivery O2 Flow Rate FiO2


 


5/8/18 11:20      Non-Rebreather  


 


5/8/18 11:20      Non-Rebreather  


 


5/8/18 08:00 97.8 97 20 99/54 99 Non-Rebreather 15.0 97





 97.8       


 


5/8/18 08:00  94      


 


5/8/18 07:20  97 18  95 Non-Rebreather 15.0 100


 


5/8/18 07:09      Non-Rebreather 15.0 100


 


5/8/18 07:09  95 18  93 Non-Rebreather 15.0 100


 


5/8/18 07:09     93 Non-Rebreather 15.0 100


 


5/8/18 04:00  101      


 


5/8/18 04:00 98.0 99 21 95/56 98 Non-Rebreather 15.0 100





 98.0       


 


5/8/18 02:57  96 18  95 Non-Rebreather 15.0 100


 


5/8/18 02:47  98 18  95 Non-Rebreather 15.0 100


 


5/8/18 00:00 98.1 95 22 104/63 95 Non-Rebreather 15.0 100





 98.1       


 


5/7/18 23:26  93 18  93 Non-Rebreather 15.0 100


 


5/7/18 23:19  96 18  96 Non-Rebreather 15.0 100


 


5/7/18 20:00  99      


 


5/7/18 20:00 97.4 99 21 104/65 93 Non-Rebreather 15.0 100





 97.4       


 


5/7/18 19:21  99 16  91 Non-Rebreather 15.0 100


 


5/7/18 19:16      Non-Rebreather 15.0 100


 


5/7/18 19:16     93 Non-Rebreather 15.0 100


 


5/7/18 19:13  93 18  92 Non-Rebreather 15.0 100


 


5/7/18 16:00  94      


 


5/7/18 16:00 97.8 97 19 96/79 100 Non-Rebreather 15.0 





 97.8       


 


5/7/18 15:15  102 16  96 Non-Rebreather 15.0 100


 


5/7/18 15:08  98 16  88 Non-Rebreather 15.0 100

















Intake and Output  


 


 5/7/18 5/8/18





 19:00 07:00


 


Intake Total 1233.334 ml 1135 ml


 


Balance 1233.334 ml 1135 ml


 


  


 


IV Total 1233.334 ml 1135 ml


 


# Voids 3 2











Laboratory Tests








Test


  5/8/18


04:00 5/8/18


13:05


 


White Blood Count


  12.1 K/UL


(4.8-10.8)  H 


 


 


Red Blood Count


  3.84 M/UL


(4.20-5.40)  L 


 


 


Hemoglobin


  11.3 G/DL


(12.0-16.0)  L 


 


 


Hematocrit


  35.4 %


(37.0-47.0)  L 


 


 


Mean Corpuscular Volume 92 FL (80-99)   


 


Mean Corpuscular Hemoglobin


  29.4 PG


(27.0-31.0) 


 


 


Mean Corpuscular Hemoglobin


Concent 31.9 G/DL


(32.0-36.0)  L 


 


 


Red Cell Distribution Width


  15.2 %


(11.6-14.8)  H 


 


 


Platelet Count


  65 K/UL


(150-450)  L 


 


 


Mean Platelet Volume


  8.8 FL


(6.5-10.1) 


 


 


Neutrophils (%) (Auto)


  % (45.0-75.0)


  


 


 


Lymphocytes (%) (Auto)


  % (20.0-45.0)


  


 


 


Monocytes (%) (Auto)  % (1.0-10.0)   


 


Eosinophils (%) (Auto)  % (0.0-3.0)   


 


Basophils (%) (Auto)  % (0.0-2.0)   


 


Differential Total Cells


Counted 100  


  


 


 


Neutrophils % (Manual) 68 % (45-75)   


 


Lymphocytes % (Manual) 10 % (20-45)  L 


 


Monocytes % (Manual) 5 % (1-10)   


 


Eosinophils % (Manual) 4 % (0-3)  H 


 


Basophils % (Manual) 0 % (0-2)   


 


Band Neutrophils 13 % (0-8)  H 


 


Nucleated Red Blood Cells 2 /100 WBC   


 


Platelet Estimate Decreased  L 


 


Platelet Morphology Normal   


 


Hypochromasia 1+   


 


Anisocytosis 1+   


 


Sodium Level


  152 MMOL/L


(136-145)  H 


 


 


Potassium Level


  3.5 MMOL/L


(3.5-5.1) 


 


 


Chloride Level


  114 MMOL/L


()  H 


 


 


Carbon Dioxide Level


  31 MMOL/L


(21-32) 


 


 


Anion Gap


  7 mmol/L


(5-15) 


 


 


Blood Urea Nitrogen


  31 mg/dL


(7-18)  H 


 


 


Creatinine


  1.1 MG/DL


(0.55-1.30) 


 


 


Estimat Glomerular Filtration


Rate > 60 mL/min


(>60) 


 


 


Glucose Level


  119 MG/DL


()  H 


 


 


Calcium Level


  9.4 MG/DL


(8.5-10.1) 


 


 


Vancomycin Level Trough


  


  10.2 ug/mL


(5.0-12.0)








Height (Feet):  5


Height (Inches):  6.00


Weight (Pounds):  129


General Appearance:  alert


Cardiovascular:  normal rate


Respiratory/Chest:  lungs clear, normal breath sounds


Abdominal Exam:  normal bowel sounds, non tender, soft


Genitourinary/Rectal:  normal rectal exam











Elena Seay N.P. May 8, 2018 14:09

## 2018-05-08 NOTE — GENERAL PROGRESS NOTE
Assessment/Plan


Problem List:  


(1) Crohn's disease


ICD Codes:  K50.90 - Crohn's disease


SNOMED:  29831004


Qualifiers:  


   Qualified Codes:  K50.919 - Crohn's disease, unspecified, with unspecified 

complications


(2) Opioid dependence


ICD Codes:  F11.20 - Opioid dependence


SNOMED:  53855717


(3) Intractable abdominal pain


ICD Codes:  R10.9 - Unspecified abdominal pain


SNOMED:  26012357, 747147571


(4) COPD (chronic obstructive pulmonary disease)


ICD Codes:  J44.9 - Chronic obstructive pulmonary disease


SNOMED:  90549504


(5) Shortness of breath


(6) SOB (shortness of breath)


ICD Codes:  R06.02 - Shortness of breath


SNOMED:  439869075


(7) UTI (urinary tract infection)


ICD Codes:  N39.0 - Urinary tract infection, site not specified


SNOMED:  97725612


(8) Lung mass


ICD Codes:  R91.8 - Other nonspecific abnormal finding of lung field


SNOMED:  328932218


(9) Tachycardia


ICD Codes:  R00.0 - Tachycardia, unspecified


SNOMED:  4377990


Status:  unchanged


Assessment/Plan


ot pt diet pain control sx eval cbc bmp am heme f/u cardio eval, ltach eval





Subjective


Constitutional:  Reports: weakness


Allergies:  


Coded Allergies:  


     No Known Allergies (Verified , 10/27/06)


All Systems:  reviewed and negative except above


Subjective


02 mask sleepy





Objective





Last 24 Hour Vital Signs








  Date Time  Temp Pulse Resp B/P (MAP) Pulse Ox O2 Delivery O2 Flow Rate FiO2


 


5/8/18 11:20      Non-Rebreather  


 


5/8/18 11:20      Non-Rebreather  


 


5/8/18 08:00 97.8 97 20 99/54 99 Non-Rebreather 15.0 97





 97.8       


 


5/8/18 08:00  94      


 


5/8/18 07:20  97 18  95 Non-Rebreather 15.0 100


 


5/8/18 07:09      Non-Rebreather 15.0 100


 


5/8/18 07:09  95 18  93 Non-Rebreather 15.0 100


 


5/8/18 07:09     93 Non-Rebreather 15.0 100


 


5/8/18 04:00  101      


 


5/8/18 04:00 98.0 99 21 95/56 98 Non-Rebreather 15.0 100





 98.0       


 


5/8/18 02:57  96 18  95 Non-Rebreather 15.0 100


 


5/8/18 02:47  98 18  95 Non-Rebreather 15.0 100


 


5/8/18 00:00 98.1 95 22 104/63 95 Non-Rebreather 15.0 100





 98.1       


 


5/7/18 23:26  93 18  93 Non-Rebreather 15.0 100


 


5/7/18 23:19  96 18  96 Non-Rebreather 15.0 100


 


5/7/18 20:00  99      


 


5/7/18 20:00 97.4 99 21 104/65 93 Non-Rebreather 15.0 100





 97.4       


 


5/7/18 19:21  99 16  91 Non-Rebreather 15.0 100


 


5/7/18 19:16      Non-Rebreather 15.0 100


 


5/7/18 19:16     93 Non-Rebreather 15.0 100


 


5/7/18 19:13  93 18  92 Non-Rebreather 15.0 100


 


5/7/18 16:00  94      


 


5/7/18 16:00 97.8 97 19 96/79 100 Non-Rebreather 15.0 





 97.8       


 


5/7/18 15:15  102 16  96 Non-Rebreather 15.0 100

















Intake and Output  


 


 5/7/18 5/8/18





 19:00 07:00


 


Intake Total 1233.334 ml 1135 ml


 


Balance 1233.334 ml 1135 ml


 


  


 


IV Total 1233.334 ml 1135 ml


 


# Voids 3 2








Laboratory Tests


5/8/18 04:00: 


White Blood Count 12.1H, Red Blood Count 3.84L, Hemoglobin 11.3L, Hematocrit 

35.4L, Mean Corpuscular Volume 92, Mean Corpuscular Hemoglobin 29.4, Mean 

Corpuscular Hemoglobin Concent 31.9L, Red Cell Distribution Width 15.2H, 

Platelet Count 65L, Mean Platelet Volume 8.8, Neutrophils (%) (Auto) , 

Lymphocytes (%) (Auto) , Monocytes (%) (Auto) , Eosinophils (%) (Auto) , 

Basophils (%) (Auto) , Differential Total Cells Counted 100, Neutrophils % (

Manual) 68, Lymphocytes % (Manual) 10L, Monocytes % (Manual) 5, Eosinophils % (

Manual) 4H, Basophils % (Manual) 0, Band Neutrophils 13H, Nucleated Red Blood 

Cells 2, Platelet Estimate DecreasedL, Platelet Morphology Normal, 

Hypochromasia 1+, Anisocytosis 1+, Sodium Level 152H, Potassium Level 3.5, 

Chloride Level 114H, Carbon Dioxide Level 31, Anion Gap 7, Blood Urea Nitrogen 

31H, Creatinine 1.1, Estimat Glomerular Filtration Rate > 60, Glucose Level 119H

, Calcium Level 9.4


5/8/18 13:05: Vancomycin Level Trough 10.2


Height (Feet):  5


Height (Inches):  6.00


Weight (Pounds):  129


General Appearance:  lethargic


EENT:  normal ENT inspection


Neck:  normal alignment


Cardiovascular:  normal peripheral pulses, normal rate, regular rhythm


Respiratory/Chest:  chest wall non-tender, decreased breath sounds


Abdomen:  normal bowel sounds, non tender, soft


Extremities:  normal inspection


Edema:  no edema noted Arm (L), no edema noted Arm (R), no edema noted Leg (L), 

no edema noted Leg (R), no edema noted Pedal (L), no edema noted Pedal (R), no 

edema noted Generalized


Neurologic:  motor weakness


Skin:  normal pigmentation, warm/dry











MAICOL LANG May 8, 2018 15:14

## 2018-05-08 NOTE — NEPHROLOGY PROGRESS NOTE
Assessment/Plan


Assessment


1. Hypernatremia


2. Acute renal failure.


3. Malnutrition.


4. Failure to thrive.


5. Metastatic CA.


6. Recurrent pleural effusion.


7. Stage 4 Metastatic CA.


Plan


plan 'to continue ivf 


monitoring renal function 


replace electrolyte as need it 


avoid NSIAD





Subjective


ROS Limited/Unobtainable:  Yes


HEENT:  Reports: no symptoms


Subjective


no acute events overnight





Objective


Objective





Last 24 Hour Vital Signs








  Date Time  Temp Pulse Resp B/P (MAP) Pulse Ox O2 Delivery O2 Flow Rate FiO2


 


5/8/18 18:37 98.9       


 


5/8/18 16:18  97 18  94 Non-Rebreather 15.0 100


 


5/8/18 16:08  94 18  91 Non-Rebreather 15.0 100


 


5/8/18 16:00 98.9 101 21 96/62 98 Non-Rebreather 15.0 100





 98.9       


 


5/8/18 16:00  96      


 


5/8/18 12:00 98.6 96 20 98/56 99 Non-Rebreather 15.0 100





 98.6       


 


5/8/18 11:53  96      


 


5/8/18 11:20      Non-Rebreather  


 


5/8/18 11:20      Non-Rebreather  


 


5/8/18 08:00 97.8 97 20 99/54 99 Non-Rebreather 15.0 100





 97.8       


 


5/8/18 08:00  94      


 


5/8/18 07:20  97 18  95 Non-Rebreather 15.0 100


 


5/8/18 07:09      Non-Rebreather 15.0 100


 


5/8/18 07:09  95 18  93 Non-Rebreather 15.0 100


 


5/8/18 07:09     93 Non-Rebreather 15.0 100


 


5/8/18 04:00  101      


 


5/8/18 04:00 98.0 99 21 95/56 98 Non-Rebreather 15.0 100





 98.0       


 


5/8/18 02:57  96 18  95 Non-Rebreather 15.0 100


 


5/8/18 02:47  98 18  95 Non-Rebreather 15.0 100


 


5/8/18 00:00 98.1 95 22 104/63 95 Non-Rebreather 15.0 100





 98.1       


 


5/7/18 23:26  93 18  93 Non-Rebreather 15.0 100


 


5/7/18 23:19  96 18  96 Non-Rebreather 15.0 100


 


5/7/18 20:00  99      


 


5/7/18 20:00 97.4 99 21 104/65 93 Non-Rebreather 15.0 100





 97.4       


 


5/7/18 19:21  99 16  91 Non-Rebreather 15.0 100

















Intake and Output  


 


 5/7/18 5/8/18





 19:00 07:00


 


Intake Total 1233.334 ml 1235 ml


 


Balance 1233.334 ml 1235 ml


 


  


 


IV Total 1233.334 ml 1235 ml


 


# Voids 3 2








Laboratory Tests


5/8/18 04:00: 


White Blood Count 12.1H, Red Blood Count 3.84L, Hemoglobin 11.3L, Hematocrit 

35.4L, Mean Corpuscular Volume 92, Mean Corpuscular Hemoglobin 29.4, Mean 

Corpuscular Hemoglobin Concent 31.9L, Red Cell Distribution Width 15.2H, 

Platelet Count 65L, Mean Platelet Volume 8.8, Neutrophils (%) (Auto) , 

Lymphocytes (%) (Auto) , Monocytes (%) (Auto) , Eosinophils (%) (Auto) , 

Basophils (%) (Auto) , Differential Total Cells Counted 100, Neutrophils % (

Manual) 68, Lymphocytes % (Manual) 10L, Monocytes % (Manual) 5, Eosinophils % (

Manual) 4H, Basophils % (Manual) 0, Band Neutrophils 13H, Nucleated Red Blood 

Cells 2, Platelet Estimate DecreasedL, Platelet Morphology Normal, 

Hypochromasia 1+, Anisocytosis 1+, Sodium Level 152H, Potassium Level 3.5, 

Chloride Level 114H, Carbon Dioxide Level 31, Anion Gap 7, Blood Urea Nitrogen 

31H, Creatinine 1.1, Estimat Glomerular Filtration Rate > 60, Glucose Level 119H

, Calcium Level 9.4


5/8/18 13:05: Vancomycin Level Trough 10.2


Height (Feet):  5


Height (Inches):  6.00


Weight (Pounds):  129


Objective


HEAD AND NECK:  Bitemporal wasting.  Extraocular movements intact.  Pupils


are reactive to light and accommodation.  Dry mucous membranes.  Currently


on non-rebreather mask.


LUNGS:  Decreased breathing sounds on the both sides.


CARDIAC:  Regular rate and rhythm.  S1 and S2.  No murmur.  No rub.


 


ABDOMEN:  Tender.  No guarding or rebound.  Bowel sounds decreased.


 


EXTREMITIES:  No edema.  No clubbing.  No cyanosis











NICOLE PAINTER May 8, 2018 19:28

## 2018-05-08 NOTE — GENERAL PROGRESS NOTE
Assessment/Plan


Assessment/Plan


(1) Chronic abdominal pain


(2) Chronic pancreatitis


(3) Crohn's disease 


(4) Herniated nucleus pulposus, lumbar


(5) Lumbar spondylosis


(6) Radiculopathy of lumbar region


(7) Lung mass, Metastatic cancer


Pt will be continued on Norco and pt has intrathecal pump.


HOLD OPIOIDS FOR OVERSEDATION OR SBP<90 OR DBP<60 OR O2SAT<92% OR RR<12


Pt was d/w Dr. Shetty and he concurred.





Subjective


Date patient seen:  May 8, 2018


Time patient seen:  08:15 - am


ROS Limited/Unobtainable:  Yes


Allergies:  


Coded Allergies:  


     No Known Allergies (Verified , 10/27/06)


Subjective


She is in bed and family at bedside. No signs of pain or distress. 


D/w nurse about refilling the intrathecal pump as per Dr. Shetty prior to 

discharge,





Objective





Last 24 Hour Vital Signs








  Date Time  Temp Pulse Resp B/P (MAP) Pulse Ox O2 Delivery O2 Flow Rate FiO2


 


5/8/18 07:20  97 18  95 Non-Rebreather 15.0 100


 


5/8/18 07:09      Non-Rebreather 15.0 100


 


5/8/18 07:09  95 18  93 Non-Rebreather 15.0 100


 


5/8/18 07:09     93 Non-Rebreather 15.0 100


 


5/8/18 04:00  101      


 


5/8/18 04:00 98.0 99 21 95/56 98 Non-Rebreather 15.0 100





 98.0       


 


5/8/18 02:57  96 18  95 Non-Rebreather 15.0 100


 


5/8/18 02:47  98 18  95 Non-Rebreather 15.0 100


 


5/8/18 00:00 98.1 95 22 104/63 95 Non-Rebreather 15.0 100





 98.1       


 


5/7/18 23:26  93 18  93 Non-Rebreather 15.0 100


 


5/7/18 23:19  96 18  96 Non-Rebreather 15.0 100


 


5/7/18 20:00  99      


 


5/7/18 20:00 97.4 99 21 104/65 93 Non-Rebreather 15.0 100





 97.4       


 


5/7/18 19:21  99 16  91 Non-Rebreather 15.0 100


 


5/7/18 19:16      Non-Rebreather 15.0 100


 


5/7/18 19:16     93 Non-Rebreather 15.0 100


 


5/7/18 19:13  93 18  92 Non-Rebreather 15.0 100


 


5/7/18 16:00  94      


 


5/7/18 16:00 97.8 97 19 96/79 100 Non-Rebreather 15.0 





 97.8       


 


5/7/18 15:15  102 16  96 Non-Rebreather 15.0 100


 


5/7/18 15:08  98 16  88 Non-Rebreather 15.0 100


 


5/7/18 12:00 98.4 97 19 98/56 100 Non-Rebreather 15.0 





 98.4       


 


5/7/18 12:00  94      


 


5/7/18 11:43  91 16  97 Non-Rebreather 15.0 100


 


5/7/18 11:30  95 16  93 Non-Rebreather 15.0 100

















Intake and Output  


 


 5/7/18 5/8/18





 19:00 07:00


 


Intake Total 1233.334 ml 1135 ml


 


Balance 1233.334 ml 1135 ml


 


  


 


IV Total 1233.334 ml 1135 ml


 


# Voids 3 2








Laboratory Tests


5/8/18 04:00: 


White Blood Count 12.1H, Red Blood Count 3.84L, Hemoglobin 11.3L, Hematocrit 

35.4L, Mean Corpuscular Volume 92, Mean Corpuscular Hemoglobin 29.4, Mean 

Corpuscular Hemoglobin Concent 31.9L, Red Cell Distribution Width 15.2H, 

Platelet Count 65L, Mean Platelet Volume 8.8, Neutrophils (%) (Auto) , 

Lymphocytes (%) (Auto) , Monocytes (%) (Auto) , Eosinophils (%) (Auto) , 

Basophils (%) (Auto) , Differential Total Cells Counted 100, Neutrophils % (

Manual) 68, Lymphocytes % (Manual) 10L, Monocytes % (Manual) 5, Eosinophils % (

Manual) 4H, Basophils % (Manual) 0, Band Neutrophils 13H, Nucleated Red Blood 

Cells 2, Platelet Estimate DecreasedL, Platelet Morphology Normal, 

Hypochromasia 1+, Anisocytosis 1+, Sodium Level 152H, Potassium Level 3.5, 

Chloride Level 114H, Carbon Dioxide Level 31, Anion Gap 7, Blood Urea Nitrogen 

31H, Creatinine 1.1, Estimat Glomerular Filtration Rate > 60, Glucose Level 119H

, Calcium Level 9.4


Height (Feet):  5


Height (Inches):  6.00


Weight (Pounds):  129


Objective


General Appearance:  on Bipap


Neck:  normal alignment, supple


Cardiovascular:  tachycardic


Respiratory/Chest:  decreased breath sounds


Abdomen:  tender, distended, intrathecal pump palpated


Extremities:  non-tender


Edema:  no edema noted











LEA BRAVO May 8, 2018 08:54

## 2018-05-08 NOTE — PULMONOLOGY PROGRESS NOTE
Assessment/Plan


Problems:  


(1) Metastatic cancer


(2) Malignant pleural effusion


(3) Chronic pain disorder


(4) IBD (inflammatory bowel disease)


(5) Interstitial lung disease


(6) COPD (chronic obstructive pulmonary disease)


Assessment/Plan


on 100% NRM


respiratory treatment


titrate fio3 to sat of 92


family doesn't want NG tube feeding


continue current symptomatic treatment


oncology on the case already and recommending comfort care, not a candidate for 

chemotherapy.


pain management


renal function improving with D5W at 100 cc/hour


private room 


dc planning preferably with hospice.





Subjective


ROS Limited/Unobtainable:  No


Constitutional:  Reports: no symptoms


HEENT:  Repors: no symptoms


Respiratory:  Reports: no symptoms


Allergies:  


Coded Allergies:  


     No Known Allergies (Verified , 10/27/06)





Objective





Last 24 Hour Vital Signs








  Date Time  Temp Pulse Resp B/P (MAP) Pulse Ox O2 Delivery O2 Flow Rate FiO2


 


5/8/18 08:00 97.8 97 20 99/54 99 Non-Rebreather 15.0 97





 97.8       


 


5/8/18 08:00  94      


 


5/8/18 07:20  97 18  95 Non-Rebreather 15.0 100


 


5/8/18 07:09      Non-Rebreather 15.0 100


 


5/8/18 07:09  95 18  93 Non-Rebreather 15.0 100


 


5/8/18 07:09     93 Non-Rebreather 15.0 100


 


5/8/18 04:00  101      


 


5/8/18 04:00 98.0 99 21 95/56 98 Non-Rebreather 15.0 100





 98.0       


 


5/8/18 02:57  96 18  95 Non-Rebreather 15.0 100


 


5/8/18 02:47  98 18  95 Non-Rebreather 15.0 100


 


5/8/18 00:00 98.1 95 22 104/63 95 Non-Rebreather 15.0 100





 98.1       


 


5/7/18 23:26  93 18  93 Non-Rebreather 15.0 100


 


5/7/18 23:19  96 18  96 Non-Rebreather 15.0 100


 


5/7/18 20:00  99      


 


5/7/18 20:00 97.4 99 21 104/65 93 Non-Rebreather 15.0 100





 97.4       


 


5/7/18 19:21  99 16  91 Non-Rebreather 15.0 100


 


5/7/18 19:16      Non-Rebreather 15.0 100


 


5/7/18 19:16     93 Non-Rebreather 15.0 100


 


5/7/18 19:13  93 18  92 Non-Rebreather 15.0 100


 


5/7/18 16:00  94      


 


5/7/18 16:00 97.8 97 19 96/79 100 Non-Rebreather 15.0 





 97.8       


 


5/7/18 15:15  102 16  96 Non-Rebreather 15.0 100


 


5/7/18 15:08  98 16  88 Non-Rebreather 15.0 100


 


5/7/18 12:00 98.4 97 19 98/56 100 Non-Rebreather 15.0 





 98.4       


 


5/7/18 12:00  94      


 


5/7/18 11:43  91 16  97 Non-Rebreather 15.0 100


 


5/7/18 11:30  95 16  93 Non-Rebreather 15.0 100

















Intake and Output  


 


 5/7/18 5/8/18





 19:00 07:00


 


Intake Total 1233.334 ml 1135 ml


 


Balance 1233.334 ml 1135 ml


 


  


 


IV Total 1233.334 ml 1135 ml


 


# Voids 3 2








Objective


General Appearance:  cachectic


HEENT:  normocephalic, atraumatic


Respiratory/Chest:  chest wall non-tender, lungs clear


Cardiovascular:  normal peripheral pulses, normal rate, regular rhythm


Abdomen:  normal bowel sounds, soft, non tender, no organomegaly, non distended


Extremities:  no cyanosis, no clubbing, no edema


Skin:  no rash, no lesions


Neurologic/Psychiatric:  CNs II-XII grossly normal


Laboratory Tests


5/8/18 04:00: 


White Blood Count 12.1H, Red Blood Count 3.84L, Hemoglobin 11.3L, Hematocrit 

35.4L, Mean Corpuscular Volume 92, Mean Corpuscular Hemoglobin 29.4, Mean 

Corpuscular Hemoglobin Concent 31.9L, Red Cell Distribution Width 15.2H, 

Platelet Count 65L, Mean Platelet Volume 8.8, Neutrophils (%) (Auto) , 

Lymphocytes (%) (Auto) , Monocytes (%) (Auto) , Eosinophils (%) (Auto) , 

Basophils (%) (Auto) , Differential Total Cells Counted 100, Neutrophils % (

Manual) 68, Lymphocytes % (Manual) 10L, Monocytes % (Manual) 5, Eosinophils % (

Manual) 4H, Basophils % (Manual) 0, Band Neutrophils 13H, Nucleated Red Blood 

Cells 2, Platelet Estimate DecreasedL, Platelet Morphology Normal, 

Hypochromasia 1+, Anisocytosis 1+, Sodium Level 152H, Potassium Level 3.5, 

Chloride Level 114H, Carbon Dioxide Level 31, Anion Gap 7, Blood Urea Nitrogen 

31H, Creatinine 1.1, Estimat Glomerular Filtration Rate > 60, Glucose Level 119H

, Calcium Level 9.4





Current Medications








 Medications


  (Trade)  Dose


 Ordered  Sig/Jed


 Route


 PRN Reason  Start Time


 Stop Time Status Last Admin


Dose Admin


 


 Acetaminophen


  (Tylenol)  650 mg  Q4H  PRN


 ORAL


 fever (temp>100.5F)  5/3/18 13:30


 5/19/18 13:29  5/4/18 06:22


 


 


 Acetaminophen


  (Tylenol)  650 mg  Q4H  PRN


 RECTAL


 Mild Pain (Pain Scale 1-3)  5/5/18 00:00


 6/4/18 00:00  5/5/18 09:44


 


 


 Acetaminophen/


 Hydrocodone Bitart


  (Norco 10/325)  1 tab  Q4H  PRN


 ORAL


 Moderate Pain (Pain Scale 4-6)  5/4/18 09:30


 5/11/18 09:29   


 


 


 Chlorhexidine


 Gluconate


  (Rosy-Hex 2%)  1 applic  DAILY@2000


 TOPIC


   5/6/18 20:00


 6/5/18 19:59  5/7/18 21:01


 


 


 Dextrose  1,000 ml @ 


 100 mls/hr  Q10H


 IV


   5/5/18 10:00


 6/4/18 09:59  5/8/18 09:29


 


 


 Dextrose


  (Dextrose 50%)  50 ml  STAT  PRN


 IV


 Hypoglycemia BS<60mg/dL  5/3/18 13:30


 6/2/18 13:29   


 


 


 Ertapenem 1 gm/


 Sodium Chloride  55 ml @ 


 110 mls/hr  Q24H


 IVPB


   5/7/18 21:00


 5/12/18 20:59  5/7/18 21:02


 


 


 Levetiracetam


  (Keppra)  1,000 mg  EVERY 8  HOURS


 ORAL


   5/4/18 09:00


 6/3/18 08:59   


 


 


 Levothyroxine


 Sodium


  (Synthroid)  75 mcg  ACBREAKFAST


 ORAL


   5/4/18 06:30


 5/20/18 06:29  5/4/18 06:21


 


 


 Nitroglycerin


  (Ntg)  0.4 mg  Q5M X 3 DOSES PRN


 SL


 Prn Chest Pain  5/3/18 12:15


 5/19/18 10:59  5/4/18 01:53


 


 


 Ondansetron HCl


  (Zofran)  4 mg  Q6H  PRN


 IVP


 Nausea & Vomiting  5/3/18 14:30


 5/19/18 20:29  5/4/18 00:40


 


 


 Vancomycin HCl


  (Vanco rx to


 dose)  1 ea  DAILY  PRN


 MISC


 Per rx protocol  5/5/18 11:45


 6/4/18 11:44   


 


 


 Vancomycin/Sodium


 Chloride  250 ml @ 


 166.667


 mls/hr  Q24H


 IVPB


   5/6/18 13:00


 5/11/18 12:59  5/7/18 12:49


 

















Blaze Fraga MD May 8, 2018 11:06

## 2018-05-08 NOTE — GENERAL PROGRESS NOTE
Assessment/Plan


Assessment/Plan


IMPRESSION/RECS:


#. Stage IV malignancy, potentially lung cancer given pattern of spread, proven 

malignancy on pleural fluid. 


--> Has a left lower lobe lung mass. Left inferior hilar mass. Left pleural 

base lung mass. Left pleural effusion. 


--> Retroperitoneal lymphadenopathy. Multiple low-attenuation liver lesions.


--> Discussed with son, RN, Primary MD, given poor performance status recommend 

palliative/hospice care


--> Do not recommend any further diagnosis/tissue given poor state, is not a 

chemotherapy candidate


#. Anemia due to underlying malignancy - continue to closely monitor


--> hgb goal is >7. Transfuse if needed. 


--> Hemoglobin stable at this time.


#. Leukocytosis.


--> Monitor and trend cbc. 


#. Crohn disease.


#. Opioid dependence.


#. Chronic obstructive pulmonary disease.


#. History of smoking.


#. Emphysema.


#. Perianal abscess.


#. Intractable abdominal pain.


#. Status post morphine pump placement.


#. Status post exploratory laparotomy.


#. Pneumoperitoneum.


#. Tachycardia.





Subjective


Date patient seen:  May 8, 2018


Constitutional:  Denies: no symptoms, chills, diaphoresis, fever, malaise, 

weakness, other


HEENT:  Denies: no symptoms, eye pain, blurred vision, tearing, double vision, 

ear pain, ear discharge, nose pain, nose congestion, throat pain, throat 

swelling, mouth pain, mouth swelling, other


Cardiovascular:  Denies: no symptoms, chest pain, edema, irregular heart rate, 

lightheadedness, palpitations, syncope, other


Respiratory:  Denies: no symptoms, cough, orthopnea, shortness of breath, SOB 

with excertion, SOB at rest, sputum, stridor, wheezing, other


Gastrointestinal/Abdominal:  Denies: no symptoms, abdomen distended, abdominal 

pain, black stools, tarry stools, blood in stool, constipated, diarrhea, 

difficulty swallowing, nausea, poor appetite, poor fluid intake, rectal bleeding

, vomiting, other


Genitourinary:  Denies: no symptoms, burning, discharge, frequency, flank pain, 

hematuria, incontinence, pain, urgency, other


Neurologic/Psychiatric:  Denies: no symptoms, anxiety, depressed, emotional 

problems, headache, numbness, paresthesia, pre-existing deficit, seizure, 

tingling, tremors, weakness, other


Hematologic/Lymphatic:  Reports: anemia


Allergies:  


Coded Allergies:  


     No Known Allergies (Verified , 10/27/06)


Subjective


Lethargic. Pain control. No fever. Comfort care. Tachycardia.





Objective





Last 24 Hour Vital Signs








  Date Time  Temp Pulse Resp B/P (MAP) Pulse Ox O2 Delivery O2 Flow Rate FiO2


 


5/8/18 23:09  99 14  88 Non-Rebreather 15.0 100


 


5/8/18 23:09      Non-Rebreather 15.0 100


 


5/8/18 20:00 97.3 101 22 102/61 94   





 97.3       


 


5/8/18 19:49 98.9       


 


5/8/18 19:09  102 22  90 Non-Rebreather 15.0 100


 


5/8/18 19:09      Non-Rebreather 15.0 100


 


5/8/18 19:00      Non-Rebreather 15.0 100


 


5/8/18 19:00     90 Non-Rebreather 15.0 100


 


5/8/18 18:37 98.9       


 


5/8/18 16:18  97 18  94 Non-Rebreather 15.0 100


 


5/8/18 16:08  94 18  91 Non-Rebreather 15.0 100


 


5/8/18 16:00 98.9 101 21 96/62 98 Non-Rebreather 15.0 100





 98.9       


 


5/8/18 16:00  96      


 


5/8/18 12:00 98.6 96 20 98/56 99 Non-Rebreather 15.0 100





 98.6       


 


5/8/18 11:53  96      


 


5/8/18 11:20      Non-Rebreather  


 


5/8/18 11:20      Non-Rebreather  


 


5/8/18 08:00 97.8 97 20 99/54 99 Non-Rebreather 15.0 100





 97.8       


 


5/8/18 08:00  94      


 


5/8/18 07:20  97 18  95 Non-Rebreather 15.0 100


 


5/8/18 07:09      Non-Rebreather 15.0 100


 


5/8/18 07:09  95 18  93 Non-Rebreather 15.0 100


 


5/8/18 07:09     93 Non-Rebreather 15.0 100


 


5/8/18 04:00  101      


 


5/8/18 04:00 98.0 99 21 95/56 98 Non-Rebreather 15.0 100





 98.0       


 


5/8/18 02:57  96 18  95 Non-Rebreather 15.0 100


 


5/8/18 02:47  98 18  95 Non-Rebreather 15.0 100


 


5/8/18 00:00 98.1 95 22 104/63 95 Non-Rebreather 15.0 100





 98.1       

















Intake and Output  


 


 5/7/18 5/8/18





 19:00 07:00


 


Intake Total 1233.334 ml 1235 ml


 


Balance 1233.334 ml 1235 ml


 


  


 


IV Total 1233.334 ml 1235 ml


 


# Voids 3 2








Laboratory Tests


5/8/18 04:00: 


White Blood Count 12.1H, Red Blood Count 3.84L, Hemoglobin 11.3L, Hematocrit 

35.4L, Mean Corpuscular Volume 92, Mean Corpuscular Hemoglobin 29.4, Mean 

Corpuscular Hemoglobin Concent 31.9L, Red Cell Distribution Width 15.2H, 

Platelet Count 65L, Mean Platelet Volume 8.8, Neutrophils (%) (Auto) , 

Lymphocytes (%) (Auto) , Monocytes (%) (Auto) , Eosinophils (%) (Auto) , 

Basophils (%) (Auto) , Differential Total Cells Counted 100, Neutrophils % (

Manual) 68, Lymphocytes % (Manual) 10L, Monocytes % (Manual) 5, Eosinophils % (

Manual) 4H, Basophils % (Manual) 0, Band Neutrophils 13H, Nucleated Red Blood 

Cells 2, Platelet Estimate DecreasedL, Platelet Morphology Normal, 

Hypochromasia 1+, Anisocytosis 1+, Sodium Level 152H, Potassium Level 3.5, 

Chloride Level 114H, Carbon Dioxide Level 31, Anion Gap 7, Blood Urea Nitrogen 

31H, Creatinine 1.1, Estimat Glomerular Filtration Rate > 60, Glucose Level 119H

, Calcium Level 9.4


5/8/18 13:05: Vancomycin Level Trough 10.2


Height (Feet):  5


Height (Inches):  6.00


Weight (Pounds):  129


EENT:  normal ENT inspection


Cardiovascular:  tachycardia


Respiratory/Chest:  lungs clear, normal breath sounds


Abdomen:  normal bowel sounds, non tender











Erick Russell MD May 8, 2018 23:55

## 2018-05-09 VITALS — SYSTOLIC BLOOD PRESSURE: 123 MMHG | DIASTOLIC BLOOD PRESSURE: 66 MMHG

## 2018-05-09 VITALS — DIASTOLIC BLOOD PRESSURE: 90 MMHG | SYSTOLIC BLOOD PRESSURE: 96 MMHG

## 2018-05-09 VITALS — SYSTOLIC BLOOD PRESSURE: 102 MMHG | DIASTOLIC BLOOD PRESSURE: 58 MMHG

## 2018-05-09 VITALS — SYSTOLIC BLOOD PRESSURE: 137 MMHG | DIASTOLIC BLOOD PRESSURE: 77 MMHG

## 2018-05-09 VITALS — DIASTOLIC BLOOD PRESSURE: 68 MMHG | SYSTOLIC BLOOD PRESSURE: 103 MMHG

## 2018-05-09 LAB
ADD MANUAL DIFF: YES
ANION GAP SERPL CALC-SCNC: 7 MMOL/L (ref 5–15)
BUN SERPL-MCNC: 19 MG/DL (ref 7–18)
CALCIUM SERPL-MCNC: 9.3 MG/DL (ref 8.5–10.1)
CHLORIDE SERPL-SCNC: 110 MMOL/L (ref 98–107)
CO2 SERPL-SCNC: 29 MMOL/L (ref 21–32)
CREAT SERPL-MCNC: 1 MG/DL (ref 0.55–1.3)
ERYTHROCYTE [DISTWIDTH] IN BLOOD BY AUTOMATED COUNT: 15.2 % (ref 11.6–14.8)
HCT VFR BLD CALC: 33.9 % (ref 37–47)
HGB BLD-MCNC: 10.6 G/DL (ref 12–16)
MCV RBC AUTO: 92 FL (ref 80–99)
PLATELET # BLD: 83 K/UL (ref 150–450)
POTASSIUM SERPL-SCNC: 3.1 MMOL/L (ref 3.5–5.1)
RBC # BLD AUTO: 3.69 M/UL (ref 4.2–5.4)
SODIUM SERPL-SCNC: 146 MMOL/L (ref 136–145)
WBC # BLD AUTO: 9.4 K/UL (ref 4.8–10.8)

## 2018-05-09 RX ADMIN — LEVETIRACETAM SCH MG: 100 SOLUTION ORAL at 06:32

## 2018-05-09 RX ADMIN — LEVALBUTEROL HYDROCHLORIDE SCH MG: 1.25 SOLUTION, CONCENTRATE RESPIRATORY (INHALATION) at 08:14

## 2018-05-09 RX ADMIN — LEVALBUTEROL HYDROCHLORIDE SCH MG: 1.25 SOLUTION, CONCENTRATE RESPIRATORY (INHALATION) at 15:50

## 2018-05-09 RX ADMIN — LEVALBUTEROL HYDROCHLORIDE SCH MG: 1.25 SOLUTION, CONCENTRATE RESPIRATORY (INHALATION) at 02:44

## 2018-05-09 RX ADMIN — LEVETIRACETAM SCH MG: 100 SOLUTION ORAL at 13:51

## 2018-05-09 RX ADMIN — LEVALBUTEROL HYDROCHLORIDE SCH MG: 1.25 SOLUTION, CONCENTRATE RESPIRATORY (INHALATION) at 11:00

## 2018-05-09 RX ADMIN — LEVALBUTEROL HYDROCHLORIDE SCH MG: 1.25 SOLUTION, CONCENTRATE RESPIRATORY (INHALATION) at 19:00

## 2018-05-09 NOTE — GI PROGRESS NOTE
Assessment/Plan


Problems:  


(1) Crohn's disease


ICD Codes:  K50.90 - Crohn's disease


SNOMED:  57169995


Qualifiers:  


   Qualified Codes:  K50.919 - Crohn's disease, unspecified, with unspecified 

complications


(2) Opioid dependence


ICD Codes:  F11.20 - Opioid dependence


SNOMED:  54695429


(3) SBO (small bowel obstruction)


ICD Codes:  K56.609 - Unspecified intestinal obstruction, unspecified as to 

partial versus complete obstruction


SNOMED:  089040605


(4) Chronic abdominal pain


Status:  stable


Status Narrative


Discussed with Dr. Mcleod.


Assessment/Plan


CT AP reviewed >>


Evidence of disseminated malignancy, with large left pulmonary hilar mass or 

adenopathy, extensive left hilar and mediastinal adenopathy, multiple pleural-

based masses on the left, multiple masses within the liver, and retroperitoneal 

lymphadenopathy.





Recommendations


supportive care


now DNI/DNR


fu oncology recs >>Do not recommend any further diagnosis/tissue given poor 

state, is not a chemotherapy candidate


pain mgmt


poor prognosis





The patient was seen and examined at bedside and all new and available data was 

reviewed in the patients chart. I agree with the above findings, impression 

and plan.  (Patient seen earlier today. Signature stamp does not reflect 

patient encounter time.). - Dimitry Mcleod MD





Subjective


Subjective


generalized pain





Objective





Last 24 Hour Vital Signs








  Date Time  Temp Pulse Resp B/P (MAP) Pulse Ox O2 Delivery O2 Flow Rate FiO2


 


5/9/18 08:14  100 20  100 Non-Rebreather 15.0 100


 


5/9/18 08:11      Non-Rebreather 15.0 100


 


5/9/18 08:10     100 Non-Rebreather 15.0 100


 


5/9/18 08:00 97.4 89 11 96/90 96 Non-Rebreather 15.0 





 97.4       


 


5/9/18 04:53      Non-Rebreather 15.0 


 


5/9/18 04:00 97.4 98 22 102/58 94   





 97.4       


 


5/9/18 02:44      Non-Rebreather 15.0 100


 


5/9/18 02:44  97 12  89 Non-Rebreather 15.0 100


 


5/9/18 00:00 97.7 100 19 103/68 94   





 97.7       


 


5/8/18 23:09  99 14  88 Non-Rebreather 15.0 100


 


5/8/18 23:09      Non-Rebreather 15.0 100


 


5/8/18 20:00 97.3 101 22 102/61 94   





 97.3       


 


5/8/18 19:49 98.9       


 


5/8/18 19:09  102 22  90 Non-Rebreather 15.0 100


 


5/8/18 19:09      Non-Rebreather 15.0 100


 


5/8/18 19:00      Non-Rebreather 15.0 100


 


5/8/18 19:00     90 Non-Rebreather 15.0 100


 


5/8/18 18:37 98.9       


 


5/8/18 16:18  97 18  94 Non-Rebreather 15.0 100


 


5/8/18 16:08  94 18  91 Non-Rebreather 15.0 100


 


5/8/18 16:00 98.9 101 21 96/62 98 Non-Rebreather 15.0 100





 98.9       


 


5/8/18 16:00  96      


 


5/8/18 12:00 98.6 96 20 98/56 99 Non-Rebreather 15.0 100





 98.6       


 


5/8/18 11:53  96      


 


5/8/18 11:20      Non-Rebreather  


 


5/8/18 11:20      Non-Rebreather  

















Intake and Output  


 


 5/8/18 5/9/18





 19:00 07:00


 


Intake Total 1225.000 ml 805 ml


 


Balance 1225.000 ml 805 ml


 


  


 


Intake Oral  100 ml


 


IV Total 1225.000 ml 705 ml


 


# Voids  2











Laboratory Tests








Test


  5/8/18


13:05 5/9/18


07:30


 


Vancomycin Level Trough


  10.2 ug/mL


(5.0-12.0) 


 


 


White Blood Count


  


  9.4 K/UL


(4.8-10.8)


 


Red Blood Count


  


  3.69 M/UL


(4.20-5.40)  L


 


Hemoglobin


  


  10.6 G/DL


(12.0-16.0)  L


 


Hematocrit


  


  33.9 %


(37.0-47.0)  L


 


Mean Corpuscular Volume  92 FL (80-99)  


 


Mean Corpuscular Hemoglobin


  


  28.6 PG


(27.0-31.0)


 


Mean Corpuscular Hemoglobin


Concent 


  31.2 G/DL


(32.0-36.0)  L


 


Red Cell Distribution Width


  


  15.2 %


(11.6-14.8)  H


 


Platelet Count


  


  83 K/UL


(150-450)  L


 


Mean Platelet Volume


  


  9.2 FL


(6.5-10.1)


 


Neutrophils (%) (Auto)


  


  % (45.0-75.0)


 


 


Lymphocytes (%) (Auto)


  


  % (20.0-45.0)


 


 


Monocytes (%) (Auto)   % (1.0-10.0)  


 


Eosinophils (%) (Auto)   % (0.0-3.0)  


 


Basophils (%) (Auto)   % (0.0-2.0)  


 


Differential Total Cells


Counted 


  100  


 


 


Neutrophils % (Manual)  68 % (45-75)  


 


Lymphocytes % (Manual)  21 % (20-45)  


 


Monocytes % (Manual)  2 % (1-10)  


 


Eosinophils % (Manual)  4 % (0-3)  H


 


Basophils % (Manual)  0 % (0-2)  


 


Band Neutrophils  5 % (0-8)  


 


Platelet Estimate  Decreased  L


 


Platelet Morphology  Normal  


 


Hypochromasia  1+  


 


Anisocytosis  1+  


 


Stomatocytes  Occasional  


 


Sodium Level


  


  146 MMOL/L


(136-145)  H


 


Potassium Level


  


  3.1 MMOL/L


(3.5-5.1)  L


 


Chloride Level


  


  110 MMOL/L


()  H


 


Carbon Dioxide Level


  


  29 MMOL/L


(21-32)


 


Anion Gap


  


  7 mmol/L


(5-15)


 


Blood Urea Nitrogen


  


  19 mg/dL


(7-18)  H


 


Creatinine


  


  1.0 MG/DL


(0.55-1.30)


 


Estimat Glomerular Filtration


Rate 


  > 60 mL/min


(>60)


 


Glucose Level


  


  114 MG/DL


()  H


 


Calcium Level


  


  9.3 MG/DL


(8.5-10.1)








Height (Feet):  5


Height (Inches):  6.00


Weight (Pounds):  129


General Appearance:  WD/WN, no apparent distress, alert, thin


Cardiovascular:  normal rate


Respiratory/Chest:  normal breath sounds, no respiratory distress


Abdominal Exam:  normal bowel sounds, non tender, soft


Extremities:  non-tender











Elena Seay N.P. May 9, 2018 11:04

## 2018-05-09 NOTE — PHYSICIAN QUERY
--------- THIS DOCUMENT IS A PERMANENT PART OF THE MEDICAL RECORD ---------



*****PLEASE COMPLETE DOCUMENT BEFORE SIGNING*****



Dear Dr. French                                            Date: 05/09/2018

/CDS Name: Nubia Briseno                     /CDS Phone No.[phone]
[phone]





Exercise your independent professional judgment when responding to the query. 
Questions asked do not imply a particular answer is desired or expected. We 
greatly appreciate your clarification on this issue.





CLINICAL DOCUMENTATION STATES: Malnutrition, Failure to thrive (Consultation 
notes 05/05/2018)



Malnutrition and failure to thrive was documented in the consultation notes.



CLINICAL FINDINGS SHOW:



Albumin level since admission: 04/19/2018: 2.3

                                                   04/20/2018: 2.2

                                         04/21/2018: 2.3



Please clarify the severity of Malnutrition:



[] Mild        [] Moderate        [] Protein/Calorie Malnutrition         [] 
Protein Malnutrition

   

>Serum albumin 2.8 to 3.4 g/dL or Pre-albumin 5 to 7 mg/dl (3) 

>Inadequate nutritional intake (1, 2, 3, 4)

>NPO > 5 days

>Weight loss: 5% in 1 month or  7.5% in 3 months or 10% in 6 months (1,3,4)

>BMI 16 to 18.4 or Weight <90 of ideal body weight (1,2,3,4)   

________________________________________________________________________________



[] Serum Malnutrition (Protein/Calorie)        [] Severe Protein Malnutrition



>Serum Albumin < 2.8 g/dL (1,2)

>Lymphocytes < 1500/uL (2)

>Inadequate nutritional intake3 , high stress e.g. major trauma, sepsis, 
pancreatitis, burns etc.

>Decubitus ulcers (1,2) , skin breakdown(2), easy hair pluckability

>Weight <80% standard for height (2)

>Triceps skin fold <3 mm2

>Mid-arm muscle circumference <25 cm2

>Creatinine-height index <60% standard (2)

________________________________________________________________________________
_



[] Hypoalbuminemia

[] Emancipated w/ Malnutrition

[] Kwashiorkor (rare in [place])

[] Marasmus

[] Other _____________

[] Unable to determine

[] Not Applicable



Condition Present on Admission:         [] Yes             [] No              [
] Unable to determine



Please also document in your Progress Notes and/or Discharge Summary and 
indicate if the condition was present on admission.









______________________

M.D.



References:

1 [facility] Board. (2007). Nutritional support strategy for protein
-energy malnutrition in the elderly. Clinical Practice Guidelines.

2 PRECIOUS Garcia (2011). Malnutrition and nutritional assessment. In Fabrice Love (18th Ed.) Carlos's Principle of Internal Medicine (450454) [place], 
[place]: [facility]

3 ROSE MARY Fisher (2001). Clinical Nutrition: Protein-energy malnutrition in the 
inpatient. [employer] Journal, vol. 165 no. 10 (pp. 1217- 5629
).

4 SHAMIR Rojas (2012). Geriactric Nutrition: Nutritional Issues in Older Adults. 
[email or web address]

MTDD

## 2018-05-09 NOTE — GENERAL PROGRESS NOTE
Assessment/Plan


Problem List:  


(1) Crohn's disease


ICD Codes:  K50.90 - Crohn's disease


SNOMED:  90118628


Qualifiers:  


   Qualified Codes:  K50.919 - Crohn's disease, unspecified, with unspecified 

complications


(2) Opioid dependence


ICD Codes:  F11.20 - Opioid dependence


SNOMED:  69121812


(3) Intractable abdominal pain


ICD Codes:  R10.9 - Unspecified abdominal pain


SNOMED:  01210791, 470277389


(4) COPD (chronic obstructive pulmonary disease)


ICD Codes:  J44.9 - Chronic obstructive pulmonary disease


SNOMED:  52865539


(5) Shortness of breath


(6) SOB (shortness of breath)


ICD Codes:  R06.02 - Shortness of breath


SNOMED:  764269397


(7) UTI (urinary tract infection)


ICD Codes:  N39.0 - Urinary tract infection, site not specified


SNOMED:  01631397


(8) Lung mass


ICD Codes:  R91.8 - Other nonspecific abnormal finding of lung field


SNOMED:  906601487


(9) Tachycardia


ICD Codes:  R00.0 - Tachycardia, unspecified


SNOMED:  1780888


Status:  unchanged


Assessment/Plan


ot pt diet pain control sx eval dc w hospice





Subjective


Constitutional:  Reports: weakness


Respiratory:  Reports: shortness of breath


Allergies:  


Coded Allergies:  


     No Known Allergies (Verified , 10/27/06)


All Systems:  reviewed and negative except above


Subjective


02 mask sleepy





Objective





Last 24 Hour Vital Signs








  Date Time  Temp Pulse Resp B/P (MAP) Pulse Ox O2 Delivery O2 Flow Rate FiO2


 


5/9/18 12:00 98.5 99 18 137/77 96 Non-Rebreather 15.0 





 98.5       


 


5/9/18 08:24  101 22  100 Non-Rebreather 15.0 100


 


5/9/18 08:14  100 20  100 Non-Rebreather 15.0 100


 


5/9/18 08:11      Non-Rebreather 15.0 100


 


5/9/18 08:10     100 Non-Rebreather 15.0 100


 


5/9/18 08:00 97.4 89 11 96/90 96 Non-Rebreather 15.0 





 97.4       


 


5/9/18 04:53      Non-Rebreather 15.0 


 


5/9/18 04:00 97.4 98 22 102/58 94   





 97.4       


 


5/9/18 02:44      Non-Rebreather 15.0 100


 


5/9/18 02:44  97 12  89 Non-Rebreather 15.0 100


 


5/9/18 00:00 97.7 100 19 103/68 94   





 97.7       


 


5/8/18 23:09  99 14  88 Non-Rebreather 15.0 100


 


5/8/18 23:09      Non-Rebreather 15.0 100


 


5/8/18 20:00 97.3 101 22 102/61 94   





 97.3       


 


5/8/18 19:49 98.9       


 


5/8/18 19:09  102 22  90 Non-Rebreather 15.0 100


 


5/8/18 19:09      Non-Rebreather 15.0 100


 


5/8/18 19:00      Non-Rebreather 15.0 100


 


5/8/18 19:00     90 Non-Rebreather 15.0 100


 


5/8/18 18:37 98.9       


 


5/8/18 16:18  97 18  94 Non-Rebreather 15.0 100


 


5/8/18 16:08  94 18  91 Non-Rebreather 15.0 100


 


5/8/18 16:00 98.9 101 21 96/62 98 Non-Rebreather 15.0 100





 98.9       


 


5/8/18 16:00  96      

















Intake and Output  


 


 5/8/18 5/9/18





 19:00 07:00


 


Intake Total 1225.000 ml 805 ml


 


Balance 1225.000 ml 805 ml


 


  


 


Intake Oral  100 ml


 


IV Total 1225.000 ml 705 ml


 


# Voids  2








Laboratory Tests


5/9/18 07:30: 


White Blood Count 9.4, Red Blood Count 3.69L, Hemoglobin 10.6L, Hematocrit 33.9L

, Mean Corpuscular Volume 92, Mean Corpuscular Hemoglobin 28.6, Mean 

Corpuscular Hemoglobin Concent 31.2L, Red Cell Distribution Width 15.2H, 

Platelet Count 83L, Mean Platelet Volume 9.2, Neutrophils (%) (Auto) , 

Lymphocytes (%) (Auto) , Monocytes (%) (Auto) , Eosinophils (%) (Auto) , 

Basophils (%) (Auto) , Differential Total Cells Counted 100, Neutrophils % (

Manual) 68, Lymphocytes % (Manual) 21, Monocytes % (Manual) 2, Eosinophils % (

Manual) 4H, Basophils % (Manual) 0, Band Neutrophils 5, Platelet Estimate 

DecreasedL, Platelet Morphology Normal, Hypochromasia 1+, Anisocytosis 1+, 

Stomatocytes Occasional, Sodium Level 146H, Potassium Level 3.1L, Chloride 

Level 110H, Carbon Dioxide Level 29, Anion Gap 7, Blood Urea Nitrogen 19H, 

Creatinine 1.0, Estimat Glomerular Filtration Rate > 60, Glucose Level 114H, 

Calcium Level 9.3


Height (Feet):  5


Height (Inches):  6.00


Weight (Pounds):  129


General Appearance:  lethargic


EENT:  normal ENT inspection


Neck:  normal alignment


Cardiovascular:  normal peripheral pulses, normal rate, regular rhythm


Respiratory/Chest:  decreased breath sounds


Abdomen:  normal bowel sounds


Extremities:  normal inspection


Edema:  no edema noted Arm (L), no edema noted Arm (R), no edema noted Leg (L), 

no edema noted Leg (R), no edema noted Pedal (L), no edema noted Pedal (R), no 

edema noted Generalized


Neurologic:  motor weakness


Skin:  normal pigmentation, warm/dry











MAICOL LANG May 9, 2018 14:38

## 2018-05-09 NOTE — PROGRESS NOTE
DATE:  05/08/2018



NOTE:  POOR AUDIO



HISTORY OF PRESENT ILLNESS:  This is a 62-year-old female patient with

intractable abdominal pains, diffuse _____ high levels of anxiety.  That

is why, her attending has requested daily psychiatric consultation _____

of anxiety has worsened secondary to stress of her mental illness.

Cognition has declined below baseline.



MENTAL STATUS EXAMINATION:  This is a 62-year-old female.  Appearance

disheveled.  Attitude, irritable and agitated.  Affect guarded and

restricted.  Intellect poor.  Mood depressed, anxious.  Motor activity,

psychomotor agitation.  Attention span is poor.  Orientation x2.  Speech

is pressured.  Thought process, disorganized and illogical.  Thought

content, auditory hallucinations, and paranoid delusions.  Insight and

judgement is poor.



DIAGNOSIS:

1. Major depression with psychotic features, rule out dementia.

2. Generalized _____ .



PLAN:  _____  Continue with Neurontin and continue her on psychotropic

medications _____ .  Chart reviewed and discussed with staff.









  ______________________________________________

  Samantha Rick M.D.





DR:  GJ/PM

D:  05/08/2018 08:17

T:  05/08/2018 23:34

JOB#:  [id number]

CC:

## 2018-05-09 NOTE — GENERAL PROGRESS NOTE
Assessment/Plan


Assessment/Plan


(1) Chronic abdominal pain


(2) Chronic pancreatitis


(3) Crohn's disease 


(4) Herniated nucleus pulposus, lumbar


(5) Lumbar spondylosis


(6) Radiculopathy of lumbar region


(7) Lung mass, Metastatic cancer


Pt will be continued on Norco and pt has intrathecal pump.


HOLD OPIOIDS FOR OVERSEDATION OR SBP<90 OR DBP<60 OR O2SAT<92% OR RR<12


Pt was d/w Dr. Shetty and he concurred.





Subjective


Date patient seen:  May 9, 2018


Time patient seen:  07:00 - am


ROS Limited/Unobtainable:  Yes


Allergies:  


Coded Allergies:  


     No Known Allergies (Verified , 10/27/06)


Subjective


In bed no signs of pain or distress. One Norco was given in last day.





Objective





Last 24 Hour Vital Signs








  Date Time  Temp Pulse Resp B/P (MAP) Pulse Ox O2 Delivery O2 Flow Rate FiO2


 


5/9/18 08:14  100 20  100 Non-Rebreather 15.0 100


 


5/9/18 08:11      Non-Rebreather 15.0 100


 


5/9/18 08:10     100 Non-Rebreather 15.0 100


 


5/9/18 08:00 97.4 89 11 96/90 96 Non-Rebreather 15.0 





 97.4       


 


5/9/18 04:53      Non-Rebreather 15.0 


 


5/9/18 04:00 97.4 98 22 102/58 94   





 97.4       


 


5/9/18 02:44      Non-Rebreather 15.0 100


 


5/9/18 02:44  97 12  89 Non-Rebreather 15.0 100


 


5/9/18 00:00 97.7 100 19 103/68 94   





 97.7       


 


5/8/18 23:09  99 14  88 Non-Rebreather 15.0 100


 


5/8/18 23:09      Non-Rebreather 15.0 100


 


5/8/18 20:00 97.3 101 22 102/61 94   





 97.3       


 


5/8/18 19:49 98.9       


 


5/8/18 19:09  102 22  90 Non-Rebreather 15.0 100


 


5/8/18 19:09      Non-Rebreather 15.0 100


 


5/8/18 19:00      Non-Rebreather 15.0 100


 


5/8/18 19:00     90 Non-Rebreather 15.0 100


 


5/8/18 18:37 98.9       


 


5/8/18 16:18  97 18  94 Non-Rebreather 15.0 100


 


5/8/18 16:08  94 18  91 Non-Rebreather 15.0 100


 


5/8/18 16:00 98.9 101 21 96/62 98 Non-Rebreather 15.0 100





 98.9       


 


5/8/18 16:00  96      


 


5/8/18 12:00 98.6 96 20 98/56 99 Non-Rebreather 15.0 100





 98.6       


 


5/8/18 11:53  96      


 


5/8/18 11:20      Non-Rebreather  


 


5/8/18 11:20      Non-Rebreather  

















Intake and Output  


 


 5/8/18 5/9/18





 19:00 07:00


 


Intake Total 1225.000 ml 805 ml


 


Balance 1225.000 ml 805 ml


 


  


 


Intake Oral  100 ml


 


IV Total 1225.000 ml 705 ml


 


# Voids  2








Laboratory Tests


5/8/18 13:05: Vancomycin Level Trough 10.2


5/9/18 07:30: 


White Blood Count 9.4, Red Blood Count 3.69L, Hemoglobin 10.6L, Hematocrit 33.9L

, Mean Corpuscular Volume 92, Mean Corpuscular Hemoglobin 28.6, Mean 

Corpuscular Hemoglobin Concent 31.2L, Red Cell Distribution Width 15.2H, 

Platelet Count 83L, Mean Platelet Volume 9.2, Neutrophils (%) (Auto) , 

Lymphocytes (%) (Auto) , Monocytes (%) (Auto) , Eosinophils (%) (Auto) , 

Basophils (%) (Auto) , Neutrophils % (Manual) [Pending], Lymphocytes % (Manual) 

[Pending], Platelet Estimate [Pending], Platelet Morphology [Pending], Sodium 

Level [Pending], Potassium Level [Pending], Chloride Level [Pending], Carbon 

Dioxide Level [Pending], Blood Urea Nitrogen [Pending], Creatinine [Pending], 

Estimat Glomerular Filtration Rate [Pending], Glucose Level [Pending], Calcium 

Level [Pending]


Height (Feet):  5


Height (Inches):  6.00


Weight (Pounds):  129


Objective


General Appearance:  on Bipap


Neck:  normal alignment, supple


Cardiovascular:  tachycardic


Respiratory/Chest:  decreased breath sounds


Abdomen:  tender, distended, intrathecal pump palpated


Extremities:  non-tender


Edema:  no edema noted











LEA BRAVO May 9, 2018 08:42

## 2018-05-09 NOTE — PROGRESS NOTE
DATE:  05/09/2018



HISTORY OF PRESENT ILLNESS:  This is a 62-year-old female with severe

abdominal pain.  She has high levels of anxiety, mood lability, worsened

by stress of her medical illness.  Her attending has requested daily

psychiatric consultation.



MENTAL STATUS EXAMINATION:  This is a 62-year-old female.  Appearance is

disheveled.  Attitude irritable and agitated.  Affect guarded and

restricted.  Intellect poor.  Mood depressed and anxious.  Motor activity,

psychomotor agitation.  Attention span is poor.  Orientation x2.  Speech

is pressured.  Thought process, disorganized and illogical.  Thought

content, auditory hallucinations and paranoid delusions.  Insight and

judgment are poor.



DIAGNOSIS:  Generalized anxiety disorder.



PLAN:  Continue to treat her with Neurontin.  _____ anxiety and depression

throughout hospital course.  An 18 to 20 minutes of supportive

psychotherapy provided.  Mood is anxious _____.  Chart reviewed and

discussed with staff.  Seen and assessed in her room.









  ______________________________________________

  Samantha Rick M.D.





DR:  GJ/PM

D:  05/09/2018 07:14

T:  05/09/2018 15:57

JOB#:  [id number]

CC:

## 2018-05-09 NOTE — PULMONOLOGY PROGRESS NOTE
Assessment/Plan


Problems:  


(1) Metastatic cancer


(2) Malignant pleural effusion


(3) Chronic pain disorder


(4) IBD (inflammatory bowel disease)


(5) Interstitial lung disease


(6) COPD (chronic obstructive pulmonary disease)


Assessment/Plan


on 100% NRM


respiratory treatment


titrate fio3 to sat of 92


family doesn't want NG tube feeding


continue current symptomatic treatment





pain management





private room 


dc planning preferably with hospice. in process





there is no point of continuing IV fluids





Subjective


ROS Limited/Unobtainable:  No


Constitutional:  Reports: no symptoms


HEENT:  Repors: no symptoms


Respiratory:  Reports: no symptoms


Allergies:  


Coded Allergies:  


     No Known Allergies (Verified , 10/27/06)





Objective





Last 24 Hour Vital Signs








  Date Time  Temp Pulse Resp B/P (MAP) Pulse Ox O2 Delivery O2 Flow Rate FiO2


 


5/9/18 12:00 98.5 99 18 137/77 96 Non-Rebreather 15.0 





 98.5       


 


5/9/18 08:14  100 20  100 Non-Rebreather 15.0 100


 


5/9/18 08:11      Non-Rebreather 15.0 100


 


5/9/18 08:10     100 Non-Rebreather 15.0 100


 


5/9/18 08:00 97.4 89 11 96/90 96 Non-Rebreather 15.0 





 97.4       


 


5/9/18 04:53      Non-Rebreather 15.0 


 


5/9/18 04:00 97.4 98 22 102/58 94   





 97.4       


 


5/9/18 02:44      Non-Rebreather 15.0 100


 


5/9/18 02:44  97 12  89 Non-Rebreather 15.0 100


 


5/9/18 00:00 97.7 100 19 103/68 94   





 97.7       


 


5/8/18 23:09  99 14  88 Non-Rebreather 15.0 100


 


5/8/18 23:09      Non-Rebreather 15.0 100


 


5/8/18 20:00 97.3 101 22 102/61 94   





 97.3       


 


5/8/18 19:49 98.9       


 


5/8/18 19:09  102 22  90 Non-Rebreather 15.0 100


 


5/8/18 19:09      Non-Rebreather 15.0 100


 


5/8/18 19:00      Non-Rebreather 15.0 100


 


5/8/18 19:00     90 Non-Rebreather 15.0 100


 


5/8/18 18:37 98.9       


 


5/8/18 16:18  97 18  94 Non-Rebreather 15.0 100


 


5/8/18 16:08  94 18  91 Non-Rebreather 15.0 100


 


5/8/18 16:00 98.9 101 21 96/62 98 Non-Rebreather 15.0 100





 98.9       


 


5/8/18 16:00  96      

















Intake and Output  


 


 5/8/18 5/9/18





 19:00 07:00


 


Intake Total 1225.000 ml 805 ml


 


Balance 1225.000 ml 805 ml


 


  


 


Intake Oral  100 ml


 


IV Total 1225.000 ml 705 ml


 


# Voids  2








Objective


General Appearance:  cachectic


HEENT:  normocephalic, atraumatic


Respiratory/Chest:  chest wall non-tender, lungs clear


Cardiovascular:  normal peripheral pulses, normal rate, regular rhythm


Abdomen:  normal bowel sounds, soft, non tender, no organomegaly, non distended


Extremities:  no cyanosis, no clubbing, no edema


Skin:  no rash, no lesions


Neurologic/Psychiatric:  CNs II-XII grossly normal





Microbiology








 Date/Time


Source Procedure


Growth Status


 


 


 5/7/18 12:10


Blood Blood Culture - Preliminary


NO GROWTH AFTER 24 HOURS Resulted


 


 5/7/18 12:00


Blood Blood Culture - Preliminary


NO GROWTH AFTER 24 HOURS Resulted








Laboratory Tests


5/8/18 13:05: Vancomycin Level Trough 10.2


5/9/18 07:30: 


White Blood Count 9.4, Red Blood Count 3.69L, Hemoglobin 10.6L, Hematocrit 33.9L

, Mean Corpuscular Volume 92, Mean Corpuscular Hemoglobin 28.6, Mean 

Corpuscular Hemoglobin Concent 31.2L, Red Cell Distribution Width 15.2H, 

Platelet Count 83L, Mean Platelet Volume 9.2, Neutrophils (%) (Auto) , 

Lymphocytes (%) (Auto) , Monocytes (%) (Auto) , Eosinophils (%) (Auto) , 

Basophils (%) (Auto) , Differential Total Cells Counted 100, Neutrophils % (

Manual) 68, Lymphocytes % (Manual) 21, Monocytes % (Manual) 2, Eosinophils % (

Manual) 4H, Basophils % (Manual) 0, Band Neutrophils 5, Platelet Estimate 

DecreasedL, Platelet Morphology Normal, Hypochromasia 1+, Anisocytosis 1+, 

Stomatocytes Occasional, Sodium Level 146H, Potassium Level 3.1L, Chloride 

Level 110H, Carbon Dioxide Level 29, Anion Gap 7, Blood Urea Nitrogen 19H, 

Creatinine 1.0, Estimat Glomerular Filtration Rate > 60, Glucose Level 114H, 

Calcium Level 9.3





Current Medications








 Medications


  (Trade)  Dose


 Ordered  Sig/Jed


 Route


 PRN Reason  Start Time


 Stop Time Status Last Admin


Dose Admin


 


 Acetaminophen


  (Tylenol)  650 mg  Q4H  PRN


 ORAL


 fever (temp>100.5F)  5/8/18 18:30


 5/19/18 18:29   


 


 


 Acetaminophen


  (Tylenol)  650 mg  Q4H  PRN


 RECTAL


 Mild Pain (Pain Scale 1-3)  5/8/18 18:30


 6/4/18 18:29   


 


 


 Acetaminophen/


 Hydrocodone Bitart


  (Norco 10/325)  1 tab  Q4H  PRN


 ORAL


 Moderate Pain (Pain Scale 4-6)  5/8/18 19:00


 5/11/18 18:59  5/8/18 18:37


 


 


 Chlorhexidine


 Gluconate


  (Rosy-Hex 2%)  1 applic  DAILY@2000


 TOPIC


   5/8/18 20:00


 6/5/18 19:59  5/8/18 21:58


 


 


 Dextrose  1,000 ml @ 


 100 mls/hr  Q10H


 IV


   5/8/18 18:30


 6/4/18 09:59  5/9/18 05:02


 


 


 Dextrose


  (Dextrose 50%)  50 ml  STAT  PRN


 IV


 Hypoglycemia BS<60mg/dL  5/8/18 18:30


 6/7/18 18:29   


 


 


 Ertapenem 1 gm/


 Sodium Chloride  55 ml @ 


 110 mls/hr  Q24H


 IVPB


   5/8/18 21:00


 5/12/18 20:59  5/8/18 22:00


 


 


 Levalbuterol HCl


  (Xopenex)  0.625 mg  Q4HRT


 HHN


   5/8/18 19:00


 5/13/18 18:59  5/9/18 08:14


 


 


 Levetiracetam


  (Keppra)  1,000 mg  EVERY 8  HOURS


 ORAL


   5/8/18 22:00


 6/3/18 08:59  5/9/18 06:32


 


 


 Levothyroxine


 Sodium


  (Synthroid)  75 mcg  ACBREAKFAST


 ORAL


   5/9/18 06:30


 5/20/18 06:29  5/9/18 06:31


 


 


 Nitroglycerin


  (Ntg)  0.4 mg  Q5M X 3 DOSES PRN


 SL


 Prn Chest Pain  5/8/18 18:30


 5/19/18 10:59   


 


 


 Ondansetron HCl


  (Zofran)  4 mg  Q6H  PRN


 IVP


 Nausea & Vomiting  5/8/18 18:30


 5/19/18 18:29   


 


 


 Vancomycin HCl


  (Vanco rx to


 dose)  1 ea  DAILY  PRN


 MISC


 Per rx protocol  5/8/18 18:30


 6/7/18 18:29   


 


 


 Vancomycin HCl 1


 gm/Dextrose  275 ml @ 


 183.708


 mls/hr  Q24H


 IVPB


   5/9/18 17:00


 5/14/18 16:59   


 

















Blaze Fraga MD May 9, 2018 12:43

## 2018-05-10 NOTE — GENERAL PROGRESS NOTE
Assessment/Plan


Assessment/Plan


IMPRESSION/RECS:


#. Stage IV malignancy, potentially lung cancer given pattern of spread, proven 

malignancy on pleural fluid. 


--> Has a left lower lobe lung mass. Left inferior hilar mass. Left pleural 

base lung mass. Left pleural effusion. 


--> Retroperitoneal lymphadenopathy. Multiple low-attenuation liver lesions.


--> Discussed with son, RN, Primary MD, given poor performance status recommend 

palliative/hospice care


--> Do not recommend any further diagnosis/tissue given poor state, is not a 

chemotherapy candidate


#. Anemia due to underlying malignancy - continue to closely monitor


--> hgb goal is >7. Transfuse if needed. 


--> Hemoglobin stable at this time.


#. Leukocytosis.


--> Monitor and trend cbc. 


#. Crohn disease.


#. Opioid dependence.


#. Chronic obstructive pulmonary disease.


#. History of smoking.


#. Emphysema.


#. Perianal abscess.


#. Intractable abdominal pain.


#. Status post morphine pump placement.


#. Status post exploratory laparotomy.


#. Pneumoperitoneum.


#. Tachycardia. 





DC planning.





Subjective


Date patient seen:  May 9, 2018


Constitutional:  Denies: no symptoms, chills, diaphoresis, fever, malaise, 

weakness, other


HEENT:  Denies: no symptoms, eye pain, blurred vision, tearing, double vision, 

ear pain, ear discharge, nose pain, nose congestion, throat pain, throat 

swelling, mouth pain, mouth swelling, other


Cardiovascular:  Denies: no symptoms, chest pain, edema, irregular heart rate, 

lightheadedness, palpitations, syncope, other


Respiratory:  Denies: no symptoms, cough, orthopnea, shortness of breath, SOB 

with excertion, SOB at rest, sputum, stridor, wheezing, other


Gastrointestinal/Abdominal:  Denies: no symptoms, abdomen distended, abdominal 

pain, black stools, tarry stools, blood in stool, constipated, diarrhea, 

difficulty swallowing, nausea, poor appetite, poor fluid intake, rectal bleeding

, vomiting, other


Genitourinary:  Denies: no symptoms, burning, discharge, frequency, flank pain, 

hematuria, incontinence, pain, urgency, other


Neurologic/Psychiatric:  Denies: no symptoms, anxiety, depressed, emotional 

problems, headache, numbness, paresthesia, pre-existing deficit, seizure, 

tingling, tremors, weakness, other


Hematologic/Lymphatic:  Reports: anemia


Allergies:  


Coded Allergies:  


     No Known Allergies (Verified , 10/27/06)


Subjective


Lethargic. Pain control. Pending discharge. Tachycardia.





Objective





Last 24 Hour Vital Signs








  Date Time  Temp Pulse Resp B/P (MAP) Pulse Ox O2 Delivery O2 Flow Rate FiO2


 


5/9/18 19:38      Non-Rebreather  


 


5/9/18 19:37     94 Non-Rebreather 15.0 100


 


5/9/18 19:37      Non-Rebreather 15.0 100


 


5/9/18 19:36     94 Non-Rebreather 15.0 100


 


5/9/18 16:01  96 14  100 Non-Rebreather 15.0 100


 


5/9/18 16:01 97.3 100 18 123/66 96  15.0 





 97.3       


 


5/9/18 15:51  113 14  93 Non-Rebreather 15.0 100


 


5/9/18 12:00 98.5 99 18 137/77 96 Non-Rebreather 15.0 





 98.5       


 


5/9/18 11:06      Non-Rebreather  


 


5/9/18 11:05      Non-Rebreather  


 


5/9/18 08:24  101 22  100 Non-Rebreather 15.0 100


 


5/9/18 08:14  100 20  100 Non-Rebreather 15.0 100


 


5/9/18 08:11      Non-Rebreather 15.0 100


 


5/9/18 08:10     100 Non-Rebreather 15.0 100


 


5/9/18 08:00 97.4 89 11 96/90 96 Non-Rebreather 15.0 





 97.4       


 


5/9/18 04:53      Non-Rebreather 15.0 


 


5/9/18 04:00 97.4 98 22 102/58 94   





 97.4       


 


5/9/18 02:44      Non-Rebreather 15.0 100


 


5/9/18 02:44  97 12  89 Non-Rebreather 15.0 100

















Intake and Output  


 


 5/9/18 5/10/18





 19:00 07:00


 


Intake Total 1295.000 ml 


 


Balance 1295.000 ml 


 


  


 


IV Total 1295.000 ml 


 


# Voids 1 2








Laboratory Tests


5/9/18 07:30: 


White Blood Count 9.4, Red Blood Count 3.69L, Hemoglobin 10.6L, Hematocrit 33.9L

, Mean Corpuscular Volume 92, Mean Corpuscular Hemoglobin 28.6, Mean 

Corpuscular Hemoglobin Concent 31.2L, Red Cell Distribution Width 15.2H, 

Platelet Count 83L, Mean Platelet Volume 9.2, Neutrophils (%) (Auto) , 

Lymphocytes (%) (Auto) , Monocytes (%) (Auto) , Eosinophils (%) (Auto) , 

Basophils (%) (Auto) , Differential Total Cells Counted 100, Neutrophils % (

Manual) 68, Lymphocytes % (Manual) 21, Monocytes % (Manual) 2, Eosinophils % (

Manual) 4H, Basophils % (Manual) 0, Band Neutrophils 5, Platelet Estimate 

DecreasedL, Platelet Morphology Normal, Hypochromasia 1+, Anisocytosis 1+, 

Stomatocytes Occasional, Sodium Level 146H, Potassium Level 3.1L, Chloride 

Level 110H, Carbon Dioxide Level 29, Anion Gap 7, Blood Urea Nitrogen 19H, 

Creatinine 1.0, Estimat Glomerular Filtration Rate > 60, Glucose Level 114H, 

Calcium Level 9.3


Height (Feet):  5


Height (Inches):  6.00


Weight (Pounds):  129


General Appearance:  cachetic


Cardiovascular:  tachycardia


Respiratory/Chest:  decreased breath sounds


Abdomen:  non tender, soft











Erick Russell MD May 10, 2018 00:28

## 2018-05-11 NOTE — DISCHARGE SUMMARY
Discharge Summary


DATE OF ADMISSION: 04/19/2018


DATE OF DISCHARGE: 05/19/2018





CONSULTANTS: 


Dr. Erick Ross





BRIEF HOSPITAL COURSE:


Patient is a 62-year-old female, who lives at home, presented to ER complaining 

of increased abdominal pain for 2 days.  Pain was left sided, sharp, 

nonradiating, 9 out of 10.  She has history of Crohn's disease and colitis.  

She presented to ED today prior, with similar complaints, she was discharged 

home on the medications.  She continued to have symptoms and went back to ED.  

She has history of Crohn's disease and colitis, asthma, COPD, MI, CVA/TIA.


On evaluation, blood work showed mild leukocytosis, WBC 11.3.  Lipase 56.  She 

was admitted for evaluation of abdominal pain.  She has chronic pain and has 

morphine pump implanted.  She had multiple surgeries in the past for bowel 

obstruction.  She eventually agreed to abdominal CT.  CT findings showed left 

pleural effusion, left lower lung mass, large mediastinal and periaortic lymph 

nodes, new liver lesion stented area around the liver.  Surgical evaluation was 

done.  Examination not consistent with perforation and no significant free 

fluid noted on CT or area of perforation.  Abdominal ultrasound with contracted 

gallbladder, no apparent stones, no pericholecystic fluid.  She was having low-

grade fever.  She was started on IV Zosyn.  Urine culture with ESBL.  Blood 

culture with Staphylococcus epidermides.  She was given meropenem and was 

transitioned to ertapenem.  She was given IV vancomycin for bacteremia.  Repeat 

blood culture did not isolate any growth.


She was seen by pain management.  She was continued on morphine pump.  She had 

an episode of lethargy/oversedation.  Dosage was adjusted.


She was worked up regarding lung mass.  She underwent left-sided thoracenteses 

on 04/20/2018 yielding 960 mL of fluid.  Pathology report highly suspicious for 

malignancy.


Patient has a lot of anxiety and was diagnosed to have major depressive 

disorder.  She was given modified regimen consisting of Cymbalta, Wellbutrin, 

Topamax, trazodone, Neurontin, Ambien, and prn Ativan.


Dr. Russell was consulted to evaluate lung mass.  She underwent CT of the 

lung abdomen and pelvis and showed evidence of disseminated malignancy, with 

large left pulmonary hilar mass, extensive left hilar and mediastinal adenopathy

, multiple mass within the liver and retroperitoneal lymphadenopathy.


Patient has stage IV malignancy, potentially lung cancer given pattern of 

spread and proven malignancy on pleural fluid.  Given poor performance status, 

not a candidate for chemotherapy.  She was recommended palliative/hospice care.


She had episodes of tachycardia however, no evidence of atrial fibrillation or 

SVT.  She had an echocardiogram that showed EF of 65%, severe pulmonary 

hypertension.


Acute renal function worsening.  She had elevated sodium.  IV fluids were 

changed.


Patient continued to deteriorate, she had recurrent pleural effusion.  She 

required higher demand of O2.  She was placed on 100% nonrebreather.  Family 

refused NG placement.  CODE STATUS was changed to DO NOT RESUSCITATE/DO NOT 

INTUBATE.  She was eventually transferred to SNF with hospice.





FINAL DIAGNOSES: 


Stage IV malignancy, potentially lung cancer


Sepsis secondary to ESBL UTI with possible bacteremia


Anemia due to malignancy


Crohn's disease


Opioid dependence 


COPD


Generalized anxiety disorder


Old CVA


Seizure disorder


Malignant pleural effusion status post thoracenteses


Chronic pain disorder


Interstitial lung disease


Acute renal failure


Acute hypoxemic respiratory failure requiring BiPAP


Malnutrition


Hypernatremia


Thrombocytopenia


Severe pulmonary hypertension


Sinus tachycardia due to hypoxemia and malignancy


Herniated nucleus pulposus on the lumbar area


Lumbar spondylosis


Hospice care/comfort care





DISPOSITION: Patient was transferred to [facility] with [facility].





DISCHARGE MEDICATIONS: Refer to Discharge Medication List.





I have been assigned to dictate discharge summary on this account, and I was 

not involved in the patient's management.











Modesta Webb NP May 11, 2018 16:25

## 2018-12-11 NOTE — PROGRESS NOTE
DATE:  01/27/2017



PLAN:  Continue treatment with psychotropic medications to prevent

any further decline in her cognition.  Chart reviewed and discussed with

staff.  She was seen and assessed at bedside.  Continue treatment with

Seroquel to prevent any further decline in her cognition.









  ______________________________________________

  Samantha Rick M.D.





DR:  Oj

D:  01/29/2017 19:53

T:  01/30/2017 02:23

JOB#:  [id number]

CC: Ja Moyer is a 78year old female. Patient presents with:  Fatigue: cn room 3: fatigue, loose stools have gotten better, patient states she has been use her neb more      HPI:     C/o not feeling well since last week.  Was doing well until a la Aerosol INHALE 2 PUFFS INTO THE LUNGS EVERY 4 HOURS AS NEEDED FOR WHEEZING Disp: 3 Inhaler Rfl: 3   Fluticasone Propionate  MCG/ACT Inhalation Aerosol Inhale 2 puffs into the lungs 2 (two) times daily.    Disp: 12 g Rfl: 1   acetaminophen 500 MG Oral generalized or localized, unspecified site    • Other and unspecified hyperlipidemia    • Pinched nerve in neck    • Pneumonia, organism unspecified(486)    • PONV (postoperative nausea and vomiting)    • Unspecified essential hypertension    • Visual impa [Flut*    Coughing, SHORTNESS OF BREATH  Breo Ellipta            OTHER (SEE COMMENTS)    Comment:Pneumonia  Celexa [Citalopram]     SHORTNESS OF BREATH  Codeine                 OTHER (SEE COMMENTS)    Comment:Causes breathing problem  Ctd Aspirin [Warfar* agree with ACT and AAP, will have her increase prednisone for 5 days (will have her take 20mg daily for 2 days, 10mg days for 2 day and then 5mg for 1 day).  She can then resume her 2.5 mg dose for arthritis  Environmental allergies-  Continue claritin chip

## 2019-01-01 NOTE — INFECTIOUS DISEASES PROG NOTE
Assessment/Plan


Problems:  


(1) PNA (pneumonia)


Assessment & Plan:  will continue  Zosyn which was started  by pulmonologist , 

and add azithromycin to cover anaerobes ,  will send sputum culture 





(2) Abdominal pain


Assessment & Plan:  due to crohn's disease , consult GI for further eval





(3) Leukocytosis


Assessment & Plan:  due to pneumonia , will send blood culture and continue 

antibiotics 





(4) COPD exacerbation


Assessment & Plan:  started on large dose of steroids, recommend to taper, 

continue nebulizers 





(5) Crohns disease


Assessment & Plan:  consult GI for further eval








Subjective


Allergies:  


Coded Allergies:  


     No Known Allergies (Verified , 10/27/06)





Objective


Vital Signs





Last 24 Hour Vital Signs








  Date Time  Temp Pulse Resp B/P Pulse Ox O2 Delivery O2 Flow Rate FiO2


 


5/25/17 11:46  83 20 115/64 96 Nasal Cannula 2.0 


 


5/25/17 10:33  90 18  99 Nasal Cannula  28


 


5/25/17 10:18        28


 


5/25/17 10:18  88 20  98 Nasal Cannula  28


 


5/25/17 10:18  88 20   Room Air  21


 


5/25/17 10:01 99.3 88 20 120/73 92 Room Air  


 


5/25/17 09:50 99.3 97 20 134/79 100 Room Air  








Height (Feet):  5


Height (Inches):  6.00


Weight (Pounds):  164





Laboratory Tests








Test


  5/25/17


10:30 5/25/17


13:00


 


White Blood Count


  15.5 K/UL


(4.8-10.8)  H 


 


 


Red Blood Count


  4.50 M/UL


(4.20-5.40) 


 


 


Hemoglobin


  11.7 G/DL


(12.0-16.0)  L 


 


 


Hematocrit


  38.7 %


(37.0-47.0) 


 


 


Mean Corpuscular Volume 86 FL (80-99)   


 


Mean Corpuscular Hemoglobin


  26.0 PG


(27.0-31.0)  L 


 


 


Mean Corpuscular Hemoglobin


Concent 30.2 G/DL


(32.0-36.0)  L 


 


 


Red Cell Distribution Width


  16.3 %


(11.6-14.8)  H 


 


 


Platelet Count


  454 K/UL


(150-450)  H 


 


 


Mean Platelet Volume


  6.3 FL


(6.5-10.1)  L 


 


 


Neutrophils (%) (Auto)


  69.3 %


(45.0-75.0) 


 


 


Lymphocytes (%) (Auto)


  22.0 %


(20.0-45.0) 


 


 


Monocytes (%) (Auto)


  7.0 %


(1.0-10.0) 


 


 


Eosinophils (%) (Auto)


  1.1 %


(0.0-3.0) 


 


 


Basophils (%) (Auto)


  0.6 %


(0.0-2.0) 


 


 


Sodium Level


  139 mEQ/L


(135-145) 


 


 


Potassium Level


  3.7 mEQ/L


(3.4-4.9) 


 


 


Chloride Level


  103 mEQ/L


() 


 


 


Carbon Dioxide Level


  23 mEQ/L


(20-30) 


 


 


Anion Gap 13 (5-15)   


 


Blood Urea Nitrogen


  23 mg/dL


(7-23) 


 


 


Creatinine


  0.9 mg/dL


(0.5-0.9) 


 


 


Estimat Glomerular Filtration


Rate > 60 mL/min


(>60) 


 


 


Glucose Level


  90 mg/dL


() 


 


 


Lactic Acid Level


  1.40 mmol/L


(0.66-2.22) 


 


 


Calcium Level


  9.2 mg/dL


(8.6-10.2) 


 


 


Total Bilirubin


  0.2 mg/dL


(0.0-1.2) 


 


 


Aspartate Amino Transf


(AST/SGOT) 15 U/L (5-40)  


  


 


 


Alanine Aminotransferase


(ALT/SGPT) 12 U/L (3-33)  


  


 


 


Alkaline Phosphatase


  80 U/L


() 


 


 


Total Creatine Kinase


  32 U/L


() 


 


 


Creatine Kinase MB


  < 1.5 ng/mL (<


3.8) 


 


 


Creatine Kinase MB Relative


Index   


  


 


 


Troponin I


  < 0.30 ng/mL


(<=0.30) 


 


 


Pro-B-Type Natriuretic Peptide


  49 pg/mL


(0-125) 


 


 


Total Protein


  8.9 g/dL


(6.6-8.7)  H 


 


 


Albumin


  3.2 g/dL


(3.5-5.2)  L 


 


 


Globulin 5.7 g/dL   


 


Albumin/Globulin Ratio


  0.5 (1.0-2.7)


L 


 


 


Lipase 15 U/L (< 60)   


 


Urine Color  Pending  


 


Urine Appearance  Pending  


 


Urine pH  Pending  


 


Urine Specific Gravity  Pending  


 


Urine Protein  Pending  


 


Urine Glucose (UA)  Pending  


 


Urine Ketones  Pending  


 


Urine Occult Blood  Pending  


 


Urine Nitrite  Pending  


 


Urine Bilirubin  Pending  


 


Urine Urobilinogen  Pending  


 


Urine Leukocyte Esterase  Pending  











Current Medications








 Medications


  (Trade)  Dose


 Ordered  Sig/Jed


 Route


 PRN Reason  Start Time


 Stop Time Status Last Admin


Dose Admin


 


 Albuterol/


 Ipratropium


  (DuoNeb


 0.5-3(2.5)mg/3ml)  3 ml  Q4H  PRN


 HHN


 dyspnea  5/25/17 12:00


 5/30/17 11:59   


 


 


 Bupropion HCl


  (Wellbutrin)  100 mg  DAILY


 ORAL


   5/26/17 09:00


 6/25/17 08:59 UNV  


 


 


 Dextrose


  (Dextrose 50%)    STAT  PRN


 IV


 Hypoglycemia  5/25/17 12:00


 6/24/17 11:59   


 


 


 Duloxetine HCl


  (Cymbalta)  60 mg  DAILY


 ORAL


   5/26/17 09:00


 6/25/17 08:59 UNV  


 


 


 Heparin Sodium


  (Porcine)


  (Heparin 5000


 units/ml)  5,000 units  EVERY 12  HOURS


 SUBQ


   5/25/17 21:00


 6/24/17 20:59 UNV  


 


 


 Ketorolac


 Tromethamine


  (Toradol 30mg)  30 mg  Q8H  PRN


 IV


 moderate pain 4-6  5/25/17 12:00


 5/30/17 11:59   


 


 


 Levetiracetam


  (Keppra)  1,500 mg  TWICE A  DAY


 ORAL


   5/25/17 18:00


 6/24/17 17:59 UNV  


 


 


 Lorazepam


  (Ativan 2mg/ml


 1ml)  0.5 mg  Q4H  PRN


 IV


 For Anxiety  5/25/17 12:00


 6/1/17 11:59   


 


 


 Methylprednisolone


 Sodium Succinate


  (Solu-MEDROL)  60 mg  EVERY 6  HOURS


 IV


   5/25/17 12:00


 6/24/17 11:59 UNV  


 


 


 Mirtazapine


  (Remeron)  30 mg  BEDTIME


 ORAL


   5/25/17 21:00


 6/24/17 20:59 UNV  


 


 


 Morphine Sulfate


  (Morphine


 Sulfate)  2 mg  Q4H  PRN


 IVP


 severe pain 7-10  5/25/17 12:00


 6/1/17 11:59  5/25/17 13:48


 


 


 Nitroglycerin


  (Ntg)  0.4 mg  Q5M X 3 DOSES PRN


 SL


 Prn Chest Pain  5/25/17 12:00


 6/24/17 11:59   


 


 


 Ondansetron HCl


  (Zofran)  4 mg  Q6H  PRN


 IVP


 Nausea & Vomiting  5/25/17 12:00


 6/24/17 11:59   


 


 


 Piperacillin Sod/


 Tazobactam Sod/


 Dextrose


  (Zosyn/D5W)  55 ml @ 


 110 mls/hr  EVERY 8  HOURS


 IV


   5/25/17 14:00


 5/30/17 13:59 UNV  


 


 


 Promethazine HCl/


 Codeine


  (Phenergan with


 Codeine)  5 ml  Q6H  PRN


 ORAL


 cough  5/25/17 12:00


 6/24/17 11:59   


 


 


 Quetiapine


 Fumarate


  (SEROquel)  100 mg  BEDTIME


 ORAL


   5/25/17 21:00


 6/24/17 20:59 UNV  


 


 


 Temazepam


  (Restoril)  15 mg  HSPRN  PRN


 ORAL


 Insomnia  5/25/17 12:00


 6/1/17 11:59   


 


 


 Theophylline 100


 mg  100 mg  EVERY 12  HOURS


 ORAL


   5/25/17 21:00


 6/24/17 20:59 UNV  


 


 


 Trazodone HCl


  (Desyrel)  100 mg  BEDTIME  PRN


 ORAL


 Insomnia  5/25/17 12:00


 6/24/17 11:59 UNV  


 

















Violet Dior M.D. May 25, 2017 14:02 39.5

## 2020-10-19 NOTE — CONSULTATION
DATE OF CONSULTATION:  10/05/2017



INITIAL PSYCHIATRIC  EVALUATION



REQUESTING PHYSICIAN:  Maxim Hopkins D.O.



History Of Present Illness:  This patient was admitted to the hospital

secondary to non-ST segment elevated MI.  She has some confusion, but

essentially she has high levels of anxiety secondary to the progression of

her medical illnesses.  She does have feelings of helpless, hopelessness,

low energy, and poor appetite at this time.  As far as her medial history,

she has a history of Crohn disease, this resulted in multiple admission to

[facility].  She also has a diagnosis of bipolar II disorder

for which she takes psychotropic medications for.



Past Medical History:  Crohn disease and also generalized weakness, nausea,

vomiting, hypokalemia.



Medications:  So anyway normally, she is on the dose of Wellbutrin and

Cymbalta at _____ mg, today I am going to actually increase that to 90 mg

daily as the patient is complaining of anxiety and depression, Remeron 30

mg nightly, Seroquel 100 mg nightly, Risperdal 0.5 mg twice a day, and

trazodone 150 mg nightly.  So, I am going to restart this patient on her

psychotropic medications at this time.



ALLERGIES:  She has no known drug allergies.



SUBSTANCE ABUSE HISTORY:  Denies drug or alcohol use.



Mental Status Examination:  This is a 62-year-old female with psychomotor

retardation.  Mood is depressed.  Affect guarded and restricted.  She has

racing thought and slight pressured speech pattern, but denies any

auditory or visual hallucinations or delusions.  Denies any current

suicidal or homicidal ideations.  Her insight and judgement are fair.



DIAGNOSIS:  Bipolar II.



Plan:  So my plans for this patient are to treat this patient with Seroquel

200 mg nightly, Remeron 15 mg nightly to decrease anxiety, depression, and

insomnia, Cymbalta _____ mg daily, increased to 90 mg to not just control

anxiety and depression, but also provide pain prophylaxis to help the

patient.  She is having problems with significant insomnia, so we are

going to also add trazodone 150 mg nightly.  She will continue to be

followed by Psychiatry throughout hospital course.



I would like to thank, Dr. Maxim Hopkins, for this interesting

consultation.  I will be happy to follow this patient with you throughout

her hospital course.  Chart was reviewed and discussed with staff.  The

patient was seen and assessed at bedside.









  ______________________________________________

  Samantha Rick M.D.





DR:  Tim

D:  10/05/2017 12:14

T:  10/05/2017 22:03

JOB#:  [id number]

CC: Name band;

## 2021-05-29 NOTE — GI PROGRESS NOTE
Assessment/Plan


Problems:  


(1) Crohn's disease


ICD Codes:  K50.90 - Crohn's disease


SNOMED:  38000893


Qualifiers:  


   Qualified Codes:  K50.919 - Crohn's disease, unspecified, with unspecified 

complications


(2) Opioid dependence


ICD Codes:  F11.20 - Opioid dependence


SNOMED:  21467376


(3) SBO (small bowel obstruction)


ICD Codes:  K56.609 - Unspecified intestinal obstruction, unspecified as to 

partial versus complete obstruction


SNOMED:  216216139


(4) Chronic abdominal pain


Status:  unchanged


Status Narrative


Discussed with Dr. Mcleod.


Assessment/Plan


CT AP reviewed >>


Evidence of disseminated malignancy, with large left pulmonary hilar mass or 

adenopathy, extensive left hilar and mediastinal adenopathy, multiple pleural-

based masses on the left, multiple masses within the liver, and retroperitoneal 

lymphadenopathy.





Recommendations


now DNI/DNR


fu oncology recs


pain mgmt


poor prognosis





Subjective


Subjective


limited





Objective





Last 24 Hour Vital Signs








  Date Time  Temp Pulse Resp B/P (MAP) Pulse Ox O2 Delivery O2 Flow Rate FiO2


 


5/4/18 10:50  136    Bi-pap  


 


5/4/18 10:50  136    Bi-pap  


 


5/4/18 10:50  136 26  98 Facial  40


 


5/4/18 08:47  131 22  100 Bi-pap  40


 


5/4/18 08:45  129 18  100 Bi-pap  40


 


5/4/18 08:43  130 25  100 Facial  40


 


5/4/18 08:00        40


 


5/4/18 08:00  126      


 


5/4/18 08:00 100.2 133 22 133/65 99 Bi-pap  40





 100.2       


 


5/4/18 07:21 99.6       


 


5/4/18 06:42  128 22  98 Facial  40


 


5/4/18 06:22 101.0       


 


5/4/18 05:12  140 24  100 Facial  40


 


5/4/18 04:00 101.1 138 19 124/80 96 Bi-pap  40





 101.1       


 


5/4/18 04:00        40


 


5/4/18 04:00  138      


 


5/4/18 03:05  139 18  100 Bi-pap  40


 


5/4/18 03:05      Bi-pap  40


 


5/4/18 03:04  138 18  100 Facial  40


 


5/4/18 01:53    125/84    


 


5/4/18 01:04  141 24  95 Facial  40


 


5/4/18 00:00 98.5 138 20 125/84 96 Bi-pap  40





 98.5       


 


5/4/18 00:00        40


 


5/4/18 00:00  139      


 


5/3/18 23:20  136 22  97 Facial  40


 


5/3/18 23:13    140/87    


 


5/3/18 23:05  142 26  98 Bi-pap  40


 


5/3/18 23:05      Bi-pap  40


 


5/3/18 21:04  138 25  96 Facial  40


 


5/3/18 20:00        40


 


5/3/18 20:00 98.6 133 17 140/87 97 Bi-pap  40





 98.6       


 


5/3/18 20:00  134      


 


5/3/18 19:04  138 20  97 Bi-pap  40


 


5/3/18 19:04      Bi-pap  40


 


5/3/18 19:03  133 20  97 Facial  40


 


5/3/18 16:52  132 18  100 Facial  40


 


5/3/18 16:00 97.5 139 20 133/86 100 Bi-pap  40





 97.5       


 


5/3/18 16:00        40


 


5/3/18 15:54  131 22  100 Bi-pap  40


 


5/3/18 15:47  131 18  100 Facial  40


 


5/3/18 15:47  131 18  100 Bi-pap  40


 


5/3/18 15:26  133      


 


5/3/18 13:19  133 18  99 Facial  40


 


5/3/18 13:00 98.5 135 18 126/80 95 Bi-pap  40





 98.5       


 


5/3/18 12:00        40


 


5/3/18 12:00  132 18 134/84 95 Bi-pap  40


 


5/3/18 12:00  135      


 


5/3/18 11:52  133 24  100 Bi-pap  40

















Intake and Output  


 


 5/3/18 5/4/18





 19:00 07:00


 


Intake Total 240 ml 710 ml


 


Balance 240 ml 710 ml


 


  


 


IV Total 240 ml 660 ml


 


Other  50 ml


 


# Voids 2 3











Laboratory Tests








Test


  5/4/18


05:00


 


White Blood Count


  15.1 K/UL


(4.8-10.8)  H


 


Red Blood Count


  4.64 M/UL


(4.20-5.40)


 


Hemoglobin


  13.6 G/DL


(12.0-16.0)


 


Hematocrit


  41.8 %


(37.0-47.0)


 


Mean Corpuscular Volume 90 FL (80-99)  


 


Mean Corpuscular Hemoglobin


  29.2 PG


(27.0-31.0)


 


Mean Corpuscular Hemoglobin


Concent 32.5 G/DL


(32.0-36.0)


 


Red Cell Distribution Width


  15.1 %


(11.6-14.8)  H


 


Platelet Count


  134 K/UL


(150-450)  L


 


Mean Platelet Volume


  7.7 FL


(6.5-10.1)


 


Neutrophils (%) (Auto)


  76.1 %


(45.0-75.0)  H


 


Lymphocytes (%) (Auto)


  17.4 %


(20.0-45.0)  L


 


Monocytes (%) (Auto)


  4.7 %


(1.0-10.0)


 


Eosinophils (%) (Auto)


  0.6 %


(0.0-3.0)


 


Basophils (%) (Auto)


  1.1 %


(0.0-2.0)


 


Sodium Level


  155 MMOL/L


(136-145)  H


 


Potassium Level


  3.1 MMOL/L


(3.5-5.1)  L


 


Chloride Level


  112 MMOL/L


()  H


 


Carbon Dioxide Level


  32 MMOL/L


(21-32)


 


Anion Gap


  11 mmol/L


(5-15)


 


Blood Urea Nitrogen


  68 mg/dL


(7-18)  H


 


Creatinine


  1.4 MG/DL


(0.55-1.30)  H


 


Estimat Glomerular Filtration


Rate 45.8 mL/min


(>60)


 


Glucose Level


  137 MG/DL


()  H


 


Calcium Level


  9.3 MG/DL


(8.5-10.1)








Height (Feet):  5


Height (Inches):  6.00


Weight (Pounds):  129


General Appearance:  lethargic


Cardiovascular:  normal rate


Respiratory/Chest:  other - bipap


Abdominal Exam:  soft











Elena Seay N.P. May 4, 2018 11:50 Patient is on the CSS on 2019 for b-12 injection it states the current orders are . Please place new orders if appropriate.     Comfort Davis CMA  11:16 AM  2019